# Patient Record
Sex: MALE | Race: WHITE | NOT HISPANIC OR LATINO | Employment: OTHER | URBAN - METROPOLITAN AREA
[De-identification: names, ages, dates, MRNs, and addresses within clinical notes are randomized per-mention and may not be internally consistent; named-entity substitution may affect disease eponyms.]

---

## 2017-01-11 ENCOUNTER — TELEPHONE (OUTPATIENT)
Dept: INTERNAL MEDICINE | Facility: MEDICAL CENTER | Age: 52
End: 2017-01-11

## 2017-01-11 NOTE — TELEPHONE ENCOUNTER
1. Caller Name: Humza                       Call Back Number: [phone]    2. Message: States pt has not been taking his pm meds x 2 months, has appt with you tomorrow, would like non-fasting labs drawn today to see current #'s.     3. Patient approves office to leave a detailed voicemail/MyChart message: yes

## 2017-01-12 ENCOUNTER — OFFICE VISIT (OUTPATIENT)
Dept: INTERNAL MEDICINE | Facility: MEDICAL CENTER | Age: 52
End: 2017-01-12
Payer: MEDICARE

## 2017-01-12 VITALS
HEIGHT: 65 IN | TEMPERATURE: 97.8 F | DIASTOLIC BLOOD PRESSURE: 108 MMHG | WEIGHT: 189 LBS | HEART RATE: 109 BPM | SYSTOLIC BLOOD PRESSURE: 144 MMHG | OXYGEN SATURATION: 94 % | BODY MASS INDEX: 31.49 KG/M2 | RESPIRATION RATE: 28 BRPM

## 2017-01-12 DIAGNOSIS — I25.119 CORONARY ARTERY DISEASE INVOLVING NATIVE CORONARY ARTERY OF NATIVE HEART WITH ANGINA PECTORIS (HCC): ICD-10-CM

## 2017-01-12 DIAGNOSIS — I25.5 CARDIOMYOPATHY, ISCHEMIC: ICD-10-CM

## 2017-01-12 DIAGNOSIS — K21.9 GASTROESOPHAGEAL REFLUX DISEASE, ESOPHAGITIS PRESENCE NOT SPECIFIED: ICD-10-CM

## 2017-01-12 DIAGNOSIS — E13.9 DIABETES MELLITUS DUE TO ABNORMAL INSULIN (HCC): ICD-10-CM

## 2017-01-12 DIAGNOSIS — I10 ESSENTIAL HYPERTENSION: ICD-10-CM

## 2017-01-12 DIAGNOSIS — E78.5 HYPERLIPIDEMIA, UNSPECIFIED HYPERLIPIDEMIA TYPE: ICD-10-CM

## 2017-01-12 DIAGNOSIS — E55.9 VITAMIN D DEFICIENCY: ICD-10-CM

## 2017-01-12 PROCEDURE — 99213 OFFICE O/P EST LOW 20 MIN: CPT | Mod: GE | Performed by: INTERNAL MEDICINE

## 2017-01-12 RX ORDER — LISINOPRIL 5 MG/1
5 TABLET ORAL DAILY
Qty: 30 TAB | Refills: 11 | Status: ON HOLD | OUTPATIENT
Start: 2017-01-12 | End: 2017-04-17

## 2017-01-12 RX ORDER — LORATADINE 10 MG/1
10 TABLET ORAL DAILY
COMMUNITY
End: 2017-01-12

## 2017-01-12 ASSESSMENT — PATIENT HEALTH QUESTIONNAIRE - PHQ9: CLINICAL INTERPRETATION OF PHQ2 SCORE: 0

## 2017-01-12 NOTE — PATIENT INSTRUCTIONS
- Current medication list:    1) Atorvastatin once daily  2) ASA 81 mg once daily  3) Metformin twice per day  4) Protonix once per day  5) Vit D once daily  6) Isosorbide mononitrate once daily  7) Lisinopril once daily

## 2017-01-12 NOTE — MR AVS SNAPSHOT
"        Ryan Trevizo   2017 2:30 PM   Office Visit   MRN: [id number]    Department:  [facility] - Internal Med   Dept Phone:  [phone]    Description:  Male : 1965   Provider:  Juancho Jenkins M.D.           Reason for Visit     Medication Management Pt has not been taking pm meds x 2 months-hoarding per caregiver       Allergies as of 2017     Allergen Noted Reactions    Ritalin [Methylphenidate] 2014   Unspecified    \"Hyper\"      You were diagnosed with     Coronary artery disease involving native coronary artery of native heart with angina pectoris (HCC)   [1891213]       Cardiomyopathy, ischemic   [063015]       Essential hypertension   [7978521]       Gastroesophageal reflux disease, esophagitis presence not specified   [3116745]       Hyperlipidemia, unspecified hyperlipidemia type   [5169201]       Vitamin D deficiency   [3605596]       Diabetes mellitus due to abnormal insulin ([facility])   [069087]         Vital Signs     Blood Pressure Pulse Temperature Respirations Height Weight    144/108 mmHg 109 36.6 °C (97.8 °F) 28 1.651 m (5' 5\") 85.73 kg (189 lb)    Body Mass Index Oxygen Saturation Smoking Status             31.45 kg/m2 94% Never Smoker          Basic Information     Date Of Birth Sex Race Ethnicity Preferred Language    1965 Male White Non- English      Your appointments     2017  4:00 PM   Established Patient with Juancho Jenkins M.D.   [facility] - Internal Medicine (--)    [address]  [address]  [facility] [address] [phone]           You will be receiving a confirmation call a few days before your appointment from our automated call confirmation system.            2017  2:30 PM   PREVIOUS PATIENT with Joseph León M.D.   [facility] (--)    [address] [place] [address] [address]  [facility] [address] [phone]              Problem List              ICD-10-CM Priority Class " Noted - Resolved    HTN (hypertension) I10 Low  7/15/2012 - Present    GERD (gastroesophageal reflux disease) K21.9 Low  7/15/2012 - Present    Hyperlipidemia E78.5 Low  7/16/2012 - Present    Ischemic Cardiomyopathy EF 45% I25.5   7/17/2012 - Present    Glaucoma (Chronic) H40.9 Low  Unknown - Present    Claudication (HCC) (Chronic) I73.9   Unknown - Present    CAD (coronary artery disease) I25.10 High  10/14/2013 - Present    Chest pain R07.9 High  12/18/2014 - Present    Hyperglycemia R73.9   4/8/2016 - Present      Health Maintenance        Date Due Completion Dates    COLONOSCOPY 5/26/2013 ---    IMM INFLUENZA (1) 9/1/2016 10/15/2015, 10/3/2015, 10/31/2014, 9/15/2012, 10/30/2011, 9/15/2011    IMM DTaP/Tdap/Td Vaccine (2 - Td) 6/15/2026 6/15/2016            Current Immunizations     Influenza TIV (IM) 10/31/2014, 9/15/2012  1:18 PM, 10/30/2011, 9/15/2011    Influenza Vaccine Quad Inj (Pf) 10/15/2015, 10/3/2015    Pneumococcal polysaccharide vaccine (PPSV-23) 7/30/2012 12:10 AM    Tdap Vaccine 6/15/2016      Below and/or attached are the medications your provider expects you to take. Review all of your home medications and newly ordered medications with your provider and/or pharmacist. Follow medication instructions as directed by your provider and/or pharmacist. Please keep your medication list with you and share with your provider. Update the information when medications are discontinued, doses are changed, or new medications (including over-the-counter products) are added; and carry medication information at all times in the event of emergency situations     Allergies:  RITALIN - Unspecified               Medications  Valid as of: January 12, 2017 -  4:17 PM    Generic Name Brand Name Tablet Size Instructions for use    Aspirin (Tablet Delayed Response) ECOTRIN 81 MG Take 1 Tab by mouth every day.        Atorvastatin Calcium (Tab) LIPITOR 40 MG Take 1 Tab by mouth every evening.        Cholecalciferol (Tab)  cholecalciferol 1000 UNIT Take 1 Tab by mouth every day.        Isosorbide Mononitrate (TABLET SR 24 HR) IMDUR 30 MG Take 1 Tab by mouth every day.        Lisinopril (Tab) PRINIVIL 5 MG Take 1 Tab by mouth every day.        MetFORMIN HCl (Tab) GLUCOPHAGE 500 MG Take 1 Tab by mouth 2 times a day, with meals.        Metoprolol Succinate (TABLET SR 24 HR) TOPROL XL 25 MG Take 1 Tab by mouth every day.        Pantoprazole Sodium (Tablet Delayed Response) PROTONIX 40 MG Take 1 Tab by mouth every day.        .                 Medicines prescribed today were sent to:     [facility], [place] - [address]    [address] [place] [address]    Phone: [phone] Fax: [phone]    Open 24 Hours?: No      Medication refill instructions:       If your prescription bottle indicates you have medication refills left, it is not necessary to call your provider’s office. Please contact your pharmacy and they will refill your medication.    If your prescription bottle indicates you do not have any refills left, you may request refills at any time through one of the following ways: The online [employer] system (except Urgent Care), by calling your provider’s office, or by asking your pharmacy to contact your provider’s office with a refill request. Medication refills are processed only during regular business hours and may not be available until the next business day. Your provider may request additional information or to have a follow-up visit with you prior to refilling your medication.   *Please Note: Medication refills are assigned a new Rx number when refilled electronically. Your pharmacy may indicate that no refills were authorized even though a new prescription for the same medication is available at the pharmacy. Please request the medicine by name with the pharmacy before contacting your provider for a refill.        Your To Do List     Future Labs/Procedures Complete By Expires    HEMOGLOBIN A1C  As  directed 1/12/2018    VITAMIN D,25 HYDROXY  As directed 1/12/2018      Referral     A referral request has been sent to our patient care coordination department. Please allow 3-5 business days for us to process this request and contact you either by phone or mail. If you do not hear from us by the 5th business day, please call us at [phone].        Instructions    - Current medication list:    1) Atorvastatin once daily  2) ASA 81 mg once daily  3) Metformin twice per day  4) Protonix once per day  5) Vit D once daily  6) Isosorbide mononitrate once daily  7) Lisinopril once daily          MyChart Status: Patient Declined

## 2017-01-12 NOTE — TELEPHONE ENCOUNTER
Spoke to Humza and relayed that you were still seeing pt's this afternoon, that we could discuss all of this at visit tomorrow and give lab order at that time.  Please reiterate to pt the importance of taking meds bid, they found out pt was hoarding them!

## 2017-01-13 NOTE — PROGRESS NOTES
Established Patient    Ryan presents today with the following:    CC: Medication reconciliation    HPI: 51 year old male with a PMH of STEMI in 2012, severe CAD, ischemic cardiomyopathy, HTN, hyperglycemia, GERD, HLD, anxiety and mild mental retardation presents to the clinic for a follow up. The patient was last seen in this office this past August as a new patient. At that time he was unsure which medications he was on and was overall a very poor historian. [facility] was contacted and a was receiving pantaprazole 40 mg daily, metformin 500 mg BID and fenofibrate 160 mg daily. Since that time the patient has visited the ED 3 times on 9/9 for HA and body aches, 12/11 for abd pain, 12/21 for cough. Basic labs were drawn on 12/11 and were significant for a glucose level of 272 and an ALT of 57. A Ct scan abdomen was also performed on 12/11 and showed changes in the distal esophagus likely related to inflammatory esophagitis and cholelithiasis. At todays visit the patient is accompanied by his caregiver named Humza who is aiding with history. Per caregiver, Mr. Trevizo has been non-compliant with medications and hoarding pills. He has been seen in the ED multiple times without any caregiver present and when medications from ED physicians are prescribed, he does not pick them up from the pharmacy. [facility] was again contacted to get an updated list of the patients medications and they are currently administering ASA, Vitamin D, metformin, protonix, atorvastatin, fenofibrate and metoprolol. Today, the patient feels fine and has no acute complaints.    Patient Active Problem List    Diagnosis Date Noted   • Chest pain 12/18/2014     Priority: High   • CAD (coronary artery disease) 10/14/2013     Priority: High   • Glaucoma      Priority: Low   • Hyperlipidemia 07/16/2012     Priority: Low   • HTN (hypertension) 07/15/2012     Priority: Low   • GERD (gastroesophageal reflux disease) 07/15/2012     Priority: Low  "  • Hyperglycemia 04/08/2016   • Claudication (HCC)    • Ischemic Cardiomyopathy EF 45% 07/17/2012       Current Outpatient Prescriptions   Medication Sig Dispense Refill   • aspirin EC (ECOTRIN) 81 MG Tablet Delayed Response Take 1 Tab by mouth every day. 30 Tab 11   • lisinopril (PRINIVIL) 5 MG Tab Take 1 Tab by mouth every day. 30 Tab 11   • vitamin D (CHOLECALCIFEROL) 1000 UNIT Tab Take 1 Tab by mouth every day. 30 Tab 11   • metformin (GLUCOPHAGE) 500 MG Tab Take 1 Tab by mouth 2 times a day, with meals. 60 Tab 11   • pantoprazole (PROTONIX) 40 MG Tablet Delayed Response Take 1 Tab by mouth every day. 30 Tab 11   • atorvastatin (LIPITOR) 40 MG Tab Take 1 Tab by mouth every evening. 30 Tab 11   • metoprolol SR (TOPROL XL) 25 MG TABLET SR 24 HR Take 1 Tab by mouth every day. 30 Tab 11   • isosorbide mononitrate SR (IMDUR) 30 MG TABLET SR 24 HR Take 1 Tab by mouth every day. 30 Tab 3     No current facility-administered medications for this visit.       ROS: As per HPI. Additional pertinent symptoms as noted below.    Constitutional: Denies fever, chills  Eyes: Denies vision loss  Reports blurry vision (recently got glasses)  ENT: Denies hearing loss, discharge, earache, odynophagia, dysphagia  Cardiovascular: Denies angina, palpitations, edema  Respiratory: Denies wheeze, sputum  GI: Denies nausea, vomiting, dysphagia, diarrhea, constipation, blood in stool, abdominal pain  : Denies frequency, hesitancy, urgency, dysuria, hematuria  Musculo-skeletal: Denies weakness, myalgia, stiffness  Skin: Denies rash, sores, jaundice  Neurological: Denies loss of sensation, numbness      /108 mmHg  Pulse 109  Temp(Src) 36.6 °C (97.8 °F)  Resp 28  Ht 1.651 m (5' 5\")  Wt 85.73 kg (189 lb)  BMI 31.45 kg/m2  SpO2 94%    Physical Exam   General:  Alert and oriented, No apparent distress. Disheveled male appears stated age, well nourished, appears anxious.  Eyes: Pupils equal and reactive. No scleral icterus. EOMI, " no injected conjunctiva.  Throat: Clear no erythema or exudates noted.  Neck: Supple. No lymphadenopathy noted. Thyroid not enlarged.  Lungs: Clear to auscultation and percussion bilaterally. No wheezing, crackles, rhales.  Cardiovascular: Regular rate and rhythm. No murmurs, rubs or gallops.  Abdomen:  Benign. No rebound or guarding noted. Non-distended, non-tender.  Extremities: No clubbing, cyanosis, edema.  Skin: Clear. No rash or suspicious skin lesions noted.      Assessment and Plan    1. Medication reconciliation              - Per [facility] pharmacy patient is being prescribed ASA, vitamin D, metformin, protonix, atorvastatin, fenofibrate and metoprolol.   - [facility] chart review with proper medications prescribed at todays visit.   - Continue vit D, metformin, protonix, atorvastatin and metoprolol   - Discontinue fenofibrate (ALT elevation, repeat CMP)   - Change ASA dose from 325mg to 81mg daily   - Added lisinopril and isosorbide mononitrate   - Caregiver states that they will be inspecting meds daily from now on                2. Coronary artery disease              - Continue metoprolol, ASA, atorvastatin   - Isosorbide mononitrate added at todays visit (previously prescribed to outside pharmacy, but patient does not  meds unless they are sent to [facility])   - Risk factor modification. Patient was counseled at length to diet + exercise              - Saw cardiologist, Dr. Calixto, on 9/29. No changes were made to medication regimen at that time.   - F/u with Dr. León     3. Ischemic Cardiomyopathy EF 45%   - Likely 2/2 to CAD              - Continue metoprolol, lisinopril, atorvastatin   - Monitor volume status    4. Hyperlipidemia              - Continue atorvastatin   - D/C fenofibrate   - Recheck lipid panel    5. Essential hypertension   - /108, pulse 109 at today's visit, but patient did not take BP medications this morning              - Continue metoprolol, added  lisinopril for renal protection and cardiomyopathy   - BP log given to patient. He has a BP cuff at home. Counseled to take BP log and bring it in with him at next visit   - Counseled regarding symptoms of hypotension     6. Gastroesophageal reflux disease              - Continue protonix daily for now   - GI referral    7. Diabetes mellitus              - HbA1c 7.7% on 5/20/2016   - Recheck HbA1c   - Continue Metformin. May need to increase dose or add second agent for better control of DM   - Check urine microalbumin    8. Vitamin D deficiency   - Continue Vitamin D replacement   - Check Vit D levels    9. Distal Esophagus changes on recent CT abd 12/11   - CT abd 12/11 shows changes in the distal esophagus likely related to inflammatory esophagitis   - Continue protonix   - Referral to GI for possible EGD    10. Health maintenance   - GI referral for colonoscopy    PLAN: Follow up in 5 weeks to review HbA1c, Vit D, lipid panel and monitor BP.          Signed by: Juancho Jenkins M.D.

## 2017-02-04 ENCOUNTER — HOSPITAL ENCOUNTER (EMERGENCY)
Facility: MEDICAL CENTER | Age: 52
End: 2017-02-04
Attending: EMERGENCY MEDICINE
Payer: MEDICARE

## 2017-02-04 VITALS
HEIGHT: 71 IN | RESPIRATION RATE: 18 BRPM | BODY MASS INDEX: 28 KG/M2 | WEIGHT: 200 LBS | HEART RATE: 94 BPM | DIASTOLIC BLOOD PRESSURE: 94 MMHG | SYSTOLIC BLOOD PRESSURE: 109 MMHG | OXYGEN SATURATION: 94 % | TEMPERATURE: 98.4 F

## 2017-02-04 DIAGNOSIS — M25.551 RIGHT HIP PAIN: ICD-10-CM

## 2017-02-04 PROCEDURE — 99283 EMERGENCY DEPT VISIT LOW MDM: CPT

## 2017-02-04 RX ORDER — TRAMADOL HYDROCHLORIDE 50 MG/1
50 TABLET ORAL EVERY 4 HOURS PRN
Qty: 16 TAB | Refills: 0 | Status: SHIPPED | OUTPATIENT
Start: 2017-02-04 | End: 2017-03-23

## 2017-02-04 ASSESSMENT — PAIN SCALES - GENERAL: PAINLEVEL_OUTOF10: 10

## 2017-02-04 NOTE — ED AVS SNAPSHOT
Neul Access Code: Activation code not generated  Current Neul Status: Patient Declined    Your email address is not on file at [facility].  Email Addresses are required for you to sign up for Neul, please contact [phone] to verify your personal information and to provide your email address prior to attempting to register for Neul.    Ryan Trevizo  [address]   [address]  [place] [address]    [employer]  A secure, online tool to manage your health information     [employer]’s Neul® is a secure, online tool that connects you to your personalized health information from the privacy of your home -- day or night - making it very easy for you to manage your healthcare. Once the activation process is completed, you can even access your medical information using the Neul anthony, which is available for free in the Apple Anthony store or Google Play store.     To learn more about Neul, visit [email or web address]    There are two levels of access available (as shown below):   My Chart Features  [facility] Primary Care Doctor [facility]  Specialists [facility]  Urgent  Care Non-[facility] Primary Care Doctor   Email your healthcare team securely and privately 24/7 X X X    Manage appointments: schedule your next appointment; view details of past/upcoming appointments X      Request prescription refills. X      View recent personal medical records, including lab and immunizations X X X X   View health record, including health history, allergies, medications X X X X   Read reports about your outpatient visits, procedures, consult and ER notes X X X X   See your discharge summary, which is a recap of your hospital and/or ER visit that includes your diagnosis, lab results, and care plan X X  X     How to register for [employer]:  Once your e-mail address has been verified, follow the following steps to sign up for [employer].     1. Go to  [email or web address]  2. Click on the Sign Up Now box, which takes you to the  New Member Sign Up page. You will need to provide the following information:  a. Enter your [employer] Access Code exactly as it appears at the top of this page. (You will not need to use this code after you’ve completed the sign-up process. If you do not sign up before the expiration date, you must request a new code.)   b. Enter your date of birth.   c. Enter your home email address.   d. Click Submit, and follow the next screen’s instructions.  3. Create a [employer] ID. This will be your [employer] login ID and cannot be changed, so think of one that is secure and easy to remember.  4. Create a [employer] password. You can change your password at any time.  5. Enter your Password Reset Question and Answer. This can be used at a later time if you forget your password.   6. Enter your e-mail address. This allows you to receive e-mail notifications when new information is available in [employer].  7. Click Sign Up. You can now view your health information.    For assistance activating your [employer] account, call [phone]

## 2017-02-04 NOTE — ED AVS SNAPSHOT
After Visit Summary                                                                                                                Ryan Trevizo   MRN: [id number]    Department:  [facility], Emergency Dept   Date of Visit:  2/4/2017            [facility], Emergency Dept    [address] [place] [id number]    Phone:  [phone]      You were seen by     Félix Soto M.D.      Your Diagnosis Was     Right hip pain     M25.551       Follow-up Information     1. Follow up with Juancho Jenkins M.D..    Specialty:  Internal Medicine    Why:  As needed    Contact information    [address] [place] [id number] [phone]        Medication Information     Review all of your home medications and newly ordered medications with your primary doctor and/or pharmacist as soon as possible. Follow medication instructions as directed by your doctor and/or pharmacist.     Please keep your complete medication list with you and share with your physician. Update the information when medications are discontinued, doses are changed, or new medications (including over-the-counter products) are added; and carry medication information at all times in the event of emergency situations.               Medication List      START taking these medications        Instructions    tramadol 50 MG Tabs   Commonly known as:  ULTRAM    Take 1 Tab by mouth every four hours as needed.   Dose:  50 mg         ASK your doctor about these medications        Instructions    aspirin EC 81 MG Tbec   Commonly known as:  ECOTRIN    Take 1 Tab by mouth every day.   Dose:  81 mg       atorvastatin 40 MG Tabs   Commonly known as:  LIPITOR    Take 1 Tab by mouth every evening.   Dose:  40 mg       isosorbide mononitrate SR 30 MG Tb24   Commonly known as:  IMDUR    Take 1 Tab by mouth every day.   Dose:  30 mg       lisinopril 5 MG Tabs   Commonly known as:  PRINIVIL    Take 1 Tab by mouth every day.   Dose:   5 mg       metformin 500 MG Tabs   Commonly known as:  GLUCOPHAGE    Take 1 Tab by mouth 2 times a day, with meals.   Dose:  500 mg       metoprolol SR 25 MG Tb24   Commonly known as:  TOPROL XL    Take 1 Tab by mouth every day.   Dose:  25 mg       pantoprazole 40 MG Tbec   Commonly known as:  PROTONIX    Take 1 Tab by mouth every day.   Dose:  40 mg       vitamin D 1000 UNIT Tabs   Commonly known as:  cholecalciferol    Take 1 Tab by mouth every day.   Dose:  1000 Units               Procedures and tests performed during your visit     CARDIAC MONITORING    O2 Protocol    SALINE LOCK        Discharge Instructions       Joint Pain  Joint pain can be caused by many things. The joint can be bruised, infected, weak from aging, or sore from exercise. The pain will probably go away if you follow your doctor's instructions for home care. If your joint pain continues, more tests may be needed to help find the cause of your condition.  HOME CARE  Watch your condition for any changes. Follow these instructions as told to lessen the pain that you are feeling:  · Take medicines only as told by your doctor.  · Rest the sore joint for as long as told by your doctor. If your doctor tells you to, raise (elevate) the painful joint above the level of your heart while you are sitting or lying down.  · Do not do things that cause pain or make the pain worse.  · If told, put ice on the painful area:  ¨ Put ice in a plastic bag.  ¨ Place a towel between your skin and the bag.  ¨ Leave the ice on for 20 minutes, 2-3 times per day.  · Wear an elastic bandage, splint, or sling as told by your doctor. Loosen the bandage or splint if your fingers or toes lose feeling (become numb) and tingle, or if they turn cold and blue.  · Begin exercising or stretching the joint as told by your doctor. Ask your doctor what types of exercise are safe for you.  · Keep all follow-up visits as told by your doctor. This is important.  GET HELP IF:  · Your  pain gets worse and medicine does not help it.  · Your joint pain does not get better in 3 days.  · You have more bruising or swelling.  · You have a fever.  · You lose 10 pounds (4.5 kg) or more without trying.  GET HELP RIGHT AWAY IF:  · You are not able to move the joint.  · Your fingers or toes become numb or they turn cold and blue.     This information is not intended to replace advice given to you by your health care provider. Make sure you discuss any questions you have with your health care provider.     Document Released: 12/06/2010 Document Revised: 01/08/2016 Document Reviewed: 09/29/2015  [employer] Interactive Patient Education ©2016 [employer]vier Inc.            Patient Information     Patient Information    Following emergency treatment: all patient requiring follow-up care must return either to a private physician or a clinic if your condition worsens before you are able to obtain further medical attention, please return to the emergency room.     Billing Information    At [facility], we work to make the billing process streamlined for our patients.  Our Representatives are here to answer any questions you may have regarding your hospital bill.  If you have insurance coverage and have supplied your insurance information to us, we will submit a claim to your insurer on your behalf.  Should you have any questions regarding your bill, we can be reached online or by phone as follows:  Online: You are able pay your bills online or live chat with our representatives about any billing questions you may have. We are here to help Monday - Friday from 8:00am to 7:30pm and 9:00am - 12:00pm on Saturdays.  Please visit [email or web address]  for more information.   Phone:  [phone] or [phone]    Please note that your emergency physician, surgeon, pathologist, radiologist, anesthesiologist, and other specialists are not employed by [facility] and will therefore bill separately  for their services.  Please contact them directly for any questions concerning their bills at the numbers below:     Emergency Physician Services:  [phone]  [facility]:  [phone]  [facility]:  [phone]  [facility]:  [phone]    1. Your final bill may vary from the amount quoted upon discharge if all procedures are not complete at that time, or if your doctor has additional procedures of which we are not aware. You will receive an additional bill if you return to the Emergency Department at [facility] for suture removal regardless of the facility of which the sutures were placed.     2. Please arrange for settlement of this account at the emergency registration.    3. All self-pay accounts are due in full at the time of treatment.  If you are unable to meet this obligation then payment is expected within 4-5 days.     4. If you have had radiology studies (CT, X-ray, Ultrasound, MRI), you have received a preliminary result during your emergency department visit. Please contact the radiology department [phone] to receive a copy of your final result. Please discuss the Final result with your primary physician or with the follow up physician provided.     Crisis Hotline:  [place] Crisis Hotline:  4-724-HLYXOCX or 1-491.520.2743  [place] Crisis Hotline:    1-809.244.7603 or 349-903-9714         ED Discharge Follow Up Questions    1. In order to provide you with very good care, we would like to follow up with a phone call in the next few days.  May we have your permission to contact you?     YES /  NO    2. What is the best phone number to call you? (       )_____-__________    3. What is the best time to call you?      Morning  /  Afternoon  /  Evening                   Patient Signature:  ____________________________________________________________    Date:  ____________________________________________________________      Your appointments        Feb 16, 2017  4:00 PM   Established Patient with Juancho Jenkins M.D.   [facility] - Internal Medicine (--)    [address] [place] [address]  [facility] [id number] [phone]           You will be receiving a confirmation call a few days before your appointment from our automated call confirmation system.            Apr 13, 2017  2:30 PM   PREVIOUS PATIENT with Joseph León M.D.   [facility] (--)    [address] [place] [address]  [facility] [id number] [phone]

## 2017-02-04 NOTE — ED AVS SNAPSHOT
2/4/2017          Ryan Trevizo  [address]    [address]  [place] [address]    Dear Ryan:    [facility] wants to ensure your discharge home is safe and you or your loved ones have had all your questions answered regarding your care after you leave the hospital.    You may receive a telephone call within two days of your discharge.  This call is to make certain you understand your discharge instructions as well as ensure we provided you with the best care possible during your stay with us.     The call will only last approximately 3-5 minutes and will be done by a nurse.    Once again, we want to ensure your discharge home is safe and that you have a clear understanding of any next steps in your care.  If you have any questions or concerns, please do not hesitate to contact us, we are here for you.  Thank you for choosing [facility] for your healthcare needs.    Sincerely,    Enrique Ibanez    [facility]

## 2017-02-05 NOTE — DISCHARGE INSTRUCTIONS
Joint Pain  Joint pain can be caused by many things. The joint can be bruised, infected, weak from aging, or sore from exercise. The pain will probably go away if you follow your doctor's instructions for home care. If your joint pain continues, more tests may be needed to help find the cause of your condition.  HOME CARE  Watch your condition for any changes. Follow these instructions as told to lessen the pain that you are feeling:  · Take medicines only as told by your doctor.  · Rest the sore joint for as long as told by your doctor. If your doctor tells you to, raise (elevate) the painful joint above the level of your heart while you are sitting or lying down.  · Do not do things that cause pain or make the pain worse.  · If told, put ice on the painful area:  ¨ Put ice in a plastic bag.  ¨ Place a towel between your skin and the bag.  ¨ Leave the ice on for 20 minutes, 2-3 times per day.  · Wear an elastic bandage, splint, or sling as told by your doctor. Loosen the bandage or splint if your fingers or toes lose feeling (become numb) and tingle, or if they turn cold and blue.  · Begin exercising or stretching the joint as told by your doctor. Ask your doctor what types of exercise are safe for you.  · Keep all follow-up visits as told by your doctor. This is important.  GET HELP IF:  · Your pain gets worse and medicine does not help it.  · Your joint pain does not get better in 3 days.  · You have more bruising or swelling.  · You have a fever.  · You lose 10 pounds (4.5 kg) or more without trying.  GET HELP RIGHT AWAY IF:  · You are not able to move the joint.  · Your fingers or toes become numb or they turn cold and blue.     This information is not intended to replace advice given to you by your health care provider. Make sure you discuss any questions you have with your health care provider.     Document Released: 12/06/2010 Document Revised: 01/08/2016 Document Reviewed: 09/29/2015  [employer]  Patient Education ©2016 Elsevier Inc.

## 2017-02-05 NOTE — ED PROVIDER NOTES
ED Provider Note    CHIEF COMPLAINT  Chief Complaint   Patient presents with   • Abdominal Pain     right lower       HPI  Ryan Trevizo is a 51 y.o. male who presents to the ED with R hip pain. No reported trauma, however, did lift a heavy box yesterday. No back pain. No leg weakness. No loss of urine or bowel control. Denies flank or abdominal pain and only pain located on lateral/anterior R hip. Able to ambulate w/o problem. No testicular pain. No hematuria or dysuria.     REVIEW OF SYSTEMS  See HPI for further details. All other systems are negative.     PAST MEDICAL HISTORY   has a past medical history of Arthritis; HTN (hypertension) (7/15/2012); GERD (gastroesophageal reflux disease) (7/15/2012); STEMI (ST elevation myocardial infarction) (CMS-HCC); Anxiety disorder; MI (myocardial infarction) (CMS-HCC); Hyperlipemia; Upper GI bleed (7/26/2012); Glaucoma; SOB (shortness of breath); Claudication (CMS-HCC); Dizziness; CAD (coronary artery disease) (10/14/2013); ACS (acute coronary syndrome) (CMS-HCC) (7/25/2012); Pericarditis secondary to acute myocardial infarction (CMS-HCC) (7/19/2012); Right knee pain; Blood glucose elevated; Mental retardation; Osteoarthritis; and Hypertriglyceridemia.    SOCIAL HISTORY  Social History     Social History Main Topics   • Smoking status: Never Smoker    • Smokeless tobacco: Never Used   • Alcohol Use: No   • Drug Use: No      Comment: cough syrup; vicodin; narcotics   • Sexual Activity: Not on file      Comment: Single, has no children, works as an .       SURGICAL HISTORY   has past surgical history that includes other orthopedic surgery (7- ); other (knee surgery); other cardiac surgery; other; gastroscopy-endo (7/25/2012); and gastroscopy-endo (7/26/2012).    CURRENT MEDICATIONS  Home Medications     **Home medications have not yet been reviewed for this encounter**          ALLERGIES  Allergies   Allergen Reactions   • Ritalin [Methylphenidate]  "Unspecified     \"Hyper\"       PHYSICAL EXAM  VITAL SIGNS: /99 mmHg  Pulse 98  Temp(Src) 36.9 °C (98.4 °F) (Temporal)  Resp 16  Ht 1.803 m (5' 11\")  Wt 90.719 kg (200 lb)  BMI 27.91 kg/m2  SpO2 94% @LARON[825411::@   Pulse ox interpretation: I interpret this pulse ox as normal.  Constitutional: Alert in no apparent distress. Stable gait.   HENT: No signs of trauma, Bilateral external ears normal, Nose normal.   Eyes: Pupils are equal and reactive, Conjunctiva normal, Non-icteric.   Neck: Normal range of motion, No tenderness, Supple, No stridor. No JVD.  Lymphatic: No lymphadenopathy noted.   Cardiovascular: Regular rate and rhythm, no murmurs.   Thorax & Lungs: Normal breath sounds, No respiratory distress, No wheezing, No chest tenderness.   Abdomen: Bowel sounds normal, Soft, No tenderness, No masses, No pulsatile masses. No peritoneal signs.  Skin: Warm, Dry, No erythema, No rash.   Back: No bony tenderness, No CVA tenderness.   Extremities: Intact distal pulses, No edema, No tenderness,   Musculoskeletal: Good range of motion in all major joints. Tenderness on palpation of R lateral/anterior hip. Range of motion w/o pain. No erythema. No significant edema or deformity. Normal distal pulses and sensation/strength in R leg.   Neurologic: Alert , Normal motor function, Normal sensory function, No focal deficits noted.   Psychiatric: Affect normal, Judgment normal, Mood normal.     COURSE & MEDICAL DECISION MAKING  Pertinent Labs & Imaging studies reviewed. (See chart for details)  Well appearing. VSS. No flank pain. No abdominal pain. Only pain is located R hip. Normal rom and stable gait. Low suspicion for acute fracture or septic joint. Given tenderness to palpation on hip - low suspicion for back etiology as source and no back pain present. Recommend pain control and close medicine f/u as needed. Ambulated out of ED w/o difficulty.   The patient will not drink alcohol nor drive with prescribed " medications. The patient will return for worsening symptoms and is stable at the time of discharge. The patient verbalizes understanding and will comply.    FINAL IMPRESSION  1. Right hip pain         Electronically signed by: Félix Soto, 2/4/2017 10:38 PM

## 2017-02-05 NOTE — ED NOTES
Pt dc home ambulatory verbalized understanding of dc instruction follow up and meds vitals stable no distress noted at dc

## 2017-02-16 ENCOUNTER — OFFICE VISIT (OUTPATIENT)
Dept: INTERNAL MEDICINE | Facility: MEDICAL CENTER | Age: 52
End: 2017-02-16
Payer: MEDICARE

## 2017-02-16 VITALS
RESPIRATION RATE: 24 BRPM | WEIGHT: 188.6 LBS | TEMPERATURE: 97.3 F | HEIGHT: 65 IN | HEART RATE: 94 BPM | SYSTOLIC BLOOD PRESSURE: 114 MMHG | DIASTOLIC BLOOD PRESSURE: 88 MMHG | OXYGEN SATURATION: 95 % | BODY MASS INDEX: 31.42 KG/M2

## 2017-02-16 DIAGNOSIS — M25.562 CHRONIC PAIN OF BOTH KNEES: ICD-10-CM

## 2017-02-16 DIAGNOSIS — K21.9 GASTROESOPHAGEAL REFLUX DISEASE, ESOPHAGITIS PRESENCE NOT SPECIFIED: ICD-10-CM

## 2017-02-16 DIAGNOSIS — G89.29 CHRONIC PAIN OF BOTH KNEES: ICD-10-CM

## 2017-02-16 DIAGNOSIS — R30.0 DYSURIA: ICD-10-CM

## 2017-02-16 DIAGNOSIS — M25.561 CHRONIC PAIN OF BOTH KNEES: ICD-10-CM

## 2017-02-16 DIAGNOSIS — E11.9 TYPE 2 DIABETES MELLITUS WITHOUT COMPLICATION, WITHOUT LONG-TERM CURRENT USE OF INSULIN (HCC): ICD-10-CM

## 2017-02-16 DIAGNOSIS — I25.119 CORONARY ARTERY DISEASE INVOLVING NATIVE CORONARY ARTERY OF NATIVE HEART WITH ANGINA PECTORIS (HCC): ICD-10-CM

## 2017-02-16 DIAGNOSIS — I10 ESSENTIAL HYPERTENSION: ICD-10-CM

## 2017-02-16 DIAGNOSIS — I25.5 CARDIOMYOPATHY, ISCHEMIC: ICD-10-CM

## 2017-02-16 DIAGNOSIS — E78.5 HYPERLIPIDEMIA, UNSPECIFIED HYPERLIPIDEMIA TYPE: ICD-10-CM

## 2017-02-16 LAB
APPEARANCE UR: CLEAR
BILIRUB UR STRIP-MCNC: ABNORMAL MG/DL
COLOR UR AUTO: ABNORMAL
GLUCOSE UR STRIP.AUTO-MCNC: 2000 MG/DL
KETONES UR STRIP.AUTO-MCNC: 5 MG/DL
LEUKOCYTE ESTERASE UR QL STRIP.AUTO: ABNORMAL
NITRITE UR QL STRIP.AUTO: ABNORMAL
PH UR STRIP.AUTO: 6 [PH] (ref 5–8)
PROT UR QL STRIP: 30 MG/DL
RBC UR QL AUTO: ABNORMAL
SP GR UR STRIP.AUTO: 1.01
UROBILINOGEN UR STRIP-MCNC: ABNORMAL MG/DL

## 2017-02-16 PROCEDURE — 81002 URINALYSIS NONAUTO W/O SCOPE: CPT | Performed by: INTERNAL MEDICINE

## 2017-02-16 PROCEDURE — G8598 ASA/ANTIPLAT THER USED: HCPCS | Mod: GC | Performed by: INTERNAL MEDICINE

## 2017-02-16 PROCEDURE — G8484 FLU IMMUNIZE NO ADMIN: HCPCS | Mod: GC | Performed by: INTERNAL MEDICINE

## 2017-02-16 PROCEDURE — 1036F TOBACCO NON-USER: CPT | Mod: GC | Performed by: INTERNAL MEDICINE

## 2017-02-16 PROCEDURE — 3045F PR MOST RECENT HEMOGLOBIN A1C LEVEL 7.0-9.0%: CPT | Mod: GC | Performed by: INTERNAL MEDICINE

## 2017-02-16 PROCEDURE — 3017F COLORECTAL CA SCREEN DOC REV: CPT | Mod: 8P,GC | Performed by: INTERNAL MEDICINE

## 2017-02-16 PROCEDURE — 99213 OFFICE O/P EST LOW 20 MIN: CPT | Mod: GE | Performed by: INTERNAL MEDICINE

## 2017-02-16 PROCEDURE — G8419 CALC BMI OUT NRM PARAM NOF/U: HCPCS | Mod: GC | Performed by: INTERNAL MEDICINE

## 2017-02-16 PROCEDURE — G8510 SCR DEP NEG, NO PLAN REQD: HCPCS | Mod: GC | Performed by: INTERNAL MEDICINE

## 2017-02-16 RX ORDER — IBUPROFEN 600 MG/1
600 TABLET ORAL EVERY 6 HOURS PRN
Qty: 30 TAB | Refills: 0 | Status: SHIPPED | OUTPATIENT
Start: 2017-02-16 | End: 2017-04-16

## 2017-02-16 ASSESSMENT — PATIENT HEALTH QUESTIONNAIRE - PHQ9: CLINICAL INTERPRETATION OF PHQ2 SCORE: 0

## 2017-02-16 NOTE — MR AVS SNAPSHOT
"        Ryan Trevizo   2017 4:00 PM   Office Visit   MRN: [id number]    Department:  [facility] Med - Internal Med   Dept Phone:  [phone]    Description:  Male : 1965   Provider:  Juancho Jenkins M.D.           Reason for Visit     Coronary Artery Disease           Allergies as of 2017     Allergen Noted Reactions    Ritalin [Methylphenidate] 2014   Unspecified    \"Hyper\"      You were diagnosed with     Chronic pain of both knees   [4870241]       Dysuria   [788.1.ICD-9-CM]         Vital Signs     Blood Pressure Pulse Temperature Respirations Height Weight    114/88 mmHg 94 36.3 °C (97.3 °F) 24 1.651 m (5' 5\") 85.548 kg (188 lb 9.6 oz)    Body Mass Index Oxygen Saturation Smoking Status             31.38 kg/m2 95% Never Smoker          Basic Information     Date Of Birth Sex Race Ethnicity Preferred Language    1965 Male White Non- English      Your appointments     Mar 23, 2017  2:00 PM   Established Patient with Juancho Jenkins M.D.   [facility] - Internal Medicine (--)    [address] [place] [address]  [address]  [facility] [address] [phone]           You will be receiving a confirmation call a few days before your appointment from our automated call confirmation system.            2017  2:30 PM   PREVIOUS PATIENT with Joseph León M.D.   [facility] (--)    [address] [place] [address]  [facility] [address] [phone]              Problem List              ICD-10-CM Priority Class Noted - Resolved    HTN (hypertension) I10 Low  7/15/2012 - Present    GERD (gastroesophageal reflux disease) K21.9 Low  7/15/2012 - Present    Hyperlipidemia E78.5 Low  2012 - Present    Ischemic Cardiomyopathy EF 45% I25.5   2012 - Present    Glaucoma (Chronic) H40.9 Low  Unknown - Present    Claudication (CMS-HCC) (Chronic) I73.9   Unknown - Present    CAD (coronary artery disease) I25.10 High  10/14/2013 - Present   " Chest pain R07.9 High  12/18/2014 - Present    Hyperglycemia R73.9   4/8/2016 - Present      Health Maintenance        Date Due Completion Dates    IMM INFLUENZA (1) 9/1/2016 10/15/2015, 10/3/2015, 10/31/2014, 9/15/2012, 10/30/2011, 9/15/2011    IMM DTaP/Tdap/Td Vaccine (2 - Td) 6/15/2026 6/15/2016    COLONOSCOPY 2/6/2027 2/6/2017 (N/S)    Override on 2/6/2017: (N/S) (PER [facility] NO COLONOSCOPY)            Current Immunizations     Influenza TIV (IM) 10/31/2014, 9/15/2012  1:18 PM, 10/30/2011, 9/15/2011    Influenza Vaccine Quad Inj (Pf) 10/15/2015, 10/3/2015    Pneumococcal polysaccharide vaccine (PPSV-23) 7/30/2012 12:10 AM    Tdap Vaccine 6/15/2016      Below and/or attached are the medications your provider expects you to take. Review all of your home medications and newly ordered medications with your provider and/or pharmacist. Follow medication instructions as directed by your provider and/or pharmacist. Please keep your medication list with you and share with your provider. Update the information when medications are discontinued, doses are changed, or new medications (including over-the-counter products) are added; and carry medication information at all times in the event of emergency situations     Allergies:  RITALIN - Unspecified               Medications  Valid as of: February 16, 2017 -  4:50 PM    Generic Name Brand Name Tablet Size Instructions for use    Aspirin (Tablet Delayed Response) ECOTRIN 81 MG Take 1 Tab by mouth every day.        Atorvastatin Calcium (Tab) LIPITOR 40 MG Take 1 Tab by mouth every evening.        Cholecalciferol (Tab) cholecalciferol 1000 UNIT Take 1 Tab by mouth every day.        Ibuprofen (Tab) MOTRIN 600 MG Take 1 Tab by mouth every 6 hours as needed. for knee pain        Isosorbide Mononitrate (TABLET SR 24 HR) IMDUR 30 MG Take 1 Tab by mouth every day.        Lisinopril (Tab) PRINIVIL 5 MG Take 1 Tab by mouth every day.        MetFORMIN HCl (Tab) GLUCOPHAGE 500 MG Take 1  Tab by mouth 2 times a day, with meals.        Metoprolol Succinate (TABLET SR 24 HR) TOPROL XL 25 MG Take 1 Tab by mouth every day.        Pantoprazole Sodium (Tablet Delayed Response) PROTONIX 40 MG Take 1 Tab by mouth every day.        TraMADol HCl (Tab) ULTRAM 50 MG Take 1 Tab by mouth every four hours as needed.        .                 Medicines prescribed today were sent to:     [facility] [place] [place] - [address] [facility]    [address] [place] [address]    Phone: [phone] Fax: [phone]    Open 24 Hours?: No      Medication refill instructions:       If your prescription bottle indicates you have medication refills left, it is not necessary to call your provider’s office. Please contact your pharmacy and they will refill your medication.    If your prescription bottle indicates you do not have any refills left, you may request refills at any time through one of the following ways: The online [employer] system (except Urgent Care), by calling your provider’s office, or by asking your pharmacy to contact your provider’s office with a refill request. Medication refills are processed only during regular business hours and may not be available until the next business day. Your provider may request additional information or to have a follow-up visit with you prior to refilling your medication.   *Please Note: Medication refills are assigned a new Rx number when refilled electronically. Your pharmacy may indicate that no refills were authorized even though a new prescription for the same medication is available at the pharmacy. Please request the medicine by name with the pharmacy before contacting your provider for a refill.        Instructions    - Urinalysis  - Call [phone]  - Get blood work  - Batteries for your blood pressure cuff. Take blood pressure once per day.          MyChart Status: Patient Declined

## 2017-02-17 NOTE — PATIENT INSTRUCTIONS
- Urinalysis  - Call [phone]  - Get blood work  - Batteries for your blood pressure cuff. Take blood pressure once per day.

## 2017-02-17 NOTE — PROGRESS NOTES
Established Patient    Ryan presents today with the following:    CC: 5 week follow up    HPI: Mr. Trevizo presents to the clinic today alone after taking the bus here (usually has a caregiver present) for a follow up. At last visit he was ordered a urine microalbumin, HbA1c, Vitamin D, lipid panel, given a GI referral for colonoscopy and follow up on his distal esophageal changes and was asked to bring in a BP log. Pt did not get labs, did not follow up with GI and did not log his BP because his cuff was out of batteries. He was seen in the ED since I last saw him on 2/4 with complaints of right hip pain after lifting heavy boxes during work. He was prescribed toradol and sent home. At today's visit his right hip is not painful, but he states that he has been urinating frequently with episodic dysuria and complains of continued chronic b/l knee pain. He states that he is compliant with all his medications. Patient was provided with the phone number for the GI referral and instructed to get his labs and get batteries for his BP cuff and take it once per day.    Patient Active Problem List    Diagnosis Date Noted   • Chest pain 12/18/2014     Priority: High   • CAD (coronary artery disease) 10/14/2013     Priority: High   • Glaucoma      Priority: Low   • Hyperlipidemia 07/16/2012     Priority: Low   • HTN (hypertension) 07/15/2012     Priority: Low   • GERD (gastroesophageal reflux disease) 07/15/2012     Priority: Low   • Type 2 diabetes mellitus without complication, without long-term current use of insulin ([facility]) 02/16/2017   • Claudication (CMS-[facility])    • Ischemic Cardiomyopathy EF 45% 07/17/2012       Current Outpatient Prescriptions   Medication Sig Dispense Refill   • ibuprofen (MOTRIN) 600 MG Tab Take 1 Tab by mouth every 6 hours as needed. for knee pain 30 Tab 0   • tramadol (ULTRAM) 50 MG Tab Take 1 Tab by mouth every four hours as needed. 16 Tab 0   • aspirin EC (ECOTRIN) 81 MG Tablet Delayed Response Take 1  "Tab by mouth every day. 30 Tab 11   • lisinopril (PRINIVIL) 5 MG Tab Take 1 Tab by mouth every day. 30 Tab 11   • metoprolol SR (TOPROL XL) 25 MG TABLET SR 24 HR Take 1 Tab by mouth every day. 30 Tab 11   • vitamin D (CHOLECALCIFEROL) 1000 UNIT Tab Take 1 Tab by mouth every day. 30 Tab 11   • metformin (GLUCOPHAGE) 500 MG Tab Take 1 Tab by mouth 2 times a day, with meals. 60 Tab 11   • pantoprazole (PROTONIX) 40 MG Tablet Delayed Response Take 1 Tab by mouth every day. 30 Tab 11   • isosorbide mononitrate SR (IMDUR) 30 MG TABLET SR 24 HR Take 1 Tab by mouth every day. 30 Tab 3   • atorvastatin (LIPITOR) 40 MG Tab Take 1 Tab by mouth every evening. 30 Tab 11     No current facility-administered medications for this visit.       ROS: As per HPI. Additional pertinent symptoms as noted below.      /88 mmHg  Pulse 94  Temp(Src) 36.3 °C (97.3 °F)  Resp 24  Ht 1.651 m (5' 5\")  Wt 85.548 kg (188 lb 9.6 oz)  BMI 31.38 kg/m2  SpO2 95%    Physical Exam   General:  Alert and oriented, No apparent distress. Disheveled male appears stated age, well nourished, appears anxious.  Eyes: Pupils equal and reactive. No scleral icterus. EOMI, no injected conjunctiva.  Throat: Clear no erythema or exudates noted.  Neck: Supple. No lymphadenopathy noted. Thyroid not enlarged.  Lungs: Clear to auscultation and percussion bilaterally. No wheezing, crackles, rhales.  Cardiovascular: Regular rate and rhythm. No murmurs, rubs or gallops.  Abdomen:  Benign. No rebound or guarding noted. Non-distended, non-tender.  Extremities: No clubbing, cyanosis, edema. Surgical scar on right knee, knees non-tender to touch, non-erythematous, no swelling present.  Skin: Clear. No rash or suspicious skin lesions noted.      Assessment and Plan    1. Chronic pain of both knees  - Left knee worse than right  - Xrays in 2009 and 2012 show mild arthritis  - Likely worsening arthritis  - Conservative care with ibuprofen PRN  - Consider adding a " brace    2. Dysuria  - UA showed >2000 glucose and >30 protein  - Likely caused by glycosuria  - Control DMII  - Already on lisinopril  - HbA1c pending    3. Essential hypertension  - /88 at today's visit  - No symptoms of hypotension  - Continue current regimen  - BP log      PLAN: Follow up in 5 weeks. Asked again to get his urine microalbumin, HbA1c, Vit D, lipid panel and monitor BP. Given number for GI referral and he said he would call.      Signed by: Juancho Jenkins M.D.

## 2017-03-21 LAB
CHOLEST SERPL-MCNC: 192 MG/DL (ref 100–199)
COMMENT 011824: ABNORMAL
HDLC SERPL-MCNC: 37 MG/DL
LDLC SERPL CALC-MCNC: 119 MG/DL (ref 0–99)
MICROALBUMIN UR-MCNC: 69.1 UG/ML
TRIGL SERPL-MCNC: 181 MG/DL (ref 0–149)
VLDLC SERPL CALC-MCNC: 36 MG/DL (ref 5–40)

## 2017-03-23 ENCOUNTER — OFFICE VISIT (OUTPATIENT)
Dept: INTERNAL MEDICINE | Facility: MEDICAL CENTER | Age: 52
End: 2017-03-23
Payer: MEDICARE

## 2017-03-23 VITALS
SYSTOLIC BLOOD PRESSURE: 118 MMHG | HEART RATE: 103 BPM | TEMPERATURE: 98.2 F | HEIGHT: 65 IN | WEIGHT: 186.8 LBS | BODY MASS INDEX: 31.12 KG/M2 | RESPIRATION RATE: 22 BRPM | OXYGEN SATURATION: 92 % | DIASTOLIC BLOOD PRESSURE: 86 MMHG

## 2017-03-23 DIAGNOSIS — E78.5 HYPERLIPIDEMIA, UNSPECIFIED HYPERLIPIDEMIA TYPE: ICD-10-CM

## 2017-03-23 DIAGNOSIS — E11.9 TYPE 2 DIABETES MELLITUS WITHOUT COMPLICATION, WITHOUT LONG-TERM CURRENT USE OF INSULIN (HCC): ICD-10-CM

## 2017-03-23 LAB
HBA1C MFR BLD: NORMAL % (ref ?–5.8)
INT CON NEG: NEGATIVE
INT CON POS: POSITIVE
REQUEST PROBLEM   100552: NORMAL

## 2017-03-23 PROCEDURE — G8484 FLU IMMUNIZE NO ADMIN: HCPCS | Mod: GC | Performed by: INTERNAL MEDICINE

## 2017-03-23 PROCEDURE — 99213 OFFICE O/P EST LOW 20 MIN: CPT | Mod: GE | Performed by: INTERNAL MEDICINE

## 2017-03-23 PROCEDURE — G8432 DEP SCR NOT DOC, RNG: HCPCS | Mod: GC | Performed by: INTERNAL MEDICINE

## 2017-03-23 PROCEDURE — G8598 ASA/ANTIPLAT THER USED: HCPCS | Mod: GC | Performed by: INTERNAL MEDICINE

## 2017-03-23 PROCEDURE — 1036F TOBACCO NON-USER: CPT | Mod: GC | Performed by: INTERNAL MEDICINE

## 2017-03-23 PROCEDURE — 3017F COLORECTAL CA SCREEN DOC REV: CPT | Mod: 8P,GC | Performed by: INTERNAL MEDICINE

## 2017-03-23 PROCEDURE — 3046F HEMOGLOBIN A1C LEVEL >9.0%: CPT | Mod: 8P,GC | Performed by: INTERNAL MEDICINE

## 2017-03-23 PROCEDURE — 83036 HEMOGLOBIN GLYCOSYLATED A1C: CPT | Mod: GC | Performed by: INTERNAL MEDICINE

## 2017-03-23 PROCEDURE — G8417 CALC BMI ABV UP PARAM F/U: HCPCS | Mod: GC | Performed by: INTERNAL MEDICINE

## 2017-03-23 RX ORDER — ATORVASTATIN CALCIUM 80 MG/1
80 TABLET, FILM COATED ORAL DAILY
Qty: 30 TAB | Refills: 11 | Status: SHIPPED | OUTPATIENT
Start: 2017-03-23 | End: 2017-03-23 | Stop reason: SDUPTHER

## 2017-03-23 RX ORDER — ATORVASTATIN CALCIUM 80 MG/1
80 TABLET, FILM COATED ORAL EVERY EVENING
Qty: 30 TAB | Refills: 11 | Status: SHIPPED | OUTPATIENT
Start: 2017-03-23 | End: 2017-06-18

## 2017-03-23 NOTE — PATIENT INSTRUCTIONS
- Increase metformin from 500mg twice daily to 1,000mg twice daily  - Increase atorvastatin from 40mg to 80mg and take at night    1800 Calorie Diet for Diabetes Meal Planning  The 1800 calorie diet is designed for eating up to 1800 calories each day. Following this diet and making healthy meal choices can help improve overall health. This diet controls blood sugar (glucose) levels and can also help lower blood pressure and cholesterol.  SERVING SIZES  Measuring foods and serving sizes helps to make sure you are getting the right amount of food. The list below tells how big or small some common serving sizes are:  · 1 oz.........4 stacked dice.   · 3 oz.........Deck of cards.   · 1 tsp........Tip of little finger.   · 1 tbs........Thumb.   · 2 tbs........Golf ball.   · ½ cup.......Half of a fist.   · 1 cup........A fist.   GUIDELINES FOR CHOOSING FOODS  The goal of this diet is to eat a variety of foods and limit calories to 1800 each day. This can be done by choosing foods that are low in calories and fat. The diet also suggests eating small amounts of food frequently. Doing this helps control your blood glucose levels so they do not get too high or too low. Each meal or snack may include a protein food source to help you feel more satisfied and to stabilize your blood glucose. Try to eat about the same amount of food around the same time each day. This includes weekend days, travel days, and days off work. Space your meals about 4 to 5 hours apart and add a snack between them if you wish.   For example, a daily food plan could include breakfast, a morning snack, lunch, dinner, and an evening snack. Healthy meals and snacks include whole grains, vegetables, fruits, lean meats, poultry, fish, and dairy products. As you plan your meals, select a variety of foods. Choose from the bread and starch, vegetable, fruit, dairy, and meat/protein groups. Examples of foods from each group and their suggested serving sizes are  listed below. Use measuring cups and spoons to become familiar with what a healthy portion looks like.  Bread and Starch  Each serving equals 15 grams of carbohydrates.  · 1 slice bread.   · ¼ bagel.   · ¾ cup cold cereal (unsweetened).   · ½ cup hot cereal or mashed potatoes.   · 1 small potato (size of a computer mouse).   ·  cup cooked pasta or rice.   · ½ English muffin.   · 1 cup broth-based soup.   · 3 cups of popcorn.   · 4 to 6 whole-wheat crackers.   · ½ cup cooked beans, peas, or corn.   Vegetable  Each serving equals 5 grams of carbohydrates.  · ½ cup cooked vegetables.   · 1 cup raw vegetables.   · ½ cup tomato or vegetable juice.   Fruit  Each serving equals 15 grams of carbohydrates.  · 1 small apple or orange.   · 1¼ cup watermelon or strawberries.   · ½ cup applesauce (no sugar added).   · 2 tbs raisins.   · ½ banana.   · ½ cup canned fruit, packed in water, its own juice, or sweetened with a sugar substitute.   · ½ cup unsweetened fruit juice.   Dairy  Each serving equals 12 to 15 grams of carbohydrates.  · 1 cup fat-free milk.   · 6 oz artificially sweetened yogurt or plain yogurt.   · 1 cup low-fat buttermilk.   · 1 cup soy milk.   · 1 cup almond milk.   Meat/Protein  · 1 large egg.   · 2 to 3 oz meat, poultry, or fish.   · ¼ cup low-fat cottage cheese.   · 1 tbs peanut butter.   · 1 oz low-fat cheese.   · ¼ cup tuna in water.   · ½ cup tofu.   Fat  · 1 tsp oil.   · 1 tsp trans-fat-free margarine.   · 1 tsp butter.   · 1 tsp mayonnaise.   · 2 tbs avocado.   · 1 tbs salad dressing.   · 1 tbs cream cheese.   · 2 tbs sour cream.   SAMPLE 1800 CALORIE DIET PLAN  Breakfast  · ¾ cup unsweetened cereal (1 carb serving).   · 1 cup fat-free milk (1 carb serving).   · 1 slice whole-wheat toast (1 carb serving).   · ½ small banana (1 carb serving).   · 1 scrambled egg.   · 1 tsp trans-fat-free margarine.   Lunch  · Tuna sandwich.   · 2 slices whole-wheat bread (2 carb servings).   · ½ cup canned tuna in  water, drained.   · 1 tbs reduced fat mayonnaise.   · 1 stalk celery, chopped.   · 2 slices tomato.   · 1 lettuce leaf.   · 1 cup carrot sticks.   · 24 to 30 seedless grapes (2 carb servings).   · 6 oz light yogurt (1 carb serving).   Afternoon Snack  · 3 stephanie cracker squares (1 carb serving).   · Fat-free milk, 1 cup (1 carb serving).   · 1 tbs peanut butter.   Dinner  · 3 oz salmon, broiled with 1 tsp oil.   · 1 cup mashed potatoes (2 carb servings) with 1 tsp trans-fat-free margarine.   · 1 cup fresh or frozen green beans.   · 1 cup steamed asparagus.   · 1 cup fat-free milk (1 carb serving).   Evening Snack  · 3 cups air-popped popcorn (1 carb serving).   · 2 tbs parmesan cheese sprinkled on top.   MEAL PLAN  Use this worksheet to help you make a daily meal plan based on the 1800 calorie diet suggestions. If you are using this plan to help you control your blood glucose, you may interchange carbohydrate-containing foods (dairy, starches, and fruits). Select a variety of fresh foods of varying colors and flavors. The total amount of carbohydrate in your meals or snacks is more important than making sure you include all of the food groups every time you eat. Choose from the following foods to build your day's meals:  · 8 Starches.   · 4 Vegetables.   · 3 Fruits.   · 2 Dairy.   · 6 to 7 oz Meat/Protein.   · Up to 4 Fats.   Your dietician can use this worksheet to help you decide how many servings and which types of foods are right for you.  BREAKFAST  Food Group and Servings / Food Choice  Starch ________________________________________________________  Dairy _________________________________________________________  Fruit _________________________________________________________  Meat/Protein __________________________________________________  Fat ___________________________________________________________  LUNCH  Food Group and Servings / Food Choice  Starch  ________________________________________________________  Meat/Protein __________________________________________________  Vegetable _____________________________________________________  Fruit _________________________________________________________  Dairy _________________________________________________________  Fat ___________________________________________________________  AFTERNOON SNACK  Food Group and Servings / Food Choice  Starch ________________________________________________________  Meat/Protein __________________________________________________  Fruit __________________________________________________________  Dairy _________________________________________________________  DINNER  Food Group and Servings / Food Choice  Starch _________________________________________________________  Meat/Protein ___________________________________________________  Dairy __________________________________________________________  Vegetable ______________________________________________________  Fruit ___________________________________________________________  Fat ____________________________________________________________  EVENING SNACK  Food Group and Servings / Food Choice  Fruit __________________________________________________________  Meat/Protein ___________________________________________________  Dairy __________________________________________________________  Starch _________________________________________________________  DAILY TOTALS  Starch ____________________________  Vegetable _________________________  Fruit _____________________________  Dairy _____________________________  Meat/Protein______________________  Fat _______________________________  Document Released: 07/10/2006 Document Revised: 03/11/2013 Document Reviewed: 11/02/2012  [employer]® Patient Information ©2013 [employer].

## 2017-03-24 LAB — 25(OH)D3+25(OH)D2 SERPL-MCNC: 28 NG/ML (ref 30–100)

## 2017-03-24 NOTE — PROGRESS NOTES
Established Patient    Ryan presents today with the following:    CC: 5 week follow up    HPI: Mr. Trevizo presents to the clinic today for follow up on recent labwork and for management of his chronic conditions. He is accompanied by his , Humza. Since being seen 1 month prior the patient has been doing well, overall and has not had any ER visits. Laboratory evaluation showed a HbA1c of 9.8%, T. chol 192, , HDL 37,  and urine microalbumin of 69.1. He has an appt. To be seen by a Dr. León (cardiologist) on 4/13 for management of his previous MI, severe CAD, ischemic cardiomyopathy, and HTN.    Patient Active Problem List    Diagnosis Date Noted   • Chest pain 12/18/2014     Priority: High   • CAD (coronary artery disease) 10/14/2013     Priority: High   • Glaucoma      Priority: Low   • Hyperlipidemia 07/16/2012     Priority: Low   • HTN (hypertension) 07/15/2012     Priority: Low   • GERD (gastroesophageal reflux disease) 07/15/2012     Priority: Low   • Type 2 diabetes mellitus without complication, without long-term current use of insulin ([facility]) 02/16/2017   • Claudication (CMS-[facility])    • Ischemic Cardiomyopathy EF 45% 07/17/2012       Current Outpatient Prescriptions   Medication Sig Dispense Refill   • metformin (GLUCOPHAGE) 1000 MG tablet Take 1 Tab by mouth 2 times a day, with meals. 60 Tab 11   • atorvastatin (LIPITOR) 80 MG tablet Take 1 Tab by mouth every evening. 30 Tab 11   • ibuprofen (MOTRIN) 600 MG Tab Take 1 Tab by mouth every 6 hours as needed. for knee pain 30 Tab 0   • aspirin EC (ECOTRIN) 81 MG Tablet Delayed Response Take 1 Tab by mouth every day. 30 Tab 11   • lisinopril (PRINIVIL) 5 MG Tab Take 1 Tab by mouth every day. 30 Tab 11   • metoprolol SR (TOPROL XL) 25 MG TABLET SR 24 HR Take 1 Tab by mouth every day. 30 Tab 11   • vitamin D (CHOLECALCIFEROL) 1000 UNIT Tab Take 1 Tab by mouth every day. 30 Tab 11   • pantoprazole (PROTONIX) 40 MG Tablet Delayed Response Take  "1 Tab by mouth every day. 30 Tab 11   • isosorbide mononitrate SR (IMDUR) 30 MG TABLET SR 24 HR Take 1 Tab by mouth every day. 30 Tab 3     No current facility-administered medications for this visit.       ROS: As per HPI. Additional pertinent symptoms as noted below.        /86 mmHg  Pulse 103  Temp(Src) 36.8 °C (98.2 °F)  Resp 22  Ht 1.651 m (5' 5\")  Wt 84.732 kg (186 lb 12.8 oz)  BMI 31.09 kg/m2  SpO2 92%        Assessment and Plan    1. Type 2 diabetes mellitus  - HbA1c 7.7% on 5/20/2016  - HbA1c 9.8% on 3/23/17 indicating worsening control  - Increase metformin from 500mg BID to 1000mg BID today  - Urine microalbumin 69.1. Already on lisinopril for renal protection.  - Long discussion with pt about diet and exercise with diabetic diet information printed for pt.  - Eye exam, microfilament at next visit    2. Hyperlipidemia  - Atorvastatin 40mg daily increased to 80mg at today's visit  - Lipid panel 3/20 showed T. Chol 192, , HDL 37 and   - ASCVD score 11.2% based on most recent lipid panel results. Increase lipid therapy today.    3. Coronary artery disease  - Continue metoprolol, ASA, atorvastatin, isosorbide mononitrate  - Will see Dr. León on 4/13  - Stable, no CP                4. Ischemic Cardiomyopathy EF 45%  - Likely 2/2 CAD, Hx MI  - Saw cardiologist, Dr. Calixto, on 9/29. No changes were made to medication regimen at that time.  - F/u with Dr. León on 4/13  - Continue metoprolol, lisinopril, atorvastatin  - Euvolemic with clear lungs at todays visit    5. Essential hypertension  - /76  - Continue metoprolol, lisinopril  - Stable    6. Gastroesophageal reflux disease  - Continue protonix daily for now  - GI referral    8. Vitamin D deficiency  - Continue Vitamin D replacement  - Vit D level pending    9. Distal Esophagus changes on recent CT abd 12/11  - CT abd 12/11 shows changes in the distal esophagus likely related to inflammatory esophagitis  - Continue " protonix  - Referral to GI for possible EGD    10. Health maintenance  - GI referral for colonoscopy pending    PLAN: Follow up in 3 months with repeat HbA1c, Vit D level pending, GI referral pending.    Signed by: Juancho Jenkins M.D.

## 2017-03-29 LAB
25(OH)D3+25(OH)D2 SERPL-MCNC: 29.9 NG/ML (ref 30–100)
HBA1C MFR BLD: 10.1 % (ref 4.8–5.6)

## 2017-04-13 ENCOUNTER — OFFICE VISIT (OUTPATIENT)
Dept: CARDIOLOGY | Facility: MEDICAL CENTER | Age: 52
End: 2017-04-13
Payer: MEDICARE

## 2017-04-13 VITALS
HEIGHT: 65 IN | OXYGEN SATURATION: 92 % | SYSTOLIC BLOOD PRESSURE: 110 MMHG | BODY MASS INDEX: 30.49 KG/M2 | HEART RATE: 100 BPM | WEIGHT: 183 LBS | DIASTOLIC BLOOD PRESSURE: 80 MMHG

## 2017-04-13 DIAGNOSIS — I10 ESSENTIAL HYPERTENSION: ICD-10-CM

## 2017-04-13 DIAGNOSIS — I25.5 CARDIOMYOPATHY, ISCHEMIC: Primary | ICD-10-CM

## 2017-04-13 DIAGNOSIS — E78.2 MIXED HYPERLIPIDEMIA: ICD-10-CM

## 2017-04-13 DIAGNOSIS — E78.5 DYSLIPIDEMIA: ICD-10-CM

## 2017-04-13 DIAGNOSIS — I25.10 CORONARY ARTERY DISEASE INVOLVING NATIVE CORONARY ARTERY OF NATIVE HEART WITHOUT ANGINA PECTORIS: ICD-10-CM

## 2017-04-13 PROCEDURE — G8432 DEP SCR NOT DOC, RNG: HCPCS | Performed by: INTERNAL MEDICINE

## 2017-04-13 PROCEDURE — 99214 OFFICE O/P EST MOD 30 MIN: CPT | Performed by: INTERNAL MEDICINE

## 2017-04-13 PROCEDURE — G8598 ASA/ANTIPLAT THER USED: HCPCS | Performed by: INTERNAL MEDICINE

## 2017-04-13 PROCEDURE — 3017F COLORECTAL CA SCREEN DOC REV: CPT | Mod: 8P | Performed by: INTERNAL MEDICINE

## 2017-04-13 PROCEDURE — 1036F TOBACCO NON-USER: CPT | Performed by: INTERNAL MEDICINE

## 2017-04-13 PROCEDURE — G8417 CALC BMI ABV UP PARAM F/U: HCPCS | Performed by: INTERNAL MEDICINE

## 2017-04-13 ASSESSMENT — ENCOUNTER SYMPTOMS: CARDIOVASCULAR NEGATIVE: 1

## 2017-04-13 NOTE — PROGRESS NOTES
Name:          Ryan Trevizo   YOB: 1965  Date:     4/13/2017      Juancho Jenkins M.D.  [address]  [place] [address]     Joseph León MD  [address], [address]  [place] [address]  Phone: [phone]  Back Line: [phone]  Fax: [phone]  E-mail: [email or web address]   Dear Dr. Jenkins,    We had the pleasure of seeing your patient, Ryan Trevizo, in Cardiology Clinic at [facility] today.    As you know, he is a 51-year-old man with a history of ischemic systolic congestive heart failure with an ejection fraction of 40% by left ventriculogram done on 4/9/16 though nuclear stress test at that time showed an EF of 57%. He has high-grade disease in his PDA/PL system,  of his proximal circumflex, high grade disease of a small diagonal, but no obstructive disease of his LAD.    Mr. Trevizo is overall doing fairly well today. He has no complaints of angina in conjunction with his ischemic cardiomyopathy and various areas of under revascularized myocardium. His blood pressure in our office is 110/80 mmHg with a heart rate of 100 bpm. His medical therapy includes Toprol 25 mg by mouth daily, lisinopril 5 mg by mouth daily, atorvastatin 80 mg by mouth daily at bedtime, and Imdur were 30 mg by mouth daily. I reviewed his previous testing showing fairly dense scar inferiorly and laterally reveals high-grade disease of his posterior descending coronary artery, and posterior lateral branches of the right coronary artery. 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 His LAD, however, is patent. His ejection fraction is variable between different modalities, and I will plan to repeat an echocardiogram at our next follow-up appointment to adjudicate that. His LDL last month was 119 mg/dL having previously been 48 mg/dL. He is again on 80 mg by mouth daily of atorvastatin, and I suspect that that LDL reflects nonadherence to that  medication. I did order a repeat lipid panel for 6 months time. Otherwise, I have not changed his cardiovascular regimen today.    We will have the patient follow-up in 6 months.    Thank you for the referral and please do not hesitate to contact me at any time. My contact information is listed above.    This note was dictated using Dragon speech recognition software.     A full note including my physical examination and a full list of rectified medications is available in our medical record, and can be faxed as well.    Joseph León MD  Cardiologist  [facility]

## 2017-04-13 NOTE — Clinical Note
Name:          Ryan Trevizo   YOB: 1965  Date:     4/13/2017      Juancho Jenkins M.D.  [address]  [place] [address]     Joseph León MD  [address]  [place] [address]  Phone: [phone]  Back Line: [phone]  Fax: [phone]  E-mail: [email or web address]   Dear Dr. Jenkins,    We had the pleasure of seeing your patient, Ryan Trevizo, in Cardiology Clinic at [facility] and [facility] today.    As you know, he is a 51-year-old man with a history of ischemic systolic congestive heart failure with an ejection fraction of 40% by left ventriculogram done on 4/9/16 though nuclear stress test at that time showed an EF of 57%. He has high-grade disease in his PDA/PL system,  of his proximal circumflex, high grade disease of a small diagonal, but no obstructive disease of his LAD.    Mr. Trevizo is overall doing fairly well today. He has no complaints of angina in conjunction with his ischemic cardiomyopathy and various areas of under revascularized myocardium. His blood pressure in our office is 110/80 mmHg with a heart rate of 100 bpm. His medical therapy includes Toprol 25 mg by mouth daily, lisinopril 5 mg by mouth daily, atorvastatin 80 mg by mouth daily at bedtime, and Imdur were 30 mg by mouth daily. I reviewed his previous testing showing fairly dense scar inferiorly and laterally reveals high-grade disease of his posterior descending coronary artery, and posterior lateral branches of the right coronary artery. 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 His LAD, however, is patent. His ejection fraction is variable between different modalities, and I will plan to repeat an echocardiogram at our next follow-up appointment to adjudicate that. His LDL last month was 119 mg/dL having previously been 48 mg/dL. He is again on 80 mg by mouth daily of atorvastatin, and I suspect that that LDL reflects nonadherence to that  medication. I did order a repeat lipid panel for 6 months time. Otherwise, I have not changed his cardiovascular regimen today.    We will have the patient follow-up in 6 months.    Thank you for the referral and please do not hesitate to contact me at any time. My contact information is listed above.    This note was dictated using Dragon speech recognition software.     A full note including my physical examination and a full list of rectified medications is available in our medical record, and can be faxed as well.    Joseph León MD  Cardiologist  [facility]

## 2017-04-13 NOTE — MR AVS SNAPSHOT
"        Ryan Trevizo   2017 2:30 PM   Office Visit   MRN: [id number]    Department:  [facility]   Dept Phone:  [phone]    Description:  Male : 1965   Provider:  Joseph León M.D.           Allergies as of 2017     Allergen Noted Reactions    Ritalin [Methylphenidate] 2014   Unspecified    \"Hyper\"      You were diagnosed with     Dyslipidemia   [358593]         Vital Signs     Blood Pressure Pulse Height Weight Body Mass Index Oxygen Saturation    110/80 mmHg 100 1.651 m (5' 5\") 83.008 kg (183 lb) 30.45 kg/m2 92%    Smoking Status                   Never Smoker            Basic Information     Date Of Birth Sex Race Ethnicity Preferred Language    1965 Male White Non- English      Your appointments     2017  2:30 PM   Established Patient with Juancho Jenkins M.D.   [facility] - Internal Medicine (--)    [address] [place] [address]  [address]  [facility] [address] [phone]           You will be receiving a confirmation call a few days before your appointment from our automated call confirmation system.            Oct 06, 2017  2:30 PM   FOLLOW UP with Joseph León M.D.   [facility] (--)    [address] [place] [address]  [facility] [address] [phone]              Problem List              ICD-10-CM Priority Class Noted - Resolved    HTN (hypertension) I10 Low  7/15/2012 - Present    GERD (gastroesophageal reflux disease) K21.9 Low  7/15/2012 - Present    Hyperlipidemia E78.5 Low  2012 - Present    Ischemic Cardiomyopathy EF 45% I25.5   2012 - Present    Glaucoma (Chronic) H40.9 Low  Unknown - Present    Claudication (CMS-HCC) (Chronic) I73.9   Unknown - Present    CAD (coronary artery disease) I25.10 High  10/14/2013 - Present    Chest pain R07.9 High  2014 - Present    Type 2 diabetes mellitus without complication, without long-term current use of insulin (HCC) E11.9   2017 - " Present      Health Maintenance        Date Due Completion Dates    IMM HEP B VACCINE (1 of 3 - Primary Series) 1965 ---    DIABETES MONOFILAMENT / LE EXAM 1965 ---    RETINAL SCREENING 5/26/1981 ---    A1C SCREENING 9/28/2017 3/28/2017, 3/23/2017, 5/20/2016, 4/8/2016, 7/25/2012, 7/14/2012    SERUM CREATININE 12/11/2017 12/11/2016, 5/20/2016, 5/15/2016, 5/5/2016, 4/11/2016, 4/10/2016, 4/9/2016, 4/8/2016, 4/8/2016, 4/5/2016, 4/4/2016, 10/8/2015, 7/15/2015, 12/19/2014, 12/18/2014, 6/13/2014, 6/12/2013, 1/24/2013, 9/15/2012, 9/14/2012, 7/30/2012, 7/30/2012, 7/29/2012, 7/28/2012, 7/27/2012, 7/25/2012, 7/20/2012, 7/19/2012, 7/17/2012, 7/17/2012, 7/16/2012, 7/15/2012, 7/14/2012, 8/1/2009, 7/12/2007, 4/21/2005    FASTING LIPID PROFILE 3/20/2018 3/20/2017, 12/19/2014, 6/13/2014, 1/24/2013, 7/15/2012, 8/1/2009    URINE ACR / MICROALBUMIN 3/20/2018 3/20/2017, 1/24/2013    IMM DTaP/Tdap/Td Vaccine (2 - Td) 6/15/2026 6/15/2016    COLONOSCOPY 2/6/2027 2/6/2017 (N/S)    Override on 2/6/2017: (N/S) (PER [facility] NO COLONOSCOPY)            Current Immunizations     Influenza TIV (IM) 10/31/2014, 9/15/2012  1:18 PM, 10/30/2011, 9/15/2011    Influenza Vaccine Quad Inj (Pf) 10/15/2015, 10/3/2015    Pneumococcal polysaccharide vaccine (PPSV-23) 7/30/2012 12:10 AM    Tdap Vaccine 6/15/2016      Below and/or attached are the medications your provider expects you to take. Review all of your home medications and newly ordered medications with your provider and/or pharmacist. Follow medication instructions as directed by your provider and/or pharmacist. Please keep your medication list with you and share with your provider. Update the information when medications are discontinued, doses are changed, or new medications (including over-the-counter products) are added; and carry medication information at all times in the event of emergency situations     Allergies:  RITALIN - Unspecified               Medications  Valid as of: April 13,  2017 -  3:07 PM    Generic Name Brand Name Tablet Size Instructions for use    Aspirin (Tablet Delayed Response) ECOTRIN 81 MG Take 1 Tab by mouth every day.        Atorvastatin Calcium (Tab) LIPITOR 80 MG Take 1 Tab by mouth every evening.        Cholecalciferol (Tab) cholecalciferol 1000 UNIT Take 1 Tab by mouth every day.        Ibuprofen (Tab) MOTRIN 600 MG Take 1 Tab by mouth every 6 hours as needed. for knee pain        Isosorbide Mononitrate (TABLET SR 24 HR) IMDUR 30 MG Take 1 Tab by mouth every day.        Lisinopril (Tab) PRINIVIL 5 MG Take 1 Tab by mouth every day.        MetFORMIN HCl (Tab) GLUCOPHAGE 1000 MG Take 1 Tab by mouth 2 times a day, with meals.        Metoprolol Succinate (TABLET SR 24 HR) TOPROL XL 25 MG Take 1 Tab by mouth every day.        Pantoprazole Sodium (Tablet Delayed Response) PROTONIX 40 MG Take 1 Tab by mouth every day.        .                 Medicines prescribed today were sent to:     [facility] [address] [place] [address] Lizzie Lewis. [address] [address]    Phone: [phone] Fax: [phone]    Open 24 Hours?: No      Medication refill instructions:       If your prescription bottle indicates you have medication refills left, it is not necessary to call your provider’s office. Please contact your pharmacy and they will refill your medication.    If your prescription bottle indicates you do not have any refills left, you may request refills at any time through one of the following ways: The online fitkit system (except Urgent Care), by calling your provider’s office, or by asking your pharmacy to contact your provider’s office with a refill request. Medication refills are processed only during regular business hours and may not be available until the next business day. Your provider may request additional information or to have a follow-up visit with you prior to refilling your medication.   *Please Note: Medication refills are assigned a  new Rx number when refilled electronically. Your pharmacy may indicate that no refills were authorized even though a new prescription for the same medication is available at the pharmacy. Please request the medicine by name with the pharmacy before contacting your provider for a refill.           MyChart Status: Patient Declined

## 2017-04-13 NOTE — PROGRESS NOTES
Subjective:   Ryan Trevizo is a 51-year-old man with a history of ischemic systolic congestive heart failure with an ejection fraction of 40% by left ventriculogram done on 4/9/16 though nuclear stress test at that time showed an EF of 57%. He has high-grade disease in his PDA/PL system,  of his proximal circumflex, high grade disease of a small diagonal, but no obstructive disease of his LAD.    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     He comes in with a caregiver.    In conjunction with his fairly high cholesterol level as below asked him if he was adherent to his statin therapy when he had that panel drawn. His caregiver felt that he was nonadherence to metformin at that time, but otherwise adherent to his medical regimen. The patient endorsed the same.     Past Medical History   Diagnosis Date   • Arthritis    • HTN (hypertension) 7/15/2012   • GERD (gastroesophageal reflux disease) 7/15/2012   • STEMI (ST elevation myocardial infarction) (CMS-HCC)    • Anxiety disorder    • MI (myocardial infarction) (CMS-HCC)    • Hyperlipemia    • Upper GI bleed 7/26/2012   • Glaucoma    • SOB (shortness of breath)    • Claudication (CMS-HCC)    • Dizziness    • CAD (coronary artery disease) 10/14/2013   • ACS (acute coronary syndrome) (CMS-[facility]) 7/25/2012   • Pericarditis secondary to acute myocardial infarction (CMS-HCC) 7/19/2012   • Right knee pain    • Blood glucose elevated    • Mental retardation    • Osteoarthritis    • Hypertriglyceridemia      Past Surgical History   Procedure Laterality Date   • Other orthopedic surgery  7-      R knee surgery   • Other  knee surgery   • Other cardiac surgery       stent placement   • Other     • Gastroscopy-endo  7/25/2012     Performed by JORDIN VERA at ENDOSCOPY [facility]   •  "Gastroscopy-endo  7/26/2012     Performed by JORDIN VERA at ENDOSCOPY [facility]S     History reviewed. No pertinent family history.  History   Smoking status   • Never Smoker    Smokeless tobacco   • Never Used     Allergies   Allergen Reactions   • Ritalin [Methylphenidate] Unspecified     \"Hyper\"     Outpatient Encounter Prescriptions as of 4/13/2017   Medication Sig Dispense Refill   • metformin (GLUCOPHAGE) 1000 MG tablet Take 1 Tab by mouth 2 times a day, with meals. 60 Tab 11   • atorvastatin (LIPITOR) 80 MG tablet Take 1 Tab by mouth every evening. 30 Tab 11   • aspirin EC (ECOTRIN) 81 MG Tablet Delayed Response Take 1 Tab by mouth every day. 30 Tab 11   • lisinopril (PRINIVIL) 5 MG Tab Take 1 Tab by mouth every day. 30 Tab 11   • metoprolol SR (TOPROL XL) 25 MG TABLET SR 24 HR Take 1 Tab by mouth every day. 30 Tab 11   • vitamin D (CHOLECALCIFEROL) 1000 UNIT Tab Take 1 Tab by mouth every day. 30 Tab 11   • pantoprazole (PROTONIX) 40 MG Tablet Delayed Response Take 1 Tab by mouth every day. 30 Tab 11   • isosorbide mononitrate SR (IMDUR) 30 MG TABLET SR 24 HR Take 1 Tab by mouth every day. 30 Tab 3   • ibuprofen (MOTRIN) 600 MG Tab Take 1 Tab by mouth every 6 hours as needed. for knee pain (Patient not taking: Reported on 4/13/2017) 30 Tab 0     No facility-administered encounter medications on file as of 4/13/2017.     Review of Systems   Cardiovascular: Negative.    All other systems reviewed and are negative.       Objective:   /80 mmHg  Pulse 100  Ht 1.651 m (5' 5\")  Wt 83.008 kg (183 lb)  BMI 30.45 kg/m2  SpO2 92%    Physical Exam   Constitutional: He is oriented to person, place, and time. He appears well-developed and well-nourished. No distress.   Pleasant, fairly soft spoken, mildly disheveled, middle-aged man accompanied by a caregiver in no distress   HENT:   Head: Normocephalic and atraumatic.   Eyes: Conjunctivae and EOM are normal. Pupils are equal, round, and reactive to " "light. No scleral icterus.   Neck: Neck supple. No JVD present. No tracheal deviation present.   Cardiovascular: Normal rate, regular rhythm, normal heart sounds and intact distal pulses.  Exam reveals no gallop and no friction rub.    No murmur heard.  Pulses:       Dorsalis pedis pulses are 2+ on the right side, and 2+ on the left side.   No carotid bruits   Pulmonary/Chest: Effort normal and breath sounds normal. No stridor. No respiratory distress. He has no wheezes. He has no rales.   Abdominal: Soft. Bowel sounds are normal. He exhibits no distension.   Musculoskeletal: He exhibits no edema.   Neurological: He is alert and oriented to person, place, and time.   Skin: Skin is warm and dry. No rash noted. He is not diaphoretic. No erythema. No pallor.   Psychiatric: He has a normal mood and affect. Judgment and thought content normal.   Vitals reviewed.    Cardiac catheterization, 4/9/2016:  \"IMPRESSION:  1.  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  2.  A 40% lesion in the very proximal right coronary artery.  3.  A 90% stenosis at the origin of one of the tandem posterior descending    artery vessels.  4.  An 80% lesion in the mid portion of the posterior left ventricular artery.  5.  Elongated area of ectasia of the proximal left anterior descending.  6.  A 90% stenosis involving the origin of the small caliber first diagonal    artery.  7.  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  8.  Diffuse nonobstructive plaquing in the mid and distal left anterior    descending.  9.  Complete occlusion of the proximal circumflex, which is known from    previous angiogram.  10.  Ventricular dysfunction with hypokinesis of the mid anterior wall and    distal inferior wall\"    Exercise stress echocardiogram, 10/7/2014:  \"CONCLUSIONS  Exercise capacity is average.  EKG response to exercise was indeterminate because of baseline EKG   abnormality  Dense inferior infarct with " "preserved overall function.  Estimated LVEF   50%\"    Myocardial perfusion imaging, 4/5/2016:  \" IMPRESSIONS   Fixed defect in the basal and mid myocardium.   inferoNormal left ventricular size, ejection fraction, and wall motion\"       12/19/2014 00:45 3/20/2017 11:14   Cholesterol,Tot 101 192   Triglycerides 123 181 (H)   HDL 28 (A) 37 (L)   LDL 48 119 (H)       Assessment:     1. Ischemic Cardiomyopathy EF 45%     2. Dyslipidemia  LIPID PANEL   3. Coronary artery disease involving native coronary artery of native heart without angina pectoris     4. Essential hypertension     5. Mixed hyperlipidemia         Medical Decision Making:  Today's Assessment / Status / Plan:     Medical Decision Making:    Mr. Trevizo is overall doing fairly well today. He has no complaints of angina in conjunction with his ischemic cardiomyopathy and various areas of under revascularized myocardium. His blood pressure in our office is 110/80 mmHg with a heart rate of 100 bpm. His medical therapy includes Toprol 25 mg by mouth daily, lisinopril 5 mg by mouth daily, atorvastatin 80 mg by mouth daily at bedtime, and Imdur were 30 mg by mouth daily. I reviewed his previous testing showing fairly dense scar inferiorly and laterally reveals high-grade disease of his posterior descending coronary artery, and posterior lateral branches of the right coronary artery. 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 His LAD, however, is patent. His ejection fraction is variable between different modalities, and I will plan to repeat an echocardiogram at our next follow-up appointment to adjudicate that. His LDL last month was 119 mg/dL having previously been 48 mg/dL. He is again on 80 mg by mouth daily of atorvastatin, and I suspect that that LDL reflects nonadherence to that medication. I did order a repeat lipid panel for 6 months time. Otherwise, I have not changed his cardiovascular regimen today.    Joseph " MD Mau  Cardiologist, [facility] [place]

## 2017-04-16 ENCOUNTER — HOSPITAL ENCOUNTER (OUTPATIENT)
Facility: MEDICAL CENTER | Age: 52
End: 2017-04-17
Attending: EMERGENCY MEDICINE | Admitting: HOSPITALIST
Payer: MEDICARE

## 2017-04-16 ENCOUNTER — APPOINTMENT (OUTPATIENT)
Dept: RADIOLOGY | Facility: MEDICAL CENTER | Age: 52
End: 2017-04-16
Attending: STUDENT IN AN ORGANIZED HEALTH CARE EDUCATION/TRAINING PROGRAM
Payer: MEDICARE

## 2017-04-16 ENCOUNTER — APPOINTMENT (OUTPATIENT)
Dept: RADIOLOGY | Facility: MEDICAL CENTER | Age: 52
End: 2017-04-16
Attending: EMERGENCY MEDICINE
Payer: MEDICARE

## 2017-04-16 ENCOUNTER — RESOLUTE PROFESSIONAL BILLING HOSPITAL PROF FEE (OUTPATIENT)
Dept: HOSPITALIST | Facility: MEDICAL CENTER | Age: 52
End: 2017-04-16
Payer: MEDICARE

## 2017-04-16 DIAGNOSIS — R07.9 CHEST PAIN, UNSPECIFIED TYPE: ICD-10-CM

## 2017-04-16 LAB
ALBUMIN SERPL BCP-MCNC: 4.4 G/DL (ref 3.2–4.9)
ALBUMIN/GLOB SERPL: 1.3 G/DL
ALP SERPL-CCNC: 71 U/L (ref 30–99)
ALT SERPL-CCNC: 62 U/L (ref 2–50)
AMPHET UR QL SCN: NEGATIVE
ANION GAP SERPL CALC-SCNC: 12 MMOL/L (ref 0–11.9)
APTT PPP: 23.6 SEC (ref 24.7–36)
AST SERPL-CCNC: 25 U/L (ref 12–45)
BARBITURATES UR QL SCN: NEGATIVE
BASOPHILS # BLD AUTO: 0.6 % (ref 0–1.8)
BASOPHILS # BLD: 0.05 K/UL (ref 0–0.12)
BENZODIAZ UR QL SCN: NEGATIVE
BILIRUB SERPL-MCNC: 0.7 MG/DL (ref 0.1–1.5)
BNP SERPL-MCNC: 21 PG/ML (ref 0–100)
BUN SERPL-MCNC: 15 MG/DL (ref 8–22)
BZE UR QL SCN: NEGATIVE
CALCIUM SERPL-MCNC: 9.9 MG/DL (ref 8.5–10.5)
CANNABINOIDS UR QL SCN: NEGATIVE
CHLORIDE SERPL-SCNC: 100 MMOL/L (ref 96–112)
CO2 SERPL-SCNC: 24 MMOL/L (ref 20–33)
CREAT SERPL-MCNC: 0.91 MG/DL (ref 0.5–1.4)
EOSINOPHIL # BLD AUTO: 0.12 K/UL (ref 0–0.51)
EOSINOPHIL NFR BLD: 1.4 % (ref 0–6.9)
ERYTHROCYTE [DISTWIDTH] IN BLOOD BY AUTOMATED COUNT: 39.7 FL (ref 35.9–50)
ERYTHROCYTE [SEDIMENTATION RATE] IN BLOOD BY WESTERGREN METHOD: 17 MM/HOUR (ref 0–20)
GFR SERPL CREATININE-BSD FRML MDRD: >60 ML/MIN/1.73 M 2
GLOBULIN SER CALC-MCNC: 3.3 G/DL (ref 1.9–3.5)
GLUCOSE BLD-MCNC: 264 MG/DL (ref 65–99)
GLUCOSE SERPL-MCNC: 194 MG/DL (ref 65–99)
HCT VFR BLD AUTO: 43.4 % (ref 42–52)
HGB BLD-MCNC: 15.1 G/DL (ref 14–18)
IMM GRANULOCYTES # BLD AUTO: 0.04 K/UL (ref 0–0.11)
IMM GRANULOCYTES NFR BLD AUTO: 0.5 % (ref 0–0.9)
INR PPP: 0.84 (ref 0.87–1.13)
LIPASE SERPL-CCNC: 62 U/L (ref 11–82)
LYMPHOCYTES # BLD AUTO: 1.91 K/UL (ref 1–4.8)
LYMPHOCYTES NFR BLD: 23.1 % (ref 22–41)
MCH RBC QN AUTO: 29.9 PG (ref 27–33)
MCHC RBC AUTO-ENTMCNC: 34.8 G/DL (ref 33.7–35.3)
MCV RBC AUTO: 85.9 FL (ref 81.4–97.8)
MDMA UR QL SCN: NEGATIVE
METHADONE UR QL SCN: NEGATIVE
MONOCYTES # BLD AUTO: 0.49 K/UL (ref 0–0.85)
MONOCYTES NFR BLD AUTO: 5.9 % (ref 0–13.4)
NEUTROPHILS # BLD AUTO: 5.67 K/UL (ref 1.82–7.42)
NEUTROPHILS NFR BLD: 68.5 % (ref 44–72)
NRBC # BLD AUTO: 0 K/UL
NRBC BLD AUTO-RTO: 0 /100 WBC
OPIATES UR QL SCN: POSITIVE
OXYCODONE UR QL SCN: NEGATIVE
PCP UR QL SCN: NEGATIVE
PLATELET # BLD AUTO: 432 K/UL (ref 164–446)
PMV BLD AUTO: 9.8 FL (ref 9–12.9)
POTASSIUM SERPL-SCNC: 4.3 MMOL/L (ref 3.6–5.5)
PROPOXYPH UR QL SCN: NEGATIVE
PROT SERPL-MCNC: 7.7 G/DL (ref 6–8.2)
PROTHROMBIN TIME: 11.8 SEC (ref 12–14.6)
RBC # BLD AUTO: 5.05 M/UL (ref 4.7–6.1)
SODIUM SERPL-SCNC: 136 MMOL/L (ref 135–145)
TROPONIN I SERPL-MCNC: <0.01 NG/ML (ref 0–0.04)
TROPONIN I SERPL-MCNC: <0.01 NG/ML (ref 0–0.04)
TSH SERPL DL<=0.005 MIU/L-ACNC: 9.54 UIU/ML (ref 0.3–3.7)
WBC # BLD AUTO: 8.3 K/UL (ref 4.8–10.8)

## 2017-04-16 PROCEDURE — 83880 ASSAY OF NATRIURETIC PEPTIDE: CPT

## 2017-04-16 PROCEDURE — 700102 HCHG RX REV CODE 250 W/ 637 OVERRIDE(OP): Performed by: EMERGENCY MEDICINE

## 2017-04-16 PROCEDURE — 83036 HEMOGLOBIN GLYCOSYLATED A1C: CPT

## 2017-04-16 PROCEDURE — 96375 TX/PRO/DX INJ NEW DRUG ADDON: CPT

## 2017-04-16 PROCEDURE — 85730 THROMBOPLASTIN TIME PARTIAL: CPT

## 2017-04-16 PROCEDURE — 74000 DX-ABDOMEN-1 VIEW: CPT

## 2017-04-16 PROCEDURE — 96374 THER/PROPH/DIAG INJ IV PUSH: CPT

## 2017-04-16 PROCEDURE — 99285 EMERGENCY DEPT VISIT HI MDM: CPT

## 2017-04-16 PROCEDURE — G0378 HOSPITAL OBSERVATION PER HR: HCPCS

## 2017-04-16 PROCEDURE — 700117 HCHG RX CONTRAST REV CODE 255: Performed by: INTERNAL MEDICINE

## 2017-04-16 PROCEDURE — 83690 ASSAY OF LIPASE: CPT

## 2017-04-16 PROCEDURE — 96361 HYDRATE IV INFUSION ADD-ON: CPT

## 2017-04-16 PROCEDURE — 304562 HCHG STAT O2 MASK/CANNULA

## 2017-04-16 PROCEDURE — A9270 NON-COVERED ITEM OR SERVICE: HCPCS | Performed by: STUDENT IN AN ORGANIZED HEALTH CARE EDUCATION/TRAINING PROGRAM

## 2017-04-16 PROCEDURE — 700111 HCHG RX REV CODE 636 W/ 250 OVERRIDE (IP): Performed by: HOSPITALIST

## 2017-04-16 PROCEDURE — 85652 RBC SED RATE AUTOMATED: CPT

## 2017-04-16 PROCEDURE — A9270 NON-COVERED ITEM OR SERVICE: HCPCS | Performed by: EMERGENCY MEDICINE

## 2017-04-16 PROCEDURE — 85610 PROTHROMBIN TIME: CPT

## 2017-04-16 PROCEDURE — 85025 COMPLETE CBC W/AUTO DIFF WBC: CPT

## 2017-04-16 PROCEDURE — A9270 NON-COVERED ITEM OR SERVICE: HCPCS | Performed by: HOSPITALIST

## 2017-04-16 PROCEDURE — 36415 COLL VENOUS BLD VENIPUNCTURE: CPT

## 2017-04-16 PROCEDURE — 700111 HCHG RX REV CODE 636 W/ 250 OVERRIDE (IP): Performed by: EMERGENCY MEDICINE

## 2017-04-16 PROCEDURE — 700102 HCHG RX REV CODE 250 W/ 637 OVERRIDE(OP): Performed by: HOSPITALIST

## 2017-04-16 PROCEDURE — 71275 CT ANGIOGRAPHY CHEST: CPT

## 2017-04-16 PROCEDURE — 96372 THER/PROPH/DIAG INJ SC/IM: CPT

## 2017-04-16 PROCEDURE — 93306 TTE W/DOPPLER COMPLETE: CPT

## 2017-04-16 PROCEDURE — 71010 DX-CHEST-LIMITED (1 VIEW): CPT

## 2017-04-16 PROCEDURE — 700105 HCHG RX REV CODE 258: Performed by: EMERGENCY MEDICINE

## 2017-04-16 PROCEDURE — 700102 HCHG RX REV CODE 250 W/ 637 OVERRIDE(OP): Performed by: STUDENT IN AN ORGANIZED HEALTH CARE EDUCATION/TRAINING PROGRAM

## 2017-04-16 PROCEDURE — 80307 DRUG TEST PRSMV CHEM ANLYZR: CPT

## 2017-04-16 PROCEDURE — 84443 ASSAY THYROID STIM HORMONE: CPT

## 2017-04-16 PROCEDURE — 93306 TTE W/DOPPLER COMPLETE: CPT | Mod: 26 | Performed by: INTERNAL MEDICINE

## 2017-04-16 PROCEDURE — 93005 ELECTROCARDIOGRAM TRACING: CPT

## 2017-04-16 PROCEDURE — 82962 GLUCOSE BLOOD TEST: CPT

## 2017-04-16 PROCEDURE — 80053 COMPREHEN METABOLIC PANEL: CPT

## 2017-04-16 PROCEDURE — 84484 ASSAY OF TROPONIN QUANT: CPT

## 2017-04-16 RX ORDER — OMEPRAZOLE 20 MG/1
20 CAPSULE, DELAYED RELEASE ORAL DAILY
Status: DISCONTINUED | OUTPATIENT
Start: 2017-04-17 | End: 2017-04-17

## 2017-04-16 RX ORDER — ISOSORBIDE MONONITRATE 30 MG/1
30 TABLET, EXTENDED RELEASE ORAL DAILY
Status: DISCONTINUED | OUTPATIENT
Start: 2017-04-17 | End: 2017-04-16

## 2017-04-16 RX ORDER — NITROGLYCERIN 0.4 MG/1
0.4 TABLET SUBLINGUAL
Status: DISCONTINUED | OUTPATIENT
Start: 2017-04-16 | End: 2017-04-17 | Stop reason: HOSPADM

## 2017-04-16 RX ORDER — MORPHINE SULFATE 4 MG/ML
4 INJECTION, SOLUTION INTRAMUSCULAR; INTRAVENOUS
Status: DISCONTINUED | OUTPATIENT
Start: 2017-04-16 | End: 2017-04-16

## 2017-04-16 RX ORDER — ACETAMINOPHEN 325 MG/1
650 TABLET ORAL EVERY 6 HOURS PRN
Status: DISCONTINUED | OUTPATIENT
Start: 2017-04-16 | End: 2017-04-17 | Stop reason: HOSPADM

## 2017-04-16 RX ORDER — METOPROLOL SUCCINATE 25 MG/1
25 TABLET, EXTENDED RELEASE ORAL DAILY
Status: DISCONTINUED | OUTPATIENT
Start: 2017-04-17 | End: 2017-04-17 | Stop reason: HOSPADM

## 2017-04-16 RX ORDER — ONDANSETRON 2 MG/ML
4 INJECTION INTRAMUSCULAR; INTRAVENOUS EVERY 4 HOURS PRN
Status: DISCONTINUED | OUTPATIENT
Start: 2017-04-16 | End: 2017-04-17 | Stop reason: HOSPADM

## 2017-04-16 RX ORDER — ATORVASTATIN CALCIUM 80 MG/1
80 TABLET, FILM COATED ORAL EVERY EVENING
Status: DISCONTINUED | OUTPATIENT
Start: 2017-04-16 | End: 2017-04-17 | Stop reason: HOSPADM

## 2017-04-16 RX ORDER — MORPHINE SULFATE 4 MG/ML
2 INJECTION, SOLUTION INTRAMUSCULAR; INTRAVENOUS
Status: DISCONTINUED | OUTPATIENT
Start: 2017-04-16 | End: 2017-04-16

## 2017-04-16 RX ORDER — ASPIRIN 325 MG
325 TABLET ORAL ONCE
Status: COMPLETED | OUTPATIENT
Start: 2017-04-16 | End: 2017-04-16

## 2017-04-16 RX ORDER — POLYETHYLENE GLYCOL 3350 17 G/17G
1 POWDER, FOR SOLUTION ORAL
Status: DISCONTINUED | OUTPATIENT
Start: 2017-04-16 | End: 2017-04-17 | Stop reason: HOSPADM

## 2017-04-16 RX ORDER — DEXTROSE MONOHYDRATE 25 G/50ML
25 INJECTION, SOLUTION INTRAVENOUS
Status: DISCONTINUED | OUTPATIENT
Start: 2017-04-16 | End: 2017-04-17 | Stop reason: HOSPADM

## 2017-04-16 RX ORDER — ISOSORBIDE MONONITRATE 30 MG/1
30 TABLET, EXTENDED RELEASE ORAL DAILY
Status: DISCONTINUED | OUTPATIENT
Start: 2017-04-16 | End: 2017-04-17 | Stop reason: HOSPADM

## 2017-04-16 RX ORDER — LISINOPRIL 10 MG/1
5 TABLET ORAL DAILY
Status: DISCONTINUED | OUTPATIENT
Start: 2017-04-17 | End: 2017-04-17 | Stop reason: HOSPADM

## 2017-04-16 RX ORDER — GUAIFENESIN/DEXTROMETHORPHAN 100-10MG/5
10 SYRUP ORAL EVERY 6 HOURS PRN
Status: DISCONTINUED | OUTPATIENT
Start: 2017-04-16 | End: 2017-04-16

## 2017-04-16 RX ORDER — BISACODYL 10 MG
10 SUPPOSITORY, RECTAL RECTAL
Status: DISCONTINUED | OUTPATIENT
Start: 2017-04-16 | End: 2017-04-17 | Stop reason: HOSPADM

## 2017-04-16 RX ORDER — AMOXICILLIN 250 MG
2 CAPSULE ORAL 2 TIMES DAILY
Status: DISCONTINUED | OUTPATIENT
Start: 2017-04-16 | End: 2017-04-17 | Stop reason: HOSPADM

## 2017-04-16 RX ORDER — MORPHINE SULFATE 4 MG/ML
2 INJECTION, SOLUTION INTRAMUSCULAR; INTRAVENOUS EVERY 4 HOURS PRN
Status: DISCONTINUED | OUTPATIENT
Start: 2017-04-16 | End: 2017-04-17 | Stop reason: HOSPADM

## 2017-04-16 RX ORDER — ONDANSETRON 2 MG/ML
4 INJECTION INTRAMUSCULAR; INTRAVENOUS ONCE
Status: COMPLETED | OUTPATIENT
Start: 2017-04-16 | End: 2017-04-16

## 2017-04-16 RX ORDER — SODIUM CHLORIDE 9 MG/ML
1000 INJECTION, SOLUTION INTRAVENOUS ONCE
Status: COMPLETED | OUTPATIENT
Start: 2017-04-16 | End: 2017-04-16

## 2017-04-16 RX ORDER — NITROGLYCERIN 80 MG/1
1 PATCH TRANSDERMAL DAILY
Status: DISCONTINUED | OUTPATIENT
Start: 2017-04-16 | End: 2017-04-16

## 2017-04-16 RX ADMIN — NITROGLYCERIN 0.4 MG: 0.4 TABLET SUBLINGUAL at 21:21

## 2017-04-16 RX ADMIN — LIDOCAINE HYDROCHLORIDE 30 ML: 20 SOLUTION OROPHARYNGEAL at 17:39

## 2017-04-16 RX ADMIN — ASPIRIN 325 MG: 325 TABLET, COATED ORAL at 17:39

## 2017-04-16 RX ADMIN — IOHEXOL 40 ML: 350 INJECTION, SOLUTION INTRAVENOUS at 20:36

## 2017-04-16 RX ADMIN — ONDANSETRON 4 MG: 2 INJECTION INTRAMUSCULAR; INTRAVENOUS at 17:39

## 2017-04-16 RX ADMIN — ENOXAPARIN SODIUM 40 MG: 100 INJECTION SUBCUTANEOUS at 20:07

## 2017-04-16 RX ADMIN — MORPHINE SULFATE 2 MG: 4 INJECTION INTRAVENOUS at 18:51

## 2017-04-16 RX ADMIN — HYDROMORPHONE HYDROCHLORIDE 1 MG: 1 INJECTION, SOLUTION INTRAMUSCULAR; INTRAVENOUS; SUBCUTANEOUS at 17:39

## 2017-04-16 RX ADMIN — INSULIN LISPRO 5 UNITS: 100 INJECTION, SOLUTION INTRAVENOUS; SUBCUTANEOUS at 20:10

## 2017-04-16 RX ADMIN — SODIUM CHLORIDE 1000 ML: 9 INJECTION, SOLUTION INTRAVENOUS at 17:39

## 2017-04-16 RX ADMIN — ATORVASTATIN CALCIUM 80 MG: 80 TABLET, FILM COATED ORAL at 20:07

## 2017-04-16 ASSESSMENT — COPD QUESTIONNAIRES
DO YOU EVER COUGH UP ANY MUCUS OR PHLEGM?: YES, EVERY DAY
COPD SCREENING SCORE: 4
HAVE YOU SMOKED AT LEAST 100 CIGARETTES IN YOUR ENTIRE LIFE: NO/DON'T KNOW
DURING THE PAST 4 WEEKS HOW MUCH DID YOU FEEL SHORT OF BREATH: SOME OF THE TIME

## 2017-04-16 ASSESSMENT — PAIN SCALES - GENERAL
PAINLEVEL_OUTOF10: 10

## 2017-04-16 ASSESSMENT — LIFESTYLE VARIABLES
EVER_SMOKED: NEVER
DO YOU DRINK ALCOHOL: NO

## 2017-04-16 NOTE — ED NOTES
Chief Complaint   Patient presents with   • Chest Pain     Pt Brought in by EMS from home. Pt complains of pain in his chest since 1300hrs today. Pt states that coughing makes it worse. Pt states that if he lays still the pain goes down.

## 2017-04-16 NOTE — ED PROVIDER NOTES
"ER Provider Note     Scribed for Tien Camargo M.D. by Bill Blake. 4/16/2017, 4:35 PM.    Primary Care Provider: Juancho Jenkins M.D.  Means of Arrival: Ambulance    History obtained from: Patient  History limited by: None     CHIEF COMPLAINT   Chief Complaint   Patient presents with   • Chest Pain     Pt Brought in by EMS from home. Pt complains of pain in his chest since 1300hrs today. Pt states that coughing makes it worse. Pt states that if he lays still the pain goes down.        HPI   Ryan Trevizo is a 51 y.o. brought in by ambulance to the emergency department for chest pain since 1:00 PM today. Patient reports his pain onset while he was at rest and is substernal and epigastric. He describes it as \"sharp\" and states it was not relieved by nitroglycerin or aspirin administered by EMS. Patient's pain is worsened with cough, and he states he has had a cough for a few days. His pain does not radiate anywhere. He denies shortness of breath, fever, chills, numbness, diaphoresis, or other symptoms. Patient reports a history of coronary artery disease and myocardial infarction.     Cardiac Risk Factors:  Age < 65  No Aspirin use within 7 days  +ve prior history of coronary artery disease  History of diabetes  No hyperlipidemia   History of hypertension  No obesity  No family history of coronary artery disease at a young age <54 yo  No tobacco use   No drugs (methamphetamine or cocaine)  No history of aortic aneurysm   No history of aortic dissection   No history of deep vein thrombosis or pulmonary embolism     REVIEW OF SYSTEMS     General: No fever or chills. No diaphoresis   Ear nose throat: No sore throat or trouble swallowing. No runny nose or congestion.  Pulmonary: Positive for cough. No shortness of breath.  Cardiovascular: Positive for chest pain.. See above at rest  GI: Positive for epigastric pain. No nausea or vomiting.  Dermatologic: No rashes. No abrasions.  Neurologic: No " weakness or numbness.  Psychiatric: No anxiety or stress.  All other systems reviewed and are negative. C.    PAST MEDICAL HISTORY  Past Medical History   Diagnosis Date   • Arthritis    • HTN (hypertension) 7/15/2012   • GERD (gastroesophageal reflux disease) 7/15/2012   • STEMI (ST elevation myocardial infarction) (CMS-HCC)    • Anxiety disorder    • MI (myocardial infarction) (CMS-HCC)    • Hyperlipemia    • Upper GI bleed 7/26/2012   • Glaucoma    • SOB (shortness of breath)    • Claudication (CMS-HCC)    • Dizziness    • CAD (coronary artery disease) 10/14/2013   • ACS (acute coronary syndrome) (CMS-HCC) 7/25/2012   • Pericarditis secondary to acute myocardial infarction (CMS-HCC) 7/19/2012   • Right knee pain    • Blood glucose elevated    • Mental retardation    • Osteoarthritis    • Hypertriglyceridemia        SURGICAL HISTORY  Past Surgical History   Procedure Laterality Date   • Other orthopedic surgery  7-      R knee surgery   • Other  knee surgery   • Other cardiac surgery       stent placement   • Other     • Gastroscopy-endo  7/25/2012     Performed by JORDIN VERA at ENDOSCOPY [facility]   • Gastroscopy-endo  7/26/2012     Performed by JORDIN VERA at ENDOSCOPY [facility]       SOCIAL HISTORY  Social History   Substance Use Topics   • Smoking status: Never Smoker    • Smokeless tobacco: Never Used   • Alcohol Use: No       CURRENT MEDICATIONS  Previous Medications    ASPIRIN EC (ECOTRIN) 81 MG TABLET DELAYED RESPONSE    Take 1 Tab by mouth every day.    ATORVASTATIN (LIPITOR) 80 MG TABLET    Take 1 Tab by mouth every evening.    IBUPROFEN (MOTRIN) 600 MG TAB    Take 1 Tab by mouth every 6 hours as needed. for knee pain    ISOSORBIDE MONONITRATE SR (IMDUR) 30 MG TABLET SR 24 HR    Take 1 Tab by mouth every day.    LISINOPRIL (PRINIVIL) 5 MG TAB    Take 1 Tab by mouth every day.    METFORMIN (GLUCOPHAGE) 1000 MG TABLET    Take 1 Tab by mouth 2 times a day, with meals.    METOPROLOL  "SR (TOPROL XL) 25 MG TABLET SR 24 HR    Take 1 Tab by mouth every day.    PANTOPRAZOLE (PROTONIX) 40 MG TABLET DELAYED RESPONSE    Take 1 Tab by mouth every day.    VITAMIN D (CHOLECALCIFEROL) 1000 UNIT TAB    Take 1 Tab by mouth every day.       ALLERGIES     Allergies   Allergen Reactions   • Ritalin [Methylphenidate] Unspecified     \"Hyper\"       PHYSICAL EXAM   Vital Signs: /94 mmHg  Pulse 119  Resp 19  Ht 1.803 m (5' 10.98\")  Wt 90.719 kg (200 lb)  BMI 27.91 kg/m2  SpO2 91%    Constitutional: Well developed, Well nourished, No acute distress, Non-toxic appearance.   Psychiatric: Calm. Not anxious.  HENT:  Oropharynx: no exudate no erythema  Eyes: PERRLA, EOMI, Conjunctiva normal, No discharge.   Musculoskeletal: Neck is soft and supple no meningismus  Lymphatic: No cervical lymphadenopathy noted.   Cardiovascular: Normal heart rate, Normal rhythm, No murmurs, Negative Homans, no pedal edema, good equal pedal pulses.  Pulmonary: Lungs are clear to auscultation bilaterally. No wheezes rales or rhonchi  Abdomen: Bowel sounds normal, soft nondistended and nontender. No rebound and no guarding .  Skin: Warm, Dry, No erythema, No rash.   Neurologic: Alert & oriented, moves all extremities normally. Good sensation to light touch on all extremities.    DIAGNOSTIC STUDIES/PROCEDURES  Labs:   Results for orders placed or performed during the hospital encounter of 04/16/17   Troponin   Result Value Ref Range    Troponin I <0.01 0.00 - 0.04 ng/mL   Btype Natriuretic Peptide   Result Value Ref Range    B Natriuretic Peptide 21 0 - 100 pg/mL   CBC with Differential   Result Value Ref Range    WBC 8.3 4.8 - 10.8 K/uL    RBC 5.05 4.70 - 6.10 M/uL    Hemoglobin 15.1 14.0 - 18.0 g/dL    Hematocrit 43.4 42.0 - 52.0 %    MCV 85.9 81.4 - 97.8 fL    MCH 29.9 27.0 - 33.0 pg    MCHC 34.8 33.7 - 35.3 g/dL    RDW 39.7 35.9 - 50.0 fL    Platelet Count 432 164 - 446 K/uL    MPV 9.8 9.0 - 12.9 fL    Neutrophils-Polys 68.50 " 44.00 - 72.00 %    Lymphocytes 23.10 22.00 - 41.00 %    Monocytes 5.90 0.00 - 13.40 %    Eosinophils 1.40 0.00 - 6.90 %    Basophils 0.60 0.00 - 1.80 %    Immature Granulocytes 0.50 0.00 - 0.90 %    Nucleated RBC 0.00 /100 WBC    Neutrophils (Absolute) 5.67 1.82 - 7.42 K/uL    Lymphs (Absolute) 1.91 1.00 - 4.80 K/uL    Monos (Absolute) 0.49 0.00 - 0.85 K/uL    Eos (Absolute) 0.12 0.00 - 0.51 K/uL    Baso (Absolute) 0.05 0.00 - 0.12 K/uL    Immature Granulocytes (abs) 0.04 0.00 - 0.11 K/uL    NRBC (Absolute) 0.00 K/uL   Complete Metabolic Panel (CMP)   Result Value Ref Range    Sodium 136 135 - 145 mmol/L    Potassium 4.3 3.6 - 5.5 mmol/L    Chloride 100 96 - 112 mmol/L    Co2 24 20 - 33 mmol/L    Anion Gap 12.0 (H) 0.0 - 11.9    Glucose 194 (H) 65 - 99 mg/dL    Bun 15 8 - 22 mg/dL    Creatinine 0.91 0.50 - 1.40 mg/dL    Calcium 9.9 8.5 - 10.5 mg/dL    AST(SGOT) 25 12 - 45 U/L    ALT(SGPT) 62 (H) 2 - 50 U/L    Alkaline Phosphatase 71 30 - 99 U/L    Total Bilirubin 0.7 0.1 - 1.5 mg/dL    Albumin 4.4 3.2 - 4.9 g/dL    Total Protein 7.7 6.0 - 8.2 g/dL    Globulin 3.3 1.9 - 3.5 g/dL    A-G Ratio 1.3 g/dL   Prothrombin Time   Result Value Ref Range    PT 11.8 (L) 12.0 - 14.6 sec    INR 0.84 (L) 0.87 - 1.13   APTT   Result Value Ref Range    APTT 23.6 (L) 24.7 - 36.0 sec   Lipase   Result Value Ref Range    Lipase 62 11 - 82 U/L   ESTIMATED GFR   Result Value Ref Range    GFR If African American >60 >60 mL/min/1.73 m 2    GFR If Non African American >60 >60 mL/min/1.73 m 2      All labs reviewed by me.    EKG Interpretation:  12- Lead EKG; interpreted by Dr. Tien Camargo.  Sinus tachycardia with a rate of 128 bpm.   Borderline right axis deviation  Normal intervals.   No ST elevation or depression    No widening of QRS complex   Good R wave progression   No diagnostic Q waves.   Axis deviation is new when compared to old EKG from 12/11/2016   Clinical Impression: No acute ischemic findings     Radiology:    DX-CHEST-LIMITED (1 VIEW)   Final Result         Bibasilar atelectasis.        The radiologist's interpretation of all radiological studies have been reviewed by me.    COURSE & MEDICAL DECISION MAKING   Nursing notes, VS, PMSFSHx reviewed in chart     Differential Diagnoses: (include but are not limited to) this patient unfortunately has a known history of coronary artery disease are reviewed the old chart. 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 Unfortunate, this patient had chest pain at rest and not relieved with nitroglycerin this may be GI related may not be cardiac with his history that is quite significant, do not feel comfortable sending him home he does have some axis deviation EKG in 2011. That's unusual however, the patient does not have the classical EKG findings for pulmonary embolus. This is slightly pleuritic.    4:35 PM - Patient was evaluated; DX-chest, eGFR, CBC, CMP, troponin, BNP, lipase, prothrombin time, APTT, EKG ordered. I explained to the patient I will order a full cardiac workup and treat his symptoms. We discussed he will be admitted to the hospital due to his history and current symptoms. The patient will be medicated with Dilaudid and Zofran for his symptoms.     5:03 PM - [facility] paged. Patient's troponin is negative. EKG does not show signs of ischemia he does have a history of coronary artery disease my recommendation at this point would be watch for serial troponins. At this point,. Patient also has tachycardia but I do a CT scan to rule out PE as the patient had pleuritic chest pain tachycardia and I cannot rule him out. By the PERC criteria and by the well's criteria the patient is moderate risk.    DISPOSITION:  Patient will be admitted to [facility] in guarded condition.     FINAL IMPRESSION   1. Chest pain, unspecified type         I, Bill Blake (Scribe), am scribing for, and in the presence of,  Tien Camargo M.D..    Electronically signed by: Bill Blake (Scribe), 4/16/2017    ITien M.D. personally performed the services described in this documentation, as scribed by Bill Blake in my presence, and it is both accurate and complete.    The note accurately reflects work and decisions made by me.  Tien Camargo  4/16/2017  5:31 PM

## 2017-04-16 NOTE — IP AVS SNAPSHOT
" Home Care Instructions                                                                                                                  Name:Ryan Trevizo  Medical Record Number:[id number]  CSN: [id number]    YOB: 1965   Age: 51 y.o.  Sex: male  HT:1.803 m (5' 10.98\") WT: 90.719 kg (200 lb)          Admit Date: 4/16/2017     Discharge Date:   Today's Date: 4/17/2017  Attending Doctor:  No att. providers found                  Allergies:  Ritalin            Discharge Instructions       Discharge Instructions    Discharged to home by car with self. Discharged via wheelchair, hospital escort: Yes.  Special equipment needed: Not Applicable    Be sure to schedule a follow-up appointment with your primary care doctor or any specialists as instructed.     Discharge Plan:   Diet Plan: Discussed  Activity Level: Discussed  Confirmed Follow up Appointment: Patient to Call and Schedule Appointment  Confirmed Symptoms Management: Discussed  Medication Reconciliation Updated: Yes  Influenza Vaccine Indication: Not indicated: Previously immunized this influenza season and > 8 years of age    I understand that a diet low in cholesterol, fat, and sodium is recommended for good health. Unless I have been given specific instructions below for another diet, I accept this instruction as my diet prescription.   Other diet: Heart healthy     Special Instructions: None    · Is patient discharged on Warfarin / Coumadin?   No     · Is patient Post Blood Transfusion?  No    Depression / Suicide Risk    As you are discharged from this [facility] facility, it is important to learn how to keep safe from harming yourself.    Recognize the warning signs:  · Abrupt changes in personality, positive or negative- including increase in energy   · Giving away possessions  · Change in eating patterns- significant weight changes-  positive or negative  · Change in sleeping patterns- unable to sleep or sleeping all the " time   · Unwillingness or inability to communicate  · Depression  · Unusual sadness, discouragement and loneliness  · Talk of wanting to die  · Neglect of personal appearance   · Rebelliousness- reckless behavior  · Withdrawal from people/activities they love  · Confusion- inability to concentrate     If you or a loved one observes any of these behaviors or has concerns about self-harm, here's what you can do:  · Talk about it- your feelings and reasons for harming yourself  · Remove any means that you might use to hurt yourself (examples: pills, rope, extension cords, firearm)  · Get professional help from the community (Mental Health, Substance Abuse, psychological counseling)  · Do not be alone:Call your Safe Contact- someone whom you trust who will be there for you.  · Call your local CRISIS HOTLINE 456-7091 or 517-464-6205  · Call your local Children's Mobile Crisis Response Team [place] (692) 496-6907 or www.[employer]  · Call the toll free National Suicide Prevention Hotlines   · National Suicide Prevention Lifeline 971-499-ETEH (3150)  · Dw[facility] Line Network 800-SUICIDE (815-6009)        Your appointments     Jun 01, 2017  2:30 PM   Established Patient with Juancho Jenkins M.D.   [facility] (--)    [address] [place] [address] [address]  [facility] [address] [phone]           You will be receiving a confirmation call a few days before your appointment from our automated call confirmation system.            Oct 06, 2017  2:30 PM   FOLLOW UP with Joseph León M.D.   [facility] (--)    [address] [place] [address] [address]  [facility] [address] [phone]                 Discharge Medication Instructions:    Below are the medications your physician expects you to take upon discharge:    Review all your home medications and newly ordered medications with your doctor and/or pharmacist. Follow medication instructions as  directed by your doctor and/or pharmacist.    Please keep your medication list with you and share with your physician.               Medication List      START taking these medications        Instructions    Morning Afternoon Evening Bedtime    azithromycin 250 MG Tabs   Commonly known as:  ZITHROMAX        500 mg X day 1 250 mg PO OD for the remaining 4 days                        fluconazole 200 MG Tabs   Commonly known as:  DIFLUCAN        Take 1 Tab by mouth every day.   Dose:  200 mg                        hyoscyamine-maalox plus-lidocaine viscous   Last time this was given:  30 mL on 4/16/2017  5:39 PM   Commonly known as:  GI COCKTAIL        Take 15 mL by mouth every four hours as needed (abdominal pain).   Dose:  15 mL                        losartan 25 MG Tabs   Commonly known as:  COZAAR        Take 1 Tab by mouth every day.   Dose:  25 mg                        metoprolol SR 25 MG Tb24   Last time this was given:  25 mg on 4/17/2017  9:22 AM   Commonly known as:  TOPROL XL        Take 2 Tabs by mouth every day.   Dose:  50 mg                        omeprazole 20 MG delayed-release capsule   Last time this was given:  20 mg on 4/17/2017  9:23 AM   Commonly known as:  PRILOSEC        Take 2 Caps by mouth 2 times a day.   Dose:  40 mg                          CONTINUE taking these medications        Instructions    Morning Afternoon Evening Bedtime    aspirin EC 81 MG Tbec   Last time this was given:  81 mg on 4/17/2017  9:21 AM   Commonly known as:  ECOTRIN        Take 1 Tab by mouth every day.   Dose:  81 mg                        atorvastatin 80 MG tablet   Last time this was given:  80 mg on 4/16/2017  8:07 PM   Commonly known as:  LIPITOR        Take 1 Tab by mouth every evening.   Dose:  80 mg                        isosorbide mononitrate SR 30 MG Tb24   Last time this was given:  30 mg on 4/17/2017  9:22 AM   Commonly known as:  IMDUR        Take 1 Tab by mouth every day.   Dose:  30 mg                         metformin 1000 MG tablet   Commonly known as:  GLUCOPHAGE        Take 1 Tab by mouth 2 times a day, with meals.   Dose:  1000 mg                        vitamin D 1000 UNIT Tabs   Last time this was given:  1,000 Units on 4/17/2017  9:21 AM   Commonly known as:  cholecalciferol        Take 1 Tab by mouth every day.   Dose:  1000 Units                          STOP taking these medications     lisinopril 5 MG Tabs   Commonly known as:  PRINIVIL               pantoprazole 40 MG Tbec   Commonly known as:  PROTONIX                    Where to Get Your Medications      Information about where to get these medications is not yet available     ! Ask your nurse or doctor about these medications    - azithromycin 250 MG Tabs  - fluconazole 200 MG Tabs  - hyoscyamine-maalox plus-lidocaine viscous  - losartan 25 MG Tabs  - metoprolol SR 25 MG Tb24  - omeprazole 20 MG delayed-release capsule            Orders for after discharge     REFERRAL TO CARDIOLOGY    Complete by:  As directed    Diffuse CAD       REFERRAL TO GASTROENTEROLOGY    Complete by:  As directed    Diffuse esophagitis             Instructions           Diet / Nutrition:    Follow any diet instructions given to you by your doctor or the dietician, including how much salt (sodium) you are allowed each day.    If you are overweight, talk to your doctor about a weight reduction plan.    Activity:    Remain physically active following your doctor's instructions about exercise and activity.    Rest often.     Any time you become even a little tired or short of breath, SIT DOWN and rest.    Worsening Symptoms:    Report any of the following signs and symptoms to the doctor's office immediately:    *Pain of jaw, arm, or neck  *Chest pain not relieved by medication                               *Dizziness or loss of consciousness  *Difficulty breathing even when at rest   *More tired than usual                                       *Bleeding drainage or swelling  of surgical site  *Swelling of feet, ankles, legs or stomach                 *Fever (>100ºF)  *Pink or blood tinged sputum  *Weight gain (3lbs/day or 5lbs /week)           *Shock from internal defibrillator (if applicable)  *Palpitations or irregular heartbeats                *Cool and/or numb extremities    Stroke Awareness    Common Risk Factors for Stroke include:    Age  Atrial Fibrillation  Carotid Artery Stenosis  Diabetes Mellitus  Excessive alcohol consumption  High blood pressure  Overweight   Physical inactivity  Smoking    Warning signs and symptoms of a stroke include:    *Sudden numbness or weakness of the face, arm or leg (especially on one side of the body).  *Sudden confusion, trouble speaking or understanding.  *Sudden trouble seeing in one or both eyes.  *Sudden trouble walking, dizziness, loss of balance or coordination.Sudden severe headache with no known cause.    It is very important to get treatment quickly when a stroke occurs. If you experience any of the above warning signs, call 911 immediately.                   Disclaimer         Quit Smoking / Tobacco Use:    I understand the use of any tobacco products increases my chance of suffering from future heart disease or stroke and could cause other illnesses which may shorten my life. Quitting the use of tobacco products is the single most important thing I can do to improve my health. For further information on smoking / tobacco cessation call a Toll Free Quit Line at 1-691.183.3248 (*[employer] [place]) or 1-598.668.3398 (American Lung Association) or you can access the web based program at www.lungusa.org.    [place] Tobacco Users Help Line:  (567) 591-2638       Toll Free: 1-565.437.6765  Quit Tobacco Program [facility] (452)862-9920    Crisis Hotline:    [place] Crisis Hotline:  2-526-UZPBUCD or 1-464.380.3726    [place] Crisis Hotline:    1-922.907.8609 or 243-561-1639    Discharge Survey:   Thank you for  choosing [facility]. We hope we did everything we could to make your hospital stay a pleasant one. You may be receiving a phone survey and we would appreciate your time and participation in answering the questions. Your input is very valuable to us in our efforts to improve our service to our patients and their families.        My signature on this form indicates that:    1. I have reviewed and understand the above information.  2. My questions regarding this information have been answered to my satisfaction.  3. I have formulated a plan with my discharge nurse to obtain my prescribed medications for home.                  Disclaimer         __________________________________                     __________       ________                       Patient Signature                                                 Date                    Time

## 2017-04-16 NOTE — ED NOTES
Pt resting comfortably in bed. Monitors in place VSS. No s/s of distress noted. Call bell within reach. Will continue to monitor.

## 2017-04-17 VITALS
BODY MASS INDEX: 28 KG/M2 | RESPIRATION RATE: 16 BRPM | HEIGHT: 71 IN | TEMPERATURE: 97.6 F | OXYGEN SATURATION: 95 % | WEIGHT: 200 LBS | DIASTOLIC BLOOD PRESSURE: 56 MMHG | SYSTOLIC BLOOD PRESSURE: 106 MMHG | HEART RATE: 73 BPM

## 2017-04-17 PROBLEM — R00.0 TACHYCARDIA: Status: ACTIVE | Noted: 2017-04-17

## 2017-04-17 LAB
ALBUMIN SERPL BCP-MCNC: 3.6 G/DL (ref 3.2–4.9)
ALBUMIN/GLOB SERPL: 1.4 G/DL
ALP SERPL-CCNC: 53 U/L (ref 30–99)
ALT SERPL-CCNC: 44 U/L (ref 2–50)
ANION GAP SERPL CALC-SCNC: 8 MMOL/L (ref 0–11.9)
AST SERPL-CCNC: 23 U/L (ref 12–45)
BASOPHILS # BLD AUTO: 0.3 % (ref 0–1.8)
BASOPHILS # BLD: 0.03 K/UL (ref 0–0.12)
BILIRUB SERPL-MCNC: 0.7 MG/DL (ref 0.1–1.5)
BUN SERPL-MCNC: 17 MG/DL (ref 8–22)
CALCIUM SERPL-MCNC: 8.2 MG/DL (ref 8.5–10.5)
CHLORIDE SERPL-SCNC: 107 MMOL/L (ref 96–112)
CHOLEST SERPL-MCNC: 148 MG/DL (ref 100–199)
CO2 SERPL-SCNC: 20 MMOL/L (ref 20–33)
CREAT SERPL-MCNC: 0.7 MG/DL (ref 0.5–1.4)
EKG IMPRESSION: NORMAL
EOSINOPHIL # BLD AUTO: 0.07 K/UL (ref 0–0.51)
EOSINOPHIL NFR BLD: 0.7 % (ref 0–6.9)
ERYTHROCYTE [DISTWIDTH] IN BLOOD BY AUTOMATED COUNT: 41.6 FL (ref 35.9–50)
EST. AVERAGE GLUCOSE BLD GHB EST-MCNC: 246 MG/DL
GFR SERPL CREATININE-BSD FRML MDRD: >60 ML/MIN/1.73 M 2
GLOBULIN SER CALC-MCNC: 2.6 G/DL (ref 1.9–3.5)
GLUCOSE BLD-MCNC: 185 MG/DL (ref 65–99)
GLUCOSE BLD-MCNC: 225 MG/DL (ref 65–99)
GLUCOSE SERPL-MCNC: 185 MG/DL (ref 65–99)
HBA1C MFR BLD: 10.2 % (ref 0–5.6)
HCT VFR BLD AUTO: 40.4 % (ref 42–52)
HDLC SERPL-MCNC: 28 MG/DL
HGB BLD-MCNC: 13.4 G/DL (ref 14–18)
IMM GRANULOCYTES # BLD AUTO: 0.06 K/UL (ref 0–0.11)
IMM GRANULOCYTES NFR BLD AUTO: 0.6 % (ref 0–0.9)
LDLC SERPL CALC-MCNC: 73 MG/DL
LV EJECT FRACT  99904: 45
LV EJECT FRACT MOD 2C 99903: 34.17
LV EJECT FRACT MOD 4C 99902: 50.92
LV EJECT FRACT MOD BP 99901: 42.33
LYMPHOCYTES # BLD AUTO: 1.59 K/UL (ref 1–4.8)
LYMPHOCYTES NFR BLD: 15.4 % (ref 22–41)
MCH RBC QN AUTO: 29.6 PG (ref 27–33)
MCHC RBC AUTO-ENTMCNC: 33.2 G/DL (ref 33.7–35.3)
MCV RBC AUTO: 89.4 FL (ref 81.4–97.8)
MONOCYTES # BLD AUTO: 0.6 K/UL (ref 0–0.85)
MONOCYTES NFR BLD AUTO: 5.8 % (ref 0–13.4)
NEUTROPHILS # BLD AUTO: 7.99 K/UL (ref 1.82–7.42)
NEUTROPHILS NFR BLD: 77.2 % (ref 44–72)
NRBC # BLD AUTO: 0 K/UL
NRBC BLD AUTO-RTO: 0 /100 WBC
PLATELET # BLD AUTO: 359 K/UL (ref 164–446)
PMV BLD AUTO: 9.6 FL (ref 9–12.9)
POTASSIUM SERPL-SCNC: 4 MMOL/L (ref 3.6–5.5)
PROT SERPL-MCNC: 6.2 G/DL (ref 6–8.2)
RBC # BLD AUTO: 4.52 M/UL (ref 4.7–6.1)
SODIUM SERPL-SCNC: 135 MMOL/L (ref 135–145)
TRIGL SERPL-MCNC: 235 MG/DL (ref 0–149)
TROPONIN I SERPL-MCNC: <0.01 NG/ML (ref 0–0.04)
WBC # BLD AUTO: 10.3 K/UL (ref 4.8–10.8)

## 2017-04-17 PROCEDURE — 93005 ELECTROCARDIOGRAM TRACING: CPT | Performed by: HOSPITALIST

## 2017-04-17 PROCEDURE — 99217 PR OBSERVATION CARE DISCHARGE: CPT | Mod: GC | Performed by: HOSPITALIST

## 2017-04-17 PROCEDURE — 96376 TX/PRO/DX INJ SAME DRUG ADON: CPT

## 2017-04-17 PROCEDURE — 700102 HCHG RX REV CODE 250 W/ 637 OVERRIDE(OP): Performed by: INTERNAL MEDICINE

## 2017-04-17 PROCEDURE — A9270 NON-COVERED ITEM OR SERVICE: HCPCS | Performed by: INTERNAL MEDICINE

## 2017-04-17 PROCEDURE — A9270 NON-COVERED ITEM OR SERVICE: HCPCS | Performed by: HOSPITALIST

## 2017-04-17 PROCEDURE — 80061 LIPID PANEL: CPT

## 2017-04-17 PROCEDURE — 36415 COLL VENOUS BLD VENIPUNCTURE: CPT

## 2017-04-17 PROCEDURE — G0378 HOSPITAL OBSERVATION PER HR: HCPCS

## 2017-04-17 PROCEDURE — A9270 NON-COVERED ITEM OR SERVICE: HCPCS | Performed by: STUDENT IN AN ORGANIZED HEALTH CARE EDUCATION/TRAINING PROGRAM

## 2017-04-17 PROCEDURE — 700102 HCHG RX REV CODE 250 W/ 637 OVERRIDE(OP): Performed by: HOSPITALIST

## 2017-04-17 PROCEDURE — 93010 ELECTROCARDIOGRAM REPORT: CPT | Performed by: INTERNAL MEDICINE

## 2017-04-17 PROCEDURE — 80053 COMPREHEN METABOLIC PANEL: CPT

## 2017-04-17 PROCEDURE — 84484 ASSAY OF TROPONIN QUANT: CPT

## 2017-04-17 PROCEDURE — 700111 HCHG RX REV CODE 636 W/ 250 OVERRIDE (IP): Performed by: HOSPITALIST

## 2017-04-17 PROCEDURE — 82962 GLUCOSE BLOOD TEST: CPT

## 2017-04-17 PROCEDURE — 85025 COMPLETE CBC W/AUTO DIFF WBC: CPT

## 2017-04-17 PROCEDURE — 96372 THER/PROPH/DIAG INJ SC/IM: CPT

## 2017-04-17 PROCEDURE — 700102 HCHG RX REV CODE 250 W/ 637 OVERRIDE(OP): Performed by: STUDENT IN AN ORGANIZED HEALTH CARE EDUCATION/TRAINING PROGRAM

## 2017-04-17 RX ORDER — FLUCONAZOLE 200 MG/1
200 TABLET ORAL DAILY
Qty: 10 TAB | Refills: 0 | Status: SHIPPED | OUTPATIENT
Start: 2017-04-17 | End: 2017-05-06

## 2017-04-17 RX ORDER — FLUCONAZOLE 200 MG/1
200 TABLET ORAL DAILY
Qty: 10 TAB | Refills: 0 | Status: SHIPPED | OUTPATIENT
Start: 2017-04-17 | End: 2017-04-17

## 2017-04-17 RX ORDER — LOSARTAN POTASSIUM 25 MG/1
25 TABLET ORAL DAILY
Qty: 30 TAB | Refills: 0 | Status: SHIPPED | OUTPATIENT
Start: 2017-04-17 | End: 2017-05-05 | Stop reason: SDUPTHER

## 2017-04-17 RX ORDER — AZITHROMYCIN 250 MG/1
TABLET, FILM COATED ORAL
Qty: 6 TAB | Refills: 0 | Status: SHIPPED | OUTPATIENT
Start: 2017-04-17 | End: 2017-05-06

## 2017-04-17 RX ORDER — LOSARTAN POTASSIUM 25 MG/1
25 TABLET ORAL DAILY
Qty: 30 TAB | Refills: 0 | Status: ON HOLD | OUTPATIENT
Start: 2017-04-17 | End: 2017-05-18 | Stop reason: CLARIF

## 2017-04-17 RX ORDER — OMEPRAZOLE 20 MG/1
20 CAPSULE, DELAYED RELEASE ORAL 2 TIMES DAILY
Status: DISCONTINUED | OUTPATIENT
Start: 2017-04-17 | End: 2017-04-17 | Stop reason: HOSPADM

## 2017-04-17 RX ORDER — AZITHROMYCIN 250 MG/1
TABLET, FILM COATED ORAL
Qty: 6 TAB | Refills: 0 | Status: SHIPPED | OUTPATIENT
Start: 2017-04-17 | End: 2017-04-17

## 2017-04-17 RX ORDER — METOPROLOL SUCCINATE 25 MG/1
50 TABLET, EXTENDED RELEASE ORAL DAILY
Qty: 30 TAB | Refills: 11 | Status: SHIPPED | OUTPATIENT
Start: 2017-04-17 | End: 2017-04-17

## 2017-04-17 RX ORDER — OMEPRAZOLE 20 MG/1
40 CAPSULE, DELAYED RELEASE ORAL 2 TIMES DAILY
Qty: 60 CAP | Refills: 1 | Status: SHIPPED | OUTPATIENT
Start: 2017-04-17 | End: 2017-04-17

## 2017-04-17 RX ORDER — METOPROLOL SUCCINATE 25 MG/1
50 TABLET, EXTENDED RELEASE ORAL DAILY
Qty: 30 TAB | Refills: 1 | Status: SHIPPED | OUTPATIENT
Start: 2017-04-17 | End: 2017-05-18

## 2017-04-17 RX ORDER — OMEPRAZOLE 20 MG/1
40 CAPSULE, DELAYED RELEASE ORAL 2 TIMES DAILY
Qty: 60 CAP | Refills: 1 | Status: SHIPPED | OUTPATIENT
Start: 2017-04-17 | End: 2017-05-18

## 2017-04-17 RX ADMIN — ISOSORBIDE MONONITRATE 30 MG: 30 TABLET ORAL at 09:22

## 2017-04-17 RX ADMIN — INSULIN LISPRO 3 UNITS: 100 INJECTION, SOLUTION INTRAVENOUS; SUBCUTANEOUS at 13:17

## 2017-04-17 RX ADMIN — LISINOPRIL 5 MG: 10 TABLET ORAL at 09:22

## 2017-04-17 RX ADMIN — ACETAMINOPHEN 650 MG: 325 TABLET, FILM COATED ORAL at 05:54

## 2017-04-17 RX ADMIN — ASPIRIN 81 MG: 81 TABLET ORAL at 09:21

## 2017-04-17 RX ADMIN — OMEPRAZOLE 20 MG: 20 CAPSULE, DELAYED RELEASE ORAL at 09:23

## 2017-04-17 RX ADMIN — METOPROLOL SUCCINATE 25 MG: 25 TABLET, EXTENDED RELEASE ORAL at 09:22

## 2017-04-17 RX ADMIN — MORPHINE SULFATE 2 MG: 4 INJECTION INTRAVENOUS at 09:28

## 2017-04-17 RX ADMIN — INSULIN LISPRO 2 UNITS: 100 INJECTION, SOLUTION INTRAVENOUS; SUBCUTANEOUS at 05:55

## 2017-04-17 RX ADMIN — MORPHINE SULFATE 2 MG: 4 INJECTION INTRAVENOUS at 03:55

## 2017-04-17 RX ADMIN — VITAMIN D, TAB 1000IU (100/BT) 1000 UNITS: 25 TAB at 09:21

## 2017-04-17 ASSESSMENT — PAIN SCALES - GENERAL
PAINLEVEL_OUTOF10: 10
PAINLEVEL_OUTOF10: 10

## 2017-04-17 NOTE — DISCHARGE SUMMARY
[facility] Internal Medicine Discharge Summary      Admit Date:  4/16/2017       Discharge Date:   4/17/2017    Service:   [facility] Internal Medicine White Team  Attending Physician(s):   Dr. Smith       Senior Resident(s):   Dr. Tuttle  Luis Felipe Resident(s):   Dr. Molina      Primary Diagnosis:   Atypical chest pain  Secondary Diagnoses:                Active Hospital Problems    Diagnosis   • Chest pain [R07.9]       Hospital Summary (Brief Narrative):          Patient was admitted to telemetry on observation status with serial troponins and EKGs. Studies were not suggestive of ischemic heart disease in this CAD patient (recent cardiac cath showing diffuse disease). Patient did have tachycardia and desated in the ED prompting CTPE, which was negative, however did reveal esophageal thickening suggestive of esophagitis as a cause of his complaints. He reported relief with GI cocktail during his short visit. His vitals remained stable other than the tachycardia noted on EKGs without any ischemic changes. His axis shifted (almost 90 degrees) when his rate decreased to normal upon repeat EKGs which is indicative of strain. It is unclear the etiology of the tachycardia but the patient remained stable throughout his visit and his rate was normal at the time of discharge. Recommend outpatient Holter monitoring for the same and outpatient cardiology consult placed. Outpatient GI consulted for possible upper EGD in the near future. Patient was started on oral Prilosec 40 mg BID with a 10 day course of fluconazole.     URTI  Patient was also complaining of cough for the past few days with clear sputum production. He works in the [employer] nearby and is exposed to significant secondhand smoke which may have exacerbated bronchitis. He was discharged on a 5 day course of Z-tucker and advised to follow up with his PCP 1-2 weeks after discharge to note resolution of symptoms.      Patient /Hospital Summary (Details -- Problem Oriented)  ":            #Atypical chest pain  -hx of  Ischemic cardiomyopathy , CAD, DM2, HTN   - central chest pain, started 4/16 at 1 pm, lasted few minutes while lying on couch and watching a movie, sharp in nature, no radiation, worse with coughing and moving around, associated with SOB, dry cough and palpitation.    -VS at ED: Vital Signs: /94 mmHg  Pulse 119  Resp 19  Ht 1.803 m (5' 10.98\")  SpO2 91% on 2 L NC  -CBC: wnl, CMP: wnl apart from sr glucose: 194  -EKG no acute ischemic changes , troponin wnl , BNP wnl    -CXR: Bibasilar atelectasis.    -ECHO in 7/2012: Left ventricular ejection fraction is 55% to 60%. Left Ventricle Normal left ventricular chamber size. Normal left ventricular systolic function.  Hypokinesis to akinesis of the inferior/posterior/lateral   wall.    -MPI in 4/2016: Fixed defect in the basal and mid myocardium. inferoNormal left ventricular size, ejection fraction, and wall motion  -Pt had cath in 4/2016:  40% lesion in the very proximal right coronary artery,  90% stenosis at the origin of one of the tandem posterior descending  artery vessels, 80% lesion in the mid portion of the posterior left ventricular artery. Elongated area of ectasia of the proximal left anterior descending.A 90% stenosis involving the origin of the small caliber first diagonal  artery.  - Lipid panel, TSH 9.5 Ft4 and Ft3 pending  - continue outpatient therapy      Hx of DM  - sr glucose: 194  -ISS, hypoglycemia protocol    - A1C  pending  -Accu check     Hx of HTN  -stable    -cont lisinopril  -ctm      Service: MEDICAL      Consultants:     None    Procedures:        None    Imaging/ Testing:      CXR  Heart size is within normal limits.  No focal infiltrates or consolidations are identified in the lungs.  No pleural fluid collections are identified.  No pneumothorax is appreciated. No rib fracture.  There is bibasilar atelectasis.    CTA Pulmonary arteries  1.  No CT evidence for pulmonary emboli.  2.  No " pneumonia or pneumothorax.  3.  Diffuse esophageal wall thickening, likely inflammatory.  4.  Fatty infiltration of liver.  5.  Stomach distended with food material.    TTE  CONCLUSIONS  Mildly reduced left ventricular systolic function.  Left ventricular ejection fraction is visually estimated to be 45%.  Inferolateral and inferior hypokinesis.  Grade I diastolic dysfunction.  Moderate asymmetric septal hypertrophy.  Mild aortic insufficiency.  Compared to the report of the study done on 07/25/2012 there has been   no significant change.        Discharge Medications:         Medication Reconciliation: Completed     Medication List      START taking these medications       Instructions    azithromycin 250 MG Tabs   Commonly known as:  ZITHROMAX    500 mg X day 1 250 mg PO OD for the remaining 4 days       fluconazole 200 MG Tabs   Commonly known as:  DIFLUCAN    Take 1 Tab by mouth every day.   Dose:  200 mg       hyoscyamine-maalox plus-lidocaine viscous   Last time this was given:  30 mL on 4/16/2017  5:39 PM   Commonly known as:  GI COCKTAIL    Take 15 mL by mouth every four hours as needed (abdominal pain).   Dose:  15 mL       losartan 25 MG Tabs   Commonly known as:  COZAAR    Take 1 Tab by mouth every day.   Dose:  25 mg       omeprazole 20 MG delayed-release capsule   Last time this was given:  20 mg on 4/17/2017  9:23 AM   Commonly known as:  PRILOSEC   Replaces:  pantoprazole 40 MG Tbec    Take 2 Caps by mouth 2 times a day.   Dose:  40 mg         CHANGE how you take these medications       Instructions    metoprolol SR 25 MG Tb24   What changed:  how much to take   Last time this was given:  25 mg on 4/17/2017  9:22 AM   Commonly known as:  TOPROL XL    Take 2 Tabs by mouth every day.   Dose:  50 mg         CONTINUE taking these medications       Instructions    aspirin EC 81 MG Tbec   Last time this was given:  81 mg on 4/17/2017  9:21 AM   Commonly known as:  ECOTRIN    Take 1 Tab by mouth every day.    Dose:  81 mg       atorvastatin 80 MG tablet   Last time this was given:  80 mg on 4/16/2017  8:07 PM   Commonly known as:  LIPITOR    Take 1 Tab by mouth every evening.   Dose:  80 mg       isosorbide mononitrate SR 30 MG Tb24   Last time this was given:  30 mg on 4/17/2017  9:22 AM   Commonly known as:  IMDUR    Take 1 Tab by mouth every day.   Dose:  30 mg       metformin 1000 MG tablet   Commonly known as:  GLUCOPHAGE    Take 1 Tab by mouth 2 times a day, with meals.   Dose:  1000 mg       vitamin D 1000 UNIT Tabs   Last time this was given:  1,000 Units on 4/17/2017  9:21 AM   Commonly known as:  cholecalciferol    Take 1 Tab by mouth every day.   Dose:  1000 Units         STOP taking these medications          lisinopril 5 MG Tabs   Commonly known as:  PRINIVIL       pantoprazole 40 MG Tbec   Commonly known as:  PROTONIX   Replaced by:  omeprazole 20 MG delayed-release capsule               Disposition:   Discharge to home    Diet:   Diabetic    Activity:   As tolerated    Instructions:      Please follow up with outpatient GI and Cardiology   Recommend outpatient Holter monitor 24 hours.   The patient was instructed to return to the ER in the event of worsening symptoms. I have counseled the patient on the importance of compliance and the patient has agreed to proceed with all medical recommendations and follow up plan indicated above.   The patient understands that all medications come with benefits and risks. Risks may include permanent injury or death and these risks can be minimized with close reassessment and monitoring.        Primary Care Provider:    Juancho Jenkins M.D.    Discharge summary faxed to primary care provider:  Completed  Copy of discharge summary given to the patient: Completed    Follow up appointment details :      N/A    Pending Studies:        Glyco-HB  Free T4  Free T3    Time spent on discharge day patient visit, preparing discharge paperwork and arranging for patient follow  up.    Summary of follow up issues:   Possible outpatient Holter monitoring for asymptomatic tachycardia with axis shift  Possible outpatient EGD    Discharge Time (Minutes) :    >45 mins      Condition on Discharge    ______________________________________________________________________    Interval history/exam for day of discharge:     Denies any chest pain overnight. No significant events overnight. Stable VSS. EKG and Trops negative. Patient ready for discharge and advised to follow up with outpatient PCP, cardiology and GI    Filed Vitals:    04/16/17 2354 04/17/17 0409 04/17/17 0745 04/17/17 1131   BP: 117/84 139/90 122/64 106/56   Pulse: 91 88 72 73   Temp: 36.4 °C (97.6 °F) 36.4 °C (97.5 °F) 35.8 °C (96.4 °F) 36.4 °C (97.6 °F)   Resp: 15 16 16 16   Height:       Weight:       SpO2: 96% 93% 98% 95%     Weight/BMI: Body mass index is 27.91 kg/(m^2).  Pulse Oximetry: 95 %, O2 (LPM): 2, O2 Delivery: Nasal Cannula    GEN: Well developed, AO x 3, in no apparent distress   HEENT: Atraumatic, normocephalic, oral mucosa is moist, no JVD  CVS: S1 S2 present, R/R/R, no M/R/G  RS: Air entry equal bilaterally. No W/R/R noted upon auscultation  ABD: Soft, nontender, BS present in all quadrants  EXT: No pedal edema noted  NEUR: Patient is able to move all of her extremities. CN 2-12 are grossly intact         Most Recent Labs:    Lab Results   Component Value Date/Time    WBC 10.3 04/17/2017 03:59 AM    RBC 4.52* 04/17/2017 03:59 AM    HEMOGLOBIN 13.4* 04/17/2017 03:59 AM    HEMATOCRIT 40.4* 04/17/2017 03:59 AM    MCV 89.4 04/17/2017 03:59 AM    MCH 29.6 04/17/2017 03:59 AM    MCHC 33.2* 04/17/2017 03:59 AM    MPV 9.6 04/17/2017 03:59 AM    NEUTROPHILS-POLYS 77.20* 04/17/2017 03:59 AM    LYMPHOCYTES 15.40* 04/17/2017 03:59 AM    MONOCYTES 5.80 04/17/2017 03:59 AM    EOSINOPHILS 0.70 04/17/2017 03:59 AM    BASOPHILS 0.30 04/17/2017 03:59 AM    ANISOCYTOSIS 1+ 07/26/2012 11:00 AM      Lab Results   Component Value Date/Time     SODIUM 135 04/17/2017 03:59 AM    POTASSIUM 4.0 04/17/2017 03:59 AM    CHLORIDE 107 04/17/2017 03:59 AM    CO2 20 04/17/2017 03:59 AM    GLUCOSE 185* 04/17/2017 03:59 AM    BUN 17 04/17/2017 03:59 AM    CREATININE 0.70 04/17/2017 03:59 AM    BUN-CREATININE RATIO 18 06/13/2014 08:40 AM      Lab Results   Component Value Date/Time    ALT(SGPT) 44 04/17/2017 03:59 AM    AST(SGOT) 23 04/17/2017 03:59 AM    ALKALINE PHOSPHATASE 53 04/17/2017 03:59 AM    TOTAL BILIRUBIN 0.7 04/17/2017 03:59 AM    DIRECT BILIRUBIN 0.3 09/14/2012 09:10 PM    LIPASE 62 04/16/2017 04:00 PM    ALBUMIN 3.6 04/17/2017 03:59 AM    GLOBULIN 2.6 04/17/2017 03:59 AM    INR 0.84* 04/16/2017 04:00 PM     Lab Results   Component Value Date/Time    PT 11.8* 04/16/2017 04:00 PM    INR 0.84* 04/16/2017 04:00 PM

## 2017-04-17 NOTE — PROGRESS NOTES
Pt up to bathroom steady gait, C/O upset stomach. Moderate belching occuring will continue to monitor.

## 2017-04-17 NOTE — PROGRESS NOTES
Morphine was given at 1851 from day nurse. Pt still has CP 10/10. Paged [facility] for updates and orders.   Order received.

## 2017-04-17 NOTE — PROGRESS NOTES
"Pt vomited dark brown color emesis with undigested food, 400 ml.   Asked pt what food pt had today. Pt doesn't recall what he had in the morning.   Asked pt when last BM was. Doesn't recall it either, but reported approximately 2-3 days ago.  Hyperactive BS x 4 present. Rounded abdomen noted. No abdominal pain noted.   Blood pressure 135/85, pulse 96, temperature 36.2 °C (97.2 °F), resp. rate 22, height 1.803 m (5' 10.98\"), weight 90.719 kg (200 lb), SpO2 98 %.    Paged [facility] and awaiting   "

## 2017-04-17 NOTE — PROGRESS NOTES
Assumed patient care. Report received from SARAHI Stephens.  Pt A&Ox4.  Respirations even, unlabored on 2L O2, diminished lung sounds to lower lobes noted.   Pt complains of 10/10 non-radiating sharp, constant mid chest pain, exacerbated with cough.  Medicated per MAR.  Monitors applied, sinsus rhythm noted.    Call light within reach.  Pt updated on POC, updated communication board.  Needs met, will continue to monitor.

## 2017-04-17 NOTE — PROGRESS NOTES
Diet order changed from NPO to cardiac diabetic.  Informed dietary and patient.   Provided patient with iced water per pt's request

## 2017-04-17 NOTE — PROGRESS NOTES
Pt arrived to unit via gurney at 1845. Pt oriented to room, unit, and plan of care. Tele-monitor placed. Pt c/o 10/10 jad CP. Medicated per MAR. No SOB, but dizziness w and w/o movement. VSS. All questions answered at this time. Call light within reach; fall precautions in place.

## 2017-04-17 NOTE — H&P
[facility] – [place], [place] Internal Medicine Admitting History and Physical    Name Ryan Trevizo     1965   Age/Sex 51 y.o. male   MRN [id number]   Code Status Full      After 5PM or if no immediate response to page, please call for cross-coverage  Attending/Team: Dr. Black/Julito Call [phone] to page   1st Call - Day Intern (R1):   Dr. Molina 2nd Call - Day Sr. Resident (R2/R3):   Dr. Tuttle       Chief Complaint:  Chest pain     HPI:  Mr. Trevizo is a 51 y.o. M with PMHx of CAD, HTN, DLD , DM2 presented to Ed c/o chest pain. Pt reported central chest pain, started  at 1 pm, lasted few minutes while lying on couch and watching a movie, sharp in nature, no radiation, worse with coughing and moving around, associated with SOB, dry cough and palpitation. Reported having chest pain 5 times/day. Reported SOB and chest pain worse on walking. Reported hx of chest pain for almost 1 yr.  Denies PND, orthopnea, fever, chills. Denies abdominal pain, Nausea, vomiting, diarrhea or constipation.   Denies any recent travel history or clot hx..Pt is following with cardiology out pt. Denies substance use or tobacco use.  Per nurse pt desat to 80% in ED . CTPE was ordered by ED physician , result pending     MPI in 2016: Fixed defect in the basal and mid myocardium. inferoNormal left ventricular size, ejection fraction, and wall motion  Pt had cath in 2016:  40% lesion in the very proximal right coronary artery,  90% stenosis at the origin of one of the tandem posterior descending  artery vessels, 80% lesion in the mid portion of the posterior left ventricular artery. Elongated area of ectasia of the proximal left anterior descending.A 90% stenosis involving the origin of the small caliber first diagonal  artery.    ECHO in 2012: Left ventricular ejection fraction is 55% to 60%. Left Ventricle Normal left ventricular chamber size. Normal left ventricular systolic function.  Hypokinesis to akinesis of the  inferior/posterior/lateral   wall.       ROS  General: No fever or chills. No diaphoresis    Ear nose throat: No sore throat or trouble swallowing. No runny nose or congestion.  Pulmonary: Positive for cough and shortness of breath.  Cardiovascular: Positive for chest pain.  GI:No nausea or vomiting. No abdominal pain   Dermatologic: No rashes. No abrasions.  Neurologic: No weakness or numbness.  Psychiatric: No anxiety or stress.        Past Medical History:   Past Medical History   Diagnosis Date   • Arthritis    • HTN (hypertension) 7/15/2012   • GERD (gastroesophageal reflux disease) 7/15/2012   • STEMI (ST elevation myocardial infarction) (CMS-HCC)    • Anxiety disorder    • MI (myocardial infarction) (CMS-HCC)    • Hyperlipemia    • Upper GI bleed 7/26/2012   • Glaucoma    • SOB (shortness of breath)    • Claudication (CMS-HCC)    • Dizziness    • CAD (coronary artery disease) 10/14/2013   • ACS (acute coronary syndrome) (CMS-HCC) 7/25/2012   • Pericarditis secondary to acute myocardial infarction (CMS-HCC) 7/19/2012   • Right knee pain    • Blood glucose elevated    • Mental retardation    • Osteoarthritis    • Hypertriglyceridemia        Past Surgical History:  Past Surgical History   Procedure Laterality Date   • Other orthopedic surgery  7-      R knee surgery   • Other  knee surgery   • Other cardiac surgery       stent placement   • Other     • Gastroscopy-endo  7/25/2012     Performed by JORDIN VERA at ENDOSCOPY [facility]   • Gastroscopy-endo  7/26/2012     Performed by JORDIN VERA at ENDOSCOPY [facility] ORS       Current Outpatient Medications:  Home Medications     Reviewed by Brian Balderas (Pharmacy Tech) on 04/16/17 at 1716  Med List Status: Complete    Medication Last Dose Status    aspirin EC (ECOTRIN) 81 MG Tablet Delayed Response 4/16/2017 Active    atorvastatin (LIPITOR) 80 MG tablet 4/15/2017 Active    isosorbide mononitrate SR (IMDUR) 30 MG TABLET SR 24 HR  "4/16/2017 Active    lisinopril (PRINIVIL) 5 MG Tab 4/16/2017 Active    metformin (GLUCOPHAGE) 1000 MG tablet 4/16/2017 Active    metoprolol SR (TOPROL XL) 25 MG TABLET SR 24 HR 4/16/2017 Active    pantoprazole (PROTONIX) 40 MG Tablet Delayed Response 4/16/2017 Active    vitamin D (CHOLECALCIFEROL) 1000 UNIT Tab 4/16/2017 Active                Medication Allergy/Sensitivities:  Allergies   Allergen Reactions   • Ritalin [Methylphenidate] Unspecified     \"Hyper\"         Family History:  No family history on file.    Social History:  Social History     Social History   • Marital Status: Single     Spouse Name: N/A   • Number of Children: N/A   • Years of Education: N/A     Occupational History   • Not on file.     Social History Main Topics   • Smoking status: Never Smoker    • Smokeless tobacco: Never Used   • Alcohol Use: No   • Drug Use: No      Comment: cough syrup; vicodin; narcotics   • Sexual Activity: Not on file      Comment: Single, has no children, works as an .     Other Topics Concern   • Not on file     Social History Narrative    ** Merged History Encounter **         ** Merged History Encounter **          PCP : Juancho Jenkins M.D.    Physical Exam     Filed Vitals:    04/16/17 1555 04/16/17 1800 04/16/17 1838 04/16/17 1957   BP: 118/94  130/95 122/80   Pulse: 119 106 91 78   Temp:   36.2 °C (97.1 °F) 36.6 °C (97.8 °F)   Resp: 19 18 20 18   Height:       Weight:       SpO2: 91% 96% 94% 95%     Body mass index is 27.91 kg/(m^2).  /80 mmHg  Pulse 78  Temp(Src) 36.6 °C (97.8 °F)  Resp 18  Ht 1.803 m (5' 10.98\")  Wt 90.719 kg (200 lb)  BMI 27.91 kg/m2  SpO2 95%  O2 therapy: Pulse Oximetry: 95 %, O2 (LPM): 2, O2 Delivery: Nasal Cannula    Physical Exam  Constitutional: Well developed, Well nourished, No acute distress, Non-toxic appearance.    Psychiatric: Calm. Not anxious.  HENT:  Oropharynx: no exudate no erythema  Eyes: PERRLA, EOMI, Conjunctiva normal, No discharge. "    Musculoskeletal: Neck is soft and supple no meningismus  Lymphatic: No cervical lymphadenopathy noted.    Cardiovascular: Tachycardia, Normal rhythm, No murmurs, Negative Homans, no pedal edema, good equal pedal pulses.  Pulmonary: Lungs are clear to auscultation bilaterally. No wheezes rales or rhonchi  Abdomen: Bowel sounds normal, soft nondistended and nontender. No rebound and no guarding .  Skin: Warm, Dry, No erythema, No rash.    Neurologic: Alert & oriented, moves all extremities normally. Good sensation to light touch on all extremities.        Data Review     Lab Data Review:  Recent Results (from the past 24 hour(s))   EKG (ER)    Collection Time: 17  3:41 PM   Result Value Ref Range    Report       [facility] Emergency Dept.    Test Date:  2017  Pt Name:    NICHELLE MARQUEZ                   Department: ER  MRN:        [id number]                      Room:        03  Gender:     M                            Technician: MACHELLE  :        1965                   Requested By:ER TRIAGE PROTOCOL  Order #:    [id number]                    Reading MD:    Measurements  Intervals                                Axis  Rate:       128                          P:          24  NH:         148                          QRS:        67  QRSD:       96                           T:          42  QT:         296  QTc:        432    Interpretive Statements  SINUS TACHYCARDIA  LATERAL INFARCT, AGE INDETERMINATE  BASELINE WANDER IN LEAD(S) V1,V2,V3,V4,V5,V6  Compared to ECG 2016 18:12:22  No significant changes     Troponin    Collection Time: 17  4:00 PM   Result Value Ref Range    Troponin I <0.01 0.00 - 0.04 ng/mL   Btype Natriuretic Peptide    Collection Time: 17  4:00 PM   Result Value Ref Range    B Natriuretic Peptide 21 0 - 100 pg/mL   CBC with Differential    Collection Time: 17  4:00 PM   Result Value Ref Range    WBC 8.3 4.8 - 10.8 K/uL    RBC 5.05 4.70 - 6.10  M/uL    Hemoglobin 15.1 14.0 - 18.0 g/dL    Hematocrit 43.4 42.0 - 52.0 %    MCV 85.9 81.4 - 97.8 fL    MCH 29.9 27.0 - 33.0 pg    MCHC 34.8 33.7 - 35.3 g/dL    RDW 39.7 35.9 - 50.0 fL    Platelet Count 432 164 - 446 K/uL    MPV 9.8 9.0 - 12.9 fL    Neutrophils-Polys 68.50 44.00 - 72.00 %    Lymphocytes 23.10 22.00 - 41.00 %    Monocytes 5.90 0.00 - 13.40 %    Eosinophils 1.40 0.00 - 6.90 %    Basophils 0.60 0.00 - 1.80 %    Immature Granulocytes 0.50 0.00 - 0.90 %    Nucleated RBC 0.00 /100 WBC    Neutrophils (Absolute) 5.67 1.82 - 7.42 K/uL    Lymphs (Absolute) 1.91 1.00 - 4.80 K/uL    Monos (Absolute) 0.49 0.00 - 0.85 K/uL    Eos (Absolute) 0.12 0.00 - 0.51 K/uL    Baso (Absolute) 0.05 0.00 - 0.12 K/uL    Immature Granulocytes (abs) 0.04 0.00 - 0.11 K/uL    NRBC (Absolute) 0.00 K/uL   Complete Metabolic Panel (CMP)    Collection Time: 04/16/17  4:00 PM   Result Value Ref Range    Sodium 136 135 - 145 mmol/L    Potassium 4.3 3.6 - 5.5 mmol/L    Chloride 100 96 - 112 mmol/L    Co2 24 20 - 33 mmol/L    Anion Gap 12.0 (H) 0.0 - 11.9    Glucose 194 (H) 65 - 99 mg/dL    Bun 15 8 - 22 mg/dL    Creatinine 0.91 0.50 - 1.40 mg/dL    Calcium 9.9 8.5 - 10.5 mg/dL    AST(SGOT) 25 12 - 45 U/L    ALT(SGPT) 62 (H) 2 - 50 U/L    Alkaline Phosphatase 71 30 - 99 U/L    Total Bilirubin 0.7 0.1 - 1.5 mg/dL    Albumin 4.4 3.2 - 4.9 g/dL    Total Protein 7.7 6.0 - 8.2 g/dL    Globulin 3.3 1.9 - 3.5 g/dL    A-G Ratio 1.3 g/dL   Prothrombin Time    Collection Time: 04/16/17  4:00 PM   Result Value Ref Range    PT 11.8 (L) 12.0 - 14.6 sec    INR 0.84 (L) 0.87 - 1.13   APTT    Collection Time: 04/16/17  4:00 PM   Result Value Ref Range    APTT 23.6 (L) 24.7 - 36.0 sec   Lipase    Collection Time: 04/16/17  4:00 PM   Result Value Ref Range    Lipase 62 11 - 82 U/L   ESTIMATED GFR    Collection Time: 04/16/17  4:00 PM   Result Value Ref Range    GFR If African American >60 >60 mL/min/1.73 m 2    GFR If Non  >60 >60  "mL/min/1.73 m 2   TSH (Thyroid Stimulating Hormone)    Collection Time: 04/16/17  4:00 PM   Result Value Ref Range    TSH 9.540 (H) 0.300 - 3.700 uIU/mL   URINE DRUG SCREEN    Collection Time: 04/16/17  7:05 PM   Result Value Ref Range    Amphetamines Urine Negative Negative    Barbiturates Negative Negative    Benzodiazepines Negative Negative    Cocaine Metabolite Negative Negative    Methadone Negative Negative    Ecstasy Negative Negative    Opiates Positive (A) Negative    Oxycodone Negative Negative    Phencyclidine -Pcp Negative Negative    Propoxyphene Negative Negative    Cannabinoid Metab Negative Negative   WESTERGREN SED RATE    Collection Time: 04/16/17  7:16 PM   Result Value Ref Range    Sed Rate Westergren 17 0 - 20 mm/hour   ACCU-CHEK GLUCOSE    Collection Time: 04/16/17  8:06 PM   Result Value Ref Range    Glucose - Accu-Ck 264 (H) 65 - 99 mg/dL       Imaging/Procedures Review:    DX-CHEST-LIMITED (1 VIEW)   Final Result         Bibasilar atelectasis.      CT-CTA CHEST PULMONARY ARTERY W/ RECONS    (Results Pending)   ECHOCARDIOGRAM COMP W/O CONT    (Results Pending)             Assessment/Plan     #Atypical chest pain/  Acute Hypoxia  -hx of  Ischemic cardiomyopathy , CAD, DM2, HTN   - central chest pain, started 4/16 at 1 pm, lasted few minutes while lying on couch and watching a movie, sharp in nature, no radiation, worse with coughing and moving around, associated with SOB, dry cough and palpitation.   -VS at ED: Vital Signs: /94 mmHg  Pulse 119  Resp 19  Ht 1.803 m (5' 10.98\")  SpO2 91% on 2 L NC  -CBC: wnl, CMP: wnl apart from sr glucose: 194  -EKG no acute ischemic changes , troponin wnl , BNP wnl    -Hypoxia  Etiology unclear , no sign and symptoms of pneumonia   -CXR: Bibasilar atelectasis.   -ECHO in 7/2012: Left ventricular ejection fraction is 55% to 60%. Left Ventricle Normal left ventricular chamber size. Normal left ventricular systolic function.  Hypokinesis to akinesis of " the inferior/posterior/lateral   wall.   -MPI in 4/2016: Fixed defect in the basal and mid myocardium. inferoNormal left ventricular size, ejection fraction, and wall motion  -Pt had cath in 4/2016:  40% lesion in the very proximal right coronary artery,  90% stenosis at the origin of one of the tandem posterior descending  artery vessels, 80% lesion in the mid portion of the posterior left ventricular artery. Elongated area of ectasia of the proximal left anterior descending.A 90% stenosis involving the origin of the small caliber first diagonal  artery.    Plan    -Ordered aspirin 325 mg in ED    -Trend troponin and EKG    -Aspirin , NTG prn , atorvastatin 80 mg . Cont home med BB, ACEs and isosorbide    -No indication of heparin drip for now   -F/U CTPE    -Will keep pt NPO at midnight  if pt have EKG changes or troponin bump up then will consider MPI study    -Utox    -GI cocktail  -Lipid panel,, TSH        Hx of DM  - sr glucose: 194  -ISS, hypoglycemia protocol    - A1C   -Accu check     Hx of HTN  -stable   -cont lisinopril  -ctm      Anticipated Hospital stay: Observation admit        Quality Measures  Core Measures  No FC  DVT ppx: lovenox

## 2017-04-17 NOTE — SENIOR ADMIT NOTE
"52 Y/O M with PMHx of CAD, HTN, DLD , DM came to the hospital due to recurrent chest pain x 3 year. He is having chest pain five time per day and each episode last for 10 minutes, substernal ,non radiating , ache in nature and aggravated with cough . Denies any exertional chest pain . Pt reports of chronic cough . Denies any recent travel history or clot hx..Pt is following with cardiology out pt. Denies substance use or tobacco use.    Per nurse pt desat to 80% in ED . CTPE was ordered by ED physician , result pending       MPI in 4/2016   Fixed defect in the basal and mid myocardium.   inferoNormal left ventricular size, ejection fraction, and wall motion      Pt had cath in 4/2016:  40% lesion in the very proximal right coronary artery,  90% stenosis at the origin of one of the tandem posterior descending  artery vessels, 80% lesion in the mid portion of the posterior left ventricular artery.Elongated area of ectasia of the proximal left anterior descending.A 90% stenosis involving the origin of the small caliber first diagonal  artery.    ECHO in 7/2012    Normal left ventricular systolic function.  Normal left ventricular wall thickness.  Left ventricular ejection fraction is 55% to 60%.  Structurally normal mitral valve.  Trace mitral regurgitation.  Mild aortic insufficiency.  Mild tricuspid regurgitation.           #################################################################  Physical Exam:  Vitals/ General Appearance:   Weight/BMI: Body mass index is 27.91 kg/(m^2).  /94 mmHg  Pulse 119  Resp 19  Ht 1.803 m (5' 10.98\")  Wt 90.719 kg (200 lb)  BMI 27.91 kg/m2  SpO2 91%  Filed Vitals:    04/16/17 1552 04/16/17 1555   BP:  118/94   Pulse:  119   Resp:  19   Height: 1.803 m (5' 10.98\")    Weight: 90.719 kg (200 lb)    SpO2:  91%     Oxygen Therapy:  Pulse Oximetry: 91 %, O2 (LPM): 3.5    Constitutional:    No acute distress, Non-toxic appearance.   HENMT:  Normocephalic, Atraumatic  Eyes:   " Conjunctiva normal, No discharge.  Neck:   Supple,   Cardiovascular:  Normal heart rate, Normal rhythm, No murmurs, No rubs, No gallops.   Extremitites with intact distal pulses, no cyanosis, clubbing or edema.  Lungs:  Respiratory effort is normal. Normal breath sounds, breath sounds clear to auscultation bilaterally,  no rales, no rhonchi, no wheezing.   Abdomen: Bowel sounds normal, Soft, No tenderness, No guarding, No rebound, No masses  Neurologic: Alert & oriented x 3      Lab Data Review:  Recent Results (from the past 24 hour(s))   Troponin    Collection Time: 04/16/17  4:00 PM   Result Value Ref Range    Troponin I <0.01 0.00 - 0.04 ng/mL   Btype Natriuretic Peptide    Collection Time: 04/16/17  4:00 PM   Result Value Ref Range    B Natriuretic Peptide 21 0 - 100 pg/mL   CBC with Differential    Collection Time: 04/16/17  4:00 PM   Result Value Ref Range    WBC 8.3 4.8 - 10.8 K/uL    RBC 5.05 4.70 - 6.10 M/uL    Hemoglobin 15.1 14.0 - 18.0 g/dL    Hematocrit 43.4 42.0 - 52.0 %    MCV 85.9 81.4 - 97.8 fL    MCH 29.9 27.0 - 33.0 pg    MCHC 34.8 33.7 - 35.3 g/dL    RDW 39.7 35.9 - 50.0 fL    Platelet Count 432 164 - 446 K/uL    MPV 9.8 9.0 - 12.9 fL    Neutrophils-Polys 68.50 44.00 - 72.00 %    Lymphocytes 23.10 22.00 - 41.00 %    Monocytes 5.90 0.00 - 13.40 %    Eosinophils 1.40 0.00 - 6.90 %    Basophils 0.60 0.00 - 1.80 %    Immature Granulocytes 0.50 0.00 - 0.90 %    Nucleated RBC 0.00 /100 WBC    Neutrophils (Absolute) 5.67 1.82 - 7.42 K/uL    Lymphs (Absolute) 1.91 1.00 - 4.80 K/uL    Monos (Absolute) 0.49 0.00 - 0.85 K/uL    Eos (Absolute) 0.12 0.00 - 0.51 K/uL    Baso (Absolute) 0.05 0.00 - 0.12 K/uL    Immature Granulocytes (abs) 0.04 0.00 - 0.11 K/uL    NRBC (Absolute) 0.00 K/uL   Complete Metabolic Panel (CMP)    Collection Time: 04/16/17  4:00 PM   Result Value Ref Range    Sodium 136 135 - 145 mmol/L    Potassium 4.3 3.6 - 5.5 mmol/L    Chloride 100 96 - 112 mmol/L    Co2 24 20 - 33 mmol/L     Anion Gap 12.0 (H) 0.0 - 11.9    Glucose 194 (H) 65 - 99 mg/dL    Bun 15 8 - 22 mg/dL    Creatinine 0.91 0.50 - 1.40 mg/dL    Calcium 9.9 8.5 - 10.5 mg/dL    AST(SGOT) 25 12 - 45 U/L    ALT(SGPT) 62 (H) 2 - 50 U/L    Alkaline Phosphatase 71 30 - 99 U/L    Total Bilirubin 0.7 0.1 - 1.5 mg/dL    Albumin 4.4 3.2 - 4.9 g/dL    Total Protein 7.7 6.0 - 8.2 g/dL    Globulin 3.3 1.9 - 3.5 g/dL    A-G Ratio 1.3 g/dL   Prothrombin Time    Collection Time: 04/16/17  4:00 PM   Result Value Ref Range    PT 11.8 (L) 12.0 - 14.6 sec    INR 0.84 (L) 0.87 - 1.13   APTT    Collection Time: 04/16/17  4:00 PM   Result Value Ref Range    APTT 23.6 (L) 24.7 - 36.0 sec   Lipase    Collection Time: 04/16/17  4:00 PM   Result Value Ref Range    Lipase 62 11 - 82 U/L   ESTIMATED GFR    Collection Time: 04/16/17  4:00 PM   Result Value Ref Range    GFR If African American >60 >60 mL/min/1.73 m 2    GFR If Non African American >60 >60 mL/min/1.73 m 2       Imaging/Procedures Review:    DX-CHEST-LIMITED (1 VIEW)   Final Result         Bibasilar atelectasis.      CT-CTA CHEST PULMONARY ARTERY W/ RECONS    (Results Pending)      MDM (Assessment and Plan):         1-Atypical chest pain : EKG no acute ischemic changes , troponin wnl   2-Hypoxia  Etiology unclear , no sign and symptoms of pneumonia . CTPE pending . CXR normal. BNP wnl   3-Hx of DM, Ischemic cardiomyopathy , DLD, CAD , HTN, GERD ,       Plan   -Ordered aspirin 325 mg in ED   -Trend troponin and EKG   -Aspirin , NTG prn , atorvastatin 80 mg . Cont home med BB, ACEs and isosorbide   -No indication of heparin drip   -F/U CTPE   -Will keep pt NPO at midnight  if pt have EKG changes or troponin bump up then will consider MPI study   -Utox   -ISS, hypoglycemia protocol   -GI cocktail  -Lipid panel, A1C , TSH    For detail H@P please see intern note

## 2017-04-17 NOTE — PROGRESS NOTES
Pt states personal belongings are in possession.  Pt escorted off unit by unit clerk without incident.

## 2017-04-17 NOTE — DISCHARGE PLANNING
Care Transition Team Assessment    Information Source  Orientation : Oriented x 4  Information Given By: Patient  Who is responsible for making decisions for patient? : Patient    Readmission Evaluation  Is this a readmission?: No    Elopement Risk  Legal Hold: No  Ambulatory or Self Mobile in Wheelchair: Yes  Disoriented: No  Psychiatric Symptoms: None  History of Wandering: No  Elopement this Admit: No  Vocalizing Wanting to Leave: No  Displays Behaviors, Body Language Wanting to Leave: No-Not at Risk for Elopement    Interdisciplinary Discharge Planning  Does Admitting Nurse Feel This Could be a Complex Discharge?: No  Primary Care Physician: Alice  Lives with - Patient's Self Care Capacity: Alone and Able to Care For Self  Support Systems: Friends / Neighbors  Housing / Facility: 1 Story Apartment / Condo  Do You Take your Prescribed Medications Regularly: Yes  Able to Return to Previous ADL's: Yes  Mobility Issues: No  Prior Services: None  Patient Expects to be Discharged to:: home  Assistance Needed: No  Durable Medical Equipment: Not Applicable    Discharge Preparedness  What is your plan after discharge?: Uncertain - pending medical team collaboration  Prior Functional Level: Ambulatory  Difficulity with ADLs: None  Difficulity with IADLs: None    Functional Assesment  Prior Functional Level: Ambulatory    Finances  Financial Barriers to Discharge: No  Prescription Coverage: Yes    Vision / Hearing Impairment  Vision Impairment : No  Hearing Impairment : No    Values / Beliefs / Concerns  Values / Beliefs Concerns : No    Advance Directive  Advance Directive?: None  Advance Directive offered?: AD Booklet refused    Domestic Abuse  Have you ever been the victim of abuse or violence?: No  Physical Abuse or Sexual Abuse: No  Verbal Abuse or Emotional Abuse: No    Psychological Assessment  History of Substance Abuse: None  History of Psychiatric Problems: No  Non-compliant with Treatment: No  Newly  Diagnosed Illness: No    Discharge Risks or Barriers  Discharge risks or barriers?: No  Patient risk factors: Lack of outside supports    Anticipated Discharge Information  Anticipated discharge disposition: Home  Discharge Address: face sheet  Discharge Contact Phone Number: bus

## 2017-04-17 NOTE — ED NOTES
"Med rec updated and complete  Allergies reviewed  Pt states \"No antibiotics in the last 30 days\".      "

## 2017-04-17 NOTE — DISCHARGE INSTRUCTIONS
Discharge Instructions    Discharged to home by car with self. Discharged via wheelchair, hospital escort: Yes.  Special equipment needed: Not Applicable    Be sure to schedule a follow-up appointment with your primary care doctor or any specialists as instructed.     Discharge Plan:   Diet Plan: Discussed  Activity Level: Discussed  Confirmed Follow up Appointment: Patient to Call and Schedule Appointment  Confirmed Symptoms Management: Discussed  Medication Reconciliation Updated: Yes  Influenza Vaccine Indication: Not indicated: Previously immunized this influenza season and > 8 years of age    I understand that a diet low in cholesterol, fat, and sodium is recommended for good health. Unless I have been given specific instructions below for another diet, I accept this instruction as my diet prescription.   Other diet: Heart healthy     Special Instructions: None    · Is patient discharged on Warfarin / Coumadin?   No     · Is patient Post Blood Transfusion?  No    Depression / Suicide Risk    As you are discharged from this [facility] facility, it is important to learn how to keep safe from harming yourself.    Recognize the warning signs:  · Abrupt changes in personality, positive or negative- including increase in energy   · Giving away possessions  · Change in eating patterns- significant weight changes-  positive or negative  · Change in sleeping patterns- unable to sleep or sleeping all the time   · Unwillingness or inability to communicate  · Depression  · Unusual sadness, discouragement and loneliness  · Talk of wanting to die  · Neglect of personal appearance   · Rebelliousness- reckless behavior  · Withdrawal from people/activities they love  · Confusion- inability to concentrate     If you or a loved one observes any of these behaviors or has concerns about self-harm, here's what you can do:  · Talk about it- your feelings and reasons for harming yourself  · Remove any means that you might use to  hurt yourself (examples: pills, rope, extension cords, firearm)  · Get professional help from the community (Mental Health, Substance Abuse, psychological counseling)  · Do not be alone:Call your Safe Contact- someone whom you trust who will be there for you.  · Call your local CRISIS HOTLINE 587-4303 or 451-112-3049  · Call your local Children's Mobile Crisis Response Team [place] (826) 137-2881 or www.[employer]  · Call the toll free National Suicide Prevention Hotlines   · National Suicide Prevention Lifeline 621-459-KTKF (1853)  · National Hope Line Network 800-SUICIDE (986-3908)

## 2017-04-17 NOTE — PROGRESS NOTES
Hot meal provided to patient.   IV dc'd.  Discharge instructions given to patient; patient verbalizes understanding, all questions answered.  Copy of DC summary provided, signed copy in chart.  6 prescriptions provided to patient, copies in chart.

## 2017-04-17 NOTE — H&P
[facility] Internal Medicine Admitting History and Physical    Name Ryan Trevizo     1965   Age/Sex 51 y.o. male   MRN [id number]   Code Status Full      After 5PM or if no immediate response to page, please call for cross-coverage  Attending/Team: Dr. Smith/Julito Call [phone] to page   1st Call - Day Intern (R1):   Dr. Szymanski  2nd Call - Day Sr. Resident (R2/R3):   Dr. Tuttle       Chief Complaint:  Chest pain     HPI:  Mr. Trevizo is a 51 y.o. M with PMHx of CAD, HTN, DLD , DM2 presented to Ed c/o chest pain. Pt reported central chest pain, started  at 1 pm, lasted few minutes while lying on couch and watching a movie, sharp in nature, no radiation, worse with coughing and moving around, associated with SOB, dry cough and palpitation. Reported having chest pain 5 times/day. Reported SOB and chest pain worse on walking. Reported hx of chest pain for almost 1 yr.  Denies PND, orthopnea, fever, chills. Denies abdominal pain, Nausea, vomiting, diarrhea or constipation.   Denies any recent travel history or clot hx..Pt is following with cardiology out pt. Denies substance use or tobacco use.  Per nurse pt desat to 80% in ED . CTPE was ordered by ED physician , result pending     MPI in 2016: Fixed defect in the basal and mid myocardium. inferoNormal left ventricular size, ejection fraction, and wall motion  Pt had cath in 2016:  40% lesion in the very proximal right coronary artery,  90% stenosis at the origin of one of the tandem posterior descending  artery vessels, 80% lesion in the mid portion of the posterior left ventricular artery. Elongated area of ectasia of the proximal left anterior descending.A 90% stenosis involving the origin of the small caliber first diagonal  artery.    ECHO in 2012: Left ventricular ejection fraction is 55% to 60%. Left Ventricle Normal left ventricular chamber size. Normal left ventricular systolic function.  Hypokinesis to akinesis of the  inferior/posterior/lateral   wall.       ROS  General: No fever or chills. No diaphoresis    Ear nose throat: No sore throat or trouble swallowing. No runny nose or congestion.  Pulmonary: Positive for cough and shortness of breath.  Cardiovascular: Positive for chest pain.  GI:No nausea or vomiting. No abdominal pain   Dermatologic: No rashes. No abrasions.  Neurologic: No weakness or numbness.  Psychiatric: No anxiety or stress.        Past Medical History:   Past Medical History   Diagnosis Date   • Arthritis    • HTN (hypertension) 7/15/2012   • GERD (gastroesophageal reflux disease) 7/15/2012   • STEMI (ST elevation myocardial infarction) (CMS-HCC)    • Anxiety disorder    • MI (myocardial infarction) (CMS-HCC)    • Hyperlipemia    • Upper GI bleed 7/26/2012   • Glaucoma    • SOB (shortness of breath)    • Claudication (CMS-HCC)    • Dizziness    • CAD (coronary artery disease) 10/14/2013   • ACS (acute coronary syndrome) (CMS-HCC) 7/25/2012   • Pericarditis secondary to acute myocardial infarction (CMS-HCC) 7/19/2012   • Right knee pain    • Blood glucose elevated    • Mental retardation    • Osteoarthritis    • Hypertriglyceridemia        Past Surgical History:  Past Surgical History   Procedure Laterality Date   • Other orthopedic surgery  7-      R knee surgery   • Other  knee surgery   • Other cardiac surgery       stent placement   • Other     • Gastroscopy-endo  7/25/2012     Performed by JORDIN VERA at ENDOSCOPY [facility]   • Gastroscopy-endo  7/26/2012     Performed by JORDIN VERA at ENDOSCOPY [facility] ORS       Current Outpatient Medications:  Home Medications     Reviewed by Brian Balderas (Pharmacy Tech) on 04/16/17 at 1716  Med List Status: Complete    Medication Last Dose Status    aspirin EC (ECOTRIN) 81 MG Tablet Delayed Response 4/16/2017 Active    atorvastatin (LIPITOR) 80 MG tablet 4/15/2017 Active    isosorbide mononitrate SR (IMDUR) 30 MG TABLET SR 24 HR  "4/16/2017 Active    lisinopril (PRINIVIL) 5 MG Tab 4/16/2017 Active    metformin (GLUCOPHAGE) 1000 MG tablet 4/16/2017 Active    metoprolol SR (TOPROL XL) 25 MG TABLET SR 24 HR 4/16/2017 Active    pantoprazole (PROTONIX) 40 MG Tablet Delayed Response 4/16/2017 Active    vitamin D (CHOLECALCIFEROL) 1000 UNIT Tab 4/16/2017 Active                Medication Allergy/Sensitivities:  Allergies   Allergen Reactions   • Ritalin [Methylphenidate] Unspecified     \"Hyper\"         Family History:  No family history on file.    Social History:  Social History     Social History   • Marital Status: Single     Spouse Name: N/A   • Number of Children: N/A   • Years of Education: N/A     Occupational History   • Not on file.     Social History Main Topics   • Smoking status: Never Smoker    • Smokeless tobacco: Never Used   • Alcohol Use: No   • Drug Use: No      Comment: cough syrup; vicodin; narcotics   • Sexual Activity: Not on file      Comment: Single, has no children, works as an .     Other Topics Concern   • Not on file     Social History Narrative    ** Merged History Encounter **         ** Merged History Encounter **          PCP : Juancho Jenkins M.D.    Physical Exam     Filed Vitals:    04/16/17 2354 04/17/17 0409 04/17/17 0745 04/17/17 1131   BP: 117/84 139/90 122/64 106/56   Pulse: 91 88 72 73   Temp: 36.4 °C (97.6 °F) 36.4 °C (97.5 °F) 35.8 °C (96.4 °F) 36.4 °C (97.6 °F)   Resp: 15 16 16 16   Height:       Weight:       SpO2: 96% 93% 98% 95%     Body mass index is 27.91 kg/(m^2).  /56 mmHg  Pulse 73  Temp(Src) 36.4 °C (97.6 °F)  Resp 16  Ht 1.803 m (5' 10.98\")  Wt 90.719 kg (200 lb)  BMI 27.91 kg/m2  SpO2 95%  O2 therapy: Pulse Oximetry: 95 %, O2 (LPM): 2, O2 Delivery: Nasal Cannula    Physical Exam  Constitutional: Well developed, Well nourished, No acute distress, Non-toxic appearance.    Psychiatric: Calm. Not anxious.  HENT:  Oropharynx: no exudate no erythema  Eyes: ELICEO DIMAS, " Conjunctiva normal, No discharge.    Musculoskeletal: Neck is soft and supple no meningismus  Lymphatic: No cervical lymphadenopathy noted.    Cardiovascular: Tachycardia, Normal rhythm, No murmurs, Negative Homans, no pedal edema, good equal pedal pulses.  Pulmonary: Lungs are clear to auscultation bilaterally. No wheezes rales or rhonchi  Abdomen: Bowel sounds normal, soft nondistended and nontender. No rebound and no guarding .  Skin: Warm, Dry, No erythema, No rash.    Neurologic: Alert & oriented, moves all extremities normally. Good sensation to light touch on all extremities.        Data Review     Lab Data Review:  Recent Results (from the past 24 hour(s))   EKG (ER)    Collection Time: 17  3:41 PM   Result Value Ref Range    Report       [facility] Emergency Dept.    Test Date:  2017  Pt Name:    NICHELLE MARQUEZ                   Department: ER  MRN:        [id number]                      Room:       [facility]  Gender:     M                            Technician: EDSR  :        1965                   Requested By:ER TRIAGE PROTOCOL  Order #:    [id number]                    Reading MD:    Measurements  Intervals                                Axis  Rate:       128                          P:          24  NH:         148                          QRS:        67  QRSD:       96                           T:          42  QT:         296  QTc:        432    Interpretive Statements  SINUS TACHYCARDIA  LATERAL INFARCT, AGE INDETERMINATE  BASELINE WANDER IN LEAD(S) V1,V2,V3,V4,V5,V6  Compared to ECG 2016 18:12:22  No significant changes     Troponin    Collection Time: 17  4:00 PM   Result Value Ref Range    Troponin I <0.01 0.00 - 0.04 ng/mL   Btype Natriuretic Peptide    Collection Time: 17  4:00 PM   Result Value Ref Range    B Natriuretic Peptide 21 0 - 100 pg/mL   CBC with Differential    Collection Time: 17  4:00 PM   Result Value Ref Range    WBC 8.3 4.8 -  10.8 K/uL    RBC 5.05 4.70 - 6.10 M/uL    Hemoglobin 15.1 14.0 - 18.0 g/dL    Hematocrit 43.4 42.0 - 52.0 %    MCV 85.9 81.4 - 97.8 fL    MCH 29.9 27.0 - 33.0 pg    MCHC 34.8 33.7 - 35.3 g/dL    RDW 39.7 35.9 - 50.0 fL    Platelet Count 432 164 - 446 K/uL    MPV 9.8 9.0 - 12.9 fL    Neutrophils-Polys 68.50 44.00 - 72.00 %    Lymphocytes 23.10 22.00 - 41.00 %    Monocytes 5.90 0.00 - 13.40 %    Eosinophils 1.40 0.00 - 6.90 %    Basophils 0.60 0.00 - 1.80 %    Immature Granulocytes 0.50 0.00 - 0.90 %    Nucleated RBC 0.00 /100 WBC    Neutrophils (Absolute) 5.67 1.82 - 7.42 K/uL    Lymphs (Absolute) 1.91 1.00 - 4.80 K/uL    Monos (Absolute) 0.49 0.00 - 0.85 K/uL    Eos (Absolute) 0.12 0.00 - 0.51 K/uL    Baso (Absolute) 0.05 0.00 - 0.12 K/uL    Immature Granulocytes (abs) 0.04 0.00 - 0.11 K/uL    NRBC (Absolute) 0.00 K/uL   Complete Metabolic Panel (CMP)    Collection Time: 04/16/17  4:00 PM   Result Value Ref Range    Sodium 136 135 - 145 mmol/L    Potassium 4.3 3.6 - 5.5 mmol/L    Chloride 100 96 - 112 mmol/L    Co2 24 20 - 33 mmol/L    Anion Gap 12.0 (H) 0.0 - 11.9    Glucose 194 (H) 65 - 99 mg/dL    Bun 15 8 - 22 mg/dL    Creatinine 0.91 0.50 - 1.40 mg/dL    Calcium 9.9 8.5 - 10.5 mg/dL    AST(SGOT) 25 12 - 45 U/L    ALT(SGPT) 62 (H) 2 - 50 U/L    Alkaline Phosphatase 71 30 - 99 U/L    Total Bilirubin 0.7 0.1 - 1.5 mg/dL    Albumin 4.4 3.2 - 4.9 g/dL    Total Protein 7.7 6.0 - 8.2 g/dL    Globulin 3.3 1.9 - 3.5 g/dL    A-G Ratio 1.3 g/dL   Prothrombin Time    Collection Time: 04/16/17  4:00 PM   Result Value Ref Range    PT 11.8 (L) 12.0 - 14.6 sec    INR 0.84 (L) 0.87 - 1.13   APTT    Collection Time: 04/16/17  4:00 PM   Result Value Ref Range    APTT 23.6 (L) 24.7 - 36.0 sec   Lipase    Collection Time: 04/16/17  4:00 PM   Result Value Ref Range    Lipase 62 11 - 82 U/L   ESTIMATED GFR    Collection Time: 04/16/17  4:00 PM   Result Value Ref Range    GFR If African American >60 >60 mL/min/1.73 m 2    GFR If Non  African American >60 >60 mL/min/1.73 m 2   Hemoglobin A1c    Collection Time: 04/16/17  4:00 PM   Result Value Ref Range    Glycohemoglobin 10.2 (H) 0.0 - 5.6 %    Est Avg Glucose 246 mg/dL   TSH (Thyroid Stimulating Hormone)    Collection Time: 04/16/17  4:00 PM   Result Value Ref Range    TSH 9.540 (H) 0.300 - 3.700 uIU/mL   URINE DRUG SCREEN    Collection Time: 04/16/17  7:05 PM   Result Value Ref Range    Amphetamines Urine Negative Negative    Barbiturates Negative Negative    Benzodiazepines Negative Negative    Cocaine Metabolite Negative Negative    Methadone Negative Negative    Ecstasy Negative Negative    Opiates Positive (A) Negative    Oxycodone Negative Negative    Phencyclidine -Pcp Negative Negative    Propoxyphene Negative Negative    Cannabinoid Metab Negative Negative   WESTERGREN SED RATE    Collection Time: 04/16/17  7:16 PM   Result Value Ref Range    Sed Rate Westergren 17 0 - 20 mm/hour   ACCU-CHEK GLUCOSE    Collection Time: 04/16/17  8:06 PM   Result Value Ref Range    Glucose - Accu-Ck 264 (H) 65 - 99 mg/dL   ECHOCARDIOGRAM COMP W/O CONT    Collection Time: 04/16/17  8:52 PM   Result Value Ref Range    Eject.Frac. MOD BP 42.33     Eject.Frac. MOD 4C 50.92     Eject.Frac. MOD 2C 34.17     Left Ventrical Ejection Fraction 45    TROPONIN    Collection Time: 04/16/17 10:30 PM   Result Value Ref Range    Troponin I <0.01 0.00 - 0.04 ng/mL   CBC with Differential    Collection Time: 04/17/17  3:59 AM   Result Value Ref Range    WBC 10.3 4.8 - 10.8 K/uL    RBC 4.52 (L) 4.70 - 6.10 M/uL    Hemoglobin 13.4 (L) 14.0 - 18.0 g/dL    Hematocrit 40.4 (L) 42.0 - 52.0 %    MCV 89.4 81.4 - 97.8 fL    MCH 29.6 27.0 - 33.0 pg    MCHC 33.2 (L) 33.7 - 35.3 g/dL    RDW 41.6 35.9 - 50.0 fL    Platelet Count 359 164 - 446 K/uL    MPV 9.6 9.0 - 12.9 fL    Neutrophils-Polys 77.20 (H) 44.00 - 72.00 %    Lymphocytes 15.40 (L) 22.00 - 41.00 %    Monocytes 5.80 0.00 - 13.40 %    Eosinophils 0.70 0.00 - 6.90 %     Basophils 0.30 0.00 - 1.80 %    Immature Granulocytes 0.60 0.00 - 0.90 %    Nucleated RBC 0.00 /100 WBC    Neutrophils (Absolute) 7.99 (H) 1.82 - 7.42 K/uL    Lymphs (Absolute) 1.59 1.00 - 4.80 K/uL    Monos (Absolute) 0.60 0.00 - 0.85 K/uL    Eos (Absolute) 0.07 0.00 - 0.51 K/uL    Baso (Absolute) 0.03 0.00 - 0.12 K/uL    Immature Granulocytes (abs) 0.06 0.00 - 0.11 K/uL    NRBC (Absolute) 0.00 K/uL   Comp Metabolic Panel (CMP)    Collection Time: 04/17/17  3:59 AM   Result Value Ref Range    Sodium 135 135 - 145 mmol/L    Potassium 4.0 3.6 - 5.5 mmol/L    Chloride 107 96 - 112 mmol/L    Co2 20 20 - 33 mmol/L    Anion Gap 8.0 0.0 - 11.9    Glucose 185 (H) 65 - 99 mg/dL    Bun 17 8 - 22 mg/dL    Creatinine 0.70 0.50 - 1.40 mg/dL    Calcium 8.2 (L) 8.5 - 10.5 mg/dL    AST(SGOT) 23 12 - 45 U/L    ALT(SGPT) 44 2 - 50 U/L    Alkaline Phosphatase 53 30 - 99 U/L    Total Bilirubin 0.7 0.1 - 1.5 mg/dL    Albumin 3.6 3.2 - 4.9 g/dL    Total Protein 6.2 6.0 - 8.2 g/dL    Globulin 2.6 1.9 - 3.5 g/dL    A-G Ratio 1.4 g/dL   Lipid Profile (Lipid Panel) Fasting    Collection Time: 04/17/17  3:59 AM   Result Value Ref Range    Cholesterol,Tot 148 100 - 199 mg/dL    Triglycerides 235 (H) 0 - 149 mg/dL    HDL 28 (A) >=40 mg/dL    LDL 73 <100 mg/dL   TROPONIN    Collection Time: 04/17/17  3:59 AM   Result Value Ref Range    Troponin I <0.01 0.00 - 0.04 ng/mL   ESTIMATED GFR    Collection Time: 04/17/17  3:59 AM   Result Value Ref Range    GFR If African American >60 >60 mL/min/1.73 m 2    GFR If Non African American >60 >60 mL/min/1.73 m 2   ACCU-CHEK GLUCOSE    Collection Time: 04/17/17  5:51 AM   Result Value Ref Range    Glucose - Accu-Ck 185 (H) 65 - 99 mg/dL   EKG    Collection Time: 04/17/17  7:03 AM   Result Value Ref Range    Report       [facility]    Test Date:  2017-04-17  Pt Name:    NICHELLE ALMA                   Department: CPU  MRN:        [id number]                      Room:       T208  Gender:     M                "             Technician: SARAHI  :        1965                   Requested By:TREVOR LONGORIA  Order #:    [id number]                    Reading MD: Coretta León MD    Measurements  Intervals                                Axis  Rate:       79                           P:          27  HI:         160                          QRS:        -19  QRSD:       96                           T:          37  QT:         360  QTc:        413    Interpretive Statements  SINUS RHYTHM  LEFT AXIS DEVIATION  CONSIDER INFERIOR INFARCT  Compared to ECG 2017 15:41:27  Sinus tachycardia no longer present    Electronically Signed On 2017 9:22:25 PDT by Coretta León MD         Imaging/Procedures Review:    ECHOCARDIOGRAM COMP W/O CONT   Final Result      [id number] VIEW   Final Result      1.  Mildly increased small bowel and colonic gas, without evidence for obstruction.   2.  Mildly increased colonic stool.   3.  Contrast present in the bladder from prior CT scan.      CT-CTA CHEST PULMONARY ARTERY W/ RECONS   Final Result      1.  No CT evidence for pulmonary emboli.   2.  No pneumonia or pneumothorax.   3.  Diffuse esophageal wall thickening, likely inflammatory.   4.  Fatty infiltration of liver.   5.  Stomach distended with food material.               DX-CHEST-LIMITED (1 VIEW)   Final Result         Bibasilar atelectasis.                Assessment/Plan     #Atypical chest pain/  Acute Hypoxia  -hx of  Ischemic cardiomyopathy , CAD, DM2, HTN   - central chest pain, started  at 1 pm, lasted few minutes while lying on couch and watching a movie, sharp in nature, no radiation, worse with coughing and moving around, associated with SOB, dry cough and palpitation.   -VS at ED: Vital Signs: /94 mmHg  Pulse 119  Resp 19  Ht 1.803 m (5' 10.98\")  SpO2 91% on 2 L NC  -CBC: wnl, CMP: wnl apart from sr glucose: 194  -EKG no acute ischemic changes , troponin wnl , BNP wnl    -Hypoxia  Etiology unclear , " no sign and symptoms of pneumonia   -CXR: Bibasilar atelectasis.   -ECHO in 7/2012: Left ventricular ejection fraction is 55% to 60%. Left Ventricle Normal left ventricular chamber size. Normal left ventricular systolic function.  Hypokinesis to akinesis of the inferior/posterior/lateral   wall.   -MPI in 4/2016: Fixed defect in the basal and mid myocardium. inferoNormal left ventricular size, ejection fraction, and wall motion  -Pt had cath in 4/2016:  40% lesion in the very proximal right coronary artery,  90% stenosis at the origin of one of the tandem posterior descending  artery vessels, 80% lesion in the mid portion of the posterior left ventricular artery. Elongated area of ectasia of the proximal left anterior descending.A 90% stenosis involving the origin of the small caliber first diagonal  artery.    Plan    -Ordered aspirin 325 mg in ED    -Trend troponin and EKG    -Aspirin , NTG prn , atorvastatin 80 mg . Cont home med BB, ACEs and isosorbide    -No indication of heparin drip for now   -F/U CTPE    -Will keep pt NPO at midnight  if pt have EKG changes or troponin bump up then will consider MPI study    -Utox    -GI cocktail  -Lipid panel,, TSH        Hx of DM  - sr glucose: 194  -ISS, hypoglycemia protocol    - A1C   -Accu check     Hx of HTN  -stable   -cont lisinopril  -ctm      Anticipated Hospital stay: Observation admit        Quality Measures  Core Measures  No FC  DVT ppx: lovenox

## 2017-04-18 ENCOUNTER — PATIENT OUTREACH (OUTPATIENT)
Dept: HEALTH INFORMATION MANAGEMENT | Facility: OTHER | Age: 52
End: 2017-04-18

## 2017-04-18 NOTE — PROGRESS NOTES
04/18/2017 1022 - Discharge Outreach attempt - phone listed is for brotherHumza - LM on brother's VMB  04/18/2017 1135 - Discharge Outreach - received callback from Humza who states he is not his brother, but is his . Phone number received. Patient called on cell and LM  04/19/2017 9606 - Discharge Outreach - LM

## 2017-05-05 PROCEDURE — 99284 EMERGENCY DEPT VISIT MOD MDM: CPT

## 2017-05-06 ENCOUNTER — HOSPITAL ENCOUNTER (EMERGENCY)
Facility: MEDICAL CENTER | Age: 52
End: 2017-05-06
Attending: EMERGENCY MEDICINE
Payer: MEDICARE

## 2017-05-06 VITALS
BODY MASS INDEX: 28.21 KG/M2 | TEMPERATURE: 96.6 F | HEART RATE: 76 BPM | RESPIRATION RATE: 18 BRPM | OXYGEN SATURATION: 93 % | WEIGHT: 201.5 LBS | HEIGHT: 71 IN | SYSTOLIC BLOOD PRESSURE: 138 MMHG | DIASTOLIC BLOOD PRESSURE: 100 MMHG

## 2017-05-06 DIAGNOSIS — R11.2 NAUSEA AND VOMITING, INTRACTABILITY OF VOMITING NOT SPECIFIED, UNSPECIFIED VOMITING TYPE: ICD-10-CM

## 2017-05-06 LAB
ALBUMIN SERPL BCP-MCNC: 4.4 G/DL (ref 3.2–4.9)
ALBUMIN/GLOB SERPL: 1.4 G/DL
ALP SERPL-CCNC: 59 U/L (ref 30–99)
ALT SERPL-CCNC: 72 U/L (ref 2–50)
ANION GAP SERPL CALC-SCNC: 6 MMOL/L (ref 0–11.9)
AST SERPL-CCNC: 29 U/L (ref 12–45)
BASOPHILS # BLD AUTO: 0.7 % (ref 0–1.8)
BASOPHILS # BLD: 0.04 K/UL (ref 0–0.12)
BILIRUB SERPL-MCNC: 0.6 MG/DL (ref 0.1–1.5)
BUN SERPL-MCNC: 18 MG/DL (ref 8–22)
CALCIUM SERPL-MCNC: 9.6 MG/DL (ref 8.5–10.5)
CHLORIDE SERPL-SCNC: 104 MMOL/L (ref 96–112)
CO2 SERPL-SCNC: 22 MMOL/L (ref 20–33)
CREAT SERPL-MCNC: 0.84 MG/DL (ref 0.5–1.4)
EOSINOPHIL # BLD AUTO: 0.12 K/UL (ref 0–0.51)
EOSINOPHIL NFR BLD: 2.1 % (ref 0–6.9)
ERYTHROCYTE [DISTWIDTH] IN BLOOD BY AUTOMATED COUNT: 41.5 FL (ref 35.9–50)
ETHANOL BLD-MCNC: 0 G/DL
GFR SERPL CREATININE-BSD FRML MDRD: >60 ML/MIN/1.73 M 2
GLOBULIN SER CALC-MCNC: 3.1 G/DL (ref 1.9–3.5)
GLUCOSE SERPL-MCNC: 244 MG/DL (ref 65–99)
HCT VFR BLD AUTO: 38.8 % (ref 42–52)
HGB BLD-MCNC: 13.2 G/DL (ref 14–18)
IMM GRANULOCYTES # BLD AUTO: 0.03 K/UL (ref 0–0.11)
IMM GRANULOCYTES NFR BLD AUTO: 0.5 % (ref 0–0.9)
LIPASE SERPL-CCNC: 45 U/L (ref 11–82)
LYMPHOCYTES # BLD AUTO: 1.16 K/UL (ref 1–4.8)
LYMPHOCYTES NFR BLD: 20.8 % (ref 22–41)
MCH RBC QN AUTO: 30.1 PG (ref 27–33)
MCHC RBC AUTO-ENTMCNC: 34 G/DL (ref 33.7–35.3)
MCV RBC AUTO: 88.4 FL (ref 81.4–97.8)
MONOCYTES # BLD AUTO: 0.39 K/UL (ref 0–0.85)
MONOCYTES NFR BLD AUTO: 7 % (ref 0–13.4)
NEUTROPHILS # BLD AUTO: 3.85 K/UL (ref 1.82–7.42)
NEUTROPHILS NFR BLD: 68.9 % (ref 44–72)
NRBC # BLD AUTO: 0 K/UL
NRBC BLD AUTO-RTO: 0 /100 WBC
PLATELET # BLD AUTO: 327 K/UL (ref 164–446)
PMV BLD AUTO: 9.6 FL (ref 9–12.9)
POTASSIUM SERPL-SCNC: 4.2 MMOL/L (ref 3.6–5.5)
PROT SERPL-MCNC: 7.5 G/DL (ref 6–8.2)
RBC # BLD AUTO: 4.39 M/UL (ref 4.7–6.1)
SODIUM SERPL-SCNC: 132 MMOL/L (ref 135–145)
WBC # BLD AUTO: 5.6 K/UL (ref 4.8–10.8)

## 2017-05-06 PROCEDURE — 83690 ASSAY OF LIPASE: CPT

## 2017-05-06 PROCEDURE — 700111 HCHG RX REV CODE 636 W/ 250 OVERRIDE (IP): Performed by: EMERGENCY MEDICINE

## 2017-05-06 PROCEDURE — 80053 COMPREHEN METABOLIC PANEL: CPT

## 2017-05-06 PROCEDURE — 96360 HYDRATION IV INFUSION INIT: CPT

## 2017-05-06 PROCEDURE — 80307 DRUG TEST PRSMV CHEM ANLYZR: CPT

## 2017-05-06 PROCEDURE — 96372 THER/PROPH/DIAG INJ SC/IM: CPT | Mod: XU

## 2017-05-06 PROCEDURE — 700105 HCHG RX REV CODE 258: Performed by: EMERGENCY MEDICINE

## 2017-05-06 PROCEDURE — 85025 COMPLETE CBC W/AUTO DIFF WBC: CPT

## 2017-05-06 RX ORDER — ONDANSETRON 4 MG/1
4 TABLET, ORALLY DISINTEGRATING ORAL EVERY 8 HOURS PRN
Qty: 10 TAB | Refills: 0 | Status: SHIPPED | OUTPATIENT
Start: 2017-05-06 | End: 2017-06-01

## 2017-05-06 RX ORDER — ONDANSETRON 4 MG/1
4 TABLET, ORALLY DISINTEGRATING ORAL ONCE
Status: COMPLETED | OUTPATIENT
Start: 2017-05-06 | End: 2017-05-06

## 2017-05-06 RX ORDER — SODIUM CHLORIDE 9 MG/ML
1000 INJECTION, SOLUTION INTRAVENOUS ONCE
Status: COMPLETED | OUTPATIENT
Start: 2017-05-06 | End: 2017-05-06

## 2017-05-06 RX ADMIN — ONDANSETRON 4 MG: 4 TABLET, ORALLY DISINTEGRATING ORAL at 04:03

## 2017-05-06 RX ADMIN — SODIUM CHLORIDE 1000 ML: 9 INJECTION, SOLUTION INTRAVENOUS at 06:32

## 2017-05-06 ASSESSMENT — ENCOUNTER SYMPTOMS
VOMITING: 1
ABDOMINAL PAIN: 1
NAUSEA: 1
DIARRHEA: 1
BACK PAIN: 0
SHORTNESS OF BREATH: 0
FEVER: 0
HEADACHES: 0

## 2017-05-06 ASSESSMENT — PAIN SCALES - GENERAL
PAINLEVEL_OUTOF10: 10
PAINLEVEL_OUTOF10: 10

## 2017-05-06 ASSESSMENT — LIFESTYLE VARIABLES: DO YOU DRINK ALCOHOL: NO

## 2017-05-06 NOTE — ED NOTES
Pt arrived to ED EMS and was placed in lobby.  Pt with c/o abdominal pain and vomiting that started yesterday afternoon.

## 2017-05-06 NOTE — DISCHARGE INSTRUCTIONS
Nausea and Vomiting  Nausea means you feel sick to your stomach. Throwing up (vomiting) is a reflex where stomach contents come out of your mouth.  HOME CARE   · Take medicine as told by your doctor.  · Do not force yourself to eat. However, you do need to drink fluids.  · If you feel like eating, eat a normal diet as told by your doctor.  ¨ Eat rice, wheat, potatoes, bread, lean meats, yogurt, fruits, and vegetables.  ¨ Avoid high-fat foods.  · Drink enough fluids to keep your pee (urine) clear or pale yellow.  · Ask your doctor how to replace body fluid losses (rehydrate). Signs of body fluid loss (dehydration) include:  ¨ Feeling very thirsty.  ¨ Dry lips and mouth.  ¨ Feeling dizzy.  ¨ Dark pee.  ¨ Peeing less than normal.  ¨ Feeling confused.  ¨ Fast breathing or heart rate.  GET HELP RIGHT AWAY IF:   · You have blood in your throw up.  · You have black or bloody poop (stool).  · You have a bad headache or stiff neck.  · You feel confused.  · You have bad belly (abdominal) pain.  · You have chest pain or trouble breathing.  · You do not pee at least once every 8 hours.  · You have cold, clammy skin.  · You keep throwing up after 24 to 48 hours.  · You have a fever.  MAKE SURE YOU:   · Understand these instructions.  · Will watch your condition.  · Will get help right away if you are not doing well or get worse.     This information is not intended to replace advice given to you by your health care provider. Make sure you discuss any questions you have with your health care provider.     Document Released: 06/05/2009 Document Revised: 03/11/2013 Document Reviewed: 05/18/2012  [email or web address] Interactive Patient Education ©2016 [email or web address]

## 2017-05-06 NOTE — ED AVS SNAPSHOT
Home Care Instructions                                                                                                                Ryan Trevizo   MRN: [id number]    Department:  [facility], Emergency Dept   Date of Visit:  5/5/2017            [facility], Emergency Dept    [address] [place] [address]    Phone:  [phone]      You were seen by     An Samnaiego D.O.      Your Diagnosis Was     Nausea and vomiting, intractability of vomiting not specified, unspecified vomiting type     R11.2       These are the medications you received during your hospitalization from 05/05/2017 2355 to 05/06/2017 0848     Date/Time Order Dose Route Action    05/06/2017 0403 ondansetron (ZOFRAN ODT) dispertab 4 mg 4 mg Oral Given    05/06/2017 0632 NS infusion 1,000 mL 1,000 mL Intravenous New Bag      Follow-up Information     1. Follow up with Juancho Jenkins M.D. In 2 days.    Specialty:  Internal Medicine    Contact information    [address]  [address] [address] [phone]          2. Follow up with [facility], Emergency Dept.    Specialty:  Emergency Medicine    Why:  If symptoms worsen    Contact information    [address] [place] [address] [phone]      Medication Information     Review all of your home medications and newly ordered medications with your primary doctor and/or pharmacist as soon as possible. Follow medication instructions as directed by your doctor and/or pharmacist.     Please keep your complete medication list with you and share with your physician. Update the information when medications are discontinued, doses are changed, or new medications (including over-the-counter products) are added; and carry medication information at all times in the event of emergency situations.               Medication List      START taking these medications        Instructions    Morning Afternoon Evening Bedtime    ondansetron 4 MG Tbdp   Last time this was given:  4 mg on 5/6/2017  4:03 AM   Commonly known as:  ZOFRAN ODT        Take 1 Tab by mouth every 8 hours as needed for Nausea/Vomiting.   Dose:  4 mg                          ASK your doctor about these medications        Instructions    Morning Afternoon Evening Bedtime    aspirin EC 81 MG Tbec   Commonly known as:  ECOTRIN        Take 1 Tab by mouth every day.   Dose:  81 mg                        atorvastatin 80 MG tablet   Commonly known as:  LIPITOR        Take 1 Tab by mouth every evening.   Dose:  80 mg                        isosorbide mononitrate SR 30 MG Tb24   Commonly known as:  IMDUR        Take 1 Tab by mouth every day.   Dose:  30 mg                        * losartan 25 MG Tabs   Commonly known as:  COZAAR        Take 1 Tab by mouth every day.   Dose:  25 mg                        * losartan 25 MG Tabs   Commonly known as:  COZAAR        TAKE 1 TABLET BY MOUTH ONCE DAILY.                        metformin 1000 MG tablet   Commonly known as:  GLUCOPHAGE        Take 1 Tab by mouth 2 times a day, with meals.   Dose:  1000 mg                        metoprolol SR 25 MG Tb24   Commonly known as:  TOPROL XL        Take 2 Tabs by mouth every day.   Dose:  50 mg                        omeprazole 20 MG delayed-release capsule   Commonly known as:  PRILOSEC        Take 2 Caps by mouth 2 times a day.   Dose:  40 mg                        vitamin D 1000 UNIT Tabs   Commonly known as:  cholecalciferol        Take 1 Tab by mouth every day.   Dose:  1000 Units                        * Notice:  This list has 2 medication(s) that are the same as other medications prescribed for you. Read the directions carefully, and ask your doctor or other care provider to review them with you.         Where to Get Your Medications      These medications were sent to [facility] - [address] [facility]  [address] [facility] [place] [address]     Phone:   [phone]    - ondansetron 4 MG Tbdp            Procedures and tests performed during your visit     CBC WITH DIFFERENTIAL    CMP    DIAGNOSTIC ALCOHOL    ESTIMATED GFR    LIPASE        Discharge Instructions       Nausea and Vomiting  Nausea means you feel sick to your stomach. Throwing up (vomiting) is a reflex where stomach contents come out of your mouth.  HOME CARE   · Take medicine as told by your doctor.  · Do not force yourself to eat. However, you do need to drink fluids.  · If you feel like eating, eat a normal diet as told by your doctor.  ¨ Eat rice, wheat, potatoes, bread, lean meats, yogurt, fruits, and vegetables.  ¨ Avoid high-fat foods.  · Drink enough fluids to keep your pee (urine) clear or pale yellow.  · Ask your doctor how to replace body fluid losses (rehydrate). Signs of body fluid loss (dehydration) include:  ¨ Feeling very thirsty.  ¨ Dry lips and mouth.  ¨ Feeling dizzy.  ¨ Dark pee.  ¨ Peeing less than normal.  ¨ Feeling confused.  ¨ Fast breathing or heart rate.  GET HELP RIGHT AWAY IF:   · You have blood in your throw up.  · You have black or bloody poop (stool).  · You have a bad headache or stiff neck.  · You feel confused.  · You have bad belly (abdominal) pain.  · You have chest pain or trouble breathing.  · You do not pee at least once every 8 hours.  · You have cold, clammy skin.  · You keep throwing up after 24 to 48 hours.  · You have a fever.  MAKE SURE YOU:   · Understand these instructions.  · Will watch your condition.  · Will get help right away if you are not doing well or get worse.     This information is not intended to replace advice given to you by your health care provider. Make sure you discuss any questions you have with your health care provider.     Document Released: 06/05/2009 Document Revised: 03/11/2013 Document Reviewed: 05/18/2012  [employer] Interactive Patient Education ©2016 [employer]            Patient Information     Patient  Information    Following emergency treatment: all patient requiring follow-up care must return either to a private physician or a clinic if your condition worsens before you are able to obtain further medical attention, please return to the emergency room.     Billing Information    At [facility], we work to make the billing process streamlined for our patients.  Our Representatives are here to answer any questions you may have regarding your hospital bill.  If you have insurance coverage and have supplied your insurance information to us, we will submit a claim to your insurer on your behalf.  Should you have any questions regarding your bill, we can be reached online or by phone as follows:  Online: You are able pay your bills online or live chat with our representatives about any billing questions you may have. We are here to help Monday - Friday from 8:00am to 7:30pm and 9:00am - 12:00pm on Saturdays.  Please visit [email or web address]  for more information.   Phone:  [phone] or [phone]    Please note that your emergency physician, surgeon, pathologist, radiologist, anesthesiologist, and other specialists are not employed by [facility] and will therefore bill separately for their services.  Please contact them directly for any questions concerning their bills at the numbers below:     Emergency Physician Services:  [phone]  [facility]:  [phone]  [facility]:  [phone]  [facility]:  [phone]    1. Your final bill may vary from the amount quoted upon discharge if all procedures are not complete at that time, or if your doctor has additional procedures of which we are not aware. You will receive an additional bill if you return to the Emergency Department at [facility] for suture removal regardless of the facility of which the sutures were placed.     2. Please arrange for settlement of this account  at the emergency registration.    3. All self-pay accounts are due in full at the time of treatment.  If you are unable to meet this obligation then payment is expected within 4-5 days.     4. If you have had radiology studies (CT, X-ray, Ultrasound, MRI), you have received a preliminary result during your emergency department visit. Please contact the radiology department [phone] to receive a copy of your final result. Please discuss the Final result with your primary physician or with the follow up physician provided.     Crisis Hotline:  [place] Crisis Hotline:  3-269-GQCYEQK or 1-108.156.4283  [place] Crisis Hotline:    1-345.256.5260 or 879-579-2089         ED Discharge Follow Up Questions    1. In order to provide you with very good care, we would like to follow up with a phone call in the next few days.  May we have your permission to contact you?     YES /  NO    2. What is the best phone number to call you? (       )_____-__________    3. What is the best time to call you?      Morning  /  Afternoon  /  Evening                   Patient Signature:  ____________________________________________________________    Date:  ____________________________________________________________      Your appointments     Jun 01, 2017  2:45 PM   Established Patient with Juancho Jenkins M.D.   [facility] (--)    [address] [place] [address]  [address]  [place] [address] [phone]           You will be receiving a confirmation call a few days before your appointment from our automated call confirmation system.            Oct 06, 2017  2:30 PM   FOLLOW UP with Joseph León M.D.   [facility] (--)    [address] [place] [address] [address]  [place] [address] [phone]

## 2017-05-06 NOTE — ED AVS SNAPSHOT
5/6/2017    Ryan Trevizo  [address]  [place] [address]    Dear Ryan:    [facility] wants to ensure your discharge home is safe and you or your loved ones have had all of your questions answered regarding your care after you leave the hospital.    Below is a list of resources and contact information should you have any questions regarding your hospital stay, follow-up instructions, or active medical symptoms.    Questions or Concerns Regarding… Contact   Medical Questions Related to Your Discharge  (7 days a week, 8am-5pm) Contact a Nurse Care Coordinator   [phone]   Medical Questions Not Related to Your Discharge  (24 hours a day / 7 days a week)  Contact the Nurse Health Line   [phone]    Medications or Discharge Instructions Refer to your discharge packet   or contact your [facility] Primary Care Provider   [phone]   Follow-up Appointment(s) Schedule your appointment via [employer]   or contact Scheduling [phone]   Billing Review your statement via [employer]  or contact Billing [phone]   Medical Records Review your records via [employer]   or contact Medical Records [phone]     You may receive a telephone call within two days of discharge. This call is to make certain you understand your discharge instructions and have the opportunity to have any questions answered. You can also easily access your medical information, test results and upcoming appointments via the [employer] free online health management tool. You can learn more and sign up at [employer]. For assistance setting up your [employer] account, please call [phone].    Once again, we want to ensure your discharge home is safe and that you have a clear understanding of any next steps in your care. If you have any questions or concerns, please do not hesitate to contact us, we are here for you. Thank you for choosing [facility] for your healthcare needs.    Sincerely,    Your [facility] Healthcare Team

## 2017-05-06 NOTE — ED NOTES
Pt IV removed and dressing placed pt verbalizes understanding of discharge instructions, patient ambulatory to discharge with steady gait NAD or deficits noted at time of discharge. Pt taking bus home

## 2017-05-06 NOTE — ED NOTES
Chief Complaint   Patient presents with   • Vomiting     ate spaghetti tonight and was laying on couch. pt stated he felt nauseated and vomited once. brought in by EMS

## 2017-05-06 NOTE — ED NOTES
Pt provided with warm blanket.  Pt resting quietly.  NAD noted.  Respirations even and unlabored.  VS stable.

## 2017-05-06 NOTE — ED PROVIDER NOTES
ED Provider Note    Scribed for An Samaniego D.O. by Karon Aparicio. 5/6/2017, 3:59 AM.    Primary care provider: Juancho Jenkins M.D.  Means of arrival: EMS  History obtained from: Patient  History limited by: None    CHIEF COMPLAINT  Chief Complaint   Patient presents with   • Vomiting     ate spaghetti tonight and was laying on couch. pt stated he felt nauseated and vomited once. brought in by EMS      HPI  Ryan Trevizo is a 51 y.o. male who presents to the Emergency Department with left upper quadrant abdominal pain, nausea and one episode of vomiting onset one half hour prior to arrival to the ED. He also has diarrhea but denies fever or back pain. The patient states he ate spaghetti last night, and he was the only one who ate it, before he went to bed. He was feeling all right but got sick when he went to sleep. He has had symptoms like this before and states this happens periodically, and he takes no medications for this. He denies a history of surgeries. He denie any alcohol, drug or tobacco use.    REVIEW OF SYSTEMS  Review of Systems   Constitutional: Negative for fever.   Respiratory: Negative for shortness of breath.    Cardiovascular: Negative for chest pain.   Gastrointestinal: Positive for nausea, vomiting, abdominal pain and diarrhea.   Musculoskeletal: Negative for back pain.   Neurological: Negative for headaches.   All other systems reviewed and are negative.      PAST MEDICAL HISTORY   has a past medical history of Arthritis; HTN (hypertension) (7/15/2012); GERD (gastroesophageal reflux disease) (7/15/2012); STEMI (ST elevation myocardial infarction) (CMS-HCC); Anxiety disorder; MI (myocardial infarction) (CMS-HCC); Hyperlipemia; Upper GI bleed (7/26/2012); Glaucoma; SOB (shortness of breath); Claudication (CMS-HCC); Dizziness; CAD (coronary artery disease) (10/14/2013); ACS (acute coronary syndrome) (CMS-HCC) (7/25/2012); Pericarditis secondary to acute myocardial infarction  "(CMS-HCC) (7/19/2012); Right knee pain; Blood glucose elevated; Mental retardation; Osteoarthritis; and Hypertriglyceridemia.    SURGICAL HISTORY   has past surgical history that includes other orthopedic surgery (7- ); other (knee surgery); other cardiac surgery; other; gastroscopy-endo (7/25/2012); and gastroscopy-endo (7/26/2012).    SOCIAL HISTORY  Social History   Substance Use Topics   • Smoking status: Never Smoker    • Smokeless tobacco: Never Used   • Alcohol Use: No      History   Drug Use No     Comment: cough syrup; vicodin; narcotics     FAMILY HISTORY  None noted    CURRENT MEDICATIONS  Home Medications     Reviewed by Linda Babin R.N. (Registered Nurse) on 05/06/17 at 0312  Med List Status: Partial    Medication Last Dose Status    aspirin EC (ECOTRIN) 81 MG Tablet Delayed Response 4/16/2017 Active    atorvastatin (LIPITOR) 80 MG tablet 4/15/2017 Active    isosorbide mononitrate SR (IMDUR) 30 MG TABLET SR 24 HR 4/16/2017 Active    losartan (COZAAR) 25 MG Tab  Active    losartan (COZAAR) 25 MG Tab  Active    metformin (GLUCOPHAGE) 1000 MG tablet 4/16/2017 Active    metoprolol SR (TOPROL XL) 25 MG TABLET SR 24 HR  Active    omeprazole (PRILOSEC) 20 MG delayed-release capsule  Active    vitamin D (CHOLECALCIFEROL) 1000 UNIT Tab 4/16/2017 Active              ALLERGIES  Allergies   Allergen Reactions   • Ritalin [Methylphenidate] Unspecified     \"Hyper\"     PHYSICAL EXAM  VITAL SIGNS: /100 mmHg  Pulse 80  Temp(Src) 36.9 °C (98.4 °F)  Resp 14  Ht 1.803 m (5' 11\")  Wt 91.4 kg (201 lb 8 oz)  BMI 28.12 kg/m2  SpO2 94%  Vitals reviewed.  Constitutional: Patient is oriented to person, place, and time. Appears well-developed and well-nourished. No distress.    Head: Normocephalic and atraumatic.   Ears: Normal external ears bilaterally.   Mouth/Throat: Oropharynx is clear and moist  Eyes: Conjunctivae are normal. Pupils are equal, round, and reactive to light.   Neck: Normal range of " motion. Neck supple.  Cardiovascular: Normal rate, regular rhythm and normal heart sounds.   Pulmonary/Chest: Effort normal and breath sounds normal. No respiratory distress, no wheezes, rhonchi, or rales. No chest wall tenderness.  Abdominal: Soft. Bowel sounds are normal. There is mild left upper quadrant pain but no rebound or guarding, or peritoneal signs. No CVA tenderness.  Musculoskeletal: No edema and no tenderness.   Neurological: No focal deficits.   Skin: Skin is warm and dry. No erythema. No pallor.   Psychiatric: Patient has a normal mood and affect.     LABS  Results for orders placed or performed during the hospital encounter of 05/06/17   CBC WITH DIFFERENTIAL   Result Value Ref Range    WBC 5.6 4.8 - 10.8 K/uL    RBC 4.39 (L) 4.70 - 6.10 M/uL    Hemoglobin 13.2 (L) 14.0 - 18.0 g/dL    Hematocrit 38.8 (L) 42.0 - 52.0 %    MCV 88.4 81.4 - 97.8 fL    MCH 30.1 27.0 - 33.0 pg    MCHC 34.0 33.7 - 35.3 g/dL    RDW 41.5 35.9 - 50.0 fL    Platelet Count 327 164 - 446 K/uL    MPV 9.6 9.0 - 12.9 fL    Neutrophils-Polys 68.90 44.00 - 72.00 %    Lymphocytes 20.80 (L) 22.00 - 41.00 %    Monocytes 7.00 0.00 - 13.40 %    Eosinophils 2.10 0.00 - 6.90 %    Basophils 0.70 0.00 - 1.80 %    Immature Granulocytes 0.50 0.00 - 0.90 %    Nucleated RBC 0.00 /100 WBC    Neutrophils (Absolute) 3.85 1.82 - 7.42 K/uL    Lymphs (Absolute) 1.16 1.00 - 4.80 K/uL    Monos (Absolute) 0.39 0.00 - 0.85 K/uL    Eos (Absolute) 0.12 0.00 - 0.51 K/uL    Baso (Absolute) 0.04 0.00 - 0.12 K/uL    Immature Granulocytes (abs) 0.03 0.00 - 0.11 K/uL    NRBC (Absolute) 0.00 K/uL   CMP   Result Value Ref Range    Sodium 132 (L) 135 - 145 mmol/L    Potassium 4.2 3.6 - 5.5 mmol/L    Chloride 104 96 - 112 mmol/L    Co2 22 20 - 33 mmol/L    Anion Gap 6.0 0.0 - 11.9    Glucose 244 (H) 65 - 99 mg/dL    Bun 18 8 - 22 mg/dL    Creatinine 0.84 0.50 - 1.40 mg/dL    Calcium 9.6 8.5 - 10.5 mg/dL    AST(SGOT) 29 12 - 45 U/L    ALT(SGPT) 72 (H) 2 - 50 U/L     Alkaline Phosphatase 59 30 - 99 U/L    Total Bilirubin 0.6 0.1 - 1.5 mg/dL    Albumin 4.4 3.2 - 4.9 g/dL    Total Protein 7.5 6.0 - 8.2 g/dL    Globulin 3.1 1.9 - 3.5 g/dL    A-G Ratio 1.4 g/dL   LIPASE   Result Value Ref Range    Lipase 45 11 - 82 U/L   DIAGNOSTIC ALCOHOL   Result Value Ref Range    Diagnostic Alcohol 0.00 0.00 g/dL   ESTIMATED GFR   Result Value Ref Range    GFR If African American >60 >60 mL/min/1.73 m 2    GFR If Non African American >60 >60 mL/min/1.73 m 2     All labs reviewed by me.    COURSE & MEDICAL DECISION MAKING  Pertinent Labs & Imaging studies reviewed. (See chart for details)    Obtained and reviewed past medical records which indicated that patient was seen here 4/16 and 2/4 for evaluation of abdominal pain.    3:59 AM - Patient seen and examined at bedside. This is an overall well-appearing patient. If mild upper abdominal pain. No rebound or guarding. Patient will be treated with Zofran PO 4 mg. Ordered CBC with differential, CMP, lipase, diagnostic alcohol and estimated GFR labs to evaluate his symptoms. The differential diagnoses include but are not limited to: pancreatitis, gastritis, food poisoning     5:54 AM - ED testing reveals that patient's white count is normal, his lipase is not elevated. His glucose is elevated at 244 and sodium is slightly low at 132. Patient will be treated with 1L of IV fluids.     8:19 AM - Patient was reevaluated at bedside, and he states he is feeling better. We will have patient undergo a PO challenge to ensure he does not vomit then plan for discharge home.     8:42 AM - Patient underwent PO challenge with no vomiting. He will be discharged home at this time with a prescription for Zofran. He is instructed to follow up with his PCP for a reevaluation or return to [facility] ED for worsening symptoms. Patient is agreeable to discharge plan with no further questions.     The patient will return for new or worsening symptoms and is stable at the time  of discharge.    The patient is referred to a primary physician for blood pressure management, diabetic screening, and for all other preventative health concerns.    DISPOSITION:  Patient will be discharged home in stable condition.    FOLLOW UP:  Juancho Jenkins M.D.  [address]  [address]  [place] [address]  [phone]    In 2 days      [facility], Emergency Dept  [address] [address]  [place] [id number] [phone]    If symptoms worsen    OUTPATIENT MEDICATIONS:  Discharge Medication List as of 5/6/2017  8:48 AM      START taking these medications    Details   ondansetron (ZOFRAN ODT) 4 MG TABLET DISPERSIBLE Take 1 Tab by mouth every 8 hours as needed for Nausea/Vomiting., Disp-10 Tab, R-0, Normal           FINAL IMPRESSION  1. Nausea and vomiting, intractability of vomiting not specified, unspecified vomiting type          I, Karon Aparicio (Scribe), am scribing for, and in the presence of, An Samaniego D.O..    Electronically signed by: Karon Aparicio (Scribe), 5/6/2017    IAn D.O. personally performed the services described in this documentation, as scribed by Karon Aparicio in my presence, and it is both accurate and complete.    The note accurately reflects work and decisions made by me.  An Samaniego  5/6/2017  11:21 AM

## 2017-05-06 NOTE — ED AVS SNAPSHOT
Losonoco Access Code: Activation code not generated  Current Losonoco Status: Patient Declined    Your email address is not on file at [employer].  Email Addresses are required for you to sign up for Losonoco, please contact [phone] to verify your personal information and to provide your email address prior to attempting to register for Losonoco.    Ryan Trevizo  [address]  [place] [address]    Content Fleett  A secure, online tool to manage your health information     [employer]’s Losonoco® is a secure, online tool that connects you to your personalized health information from the privacy of your home -- day or night - making it very easy for you to manage your healthcare. Once the activation process is completed, you can even access your medical information using the Losonoco anthony, which is available for free in the Apple Anthony store or Google Play store.     To learn more about Losonoco, visit [email or web address]    There are two levels of access available (as shown below):   My Chart Features  [facility] Primary Care Doctor [facility]  Specialists [facility]  Urgent  Care Non-[facility] Primary Care Doctor   Email your healthcare team securely and privately 24/7 X X X    Manage appointments: schedule your next appointment; view details of past/upcoming appointments X      Request prescription refills. X      View recent personal medical records, including lab and immunizations X X X X   View health record, including health history, allergies, medications X X X X   Read reports about your outpatient visits, procedures, consult and ER notes X X X X   See your discharge summary, which is a recap of your hospital and/or ER visit that includes your diagnosis, lab results, and care plan X X  X     How to register for Content Fleett:  Once your e-mail address has been verified, follow the following steps to sign up for Content Fleett.     1. Go to  [email or web address]  2. Click on the Sign Up Now box, which takes you to the  New Member Sign Up page. You will need to provide the following information:  a. Enter your CRITICAL TECHNOLOGIES Access Code exactly as it appears at the top of this page. (You will not need to use this code after you’ve completed the sign-up process. If you do not sign up before the expiration date, you must request a new code.)   b. Enter your date of birth.   c. Enter your home email address.   d. Click Submit, and follow the next screen’s instructions.  3. Create a CRITICAL TECHNOLOGIES ID. This will be your CRITICAL TECHNOLOGIES login ID and cannot be changed, so think of one that is secure and easy to remember.  4. Create a CRITICAL TECHNOLOGIES password. You can change your password at any time.  5. Enter your Password Reset Question and Answer. This can be used at a later time if you forget your password.   6. Enter your e-mail address. This allows you to receive e-mail notifications when new information is available in CRITICAL TECHNOLOGIES.  7. Click Sign Up. You can now view your health information.    For assistance activating your CRITICAL TECHNOLOGIES account, call [phone]

## 2017-05-06 NOTE — ED NOTES
Pt resting quietly in bed with eyes closed.  NAD noted.  Respirations even and unlabored.  VS stable.  No question or concerns to address at this time.

## 2017-05-17 ENCOUNTER — HOSPITAL ENCOUNTER (INPATIENT)
Facility: MEDICAL CENTER | Age: 52
LOS: 2 days | DRG: 309 | End: 2017-05-20
Attending: EMERGENCY MEDICINE | Admitting: HOSPITALIST
Payer: MEDICARE

## 2017-05-17 DIAGNOSIS — I48.91 ATRIAL FIBRILLATION, UNSPECIFIED TYPE (HCC): ICD-10-CM

## 2017-05-17 DIAGNOSIS — R10.13 EPIGASTRIC PAIN: ICD-10-CM

## 2017-05-17 DIAGNOSIS — K20.90 ESOPHAGITIS: ICD-10-CM

## 2017-05-17 DIAGNOSIS — R00.0 RAPID HEART RATE: ICD-10-CM

## 2017-05-17 PROCEDURE — 85025 COMPLETE CBC W/AUTO DIFF WBC: CPT

## 2017-05-17 PROCEDURE — 81002 URINALYSIS NONAUTO W/O SCOPE: CPT | Performed by: EMERGENCY MEDICINE

## 2017-05-17 PROCEDURE — 99285 EMERGENCY DEPT VISIT HI MDM: CPT

## 2017-05-17 PROCEDURE — 83690 ASSAY OF LIPASE: CPT

## 2017-05-17 PROCEDURE — 80053 COMPREHEN METABOLIC PANEL: CPT

## 2017-05-17 RX ORDER — ONDANSETRON 2 MG/ML
4 INJECTION INTRAMUSCULAR; INTRAVENOUS
Status: COMPLETED | OUTPATIENT
Start: 2017-05-17 | End: 2017-05-18

## 2017-05-17 NOTE — ED AVS SNAPSHOT
Home Care Instructions                                                                                                                Ryan Trevizo   MRN: [id number]    Department:  [facility], Emergency Dept   Date of Visit:  5/17/2017            [facility], Emergency Dept    [address] [address]    Phone:  [phone]      You were seen by     1. Abilio Sotomayor M.D.    2. Reinier Vogt M.D.      Your Diagnosis Was     Epigastric pain     R10.13       These are the medications you received during your hospitalization from 05/17/2017 2134 to 05/18/2017 0404     Date/Time Order Dose Route Action    05/18/2017 0005 HYDROmorphone (DILAUDID) injection 1 mg 1 mg Intravenous Given    05/18/2017 0005 ondansetron (ZOFRAN) syringe/vial injection 4 mg 4 mg Intravenous Given    05/18/2017 0248 HYDROmorphone (DILAUDID) injection 1 mg 1 mg Intravenous Given    05/18/2017 0248 omeprazole (PRILOSEC) capsule 40 mg 40 mg Oral Given    05/18/2017 0248 NS infusion 1,000 mL 1,000 mL Intravenous New Bag      Follow-up Information     1. Follow up with Juancho Jenkins M.D.. Schedule an appointment as soon as possible for a visit in 2 days.    Specialty:  Internal Medicine    Why:  As needed    Contact information    [address]  [place] [address] [place] [address] [phone]        Medication Information     Review all of your home medications and newly ordered medications with your primary doctor and/or pharmacist as soon as possible. Follow medication instructions as directed by your doctor and/or pharmacist.     Please keep your complete medication list with you and share with your physician. Update the information when medications are discontinued, doses are changed, or new medications (including over-the-counter products) are added; and carry medication information at all times in the event of emergency situations.               Medication List      START taking  these medications        Instructions    Morning Afternoon Evening Bedtime    hydrocodone-acetaminophen 5-325 MG Tabs per tablet   Commonly known as:  NORCO        Take 1-2 Tabs by mouth every four hours as needed (mild pain).   Dose:  1-2 Tab                        * sucralfate 1 GM/10ML Susp   Commonly known as:  CARAFATE        Take 10 mL by mouth 2 times a day.   Dose:  1 g                        * sucralfate 1 GM/10ML Susp   Commonly known as:  CARAFATE        Take 10 mL by mouth 2 times a day.   Dose:  1 g                        * Notice:  This list has 2 medication(s) that are the same as other medications prescribed for you. Read the directions carefully, and ask your doctor or other care provider to review them with you.      ASK your doctor about these medications        Instructions    Morning Afternoon Evening Bedtime    aspirin EC 81 MG Tbec   Commonly known as:  ECOTRIN        Take 1 Tab by mouth every day.   Dose:  81 mg                        atorvastatin 80 MG tablet   Commonly known as:  LIPITOR        Take 1 Tab by mouth every evening.   Dose:  80 mg                        isosorbide mononitrate SR 30 MG Tb24   Commonly known as:  IMDUR        Take 1 Tab by mouth every day.   Dose:  30 mg                        * losartan 25 MG Tabs   Commonly known as:  COZAAR        Take 1 Tab by mouth every day.   Dose:  25 mg                        * losartan 25 MG Tabs   Commonly known as:  COZAAR        TAKE 1 TABLET BY MOUTH ONCE DAILY.                        metformin 1000 MG tablet   Commonly known as:  GLUCOPHAGE        Take 1 Tab by mouth 2 times a day, with meals.   Dose:  1000 mg                        metoprolol SR 25 MG Tb24   Commonly known as:  TOPROL XL        Take 2 Tabs by mouth every day.   Dose:  50 mg                        * omeprazole 20 MG delayed-release capsule   What changed:  Another medication with the same name was added. Make sure you understand how and when to take each.   Last  time this was given:  40 mg on 5/18/2017  2:48 AM   Commonly known as:  PRILOSEC   Ask about: Which instructions should I use?        Take 2 Caps by mouth 2 times a day.   Dose:  40 mg                        * omeprazole 40 MG delayed-release capsule   What changed:  You were already taking a medication with the same name, and this prescription was added. Make sure you understand how and when to take each.   Last time this was given:  40 mg on 5/18/2017  2:48 AM   Commonly known as:  PRILOSEC   Ask about: Which instructions should I use?        Take 1 Cap by mouth every day.   Dose:  40 mg                        ondansetron 4 MG Tbdp   Commonly known as:  ZOFRAN ODT        Take 1 Tab by mouth every 8 hours as needed for Nausea/Vomiting.   Dose:  4 mg                        vitamin D 1000 UNIT Tabs   Commonly known as:  cholecalciferol        Take 1 Tab by mouth every day.   Dose:  1000 Units                        * Notice:  This list has 4 medication(s) that are the same as other medications prescribed for you. Read the directions carefully, and ask your doctor or other care provider to review them with you.         Where to Get Your Medications      These medications were sent to [facility] - [facility] - [address] [facility]  [address] [facility], [place] [address]     Phone:  [phone]    - omeprazole 40 MG delayed-release capsule  - sucralfate 1 GM/10ML Susp      You can get these medications from any pharmacy     Bring a paper prescription for each of these medications    - hydrocodone-acetaminophen 5-325 MG Tabs per tablet  - sucralfate 1 GM/10ML Susp            Procedures and tests performed during your visit     Procedure/Test Number of Times Performed    CBC WITH DIFFERENTIAL 1    COMP METABOLIC PANEL 1    ESTIMATED GFR 1    LIPASE 1    NURSING COMMUNICATION 3    POC UA 1    POC URINALYSIS 1        Discharge Instructions       Gastritis    Avoid all ibuprofen and naproxen, aspirin and  alcohol.  Take Prilosec 40 mg a day for 4 weeks.  Add sucralfate once a day to the Prilosec. Return for worsening pain, bleeding or pain and fever.  Followup with your doctor or GI if not better in 3-4 days.   Take maalox and hydrocodone if needed until acid blocker takes effect. Return for worsening pain, uncontrolled vomiting, bleeding, dizziness or ill appearance    You have gastritis. Gastritis is an irritation of the stomach. This is often caused by medications, but can be from anything that bothers the stomach.   Other stomach irritants are:  Ø Alcohol.  Ø Caffeine.  Ø Nicotine.  Ø Spicy or acid foods.     Ø Medications for pain and arthritis. Aspirin and other anti-inflammatory medicines such as ibuprofen (Advil), naproxen (Aleve), and ketoprofen (Orudis) can be highly irritating.  Ø Emotional distress.     Symptoms of gastritis may include:  Ø Abdominal pain. Ø Indigestion. Ø Nausea and or vomiting. Ø Bleeding.      Some patients with chronic gastritis and ulcers have been infected by a germ. They may need special testing. Medications which kill germs can be used to cure this condition.    Treatment includes avoiding the substances mentioned above that are known to cause stomach trouble.    Medications used to treat gastritis can include:  Ø Antacids.  Ø Medicines to control vomiting.   Ø Acid blocking medicines (Axid, Pepcid, Prevacid, Prilosec, Tagamet, Zantac).     Symptoms of gastritis usually improve within 2-3 days of starting treatment. Call your caregiver if you are not better in a few days.    Seek medical care if you:    Ø Have increased stomach pain or chest pain.  Ø Vomit blood.  Ø Faint or feel light headed. Ø Can not keep fluids down.  Ø Pass bloody or black stools.  Ø Develop severe back pain.     Document Released: 12/18/2006  Document Re-Released: 06/30/2008  PutPlace® Patient Information ©2008 Incoming Media.              Patient Information     Patient Information    Following emergency  treatment: all patient requiring follow-up care must return either to a private physician or a clinic if your condition worsens before you are able to obtain further medical attention, please return to the emergency room.     Billing Information    At [facility], we work to make the billing process streamlined for our patients.  Our Representatives are here to answer any questions you may have regarding your hospital bill.  If you have insurance coverage and have supplied your insurance information to us, we will submit a claim to your insurer on your behalf.  Should you have any questions regarding your bill, we can be reached online or by phone as follows:  Online: You are able pay your bills online or live chat with our representatives about any billing questions you may have. We are here to help Monday - Friday from 8:00am to 7:30pm and 9:00am - 12:00pm on Saturdays.  Please visit [email or web address]  for more information.   Phone:  [phone] or [phone]    Please note that your emergency physician, surgeon, pathologist, radiologist, anesthesiologist, and other specialists are not employed by [facility] and will therefore bill separately for their services.  Please contact them directly for any questions concerning their bills at the numbers below:     Emergency Physician Services:  [phone]  [facility]:  [phone]  [facility]:  [phone]  [facility]:  [phone]    1. Your final bill may vary from the amount quoted upon discharge if all procedures are not complete at that time, or if your doctor has additional procedures of which we are not aware. You will receive an additional bill if you return to the Emergency Department at [facility] for suture removal regardless of the facility of which the sutures were placed.     2. Please arrange for settlement of this account at the emergency  registration.    3. All self-pay accounts are due in full at the time of treatment.  If you are unable to meet this obligation then payment is expected within 4-5 days.     4. If you have had radiology studies (CT, X-ray, Ultrasound, MRI), you have received a preliminary result during your emergency department visit. Please contact the radiology department [phone] to receive a copy of your final result. Please discuss the Final result with your primary physician or with the follow up physician provided.     Crisis Hotline:  [place] Crisis Hotline:  3-113-RVDVDYX or 1-342.234.6279  [place] Crisis Hotline:    1-808.714.3299 or 038-467-7948         ED Discharge Follow Up Questions    1. In order to provide you with very good care, we would like to follow up with a phone call in the next few days.  May we have your permission to contact you?     YES /  NO    2. What is the best phone number to call you? (       )_____-__________    3. What is the best time to call you?      Morning  /  Afternoon  /  Evening                   Patient Signature:  ____________________________________________________________    Date:  ____________________________________________________________      Your appointments     Jun 01, 2017  2:45 PM   Established Patient with Juancho Jenkins M.D.   [facility] Internal [facility] (--)    [address] [place] [address]  [address]  [facility] [address] [phone]           You will be receiving a confirmation call a few days before your appointment from our automated call confirmation system.            Oct 06, 2017  2:30 PM   FOLLOW UP with Joseph León M.D.   [facility] (--)    [address] [place] [address] [address]  [place] [address] [phone]

## 2017-05-17 NOTE — IP AVS SNAPSHOT
SÃ‚Â² Development Access Code: Activation code not generated  Current SÃ‚Â² Development Status: Patient Declined    Your email address is not on file at [employer].  Email Addresses are required for you to sign up for SÃ‚Â² Development, please contact [phone] to verify your personal information and to provide your email address prior to attempting to register for SÃ‚Â² Development.    Ryan Trevizo  [address]  [place] [address]    Watch Over Met  A secure, online tool to manage your health information     [employer]’s SÃ‚Â² Development® is a secure, online tool that connects you to your personalized health information from the privacy of your home -- day or night - making it very easy for you to manage your healthcare. Once the activation process is completed, you can even access your medical information using the SÃ‚Â² Development anthony, which is available for free in the Apple Anthony store or Google Play store.     To learn more about SÃ‚Â² Development, visit [email or web address]    There are two levels of access available (as shown below):   My Chart Features  [facility] Primary Care Doctor [facility]  Specialists [facility]  Urgent  Care Non-[facility] Primary Care Doctor   Email your healthcare team securely and privately 24/7 X X X    Manage appointments: schedule your next appointment; view details of past/upcoming appointments X      Request prescription refills. X      View recent personal medical records, including lab and immunizations X X X X   View health record, including health history, allergies, medications X X X X   Read reports about your outpatient visits, procedures, consult and ER notes X X X X   See your discharge summary, which is a recap of your hospital and/or ER visit that includes your diagnosis, lab results, and care plan X X  X     How to register for Watch Over Met:  Once your e-mail address has been verified, follow the following steps to sign up for Watch Over Met.     1. Go to  [email or web address]  2. Click on the Sign Up Now box, which takes you to the  New Member Sign Up page. You will need to provide the following information:  a. Enter your Weblicon Technologies Access Code exactly as it appears at the top of this page. (You will not need to use this code after you’ve completed the sign-up process. If you do not sign up before the expiration date, you must request a new code.)   b. Enter your date of birth.   c. Enter your home email address.   d. Click Submit, and follow the next screen’s instructions.  3. Create a Weblicon Technologies ID. This will be your Weblicon Technologies login ID and cannot be changed, so think of one that is secure and easy to remember.  4. Create a Weblicon Technologies password. You can change your password at any time.  5. Enter your Password Reset Question and Answer. This can be used at a later time if you forget your password.   6. Enter your e-mail address. This allows you to receive e-mail notifications when new information is available in Weblicon Technologies.  7. Click Sign Up. You can now view your health information.    For assistance activating your Weblicon Technologies account, call [phone]

## 2017-05-17 NOTE — ED AVS SNAPSHOT
Visualant Access Code: Activation code not generated  Current Visualant Status: Patient Declined    Your email address is not on file at [employer].  Email Addresses are required for you to sign up for Visualant, please contact [phone] to verify your personal information and to provide your email address prior to attempting to register for Visualant.    Ryan Trevizo  [address]  [place] [address]    Cloud Dynamicst  A secure, online tool to manage your health information     [employer]’s Visualant® is a secure, online tool that connects you to your personalized health information from the privacy of your home -- day or night - making it very easy for you to manage your healthcare. Once the activation process is completed, you can even access your medical information using the Visualant anthony, which is available for free in the Apple Anthony store or Google Play store.     To learn more about Visualant, visit [email or web address]    There are two levels of access available (as shown below):   My Chart Features  [facility] Primary Care Doctor [facility]  Specialists [facility]  Urgent  Care Non-[facility] Primary Care Doctor   Email your healthcare team securely and privately 24/7 X X X    Manage appointments: schedule your next appointment; view details of past/upcoming appointments X      Request prescription refills. X      View recent personal medical records, including lab and immunizations X X X X   View health record, including health history, allergies, medications X X X X   Read reports about your outpatient visits, procedures, consult and ER notes X X X X   See your discharge summary, which is a recap of your hospital and/or ER visit that includes your diagnosis, lab results, and care plan X X  X     How to register for Cloud Dynamicst:  Once your e-mail address has been verified, follow the following steps to sign up for Cloud Dynamicst.     1. Go to  [email or web address]  2. Click on the Sign Up Now box, which takes you to the  New Member Sign Up page. You will need to provide the following information:  a. Enter your Telesocial Access Code exactly as it appears at the top of this page. (You will not need to use this code after you’ve completed the sign-up process. If you do not sign up before the expiration date, you must request a new code.)   b. Enter your date of birth.   c. Enter your home email address.   d. Click Submit, and follow the next screen’s instructions.  3. Create a Telesocial ID. This will be your Telesocial login ID and cannot be changed, so think of one that is secure and easy to remember.  4. Create a Telesocial password. You can change your password at any time.  5. Enter your Password Reset Question and Answer. This can be used at a later time if you forget your password.   6. Enter your e-mail address. This allows you to receive e-mail notifications when new information is available in Telesocial.  7. Click Sign Up. You can now view your health information.    For assistance activating your Telesocial account, call [phone]

## 2017-05-17 NOTE — IP AVS SNAPSHOT
5/20/2017    Ryan Trevizo  [address]  [place] [address]    Dear Ryan:    [facility] wants to ensure your discharge home is safe and you or your loved ones have had all of your questions answered regarding your care after you leave the hospital.    Below is a list of resources and contact information should you have any questions regarding your hospital stay, follow-up instructions, or active medical symptoms.    Questions or Concerns Regarding… Contact   Medical Questions Related to Your Discharge  (7 days a week, 8am-5pm) Contact a Nurse Care Coordinator   [phone]   Medical Questions Not Related to Your Discharge  (24 hours a day / 7 days a week)  Contact the Nurse Health Line   [phone]    Medications or Discharge Instructions Refer to your discharge packet   or contact your [facility] Primary Care Provider   [phone]   Follow-up Appointment(s) Schedule your appointment via [employer]Ex Communications   or contact Scheduling [phone]   Billing Review your statement via [employer]Ex Communications  or contact Billing [phone]   Medical Records Review your records via [employer]Ex Communications   or contact Medical Records [phone]     You may receive a telephone call within two days of discharge. This call is to make certain you understand your discharge instructions and have the opportunity to have any questions answered. You can also easily access your medical information, test results and upcoming appointments via the WebEx Communications free online health management tool. You can learn more and sign up at Ember/WebEx Communications. For assistance setting up your WebEx Communications account, please call [phone].    Once again, we want to ensure your discharge home is safe and that you have a clear understanding of any next steps in your care. If you have any questions or concerns, please do not hesitate to contact us, we are here for you. Thank you for choosing [facility] for your healthcare needs.    Sincerely,    Your [facility] Healthcare Team

## 2017-05-17 NOTE — ED AVS SNAPSHOT
5/18/2017    Ryan Trevizo  [address]  [place] [address]    Dear Ryan:    [facility] wants to ensure your discharge home is safe and you or your loved ones have had all of your questions answered regarding your care after you leave the hospital.    Below is a list of resources and contact information should you have any questions regarding your hospital stay, follow-up instructions, or active medical symptoms.    Questions or Concerns Regarding… Contact   Medical Questions Related to Your Discharge  (7 days a week, 8am-5pm) Contact a Nurse Care Coordinator   [phone]   Medical Questions Not Related to Your Discharge  (24 hours a day / 7 days a week)  Contact the Nurse Health Line   [phone]    Medications or Discharge Instructions Refer to your discharge packet   or contact your [facility] Primary Care Provider   [phone]   Follow-up Appointment(s) Schedule your appointment via [employer]   or contact Scheduling [phone]   Billing Review your statement via [employer]  or contact Billing [phone]   Medical Records Review your records via [employer]   or contact Medical Records [phone]     You may receive a telephone call within two days of discharge. This call is to make certain you understand your discharge instructions and have the opportunity to have any questions answered. You can also easily access your medical information, test results and upcoming appointments via the [employer] free online health management tool. You can learn more and sign up at [employer]. For assistance setting up your [employer] account, please call [phone].    Once again, we want to ensure your discharge home is safe and that you have a clear understanding of any next steps in your care. If you have any questions or concerns, please do not hesitate to contact us, we are here for you. Thank you for choosing [facility] for your healthcare needs.    Sincerely,    Your [facility] Healthcare Team

## 2017-05-17 NOTE — IP AVS SNAPSHOT
" Home Care Instructions                                                                                                                  Name:Ryan Trevizo  Medical Record Number:[id number]  CSN: [id number]    YOB: 1965   Age: 51 y.o.  Sex: male  HT:1.753 m (5' 9\") WT: 84.5 kg (186 lb 4.6 oz)          Admit Date: 5/17/2017     Discharge Date:   Today's Date: 5/20/2017  Attending Doctor:  Doug West M.D.                  Allergies:  Ritalin            Discharge Instructions       Discharge Instructions    Discharged to home by car with relative. Discharged via wheelchair, hospital escort: Yes.  Special equipment needed: Not Applicable    Be sure to schedule a follow-up appointment with your primary care doctor or any specialists as instructed.     Discharge Plan:   Influenza Vaccine Indication: Not indicated: Previously immunized this influenza season and > 8 years of age    I understand that a diet low in cholesterol, fat, and sodium is recommended for good health. Unless I have been given specific instructions below for another diet, I accept this instruction as my diet prescription.   Other diet: Diabetic (Low Concentrated Sugar)    Special Instructions: None    · Is patient discharged on Warfarin / Coumadin?   No     · Is patient Post Blood Transfusion?  No    Depression / Suicide Risk    As you are discharged from this [facility] facility, it is important to learn how to keep safe from harming yourself.    Recognize the warning signs:  · Abrupt changes in personality, positive or negative- including increase in energy   · Giving away possessions  · Change in eating patterns- significant weight changes-  positive or negative  · Change in sleeping patterns- unable to sleep or sleeping all the time   · Unwillingness or inability to communicate  · Depression  · Unusual sadness, discouragement and loneliness  · Talk of wanting to die  · Neglect of personal appearance   · Rebelliousness- " reckless behavior  · Withdrawal from people/activities they love  · Confusion- inability to concentrate     If you or a loved one observes any of these behaviors or has concerns about self-harm, here's what you can do:  · Talk about it- your feelings and reasons for harming yourself  · Remove any means that you might use to hurt yourself (examples: pills, rope, extension cords, firearm)  · Get professional help from the community (Mental Health, Substance Abuse, psychological counseling)  · Do not be alone:Call your Safe Contact- someone whom you trust who will be there for you.  · Call your local CRISIS HOTLINE 827-0745 or 514-181-8384  · Call your local Children's Mobile Crisis Response Team [place] (835) 366-9274 or www.[employer]  · Call the toll free National Suicide Prevention Hotlines   · National Suicide Prevention Lifeline 285-836-HOWG (0243)  · [employer] Line Network 800-SUICIDE (879-5528)        Rivaroxaban oral tablets  What is this medicine?  RIVAROXABAN (ri va LEONIDES a ban) is an anticoagulant (blood thinner). It is used to treat blood clots in the lungs or in the veins. It is also used after knee or hip surgeries to prevent blood clots. It is also used to lower the chance of stroke in people with a medical condition called atrial fibrillation.  This medicine may be used for other purposes; ask your health care provider or pharmacist if you have questions.  COMMON BRAND NAME(S): Xarelto  What should I tell my health care provider before I take this medicine?  They need to know if you have any of these conditions:  -bleeding disorders  -bleeding in the brain  -blood in your stools (black or tarry stools) or if you have blood in your vomit  -history of stomach bleeding  -kidney disease  -liver disease  -low blood counts, like low white cell, platelet, or red cell counts  -recent or planned spinal or epidural procedure  -take medicines that treat or prevent blood clots  -an unusual or  allergic reaction to rivaroxaban, other medicines, foods, dyes, or preservatives  -pregnant or trying to get pregnant  -breast-feeding  How should I use this medicine?  Take this medicine by mouth with a glass of water. Follow the directions on the prescription label. Take your medicine at regular intervals. Do not take it more often than directed. Do not stop taking except on your doctor's advice.  If you are taking this medicine after hip or knee replacement surgery, take it with or without food.  If you are taking this medicine for atrial fibrillation, take it with your evening meal. If you are taking this medicine to treat blood clots, take it with food at the same time each day. If you are unable to swallow your tablet, you may crush the tablet and mix it in applesauce. Then, immediately eat the applesauce. You should eat more food right after you eat the applesauce containing the crushed tablet.  Talk to your pediatrician regarding the use of this medicine in children. Special care may be needed.  Overdosage: If you think you've taken too much of this medicine contact a poison control center or emergency room at once.  Overdosage: If you think you have taken too much of this medicine contact a poison control center or emergency room at once.  NOTE: This medicine is only for you. Do not share this medicine with others.  What if I miss a dose?  If you take your medicine once a day and miss a dose, take the missed dose as soon as you remember. If you take your medicine twice a day and miss a dose, take the missed dose immediately. In this instance, 2 tablets may be taken at the same time. The next day you should take 1 tablet twice a day as directed.  What may interact with this medicine?  -aspirin and aspirin-like medicines  -certain antibiotics like erythromycin, azithromycin, and clarithromycin  -certain medicines for fungal infections like ketoconazole and itraconazole  -certain medicines for irregular heart  beat like amiodarone, quinidine, dronedarone  -certain medicines for seizures like carbamazepine, phenytoin  -certain medicines that treat or prevent blood clots like warfarin, enoxaparin, and dalteparin   -conivaptan  -diltiazem  -felodipine  -indinavir  -lopinavir; ritonavir  -NSAIDS, medicines for pain and inflammation, like ibuprofen or naproxen  -ranolazine  -rifampin  -ritonavir  -Inga's wort  -verapamil  This list may not describe all possible interactions. Give your health care provider a list of all the medicines, herbs, non-prescription drugs, or dietary supplements you use. Also tell them if you smoke, drink alcohol, or use illegal drugs. Some items may interact with your medicine.  What should I watch for while using this medicine?  Do not stop taking this medicine without first talking to your doctor. Stopping this medicine may increase your risk of having a stroke. Be sure to refill your prescription before you run out of medicine.  This medicine may increase your risk to bruise or bleed. Call your doctor or health care professional if you notice any unusual bleeding.  Be careful brushing and flossing your teeth or using a toothpick because you may bleed more easily. If you have any dental work done, tell your dentist you are receiving this medicine.  What side effects may I notice from receiving this medicine?  Side effects that you should report to your doctor or health care professional as soon as possible:  -allergic reactions like skin rash, itching or hives, swelling of the face, lips, or tongue  -back pain  -bloody or black, tarry stools  -changes in vision  -confusion, trouble speaking or understanding  -red or dark-brown urine  -redness, blistering, peeling or loosening of the skin, including inside the mouth  -severe headaches  -spitting up blood or brown material that looks like coffee grounds  -sudden numbness or weakness of the face, arm or leg  -trouble walking, dizziness, loss of  balance or coordination  -unusual bruising or bleeding from the eye, gums, or nose   Side effects that usually do not require medical attention (Report these to your doctor or health care professional if they continue or are bothersome.):  -dizziness  -muscle pain  This list may not describe all possible side effects. Call your doctor for medical advice about side effects. You may report side effects to FDA at 2-693-QWX-6534.  Where should I keep my medicine?  Keep out of the reach of children.  Store at room temperature between 15 and 30 degrees C (59 and 86 degrees F). Throw away any unused medicine after the expiration date.  NOTE: This sheet is a summary. It may not cover all possible information. If you have questions about this medicine, talk to your doctor, pharmacist, or health care provider.  © 2014, Elsevier/Gold Standard. (3/17/2014 3:32:09 PM)      Atrial Fibrillation  Atrial fibrillation is a type of irregular heart rhythm (arrhythmia). During atrial fibrillation, the upper chambers of the heart (atria) quiver continuously in a chaotic pattern. This causes an irregular and often rapid heart rate.   Atrial fibrillation is the result of the heart becoming overloaded with disorganized signals that tell it to beat. These signals are normally released one at a time by a part of the right atrium called the sinoatrial node. They then travel from the atria to the lower chambers of the heart (ventricles), causing the atria and ventricles to contract and pump blood as they pass. In atrial fibrillation, parts of the atria outside of the sinoatrial node also release these signals. This results in two problems. First, the atria receive so many signals that they do not have time to fully contract. Second, the ventricles, which can only receive one signal at a time, beat irregularly and out of rhythm with the atria.   There are three types of atrial fibrillation:   · Paroxysmal. Paroxysmal atrial fibrillation starts  suddenly and stops on its own within a week.  · Persistent. Persistent atrial fibrillation lasts for more than a week. It may stop on its own or with treatment.  · Permanent. Permanent atrial fibrillation does not go away. Episodes of atrial fibrillation may lead to permanent atrial fibrillation.  Atrial fibrillation can prevent your heart from pumping blood normally. It increases your risk of stroke and can lead to heart failure.   CAUSES   2. Heart conditions, including a heart attack, heart failure, coronary artery disease, and heart valve conditions.    3. Inflammation of the sac that surrounds the heart (pericarditis).  4. Blockage of an artery in the lungs (pulmonary embolism).  5. Pneumonia or other infections.  6. Chronic lung disease.  7. Thyroid problems, especially if the thyroid is overactive (hyperthyroidism).  8. Caffeine, excessive alcohol use, and use of some illegal drugs.    9. Use of some medicines, including certain decongestants and diet pills.  10. Heart surgery.    11. Birth defects.    Sometimes, no cause can be found. When this happens, the atrial fibrillation is called lone atrial fibrillation. The risk of complications from atrial fibrillation increases if you have lone atrial fibrillation and you are age 60 years or older.  RISK FACTORS  2. Heart failure.  3. Coronary artery disease.  4. Diabetes mellitus.    5. High blood pressure (hypertension).    6. Obesity.    7. Other arrhythmias.    8. Increased age.  SIGNS AND SYMPTOMS   2. A feeling that your heart is beating rapidly or irregularly.    3. A feeling of discomfort or pain in your chest.    4. Shortness of breath.    5. Sudden light-headedness or weakness.    6. Getting tired easily when exercising.    7. Urinating more often than normal (mainly when atrial fibrillation first begins).    In paroxysmal atrial fibrillation, symptoms may start and suddenly stop.  DIAGNOSIS   Your health care provider may be able to detect atrial  fibrillation when taking your pulse. Your health care provider may have you take a test called an ambulatory electrocardiogram (ECG). An ECG records your heartbeat patterns over a 24-hour period. You may also have other tests, such as:  2. Transthoracic echocardiogram (TTE). During echocardiography, sound waves are used to evaluate how blood flows through your heart.  3. Transesophageal echocardiogram (DELMY).  4. Stress test. There is more than one type of stress test. If a stress test is needed, ask your health care provider about which type is best for you.  5. Chest X-ray exam.  6. Blood tests.  7. Computed tomography (CT).  TREATMENT   Treatment may include:  · Treating any underlying conditions. For example, if you have an overactive thyroid, treating the condition may correct atrial fibrillation.  · Taking medicine. Medicines may be given to control a rapid heart rate or to prevent blood clots, heart failure, or a stroke.  · Having a procedure to correct the rhythm of the heart:  ¨ Electrical cardioversion. During electrical cardioversion, a controlled, low-energy shock is delivered to the heart through your skin. If you have chest pain, very low blood pressure, or sudden heart failure, this procedure may need to be done as an emergency.  ¨ Catheter ablation. During this procedure, heart tissues that send the signals that cause atrial fibrillation are destroyed.  ¨ Surgical ablation. During this surgery, thin lines of heart tissue that carry the abnormal signals are destroyed. This procedure can either be an open-heart surgery or a minimally invasive surgery. With the minimally invasive surgery, small cuts are made to access the heart instead of a large opening.  ¨ Pulmonary venous isolation. During this surgery, tissue around the veins that carry blood from the lungs (pulmonary veins) is destroyed. This tissue is thought to carry the abnormal signals.  HOME CARE INSTRUCTIONS   · Take medicines only as directed  by your health care provider. Some medicines can make atrial fibrillation worse or recur.  · If blood thinners were prescribed by your health care provider, take them exactly as directed. Too much blood-thinning medicine can cause bleeding. If you take too little, you will not have the needed protection against stroke and other problems.  · Perform blood tests at home if directed by your health care provider. Perform blood tests exactly as directed.  · Quit smoking if you smoke.  · Do not drink alcohol.  · Do not drink caffeinated beverages such as coffee, soda, and some teas. You may drink decaffeinated coffee, soda, or tea.    · Maintain a healthy weight. Do not use diet pills unless your health care provider approves. They may make heart problems worse.    · Follow diet instructions as directed by your health care provider.  · Exercise regularly as directed by your health care provider.  · Keep all follow-up visits as directed by your health care provider. This is important.  PREVENTION   The following substances can cause atrial fibrillation to recur:   · Caffeinated beverages.  · Alcohol.  · Certain medicines, especially those used for breathing problems.  · Certain herbs and herbal medicines, such as those containing ephedra or ginseng.  · Illegal drugs, such as cocaine and amphetamines.  Sometimes medicines are given to prevent atrial fibrillation from recurring. Proper treatment of any underlying condition is also important in helping prevent recurrence.   SEEK MEDICAL CARE IF:  · You notice a change in the rate, rhythm, or strength of your heartbeat.  · You suddenly begin urinating more frequently.  · You tire more easily when exerting yourself or exercising.  SEEK IMMEDIATE MEDICAL CARE IF:   · You have chest pain, abdominal pain, sweating, or weakness.  · You feel nauseous.  · You have shortness of breath.  · You suddenly have swollen feet and ankles.  · You feel dizzy.  · Your face or limbs feel numb or  weak.  · You have a change in your vision or speech.  MAKE SURE YOU:   · Understand these instructions.  · Will watch your condition.  · Will get help right away if you are not doing well or get worse.     This information is not intended to replace advice given to you by your health care provider. Make sure you discuss any questions you have with your health care provider.     Document Released: 12/18/2006 Document Revised: 01/08/2016 Document Reviewed: 04/13/2016  [employer]K Interactive Patient Education ©2016 Elsevier Inc.      Gastritis    Avoid all ibuprofen and naproxen, aspirin and alcohol.  Take Prilosec 40 mg a day for 4 weeks.  Add sucralfate once a day to the Prilosec. Return for worsening pain, bleeding or pain and fever.  Followup with your doctor or GI if not better in 3-4 days.   Take maalox and hydrocodone if needed until acid blocker takes effect. Return for worsening pain, uncontrolled vomiting, bleeding, dizziness or ill appearance    You have gastritis. Gastritis is an irritation of the stomach. This is often caused by medications, but can be from anything that bothers the stomach.   Other stomach irritants are:  9. Alcohol.  10. Caffeine.  11. Nicotine.  12. Spicy or acid foods.     13. Medications for pain and arthritis. Aspirin and other anti-inflammatory medicines such as ibuprofen (Advil), naproxen (Aleve), and ketoprofen (Orudis) can be highly irritating.  14. Emotional distress.     Symptoms of gastritis may include:  15. Abdominal pain. 16. Indigestion. 17. Nausea and or vomiting. 18. Bleeding.      Some patients with chronic gastritis and ulcers have been infected by a germ. They may need special testing. Medications which kill germs can be used to cure this condition.    Treatment includes avoiding the substances mentioned above that are known to cause stomach trouble.    Medications used to treat gastritis can include:  19. Antacids.  20. Medicines to control vomiting.   21. Acid  blocking medicines (Axid, Pepcid, Prevacid, Prilosec, Tagamet, Zantac).     Symptoms of gastritis usually improve within 2-3 days of starting treatment. Call your caregiver if you are not better in a few days.    Seek medical care if you:    22. Have increased stomach pain or chest pain.  23. Vomit blood.  24. Faint or feel light headed. 25. Can not keep fluids down.  26. Pass bloody or black stools.  27. Develop severe back pain.     Document Released: 12/18/2006  Document Re-Released: 06/30/2008  ExitCare® Patient Information ©2008 IQMS.        Your appointments     Jun 01, 2017  2:00 PM   HOSPITAL FOLLOW UP with YONAS Anglin   [facility] (--)    [address] [facility] [address]  [place] [address] [phone]            Jun 01, 2017  2:45 PM   Established Patient with Juancho Jenkins M.D.   [facility] (--)    [address] [place] [address] [address]  [place] [address] [phone]           You will be receiving a confirmation call a few days before your appointment from our automated call confirmation system.            Oct 06, 2017  2:30 PM   FOLLOW UP with Joseph León M.D.   [facility] (--)    [address] [facility] [address]  [place] [address] [phone]              Follow-up Information     1. Follow up with Juancho Jenkins M.D.. Schedule an appointment as soon as possible for a visit in 2 days.    Specialty:  Internal Medicine    Why:  As needed    Contact information    [address]  [facility] [address]  [place] [address] [phone]          2. Follow up with Reinaldo Berry M.D..    Specialty:  Gastroenterology    Contact information    [id number]   [place] [address] [phone]          3. Follow up with Juancho Jenkins M.D..    Specialty:  Internal Medicine    Contact information    [address] [facility] [address]  [place] [address] [phone]          4. Follow up  with Juancho Jenkins M.D.. Schedule an appointment as soon as possible for a visit in 2 weeks.    Specialty:  Internal Medicine    Why:  For hospital follow up    Contact information    [address]  [address]  [place] [address] [phone]           Discharge Medication Instructions:    Below are the medications your physician expects you to take upon discharge:    Review all your home medications and newly ordered medications with your doctor and/or pharmacist. Follow medication instructions as directed by your doctor and/or pharmacist.    Please keep your medication list with you and share with your physician.               Medication List      START taking these medications        Instructions    Morning Afternoon Evening Bedtime    hydrocodone-acetaminophen 5-325 MG Tabs per tablet   Commonly known as:  NORCO   Next Dose Due:  Take as needed for Pain every 4 hours   Notes to Patient:  Pain Pill          Take 1-2 Tabs by mouth every four hours as needed (mild pain).   Dose:  1-2 Tab                        metoprolol SR 25 MG Tb24   Commonly known as:  TOPROL XL   Next Dose Due:  Take tomorrow afternoon   Notes to Patient:  Heart and Blood Pressure        Take 1 Tab by mouth every day.   Dose:  25 mg                        omeprazole 20 MG delayed-release capsule   Last time this was given:  20 mg on 5/20/2017  8:12 AM   Commonly known as:  PRILOSEC   Next Dose Due:  Take tonight at bedtime   Notes to Patient:  For Acid Reflux (Heart Burn)        Take 1 Cap by mouth 2 times a day.   Dose:  20 mg                        rivaroxaban 20 MG Tabs tablet   Commonly known as:  XARELTO   Next Dose Due:  Take tonight at dinner time   Notes to Patient:  Blood Thinner        Take 1 Tab by mouth with dinner.   Dose:  20 mg                          CHANGE how you take these medications        Instructions    Morning Afternoon Evening Bedtime    atorvastatin 80 MG tablet   What changed:  how much to take   Last time this  was given:  80 mg on 5/19/2017  9:22 PM   Commonly known as:  LIPITOR   Next Dose Due:  Take at Bedtime   Notes to Patient:  Help Lower Cholesterol        Take 1 Tab by mouth every evening.   Dose:  80 mg                          CONTINUE taking these medications        Instructions    Morning Afternoon Evening Bedtime    fenofibrate 160 MG tablet   Commonly known as:  TRIGLIDE   Next Dose Due:  Take tomorrow morning   Notes to Patient:  For triglycerides        Take 160 mg by mouth every day.   Dose:  160 mg                        lisinopril 5 MG Tabs   Commonly known as:  PRINIVIL   Next Dose Due:  Take tomorrow morning   Notes to Patient:  For blood pressure        Take 5 mg by mouth every day.   Dose:  5 mg                        loratadine 10 MG Tabs   Commonly known as:  CLARITIN   Next Dose Due:  Take tomorrow morning   Notes to Patient:  For allergies        Take 10 mg by mouth every day.   Dose:  10 mg                        metformin 1000 MG tablet   Commonly known as:  GLUCOPHAGE   Next Dose Due:  Take with dinner tonight and with breakfast tomorrow.   Notes to Patient:  For blood sugar        Take 1 Tab by mouth 2 times a day, with meals.   Dose:  1000 mg                        ondansetron 4 MG Tbdp   Commonly known as:  ZOFRAN ODT        Take 1 Tab by mouth every 8 hours as needed for Nausea/Vomiting.   Dose:  4 mg                        vitamin D 1000 UNIT Tabs   Last time this was given:  1,000 Units on 5/20/2017  8:12 AM   Commonly known as:  cholecalciferol   Next Dose Due:  Take tomorrow morning.   Notes to Patient:  For Vitamin D        Take 1 Tab by mouth every day.   Dose:  1000 Units                          STOP taking these medications     aspirin EC 81 MG Tbec   Commonly known as:  ECOTRIN   Notes to Patient:  STOP TAKING               pantoprazole 40 MG Tbec   Commonly known as:  PROTONIX   Notes to Patient:  STOP TAKING                      Where to Get Your Medications      These  medications were sent to [facility] - [place], [place] - [address]  [address], [place] [address]     Phone:  [phone]    - metoprolol SR 25 MG Tb24  - omeprazole 20 MG delayed-release capsule      You can get these medications from any pharmacy     Bring a paper prescription for each of these medications    - hydrocodone-acetaminophen 5-325 MG Tabs per tablet      Information about where to get these medications is not yet available     ! Ask your nurse or doctor about these medications    - rivaroxaban 20 MG Tabs tablet            Instructions           Diet / Nutrition:    Follow any diet instructions given to you by your doctor or the dietician, including how much salt (sodium) you are allowed each day.    If you are overweight, talk to your doctor about a weight reduction plan.    Activity:    Remain physically active following your doctor's instructions about exercise and activity.    Rest often.     Any time you become even a little tired or short of breath, SIT DOWN and rest.    Worsening Symptoms:    Report any of the following signs and symptoms to the doctor's office immediately:    *Pain of jaw, arm, or neck  *Chest pain not relieved by medication                               *Dizziness or loss of consciousness  *Difficulty breathing even when at rest   *More tired than usual                                       *Bleeding drainage or swelling of surgical site  *Swelling of feet, ankles, legs or stomach                 *Fever (>100ºF)  *Pink or blood tinged sputum  *Weight gain (3lbs/day or 5lbs /week)           *Shock from internal defibrillator (if applicable)  *Palpitations or irregular heartbeats                *Cool and/or numb extremities    Stroke Awareness    Common Risk Factors for Stroke include:    Age  Atrial Fibrillation  Carotid Artery Stenosis  Diabetes Mellitus  Excessive alcohol consumption  High blood pressure  Overweight   Physical  inactivity  Smoking    Warning signs and symptoms of a stroke include:    *Sudden numbness or weakness of the face, arm or leg (especially on one side of the body).  *Sudden confusion, trouble speaking or understanding.  *Sudden trouble seeing in one or both eyes.  *Sudden trouble walking, dizziness, loss of balance or coordination.Sudden severe headache with no known cause.    It is very important to get treatment quickly when a stroke occurs. If you experience any of the above warning signs, call 911 immediately.                   Disclaimer         Quit Smoking / Tobacco Use:    I understand the use of any tobacco products increases my chance of suffering from future heart disease or stroke and could cause other illnesses which may shorten my life. Quitting the use of tobacco products is the single most important thing I can do to improve my health. For further information on smoking / tobacco cessation call a Toll Free Quit Line at 1-472.720.3413 (*[employer] [place]) or 1-548.325.9988 (American [employer] Association) or you can access the web based program at www.[employer].org.    [place] Tobacco Users Help Line:  (678) 924-6747       Toll Free: 1-700.157.7081  Quit Tobacco Program [facility] (938)546-3591    Crisis Hotline:    [place] Crisis Hotline:  8-711-CKNSNEU or 1-662.955.6691    [place] Crisis Hotline:    1-238.878.8771 or 362-235-5277    Discharge Survey:   Thank you for choosing [facility]. We hope we did everything we could to make your hospital stay a pleasant one. You may be receiving a phone survey and we would appreciate your time and participation in answering the questions. Your input is very valuable to us in our efforts to improve our service to our patients and their families.        My signature on this form indicates that:    1. I have reviewed and understand the above information.  2. My questions regarding this information have been answered to my  satisfaction.  3. I have formulated a plan with my discharge nurse to obtain my prescribed medications for home.                  Disclaimer         __________________________________                     __________       ________                       Patient Signature                                                 Date                    Time

## 2017-05-17 NOTE — IP AVS SNAPSHOT
" <p align=\"LEFT\"><IMG SRC=\"//[email or web address]$/[email or web address]\" alt=\"Image\" WIDTH=\"50%\" HEIGHT=\"200\" BORDER=\"\"></p>                   Name:Ryan Trevizo  Medical Record Number:[id number]  CSN: [id number]    YOB: 1965   Age: 51 y.o.  Sex: male  HT:1.753 m (5' 9\") WT: 84.5 kg (186 lb 4.6 oz)          Admit Date: 5/17/2017     Discharge Date:   Today's Date: 5/20/2017  Attending Doctor:  Doug West M.D.                  Allergies:  Ritalin          Your appointments     Jun 01, 2017  2:00 PM   HOSPITAL FOLLOW UP with YONAS Anglin   [facility] (--)    [address] [place] [address] [address]  [place] [address] [phone]            Jun 01, 2017  2:45 PM   Established Patient with Juancho Jenkins M.D.   [facility] - Internal Medicine (--)    [address] [place] [address] [phone]           You will be receiving a confirmation call a few days before your appointment from our automated call confirmation system.            Oct 06, 2017  2:30 PM   FOLLOW UP with Joseph León M.D.   [facility] (--)    [address] [place] [address] [address]  [place] [address] [phone]              Follow-up Information     1. Follow up with Juancho Jenkins M.D.. Schedule an appointment as soon as possible for a visit in 2 days.    Specialty:  Internal Medicine    Why:  As needed    Contact information    [address] [place] [address] [address]  [facility] [address] [phone]          2. Follow up with Reinaldo Berry M.D..    Specialty:  Gastroenterology    Contact information    [address] [facility]  [place] [address] [phone]          3. Follow up with Juancho Jenkins M.D..    Specialty:  Internal Medicine    Contact information    [address] [id number] [phone]          4. Follow up with Juancho Jenkins M.D.. Schedule an appointment as soon as possible for a visit in 2 " weeks.    Specialty:  Internal Medicine    Why:  For hospital follow up    Contact information    [address]  [address]  [place] [address]  [phone]           Medication List      Take these Medications        Instructions    atorvastatin 80 MG tablet   What changed:  how much to take   Commonly known as:  LIPITOR   Notes to Patient:  Help Lower Cholesterol    Take 1 Tab by mouth every evening.   Dose:  80 mg       fenofibrate 160 MG tablet   Commonly known as:  TRIGLIDE   Notes to Patient:  For triglycerides    Take 160 mg by mouth every day.   Dose:  160 mg       hydrocodone-acetaminophen 5-325 MG Tabs per tablet   Commonly known as:  NORCO   Notes to Patient:  Pain Pill      Take 1-2 Tabs by mouth every four hours as needed (mild pain).   Dose:  1-2 Tab       lisinopril 5 MG Tabs   Commonly known as:  PRINIVIL   Notes to Patient:  For blood pressure    Take 5 mg by mouth every day.   Dose:  5 mg       loratadine 10 MG Tabs   Commonly known as:  CLARITIN   Notes to Patient:  For allergies    Take 10 mg by mouth every day.   Dose:  10 mg       metformin 1000 MG tablet   Commonly known as:  GLUCOPHAGE   Notes to Patient:  For blood sugar    Take 1 Tab by mouth 2 times a day, with meals.   Dose:  1000 mg       metoprolol SR 25 MG Tb24   Commonly known as:  TOPROL XL   Notes to Patient:  Heart and Blood Pressure    Take 1 Tab by mouth every day.   Dose:  25 mg       omeprazole 20 MG delayed-release capsule   Commonly known as:  PRILOSEC   Notes to Patient:  For Acid Reflux (Heart Burn)    Take 1 Cap by mouth 2 times a day.   Dose:  20 mg       ondansetron 4 MG Tbdp   Commonly known as:  ZOFRAN ODT    Take 1 Tab by mouth every 8 hours as needed for Nausea/Vomiting.   Dose:  4 mg       rivaroxaban 20 MG Tabs tablet   Commonly known as:  XARELTO   Notes to Patient:  Blood Thinner    Take 1 Tab by mouth with dinner.   Dose:  20 mg       vitamin D 1000 UNIT Tabs   Commonly known as:  cholecalciferol   Notes to  Patient:  For Vitamin D    Take 1 Tab by mouth every day.   Dose:  1000 Units

## 2017-05-18 ENCOUNTER — RESOLUTE PROFESSIONAL BILLING HOSPITAL PROF FEE (OUTPATIENT)
Dept: HOSPITALIST | Facility: MEDICAL CENTER | Age: 52
End: 2017-05-18
Payer: MEDICARE

## 2017-05-18 PROBLEM — I48.91 ATRIAL FIBRILLATION WITH RAPID VENTRICULAR RESPONSE (HCC): Status: ACTIVE | Noted: 2017-05-18

## 2017-05-18 LAB
ALBUMIN SERPL BCP-MCNC: 4.8 G/DL (ref 3.2–4.9)
ALBUMIN/GLOB SERPL: 1.5 G/DL
ALP SERPL-CCNC: 61 U/L (ref 30–99)
ALT SERPL-CCNC: 56 U/L (ref 2–50)
ANION GAP SERPL CALC-SCNC: 10 MMOL/L (ref 0–11.9)
APPEARANCE UR: CLEAR
APPEARANCE UR: NORMAL
AST SERPL-CCNC: 29 U/L (ref 12–45)
BASOPHILS # BLD AUTO: 0.4 % (ref 0–1.8)
BASOPHILS # BLD: 0.04 K/UL (ref 0–0.12)
BILIRUB SERPL-MCNC: 0.6 MG/DL (ref 0.1–1.5)
BUN SERPL-MCNC: 18 MG/DL (ref 8–22)
CALCIUM SERPL-MCNC: 9.7 MG/DL (ref 8.5–10.5)
CHLORIDE SERPL-SCNC: 102 MMOL/L (ref 96–112)
CO2 SERPL-SCNC: 21 MMOL/L (ref 20–33)
COLOR UR AUTO: NORMAL
COLOR UR AUTO: YELLOW
CREAT SERPL-MCNC: 0.96 MG/DL (ref 0.5–1.4)
EOSINOPHIL # BLD AUTO: 0.1 K/UL (ref 0–0.51)
EOSINOPHIL NFR BLD: 1 % (ref 0–6.9)
ERYTHROCYTE [DISTWIDTH] IN BLOOD BY AUTOMATED COUNT: 41.7 FL (ref 35.9–50)
GFR SERPL CREATININE-BSD FRML MDRD: >60 ML/MIN/1.73 M 2
GLOBULIN SER CALC-MCNC: 3.2 G/DL (ref 1.9–3.5)
GLUCOSE BLD-MCNC: 169 MG/DL (ref 65–99)
GLUCOSE BLD-MCNC: 203 MG/DL (ref 65–99)
GLUCOSE BLD-MCNC: 271 MG/DL (ref 65–99)
GLUCOSE SERPL-MCNC: 295 MG/DL (ref 65–99)
GLUCOSE UR QL STRIP.AUTO: 500 MG/DL
GLUCOSE UR STRIP.AUTO-MCNC: NORMAL MG/DL
HCT VFR BLD AUTO: 43.2 % (ref 42–52)
HGB BLD-MCNC: 14.4 G/DL (ref 14–18)
IMM GRANULOCYTES # BLD AUTO: 0.05 K/UL (ref 0–0.11)
IMM GRANULOCYTES NFR BLD AUTO: 0.5 % (ref 0–0.9)
KETONES UR QL STRIP.AUTO: NEGATIVE MG/DL
KETONES UR STRIP.AUTO-MCNC: NORMAL MG/DL
LEUKOCYTE ESTERASE UR QL STRIP.AUTO: NEGATIVE
LEUKOCYTE ESTERASE UR QL STRIP.AUTO: NORMAL
LIPASE SERPL-CCNC: 52 U/L (ref 11–82)
LYMPHOCYTES # BLD AUTO: 1.12 K/UL (ref 1–4.8)
LYMPHOCYTES NFR BLD: 11.5 % (ref 22–41)
MCH RBC QN AUTO: 29.8 PG (ref 27–33)
MCHC RBC AUTO-ENTMCNC: 33.3 G/DL (ref 33.7–35.3)
MCV RBC AUTO: 89.3 FL (ref 81.4–97.8)
MONOCYTES # BLD AUTO: 0.46 K/UL (ref 0–0.85)
MONOCYTES NFR BLD AUTO: 4.7 % (ref 0–13.4)
NEUTROPHILS # BLD AUTO: 7.97 K/UL (ref 1.82–7.42)
NEUTROPHILS NFR BLD: 81.9 % (ref 44–72)
NITRITE UR QL STRIP.AUTO: NEGATIVE
NITRITE UR QL STRIP.AUTO: NORMAL
NRBC # BLD AUTO: 0 K/UL
NRBC BLD AUTO-RTO: 0 /100 WBC
PH UR STRIP.AUTO: 5.5 [PH]
PH UR STRIP.AUTO: NORMAL [PH] (ref 5–8)
PLATELET # BLD AUTO: 350 K/UL (ref 164–446)
PMV BLD AUTO: 9.6 FL (ref 9–12.9)
POTASSIUM SERPL-SCNC: 4.6 MMOL/L (ref 3.6–5.5)
PROT SERPL-MCNC: 8 G/DL (ref 6–8.2)
PROT UR QL STRIP: NEGATIVE MG/DL
PROT UR QL STRIP: NORMAL MG/DL
RBC # BLD AUTO: 4.84 M/UL (ref 4.7–6.1)
RBC UR QL AUTO: NEGATIVE
RBC UR QL AUTO: NORMAL
SODIUM SERPL-SCNC: 133 MMOL/L (ref 135–145)
SP GR UR STRIP.AUTO: NORMAL
SP GR UR: 1.02
TROPONIN I SERPL-MCNC: <0.01 NG/ML (ref 0–0.04)
TSH SERPL DL<=0.005 MIU/L-ACNC: 5.22 UIU/ML (ref 0.3–3.7)
WBC # BLD AUTO: 9.7 K/UL (ref 4.8–10.8)

## 2017-05-18 PROCEDURE — 700111 HCHG RX REV CODE 636 W/ 250 OVERRIDE (IP)

## 2017-05-18 PROCEDURE — 99223 1ST HOSP IP/OBS HIGH 75: CPT | Mod: AI | Performed by: HOSPITALIST

## 2017-05-18 PROCEDURE — 700111 HCHG RX REV CODE 636 W/ 250 OVERRIDE (IP): Performed by: EMERGENCY MEDICINE

## 2017-05-18 PROCEDURE — A9270 NON-COVERED ITEM OR SERVICE: HCPCS | Performed by: HOSPITALIST

## 2017-05-18 PROCEDURE — 96366 THER/PROPH/DIAG IV INF ADDON: CPT

## 2017-05-18 PROCEDURE — 700112 HCHG RX REV CODE 229: Performed by: HOSPITALIST

## 2017-05-18 PROCEDURE — 84443 ASSAY THYROID STIM HORMONE: CPT

## 2017-05-18 PROCEDURE — 84484 ASSAY OF TROPONIN QUANT: CPT

## 2017-05-18 PROCEDURE — 700105 HCHG RX REV CODE 258: Performed by: EMERGENCY MEDICINE

## 2017-05-18 PROCEDURE — 700111 HCHG RX REV CODE 636 W/ 250 OVERRIDE (IP): Performed by: HOSPITALIST

## 2017-05-18 PROCEDURE — 770020 HCHG ROOM/CARE - TELE (206)

## 2017-05-18 PROCEDURE — 96361 HYDRATE IV INFUSION ADD-ON: CPT

## 2017-05-18 PROCEDURE — 96375 TX/PRO/DX INJ NEW DRUG ADDON: CPT

## 2017-05-18 PROCEDURE — A9270 NON-COVERED ITEM OR SERVICE: HCPCS | Performed by: EMERGENCY MEDICINE

## 2017-05-18 PROCEDURE — 81002 URINALYSIS NONAUTO W/O SCOPE: CPT

## 2017-05-18 PROCEDURE — 700102 HCHG RX REV CODE 250 W/ 637 OVERRIDE(OP): Performed by: EMERGENCY MEDICINE

## 2017-05-18 PROCEDURE — 82962 GLUCOSE BLOOD TEST: CPT | Mod: 91

## 2017-05-18 PROCEDURE — 304562 HCHG STAT O2 MASK/CANNULA

## 2017-05-18 PROCEDURE — 700102 HCHG RX REV CODE 250 W/ 637 OVERRIDE(OP): Performed by: HOSPITALIST

## 2017-05-18 PROCEDURE — 96365 THER/PROPH/DIAG IV INF INIT: CPT

## 2017-05-18 PROCEDURE — 93005 ELECTROCARDIOGRAM TRACING: CPT | Performed by: EMERGENCY MEDICINE

## 2017-05-18 PROCEDURE — 96376 TX/PRO/DX INJ SAME DRUG ADON: CPT

## 2017-05-18 RX ORDER — ATORVASTATIN CALCIUM 80 MG/1
80 TABLET, FILM COATED ORAL EVERY EVENING
Status: DISCONTINUED | OUTPATIENT
Start: 2017-05-18 | End: 2017-05-20 | Stop reason: HOSPADM

## 2017-05-18 RX ORDER — OMEPRAZOLE 20 MG/1
40 CAPSULE, DELAYED RELEASE ORAL 2 TIMES DAILY
Status: DISCONTINUED | OUTPATIENT
Start: 2017-05-18 | End: 2017-05-18

## 2017-05-18 RX ORDER — PANTOPRAZOLE SODIUM 40 MG/1
40 TABLET, DELAYED RELEASE ORAL DAILY
Status: ON HOLD | COMMUNITY
End: 2017-05-20

## 2017-05-18 RX ORDER — PROMETHAZINE HYDROCHLORIDE 25 MG/1
12.5-25 SUPPOSITORY RECTAL EVERY 4 HOURS PRN
Status: DISCONTINUED | OUTPATIENT
Start: 2017-05-18 | End: 2017-05-20 | Stop reason: HOSPADM

## 2017-05-18 RX ORDER — AMOXICILLIN 250 MG
2 CAPSULE ORAL 2 TIMES DAILY
Status: DISCONTINUED | OUTPATIENT
Start: 2017-05-18 | End: 2017-05-20 | Stop reason: HOSPADM

## 2017-05-18 RX ORDER — BISACODYL 10 MG
10 SUPPOSITORY, RECTAL RECTAL
Status: DISCONTINUED | OUTPATIENT
Start: 2017-05-18 | End: 2017-05-20 | Stop reason: HOSPADM

## 2017-05-18 RX ORDER — LISINOPRIL 5 MG/1
5 TABLET ORAL DAILY
COMMUNITY
End: 2017-06-01 | Stop reason: SDUPTHER

## 2017-05-18 RX ORDER — SODIUM CHLORIDE 9 MG/ML
1000 INJECTION, SOLUTION INTRAVENOUS ONCE
Status: COMPLETED | OUTPATIENT
Start: 2017-05-18 | End: 2017-05-18

## 2017-05-18 RX ORDER — OMEPRAZOLE 40 MG/1
40 CAPSULE, DELAYED RELEASE ORAL DAILY
Qty: 30 CAP | Refills: 0 | Status: SHIPPED | OUTPATIENT
Start: 2017-05-18 | End: 2017-05-20

## 2017-05-18 RX ORDER — OMEPRAZOLE 20 MG/1
20 CAPSULE, DELAYED RELEASE ORAL 2 TIMES DAILY
Status: DISCONTINUED | OUTPATIENT
Start: 2017-05-18 | End: 2017-05-20 | Stop reason: HOSPADM

## 2017-05-18 RX ORDER — DILTIAZEM HYDROCHLORIDE 5 MG/ML
0.25 INJECTION INTRAVENOUS ONCE
Status: COMPLETED | OUTPATIENT
Start: 2017-05-18 | End: 2017-05-18

## 2017-05-18 RX ORDER — SUCRALFATE ORAL 1 G/10ML
1 SUSPENSION ORAL 2 TIMES DAILY
Qty: 240 ML | Refills: 1 | Status: SHIPPED | OUTPATIENT
Start: 2017-05-18 | End: 2017-05-20

## 2017-05-18 RX ORDER — SUCRALFATE ORAL 1 G/10ML
1 SUSPENSION ORAL EVERY 6 HOURS
Status: DISCONTINUED | OUTPATIENT
Start: 2017-05-18 | End: 2017-05-19

## 2017-05-18 RX ORDER — POLYETHYLENE GLYCOL 3350 17 G/17G
1 POWDER, FOR SOLUTION ORAL
Status: DISCONTINUED | OUTPATIENT
Start: 2017-05-18 | End: 2017-05-20 | Stop reason: HOSPADM

## 2017-05-18 RX ORDER — LORATADINE 10 MG/1
10 TABLET ORAL DAILY
COMMUNITY
End: 2017-06-01 | Stop reason: SDUPTHER

## 2017-05-18 RX ORDER — HYDROCODONE BITARTRATE AND ACETAMINOPHEN 5; 325 MG/1; MG/1
1-2 TABLET ORAL EVERY 4 HOURS PRN
Qty: 10 TAB | Refills: 0 | Status: SHIPPED | OUTPATIENT
Start: 2017-05-18 | End: 2017-06-01

## 2017-05-18 RX ORDER — DILTIAZEM HYDROCHLORIDE 5 MG/ML
10 INJECTION INTRAVENOUS
Status: DISCONTINUED | OUTPATIENT
Start: 2017-05-18 | End: 2017-05-20 | Stop reason: HOSPADM

## 2017-05-18 RX ORDER — LOSARTAN POTASSIUM 25 MG/1
25 TABLET ORAL DAILY
Status: DISCONTINUED | OUTPATIENT
Start: 2017-05-19 | End: 2017-05-20 | Stop reason: HOSPADM

## 2017-05-18 RX ORDER — ONDANSETRON 2 MG/ML
4 INJECTION INTRAMUSCULAR; INTRAVENOUS EVERY 4 HOURS PRN
Status: DISCONTINUED | OUTPATIENT
Start: 2017-05-18 | End: 2017-05-20 | Stop reason: HOSPADM

## 2017-05-18 RX ORDER — ONDANSETRON 2 MG/ML
4 INJECTION INTRAMUSCULAR; INTRAVENOUS ONCE
Status: COMPLETED | OUTPATIENT
Start: 2017-05-18 | End: 2017-05-18

## 2017-05-18 RX ORDER — ACETAMINOPHEN 325 MG/1
650 TABLET ORAL EVERY 6 HOURS PRN
Status: DISCONTINUED | OUTPATIENT
Start: 2017-05-18 | End: 2017-05-20 | Stop reason: HOSPADM

## 2017-05-18 RX ORDER — DOCUSATE SODIUM 100 MG/1
100 CAPSULE, LIQUID FILLED ORAL 2 TIMES DAILY
Status: DISCONTINUED | OUTPATIENT
Start: 2017-05-18 | End: 2017-05-20 | Stop reason: HOSPADM

## 2017-05-18 RX ORDER — DEXTROSE MONOHYDRATE 25 G/50ML
25 INJECTION, SOLUTION INTRAVENOUS
Status: DISCONTINUED | OUTPATIENT
Start: 2017-05-18 | End: 2017-05-20 | Stop reason: HOSPADM

## 2017-05-18 RX ORDER — OMEPRAZOLE 20 MG/1
40 CAPSULE, DELAYED RELEASE ORAL ONCE
Status: COMPLETED | OUTPATIENT
Start: 2017-05-18 | End: 2017-05-18

## 2017-05-18 RX ORDER — ONDANSETRON 2 MG/ML
INJECTION INTRAMUSCULAR; INTRAVENOUS
Status: COMPLETED
Start: 2017-05-18 | End: 2017-05-18

## 2017-05-18 RX ORDER — FENOFIBRATE 160 MG/1
160 TABLET ORAL DAILY
COMMUNITY
End: 2017-06-01 | Stop reason: SDUPTHER

## 2017-05-18 RX ORDER — ONDANSETRON 4 MG/1
4 TABLET, ORALLY DISINTEGRATING ORAL EVERY 8 HOURS PRN
Qty: 10 TAB | Refills: 0 | Status: SHIPPED | OUTPATIENT
Start: 2017-05-18 | End: 2017-05-20

## 2017-05-18 RX ORDER — ONDANSETRON 4 MG/1
4 TABLET, ORALLY DISINTEGRATING ORAL EVERY 4 HOURS PRN
Status: DISCONTINUED | OUTPATIENT
Start: 2017-05-18 | End: 2017-05-20 | Stop reason: HOSPADM

## 2017-05-18 RX ORDER — PROMETHAZINE HYDROCHLORIDE 25 MG/1
12.5-25 TABLET ORAL EVERY 4 HOURS PRN
Status: DISCONTINUED | OUTPATIENT
Start: 2017-05-18 | End: 2017-05-20 | Stop reason: HOSPADM

## 2017-05-18 RX ADMIN — INSULIN LISPRO 2 UNITS: 100 INJECTION, SOLUTION INTRAVENOUS; SUBCUTANEOUS at 17:26

## 2017-05-18 RX ADMIN — DOCUSATE SODIUM 100 MG: 100 CAPSULE ORAL at 20:30

## 2017-05-18 RX ADMIN — ONDANSETRON 4 MG: 2 INJECTION, SOLUTION INTRAMUSCULAR; INTRAVENOUS at 05:15

## 2017-05-18 RX ADMIN — ONDANSETRON 4 MG: 2 INJECTION INTRAMUSCULAR; INTRAVENOUS at 05:15

## 2017-05-18 RX ADMIN — SUCRALFATE 1 G: 1 SUSPENSION ORAL at 13:40

## 2017-05-18 RX ADMIN — SODIUM CHLORIDE 1000 ML: 9 INJECTION, SOLUTION INTRAVENOUS at 05:53

## 2017-05-18 RX ADMIN — INSULIN LISPRO 5 UNITS: 100 INJECTION, SOLUTION INTRAVENOUS; SUBCUTANEOUS at 14:27

## 2017-05-18 RX ADMIN — OMEPRAZOLE 40 MG: 20 CAPSULE, DELAYED RELEASE ORAL at 02:48

## 2017-05-18 RX ADMIN — OMEPRAZOLE 20 MG: 20 CAPSULE, DELAYED RELEASE ORAL at 13:41

## 2017-05-18 RX ADMIN — INSULIN LISPRO 3 UNITS: 100 INJECTION, SOLUTION INTRAVENOUS; SUBCUTANEOUS at 20:34

## 2017-05-18 RX ADMIN — OMEPRAZOLE 20 MG: 20 CAPSULE, DELAYED RELEASE ORAL at 20:28

## 2017-05-18 RX ADMIN — SUCRALFATE 1 G: 1 SUSPENSION ORAL at 20:29

## 2017-05-18 RX ADMIN — SODIUM CHLORIDE 1000 ML: 9 INJECTION, SOLUTION INTRAVENOUS at 02:48

## 2017-05-18 RX ADMIN — HYDROMORPHONE HYDROCHLORIDE 1 MG: 1 INJECTION, SOLUTION INTRAMUSCULAR; INTRAVENOUS; SUBCUTANEOUS at 02:48

## 2017-05-18 RX ADMIN — METOPROLOL TARTRATE 25 MG: 25 TABLET, FILM COATED ORAL at 08:30

## 2017-05-18 RX ADMIN — SUCRALFATE 1 G: 1 SUSPENSION ORAL at 23:59

## 2017-05-18 RX ADMIN — ATORVASTATIN CALCIUM 80 MG: 80 TABLET, FILM COATED ORAL at 20:29

## 2017-05-18 RX ADMIN — STANDARDIZED SENNA CONCENTRATE AND DOCUSATE SODIUM 2 TABLET: 8.6; 5 TABLET, FILM COATED ORAL at 20:28

## 2017-05-18 RX ADMIN — ENOXAPARIN SODIUM 100 MG: 100 INJECTION SUBCUTANEOUS at 14:29

## 2017-05-18 RX ADMIN — ONDANSETRON 4 MG: 2 INJECTION INTRAMUSCULAR; INTRAVENOUS at 00:05

## 2017-05-18 RX ADMIN — DILTIAZEM HYDROCHLORIDE 5 MG/HR: 5 INJECTION INTRAVENOUS at 06:13

## 2017-05-18 RX ADMIN — HYDROMORPHONE HYDROCHLORIDE 1 MG: 1 INJECTION, SOLUTION INTRAMUSCULAR; INTRAVENOUS; SUBCUTANEOUS at 00:05

## 2017-05-18 RX ADMIN — METOPROLOL TARTRATE 25 MG: 25 TABLET, FILM COATED ORAL at 13:40

## 2017-05-18 RX ADMIN — DILTIAZEM HYDROCHLORIDE 23 MG: 5 INJECTION INTRAVENOUS at 05:53

## 2017-05-18 RX ADMIN — ENOXAPARIN SODIUM 100 MG: 100 INJECTION SUBCUTANEOUS at 21:12

## 2017-05-18 RX ADMIN — VITAMIN D, TAB 1000IU (100/BT) 1000 UNITS: 25 TAB at 13:40

## 2017-05-18 RX ADMIN — ONDANSETRON 4 MG: 2 INJECTION INTRAMUSCULAR; INTRAVENOUS at 04:42

## 2017-05-18 RX ADMIN — METOPROLOL TARTRATE 25 MG: 25 TABLET, FILM COATED ORAL at 20:29

## 2017-05-18 ASSESSMENT — PATIENT HEALTH QUESTIONNAIRE - PHQ9
2. FEELING DOWN, DEPRESSED, IRRITABLE, OR HOPELESS: NOT AT ALL
SUM OF ALL RESPONSES TO PHQ QUESTIONS 1-9: 0
SUM OF ALL RESPONSES TO PHQ9 QUESTIONS 1 AND 2: 0
1. LITTLE INTEREST OR PLEASURE IN DOING THINGS: NOT AT ALL

## 2017-05-18 ASSESSMENT — PAIN SCALES - GENERAL
PAINLEVEL_OUTOF10: 0
PAINLEVEL_OUTOF10: 0
PAINLEVEL_OUTOF10: 9
PAINLEVEL_OUTOF10: 0
PAINLEVEL_OUTOF10: 10

## 2017-05-18 ASSESSMENT — LIFESTYLE VARIABLES
ALCOHOL_USE: NO
EVER_SMOKED: NEVER

## 2017-05-18 NOTE — ED PROVIDER NOTES
ED Provider Note    Patient was turned over to me from Dr. Blue pending IV fluids. Please see his note for additional background information. Just prior to patient being discharged patient started vomiting got very diaphoretic and started having a rapid heart rate. EKG revealed the patient was in atrial fibrillation with rapid ventricular rate up to the 140s which cause him to feel slightly lightheaded.    Constitutional: Well developed, Well nourished, mild distress.   HENT: Normocephalic, Atraumatic,   Eyes: Conjunctiva normal, No discharge.   Cardiovascular: Tachycardic with a regular rhythm  No murmurs, equal pulses.   Pulmonary: Normal breath sounds, No respiratory distress, No wheezing, No rales, No rhonchi.  Chest: No chest wall tenderness or deformity.   Abdomen:Soft, No tenderness, .   Skin: Warm, diaphoretic No erythema, No rash.   Neurologic: Alert & oriented x 3, Normal motor function,  No focal deficits noted.   Psychiatric: Affect normal, Judgment normal, Mood normal.       Because the patient has a rapid ventricular rate he'll be started on diltiazem push followed by drip. He is given 8 mg of Zofran. An additional liter of normal saline. Patient was given diltiazem with that his heart rate is down to 100.    EKG shows atrial fibrillation, normal axis, rate 127, no ST elevation, Q waves in 2 and aVF. Compared to previous EKG dated 4/16/2017 patient now having atrial fibrillation. Interpretation atrial fibrillation with rapid ventricular rate          DISPOSITION:  Patient will be admitted to Dr. Barnes in guarded condition.        Impression  1. Epigastric pain    2. Esophagitis    3. Atrial fibrillation, unspecified type (CMS-HCC)    4. Rapid heart rate      This record was made with a voice recognition software. The software is not perfect. I have tried to correct any grammar, spelling or context errors to the best of my ability, but errors may still remain. Interpretation of this chart should be  taken in this context.

## 2017-05-18 NOTE — ED NOTES
Med rec complete per pt home pharmacy  Left voicemail for caregiver to verify medications   Allergies reviewed  No oral ABX within last 30 days

## 2017-05-18 NOTE — ED PROVIDER NOTES
ED Provider Note    Scribed for Abilio Sotomayor M.D. by Laurence Woodruff. 5/17/2017  11:22 PM    Primary care provider: Juancho Jenkins M.D.  Means of arrival: [facility]   History obtained from: Patient   History limited by: None     CHIEF COMPLAINT  Chief Complaint   Patient presents with   • Abdominal Pain     abd pain since this afternoon, with n/v denies diarrhea, dizziness    • N/V   • Dizziness       HPI  Ryan Trevizo is a 51 y.o. male who presents with worsening abdominal pain onset 5.5 hours prior to exam. Patient reports associated nausea and multiple episodes of vomiting. He also reports dizziness since onset of his pain. Patient has a history of diabetes mellitus and hypertension. He denies fever, chills, or dysuria. Patient is not a smoker. Denies pancreatitis diverticulitis appendicitis and surgeries.    REVIEW OF SYSTEMS  Pertinent positives include: abdominal pain, nausea, vomiting, dizziness.  Pertinent negatives include: fever, chills, dysuria. Chest pain, cough, headache  10+ systems reviewed and negative.  C     PAST MEDICAL HISTORY  Past Medical History   Diagnosis Date   • Arthritis    • HTN (hypertension) 7/15/2012   • GERD (gastroesophageal reflux disease) 7/15/2012   • STEMI (ST elevation myocardial infarction) (CMS-HCC)    • Anxiety disorder    • MI (myocardial infarction) (CMS-HCC)    • Hyperlipemia    • Upper GI bleed 7/26/2012   • Glaucoma    • SOB (shortness of breath)    • Claudication (CMS-HCC)    • Dizziness    • CAD (coronary artery disease) 10/14/2013   • ACS (acute coronary syndrome) (CMS-HCC) 7/25/2012   • Pericarditis secondary to acute myocardial infarction (CMS-HCC) 7/19/2012   • Right knee pain    • Blood glucose elevated    • Mental retardation    • Osteoarthritis    • Hypertriglyceridemia        FAMILY HISTORY  None noted.     SOCIAL HISTORY  Social History   Substance Use Topics   • Smoking status: Never Smoker    • Smokeless tobacco: Never Used   • Alcohol Use: No  "    History   Drug Use No     Comment: cough syrup; vicodin; narcotics       SURGICAL HISTORY  Past Surgical History   Procedure Laterality Date   • Other orthopedic surgery  7-      R knee surgery   • Other  knee surgery   • Other cardiac surgery       stent placement   • Other     • Gastroscopy-endo  7/25/2012     Performed by JORDIN VERA at ENDOSCOPY [facility]   • Gastroscopy-endo  7/26/2012     Performed by JORDIN VERA at ENDOSCOPY [facility] ORS       CURRENT MEDICATIONS  No current facility-administered medications on file prior to encounter.     Current Outpatient Prescriptions on File Prior to Encounter   Medication Sig Dispense Refill   • ondansetron (ZOFRAN ODT) 4 MG TABLET DISPERSIBLE Take 1 Tab by mouth every 8 hours as needed for Nausea/Vomiting. 10 Tab 0   • losartan (COZAAR) 25 MG Tab TAKE 1 TABLET BY MOUTH ONCE DAILY. 90 Tab 3   • losartan (COZAAR) 25 MG Tab Take 1 Tab by mouth every day. 30 Tab 0   • metoprolol SR (TOPROL XL) 25 MG TABLET SR 24 HR Take 2 Tabs by mouth every day. 30 Tab 1   • omeprazole (PRILOSEC) 20 MG delayed-release capsule Take 2 Caps by mouth 2 times a day. 60 Cap 1   • metformin (GLUCOPHAGE) 1000 MG tablet Take 1 Tab by mouth 2 times a day, with meals. 60 Tab 11   • atorvastatin (LIPITOR) 80 MG tablet Take 1 Tab by mouth every evening. 30 Tab 11   • aspirin EC (ECOTRIN) 81 MG Tablet Delayed Response Take 1 Tab by mouth every day. 30 Tab 11   • vitamin D (CHOLECALCIFEROL) 1000 UNIT Tab Take 1 Tab by mouth every day. 30 Tab 11   • isosorbide mononitrate SR (IMDUR) 30 MG TABLET SR 24 HR Take 1 Tab by mouth every day. 30 Tab 3       ALLERGIES  Allergies   Allergen Reactions   • Ritalin [Methylphenidate] Unspecified     \"Hyper\"       PHYSICAL EXAM  VITAL SIGNS: /92 mmHg  Pulse 119  Temp(Src) 36.4 °C (97.5 °F)  Resp 18  Ht 1.803 m (5' 11\")  Wt 90.719 kg (200 lb)  BMI 27.91 kg/m2  SpO2 95%  Reviewed and borderline elevated blood pressure, " tachycardic  Constitutional: Well developed, Well nourished.  HENT: Normocephalic, atraumatic, bilateral external ears normal, oropharynx moist, No exudates or erythema.   Eyes: PERRLA, conjunctiva pink, no scleral icterus. Pupils 2 mm.   Cardiovascular: Regular rate and rhythm. No murmurs, rubs or gallops.   Respiratory: Lungs clear to auscultation bilaterally. No wheezes, rales, or rhonchi.   Gastrointestinal: Abdomen soft, non-tender. Slightly distended. Very active bowel sounds. No rebound or guarding. No masses. No tympany.   Skin: No erythema, no rash.   Genitourinary: No costovertebral angle tenderness.   Neurologic: Alert & oriented x 3, cranial nerves 2-12 intact by passive exam.  No focal deficit noted.  Psychiatric: Affect normal, Judgment normal, Mood normal.     DIFFERENTIAL DIAGNOSIS:  Gastritis, ulcer, pancreatitis, urinary tract infection, ventral hernia.    LABORATORY:  Results for orders placed or performed during the hospital encounter of 05/17/17   CBC WITH DIFFERENTIAL   Result Value Ref Range    WBC 9.7 4.8 - 10.8 K/uL    RBC 4.84 4.70 - 6.10 M/uL    Hemoglobin 14.4 14.0 - 18.0 g/dL    Hematocrit 43.2 42.0 - 52.0 %    MCV 89.3 81.4 - 97.8 fL    MCH 29.8 27.0 - 33.0 pg    MCHC 33.3 (L) 33.7 - 35.3 g/dL    RDW 41.7 35.9 - 50.0 fL    Platelet Count 350 164 - 446 K/uL    MPV 9.6 9.0 - 12.9 fL    Neutrophils-Polys 81.90 (H) 44.00 - 72.00 %    Lymphocytes 11.50 (L) 22.00 - 41.00 %    Monocytes 4.70 0.00 - 13.40 %    Eosinophils 1.00 0.00 - 6.90 %    Basophils 0.40 0.00 - 1.80 %    Immature Granulocytes 0.50 0.00 - 0.90 %    Nucleated RBC 0.00 /100 WBC    Neutrophils (Absolute) 7.97 (H) 1.82 - 7.42 K/uL    Lymphs (Absolute) 1.12 1.00 - 4.80 K/uL    Monos (Absolute) 0.46 0.00 - 0.85 K/uL    Eos (Absolute) 0.10 0.00 - 0.51 K/uL    Baso (Absolute) 0.04 0.00 - 0.12 K/uL    Immature Granulocytes (abs) 0.05 0.00 - 0.11 K/uL    NRBC (Absolute) 0.00 K/uL   COMP METABOLIC PANEL   Result Value Ref Range     Sodium 133 (L) 135 - 145 mmol/L    Potassium 4.6 3.6 - 5.5 mmol/L    Chloride 102 96 - 112 mmol/L    Co2 21 20 - 33 mmol/L    Anion Gap 10.0 0.0 - 11.9    Glucose 295 (H) 65 - 99 mg/dL    Bun 18 8 - 22 mg/dL    Creatinine 0.96 0.50 - 1.40 mg/dL    Calcium 9.7 8.5 - 10.5 mg/dL    AST(SGOT) 29 12 - 45 U/L    ALT(SGPT) 56 (H) 2 - 50 U/L    Alkaline Phosphatase 61 30 - 99 U/L    Total Bilirubin 0.6 0.1 - 1.5 mg/dL    Albumin 4.8 3.2 - 4.9 g/dL    Total Protein 8.0 6.0 - 8.2 g/dL    Globulin 3.2 1.9 - 3.5 g/dL    A-G Ratio 1.5 g/dL   LIPASE   Result Value Ref Range    Lipase 52 11 - 82 U/L   POC URINALYSIS   Result Value Ref Range    POC Appearance  Negative    POC Glucose  Negative mg/dL    POC Ketones  Negative mg/dL    POC Specific Gravity  <1.005 - >1.030    POC Blood  Negative    POC Urine PH  5.0 - 8.0    POC Protein  Negative mg/dL    POC Nitrites  Negative    POC Leukocyte Esterase  Negative    POC Color  Negative   POC UA   Result Value Ref Range    POC Color Yellow     POC Appearance Clear     POC Glucose 500 (A) Negative mg/dL    POC Ketones Negative Negative mg/dL    POC Specific Gravity 1.020 1.005-1.030    POC Blood Negative Negative    POC Urine PH 5.5 5.0-8.0    POC Protein Negative Negative mg/dL    POC Nitrites Negative Negative    POC Leukocyte Esterase Negative Negative         INTERVENTIONS:  Medications   ondansetron (ZOFRAN) syringe/vial injection 4 mg (4 mg Intravenous Given 5/18/17 0005)   HYDROmorphone (DILAUDID) injection 1 mg (not administered)   omeprazole (PRILOSEC) capsule 40 mg (not administered)   NS infusion 1,000 mL (not administered)   HYDROmorphone (DILAUDID) injection 1 mg (1 mg Intravenous Given 5/18/17 0005)     Response: A shoulder roll reported mild improvement in pain after 1st dose of hydromorphone but on repeat exam he still had no objective tenderness in the supraumbilical region where he felt his pain. Patient given IV fluids for dehydration given the tachycardia and  concentrated urine.    COURSE & MEDICAL DECISION MAKING    11:22 PM - Patient seen and examined at bedside. Patient will be treated with Dilaudid 1 mg IV and Zofran 4 mg IV for his symptoms. Ordered CBC with differential, CMP, lipase, and POC urinalysis to evaluate.     All chart reviewed and patient had CT pulmonary angiogram last month and a CT abdomen and pelvis in December. Both of these demonstrated esophagitis. Patient denies taking a proton pump inhibitor but based on his medication list he is supposed to be on Prilosec.    This patient presents with abdominal pain with a benign exam and unremarkable labs. There is no evidence of perforation, pancreatitis or cholecystitis. Based on prior CT he does have cholelithiasis. There is no evidence of bowel ischemia oriented to iliac disease. There is no evidence of UTI..    PLAN:  New Prescriptions    HYDROCODONE-ACETAMINOPHEN (NORCO) 5-325 MG TAB PER TABLET    Take 1-2 Tabs by mouth every four hours as needed (mild pain).    OMEPRAZOLE (PRILOSEC) 40 MG DELAYED-RELEASE CAPSULE    Take 1 Cap by mouth every day.    sucralfate  Prescription monitoring accessed  Avoid nonsteroidal anti-inflammatories and alcohol  Gastritis handout given  Return to ER for worsening pain, pain and fever, uncontrolled vomiting, ill appearance    Juancho Jenkins M.D.  [address] [address]  [phone]    Schedule an appointment as soon as possible for a visit in 2 days  As needed      CONDITION: Improved, stable.    FINAL IMPRESSION  1. Epigastric pain    2. Esophagitis        Electronically signed by: Laurence Woodruff, 5/17/2017 11:22 PM    The note accurately reflects work and decisions made by me.  Abilio Sotomayor  5/18/2017  1:33 AM    Electronically signed by: Abilio Sotomayor, 5/18/2017 1:33 AM

## 2017-05-18 NOTE — ED NOTES
Pt denies any needs at this time. Continues on monitoring equipment. Awaiting room assignment for admission. Will continue to monitor.

## 2017-05-18 NOTE — H&P
CARDIOLOGIST:  Joseph León MD.    PRIMARY CARE PHYSICIAN:  Juancho Jenkins MD    CHIEF COMPLAINT:  Epigastric pain, shortness of breath with nausea and   vomiting over past day.    HISTORY OF PRESENT ILLNESS:  The patient is a 51-year-old male with history of   hypertension, diabetes, coronary artery disease, GERD, evaluated here at   [facility] with complaints of epigastric pain, shortness of breath, nausea and   vomiting.  The patient reports onset of symptoms over the past day.  He   reports no fevers or chills, no cough or shortness of breath.  No chest pain   or leg swelling.  Reports burning sensation in the epigastric region after   eating with at times cough.  Reports no painful swallowing or food getting   stuck.  He reports no bloody or black stools.  Weight stable.  No orthopnea   symptoms.  No palpitations.  No numbness or localized weakness.    REVIEW OF SYSTEMS:  As above, otherwise negative according to AMA and CMS   criteria.    PAST MEDICAL HISTORY:  Includes,  1.  Diabetes mellitus type 2.  2.  Coronary artery disease followed by Dr. Joseph León, cardiology.  3.  GERD.  4.  Hypertension.  5.  ST elevation MI per records.  6.  Glaucoma.  7.  History of upper gastrointestinal bleed.  8.  Arthritis.    PAST SURGICAL HISTORY:  Includes EGD with findings of Dieulafoy stomach and   Nieves's esophagitis 2012, right lower extremity DVT with history of IVC   filter placement.    HOME MEDICATIONS:  Currently on enteric coated aspirin 81 daily,   Lipitor 80 daily, Triglide 160 mg daily, lisinopril 5 mg daily, Cozaar 25 mg   daily, metformin 1000 b.i.d., Zofran 4 q. 8 hours p.r.n., Protonix 40 mg   daily, vitamin D 1000 units daily.    ALLERGIES:  Ritalin.    SOCIAL HISTORY:  Lives by himself.  No tobacco, alcohol or illicit drugs.    Works with a [employer].    FAMILY HISTORY:  None reported.    PHYSICAL EXAMINATION:  VITAL SIGNS:  Temperature is 36.4, pulse 80s-130s, respiratory rate 17, blood    pressure 109/84, 91% on 3 liters.  GENERAL:  The patient was alert, appropriate, oriented.  No apparent distress.  HEENT:  Anicteric.  Extraocular movements are intact.  Mucous membranes were   moist.  NECK:  No cervical or supraclavicular adenopathy.  Trachea midline.  No JVD.  CARDIOVASCULAR:  Irregularly irregular, tachycardic.  No murmurs.  LUNGS:  Slightly diminished at bases.  No rales or wheeze.  ABDOMEN:  Bowel sounds present, soft, nontender, nondistended, no   hepatosplenomegaly.  Mildly obese.  BACK:  No CVA or paraspinal tenderness.  EXTREMITIES:  He had no lower extremity edema, negative Homans sign,   symmetric, generalized 5/5 strength.  NEUROLOGIC:  Cranial nerves grossly intact.  No focal extremity weakness.  SKIN:  Warm and dry, without pallor.  PSYCHIATRIC:  Calm, cooperative without depressed affect.    LABORATORY DATA:  White count 9, hemoglobin 14, platelet count 358,000.  BUN   and creatinine 18 and 0.9.  Blood sugar 295.  Sodium 133, ALT of 56, total   bili of 0.6, troponin less than 0.01.    DIAGNOSTIC DATA:  CT scan of the chest 4/16/2017 revealed no PE, diffuse   esophageal wall thickening, fat-infiltrated liver, stomach distended with   fluid material.    Patient's EKG, my interpretation revealed atrial fibrillation, rate   approximately 127, nonspecific ST-T findings in inferior leads and flattening   in lateral leads.    IMPRESSION AND PLAN:  1.  Atrial fibrillation with rapid ventricular response, newly diagnosed, of   unknown duration.  Patient will be admitted acute to ICU.  He will be started   on IV Cardizem drip with oral Lopressor scheduled.  Titrate to heart rate less   than 105.  Patient does have elevated CHADS-VASc 2 score with diabetes,   hypertension, heart failure.  He will be started on Lovenox anticoagulation in   event he needs intervention by GI.  We will need further discussion regarding   oral anticoagulation.  Patient previously had followed up with Dr. Ruiz    Mau, it is reasonable to have the consult.  2.  Esophagitis with gastroesophageal reflux disease, epigastric pain, nausea   and vomiting.  He will be placed on oral Prilosec b.i.d., Carafate, discuss   with GI for consultation, p.r.n. IV antiemetics.  3.  Diabetes mellitus, currently uncontrolled.  We will hold metformin with   recent contrast study.  Monitor serial Accu-Cheks, provide insulin sliding   scale coverage.  5.  History of coronary artery disease.  Resume statin.  6.  History of ischemic cardiomyopathy without decompensation.  Resume   outpatient medications to include Cozaar, metoprolol.  7.  History of hypertension, currently controlled, to be continued on   outpatient medications.  8.  Mild hyponatremia.  We will monitor.    Acute admit.  Greater than 2 midnights anticipated.        ____________________________________     MD FATUMA SANCHEZBV / NTS    DD:  05/18/2017 08:40:12  DT:  05/18/2017 10:04:02    D#:  [id number]  Job#:  [id number]

## 2017-05-18 NOTE — ED NOTES
Pt given socks per request.  Pt resting in bed in NAD.  Pt denies further needs at this time.  Bed in lowest position, brakes of bed on, call bell within reach.

## 2017-05-18 NOTE — ED NOTES
.  Chief Complaint   Patient presents with   • Abdominal Pain     abd pain since this afternoon, with n/v denies diarrhea, dizziness    • N/V   • Dizziness     No acute distress noted, resp are even and unlabored, .Pt Informed regarding triage process and verbalized understanding to inform triage tech or RN for any changes in condition.  Placed in lobby.

## 2017-05-19 ENCOUNTER — APPOINTMENT (OUTPATIENT)
Dept: RADIOLOGY | Facility: MEDICAL CENTER | Age: 52
DRG: 309 | End: 2017-05-19
Attending: INTERNAL MEDICINE
Payer: MEDICARE

## 2017-05-19 PROBLEM — K22.70 BARRETT ESOPHAGUS: Status: ACTIVE | Noted: 2017-05-19

## 2017-05-19 LAB
ANION GAP SERPL CALC-SCNC: 6 MMOL/L (ref 0–11.9)
BUN SERPL-MCNC: 14 MG/DL (ref 8–22)
CALCIUM SERPL-MCNC: 8.9 MG/DL (ref 8.5–10.5)
CHLORIDE SERPL-SCNC: 103 MMOL/L (ref 96–112)
CO2 SERPL-SCNC: 25 MMOL/L (ref 20–33)
CREAT SERPL-MCNC: 0.87 MG/DL (ref 0.5–1.4)
GFR SERPL CREATININE-BSD FRML MDRD: >60 ML/MIN/1.73 M 2
GLUCOSE BLD-MCNC: 139 MG/DL (ref 65–99)
GLUCOSE BLD-MCNC: 154 MG/DL (ref 65–99)
GLUCOSE BLD-MCNC: 158 MG/DL (ref 65–99)
GLUCOSE BLD-MCNC: 213 MG/DL (ref 65–99)
GLUCOSE SERPL-MCNC: 201 MG/DL (ref 65–99)
POTASSIUM SERPL-SCNC: 4.2 MMOL/L (ref 3.6–5.5)
SODIUM SERPL-SCNC: 134 MMOL/L (ref 135–145)

## 2017-05-19 PROCEDURE — 700112 HCHG RX REV CODE 229: Performed by: HOSPITALIST

## 2017-05-19 PROCEDURE — 74174 CTA ABD&PLVS W/CONTRAST: CPT

## 2017-05-19 PROCEDURE — 160002 HCHG RECOVERY MINUTES (STAT): Performed by: INTERNAL MEDICINE

## 2017-05-19 PROCEDURE — A9270 NON-COVERED ITEM OR SERVICE: HCPCS | Performed by: HOSPITALIST

## 2017-05-19 PROCEDURE — 80048 BASIC METABOLIC PNL TOTAL CA: CPT

## 2017-05-19 PROCEDURE — 160203 HCHG ENDO MINUTES - 1ST 30 MINS LEVEL 4: Performed by: INTERNAL MEDICINE

## 2017-05-19 PROCEDURE — 0DB48ZX EXCISION OF ESOPHAGOGASTRIC JUNCTION, VIA NATURAL OR ARTIFICIAL OPENING ENDOSCOPIC, DIAGNOSTIC: ICD-10-PCS | Performed by: INTERNAL MEDICINE

## 2017-05-19 PROCEDURE — 160035 HCHG PACU - 1ST 60 MINS PHASE I: Performed by: INTERNAL MEDICINE

## 2017-05-19 PROCEDURE — 500066 HCHG BITE BLOCK, ECT: Performed by: INTERNAL MEDICINE

## 2017-05-19 PROCEDURE — 0DB38ZX EXCISION OF LOWER ESOPHAGUS, VIA NATURAL OR ARTIFICIAL OPENING ENDOSCOPIC, DIAGNOSTIC: ICD-10-PCS | Performed by: INTERNAL MEDICINE

## 2017-05-19 PROCEDURE — 99153 MOD SED SAME PHYS/QHP EA: CPT | Performed by: INTERNAL MEDICINE

## 2017-05-19 PROCEDURE — 770020 HCHG ROOM/CARE - TELE (206)

## 2017-05-19 PROCEDURE — 36415 COLL VENOUS BLD VENIPUNCTURE: CPT

## 2017-05-19 PROCEDURE — 82962 GLUCOSE BLOOD TEST: CPT

## 2017-05-19 PROCEDURE — 0DB68ZX EXCISION OF STOMACH, VIA NATURAL OR ARTIFICIAL OPENING ENDOSCOPIC, DIAGNOSTIC: ICD-10-PCS | Performed by: INTERNAL MEDICINE

## 2017-05-19 PROCEDURE — 700111 HCHG RX REV CODE 636 W/ 250 OVERRIDE (IP)

## 2017-05-19 PROCEDURE — 99233 SBSQ HOSP IP/OBS HIGH 50: CPT | Performed by: HOSPITALIST

## 2017-05-19 PROCEDURE — 700111 HCHG RX REV CODE 636 W/ 250 OVERRIDE (IP): Performed by: HOSPITALIST

## 2017-05-19 PROCEDURE — 502240 HCHG MISC OR SUPPLY RC 0272: Performed by: INTERNAL MEDICINE

## 2017-05-19 PROCEDURE — 700117 HCHG RX CONTRAST REV CODE 255: Performed by: HOSPITALIST

## 2017-05-19 PROCEDURE — 88312 SPECIAL STAINS GROUP 1: CPT | Mod: 59

## 2017-05-19 PROCEDURE — 700102 HCHG RX REV CODE 250 W/ 637 OVERRIDE(OP): Performed by: HOSPITALIST

## 2017-05-19 PROCEDURE — 88305 TISSUE EXAM BY PATHOLOGIST: CPT | Mod: 59

## 2017-05-19 PROCEDURE — 99152 MOD SED SAME PHYS/QHP 5/>YRS: CPT | Performed by: INTERNAL MEDICINE

## 2017-05-19 PROCEDURE — 160048 HCHG OR STATISTICAL LEVEL 1-5: Performed by: INTERNAL MEDICINE

## 2017-05-19 RX ORDER — MIDAZOLAM HYDROCHLORIDE 1 MG/ML
INJECTION INTRAMUSCULAR; INTRAVENOUS
Status: DISCONTINUED | OUTPATIENT
Start: 2017-05-19 | End: 2017-05-19 | Stop reason: HOSPADM

## 2017-05-19 RX ORDER — MIDAZOLAM HYDROCHLORIDE 1 MG/ML
.5-2 INJECTION INTRAMUSCULAR; INTRAVENOUS PRN
Status: ACTIVE | OUTPATIENT
Start: 2017-05-19 | End: 2017-05-19

## 2017-05-19 RX ORDER — SODIUM CHLORIDE 9 MG/ML
500 INJECTION, SOLUTION INTRAVENOUS
Status: ACTIVE | OUTPATIENT
Start: 2017-05-19 | End: 2017-05-19

## 2017-05-19 RX ADMIN — ATORVASTATIN CALCIUM 80 MG: 80 TABLET, FILM COATED ORAL at 21:22

## 2017-05-19 RX ADMIN — METOPROLOL TARTRATE 25 MG: 25 TABLET, FILM COATED ORAL at 15:39

## 2017-05-19 RX ADMIN — METOPROLOL TARTRATE 25 MG: 25 TABLET, FILM COATED ORAL at 21:22

## 2017-05-19 RX ADMIN — OMEPRAZOLE 20 MG: 20 CAPSULE, DELAYED RELEASE ORAL at 21:22

## 2017-05-19 RX ADMIN — ENOXAPARIN SODIUM 80 MG: 100 INJECTION SUBCUTANEOUS at 11:13

## 2017-05-19 RX ADMIN — LOSARTAN POTASSIUM 25 MG: 25 TABLET, FILM COATED ORAL at 08:35

## 2017-05-19 RX ADMIN — ENOXAPARIN SODIUM 80 MG: 100 INJECTION SUBCUTANEOUS at 21:22

## 2017-05-19 RX ADMIN — VITAMIN D, TAB 1000IU (100/BT) 1000 UNITS: 25 TAB at 08:36

## 2017-05-19 RX ADMIN — STANDARDIZED SENNA CONCENTRATE AND DOCUSATE SODIUM 2 TABLET: 8.6; 5 TABLET, FILM COATED ORAL at 21:22

## 2017-05-19 RX ADMIN — OMEPRAZOLE 20 MG: 20 CAPSULE, DELAYED RELEASE ORAL at 08:36

## 2017-05-19 RX ADMIN — SUCRALFATE 1 G: 1 SUSPENSION ORAL at 05:17

## 2017-05-19 RX ADMIN — INSULIN LISPRO 2 UNITS: 100 INJECTION, SOLUTION INTRAVENOUS; SUBCUTANEOUS at 17:39

## 2017-05-19 RX ADMIN — STANDARDIZED SENNA CONCENTRATE AND DOCUSATE SODIUM 2 TABLET: 8.6; 5 TABLET, FILM COATED ORAL at 08:36

## 2017-05-19 RX ADMIN — DOCUSATE SODIUM 100 MG: 100 CAPSULE ORAL at 21:22

## 2017-05-19 RX ADMIN — INSULIN LISPRO 2 UNITS: 100 INJECTION, SOLUTION INTRAVENOUS; SUBCUTANEOUS at 21:23

## 2017-05-19 RX ADMIN — DOCUSATE SODIUM 100 MG: 100 CAPSULE ORAL at 08:35

## 2017-05-19 RX ADMIN — METOPROLOL TARTRATE 25 MG: 25 TABLET, FILM COATED ORAL at 05:18

## 2017-05-19 RX ADMIN — IOHEXOL 100 ML: 350 INJECTION, SOLUTION INTRAVENOUS at 17:06

## 2017-05-19 ASSESSMENT — ENCOUNTER SYMPTOMS
DIARRHEA: 0
MEMORY LOSS: 0
VOMITING: 0
NERVOUS/ANXIOUS: 0
DIZZINESS: 0
FOCAL WEAKNESS: 0
SPUTUM PRODUCTION: 0
FALLS: 1
BLURRED VISION: 0
HEMOPTYSIS: 0
COUGH: 0
BACK PAIN: 0
NAUSEA: 0
LOSS OF CONSCIOUSNESS: 0
SHORTNESS OF BREATH: 0
HEADACHES: 0
CHILLS: 0
HALLUCINATIONS: 0
FEVER: 0
EYE PAIN: 0
SPEECH CHANGE: 0
WEAKNESS: 0
BLOOD IN STOOL: 0
NAUSEA: 1
PALPITATIONS: 0
EYE REDNESS: 0
EYE DISCHARGE: 0
ORTHOPNEA: 0
ABDOMINAL PAIN: 1
NECK PAIN: 0
FLANK PAIN: 0
STRIDOR: 0
SEIZURES: 0
HEARTBURN: 0

## 2017-05-19 ASSESSMENT — PAIN SCALES - GENERAL
PAINLEVEL_OUTOF10: 0
PAINLEVEL_OUTOF10: 0
PAINLEVEL_OUTOF10: 2
PAINLEVEL_OUTOF10: 0
PAINLEVEL_OUTOF10: 0
PAINLEVEL_OUTOF10: 2
PAINLEVEL_OUTOF10: 0

## 2017-05-19 ASSESSMENT — LIFESTYLE VARIABLES: SUBSTANCE_ABUSE: 0

## 2017-05-19 NOTE — PROGRESS NOTES
Gastroenterology Progress Note     Author: Reinaldo Berry   Date & Time Created: 2017 10:09 AM    CC:  Epigastric pain, nausea and vomiting    Interval History:  : No further nausea or vomiting.  Mild epigastric discomfort.  Denies other issues.    Review of Systems:  Review of Systems   Constitutional: Negative for fever and chills.   Respiratory: Negative for shortness of breath.    Cardiovascular: Negative for chest pain.   Gastrointestinal: Positive for abdominal pain. Negative for heartburn, nausea, vomiting, blood in stool and melena.       Physical Exam:  Physical Exam   Constitutional: He is oriented to person, place, and time. He appears well-developed and well-nourished.   Cardiovascular: Normal rate and regular rhythm.    Pulmonary/Chest: Effort normal and breath sounds normal.   Abdominal: Soft. Bowel sounds are normal. He exhibits no distension. There is tenderness in the epigastric area.   Musculoskeletal: He exhibits no edema.   Neurological: He is alert and oriented to person, place, and time.   Nursing note and vitals reviewed.      Labs:        Invalid input(s): [id number]  Recent Labs      17   0607   TROPONINI  <0.01     Recent Labs      17   0304   SODIUM  133*  134*   POTASSIUM  4.6  4.2   CHLORIDE  102  103   CO2  21  25   BUN  18  14   CREATININE  0.96  0.87   CALCIUM  9.7  8.9     Recent Labs      17   0304   ALTSGPT  56*   --    ASTSGOT  29   --    ALKPHOSPHAT  61   --    TBILIRUBIN  0.6   --    LIPASE  52   --    GLUCOSE  295*  201*     Recent Labs      17   RBC  4.84   HEMOGLOBIN  14.4   HEMATOCRIT  43.2   PLATELETCT  350     Recent Labs      17   WBC  9.7   NEUTSPOLYS  81.90*   LYMPHOCYTES  11.50*   MONOCYTES  4.70   EOSINOPHILS  1.00   BASOPHILS  0.40   ASTSGOT  29   ALTSGPT  56*   ALKPHOSPHAT  61   TBILIRUBIN  0.6     Hemodynamics:  Temp (24hrs), Av.4 °C (97.6 °F), Min:36 °C (96.8 °F),  Max:36.9 °C (98.5 °F)  Temperature: 36.9 °C (98.5 °F)  Pulse  Av.5  Min: 63  Max: 132Heart Rate (Monitored): 90  Blood Pressure: 109/83 mmHg, NIBP: 110/76 mmHg     Respiratory:    Respiration: 19, Pulse Oximetry: 96 %           Fluids:    Intake/Output Summary (Last 24 hours) at 17 1009  Last data filed at 17 0901   Gross per 24 hour   Intake      0 ml   Output    900 ml   Net   -900 ml     Weight: 84.5 kg (186 lb 4.6 oz)  GI/Nutrition:  Orders Placed This Encounter   Procedures   • DIET NPO     Standing Status: Standing      Number of Occurrences: 8      Standing Expiration Date:      Order Specific Question:  Type:     Answer:  At Midnight [5]     Order Specific Question:  Restrict to:     Answer:  Sips with Medications [3]     Medical Decision Making, by Problem:  Active Hospital Problems    Diagnosis   • Atrial fibrillation with rapid ventricular response (CMS-HCC) [I48.91]     EGD Findings ():  1. Focal area of possible mild ischemia in gastric fundus?  Area biopsied for confirmation and to rule out neoplasia  2. Long segment suspected Barretts esophagus, biopsied.  No nodules or masses or significant inflammation.  3. Small hiatal hernia.    PROBLEMS:  1.  Nausea and vomiting.  Suspect symptoms are reflux related.  2.  Epigastric pain.  Question related to possible gastric ischemia versus reflux versus HP gastritis  3.  Chronic heartburn.  4.  Abnormal CT scan with thickening of his esophagus.  5. Barretts esophagus  6.  Atrial fibrillation with rapid ventricular response.  7.  Coronary artery disease.  8.  Hypertension.  9.  Diabetes.  10.  Fatty liver.  11.  Ischemic cardiomyopathy.    Plan:  1. Await biopsy results.  2. Continue PPI twice daily  3. Stop sucralfate  4. Obtain CTA ab/pelvis to further assess for any anatomic abnormalities or vascular issues that could lead to gastric ischemia as no obvious large hiatal hernia to explain based on endoscopy.  Question related to mild  hypotension during recent rapid A fib episode?  5. OK to restart Lovenox  6. Clear liquid diet    Medications reviewed and Labs reviewed

## 2017-05-19 NOTE — PROGRESS NOTES
Monitor Summary:  A fib  101-107, up to 153 nonsustaining; converted to SR 68-80 at 0113  5 bt V tach   /.10/

## 2017-05-19 NOTE — PROCEDURES
DATE OF SERVICE:  05/19/2017    DATE OF PROCEDURE:  05/19/2017.    PROCEDURE:  Upper endoscopy with biopsies.    :  Reinaldo Berry MD    INDICATION:  The patient is a 51-year-old gentleman, who has had issues with   recurrent chronic heartburn, nausea and vomiting, epigastric pain and had a CT   scan showing thickening of his esophagus.  He does have a history of   suspected Nieves's esophagus.    INFORMED CONSENT:  Written informed consent was obtained from the patient   after thorough explanation of indications, benefits and risks of the   procedure, which included but was not limited to bleeding, infection,   perforation, adverse reaction to medications and missed lesions.    MEDICATIONS GIVEN:  Versed 3 mg, fentanyl 75 mcg was administered IV in   titrated doses throughout the procedure.  The time of sedation was from   09:50-10:04 a.m.    Continuous telemetry, BP, pulse oximetry, and capnography were performed   throughout the procedure.    A midsize upper endoscope was used for the procedure.    FINDINGS:  Patient was placed in the left lateral decubitus position.  After   sedation was achieved, the endoscope was passed into the posterior pharynx   under direct visualization and advanced to the second portion of the duodenum   without difficulty.  The patient tolerated procedure well with following   findings:  1.  Esophagus:  The patient's esophagus did have a long segment of suspected   Nieves's mucosa with salmon-colored mucosa extending from 29 cm to the GE   junction at 37 cm.  Norton C8M9 classification.  No visible nodules or masses   or inflammation within the segment of Nieves's.  Random biopsies of the   salmon-colored mucosa were obtained for diagnostic purposes.  2.  Stomach:  He did have a very small 1 cm hiatal hernia.  Then, in the   fundus, he had a focal 10 cm area of abnormal mucosa, which was erythematous,   vascular appearing with some scant ulceration that appeared to be  possibly   consistent with mild superficial gastric ischemia.  The area was not firm.  It   did not appear clearly malignant, although this could be another possibility.    Several biopsies were obtained and placed in bottle B.  The remainder of the   stomach mucosa appeared normal.  Retroflexed view was obtained and again   showed only a very small hernia, but no other clear anatomic reason for   possible gastric ischemia.  Random gastric biopsies were obtained for H.   pylori.  2.  Duodenum:  Normal.    IMPRESSION:  1.  Focal area of abnormal appearing mucosa in the fundus, concerning for   possible mild superficial gastric ischemia of unclear etiology.  Biopsies were   obtained to confirm diagnosis and rule out gastrointestinal neoplasia.  No   obvious endoscopic reason such as large hernia that would predispose him to   volvulus or other issue seen on endoscopy.  2.  Long segment Nieves's esophagus, suspected.  Biopsied for confirmation.  3.  Small hiatal hernia.    PLAN:  1.  Continue PPI twice daily.  2.  Clear liquid diet.  3.  Await biopsies.  4.  We will proceed with CTA of the abdomen and pelvis to further assess for   any anatomic or vascular causes of possible mild superficial gastric ischemia.    Another possibility is mild hypotension that he suffered during his recent   atrial fibrillation with rapid ventricular response episode.       ____________________________________     MD RILEY GARCIA / MEÑO    DD:  05/19/2017 10:27:23  DT:  05/19/2017 12:31:00    D#:  [id number]  Job#:  [id number]

## 2017-05-19 NOTE — PROGRESS NOTES
Assumed care of patient. Bedside report received from SARAHI Nix. Updated on POC, call light within reach and fall precautions in place. Bed locked and in lowest position. Patient instructed to call for assistance before getting out of bed. All questions answered, no other needs at this time. MAR, labs, orders reviewed.

## 2017-05-19 NOTE — DISCHARGE PLANNING
Care Transition Team Assessment    Completed screening and pt stated he has a  through [employer]ability [employer] names [employer] [phone]. Communicated to  at [facility].     Information Source  Orientation : Oriented x 4  Information Given By: Patient  Informant's Name: Ryan  Who is responsible for making decisions for patient? : Patient    Elopement Risk  Legal Hold: No  Ambulatory or Self Mobile in Wheelchair: Yes  Disoriented: No  Psychiatric Symptoms: None  History of Wandering: No  Elopement this Admit: No  Vocalizing Wanting to Leave: No  Displays Behaviors, Body Language Wanting to Leave: No-Not at Risk for Elopement  Elopement Risk: Not at Risk for Elopement    Interdisciplinary Discharge Planning  Does Admitting Nurse Feel This Could be a Complex Discharge?: No  Primary Care Physician: Pranay  Lives with - Patient's Self Care Capacity: Alone and Able to Care For Self  Patient or legal guardian wants to designate a caregiver (see row info): No  Support Systems: Parent, Spouse / Significant Other  Housing / Facility: 1 Story Apartment / Condo  Do You Take your Prescribed Medications Regularly: Yes  Able to Return to Previous ADL's: Yes  Mobility Issues: No  Prior Services:  (Case Management)  Patient Expects to be Discharged to:: Home  Assistance Needed: No  Durable Medical Equipment: Not Applicable    Discharge Preparedness  What is your plan after discharge?: Home with help  What are your discharge supports?: Partner ( [employer] at [employer])  Prior Functional Level: Ambulatory, Independent with Activities of Daily Living, Independent with Medication Management  Difficulity with ADLs: None  Difficulity with IADLs: Driving  Difficulity with IADL Comments:  [employer] drives    Functional Assesment  Prior Functional Level: Ambulatory, Independent with Activities of Daily Living, Independent with Medication Management    Finances  Financial Barriers to Discharge:  No  Prescription Coverage: Yes ([employer])    Vision / Hearing Impairment  Vision Impairment : Yes  Right Eye Vision: Wears Glasses  Left Eye Vision: Wears Glasses  Hearing Impairment : No    Values / Beliefs / Concerns  Values / Beliefs Concerns : No    Domestic Abuse  Have you ever been the victim of abuse or violence?: No  Physical Abuse or Sexual Abuse: No  Verbal Abuse or Emotional Abuse: No  Possible Abuse Reported to:: Not Applicable    Psychological Assessment  History of Substance Abuse: None    Discharge Risks or Barriers  Discharge risks or barriers?: No    Anticipated Discharge Information  Anticipated discharge disposition: Home  Discharge Address: [address] [place] [id number]  Discharge Contact Phone Number: [phone]

## 2017-05-19 NOTE — CONSULTS
DATE OF SERVICE:  05/18/2017    REFERRING PHYSICIAN:  Dr. Jeffrey.    CHIEF COMPLAINT:  We were asked to evaluate the patient by Dr. Jeffrey for   further evaluation of epigastric pain, heartburn, abnormal CT scan.    HISTORY OF PRESENT ILLNESS:  The patient is a 51-year-old gentleman with a   history of prior suspected Nieves esophagus, GERD, coronary artery disease   that presented to the hospital for further evaluation of nausea, vomiting,   mild dyspnea, and epigastric pain.    He is somewhat of a poor historian.    He did have a prior GI bleed in 2012.  At that time, he had an upper endoscopy   by Dr. Funes.  This did show 3-4 cm segment of suspected Nieves's esophagus   which was not biopsied.  There was also noted to be a Dieulafoy lesion and a   small hiatal hernia.  He has not followed up with GI since that time.    He notes that he has had chronic issues with heartburn, although tells me that   he does not take any medications for heartburn despite his medication list   showing a PPI.  He notes that over the past several months, he has been having   issues with recurrent nausea and vomiting, although the vomiting sounds more   like possible regurgitation.  He notes that at night when lying flat, he   awakens with fluid in his mouth, which he spits out as well as has episodes of   coughing at night.  Denies obvious postprandial emesis.  He does note some   chronic midepigastric discomfort.  He denies any dysphagia.  He denies any   recent weight loss.  Denies melena, hematochezia, or change in bowel habits.  Then, last night, he had further episode of nausea and vomiting as well as   some lightheadedness and mild dyspnea.  Given this, he presented to the   emergency room for evaluation.  In the emergency department, he was found to   be in atrial fibrillation with rapid ventricular rate, which was treated with   Cardizem.    He has undergone prior imaging with a CTA of his chest in April 2017.  This   showed  diffuse esophageal wall thickening without obvious adenopathy.  He did   have a mildly distended stomach at that time as well as fatty liver.  He also   had a CT scan in December 2016 which showed thickening of his distal thoracic   esophagus.  Prior ultrasound from April 2016 without gallstones.    PAST MEDICAL HISTORY:  1.  GERD.  2.  Nieves's esophagus, suspected.  3.  Hypertension.  4.  Diabetes.  5.  Coronary artery disease with prior STEMI, status post stent in 2012.  6.  Mild ischemic cardiomyopathy.  7.  Glaucoma.  8.  Arthritis.    PAST SURGICAL HISTORY:  IVC filter placement.    CURRENT MEDICATIONS:  Based on the list as he is unaware of what medications   he takes shows an aspirin, but he denies this, Lipitor, Triglide, lisinopril,   Cozaar, metformin, Zofran, Protonix, also denies this, vitamin D.    ALLERGIES:  TO RITALIN.    FAMILY HISTORY:  Denies any GI malignancies.    SOCIAL HISTORY:  Denies tobacco, alcohol or other drugs.    REVIEW OF SYSTEMS:  Complete 14-point review of systems was obtained and found   to be significant for nausea, vomiting, abdominal pain, frequent heartburn,   recent dyspnea, which has resolved, fatigue, but was otherwise unremarkable.    PHYSICAL EXAMINATION:  VITAL SIGNS:  Afebrile, heart rate 70s, blood pressure 127/82, sating 97% on   room air.  GENERAL:  He is a well appearing obese gentleman in no acute distress.  HEENT:  Eyes show anicteric sclerae.  Mouth shows normal oropharynx.  NECK:  Shows no lymphadenopathy.  Thyroid shows no thyromegaly.  LUNGS:  Clear to auscultation bilaterally.  CARDIOVASCULAR:  Revealed irregularly irregular rhythm without obvious murmur.  ABDOMEN:  Soft, nondistended with good bowel sounds.  He was mildly tender to   palpation in the epigastric region without rebound or guarding.  No   appreciable hepatosplenomegaly or other masses.  EXTREMITIES:  Revealed no edema.  NEUROLOGIC:  Nonfocal.  SKIN:  Revealed no rashes.    LABORATORY DATA:   Showed a CBC with a white blood cell count of 9.7,   hemoglobin 14.4, platelet count 350.  Chemistry shows sodium 133, potassium   4.6, BUN 18, creatinine 1.0.  Glucose of 295.  LFTs show AST 29, ALT 6.    Lipase was normal.  Troponin was negative.    IMAGING:  As per the history of present illness.    IMPRESSION AND PLAN:  The patient is a 51-year-old gentleman with a history of   coronary artery disease, GERD, suspected Nieves esophagus that presented for   evaluation of nausea, vomiting, frequent heartburn, epigastric pain, as well   as some dyspnea and was found to be in atrial fibrillation with RVR, for which   he was admitted to the hospital.  Given his GI complaints as well as prior CT   imaging showing thickening of his esophagus, we have been consulted.  Based   on his history, I suspect the majority of his symptoms are reflux related, and   he may very well have some erosive esophagitis secondary to his reflux.    However, given the abnormal CT scan and history of Nieves's, would also be   concerned about the possibility of esophageal neoplasia.  He does also have   some epigastric pain and could have possible H. pylori, peptic ulcer disease   as well.  At this time, we would recommend proceeding with upper endoscopy to   further evaluate upper GI mucosa and rule out any underlying neoplasia as well   as other worrisome lesions prior to considering a long-term anticoagulation   therapy for his atrial fibrillation.  I did discuss the importance of   compliance with medications including his PPI as well as lifestyle   modifications for GERD.    PROBLEMS:  1.  Nausea and vomiting.  2.  Epigastric pain.  3.  Chronic heartburn.  4.  Abnormal CT scan with thickening of his esophagus.  5.  Atrial fibrillation with rapid ventricular response.  6.  Coronary artery disease.  7.  Hypertension.  8.  Diabetes.  9.  Fatty liver.  10.  Ischemic cardiomyopathy.    PLAN:  1.  Recommend upper endoscopy for further  evaluation of his upper GI tract   mucosa to rule out any underlying neoplasia.  2.  I agree with current PPI p.o. twice daily and sucralfate.  3.  We would hold a.m. Lovenox dosing for upper endoscopy.    Thank you for allowing me to take part in the care of your patient.  Please   feel free to call with any questions.       ____________________________________     MD RILEY GARCIA / MEÑO    DD:  05/18/2017 15:04:32  DT:  05/18/2017 18:57:53    D#:  [id number]  Job#:  [id number]

## 2017-05-19 NOTE — PROGRESS NOTES
Received report from night SARAHI Brito. Resumed care. Patient is A+Ox4. Bed alarm is in use. Patient is educated on the use of the call light and calls appropritaly. Bed is in the lowest position. Tele monitor is on the patient and heart rhythm  was confirmed by assessment of monitor reading. Patient is resting comfortably in bed and has no complaint of pain.

## 2017-05-19 NOTE — OR SURGEON
Immediate Post-Operative Note      PreOp Diagnosis: Nausea and vomiting, epigastric pain    PostOp Diagnosis: ?Focal area in fundus concerning for ischemia, Barretts esophagus, small hiatal hernia    Procedure(s):  GASTROSCOPY-ENDO - Wound Class: Clean Contaminated    Surgeon(s):  Reinaldo Berry M.D.    Anesthesiologist/Type of Anesthesia:  No anesthesia staff entered./Sedation    Surgical Staff:  Endoscopy Technician: Terrell Lanza  Sedation/Monitoring Nurse: Lakesha Lowe R.N.    Specimen: A-random gastric, B-gastric fundus, C-esophageal    Estimated Blood Loss: None    Findings:   1. 10 cm focal area in gastric fundus with erythema, edema, friability and slight ulceration concerning for possible localized gastric ischemia.  Area biopsied.  2. Suspected long segment Barretts esophagus with salmon colored mucosa extending from 29 cm to 37 cm without visible nodules.  Biopsied.  3. Small hiatal hernia     Complications: None        5/19/2017 10:05 AM Reinaldo Berry

## 2017-05-19 NOTE — PROGRESS NOTES
Hospital Medicine Progress Note, Adult, Complex               Author: Doug West Date & Time created: 5/19/2017  3:20 PM     Interval History:  Pt admitted with afib with rvr     Review of Systems:  Review of Systems   Constitutional: Positive for malaise/fatigue. Negative for fever.   HENT: Negative for congestion, ear discharge and nosebleeds.    Eyes: Negative for blurred vision, pain, discharge and redness.   Respiratory: Negative for cough, hemoptysis, sputum production, shortness of breath and stridor.    Cardiovascular: Negative for chest pain, palpitations, orthopnea and leg swelling.   Gastrointestinal: Positive for nausea and abdominal pain (mild). Negative for vomiting, diarrhea, blood in stool and melena.   Genitourinary: Negative for dysuria, hematuria and flank pain.   Musculoskeletal: Positive for falls. Negative for back pain, joint pain and neck pain.   Skin: Negative.    Neurological: Negative for dizziness, speech change, focal weakness, seizures, loss of consciousness, weakness and headaches.   Psychiatric/Behavioral: Negative for hallucinations, memory loss and substance abuse. The patient is not nervous/anxious.    All other systems reviewed and are negative.      Physical Exam:  Physical Exam   Constitutional: He is oriented to person, place, and time. He appears well-developed.   HENT:   Head: Normocephalic and atraumatic.   Mouth/Throat: Oropharynx is clear and moist. No oropharyngeal exudate.   Eyes: Conjunctivae are normal. Pupils are equal, round, and reactive to light. Right eye exhibits no discharge. Left eye exhibits no discharge. No scleral icterus.   Neck: Neck supple. No JVD present. No tracheal deviation present.   Cardiovascular: Normal rate and regular rhythm.  Exam reveals no gallop and no friction rub.    Murmur heard.  Pulmonary/Chest: Effort normal and breath sounds normal. No stridor. No respiratory distress. He has no wheezes. He exhibits no tenderness.    Abdominal: Soft. Bowel sounds are normal. He exhibits no distension. There is no tenderness. There is no rebound.   Musculoskeletal: He exhibits no edema or tenderness.   Neurological: He is alert and oriented to person, place, and time. No cranial nerve deficit. He exhibits normal muscle tone.   Skin: Skin is warm and dry. He is not diaphoretic. No cyanosis. Nails show no clubbing.   Psychiatric: He has a normal mood and affect. His behavior is normal.   Nursing note and vitals reviewed.      Labs:        Invalid input(s): [id number]  Recent Labs      17   0607   TROPONINI  <0.01     Recent Labs      17   0304   SODIUM  133*  134*   POTASSIUM  4.6  4.2   CHLORIDE  102  103   CO2  21  25   BUN  18  14   CREATININE  0.96  0.87   CALCIUM  9.7  8.9     Recent Labs      17   0304   ALTSGPT  56*   --    ASTSGOT  29   --    ALKPHOSPHAT  61   --    TBILIRUBIN  0.6   --    LIPASE  52   --    GLUCOSE  295*  201*     Recent Labs      17   RBC  4.84   HEMOGLOBIN  14.4   HEMATOCRIT  43.2   PLATELETCT  350     Recent Labs      17   WBC  9.7   NEUTSPOLYS  81.90*   LYMPHOCYTES  11.50*   MONOCYTES  4.70   EOSINOPHILS  1.00   BASOPHILS  0.40   ASTSGOT  29   ALTSGPT  56*   ALKPHOSPHAT  61   TBILIRUBIN  0.6           Hemodynamics:  Temp (24hrs), Av.6 °C (97.8 °F), Min:36 °C (96.8 °F), Max:37 °C (98.6 °F)  Temperature: 37 °C (98.6 °F)  Pulse  Av.7  Min: 63  Max: 132Heart Rate (Monitored): 82  Blood Pressure: 101/68 mmHg, NIBP: 104/82 mmHg     Respiratory:    Respiration: 20, Pulse Oximetry: 96 %        RUL Breath Sounds: Clear, RML Breath Sounds: Clear, RLL Breath Sounds: Diminished, JAISON Breath Sounds: Clear, LLL Breath Sounds: Diminished  Fluids:    Intake/Output Summary (Last 24 hours) at 17 1520  Last data filed at 17 0901   Gross per 24 hour   Intake      0 ml   Output    900 ml   Net   -900 ml     Weight: 84.5 kg (186 lb 4.6  oz)  GI/Nutrition:  Orders Placed This Encounter   Procedures   • DIET ORDER     Standing Status: Standing      Number of Occurrences: 1      Standing Expiration Date:      Order Specific Question:  Diet:     Answer:  Clear Liquids - No Red Foods [12]     Medical Decision Making, by Problem:  Active Hospital Problems    Diagnosis   • *Atrial fibrillation with rapid ventricular response (CMS-HCC) [I48.91]  Paroxysmal afib  -converted to NSR  -continue metoprolol, resume lovenox consider xarelto on discharge   • GERD (gastroesophageal reflux disease) [K21.9]  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

## 2017-05-19 NOTE — CARE PLAN
Problem: Knowledge Deficit  Goal: Knowledge of disease process/condition, treatment plan, diagnostic tests, and medications will improve  Outcome: PROGRESSING AS EXPECTED  POC discussed, all questions answered, no other concerns at this time.    Problem: Pain Management  Goal: Pain level will decrease to patient’s comfort goal  Outcome: PROGRESSING AS EXPECTED  Pt denies pain at this time.

## 2017-05-20 ENCOUNTER — PATIENT OUTREACH (OUTPATIENT)
Dept: HEALTH INFORMATION MANAGEMENT | Facility: OTHER | Age: 52
End: 2017-05-20

## 2017-05-20 VITALS
TEMPERATURE: 96.9 F | OXYGEN SATURATION: 97 % | DIASTOLIC BLOOD PRESSURE: 76 MMHG | SYSTOLIC BLOOD PRESSURE: 115 MMHG | WEIGHT: 186.29 LBS | HEART RATE: 65 BPM | RESPIRATION RATE: 16 BRPM | BODY MASS INDEX: 27.59 KG/M2 | HEIGHT: 69 IN

## 2017-05-20 LAB
ANION GAP SERPL CALC-SCNC: 8 MMOL/L (ref 0–11.9)
BASOPHILS # BLD AUTO: 0.6 % (ref 0–1.8)
BASOPHILS # BLD: 0.04 K/UL (ref 0–0.12)
BUN SERPL-MCNC: 10 MG/DL (ref 8–22)
CALCIUM SERPL-MCNC: 8.8 MG/DL (ref 8.5–10.5)
CHLORIDE SERPL-SCNC: 102 MMOL/L (ref 96–112)
CO2 SERPL-SCNC: 24 MMOL/L (ref 20–33)
CREAT SERPL-MCNC: 0.82 MG/DL (ref 0.5–1.4)
EOSINOPHIL # BLD AUTO: 0.22 K/UL (ref 0–0.51)
EOSINOPHIL NFR BLD: 3.2 % (ref 0–6.9)
ERYTHROCYTE [DISTWIDTH] IN BLOOD BY AUTOMATED COUNT: 42 FL (ref 35.9–50)
GFR SERPL CREATININE-BSD FRML MDRD: >60 ML/MIN/1.73 M 2
GLUCOSE BLD-MCNC: 138 MG/DL (ref 65–99)
GLUCOSE BLD-MCNC: 222 MG/DL (ref 65–99)
GLUCOSE SERPL-MCNC: 156 MG/DL (ref 65–99)
HCT VFR BLD AUTO: 40.1 % (ref 42–52)
HGB BLD-MCNC: 13.3 G/DL (ref 14–18)
IMM GRANULOCYTES # BLD AUTO: 0.03 K/UL (ref 0–0.11)
IMM GRANULOCYTES NFR BLD AUTO: 0.4 % (ref 0–0.9)
LYMPHOCYTES # BLD AUTO: 1.34 K/UL (ref 1–4.8)
LYMPHOCYTES NFR BLD: 19.7 % (ref 22–41)
MCH RBC QN AUTO: 29.8 PG (ref 27–33)
MCHC RBC AUTO-ENTMCNC: 33.2 G/DL (ref 33.7–35.3)
MCV RBC AUTO: 89.9 FL (ref 81.4–97.8)
MONOCYTES # BLD AUTO: 0.43 K/UL (ref 0–0.85)
MONOCYTES NFR BLD AUTO: 6.3 % (ref 0–13.4)
NEUTROPHILS # BLD AUTO: 4.73 K/UL (ref 1.82–7.42)
NEUTROPHILS NFR BLD: 69.8 % (ref 44–72)
NRBC # BLD AUTO: 0 K/UL
NRBC BLD AUTO-RTO: 0 /100 WBC
PLATELET # BLD AUTO: 291 K/UL (ref 164–446)
PMV BLD AUTO: 9.5 FL (ref 9–12.9)
POTASSIUM SERPL-SCNC: 3.9 MMOL/L (ref 3.6–5.5)
RBC # BLD AUTO: 4.46 M/UL (ref 4.7–6.1)
SODIUM SERPL-SCNC: 134 MMOL/L (ref 135–145)
WBC # BLD AUTO: 6.8 K/UL (ref 4.8–10.8)

## 2017-05-20 PROCEDURE — 82962 GLUCOSE BLOOD TEST: CPT | Mod: 91

## 2017-05-20 PROCEDURE — 80048 BASIC METABOLIC PNL TOTAL CA: CPT

## 2017-05-20 PROCEDURE — A9270 NON-COVERED ITEM OR SERVICE: HCPCS | Performed by: HOSPITALIST

## 2017-05-20 PROCEDURE — 700112 HCHG RX REV CODE 229: Performed by: HOSPITALIST

## 2017-05-20 PROCEDURE — 85025 COMPLETE CBC W/AUTO DIFF WBC: CPT

## 2017-05-20 PROCEDURE — 700102 HCHG RX REV CODE 250 W/ 637 OVERRIDE(OP): Performed by: HOSPITALIST

## 2017-05-20 PROCEDURE — 99239 HOSP IP/OBS DSCHRG MGMT >30: CPT | Performed by: HOSPITALIST

## 2017-05-20 PROCEDURE — 700111 HCHG RX REV CODE 636 W/ 250 OVERRIDE (IP): Performed by: HOSPITALIST

## 2017-05-20 PROCEDURE — 36415 COLL VENOUS BLD VENIPUNCTURE: CPT

## 2017-05-20 RX ORDER — METOPROLOL SUCCINATE 25 MG/1
25 TABLET, EXTENDED RELEASE ORAL DAILY
Qty: 30 TAB | Refills: 0 | Status: SHIPPED | OUTPATIENT
Start: 2017-05-20 | End: 2017-06-01 | Stop reason: SDUPTHER

## 2017-05-20 RX ORDER — OMEPRAZOLE 20 MG/1
20 CAPSULE, DELAYED RELEASE ORAL 2 TIMES DAILY
Qty: 60 CAP | Refills: 0 | Status: SHIPPED | OUTPATIENT
Start: 2017-05-20 | End: 2017-06-01 | Stop reason: SDUPTHER

## 2017-05-20 RX ADMIN — DOCUSATE SODIUM 100 MG: 100 CAPSULE ORAL at 08:12

## 2017-05-20 RX ADMIN — INSULIN LISPRO 3 UNITS: 100 INJECTION, SOLUTION INTRAVENOUS; SUBCUTANEOUS at 11:41

## 2017-05-20 RX ADMIN — METOPROLOL TARTRATE 25 MG: 25 TABLET, FILM COATED ORAL at 05:58

## 2017-05-20 RX ADMIN — LOSARTAN POTASSIUM 25 MG: 25 TABLET, FILM COATED ORAL at 08:12

## 2017-05-20 RX ADMIN — ENOXAPARIN SODIUM 80 MG: 100 INJECTION SUBCUTANEOUS at 08:12

## 2017-05-20 RX ADMIN — STANDARDIZED SENNA CONCENTRATE AND DOCUSATE SODIUM 2 TABLET: 8.6; 5 TABLET, FILM COATED ORAL at 08:12

## 2017-05-20 RX ADMIN — OMEPRAZOLE 20 MG: 20 CAPSULE, DELAYED RELEASE ORAL at 08:12

## 2017-05-20 RX ADMIN — METOPROLOL TARTRATE 25 MG: 25 TABLET, FILM COATED ORAL at 15:06

## 2017-05-20 RX ADMIN — VITAMIN D, TAB 1000IU (100/BT) 1000 UNITS: 25 TAB at 08:12

## 2017-05-20 ASSESSMENT — ENCOUNTER SYMPTOMS
DIARRHEA: 0
NAUSEA: 0
ABDOMINAL PAIN: 0
SHORTNESS OF BREATH: 0
FEVER: 0
BLOOD IN STOOL: 0
VOMITING: 0
CHILLS: 0
HEARTBURN: 0

## 2017-05-20 ASSESSMENT — PAIN SCALES - GENERAL: PAINLEVEL_OUTOF10: 0

## 2017-05-20 NOTE — CARE PLAN
Problem: Safety  Goal: Will remain free from injury  Outcome: PROGRESSING AS EXPECTED  Safety measures and precautions adequate. No injuries.

## 2017-05-20 NOTE — CARE PLAN
Problem: Infection  Goal: Will remain free from infection  Outcome: PROGRESSING AS EXPECTED  Pt has no new or worsening signs of infection.  MEWs are negative.

## 2017-05-20 NOTE — PROGRESS NOTES
Received pt from day RN. Pt resting in bed. No complaints of pain. Discussed POC with patient and pain management. Vitals are stable; will continue to monitor.

## 2017-05-20 NOTE — PROGRESS NOTES
Gastroenterology Progress Note     Author: Reinaldo Berry   Date & Time Created: 5/20/2017 10:45 AM    CC:  Epigastric pain, nausea and vomiting    Interval History:  5/19: No further nausea or vomiting.  Mild epigastric discomfort.  Denies other issues.  5/20: No nausea, vomiting, abdominal pain.  Gastric biopsies suggestive of ischemia.  CTA shows no vascular or anatomic problems, ?colitis.    Review of Systems:  Review of Systems   Constitutional: Negative for fever and chills.   Respiratory: Negative for shortness of breath.    Cardiovascular: Negative for chest pain.   Gastrointestinal: Negative for heartburn, nausea, vomiting, abdominal pain, diarrhea, blood in stool and melena.       Physical Exam:  Physical Exam   Constitutional: He is oriented to person, place, and time. He appears well-developed and well-nourished.   Cardiovascular: Normal rate and regular rhythm.    Pulmonary/Chest: Effort normal and breath sounds normal.   Abdominal: Soft. Bowel sounds are normal. He exhibits no distension. There is no tenderness.   Musculoskeletal: He exhibits no edema.   Neurological: He is alert and oriented to person, place, and time.   Nursing note and vitals reviewed.      Labs:        Invalid input(s): [id number]  Recent Labs      05/18/17   0607   TROPONINI  <0.01     Recent Labs      05/17/17 2349 05/19/17   0304  05/20/17   0312   SODIUM  133*  134*  134*   POTASSIUM  4.6  4.2  3.9   CHLORIDE  102  103  102   CO2  21  25  24   BUN  18  14  10   CREATININE  0.96  0.87  0.82   CALCIUM  9.7  8.9  8.8     Recent Labs      05/17/17 2349 05/19/17   0304  05/20/17   0312   ALTSGPT  56*   --    --    ASTSGOT  29   --    --    ALKPHOSPHAT  61   --    --    TBILIRUBIN  0.6   --    --    LIPASE  52   --    --    GLUCOSE  295*  201*  156*     Recent Labs      05/17/17 2349 05/20/17   0312   RBC  4.84  4.46*   HEMOGLOBIN  14.4  13.3*   HEMATOCRIT  43.2  40.1*   PLATELETCT  350  291     Recent Labs       17   2349  17   0312   WBC  9.7  6.8   NEUTSPOLYS  81.90*  69.80   LYMPHOCYTES  11.50*  19.70*   MONOCYTES  4.70  6.30   EOSINOPHILS  1.00  3.20   BASOPHILS  0.40  0.60   ASTSGOT  29   --    ALTSGPT  56*   --    ALKPHOSPHAT  61   --    TBILIRUBIN  0.6   --      Hemodynamics:  Temp (24hrs), Av.6 °C (97.8 °F), Min:35.9 °C (96.7 °F), Max:37 °C (98.6 °F)  Temperature: 35.9 °C (96.7 °F)  Pulse  Av.3  Min: 58  Max: 132   Blood Pressure: 106/75 mmHg, NIBP: 109/73 mmHg     Respiratory:    Respiration: 16, Pulse Oximetry: 97 %        RLL Breath Sounds: Diminished, LLL Breath Sounds: Diminished  Fluids:    Intake/Output Summary (Last 24 hours) at 17 1009  Last data filed at 17 0901   Gross per 24 hour   Intake      0 ml   Output    900 ml   Net   -900 ml     Weight: 84.5 kg (186 lb 4.6 oz)  GI/Nutrition:  Orders Placed This Encounter   Procedures   • DIET ORDER     Standing Status: Standing      Number of Occurrences: 1      Standing Expiration Date:      Order Specific Question:  Diet:     Answer:  Clear Liquids - No Red Foods [12]     Medical Decision Making, by Problem:  Active Hospital Problems    Diagnosis   • Atrial fibrillation with rapid ventricular response (CMS-HCC) [I48.91]     EGD Findings ():  1. Focal area of possible mild ischemia in gastric fundus?  Area biopsied for confirmation and to rule out neoplasia  2. Long segment suspected Barretts esophagus, biopsied.  No nodules or masses or significant inflammation.  3. Small hiatal hernia.    PROBLEMS:  1.  Nausea and vomiting.  Suspect symptoms are reflux related.  2.  Epigastric pain.  Suspect due to gastric ischemia and possible colonic ischemia given CTA with thickening of colon.  No obvious anatomic or vascular problems.  Suspect due to hypotension during A fib with RVR episode.  Currently asymptomatic.  4.  Abnormal CT scan with thickening of his esophagus.  5. Barretts esophagus  6.  Atrial fibrillation with rapid  ventricular response.  7.  Coronary artery disease.  8.  Hypertension.  9.  Diabetes.  10.  Fatty liver.  11.  Ischemic cardiomyopathy.    Plan:  1. Continue PPI BID  2. Advance diet  3. Will arrange for outpatient follow-up as will need surveillance EGDs given Barretts esophagus and possible colonoscopy to rule out colonic lesions.  Will sign off.    Medications reviewed, Labs reviewed and Radiology images reviewed

## 2017-05-20 NOTE — PROGRESS NOTES
Pt is pleasant, AO x 4, and has no c/o pain. Currently resting in bed but ambulated with no needed assistance.  Weaned to RA and sating in the upper 90's with no c/o SOB.  Vitals are stable.  Does continue to have some stomach discomfort but is tolerating clear liquid diet well.  Call light in reach.  No other problems or concerns.

## 2017-05-20 NOTE — DISCHARGE INSTRUCTIONS
Discharge Instructions    Discharged to home by car with relative. Discharged via wheelchair, hospital escort: Yes.  Special equipment needed: Not Applicable    Be sure to schedule a follow-up appointment with your primary care doctor or any specialists as instructed.     Discharge Plan:   Influenza Vaccine Indication: Not indicated: Previously immunized this influenza season and > 8 years of age    I understand that a diet low in cholesterol, fat, and sodium is recommended for good health. Unless I have been given specific instructions below for another diet, I accept this instruction as my diet prescription.   Other diet: Diabetic (Low Concentrated Sugar)    Special Instructions: None    · Is patient discharged on Warfarin / Coumadin?   No     · Is patient Post Blood Transfusion?  No    Depression / Suicide Risk    As you are discharged from this [facility] facility, it is important to learn how to keep safe from harming yourself.    Recognize the warning signs:  · Abrupt changes in personality, positive or negative- including increase in energy   · Giving away possessions  · Change in eating patterns- significant weight changes-  positive or negative  · Change in sleeping patterns- unable to sleep or sleeping all the time   · Unwillingness or inability to communicate  · Depression  · Unusual sadness, discouragement and loneliness  · Talk of wanting to die  · Neglect of personal appearance   · Rebelliousness- reckless behavior  · Withdrawal from people/activities they love  · Confusion- inability to concentrate     If you or a loved one observes any of these behaviors or has concerns about self-harm, here's what you can do:  · Talk about it- your feelings and reasons for harming yourself  · Remove any means that you might use to hurt yourself (examples: pills, rope, extension cords, firearm)  · Get professional help from the community (Mental Health, Substance Abuse, psychological counseling)  · Do not be  alone:Call your Safe Contact- someone whom you trust who will be there for you.  · Call your local CRISIS HOTLINE 899-6086 or 837-346-2633  · Call your local Children's Mobile Crisis Response Team [place] (052) 184-3094 or www.CREOpoint  · Call the toll free National Suicide Prevention Hotlines   · National Suicide Prevention Lifeline 707-317-IIOX (7756)  · [employer] Hope Line Network 800-SUICIDE (645-3319)        Rivaroxaban oral tablets  What is this medicine?  RIVAROXABAN (ri va LEONIDES a ban) is an anticoagulant (blood thinner). It is used to treat blood clots in the lungs or in the veins. It is also used after knee or hip surgeries to prevent blood clots. It is also used to lower the chance of stroke in people with a medical condition called atrial fibrillation.  This medicine may be used for other purposes; ask your health care provider or pharmacist if you have questions.  COMMON BRAND NAME(S): Xarelto  What should I tell my health care provider before I take this medicine?  They need to know if you have any of these conditions:  -bleeding disorders  -bleeding in the brain  -blood in your stools (black or tarry stools) or if you have blood in your vomit  -history of stomach bleeding  -kidney disease  -liver disease  -low blood counts, like low white cell, platelet, or red cell counts  -recent or planned spinal or epidural procedure  -take medicines that treat or prevent blood clots  -an unusual or allergic reaction to rivaroxaban, other medicines, foods, dyes, or preservatives  -pregnant or trying to get pregnant  -breast-feeding  How should I use this medicine?  Take this medicine by mouth with a glass of water. Follow the directions on the prescription label. Take your medicine at regular intervals. Do not take it more often than directed. Do not stop taking except on your doctor's advice.  If you are taking this medicine after hip or knee replacement surgery, take it with or without food.  If you  are taking this medicine for atrial fibrillation, take it with your evening meal. If you are taking this medicine to treat blood clots, take it with food at the same time each day. If you are unable to swallow your tablet, you may crush the tablet and mix it in applesauce. Then, immediately eat the applesauce. You should eat more food right after you eat the applesauce containing the crushed tablet.  Talk to your pediatrician regarding the use of this medicine in children. Special care may be needed.  Overdosage: If you think you've taken too much of this medicine contact a poison control center or emergency room at once.  Overdosage: If you think you have taken too much of this medicine contact a poison control center or emergency room at once.  NOTE: This medicine is only for you. Do not share this medicine with others.  What if I miss a dose?  If you take your medicine once a day and miss a dose, take the missed dose as soon as you remember. If you take your medicine twice a day and miss a dose, take the missed dose immediately. In this instance, 2 tablets may be taken at the same time. The next day you should take 1 tablet twice a day as directed.  What may interact with this medicine?  -aspirin and aspirin-like medicines  -certain antibiotics like erythromycin, azithromycin, and clarithromycin  -certain medicines for fungal infections like ketoconazole and itraconazole  -certain medicines for irregular heart beat like amiodarone, quinidine, dronedarone  -certain medicines for seizures like carbamazepine, phenytoin  -certain medicines that treat or prevent blood clots like warfarin, enoxaparin, and dalteparin   -conivaptan  -diltiazem  -felodipine  -indinavir  -lopinavir; ritonavir  -NSAIDS, medicines for pain and inflammation, like ibuprofen or naproxen  -ranolazine  -rifampin  -ritonavir  -Inga's wort  -verapamil  This list may not describe all possible interactions. Give your health care provider a list  of all the medicines, herbs, non-prescription drugs, or dietary supplements you use. Also tell them if you smoke, drink alcohol, or use illegal drugs. Some items may interact with your medicine.  What should I watch for while using this medicine?  Do not stop taking this medicine without first talking to your doctor. Stopping this medicine may increase your risk of having a stroke. Be sure to refill your prescription before you run out of medicine.  This medicine may increase your risk to bruise or bleed. Call your doctor or health care professional if you notice any unusual bleeding.  Be careful brushing and flossing your teeth or using a toothpick because you may bleed more easily. If you have any dental work done, tell your dentist you are receiving this medicine.  What side effects may I notice from receiving this medicine?  Side effects that you should report to your doctor or health care professional as soon as possible:  -allergic reactions like skin rash, itching or hives, swelling of the face, lips, or tongue  -back pain  -bloody or black, tarry stools  -changes in vision  -confusion, trouble speaking or understanding  -red or dark-brown urine  -redness, blistering, peeling or loosening of the skin, including inside the mouth  -severe headaches  -spitting up blood or brown material that looks like coffee grounds  -sudden numbness or weakness of the face, arm or leg  -trouble walking, dizziness, loss of balance or coordination  -unusual bruising or bleeding from the eye, gums, or nose   Side effects that usually do not require medical attention (Report these to your doctor or health care professional if they continue or are bothersome.):  -dizziness  -muscle pain  This list may not describe all possible side effects. Call your doctor for medical advice about side effects. You may report side effects to FDA at 9-626-FDA-3962.  Where should I keep my medicine?  Keep out of the reach of children.  Store at room  temperature between 15 and 30 degrees C (59 and 86 degrees F). Throw away any unused medicine after the expiration date.  NOTE: This sheet is a summary. It may not cover all possible information. If you have questions about this medicine, talk to your doctor, pharmacist, or health care provider.  © 2014, Elsevier/Gold Standard. (3/17/2014 3:32:09 PM)      Atrial Fibrillation  Atrial fibrillation is a type of irregular heart rhythm (arrhythmia). During atrial fibrillation, the upper chambers of the heart (atria) quiver continuously in a chaotic pattern. This causes an irregular and often rapid heart rate.   Atrial fibrillation is the result of the heart becoming overloaded with disorganized signals that tell it to beat. These signals are normally released one at a time by a part of the right atrium called the sinoatrial node. They then travel from the atria to the lower chambers of the heart (ventricles), causing the atria and ventricles to contract and pump blood as they pass. In atrial fibrillation, parts of the atria outside of the sinoatrial node also release these signals. This results in two problems. First, the atria receive so many signals that they do not have time to fully contract. Second, the ventricles, which can only receive one signal at a time, beat irregularly and out of rhythm with the atria.   There are three types of atrial fibrillation:   · Paroxysmal. Paroxysmal atrial fibrillation starts suddenly and stops on its own within a week.  · Persistent. Persistent atrial fibrillation lasts for more than a week. It may stop on its own or with treatment.  · Permanent. Permanent atrial fibrillation does not go away. Episodes of atrial fibrillation may lead to permanent atrial fibrillation.  Atrial fibrillation can prevent your heart from pumping blood normally. It increases your risk of stroke and can lead to heart failure.   CAUSES   2. Heart conditions, including a heart attack, heart failure, coronary  artery disease, and heart valve conditions.    3. Inflammation of the sac that surrounds the heart (pericarditis).  4. Blockage of an artery in the lungs (pulmonary embolism).  5. Pneumonia or other infections.  6. Chronic lung disease.  7. Thyroid problems, especially if the thyroid is overactive (hyperthyroidism).  8. Caffeine, excessive alcohol use, and use of some illegal drugs.    9. Use of some medicines, including certain decongestants and diet pills.  10. Heart surgery.    11. Birth defects.    Sometimes, no cause can be found. When this happens, the atrial fibrillation is called lone atrial fibrillation. The risk of complications from atrial fibrillation increases if you have lone atrial fibrillation and you are age 60 years or older.  RISK FACTORS  2. Heart failure.  3. Coronary artery disease.  4. Diabetes mellitus.    5. High blood pressure (hypertension).    6. Obesity.    7. Other arrhythmias.    8. Increased age.  SIGNS AND SYMPTOMS   2. A feeling that your heart is beating rapidly or irregularly.    3. A feeling of discomfort or pain in your chest.    4. Shortness of breath.    5. Sudden light-headedness or weakness.    6. Getting tired easily when exercising.    7. Urinating more often than normal (mainly when atrial fibrillation first begins).    In paroxysmal atrial fibrillation, symptoms may start and suddenly stop.  DIAGNOSIS   Your health care provider may be able to detect atrial fibrillation when taking your pulse. Your health care provider may have you take a test called an ambulatory electrocardiogram (ECG). An ECG records your heartbeat patterns over a 24-hour period. You may also have other tests, such as:  2. Transthoracic echocardiogram (TTE). During echocardiography, sound waves are used to evaluate how blood flows through your heart.  3. Transesophageal echocardiogram (DELMY).  4. Stress test. There is more than one type of stress test. If a stress test is needed, ask your health care  provider about which type is best for you.  5. Chest X-ray exam.  6. Blood tests.  7. Computed tomography (CT).  TREATMENT   Treatment may include:  · Treating any underlying conditions. For example, if you have an overactive thyroid, treating the condition may correct atrial fibrillation.  · Taking medicine. Medicines may be given to control a rapid heart rate or to prevent blood clots, heart failure, or a stroke.  · Having a procedure to correct the rhythm of the heart:  ¨ Electrical cardioversion. During electrical cardioversion, a controlled, low-energy shock is delivered to the heart through your skin. If you have chest pain, very low blood pressure, or sudden heart failure, this procedure may need to be done as an emergency.  ¨ Catheter ablation. During this procedure, heart tissues that send the signals that cause atrial fibrillation are destroyed.  ¨ Surgical ablation. During this surgery, thin lines of heart tissue that carry the abnormal signals are destroyed. This procedure can either be an open-heart surgery or a minimally invasive surgery. With the minimally invasive surgery, small cuts are made to access the heart instead of a large opening.  ¨ Pulmonary venous isolation. During this surgery, tissue around the veins that carry blood from the lungs (pulmonary veins) is destroyed. This tissue is thought to carry the abnormal signals.  HOME CARE INSTRUCTIONS   · Take medicines only as directed by your health care provider. Some medicines can make atrial fibrillation worse or recur.  · If blood thinners were prescribed by your health care provider, take them exactly as directed. Too much blood-thinning medicine can cause bleeding. If you take too little, you will not have the needed protection against stroke and other problems.  · Perform blood tests at home if directed by your health care provider. Perform blood tests exactly as directed.  · Quit smoking if you smoke.  · Do not drink alcohol.  · Do not  drink caffeinated beverages such as coffee, soda, and some teas. You may drink decaffeinated coffee, soda, or tea.    · Maintain a healthy weight. Do not use diet pills unless your health care provider approves. They may make heart problems worse.    · Follow diet instructions as directed by your health care provider.  · Exercise regularly as directed by your health care provider.  · Keep all follow-up visits as directed by your health care provider. This is important.  PREVENTION   The following substances can cause atrial fibrillation to recur:   · Caffeinated beverages.  · Alcohol.  · Certain medicines, especially those used for breathing problems.  · Certain herbs and herbal medicines, such as those containing ephedra or ginseng.  · Illegal drugs, such as cocaine and amphetamines.  Sometimes medicines are given to prevent atrial fibrillation from recurring. Proper treatment of any underlying condition is also important in helping prevent recurrence.   SEEK MEDICAL CARE IF:  · You notice a change in the rate, rhythm, or strength of your heartbeat.  · You suddenly begin urinating more frequently.  · You tire more easily when exerting yourself or exercising.  SEEK IMMEDIATE MEDICAL CARE IF:   · You have chest pain, abdominal pain, sweating, or weakness.  · You feel nauseous.  · You have shortness of breath.  · You suddenly have swollen feet and ankles.  · You feel dizzy.  · Your face or limbs feel numb or weak.  · You have a change in your vision or speech.  MAKE SURE YOU:   · Understand these instructions.  · Will watch your condition.  · Will get help right away if you are not doing well or get worse.     This information is not intended to replace advice given to you by your health care provider. Make sure you discuss any questions you have with your health care provider.     Document Released: 12/18/2006 Document Revised: 01/08/2016 Document Reviewed: 04/13/2016  Elsevier Interactive Patient Education ©2016  [employer]      Gastritis    Avoid all ibuprofen and naproxen, aspirin and alcohol.  Take Prilosec 40 mg a day for 4 weeks.  Add sucralfate once a day to the Prilosec. Return for worsening pain, bleeding or pain and fever.  Followup with your doctor or GI if not better in 3-4 days.   Take maalox and hydrocodone if needed until acid blocker takes effect. Return for worsening pain, uncontrolled vomiting, bleeding, dizziness or ill appearance    You have gastritis. Gastritis is an irritation of the stomach. This is often caused by medications, but can be from anything that bothers the stomach.   Other stomach irritants are:  9. Alcohol.  10. Caffeine.  11. Nicotine.  12. Spicy or acid foods.     13. Medications for pain and arthritis. Aspirin and other anti-inflammatory medicines such as ibuprofen (Advil), naproxen (Aleve), and ketoprofen (Orudis) can be highly irritating.  14. Emotional distress.     Symptoms of gastritis may include:  15. Abdominal pain. 16. Indigestion. 17. Nausea and or vomiting. 18. Bleeding.      Some patients with chronic gastritis and ulcers have been infected by a germ. They may need special testing. Medications which kill germs can be used to cure this condition.    Treatment includes avoiding the substances mentioned above that are known to cause stomach trouble.    Medications used to treat gastritis can include:  19. Antacids.  20. Medicines to control vomiting.   21. Acid blocking medicines (Axid, Pepcid, Prevacid, Prilosec, Tagamet, Zantac).     Symptoms of gastritis usually improve within 2-3 days of starting treatment. Call your caregiver if you are not better in a few days.    Seek medical care if you:    22. Have increased stomach pain or chest pain.  23. Vomit blood.  24. Faint or feel light headed. 25. Can not keep fluids down.  26. Pass bloody or black stools.  27. Develop severe back pain.     Document Released: 12/18/2006  Document Re-Released: 06/30/2008  ExitCare® Patient  Information ©2008 [facility].

## 2017-05-20 NOTE — CARE PLAN
Problem: Communication  Goal: The ability to communicate needs accurately and effectively will improve  Discussed with patient POC, white board updated; pt verbalized understanding

## 2017-05-20 NOTE — DISCHARGE PLANNING
[facility] with Pat at [facility] who will assured this SW she will fill and delivery pts Xarelto tonight to pts home.  SW contacted pts emergency contact who advised they will pick pt up from the hospital at 1600 today.

## 2017-05-21 NOTE — DISCHARGE SUMMARY
DATE OF ADMISSION:  05/17/2017    DATE OF DISCHARGE:  05/20/2017    PRINCIPAL DIAGNOSES:  1.  Paroxysmal atrial fibrillation with rapid ventricular response.  2.  Coronary artery disease with ischemic cardiomyopathy.  3.  Gastroesophageal reflux disease with Nieves's esophagus.  4.  Mild ischemic gastritis.  5.  Type 2 diabetes.  6.  Ischemic cardiomyopathy.    PRESENTATION:  Please refer to the dictated H and P by Dr. Jeffrey.    CONSULTANTS:  Dr. Berry from GI.    PROCEDURES DONE:  EGD revealing 10 cm focal area and gastric fundus with   erythema, edema, friability and slight ulceration concerning for possible   localized gastric ischemia.  Suspected long segment Nieves's esophagus with   salmon-colored mucosa extending from 29-37 cm without visible nodules, small   hiatal hernia.    PERTINENT TEST RESULTS ON THIS HOSPITALIZATION:  White blood cell count 6.8,   hemoglobin 13.3, hematocrit 40.1, platelet count 291.  Sodium 134, potassium   3.9, chloride 102, bicarbonate 24, glucose 156, BUN 10, creatinine 0.82.    Troponin I was less than 0.01.  Urinalysis was negative except for glucose.    TSH was 5.22.  Pathology revealed gastric biopsy fragments of benign gastric   mucosa without ___ abnormality.  No evidence of active chronic gastritis   identified.  ___ stained with appropriate control was negative for H. pylori.    Gastric fundus biopsy revealed fragments of benign gastric mucosa with marked   congestions and areas of ulceration suggestive of ischemic gastritis.  Only   minimal chronic inflammation is present, no evidence of malignancy or                 seen with appropriate control was negative for H. pylori.    Esophageal biopsy reveals 3 fragments of benign glandular mucosa, 2 of which   demonstrate intestinal metaplasia, no dysplasia is identified.  CT angiogram   of the abdomen and pelvis revealed no vascular abnormalities to account for   gastric ischemia, mild apparent wall thickening of the  gastric fundus, likely   due to nondistended state, small hiatal hernia, fatty infiltration of the   liver, minimal pneumobilia of uncertain etiology.  Minimal peritoneal fluid in   the pelvis, proximal sigmoid colonic wall thickening suggesting colitis.    HOSPITAL COURSE:  The patient is a 51-year-old male who presented with   abdominal pain.  He was noted to be in atrial fibrillation with rapid   ventricular response.  He was admitted and started on Lovenox and on Cardizem   drip.  He converted back to normal sinus rhythm.  He was noted to have   esophagitis on recent CT angiogram of the chest and Dr. Berry from GI was   consulted and evaluated the patient who underwent an upper endoscopy with the   above noted findings.  His EGD was concerning for a small area of ischemic   gastritis.  He had a CT angiogram with no clear etiology for his ischemic   gastritis.  The patient was transiently hypotensive with his AFib and he could   have possibly had some ischemia just related to his hypertension.  The   patient was restarted on anticoagulation after he was cleared by GI to resume   anticoagulation.  He remains in sinus rhythm.  His diet was advanced and he is   tolerating it well with no abdominal pain.  His abdominal exam is benign.  He   is otherwise clinically stable for discharge with the following instructions.    DIET:  Diabetic cardiac diet.    ACTIVITY:  As tolerated.    DISCHARGE MEDICATIONS:  Norco 1-2 tablets every 4 hours as needed, Toprol-XL   25 mg daily, omeprazole 20 mg 2 times a day, Xarelto 20 mg daily, atorvastatin   80 mg daily, Fenofibrate 160 mg daily, lisinopril 5 mg daily, loratadine 10   mg daily, metformin 1000 mg b.i.d., Zofran 4 mg every 8 hours as needed,   vitamin D 1000 units daily.    The patient will follow up with Dr. Berry from GI.  He will follow up with   his primary care provider as needed for recheck.  The patient will also   follow up with cardiology.  He is already  established with Dr. Mg.    Total time spent preparing for this discharge was 35 minutes.       ____________________________________     MD MAE TAVERAS / MEÑO    DD:  05/20/2017 13:39:02  DT:  05/20/2017 19:13:16    D#:  [id number]  Job#:  [id number]    cc: GALINDO MG MD, CRICKET MCARTHUR MD, Juancho Jenkins MD

## 2017-05-23 LAB — EKG IMPRESSION: NORMAL

## 2017-05-25 ENCOUNTER — PATIENT OUTREACH (OUTPATIENT)
Dept: HEALTH INFORMATION MANAGEMENT | Facility: OTHER | Age: 52
End: 2017-05-25

## 2017-06-01 ENCOUNTER — OFFICE VISIT (OUTPATIENT)
Dept: INTERNAL MEDICINE | Facility: MEDICAL CENTER | Age: 52
End: 2017-06-01
Payer: MEDICARE

## 2017-06-01 VITALS
WEIGHT: 184.2 LBS | SYSTOLIC BLOOD PRESSURE: 118 MMHG | OXYGEN SATURATION: 93 % | DIASTOLIC BLOOD PRESSURE: 84 MMHG | HEART RATE: 100 BPM | HEIGHT: 65 IN | BODY MASS INDEX: 30.69 KG/M2 | TEMPERATURE: 97.5 F

## 2017-06-01 DIAGNOSIS — E11.9 TYPE 2 DIABETES MELLITUS WITHOUT COMPLICATION, WITHOUT LONG-TERM CURRENT USE OF INSULIN (HCC): ICD-10-CM

## 2017-06-01 DIAGNOSIS — I10 ESSENTIAL HYPERTENSION: ICD-10-CM

## 2017-06-01 DIAGNOSIS — K21.9 GASTROESOPHAGEAL REFLUX DISEASE, ESOPHAGITIS PRESENCE NOT SPECIFIED: ICD-10-CM

## 2017-06-01 DIAGNOSIS — K22.719 BARRETT'S ESOPHAGUS WITH DYSPLASIA: ICD-10-CM

## 2017-06-01 DIAGNOSIS — E78.2 MIXED HYPERLIPIDEMIA: ICD-10-CM

## 2017-06-01 DIAGNOSIS — I48.91 ATRIAL FIBRILLATION WITH RAPID VENTRICULAR RESPONSE (HCC): ICD-10-CM

## 2017-06-01 DIAGNOSIS — I48.0 PAROXYSMAL ATRIAL FIBRILLATION (HCC): ICD-10-CM

## 2017-06-01 DIAGNOSIS — I25.5 CARDIOMYOPATHY, ISCHEMIC: ICD-10-CM

## 2017-06-01 PROCEDURE — 99213 OFFICE O/P EST LOW 20 MIN: CPT | Mod: GE | Performed by: INTERNAL MEDICINE

## 2017-06-01 PROCEDURE — G8417 CALC BMI ABV UP PARAM F/U: HCPCS | Mod: GC | Performed by: INTERNAL MEDICINE

## 2017-06-01 PROCEDURE — 3046F HEMOGLOBIN A1C LEVEL >9.0%: CPT | Mod: GC | Performed by: INTERNAL MEDICINE

## 2017-06-01 PROCEDURE — 3017F COLORECTAL CA SCREEN DOC REV: CPT | Mod: 8P,GC | Performed by: INTERNAL MEDICINE

## 2017-06-01 PROCEDURE — 1111F DSCHRG MED/CURRENT MED MERGE: CPT | Mod: GC | Performed by: INTERNAL MEDICINE

## 2017-06-01 PROCEDURE — G8432 DEP SCR NOT DOC, RNG: HCPCS | Mod: GC | Performed by: INTERNAL MEDICINE

## 2017-06-01 PROCEDURE — G8598 ASA/ANTIPLAT THER USED: HCPCS | Mod: GC | Performed by: INTERNAL MEDICINE

## 2017-06-01 PROCEDURE — 1036F TOBACCO NON-USER: CPT | Mod: GC | Performed by: INTERNAL MEDICINE

## 2017-06-01 RX ORDER — FENOFIBRATE 160 MG/1
160 TABLET ORAL DAILY
Qty: 30 TAB | Refills: 11 | Status: ON HOLD | OUTPATIENT
Start: 2017-06-01 | End: 2017-07-21

## 2017-06-01 RX ORDER — METOPROLOL SUCCINATE 25 MG/1
25 TABLET, EXTENDED RELEASE ORAL DAILY
Qty: 30 TAB | Refills: 5 | Status: SHIPPED | OUTPATIENT
Start: 2017-06-01 | End: 2017-11-01 | Stop reason: SDUPTHER

## 2017-06-01 RX ORDER — LISINOPRIL 5 MG/1
5 TABLET ORAL DAILY
Qty: 30 TAB | Refills: 11 | Status: SHIPPED | OUTPATIENT
Start: 2017-06-01 | End: 2018-04-30 | Stop reason: SDUPTHER

## 2017-06-01 RX ORDER — OMEPRAZOLE 20 MG/1
20 CAPSULE, DELAYED RELEASE ORAL 2 TIMES DAILY
Qty: 60 CAP | Refills: 11 | Status: SHIPPED | OUTPATIENT
Start: 2017-06-01 | End: 2017-09-11

## 2017-06-01 RX ORDER — LORATADINE 10 MG/1
10 TABLET ORAL DAILY
Qty: 30 TAB | Refills: 5 | Status: ON HOLD | OUTPATIENT
Start: 2017-06-01 | End: 2017-07-25

## 2017-06-01 NOTE — MR AVS SNAPSHOT
"        Ryan Trevizo   2017 2:45 PM   Office Visit   MRN: [id number]    Department:  [facility] Med - Internal Med   Dept Phone:  [phone]    Description:  Male : 1965   Provider:  Juancho Jenkins M.D.           Reason for Visit     Diabetes F/u       Allergies as of 2017     Allergen Noted Reactions    Ritalin [Methylphenidate] 2014   Unspecified    \"Hyper\"      You were diagnosed with     Paroxysmal atrial fibrillation (CMS-HCC)   [994763]         Vital Signs     Blood Pressure Pulse Temperature Height Weight Body Mass Index    118/84 mmHg 100 36.4 °C (97.5 °F) 1.651 m (5' 5\") 83.553 kg (184 lb 3.2 oz) 30.65 kg/m2    Oxygen Saturation Smoking Status                93% Never Smoker           Basic Information     Date Of Birth Sex Race Ethnicity Preferred Language    1965 Male White Non- English      Your appointments     Sep 11, 2017  1:15 PM   Established Patient with Juancho Jenkins M.D.   [facility] - Internal Medicine (--)    [address] [place] [address]  [facility] [address] [phone]           You will be receiving a confirmation call a few days before your appointment from our automated call confirmation system.            Oct 06, 2017  2:30 PM   FOLLOW UP with Joseph León M.D.   [facility] (--)    [address] [place] [address]  [facility] [address] [phone]              Problem List              ICD-10-CM Priority Class Noted - Resolved    HTN (hypertension) I10 Low  7/15/2012 - Present    GERD (gastroesophageal reflux disease) K21.9 Medium  7/15/2012 - Present    Hyperlipidemia E78.5 Low  2012 - Present    Ischemic Cardiomyopathy EF 45% I25.5   2012 - Present    Glaucoma (Chronic) H40.9 Low  Unknown - Present    Claudication (CMS-HCC) (Chronic) I73.9   Unknown - Present    CAD (coronary artery disease) I25.10 Low  10/14/2013 - Present    Type 2 diabetes mellitus without complication, " without long-term current use of insulin (HCC) E11.9 Low  2/16/2017 - Present    Chest pain R07.9 High  4/16/2017 - Present    Tachycardia R00.0   4/17/2017 - Present    Atrial fibrillation with rapid ventricular response (CMS-HCC) I48.91   5/18/2017 - Present    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   5/19/2017 - Present      Health Maintenance        Date Due Completion Dates    IMM HEP B VACCINE (1 of 3 - Primary Series) 1965 ---    DIABETES MONOFILAMENT / LE EXAM 1965 ---    RETINAL SCREENING 5/26/1981 ---    A1C SCREENING 10/16/2017 4/16/2017, 3/28/2017, 3/23/2017, 5/20/2016, 4/8/2016, 7/25/2012, 7/14/2012    URINE ACR / MICROALBUMIN 3/20/2018 3/20/2017, 1/24/2013    FASTING LIPID PROFILE 4/17/2018 4/17/2017, 3/20/2017, 12/19/2014, 6/13/2014, 1/24/2013, 7/15/2012, 8/1/2009    SERUM CREATININE 5/20/2018 5/20/2017, 5/19/2017, 5/17/2017, 5/6/2017, 4/17/2017, 4/16/2017, 12/11/2016, 5/20/2016, 5/15/2016, 5/5/2016, 4/11/2016, 4/10/2016, 4/9/2016, 4/8/2016, 4/8/2016, 4/5/2016, 4/4/2016, 10/8/2015, 7/15/2015, 12/19/2014, 12/18/2014, 6/13/2014, 6/12/2013, 1/24/2013, 9/15/2012, 9/14/2012, 7/30/2012, 7/30/2012, 7/29/2012, 7/28/2012, 7/27/2012, 7/25/2012, 7/20/2012, 7/19/2012, 7/17/2012, 7/17/2012, 7/16/2012, 7/15/2012, 7/14/2012, 8/1/2009, 7/12/2007, 4/21/2005    IMM DTaP/Tdap/Td Vaccine (2 - Td) 6/15/2026 6/15/2016    COLONOSCOPY 2/6/2027 2/6/2017 (N/S)    Override on 2/6/2017: (N/S) (PER [facility] NO COLONOSCOPY)            Current Immunizations     Influenza TIV (IM) 10/17/2016, 10/31/2014, 9/15/2012  1:18 PM, 10/30/2011, 9/15/2011    Influenza Vaccine Quad Inj (Pf) 10/15/2015, 10/3/2015    Pneumococcal polysaccharide vaccine (PPSV-23) 7/30/2012 12:10 AM    Tdap Vaccine 6/15/2016      Below and/or attached are the medications your provider expects you to take. Review all of your home medications and newly ordered medications with your provider and/or pharmacist. Follow medication instructions as directed by your provider  and/or pharmacist. Please keep your medication list with you and share with your provider. Update the information when medications are discontinued, doses are changed, or new medications (including over-the-counter products) are added; and carry medication information at all times in the event of emergency situations     Allergies:  RITALIN - Unspecified               Medications  Valid as of: June 01, 2017 -  3:27 PM    Generic Name Brand Name Tablet Size Instructions for use    Atorvastatin Calcium (Tab) LIPITOR 80 MG Take 1 Tab by mouth every evening.        Cholecalciferol (Tab) cholecalciferol 1000 UNIT Take 1 Tab by mouth every day.        Fenofibrate (Tab) TRIGLIDE 160 MG Take 1 Tab by mouth every day.        Lisinopril (Tab) PRINIVIL 5 MG Take 1 Tab by mouth every day.        Loratadine (Tab) CLARITIN 10 MG Take 1 Tab by mouth every day.        MetFORMIN HCl (Tab) GLUCOPHAGE 1000 MG Take 1 Tab by mouth 2 times a day, with meals.        Metoprolol Succinate (TABLET SR 24 HR) TOPROL XL 25 MG Take 1 Tab by mouth every day.        Omeprazole (CAPSULE DELAYED RELEASE) PRILOSEC 20 MG Take 1 Cap by mouth 2 times a day.        Rivaroxaban (Tab) XARELTO 20 MG Take 1 Tab by mouth with dinner.        .                 Medicines prescribed today were sent to:     [facility] [address] [facility]    [address] [address]    Phone: [phone] Fax: [phone]    Open 24 Hours?: No      Medication refill instructions:       If your prescription bottle indicates you have medication refills left, it is not necessary to call your provider’s office. Please contact your pharmacy and they will refill your medication.    If your prescription bottle indicates you do not have any refills left, you may request refills at any time through one of the following ways: The online [employer] system (except Urgent Care), by calling your provider’s office, or by asking your pharmacy to contact  your provider’s office with a refill request. Medication refills are processed only during regular business hours and may not be available until the next business day. Your provider may request additional information or to have a follow-up visit with you prior to refilling your medication.   *Please Note: Medication refills are assigned a new Rx number when refilled electronically. Your pharmacy may indicate that no refills were authorized even though a new prescription for the same medication is available at the pharmacy. Please request the medicine by name with the pharmacy before contacting your provider for a refill.        Referral     A referral request has been sent to our patient care coordination department. Please allow 3-5 business days for us to process this request and contact you either by phone or mail. If you do not hear from us by the 5th business day, please call us at [phone].           MyChart Status: Patient Declined

## 2017-06-01 NOTE — PROGRESS NOTES
Established Patient    Ryan presents today with the following:    CC: 3 month follow up, hospital follow up    HPI: Mr. Trevizo is a 52 year old male with a PMH of ischemic cardiomyopathy EF 45%, CAD, DMII, HTN, HLD, and GERD that presents for a 3 months follow up.    Since last being seen in this office on 3/23/2017 the patient was seen by his cardiologist, Dr. León, on 4/13/17 who recommended a repeat echocardiogram and lipid panel in 6 months with no change to his cardiac regimen at that time. He was hospitalized twice since last being seen once from 4/16-4/17 for chest pain that was likely 2/2 GERD. A CT-PE was ordered during that hospitalization which came back negative for acute PE. He was prescribed prilosec 40mg BID and a 10 day course of fluconazaole (He usually never picks up these prescribed medications and only will take it if it is prescribed to [facility] and delivered to him) on discharge at that time. He was also hospitalized from 5/18-5/20 with epigastric pain, SOB and N/V. He was found to be in A. Fib with RVR and admitted to the ICU on an IV cardizem drip and converted back into NSR. A CHADS vasc was calculated to be 2 and he was initiated on rivaroxaban during that hospitalization. He was also seen by gastroenterology who performed an EGD due to his recent CT findings of distal esophageal thickening and biopsies were taken of patient's distal esophagus and stomach which indicated barretts esophagus. A 10cm focal area of gastric fundus with erythema, edema, friability and slight ulceration was seen and the patient was diagnosed with ischemic gastritis which may have been secondary to hypotension.    At today's visit the patient feels well with no complaints of CP, SOB, epigastric pain, or N/V. He is accompanied by his caregiver Humza.    Patient Active Problem List    Diagnosis Date Noted   • Chest pain 04/16/2017     Priority: High   • GERD (gastroesophageal reflux disease) 07/15/2012      "Priority: Medium   • Type 2 diabetes mellitus without complication, without long-term current use of insulin ([facility]) 02/16/2017     Priority: Low   • CAD (coronary artery disease) 10/14/2013     Priority: Low   • Glaucoma      Priority: Low   • Hyperlipidemia 07/16/2012     Priority: Low   • HTN (hypertension) 07/15/2012     Priority: Low   • Nieves esophagus 05/19/2017   • Atrial fibrillation with rapid ventricular response (CMS-HCC) 05/18/2017   • Tachycardia 04/17/2017   • Claudication (CMS-HCC)    • Ischemic Cardiomyopathy EF 45% 07/17/2012       Current Outpatient Prescriptions   Medication Sig Dispense Refill   • rivaroxaban (XARELTO) 20 MG Tab tablet Take 1 Tab by mouth with dinner. 30 Tab 11   • omeprazole (PRILOSEC) 20 MG delayed-release capsule Take 1 Cap by mouth 2 times a day. 60 Cap 11   • metoprolol SR (TOPROL XL) 25 MG TABLET SR 24 HR Take 1 Tab by mouth every day. 30 Tab 5   • loratadine (CLARITIN) 10 MG Tab Take 1 Tab by mouth every day. 30 Tab 5   • fenofibrate (TRIGLIDE) 160 MG tablet Take 1 Tab by mouth every day. 30 Tab 11   • lisinopril (PRINIVIL) 5 MG Tab Take 1 Tab by mouth every day. 30 Tab 11   • metformin (GLUCOPHAGE) 1000 MG tablet Take 1 Tab by mouth 2 times a day, with meals. 60 Tab 11   • atorvastatin (LIPITOR) 80 MG tablet Take 1 Tab by mouth every evening. (Patient taking differently: Take 40 mg by mouth every evening.) 30 Tab 11   • vitamin D (CHOLECALCIFEROL) 1000 UNIT Tab Take 1 Tab by mouth every day. 30 Tab 11     No current facility-administered medications for this visit.       ROS: As per HPI. Additional pertinent symptoms as noted below.        /84 mmHg  Pulse 100  Temp(Src) 36.4 °C (97.5 °F)  Ht 1.651 m (5' 5\")  Wt 83.553 kg (184 lb 3.2 oz)  BMI 30.65 kg/m2  SpO2 93%    Physical Exam   General:  Alert and oriented, No apparent distress. Disheveled male appears stated age, well nourished, appears anxious.  Eyes: Pupils equal and reactive. No scleral icterus. EOMI, " no injected conjunctiva.  Throat: Clear no erythema or exudates noted.  Neck: Supple. No lymphadenopathy noted. Thyroid not enlarged.  Lungs: Clear to auscultation and percussion bilaterally. No wheezing, crackles, rhales.  Cardiovascular: Regular rate and rhythm. No murmurs, rubs or gallops.  Abdomen:  Benign. No rebound or guarding noted. Non-distended, non-tender.  Extremities: No clubbing, cyanosis, edema. Surgical scar on right knee, knees non-tender to touch, non-erythematous, no swelling present.  Skin: Clear. No rash or suspicious skin lesions noted.      Assessment and Plan    1. Paroxysmal atrial fibrillation with RVR  - Admitted from 5/18-5/20 with atrial fibrillation  - Converted back into NSR on IV cardizem drip  - Currently in NSR  - Denies CP, SOB, weakness, lightheadedness at today's visit  - Continue metoprolol ER 25mg daily  - CHADS vasc 2. Rivaroxaban prescribed at today's visit with enrollment in the anticoagulation clinic.  - Appt with Dr. León on 10/6/2017.    2. Type 2 diabetes mellitus  - HbA1c 7.7% on 5/20/16  - HbA1c 9.8% on 3/23/17 indicating worsening control  - Increase metformin from 500mg BID to 1000mg BID on 3/23/17  - Urine microalbumin 69.1. Already on lisinopril for renal protection.  - Continue to emphasize the importance of diet and weight loss  - Eye exam for diabetic, microfilament at next visit    3. Hyperlipidemia  - Atorvastatin 40mg daily increased to 80mg on 3/23/17  - Lipid panel 3/20 showed T. Chol 192, , HDL 37 and   - ASCVD score 11.2% based on most recent lipid panel results    4. Coronary artery disease  - Continue metoprolol, rivaroxaban, atorvastatin  - Will see Dr. León on 10/6/17 for repeat echo and lipid panel  - Stable, no CP                5. Ischemic Cardiomyopathy EF 45%  - Likely 2/2 CAD, Hx MI  - Saw cardiologist, Dr. Calixto, on 9/29. No changes were made to medication regimen at that time.  - Saw Dr. León on 4/13. No changes were  made to medication regimen, but will see on 10/6/17 for repeat echo and lipid panel.  - Continue metoprolol, lisinopril, atorvastatin  - Euvolemic with clear lungs at todays visit    6. Essential hypertension  - /84 at today's visit  - Continue metoprolol, lisinopril  - Stable    7. Gastroesophageal reflux disease  - Continue protonix daily  - EGD performed on 5/19/17 with salmon colored distal esophagus mucosa indicative of duarte's and a 10cm focal area of gastric fundus with erythema, edema, friability and slight ulceration was seen. May be indicative of ischemic gastritis associated with hypotension when he was in atrial fibrillation.  - CTA abdomen 5/19 did not indicate any vascular abnormality that would cause ischemic gastritis  - GI referral    8. Duarte's esophagus  - EGD performed on 5/19/17 with salmon colored distal esophagus mucosa indicative of duarte's and a 10cm focal area of gastric fundus with erythema, edema, friability and slight ulceration was seen.  - Pathology of esophageal biopsies 5/19/17 with intestinal metaplasia  - CT abd 12/11/16 shows changes in the distal esophagus likely related to inflammatory esophagitis  - Continue protonix  - Referral to GI for management of duarte's esophagus and screening colonoscopy    9. Vitamin D deficiency  - Continue Vitamin D replacement  - Vit D 3/28/17 was 29.9    10. Health maintenance  - GI referral for colonoscopy pending    PLAN: Follow up in 3 months with repeat POC HbA1c, GI referral pending. Consider monofilament, eye exam at next visit.              Signed by: Juancho Jenkins M.D.

## 2017-06-07 ENCOUNTER — TELEPHONE (OUTPATIENT)
Dept: VASCULAR LAB | Facility: MEDICAL CENTER | Age: 52
End: 2017-06-07

## 2017-06-07 NOTE — TELEPHONE ENCOUNTER
[facility]     for pt to call clinic and establish care. (contacted disability resource)    Félix Preston, TOYAD

## 2017-06-18 ENCOUNTER — HOSPITAL ENCOUNTER (EMERGENCY)
Facility: MEDICAL CENTER | Age: 52
End: 2017-06-18
Attending: EMERGENCY MEDICINE
Payer: MEDICARE

## 2017-06-18 VITALS
BODY MASS INDEX: 30.67 KG/M2 | DIASTOLIC BLOOD PRESSURE: 88 MMHG | HEART RATE: 67 BPM | HEIGHT: 65 IN | OXYGEN SATURATION: 92 % | RESPIRATION RATE: 15 BRPM | SYSTOLIC BLOOD PRESSURE: 150 MMHG | WEIGHT: 184.08 LBS | TEMPERATURE: 97.9 F

## 2017-06-18 DIAGNOSIS — R10.12 LEFT UPPER QUADRANT PAIN: ICD-10-CM

## 2017-06-18 LAB
ALBUMIN SERPL BCP-MCNC: 4 G/DL (ref 3.2–4.9)
ALBUMIN/GLOB SERPL: 1.7 G/DL
ALP SERPL-CCNC: 59 U/L (ref 30–99)
ALT SERPL-CCNC: 67 U/L (ref 2–50)
ANION GAP SERPL CALC-SCNC: 8 MMOL/L (ref 0–11.9)
AST SERPL-CCNC: 30 U/L (ref 12–45)
BASOPHILS # BLD AUTO: 0.6 % (ref 0–1.8)
BASOPHILS # BLD: 0.03 K/UL (ref 0–0.12)
BILIRUB SERPL-MCNC: 0.8 MG/DL (ref 0.1–1.5)
BUN SERPL-MCNC: 12 MG/DL (ref 8–22)
CALCIUM SERPL-MCNC: 8.7 MG/DL (ref 8.4–10.2)
CHLORIDE SERPL-SCNC: 108 MMOL/L (ref 96–112)
CO2 SERPL-SCNC: 21 MMOL/L (ref 20–33)
CREAT SERPL-MCNC: 0.8 MG/DL (ref 0.5–1.4)
EOSINOPHIL # BLD AUTO: 0.13 K/UL (ref 0–0.51)
EOSINOPHIL NFR BLD: 2.5 % (ref 0–6.9)
ERYTHROCYTE [DISTWIDTH] IN BLOOD BY AUTOMATED COUNT: 40.1 FL (ref 35.9–50)
GFR SERPL CREATININE-BSD FRML MDRD: >60 ML/MIN/1.73 M 2
GLOBULIN SER CALC-MCNC: 2.4 G/DL (ref 1.9–3.5)
GLUCOSE SERPL-MCNC: 171 MG/DL (ref 65–99)
HCT VFR BLD AUTO: 37.6 % (ref 42–52)
HGB BLD-MCNC: 13 G/DL (ref 14–18)
IMM GRANULOCYTES # BLD AUTO: 0.03 K/UL (ref 0–0.11)
IMM GRANULOCYTES NFR BLD AUTO: 0.6 % (ref 0–0.9)
LACTATE BLD-SCNC: 1.29 MMOL/L (ref 0.5–2)
LIPASE SERPL-CCNC: 29 U/L (ref 7–58)
LYMPHOCYTES # BLD AUTO: 1.08 K/UL (ref 1–4.8)
LYMPHOCYTES NFR BLD: 20.4 % (ref 22–41)
MCH RBC QN AUTO: 30.3 PG (ref 27–33)
MCHC RBC AUTO-ENTMCNC: 34.6 G/DL (ref 33.7–35.3)
MCV RBC AUTO: 87.6 FL (ref 81.4–97.8)
MONOCYTES # BLD AUTO: 0.4 K/UL (ref 0–0.85)
MONOCYTES NFR BLD AUTO: 7.5 % (ref 0–13.4)
NEUTROPHILS # BLD AUTO: 3.63 K/UL (ref 1.82–7.42)
NEUTROPHILS NFR BLD: 68.4 % (ref 44–72)
NRBC # BLD AUTO: 0 K/UL
NRBC BLD AUTO-RTO: 0 /100 WBC
PLATELET # BLD AUTO: 265 K/UL (ref 164–446)
PMV BLD AUTO: 9.4 FL (ref 9–12.9)
POTASSIUM SERPL-SCNC: 4 MMOL/L (ref 3.6–5.5)
PROT SERPL-MCNC: 6.4 G/DL (ref 6–8.2)
RBC # BLD AUTO: 4.29 M/UL (ref 4.7–6.1)
SODIUM SERPL-SCNC: 137 MMOL/L (ref 135–145)
SPECIMEN DRAWN FROM PATIENT: NORMAL
WBC # BLD AUTO: 5.3 K/UL (ref 4.8–10.8)

## 2017-06-18 PROCEDURE — 700102 HCHG RX REV CODE 250 W/ 637 OVERRIDE(OP): Performed by: EMERGENCY MEDICINE

## 2017-06-18 PROCEDURE — 96375 TX/PRO/DX INJ NEW DRUG ADDON: CPT

## 2017-06-18 PROCEDURE — 36415 COLL VENOUS BLD VENIPUNCTURE: CPT

## 2017-06-18 PROCEDURE — 80053 COMPREHEN METABOLIC PANEL: CPT

## 2017-06-18 PROCEDURE — 96374 THER/PROPH/DIAG INJ IV PUSH: CPT

## 2017-06-18 PROCEDURE — 85025 COMPLETE CBC W/AUTO DIFF WBC: CPT

## 2017-06-18 PROCEDURE — 96376 TX/PRO/DX INJ SAME DRUG ADON: CPT

## 2017-06-18 PROCEDURE — 99285 EMERGENCY DEPT VISIT HI MDM: CPT

## 2017-06-18 PROCEDURE — A9270 NON-COVERED ITEM OR SERVICE: HCPCS | Performed by: EMERGENCY MEDICINE

## 2017-06-18 PROCEDURE — 700111 HCHG RX REV CODE 636 W/ 250 OVERRIDE (IP): Performed by: EMERGENCY MEDICINE

## 2017-06-18 PROCEDURE — 83605 ASSAY OF LACTIC ACID: CPT

## 2017-06-18 PROCEDURE — 83690 ASSAY OF LIPASE: CPT

## 2017-06-18 RX ORDER — MORPHINE SULFATE 4 MG/ML
4 INJECTION, SOLUTION INTRAMUSCULAR; INTRAVENOUS ONCE
Status: COMPLETED | OUTPATIENT
Start: 2017-06-18 | End: 2017-06-18

## 2017-06-18 RX ORDER — ATORVASTATIN CALCIUM 80 MG/1
40 TABLET, FILM COATED ORAL NIGHTLY
Status: ON HOLD | COMMUNITY
End: 2017-07-25

## 2017-06-18 RX ORDER — ONDANSETRON 2 MG/ML
4 INJECTION INTRAMUSCULAR; INTRAVENOUS ONCE
Status: COMPLETED | OUTPATIENT
Start: 2017-06-18 | End: 2017-06-18

## 2017-06-18 RX ADMIN — ONDANSETRON 4 MG: 2 INJECTION INTRAMUSCULAR; INTRAVENOUS at 10:02

## 2017-06-18 RX ADMIN — MORPHINE SULFATE 4 MG: 4 INJECTION INTRAVENOUS at 10:06

## 2017-06-18 RX ADMIN — FAMOTIDINE 20 MG: 10 INJECTION, SOLUTION INTRAVENOUS at 10:10

## 2017-06-18 RX ADMIN — LIDOCAINE HYDROCHLORIDE 30 ML: 20 SOLUTION OROPHARYNGEAL at 10:01

## 2017-06-18 RX ADMIN — MORPHINE SULFATE 4 MG: 4 INJECTION INTRAVENOUS at 11:13

## 2017-06-18 ASSESSMENT — PAIN SCALES - GENERAL: PAINLEVEL_OUTOF10: 10

## 2017-06-18 NOTE — ED AVS SNAPSHOT
Tracour Access Code: Activation code not generated  Current Tracour Status: Patient Declined    Your email address is not on file at [facility].  Email Addresses are required for you to sign up for Tracour, please contact [phone] to verify your personal information and to provide your email address prior to attempting to register for Tracour.    Ryan Trevioz  [address]  [place] [address]    [employer]  A secure, online tool to manage your health information     [facility]’s Tracour® is a secure, online tool that connects you to your personalized health information from the privacy of your home -- day or night - making it very easy for you to manage your healthcare. Once the activation process is completed, you can even access your medical information using the Tracour anthony, which is available for free in the Apple Anthony store or Google Play store.     To learn more about Tracour, visit [email or web address]    There are two levels of access available (as shown below):   My Chart Features  [facility] Primary Care Doctor [facility]  Specialists [facility]  Urgent  Care Non-[facility] Primary Care Doctor   Email your healthcare team securely and privately 24/7 X X X    Manage appointments: schedule your next appointment; view details of past/upcoming appointments X      Request prescription refills. X      View recent personal medical records, including lab and immunizations X X X X   View health record, including health history, allergies, medications X X X X   Read reports about your outpatient visits, procedures, consult and ER notes X X X X   See your discharge summary, which is a recap of your hospital and/or ER visit that includes your diagnosis, lab results, and care plan X X  X     How to register for [employer]:  Once your e-mail address has been verified, follow the following steps to sign up for [employer].     1. Go to  [email or web address]  2. Click on the Sign Up Now box, which takes you to the  New Member Sign Up page. You will need to provide the following information:  a. Enter your [employer] Access Code exactly as it appears at the top of this page. (You will not need to use this code after you’ve completed the sign-up process. If you do not sign up before the expiration date, you must request a new code.)   b. Enter your date of birth.   c. Enter your home email address.   d. Click Submit, and follow the next screen’s instructions.  3. Create a [employer] ID. This will be your [employer] login ID and cannot be changed, so think of one that is secure and easy to remember.  4. Create a [employer] password. You can change your password at any time.  5. Enter your Password Reset Question and Answer. This can be used at a later time if you forget your password.   6. Enter your e-mail address. This allows you to receive e-mail notifications when new information is available in [employer].  7. Click Sign Up. You can now view your health information.    For assistance activating your [employer] account, call [phone]

## 2017-06-18 NOTE — ED PROVIDER NOTES
ED Provider Note    CHIEF COMPLAINT  Chief Complaint   Patient presents with   • LLQ Pain       HPI  Ryan Trevizo is a 52 y.o. male who presents to the emergency department with complaint of abdominal pain. He states he has left upper quadrant abdominal pain. Pain is sharp in nature and radiates to his back. He does have a history in the past of ulceration and possible ischemic gastric gastritis secondary to atrial fibrillation RVR. He states that he is taking his medications as prescribed. The patient denies any chest pain, has had nausea but no vomiting, no black stools, hematemesis, hematuria, melena. He states he woke up this morning and is exacerbated compared to his previous bouts.    REVIEW OF SYSTEMS  Positives as above. Pertinent negatives include fever, hematochezia, melena, hematemesis  All other review of systems are negative    PAST MEDICAL HISTORY  Past Medical History   Diagnosis Date   • Arthritis    • HTN (hypertension) 7/15/2012   • GERD (gastroesophageal reflux disease) 7/15/2012   • STEMI (ST elevation myocardial infarction) (CMS-HCC)    • Anxiety disorder    • MI (myocardial infarction) (CMS-HCC)    • Hyperlipemia    • Upper GI bleed 7/26/2012   • Glaucoma    • SOB (shortness of breath)    • Claudication (CMS-HCC)    • Dizziness    • CAD (coronary artery disease) 10/14/2013   • ACS (acute coronary syndrome) (CMS-HCC) 7/25/2012   • Pericarditis secondary to acute myocardial infarction (CMS-HCC) 7/19/2012   • Right knee pain    • Blood glucose elevated    • Mental retardation    • Osteoarthritis    • Hypertriglyceridemia    • A-fib (CMS-HCC)    • Ischemic cardiomyopathy    • Diabetes ([facility])        FAMILY HISTORY  Noncontributory    SOCIAL HISTORY  Social History     Social History   • Marital Status: Single     Spouse Name: N/A   • Number of Children: N/A   • Years of Education: N/A     Social History Main Topics   • Smoking status: Never Smoker    • Smokeless tobacco: Never Used   •  "Alcohol Use: No   • Drug Use: No   • Sexual Activity: Not Asked      Comment: Single, has no children, works as an .     Other Topics Concern   • None     Social History Narrative    ** Merged History Encounter **         ** Merged History Encounter **            SURGICAL HISTORY  Past Surgical History   Procedure Laterality Date   • Other orthopedic surgery  7-      R knee surgery   • Other  knee surgery   • Other cardiac surgery       stent placement   • Other     • Gastroscopy-endo  7/25/2012     Performed by JORDIN VERA at ENDOSCOPY [facility]   • Gastroscopy-endo  7/26/2012     Performed by JORDIN VERA at ENDOSCOPY [facility]   • Gastroscopy-endo  5/19/2017     Procedure: GASTROSCOPY-ENDO;  Surgeon: Reinaldo Berry M.D.;  Location: [facility];  Service:        CURRENT MEDICATIONS  Home Medications     **Home medications have not yet been reviewed for this encounter**          ALLERGIES  Allergies   Allergen Reactions   • Ritalin [Methylphenidate] Unspecified     \"Hyper\"       PHYSICAL EXAM  VITAL SIGNS: /88 mmHg  Pulse 76  Temp(Src) 36.6 °C (97.9 °F)  Resp 18  Ht 1.651 m (5' 5\")  Wt 83.5 kg (184 lb 1.4 oz)  BMI 30.63 kg/m2   Constitutional: Well developed, Well nourished, No acute distress, Non-toxic appearance.   Eyes: PERRLA, EOMI, Conjunctiva normal, No discharge.   Cardiovascular: Normal heart rate, Normal rhythm, No murmurs, No rubs, No gallops, and intact distal pulses.   Thorax & Lungs:  No respiratory distress, no rales, no rhonchi, No wheezing, No chest wall tenderness.   Abdomen: Protuberant, left upper quadrant tenderness, no guarding, no rebound, normal bowel sounds   Skin: Warm, Dry, No erythema, No rash.   Extremities: Full range of motion, no deformity, no edema.  Neurologic: Alert & oriented x 3, No focal deficits noted, acting appropriately on exam.  Psychiatric: Affect normal for clinical presentation.    Reviewed patient's " previous discharge summary and GI consultation by Dr. Berry    COURSE & MEDICAL DECISION MAKING  Pertinent Labs & Imaging studies reviewed. (See chart for details)  This is a 52-year-old male with chronic abdominal pain. Here in the emergency department, he is afebrile, is a vague presentation of pain. The patient is a negative lipase and not previous expressing pancreatitis, does not have right upper quadrant tenderness in his biliary enzymes are negative and I do not suspect cholecystitis, choledocholithiasis. He has no right lower quadrant tenderness and do not suspect appendicitis. The patient received a GI cocktail, morphine 4 mg IV, Zofran 4 mg IV and significant reduction in pain. Follow this, the patient requested 1 more dose of morphine therefore received 4 mg IV. On reevaluation, he has a nonsurgical abdomen, his no fever, is positive by mouth states pain is completely resolved. I do believe the patient has probable gastritis and receive Pepcid here in the emergency room as well.    Although at this point I do not believe the patient has an acute intra-abdominal process that requires emergent surgical intervention there is a possibility that the patient is experiencing an early infection that is in evolution that may require further evaluation and possible surgical consultation. Therefore, strict return precautions have been given to return to the emergency department within 24 hours if the patient has any remaining abdominal pain and to return sooner for increasing pain, uncontrolled nausea or vomiting, fevers or change in symptoms. The patient will followup with their primary care physician within 3 days for re-evaluation.      New Prescriptions    No medications on file       FINAL IMPRESSION   No diagnosis found.    The patient will return for new or worsening symptoms and is stable at the time of discharge.    The patient is referred to a primary physician for blood pressure management, diabetic  screening, and for all other preventative health concerns.    DISPOSITION:  Patient will be discharged home in stable condition.    FOLLOW UP:  [facility], Emergency Dept  [address]  [place] [id number] [phone]    If symptoms worsen    Reinaldo Berry M.D.  [address]  [place] [address]  [phone]    Schedule an appointment as soon as possible for a visit      Juancho Jenkins M.D.  [address]  [facility] [address]  [place] [id number] [phone]    Schedule an appointment as soon as possible for a visit  As needed      OUTPATIENT MEDICATIONS:  Discharge Medication List as of 6/18/2017 11:55 AM              Electronically signed by: Victor Manuel Truong, 6/18/2017 9:44 AM

## 2017-06-18 NOTE — ED NOTES
Pt able to tolerate water without difficulty, given discharge instructions, verbalized understanding to information provided including follow up care and return precautions.  Pt ambulating without difficulty, stated will take the bus home.

## 2017-06-18 NOTE — ED NOTES
0958:  PIV started by SARAHI Talley.  Blood drawn and sent to lab  1010:  Medicated as ordered.  Repositioned for comfort

## 2017-06-18 NOTE — ED AVS SNAPSHOT
6/18/2017    Ryan Trevizo  [address]  [facility] [address]    Dear Ryan:    [facility] wants to ensure your discharge home is safe and you or your loved ones have had all of your questions answered regarding your care after you leave the hospital.    Below is a list of resources and contact information should you have any questions regarding your hospital stay, follow-up instructions, or active medical symptoms.    Questions or Concerns Regarding… Contact   Medical Questions Related to Your Discharge  (7 days a week, 8am-5pm) Contact a Nurse Care Coordinator   [phone]   Medical Questions Not Related to Your Discharge  (24 hours a day / 7 days a week)  Contact the Nurse Health Line   [phone]    Medications or Discharge Instructions Refer to your discharge packet   or contact your [facility] Primary Care Provider   [phone]   Follow-up Appointment(s) Schedule your appointment via [employer]   or contact Scheduling [phone]   Billing Review your statement via [employer]  or contact Billing [phone]   Medical Records Review your records via [employer]   or contact Medical Records [phone]     You may receive a telephone call within two days of discharge. This call is to make certain you understand your discharge instructions and have the opportunity to have any questions answered. You can also easily access your medical information, test results and upcoming appointments via the [employer] free online health management tool. You can learn more and sign up at [email or web address]. For assistance setting up your [employer] account, please call [phone].    Once again, we want to ensure your discharge home is safe and that you have a clear understanding of any next steps in your care. If you have any questions or concerns, please do not hesitate to contact us, we are here for you. Thank you for choosing [facility] for your healthcare needs.    Sincerely,    Your [facility] Healthcare Team

## 2017-06-18 NOTE — ED AVS SNAPSHOT
Home Care Instructions                                                                                                                Ryan Trevizo   MRN: [id number]    Department:  [facility], Emergency Dept   Date of Visit:  6/18/2017            [facility], Emergency Dept    [address]    [place] [address]    Phone:  [phone]      You were seen by     Victor Manuel Truong D.O.      Your Diagnosis Was     Left upper quadrant pain     R10.12       These are the medications you received during your hospitalization from 06/18/2017 0920 to 06/18/2017 1155     Date/Time Order Dose Route Action    06/18/2017 1006 morphine (pf) 4 mg/ml injection 4 mg 4 mg Intravenous Given    06/18/2017 1001 hyoscyamine-maalox plus-lidocaine viscous (GI COCKTAIL) oral susp 30 mL 30 mL Oral Given    06/18/2017 1002 ondansetron (ZOFRAN) syringe/vial injection 4 mg 4 mg Intravenous Given    06/18/2017 1010 famotidine (PEPCID) injection 20 mg 20 mg Intravenous Given    06/18/2017 1113 morphine (pf) 4 mg/ml injection 4 mg 4 mg Intravenous Given      Follow-up Information     1. Follow up with [facility], Emergency Dept.    Specialty:  Emergency Medicine    Why:  If symptoms worsen    Contact information    [address] [address] [phone]        2. Schedule an appointment as soon as possible for a visit with Reinaldo Berry M.D..    Specialty:  Gastroenterology    Contact information    [address]  [place] [address] [phone]          3. Schedule an appointment as soon as possible for a visit with Juancho Jenkins M.D..    Specialty:  Internal Medicine    Why:  As needed    Contact information    [address]  [address]  [place] [address] [phone]        Medication Information     Review all of your home medications and newly ordered medications with your primary doctor and/or pharmacist as soon as  possible. Follow medication instructions as directed by your doctor and/or pharmacist.     Please keep your complete medication list with you and share with your physician. Update the information when medications are discontinued, doses are changed, or new medications (including over-the-counter products) are added; and carry medication information at all times in the event of emergency situations.               Medication List      ASK your doctor about these medications        Instructions    Morning Afternoon Evening Bedtime    atorvastatin 80 MG tablet   Commonly known as:  LIPITOR        Take 40 mg by mouth every evening.   Dose:  40 mg                        fenofibrate 160 MG tablet   Commonly known as:  TRIGLIDE        Take 1 Tab by mouth every day.   Dose:  160 mg                        lisinopril 5 MG Tabs   Commonly known as:  PRINIVIL        Take 1 Tab by mouth every day.   Dose:  5 mg                        loratadine 10 MG Tabs   Commonly known as:  CLARITIN        Take 1 Tab by mouth every day.   Dose:  10 mg                        metformin 1000 MG tablet   Commonly known as:  GLUCOPHAGE        Take 1 Tab by mouth 2 times a day, with meals.   Dose:  1000 mg                        metoprolol SR 25 MG Tb24   Commonly known as:  TOPROL XL        Take 1 Tab by mouth every day.   Dose:  25 mg                        omeprazole 20 MG delayed-release capsule   Commonly known as:  PRILOSEC        Take 1 Cap by mouth 2 times a day.   Dose:  20 mg                        rivaroxaban 20 MG Tabs tablet   Commonly known as:  XARELTO        Take 1 Tab by mouth with dinner.   Dose:  20 mg                        vitamin D 1000 UNIT Tabs   Commonly known as:  cholecalciferol        Take 1 Tab by mouth every day.   Dose:  1000 Units                                Procedures and tests performed during your visit     CBC WITH DIFFERENTIAL    COMP METABOLIC PANEL    Cardiac Monitoring    ESTIMATED GFR    IV Saline Lock     LACTIC ACID    LIPASE            Patient Information     Patient Information    Following emergency treatment: all patient requiring follow-up care must return either to a private physician or a clinic if your condition worsens before you are able to obtain further medical attention, please return to the emergency room.     Billing Information    At [facility], we work to make the billing process streamlined for our patients.  Our Representatives are here to answer any questions you may have regarding your hospital bill.  If you have insurance coverage and have supplied your insurance information to us, we will submit a claim to your insurer on your behalf.  Should you have any questions regarding your bill, we can be reached online or by phone as follows:  Online: You are able pay your bills online or live chat with our representatives about any billing questions you may have. We are here to help Monday - Friday from 8:00am to 7:30pm and 9:00am - 12:00pm on Saturdays.  Please visit [email or web address]  for more information.   Phone:  [phone] or [phone]    Please note that your emergency physician, surgeon, pathologist, radiologist, anesthesiologist, and other specialists are not employed by [facility] and will therefore bill separately for their services.  Please contact them directly for any questions concerning their bills at the numbers below:     Emergency Physician Services:  [phone]  [facility]:  [phone]  [facility]:  [phone]  [facility]:  [phone]    1. Your final bill may vary from the amount quoted upon discharge if all procedures are not complete at that time, or if your doctor has additional procedures of which we are not aware. You will receive an additional bill if you return to the Emergency Department at [facility] for suture removal regardless of the facility of which the sutures  were placed.     2. Please arrange for settlement of this account at the emergency registration.    3. All self-pay accounts are due in full at the time of treatment.  If you are unable to meet this obligation then payment is expected within 4-5 days.     4. If you have had radiology studies (CT, X-ray, Ultrasound, MRI), you have received a preliminary result during your emergency department visit. Please contact the radiology department [phone] to receive a copy of your final result. Please discuss the Final result with your primary physician or with the follow up physician provided.     Crisis Hotline:  [place] Crisis Hotline:  6-887-HSXPTRP or 1-971.106.4585  [place] Crisis Hotline:    1-704.683.4911 or 528-957-5420         ED Discharge Follow Up Questions    1. In order to provide you with very good care, we would like to follow up with a phone call in the next few days.  May we have your permission to contact you?     YES /  NO    2. What is the best phone number to call you? (       )_____-__________    3. What is the best time to call you?      Morning  /  Afternoon  /  Evening                   Patient Signature:  ____________________________________________________________    Date:  ____________________________________________________________      Your appointments     Jun 20, 2017 11:30 AM   Anti-Coag New Patient with VISTA PHARMACIST   [facility] [place] [facility]    [address]  [place] [id number] [phone]            Sep 11, 2017  1:15 PM   Established Patient with Juancho Jenkins M.D.   [facility] (--)    [address]  [address]  [place] [address]   [phone]           You will be receiving a confirmation call a few days before your appointment from our automated call confirmation system.            Oct 06, 2017  2:30 PM   FOLLOW UP with Joseph León M.D.   [facility] (--)    [address]  [place] [address]   [phone]

## 2017-06-18 NOTE — ED NOTES
Patient woke up with significant pain. States he is taking the medications as directed. Has a follow up with Dr. León. He has a  to help him with his appointment.

## 2017-06-20 ENCOUNTER — ANTICOAGULATION VISIT (OUTPATIENT)
Dept: MEDICAL GROUP | Facility: PHYSICIAN GROUP | Age: 52
End: 2017-06-20
Payer: MEDICARE

## 2017-06-20 ENCOUNTER — TELEPHONE (OUTPATIENT)
Dept: VASCULAR LAB | Facility: MEDICAL CENTER | Age: 52
End: 2017-06-20

## 2017-06-20 VITALS — DIASTOLIC BLOOD PRESSURE: 106 MMHG | HEART RATE: 74 BPM | SYSTOLIC BLOOD PRESSURE: 146 MMHG

## 2017-06-20 DIAGNOSIS — I48.91 ATRIAL FIBRILLATION WITH RAPID VENTRICULAR RESPONSE (HCC): ICD-10-CM

## 2017-06-20 LAB — INR PPP: 1 (ref 2–3.5)

## 2017-06-20 PROCEDURE — 99999 PR NO CHARGE: CPT | Performed by: INTERNAL MEDICINE

## 2017-06-20 PROCEDURE — 85610 PROTHROMBIN TIME: CPT | Performed by: INTERNAL MEDICINE

## 2017-06-20 NOTE — TELEPHONE ENCOUNTER
Initial anticoag note and most recent pcp note reviewed.  Pending further recommendations we will continue indefinite anticoag with xarelto as directed by pcp for afib with chads-vasc at least 3.      Will defer management of rhythm, rate, and all other CV issues to cards and PCP.    Michael Bloch, MD  Anticoagulation Clinic    Cc:  LUIS MIGUEL Jenkins

## 2017-06-20 NOTE — MR AVS SNAPSHOT
"Ryan Trevizo   2017 11:30 AM   Anticoagulation Visit   MRN: [id number]    Department:  [facility]   Dept Phone:  [phone]    Description:  Male : 1965   Provider:  Reinier Bah, PHARMD           Allergies as of 2017     Allergen Noted Reactions    Ritalin [Methylphenidate] 2014   Unspecified    \"Hyper\"      You were diagnosed with     Atrial fibrillation with rapid ventricular response (CMS-HCC)   [319564]         Vital Signs     Smoking Status                   Never Smoker            Basic Information     Date Of Birth Sex Race Ethnicity Preferred Language    1965 Male White Non- English      Your appointments     Sep 11, 2017  1:15 PM   Established Patient with Juancho Jenkins M.D.   [facility] - Internal Medicine (--)    [address] [place] [address]  [facility] [address] [phone]           You will be receiving a confirmation call a few days before your appointment from our automated call confirmation system.            Sep 12, 2017 11:30 AM   [facility]   [facility]    [address] [facility] [id number] [phone]            Oct 06, 2017  2:30 PM   FOLLOW UP with Joseph León M.D.   [facility] (--)    [address] [place] [address]  [facility] [id number] [phone]              Problem List              ICD-10-CM Priority Class Noted - Resolved    HTN (hypertension) I10 Low  7/15/2012 - Present    GERD (gastroesophageal reflux disease) K21.9 Medium  7/15/2012 - Present    Hyperlipidemia E78.5 Low  2012 - Present    Ischemic Cardiomyopathy EF 45% I25.5   2012 - Present    Glaucoma (Chronic) H40.9 Low  Unknown - Present    Claudication (CMS-HCC) (Chronic) I73.9   Unknown - Present    CAD (coronary artery disease) I25.10 Low  10/14/2013 - Present    Type 2 diabetes mellitus without complication, without long-term current " use of insulin (HCC) E11.9 Low  2/16/2017 - Present    Chest pain R07.9 High  4/16/2017 - Present    Tachycardia R00.0   4/17/2017 - Present    Atrial fibrillation with rapid ventricular response (CMS-HCC) I48.91   5/18/2017 - Present    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   5/19/2017 - Present      Health Maintenance        Date Due Completion Dates    IMM HEP B VACCINE (1 of 3 - Primary Series) 1965 ---    DIABETES MONOFILAMENT / LE EXAM 1965 ---    RETINAL SCREENING 5/26/1981 ---    A1C SCREENING 10/16/2017 4/16/2017, 3/28/2017, 3/23/2017, 5/20/2016, 4/8/2016, 7/25/2012, 7/14/2012    URINE ACR / MICROALBUMIN 3/20/2018 3/20/2017, 1/24/2013    FASTING LIPID PROFILE 4/17/2018 4/17/2017, 3/20/2017, 12/19/2014, 6/13/2014, 1/24/2013, 7/15/2012, 8/1/2009    SERUM CREATININE 6/18/2018 6/18/2017, 5/20/2017, 5/19/2017, 5/17/2017, 5/6/2017, 4/17/2017, 4/16/2017, 12/11/2016, 5/20/2016, 5/15/2016, 5/5/2016, 4/11/2016, 4/10/2016, 4/9/2016, 4/8/2016, 4/8/2016, 4/5/2016, 4/4/2016, 10/8/2015, 7/15/2015, 12/19/2014, 12/18/2014, 6/13/2014, 6/12/2013, 1/24/2013, 9/15/2012, 9/14/2012, 7/30/2012, 7/30/2012, 7/29/2012, 7/28/2012, 7/27/2012, 7/25/2012, 7/20/2012, 7/19/2012, 7/17/2012, 7/17/2012, 7/16/2012, 7/15/2012, 7/14/2012, 8/1/2009, 7/12/2007, 4/21/2005    IMM DTaP/Tdap/Td Vaccine (2 - Td) 6/15/2026 6/15/2016    COLONOSCOPY 2/6/2027 2/6/2017 (N/S)    Override on 2/6/2017: (N/S) (PER [facility] NO COLONOSCOPY)            Results     POCT Protime      Component    INR    1.0    Comment:     [id number] 3/2018 ic valid                        Current Immunizations     Influenza TIV (IM) 10/17/2016, 10/31/2014, 9/15/2012  1:18 PM, 10/30/2011, 9/15/2011    Influenza Vaccine Quad Inj (Pf) 10/15/2015, 10/3/2015    Pneumococcal polysaccharide vaccine (PPSV-23) 7/30/2012 12:10 AM    Tdap Vaccine 6/15/2016      Below and/or attached are the medications your provider expects you to take. Review all of your home medications and newly ordered medications  with your provider and/or pharmacist. Follow medication instructions as directed by your provider and/or pharmacist. Please keep your medication list with you and share with your provider. Update the information when medications are discontinued, doses are changed, or new medications (including over-the-counter products) are added; and carry medication information at all times in the event of emergency situations     Allergies:  RITALIN - Unspecified               Medications  Valid as of: June 20, 2017 - 11:47 AM    Generic Name Brand Name Tablet Size Instructions for use    Atorvastatin Calcium (Tab) LIPITOR 80 MG Take 40 mg by mouth every evening.        Cholecalciferol (Tab) cholecalciferol 1000 UNIT Take 1 Tab by mouth every day.        Fenofibrate (Tab) TRIGLIDE 160 MG Take 1 Tab by mouth every day.        Lisinopril (Tab) PRINIVIL 5 MG Take 1 Tab by mouth every day.        Loratadine (Tab) CLARITIN 10 MG Take 1 Tab by mouth every day.        MetFORMIN HCl (Tab) GLUCOPHAGE 1000 MG Take 1 Tab by mouth 2 times a day, with meals.        Metoprolol Succinate (TABLET SR 24 HR) TOPROL XL 25 MG Take 1 Tab by mouth every day.        Omeprazole (CAPSULE DELAYED RELEASE) PRILOSEC 20 MG Take 1 Cap by mouth 2 times a day.        Rivaroxaban (Tab) XARELTO 20 MG Take 1 Tab by mouth with dinner.        .                 Medicines prescribed today were sent to:     [place], [place] - [address] [facility]    [address] [address]    Phone: [phone] Fax: [phone]    Open 24 Hours?: No      Medication refill instructions:       If your prescription bottle indicates you have medication refills left, it is not necessary to call your provider’s office. Please contact your pharmacy and they will refill your medication.    If your prescription bottle indicates you do not have any refills left, you may request refills at any time through one of the following ways: The online [employer]  system (except Urgent Care), by calling your provider’s office, or by asking your pharmacy to contact your provider’s office with a refill request. Medication refills are processed only during regular business hours and may not be available until the next business day. Your provider may request additional information or to have a follow-up visit with you prior to refilling your medication.   *Please Note: Medication refills are assigned a new Rx number when refilled electronically. Your pharmacy may indicate that no refills were authorized even though a new prescription for the same medication is available at the pharmacy. Please request the medicine by name with the pharmacy before contacting your provider for a refill.        Warfarin Dosing Calendar   June 2017 Details    Sun Mon Tue Wed Thu Fri Sat         1               2               3                 4               5               6               7               8               9               10                 11               12               13               14               15               16               17                 18               19               20   1.0      See details      21               22               23               24                 25               26               27               28               29               30                 Date Details   06/20 This INR check   INR: 1.0   [id number] 3/2018 ic valid                 Warfarin Dosing Calendar   July 2017 Details    Sun Mon Tue Wed Thu Fri Sat           1                 2               3               4               5               6               7               8                 9               10               11               12               13               14               15                 16               17               18               19               20               21               22                 23               24               25               26                 27               28               29                 30               31                     Date Details   No additional details              Warfarin Dosing Calendar   August 2017 Details    Sun Mon Tue Wed Thu Fri Sat       1               2               3               4               5                 6               7               8               9               10               11               12                 13               14               15               16               17               18               19                 20               21               22               23               24               25               26                 27               28               29               30               31                  Date Details   No additional details              Warfarin Dosing Calendar   September 2017 Details    Sun Mon Tue Wed Thu Fri Sat          1               2                 3               4               5               6               7               8               9                 10               11               12               13               14               15               16                 17               18               19               20               21               22               23                 24               25               26               27               28               29               30                Date Details   No additional details    Date of next INR:  9/12/2017              MyChart Status: Patient Declined

## 2017-06-20 NOTE — PROGRESS NOTES
Anticoagulation Summary as of 6/20/2017     INR goal    Selected INR 1.0 (6/20/2017)   Maintenance plan No maintenance plan   Plan last modified Reinier Bah, PHARMD (6/20/2017)   Next INR check 9/12/2017   Target end date Indefinite    Indications   Atrial fibrillation with rapid ventricular response (CMS-[facility]) [I48.91]         Anticoagulation Episode Summary     INR check location     Preferred lab     Send INR reminders to     Comments       Anticoagulation Care Providers     Provider Role Specialty Phone number    Juancho Jenkins M.D. Referring Internal Medicine [phone]    [facility] Responsible  [phone]        Current Outpatient Prescriptions on File Prior to Visit   Medication Sig Dispense Refill   • atorvastatin (LIPITOR) 80 MG tablet Take 40 mg by mouth every evening.     • rivaroxaban (XARELTO) 20 MG Tab tablet Take 1 Tab by mouth with dinner. 30 Tab 11   • omeprazole (PRILOSEC) 20 MG delayed-release capsule Take 1 Cap by mouth 2 times a day. 60 Cap 11   • metoprolol SR (TOPROL XL) 25 MG TABLET SR 24 HR Take 1 Tab by mouth every day. 30 Tab 5   • loratadine (CLARITIN) 10 MG Tab Take 1 Tab by mouth every day. 30 Tab 5   • fenofibrate (TRIGLIDE) 160 MG tablet Take 1 Tab by mouth every day. 30 Tab 11   • lisinopril (PRINIVIL) 5 MG Tab Take 1 Tab by mouth every day. 30 Tab 11   • metformin (GLUCOPHAGE) 1000 MG tablet Take 1 Tab by mouth 2 times a day, with meals. 60 Tab 11   • vitamin D (CHOLECALCIFEROL) 1000 UNIT Tab Take 1 Tab by mouth every day. 30 Tab 11     No current facility-administered medications on file prior to visit.     Past Medical History   Diagnosis Date   • Arthritis    • HTN (hypertension) 7/15/2012   • GERD (gastroesophageal reflux disease) 7/15/2012   • STEMI (ST elevation myocardial infarction) (CMS-HCC)    • Anxiety disorder    • MI (myocardial infarction) (CMS-HCC)    • Hyperlipemia    • Upper GI bleed 7/26/2012   • Glaucoma    • SOB (shortness of  breath)    • Claudication (CMS-HCC)    • Dizziness    • CAD (coronary artery disease) 10/14/2013   • ACS (acute coronary syndrome) (CMS-HCC) 7/25/2012   • Pericarditis secondary to acute myocardial infarction (CMS-HCC) 7/19/2012   • Right knee pain    • Blood glucose elevated    • Mental retardation    • Osteoarthritis    • Hypertriglyceridemia    • A-fib (CMS-HCC)    • Ischemic cardiomyopathy    • Diabetes (CMS-HCC)      Lab Results   Component Value Date/Time    SODIUM 137 06/18/2017 09:58 AM    POTASSIUM 4.0 06/18/2017 09:58 AM    CHLORIDE 108 06/18/2017 09:58 AM    CO2 21 06/18/2017 09:58 AM    GLUCOSE 171* 06/18/2017 09:58 AM    BUN 12 06/18/2017 09:58 AM    CREATININE 0.80 06/18/2017 09:58 AM    BUN-CREATININE RATIO 18 06/13/2014 08:40 AM      Lab Results   Component Value Date/Time    WBC 5.3 06/18/2017 09:58 AM    RBC 4.29* 06/18/2017 09:58 AM    HEMOGLOBIN 13.0* 06/18/2017 09:58 AM    HEMATOCRIT 37.6* 06/18/2017 09:58 AM    MCV 87.6 06/18/2017 09:58 AM    MCH 30.3 06/18/2017 09:58 AM    MCHC 34.6 06/18/2017 09:58 AM    MPV 9.4 06/18/2017 09:58 AM    NEUTROPHILS-POLYS 68.40 06/18/2017 09:58 AM    LYMPHOCYTES 20.40* 06/18/2017 09:58 AM    MONOCYTES 7.50 06/18/2017 09:58 AM    EOSINOPHILS 2.50 06/18/2017 09:58 AM    BASOPHILS 0.60 06/18/2017 09:58 AM    ANISOCYTOSIS 1+ 07/26/2012 11:00 AM     Anticoagulation Patient Findings     Patient seen in clinic for initial visit following recent diagnosis of atrial fibrillation with RVR.  Patient is mentally handicapped and most information acquired through representative from [employer], Humza Zelaya.  He explains that patient is managed by PCP Dr. Jenkins, but also visits ER frequently for various ailment as he lives on his own.  Full med rec completed and reviewed with Humza to ensure no new medications initiated at last ER visit.  All medications packaged for patient via [employer], compliance monitored by [employer].  Upon review of  patient history, he did have acute DVT in hospital setting shortly after MI in 7/2012.  He had GI bleed at the time and was only placed on daily ASA as treatment going forward.  No evidence of valvular disease or stroke/TIA.  Atrial fibrillation newly diagnosed at recent ER visit, but has extensive cardiovascular history.  Followed by Dr. León in cardiology.  [id number] = at least 3 (HTN, DM, CAD).    Explained our services, hours of operation, anticoagulation therapy, potential SE, potential DI. Discussed monitoring parameters, such as blood in urine, blood in stool, discussed what to do if a dose is missed, or suspected as missed.  Emphasized importance of compliance.  Discussed lifestyle choices of ETOH & smoking and its impact on therapy.  Added [facility] to care team.       Target end date:Indefinite     Indication: Atrial Fibrillation     Drug: Xarelto 20mg     CHADsVASC = at least 3    Health Status Since Last Assessment   Patient denies any new relevant medical problems, ED visits or hospitalizations   Patient denies any embolic events (stroke/tia/systemic embolism)    Adherence with DOAC Therapy   Pt has NO missed any doses in the average week    Bleeding Risk Assessment     Negative Epistaxis   Pt denies any excessive or unusual bleeding/hematomas.  Pt denies any GI bleeds or hematemesis.  Pt denies any concerning daily headache or sub dural hematoma symptoms.     Pt denies any hematuria or abnormal vaginal bleeding.   Latest Hemoglobin 13.0   ETOH overuse Negative     Creatinine Clearance/Renal Function     Latest ClCr 124.7 mL/min (ABW)     Drug Interactions   ASA/other antiplatelets Negative   NSAID Negative   Other drug interactions Negative    Examination   Blood Pressure 146/106   Symptomatic hypotension Negative   Significant gait impairment/imbalance/high risk for falls? Negative    Final Assessment and Recommendations:   Patient appears stable from the anticoagulation  staindpoint.     Benefits of continued DOAC therapy outweigh risks for this patient   Recommend pt continue with current DOAC therapy Xarelto 20mg one time daily (with dose adjustment)     Other Actions:    Follow up:   In clinic in three months as requested by [employer] (as to coincide with next PCP visit),  Tuesday September 12, 2017 @ 11:30 am     Reinier Bah, PHARMD    CC Dr Michael Bloch

## 2017-07-03 RX ORDER — PANTOPRAZOLE SODIUM 40 MG/1
TABLET, DELAYED RELEASE ORAL
Qty: 30 TAB | Refills: 0 | OUTPATIENT
Start: 2017-07-03

## 2017-07-03 RX ORDER — MELATONIN
Qty: 30 TAB | Refills: 11 | Status: ON HOLD | OUTPATIENT
Start: 2017-07-03 | End: 2018-01-06

## 2017-07-03 NOTE — TELEPHONE ENCOUNTER
Was the patient seen in the last year in this department? Yes Last seen 6/1/17 Dr Jenkins next appt 9/11/17 Dr Jenkins.    Does patient have an active prescription for medications requested? No     Received Request Via: Pharmacy

## 2017-07-04 ENCOUNTER — APPOINTMENT (OUTPATIENT)
Dept: RADIOLOGY | Facility: MEDICAL CENTER | Age: 52
End: 2017-07-04
Attending: EMERGENCY MEDICINE
Payer: MEDICARE

## 2017-07-04 ENCOUNTER — HOSPITAL ENCOUNTER (EMERGENCY)
Facility: MEDICAL CENTER | Age: 52
End: 2017-07-04
Attending: EMERGENCY MEDICINE
Payer: MEDICARE

## 2017-07-04 VITALS
SYSTOLIC BLOOD PRESSURE: 152 MMHG | DIASTOLIC BLOOD PRESSURE: 89 MMHG | HEART RATE: 95 BPM | OXYGEN SATURATION: 95 % | RESPIRATION RATE: 18 BRPM | TEMPERATURE: 98.2 F

## 2017-07-04 DIAGNOSIS — M25.512 ACUTE PAIN OF LEFT SHOULDER: ICD-10-CM

## 2017-07-04 PROCEDURE — 99283 EMERGENCY DEPT VISIT LOW MDM: CPT

## 2017-07-04 PROCEDURE — 73030 X-RAY EXAM OF SHOULDER: CPT | Mod: LT

## 2017-07-04 NOTE — ED AVS SNAPSHOT
[employer] Access Code: [email or web address]  Expires: 8/3/2017  9:51 PM    Your email address is not on file at [facility].  Email Addresses are required for you to sign up for [employer], please contact [phone] to verify your personal information and to provide your email address prior to attempting to register for [employer].    Ryan Trevizo  [address]  [address]  [place] [address]    [employer]  A secure, online tool to manage your health information     [facility]’s [employer]® is a secure, online tool that connects you to your personalized health information from the privacy of your home -- day or night - making it very easy for you to manage your healthcare. Once the activation process is completed, you can even access your medical information using the [employer] anthony, which is available for free in the Apple Anthony store or Google Play store.     To learn more about [employer], visit [email or web address]    There are two levels of access available (as shown below):   My Chart Features  [facility] Primary Care Doctor [facility]  Specialists [facility]  Urgent  Care Non-[facility] Primary Care Doctor   Email your healthcare team securely and privately 24/7 X X X    Manage appointments: schedule your next appointment; view details of past/upcoming appointments X      Request prescription refills. X      View recent personal medical records, including lab and immunizations X X X X   View health record, including health history, allergies, medications X X X X   Read reports about your outpatient visits, procedures, consult and ER notes X X X X   See your discharge summary, which is a recap of your hospital and/or ER visit that includes your diagnosis, lab results, and care plan X X  X     How to register for [employer]:  Once your e-mail address has been verified, follow the following steps to sign up for [employer].     1. Go to  [email or web address]  2. Click on the Sign Up Now box, which takes you to the New Member Sign Up page. You will  need to provide the following information:  a. Enter your [employer] Access Code exactly as it appears at the top of this page. (You will not need to use this code after you’ve completed the sign-up process. If you do not sign up before the expiration date, you must request a new code.)   b. Enter your date of birth.   c. Enter your home email address.   d. Click Submit, and follow the next screen’s instructions.  3. Create a Bloomzt ID. This will be your [employer] login ID and cannot be changed, so think of one that is secure and easy to remember.  4. Create a Work[employer] password. You can change your password at any time.  5. Enter your Password Reset Question and Answer. This can be used at a later time if you forget your password.   6. Enter your e-mail address. This allows you to receive e-mail notifications when new information is available in [employer].  7. Click Sign Up. You can now view your health information.    For assistance activating your [employer] account, call [phone]

## 2017-07-04 NOTE — ED AVS SNAPSHOT
Home Care Instructions                                                                                                                Ryan Trevizo   MRN: [id number]    Department:  [facility], Emergency Dept   Date of Visit:  7/4/2017            [facility], Emergency Dept    [address] [place] [address]    Phone:  [phone]      You were seen by     Candelario German M.D.      Your Diagnosis Was     Acute pain of left shoulder     M25.512       Follow-up Information     1. Follow up with Candelario Richardson M.D..    Specialty:  Orthopaedics    Contact information    [address]  [address]  [place] [address] [phone]        Medication Information     Review all of your home medications and newly ordered medications with your primary doctor and/or pharmacist as soon as possible. Follow medication instructions as directed by your doctor and/or pharmacist.     Please keep your complete medication list with you and share with your physician. Update the information when medications are discontinued, doses are changed, or new medications (including over-the-counter products) are added; and carry medication information at all times in the event of emergency situations.               Medication List      ASK your doctor about these medications        Instructions    Morning Afternoon Evening Bedtime    atorvastatin 80 MG tablet   Commonly known as:  LIPITOR        Take 40 mg by mouth every evening.   Dose:  40 mg                        fenofibrate 160 MG tablet   Commonly known as:  TRIGLIDE        Take 1 Tab by mouth every day.   Dose:  160 mg                        lisinopril 5 MG Tabs   Commonly known as:  PRINIVIL        Take 1 Tab by mouth every day.   Dose:  5 mg                        loratadine 10 MG Tabs   Commonly known as:  CLARITIN        Take 1 Tab by mouth every day.   Dose:  10 mg                        metformin 1000 MG tablet      Commonly known as:  GLUCOPHAGE        Take 1 Tab by mouth 2 times a day, with meals.   Dose:  1000 mg                        metoprolol SR 25 MG Tb24   Commonly known as:  TOPROL XL        Take 1 Tab by mouth every day.   Dose:  25 mg                        omeprazole 20 MG delayed-release capsule   Commonly known as:  PRILOSEC        Take 1 Cap by mouth 2 times a day.   Dose:  20 mg                        rivaroxaban 20 MG Tabs tablet   Commonly known as:  XARELTO        Take 1 Tab by mouth with dinner.   Dose:  20 mg                        vitamin D3 (cholecalciferol) 1000 UNIT Tabs        TAKE 1 TABLET BY MOUTH ONCE DAILY.                                Procedures and tests performed during your visit     DX-SHOULDER 2+ LEFT        Discharge Instructions       Joint Pain  Joint pain can be caused by many things. The joint can be bruised, infected, weak from aging, or sore from exercise. The pain will probably go away if you follow your doctor's instructions for home care. If your joint pain continues, more tests may be needed to help find the cause of your condition.  HOME CARE  Watch your condition for any changes. Follow these instructions as told to lessen the pain that you are feeling:  · Take medicines only as told by your doctor.  · Rest the sore joint for as long as told by your doctor. If your doctor tells you to, raise (elevate) the painful joint above the level of your heart while you are sitting or lying down.  · Do not do things that cause pain or make the pain worse.  · If told, put ice on the painful area:  ¨ Put ice in a plastic bag.  ¨ Place a towel between your skin and the bag.  ¨ Leave the ice on for 20 minutes, 2-3 times per day.  · Wear an elastic bandage, splint, or sling as told by your doctor. Loosen the bandage or splint if your fingers or toes lose feeling (become numb) and tingle, or if they turn cold and blue.  · Begin exercising or stretching the joint as told by your doctor. Ask  your doctor what types of exercise are safe for you.  · Keep all follow-up visits as told by your doctor. This is important.  GET HELP IF:  · Your pain gets worse and medicine does not help it.  · Your joint pain does not get better in 3 days.  · You have more bruising or swelling.  · You have a fever.  · You lose 10 pounds (4.5 kg) or more without trying.  GET HELP RIGHT AWAY IF:  · You are not able to move the joint.  · Your fingers or toes become numb or they turn cold and blue.     This information is not intended to replace advice given to you by your health care provider. Make sure you discuss any questions you have with your health care provider.     Document Released: 12/06/2010 Document Revised: 01/08/2016 Document Reviewed: 09/29/2015  [employer] Interactive Patient Education ©2016 [employer]            Patient Information     Patient Information    Following emergency treatment: all patient requiring follow-up care must return either to a private physician or a clinic if your condition worsens before you are able to obtain further medical attention, please return to the emergency room.     Billing Information    At [facility], we work to make the billing process streamlined for our patients.  Our Representatives are here to answer any questions you may have regarding your hospital bill.  If you have insurance coverage and have supplied your insurance information to us, we will submit a claim to your insurer on your behalf.  Should you have any questions regarding your bill, we can be reached online or by phone as follows:  Online: You are able pay your bills online or live chat with our representatives about any billing questions you may have. We are here to help Monday - Friday from 8:00am to 7:30pm and 9:00am - 12:00pm on Saturdays.  Please visit [email or web address]  for more information.   Phone:  [phone] or [phone]    Please note that your emergency  physician, surgeon, pathologist, radiologist, anesthesiologist, and other specialists are not employed by [facility] and will therefore bill separately for their services.  Please contact them directly for any questions concerning their bills at the numbers below:     Emergency Physician Services:  [phone]  [facility]:  [phone]  [facility]:  [phone]  [facility]:  [phone]    1. Your final bill may vary from the amount quoted upon discharge if all procedures are not complete at that time, or if your doctor has additional procedures of which we are not aware. You will receive an additional bill if you return to the Emergency Department at [facility] for suture removal regardless of the facility of which the sutures were placed.     2. Please arrange for settlement of this account at the emergency registration.    3. All self-pay accounts are due in full at the time of treatment.  If you are unable to meet this obligation then payment is expected within 4-5 days.     4. If you have had radiology studies (CT, X-ray, Ultrasound, MRI), you have received a preliminary result during your emergency department visit. Please contact the radiology department [phone] to receive a copy of your final result. Please discuss the Final result with your primary physician or with the follow up physician provided.     Crisis Hotline:  [place] Crisis Hotline:  2-785-RUWLXPG or 1-128.614.6424  [place] Crisis Hotline:    1-381.392.2772 or 442-410-7276         ED Discharge Follow Up Questions    1. In order to provide you with very good care, we would like to follow up with a phone call in the next few days.  May we have your permission to contact you?     YES /  NO    2. What is the best phone number to call you? (       )_____-__________    3. What is the best time to call you?      Morning  /  Afternoon  /  Evening                   Patient Signature:   ____________________________________________________________    Date:  ____________________________________________________________      Your appointments     Sep 11, 2017  1:15 PM   Established Patient with Juancho Jenkins M.D.   [facility] - Internal Medicine (--)    [address] [place] [address] [address]  [place] [address]   [phone]           You will be receiving a confirmation call a few days before your appointment from our automated call confirmation system.            Sep 12, 2017 11:30 AM   Anti-Coag Routine with VISTA PHARMACIST   [facility]    [address] [place] [address]   [phone]            Oct 06, 2017  2:30 PM   FOLLOW UP with Joseph León M.D.   [facility] (--)    [address] [place] [address] [address]  [place] [address]   [phone]

## 2017-07-04 NOTE — ED AVS SNAPSHOT
7/4/2017    Ryan Trevizo  [address]  [facility] [address]    Dear Ryan:    [facility] wants to ensure your discharge home is safe and you or your loved ones have had all of your questions answered regarding your care after you leave the hospital.    Below is a list of resources and contact information should you have any questions regarding your hospital stay, follow-up instructions, or active medical symptoms.    Questions or Concerns Regarding… Contact   Medical Questions Related to Your Discharge  (7 days a week, 8am-5pm) Contact a Nurse Care Coordinator   [phone]   Medical Questions Not Related to Your Discharge  (24 hours a day / 7 days a week)  Contact the Nurse Health Line   [phone]    Medications or Discharge Instructions Refer to your discharge packet   or contact your [facility] Primary Care Provider   [phone]   Follow-up Appointment(s) Schedule your appointment via ZipList   or contact Scheduling [phone]   Billing Review your statement via ZipList  or contact Billing [phone]   Medical Records Review your records via ZipList   or contact Medical Records [phone]     You may receive a telephone call within two days of discharge. This call is to make certain you understand your discharge instructions and have the opportunity to have any questions answered. You can also easily access your medical information, test results and upcoming appointments via the ZipList free online health management tool. You can learn more and sign up at [employer]/ZipList. For assistance setting up your ZipList account, please call [phone].    Once again, we want to ensure your discharge home is safe and that you have a clear understanding of any next steps in your care. If you have any questions or concerns, please do not hesitate to contact us, we are here for you. Thank you for choosing [facility] for your healthcare needs.    Sincerely,    Your [facility] Healthcare Team

## 2017-07-05 NOTE — ED PROVIDER NOTES
ED Provider Note    CHIEF COMPLAINT  Chief Complaint   Patient presents with   • Shoulder Pain       HPI  Ryan Trevizo is a 52 y.o. male who presents complaining of left shoulder pain. At one point he told me it started Friday and another time yesterday. He is somewhat of a vague historian he doesn't think he has had a recent injury he does report a remote injury to the shoulder he says he doesn't see a doctor about the shoulder pain but it has occurred before. He is left-hand dominant he says he lifts heavy boxes occasionally at work he does not describe a definite injury however    REVIEW OF SYSTEMS  See HPI for further details. Musculoskeletal is not complaining of other joint pain  All other systems are negative.    PAST MEDICAL HISTORY  Past Medical History   Diagnosis Date   • Arthritis    • HTN (hypertension) 7/15/2012   • GERD (gastroesophageal reflux disease) 7/15/2012   • STEMI (ST elevation myocardial infarction) (CMS-HCC)    • Anxiety disorder    • MI (myocardial infarction) (CMS-HCC)    • Hyperlipemia    • Upper GI bleed 7/26/2012   • Glaucoma    • SOB (shortness of breath)    • Claudication (CMS-HCC)    • Dizziness    • CAD (coronary artery disease) 10/14/2013   • ACS (acute coronary syndrome) (CMS-HCC) 7/25/2012   • Pericarditis secondary to acute myocardial infarction (CMS-HCC) 7/19/2012   • Right knee pain    • Blood glucose elevated    • Mental retardation    • Osteoarthritis    • Hypertriglyceridemia    • A-fib (CMS-HCC)    • Ischemic cardiomyopathy    • Diabetes ([facility])        FAMILY HISTORY  No family history on file.    SOCIAL HISTORY  Social History     Social History   • Marital Status: Single     Spouse Name: N/A   • Number of Children: N/A   • Years of Education: N/A     Social History Main Topics   • Smoking status: Never Smoker    • Smokeless tobacco: Never Used   • Alcohol Use: No   • Drug Use: No   • Sexual Activity: Not on file      Comment: Single, has no children, works as an  ".     Other Topics Concern   • Not on file     Social History Narrative    ** Merged History Encounter **         ** Merged History Encounter **            SURGICAL HISTORY  Past Surgical History   Procedure Laterality Date   • Other orthopedic surgery  7-      R knee surgery   • Other  knee surgery   • Other cardiac surgery       stent placement   • Other     • Gastroscopy-endo  7/25/2012     Performed by JORDIN VERA at ENDOSCOPY [facility]   • Gastroscopy-endo  7/26/2012     Performed by JORDIN VERA at ENDOSCOPY [facility]   • Gastroscopy-endo  5/19/2017     Procedure: GASTROSCOPY-ENDO;  Surgeon: Reinaldo Berry M.D.;  Location: ENDOSCOPY [facility];  Service:        CURRENT MEDICATIONS  Home Medications     **Home medications have not yet been reviewed for this encounter**          ALLERGIES  Allergies   Allergen Reactions   • Ritalin [Methylphenidate] Unspecified     \"Hyper\"     EKG Interpretation:  Interpreted by me    Rhythm:  Normal sinus rhythm   Rate: 97  Axis: normal  Ectopy: none  Conduction: normal  ST Segments: no acute change  T Waves: no acute change  Q Waves: none  Clinical Impression: Normal EKG without acute changes   PHYSICAL EXAM  VITAL SIGNS: /115 mmHg  Pulse 100  Temp(Src) 36.8 °C (98.2 °F)  Resp 18  SpO2 97%     Constitutional: Well developed, Well nourished, No acute distress, Non-toxic appearance.    .   Skin: Warm, Dry, No erythema, No rash.   Extremities: Intact distal pulses, No edema, No tenderness, No cyanosis, No clubbing. Left shoulder does not reveal any swelling or deformity abnormal motion he does have pain with abduction above 90°  Musculoskeletal: Good range of motion in all major joints except there is pain with movement of the left shoulder. No tenderness to palpation or major deformities, or injuries noted.   Neurologic: Alert & oriented x 3, Normal motor function, Normal sensory function, No focal deficits noted.     "         COURSE & MEDICAL DECISION MAKING  Pertinent Labs & Imaging studies reviewed. (See chart for details)  Patient has some ongoing left shoulder pain which is not significantly physician for get an x-ray although I don't anticipate that it likely will be helpful since it is more of a long-term shoulder problem that will need orthopedic follow-up unless my partner to just make a disposition following x-ray. This clearly seems to be musculoskeletal and is increased with movement there is not a component of chest pain shortness of breath or other discomfort that would suggest acute coronary syndrome. I think this is a 7 over use injury will improve with time    FINAL IMPRESSION  1. Shoulder pain      Electronically signed by: Candelario German, 7/4/2017 8:57 PM

## 2017-07-05 NOTE — DISCHARGE INSTRUCTIONS
Joint Pain  Joint pain can be caused by many things. The joint can be bruised, infected, weak from aging, or sore from exercise. The pain will probably go away if you follow your doctor's instructions for home care. If your joint pain continues, more tests may be needed to help find the cause of your condition.  HOME CARE  Watch your condition for any changes. Follow these instructions as told to lessen the pain that you are feeling:  · Take medicines only as told by your doctor.  · Rest the sore joint for as long as told by your doctor. If your doctor tells you to, raise (elevate) the painful joint above the level of your heart while you are sitting or lying down.  · Do not do things that cause pain or make the pain worse.  · If told, put ice on the painful area:  ¨ Put ice in a plastic bag.  ¨ Place a towel between your skin and the bag.  ¨ Leave the ice on for 20 minutes, 2-3 times per day.  · Wear an elastic bandage, splint, or sling as told by your doctor. Loosen the bandage or splint if your fingers or toes lose feeling (become numb) and tingle, or if they turn cold and blue.  · Begin exercising or stretching the joint as told by your doctor. Ask your doctor what types of exercise are safe for you.  · Keep all follow-up visits as told by your doctor. This is important.  GET HELP IF:  · Your pain gets worse and medicine does not help it.  · Your joint pain does not get better in 3 days.  · You have more bruising or swelling.  · You have a fever.  · You lose 10 pounds (4.5 kg) or more without trying.  GET HELP RIGHT AWAY IF:  · You are not able to move the joint.  · Your fingers or toes become numb or they turn cold and blue.     This information is not intended to replace advice given to you by your health care provider. Make sure you discuss any questions you have with your health care provider.     Document Released: 12/06/2010 Document Revised: 01/08/2016 Document Reviewed: 09/29/2015  [employer]  Patient Education ©2016 [employer]

## 2017-07-14 ENCOUNTER — HOSPITAL ENCOUNTER (EMERGENCY)
Facility: MEDICAL CENTER | Age: 52
End: 2017-07-15
Attending: EMERGENCY MEDICINE
Payer: MEDICARE

## 2017-07-14 DIAGNOSIS — R19.7 DIARRHEA, UNSPECIFIED TYPE: Primary | ICD-10-CM

## 2017-07-14 DIAGNOSIS — R10.12 LEFT UPPER QUADRANT PAIN: ICD-10-CM

## 2017-07-14 PROCEDURE — 99284 EMERGENCY DEPT VISIT MOD MDM: CPT

## 2017-07-14 NOTE — ED AVS SNAPSHOT
[email or web address] Access Code: [email or web address]  Expires: 8/3/2017  9:51 PM    Your email address is not on file at [facility].  Email Addresses are required for you to sign up for [email or web address], please contact [phone] to verify your personal information and to provide your email address prior to attempting to register for [email or web address].    Ryan Palomino Joseline  [address]  [place] [address]    Mplife.com  A secure, online tool to manage your health information     [facility]’s Mplife.com® is a secure, online tool that connects you to your personalized health information from the privacy of your home -- day or night - making it very easy for you to manage your healthcare. Once the activation process is completed, you can even access your medical information using the Mplife.com yoli, which is available for free in the Apple Yoli store or Google Play store.     To learn more about [email or web address], visit [email or web address]    There are two levels of access available (as shown below):   My Chart Features  [facility] Primary Care Doctor [facility]  Specialists [facility]  Urgent  Care Non-[facility] Primary Care Doctor   Email your healthcare team securely and privately 24/7 X X X    Manage appointments: schedule your next appointment; view details of past/upcoming appointments X      Request prescription refills. X      View recent personal medical records, including lab and immunizations X X X X   View health record, including health history, allergies, medications X X X X   Read reports about your outpatient visits, procedures, consult and ER notes X X X X   See your discharge summary, which is a recap of your hospital and/or ER visit that includes your diagnosis, lab results, and care plan X X  X     How to register for Mplife.com:  Once your e-mail address has been verified, follow the following steps to sign up for [email or web address].com.     1. Go to  [email or web address]  2. Click on the Sign Up Now box, which takes you to the New Member Sign Up page.  You will need to provide the following information:  a. Enter your Qoture Access Code exactly as it appears at the top of this page. (You will not need to use this code after you’ve completed the sign-up process. If you do not sign up before the expiration date, you must request a new code.)   b. Enter your date of birth.   c. Enter your home email address.   d. Click Submit, and follow the next screen’s instructions.  3. Create a [employer] ID. This will be your Qoture login ID and cannot be changed, so think of one that is secure and easy to remember.  4. Create a Qoture password. You can change your password at any time.  5. Enter your Password Reset Question and Answer. This can be used at a later time if you forget your password.   6. Enter your e-mail address. This allows you to receive e-mail notifications when new information is available in Qoture.  7. Click Sign Up. You can now view your health information.    For assistance activating your Qoture account, call [phone]

## 2017-07-14 NOTE — ED AVS SNAPSHOT
7/15/2017    Ryan Trevizo  [address]  [facility] [address]    Dear Ryan:    [facility] wants to ensure your discharge home is safe and you or your loved ones have had all of your questions answered regarding your care after you leave the hospital.    Below is a list of resources and contact information should you have any questions regarding your hospital stay, follow-up instructions, or active medical symptoms.    Questions or Concerns Regarding… Contact   Medical Questions Related to Your Discharge  (7 days a week, 8am-5pm) Contact a Nurse Care Coordinator   [phone]   Medical Questions Not Related to Your Discharge  (24 hours a day / 7 days a week)  Contact the Nurse Health Line   [phone]    Medications or Discharge Instructions Refer to your discharge packet   or contact your [facility] Primary Care Provider   [phone]   Follow-up Appointment(s) Schedule your appointment via Roomish   or contact Scheduling [phone]   Billing Review your statement via Roomish  or contact Billing [phone]   Medical Records Review your records via Roomish   or contact Medical Records [phone]     You may receive a telephone call within two days of discharge. This call is to make certain you understand your discharge instructions and have the opportunity to have any questions answered. You can also easily access your medical information, test results and upcoming appointments via the Roomish free online health management tool. You can learn more and sign up at [employer]/Roomish. For assistance setting up your Roomish account, please call [phone].    Once again, we want to ensure your discharge home is safe and that you have a clear understanding of any next steps in your care. If you have any questions or concerns, please do not hesitate to contact us, we are here for you. Thank you for choosing [facility] for your healthcare needs.    Sincerely,    Your [facility] Healthcare Team

## 2017-07-15 VITALS
OXYGEN SATURATION: 95 % | WEIGHT: 185.19 LBS | HEART RATE: 59 BPM | DIASTOLIC BLOOD PRESSURE: 89 MMHG | TEMPERATURE: 97.9 F | SYSTOLIC BLOOD PRESSURE: 136 MMHG | HEIGHT: 71 IN | BODY MASS INDEX: 25.93 KG/M2 | RESPIRATION RATE: 18 BRPM

## 2017-07-15 LAB
ALBUMIN SERPL BCP-MCNC: 3.9 G/DL (ref 3.2–4.9)
ALBUMIN/GLOB SERPL: 1.6 G/DL
ALP SERPL-CCNC: 71 U/L (ref 30–99)
ALT SERPL-CCNC: 51 U/L (ref 2–50)
ANION GAP SERPL CALC-SCNC: 8 MMOL/L (ref 0–11.9)
AST SERPL-CCNC: 22 U/L (ref 12–45)
BASOPHILS # BLD AUTO: 0.6 % (ref 0–1.8)
BASOPHILS # BLD: 0.03 K/UL (ref 0–0.12)
BILIRUB SERPL-MCNC: 1.2 MG/DL (ref 0.1–1.5)
BUN SERPL-MCNC: 8 MG/DL (ref 8–22)
CALCIUM SERPL-MCNC: 8.9 MG/DL (ref 8.5–10.5)
CHLORIDE SERPL-SCNC: 107 MMOL/L (ref 96–112)
CO2 SERPL-SCNC: 22 MMOL/L (ref 20–33)
CREAT SERPL-MCNC: 0.79 MG/DL (ref 0.5–1.4)
EOSINOPHIL # BLD AUTO: 0.13 K/UL (ref 0–0.51)
EOSINOPHIL NFR BLD: 2.5 % (ref 0–6.9)
ERYTHROCYTE [DISTWIDTH] IN BLOOD BY AUTOMATED COUNT: 41.1 FL (ref 35.9–50)
GFR SERPL CREATININE-BSD FRML MDRD: >60 ML/MIN/1.73 M 2
GLOBULIN SER CALC-MCNC: 2.5 G/DL (ref 1.9–3.5)
GLUCOSE SERPL-MCNC: 188 MG/DL (ref 65–99)
HCT VFR BLD AUTO: 36.2 % (ref 42–52)
HGB BLD-MCNC: 12.1 G/DL (ref 14–18)
IMM GRANULOCYTES # BLD AUTO: 0.03 K/UL (ref 0–0.11)
IMM GRANULOCYTES NFR BLD AUTO: 0.6 % (ref 0–0.9)
LIPASE SERPL-CCNC: 25 U/L (ref 11–82)
LYMPHOCYTES # BLD AUTO: 1.54 K/UL (ref 1–4.8)
LYMPHOCYTES NFR BLD: 29.3 % (ref 22–41)
MCH RBC QN AUTO: 29.5 PG (ref 27–33)
MCHC RBC AUTO-ENTMCNC: 33.4 G/DL (ref 33.7–35.3)
MCV RBC AUTO: 88.3 FL (ref 81.4–97.8)
MONOCYTES # BLD AUTO: 0.45 K/UL (ref 0–0.85)
MONOCYTES NFR BLD AUTO: 8.6 % (ref 0–13.4)
NEUTROPHILS # BLD AUTO: 3.07 K/UL (ref 1.82–7.42)
NEUTROPHILS NFR BLD: 58.4 % (ref 44–72)
NRBC # BLD AUTO: 0 K/UL
NRBC BLD AUTO-RTO: 0 /100 WBC
PLATELET # BLD AUTO: 251 K/UL (ref 164–446)
PMV BLD AUTO: 9.7 FL (ref 9–12.9)
POTASSIUM SERPL-SCNC: 3.3 MMOL/L (ref 3.6–5.5)
PROT SERPL-MCNC: 6.4 G/DL (ref 6–8.2)
RBC # BLD AUTO: 4.1 M/UL (ref 4.7–6.1)
SODIUM SERPL-SCNC: 137 MMOL/L (ref 135–145)
WBC # BLD AUTO: 5.3 K/UL (ref 4.8–10.8)

## 2017-07-15 PROCEDURE — 80053 COMPREHEN METABOLIC PANEL: CPT

## 2017-07-15 PROCEDURE — 700102 HCHG RX REV CODE 250 W/ 637 OVERRIDE(OP): Performed by: EMERGENCY MEDICINE

## 2017-07-15 PROCEDURE — 85025 COMPLETE CBC W/AUTO DIFF WBC: CPT

## 2017-07-15 PROCEDURE — 83690 ASSAY OF LIPASE: CPT

## 2017-07-15 PROCEDURE — A9270 NON-COVERED ITEM OR SERVICE: HCPCS | Performed by: EMERGENCY MEDICINE

## 2017-07-15 RX ORDER — DICYCLOMINE HCL 20 MG
20 TABLET ORAL EVERY 6 HOURS
Qty: 20 TAB | Refills: 0 | Status: SHIPPED | OUTPATIENT
Start: 2017-07-15 | End: 2017-09-11

## 2017-07-15 RX ORDER — DICYCLOMINE HCL 20 MG
20 TABLET ORAL EVERY 6 HOURS
Qty: 20 TAB | Refills: 0 | Status: SHIPPED | OUTPATIENT
Start: 2017-07-15 | End: 2017-07-20

## 2017-07-15 RX ORDER — DICYCLOMINE HCL 20 MG
20 TABLET ORAL ONCE
Status: COMPLETED | OUTPATIENT
Start: 2017-07-15 | End: 2017-07-15

## 2017-07-15 RX ADMIN — DICYCLOMINE HYDROCHLORIDE 20 MG: 20 TABLET ORAL at 00:34

## 2017-07-15 ASSESSMENT — PAIN SCALES - GENERAL: PAINLEVEL_OUTOF10: 10

## 2017-07-15 ASSESSMENT — LIFESTYLE VARIABLES: DO YOU DRINK ALCOHOL: NO

## 2017-07-15 NOTE — ED NOTES
Discharge instructions given to patient; patient verbalized understanding. Patient advised on where to  prescription at; patient verbalized understanding. Patient's VS WNL upon discharge. Patient ambulatory w/ steady gait upon leaving ED.

## 2017-07-15 NOTE — ED NOTES
Stand by assist x1, patient up to restroom and back to room. He has a steady gate at this time  He was unable to provide a urine sample at this time.   Vital signs rechecked

## 2017-07-15 NOTE — ED NOTES
.  Chief Complaint   Patient presents with   • Diarrhea     diarrhea since this afternoon, pt with co abd pain to top of abd generalized in area, bib remsa ambulatory to triage, denies any blood in stool, denies any pain with urination     Pt with diarrhea stool x2 this evening fsbs 204 by sariah.  Pt Informed regarding triage process and verbalized understanding to inform triage tech or RN for any changes in condition.  Placed in lobby.

## 2017-07-15 NOTE — DISCHARGE INSTRUCTIONS
Return to the emergency department in 24 hours for any persistent abdominal pain or diarrhea.  Return to the emergency department immediately for worsening abdominal pain, vomiting, bloody stools, fever or other new concerns.  Otherwise, follow-up with primary care next week for reevaluation, medication management) pressure monitoring.    Bentyl every 6 hours as needed for abdominal cramping.  Clear liquid diet for 12-24 hours, then advance to bland as tolerated.    Abdominal Pain (Nonspecific)  Your exam might not show the exact reason you have abdominal pain. Since there are many different causes of abdominal pain, another checkup and more tests may be needed. It is very important to follow up for lasting (persistent) or worsening symptoms. A possible cause of abdominal pain in any person who still has his or her appendix is acute appendicitis. Appendicitis is often hard to diagnose. Normal blood tests, urine tests, ultrasound, and CT scans do not completely rule out early appendicitis or other causes of abdominal pain. Sometimes, only the changes that happen over time will allow appendicitis and other causes of abdominal pain to be determined. Other potential problems that may require surgery may also take time to become more apparent. Because of this, it is important that you follow all of the instructions below.  HOME CARE INSTRUCTIONS   · Rest as much as possible.   · Do not eat solid food until your pain is gone.   · While adults or children have pain: A diet of water, weak decaffeinated tea, broth or bouillon, gelatin, oral rehydration solutions (ORS), frozen ice pops, or ice chips may be helpful.   · When pain is gone in adults or children: Start a light diet (dry toast, crackers, applesauce, or white rice). Increase the diet slowly as long as it does not bother you. Eat no dairy products (including cheese and eggs) and no spicy, fatty, fried, or high-fiber foods.   · Use no alcohol, caffeine, or  cigarettes.   · Take your regular medicines unless your caregiver told you not to.   · Take any prescribed medicine as directed.   · Only take over-the-counter or prescription medicines for pain, discomfort, or fever as directed by your caregiver. Do not give aspirin to children.   If your caregiver has given you a follow-up appointment, it is very important to keep that appointment. Not keeping the appointment could result in a permanent injury and/or lasting (chronic) pain and/or disability. If there is any problem keeping the appointment, you must call to reschedule.   SEEK IMMEDIATE MEDICAL CARE IF:   · Your pain is not gone in 24 hours.   · Your pain becomes worse, changes location, or feels different.   · You or your child has an oral temperature above 102° F (38.9° C), not controlled by medicine.   · Your baby is older than 3 months with a rectal temperature of 102° F (38.9° C) or higher.   · Your baby is 3 months old or younger with a rectal temperature of 100.4° F (38° C) or higher.   · You have shaking chills.   · You keep throwing up (vomiting) or cannot drink liquids.   · There is blood in your vomit or you see blood in your bowel movements.   · Your bowel movements become dark or black.   · You have frequent bowel movements.   · Your bowel movements stop (become blocked) or you cannot pass gas.   · You have bloody, frequent, or painful urination.   · You have yellow discoloration in the skin or whites of the eyes.   · Your stomach becomes bloated or bigger.   · You have dizziness or fainting.   · You have chest or back pain.   MAKE SURE YOU:   · Understand these instructions.   · Will watch your condition.   · Will get help right away if you are not doing well or get worse.   Document Released: 12/18/2006 Document Revised: 03/11/2013 Document Reviewed: 11/15/2010  [employer]® Patient Information ©2013 [employer].  Diarrhea  Diarrhea is watery poop (stool). It can make you feel weak, tired, thirsty, or  give you a dry mouth (signs of dehydration). Watery poop is a sign of another problem, most often an infection. It often lasts 2-3 days. It can last longer if it is a sign of something serious. Take care of yourself as told by your doctor.  HOME CARE   · Drink 1 cup (8 ounces) of fluid each time you have watery poop.  · Do not drink the following fluids:  ¨ Those that contain simple sugars (fructose, glucose, galactose, lactose, sucrose, maltose).  ¨ Sports drinks.  ¨ Fruit juices.  ¨ Whole milk products.  ¨ Sodas.  ¨ Drinks with caffeine (coffee, tea, soda) or alcohol.  · Oral rehydration solution may be used if the doctor says it is okay. You may make your own solution. Follow this recipe:  ¨  - teaspoon table salt.  ¨ ¾ teaspoon baking soda.  ¨  teaspoon salt substitute containing potassium chloride.  ¨ 1 tablespoons sugar.  ¨ 1 liter (34 ounces) of water.  · Avoid the following foods:  ¨ High fiber foods, such as raw fruits and vegetables.  ¨ Nuts, seeds, and whole grain breads and cereals.  ¨  Those that are sweetened with sugar alcohols (xylitol, sorbitol, mannitol).  · Try eating the following foods:  ¨ Starchy foods, such as rice, toast, pasta, low-sugar cereal, oatmeal, baked potatoes, crackers, and bagels.  ¨ Bananas.  ¨ Applesauce.  · Eat probiotic-rich foods, such as yogurt and milk products that are fermented.  · Wash your hands well after each time you have watery poop.  · Only take medicine as told by your doctor.  · Take a warm bath to help lessen burning or pain from having watery poop.  GET HELP RIGHT AWAY IF:   · You cannot drink fluids without throwing up (vomiting).  · You keep throwing up.  · You have blood in your poop, or your poop looks black and tarry.  · You do not pee (urinate) in 6-8 hours, or there is only a small amount of very dark pee.  · You have belly (abdominal) pain that gets worse or stays in the same spot (localizes).  · You are weak, dizzy, confused, or light-headed.  · You  have a very bad headache.  · Your watery poop gets worse or does not get better.  · You have a fever or lasting symptoms for more than 2-3 days.  · You have a fever and your symptoms suddenly get worse.  MAKE SURE YOU:   · Understand these instructions.  · Will watch your condition.  · Will get help right away if you are not doing well or get worse.     This information is not intended to replace advice given to you by your health care provider. Make sure you discuss any questions you have with your health care provider.     Document Released: 06/05/2009 Document Revised: 01/08/2016 Document Reviewed: 08/25/2013  [email or web address] Interactive Patient Education ©2016 [email or web address]

## 2017-07-15 NOTE — ED PROVIDER NOTES
ED Provider Note    CHIEF COMPLAINT  Chief Complaint   Patient presents with   • Diarrhea     diarrhea since this afternoon, pt with co abd pain to top of abd generalized in area, bib remsa ambulatory to triage, denies any blood in stool, denies any pain with urination       HPI  Ryan Trevizo is a 52 y.o. male who presents to the emergency department by ambulance to triage for abdominal pain and diarrhea this afternoon. Patient points to the epigastric and left upper quadrant for intermittent cramping abdominal pain. Associated with 2 episodes of nonbloody mucoid diarrhea prior to arrival. Nausea without vomiting. No fever or chills. Denies sick contacts, recent travel, bad food or recent antibiotics.    REVIEW OF SYSTEMS  See HPI for further details. All other systems are negative.     PAST MEDICAL HISTORY   has a past medical history of Arthritis; HTN (hypertension) (7/15/2012); GERD (gastroesophageal reflux disease) (7/15/2012); STEMI (ST elevation myocardial infarction) (CMS-HCC); Anxiety disorder; MI (myocardial infarction) (CMS-HCC); Hyperlipemia; Upper GI bleed (7/26/2012); Glaucoma; SOB (shortness of breath); Claudication (CMS-HCC); Dizziness; CAD (coronary artery disease) (10/14/2013); ACS (acute coronary syndrome) (CMS-HCC) (7/25/2012); Pericarditis secondary to acute myocardial infarction (CMS-HCC) (7/19/2012); Right knee pain; Blood glucose elevated; Mental retardation; Osteoarthritis; Hypertriglyceridemia; A-fib (CMS-HCC); Ischemic cardiomyopathy; and Diabetes (CMS-HCC).    SOCIAL HISTORY  Social History     Social History Main Topics   • Smoking status: Never Smoker    • Smokeless tobacco: Never Used   • Alcohol Use: No   • Drug Use: No   • Sexual Activity: Not on file      Comment: Single, has no children, works as an .       SURGICAL HISTORY   has past surgical history that includes other orthopedic surgery (7- ); other (knee surgery); other cardiac surgery; other;  "gastroscopy-endo (7/25/2012); gastroscopy-endo (7/26/2012); and gastroscopy-endo (5/19/2017).    CURRENT MEDICATIONS  Home Medications     **Home medications have not yet been reviewed for this encounter**          ALLERGIES  Allergies   Allergen Reactions   • Ritalin [Methylphenidate] Unspecified     \"Hyper\"       PHYSICAL EXAM  VITAL SIGNS: /89 mmHg  Pulse 63  Temp(Src) 36.6 °C (97.9 °F)  Resp 18  Ht 1.803 m (5' 11\")  Wt 84 kg (185 lb 3 oz)  BMI 25.84 kg/m2  SpO2 96%  Pulse ox interpretation: I interpret this pulse ox as normal.  Constitutional: Alert in no apparent distress. Disheveled.  HENT: Normocephalic, atraumatic. Bilateral external ears normal, Nose normal. Moist mucous membranes.    Eyes: Pupils are equal and reactive, Conjunctiva normal.   Neck: Normal range of motion, Supple.   Lymphatic: No lymphadenopathy noted.   Cardiovascular: Regular rate and rhythm, no murmurs. Distal pulses intact.    Thorax & Lungs: Normal breath sounds.  No wheezing/rales/ronchi. No increased work of breathing.   Abdomen: Obese, soft, nondistended. Mild tenderness to palpation epigastric and left upper quadrant without rebound, guarding or peritonitis.  Skin: Warm, Dry, No erythema, No rash.   Musculoskeletal: Good range of motion in all major joints. No major deformities noted.   Neurologic: Alert , no gross focal deficit noted.  Psychiatric: Affect normal, Judgment normal, Mood normal.       DIAGNOSTIC STUDIES / PROCEDURES    LABS  Results for orders placed or performed during the hospital encounter of 07/14/17   CBC WITH DIFFERENTIAL   Result Value Ref Range    WBC 5.3 4.8 - 10.8 K/uL    RBC 4.10 (L) 4.70 - 6.10 M/uL    Hemoglobin 12.1 (L) 14.0 - 18.0 g/dL    Hematocrit 36.2 (L) 42.0 - 52.0 %    MCV 88.3 81.4 - 97.8 fL    MCH 29.5 27.0 - 33.0 pg    MCHC 33.4 (L) 33.7 - 35.3 g/dL    RDW 41.1 35.9 - 50.0 fL    Platelet Count 251 164 - 446 K/uL    MPV 9.7 9.0 - 12.9 fL    Neutrophils-Polys 58.40 44.00 - 72.00 %    " Lymphocytes 29.30 22.00 - 41.00 %    Monocytes 8.60 0.00 - 13.40 %    Eosinophils 2.50 0.00 - 6.90 %    Basophils 0.60 0.00 - 1.80 %    Immature Granulocytes 0.60 0.00 - 0.90 %    Nucleated RBC 0.00 /100 WBC    Neutrophils (Absolute) 3.07 1.82 - 7.42 K/uL    Lymphs (Absolute) 1.54 1.00 - 4.80 K/uL    Monos (Absolute) 0.45 0.00 - 0.85 K/uL    Eos (Absolute) 0.13 0.00 - 0.51 K/uL    Baso (Absolute) 0.03 0.00 - 0.12 K/uL    Immature Granulocytes (abs) 0.03 0.00 - 0.11 K/uL    NRBC (Absolute) 0.00 K/uL   COMP METABOLIC PANEL   Result Value Ref Range    Sodium 137 135 - 145 mmol/L    Potassium 3.3 (L) 3.6 - 5.5 mmol/L    Chloride 107 96 - 112 mmol/L    Co2 22 20 - 33 mmol/L    Anion Gap 8.0 0.0 - 11.9    Glucose 188 (H) 65 - 99 mg/dL    Bun 8 8 - 22 mg/dL    Creatinine 0.79 0.50 - 1.40 mg/dL    Calcium 8.9 8.5 - 10.5 mg/dL    AST(SGOT) 22 12 - 45 U/L    ALT(SGPT) 51 (H) 2 - 50 U/L    Alkaline Phosphatase 71 30 - 99 U/L    Total Bilirubin 1.2 0.1 - 1.5 mg/dL    Albumin 3.9 3.2 - 4.9 g/dL    Total Protein 6.4 6.0 - 8.2 g/dL    Globulin 2.5 1.9 - 3.5 g/dL    A-G Ratio 1.6 g/dL   LIPASE   Result Value Ref Range    Lipase 25 11 - 82 U/L   ESTIMATED GFR   Result Value Ref Range    GFR If African American >60 >60 mL/min/1.73 m 2    GFR If Non African American >60 >60 mL/min/1.73 m 2       COURSE & MEDICAL DECISION MAKING  Nursing notes and vital signs were reviewed. (See chart for details)  The patients  records were reviewed, history was obtained from the patient;     ED evaluation for abdominal pain and diarrhea is unrevealing, suspect acute gastrointestinal viral illness versus food reaction. Patient is quite asymptomatic in the emergency department, mild abdominal discomfort without reproducible pain on palpation. No clinical evidence for cholecystitis, appendicitis, pyelonephritis or other peritonitis or acute abdomen. Vital signs are stable without fever or tachycardia. Labs are unremarkable, no leukocytosis, electrolyte  derangement, renal or liver dysfunction. Tolerating oral fluids without nausea or vomiting. No further diarrhea in the emergency department. No recent travel, antibiotic use nor bloody or mucoid stools to suggest infectious etiology.    Patient is stable for discharge at this time, anticipatory guidance provided, Bentyl for abdominal cramping, close follow-up is encouraged, and strict ED return instructions have been detailed and include 24-hour return for persistent symptoms, an immediate return for worsening symptoms, which include pain, fever, vomiting or bloody stools at which time further evaluation may be indicated.. Patient is agreeable to the disposition and plan.    Patient's blood pressure was elevated in the emergency department, and has been referred to primary care for close monitoring.    FINAL IMPRESSION  (R19.7) Diarrhea, unspecified type  (primary encounter diagnosis)  (R10.12) Left upper quadrant pain      Electronically signed by: Talita Hickey, 7/15/2017 12:09 AM      This dictation was created using voice recognition software. The accuracy of the dictation is limited to the abilities of the software. I expect there may be some errors of grammar and possibly content. The nursing notes were reviewed and certain aspects of this information were incorporated into this note.

## 2017-07-16 ENCOUNTER — PATIENT OUTREACH (OUTPATIENT)
Dept: HEALTH INFORMATION MANAGEMENT | Facility: OTHER | Age: 52
End: 2017-07-16

## 2017-07-16 NOTE — PROGRESS NOTES
· Placed discharge outreach phone call to patient s/p ER discharge 7/15/17.  Left voicemail providing my contact information and instructions to call with any questions or concerns.

## 2017-07-20 ENCOUNTER — APPOINTMENT (OUTPATIENT)
Dept: RADIOLOGY | Facility: MEDICAL CENTER | Age: 52
End: 2017-07-20
Attending: EMERGENCY MEDICINE
Payer: MEDICARE

## 2017-07-20 ENCOUNTER — HOSPITAL ENCOUNTER (OUTPATIENT)
Facility: MEDICAL CENTER | Age: 52
End: 2017-07-21
Attending: EMERGENCY MEDICINE | Admitting: INTERNAL MEDICINE
Payer: MEDICARE

## 2017-07-20 ENCOUNTER — RESOLUTE PROFESSIONAL BILLING HOSPITAL PROF FEE (OUTPATIENT)
Dept: MEDSURG UNIT | Facility: MEDICAL CENTER | Age: 52
End: 2017-07-20
Payer: MEDICARE

## 2017-07-20 LAB
ALBUMIN SERPL BCP-MCNC: 4.3 G/DL (ref 3.2–4.9)
ALBUMIN/GLOB SERPL: 1.5 G/DL
ALP SERPL-CCNC: 72 U/L (ref 30–99)
ALT SERPL-CCNC: 45 U/L (ref 2–50)
ANION GAP SERPL CALC-SCNC: 10 MMOL/L (ref 0–11.9)
APTT PPP: 28.7 SEC (ref 24.7–36)
AST SERPL-CCNC: 23 U/L (ref 12–45)
BASOPHILS # BLD AUTO: 0.7 % (ref 0–1.8)
BASOPHILS # BLD: 0.04 K/UL (ref 0–0.12)
BILIRUB SERPL-MCNC: 1.2 MG/DL (ref 0.1–1.5)
BNP SERPL-MCNC: 6 PG/ML (ref 0–100)
BUN SERPL-MCNC: 14 MG/DL (ref 8–22)
CALCIUM SERPL-MCNC: 9.4 MG/DL (ref 8.5–10.5)
CHLORIDE SERPL-SCNC: 107 MMOL/L (ref 96–112)
CO2 SERPL-SCNC: 20 MMOL/L (ref 20–33)
CREAT SERPL-MCNC: 0.82 MG/DL (ref 0.5–1.4)
DEPRECATED D DIMER PPP IA-ACNC: <200 NG/ML(D-DU)
EKG IMPRESSION: NORMAL
EKG IMPRESSION: NORMAL
EOSINOPHIL # BLD AUTO: 0.12 K/UL (ref 0–0.51)
EOSINOPHIL NFR BLD: 2.1 % (ref 0–6.9)
ERYTHROCYTE [DISTWIDTH] IN BLOOD BY AUTOMATED COUNT: 41.8 FL (ref 35.9–50)
EST. AVERAGE GLUCOSE BLD GHB EST-MCNC: 171 MG/DL
GFR SERPL CREATININE-BSD FRML MDRD: >60 ML/MIN/1.73 M 2
GLOBULIN SER CALC-MCNC: 2.9 G/DL (ref 1.9–3.5)
GLUCOSE BLD-MCNC: 116 MG/DL (ref 65–99)
GLUCOSE SERPL-MCNC: 178 MG/DL (ref 65–99)
HBA1C MFR BLD: 7.6 % (ref 0–5.6)
HCT VFR BLD AUTO: 39.6 % (ref 42–52)
HGB BLD-MCNC: 13.3 G/DL (ref 14–18)
IMM GRANULOCYTES # BLD AUTO: 0.03 K/UL (ref 0–0.11)
IMM GRANULOCYTES NFR BLD AUTO: 0.5 % (ref 0–0.9)
INR PPP: 1.06 (ref 0.87–1.13)
LIPASE SERPL-CCNC: 38 U/L (ref 11–82)
LYMPHOCYTES # BLD AUTO: 1.38 K/UL (ref 1–4.8)
LYMPHOCYTES NFR BLD: 24.5 % (ref 22–41)
MCH RBC QN AUTO: 29.6 PG (ref 27–33)
MCHC RBC AUTO-ENTMCNC: 33.6 G/DL (ref 33.7–35.3)
MCV RBC AUTO: 88.2 FL (ref 81.4–97.8)
MONOCYTES # BLD AUTO: 0.48 K/UL (ref 0–0.85)
MONOCYTES NFR BLD AUTO: 8.5 % (ref 0–13.4)
NEUTROPHILS # BLD AUTO: 3.59 K/UL (ref 1.82–7.42)
NEUTROPHILS NFR BLD: 63.7 % (ref 44–72)
NRBC # BLD AUTO: 0 K/UL
NRBC BLD AUTO-RTO: 0 /100 WBC
PLATELET # BLD AUTO: 269 K/UL (ref 164–446)
PMV BLD AUTO: 9.9 FL (ref 9–12.9)
POTASSIUM SERPL-SCNC: 4 MMOL/L (ref 3.6–5.5)
PROT SERPL-MCNC: 7.2 G/DL (ref 6–8.2)
PROTHROMBIN TIME: 14.1 SEC (ref 12–14.6)
RBC # BLD AUTO: 4.49 M/UL (ref 4.7–6.1)
SODIUM SERPL-SCNC: 137 MMOL/L (ref 135–145)
T4 FREE SERPL-MCNC: 0.67 NG/DL (ref 0.53–1.43)
TROPONIN I SERPL-MCNC: <0.01 NG/ML (ref 0–0.04)
TROPONIN I SERPL-MCNC: <0.01 NG/ML (ref 0–0.04)
TSH SERPL DL<=0.005 MIU/L-ACNC: 3.89 UIU/ML (ref 0.3–3.7)
WBC # BLD AUTO: 5.6 K/UL (ref 4.8–10.8)

## 2017-07-20 PROCEDURE — 93010 ELECTROCARDIOGRAM REPORT: CPT | Performed by: INTERNAL MEDICINE

## 2017-07-20 PROCEDURE — 84439 ASSAY OF FREE THYROXINE: CPT

## 2017-07-20 PROCEDURE — G0378 HOSPITAL OBSERVATION PER HR: HCPCS

## 2017-07-20 PROCEDURE — 83036 HEMOGLOBIN GLYCOSYLATED A1C: CPT

## 2017-07-20 PROCEDURE — 36415 COLL VENOUS BLD VENIPUNCTURE: CPT

## 2017-07-20 PROCEDURE — 71010 DX-CHEST-LIMITED (1 VIEW): CPT

## 2017-07-20 PROCEDURE — A9270 NON-COVERED ITEM OR SERVICE: HCPCS | Performed by: STUDENT IN AN ORGANIZED HEALTH CARE EDUCATION/TRAINING PROGRAM

## 2017-07-20 PROCEDURE — 85379 FIBRIN DEGRADATION QUANT: CPT

## 2017-07-20 PROCEDURE — 80053 COMPREHEN METABOLIC PANEL: CPT

## 2017-07-20 PROCEDURE — 85730 THROMBOPLASTIN TIME PARTIAL: CPT

## 2017-07-20 PROCEDURE — 85025 COMPLETE CBC W/AUTO DIFF WBC: CPT

## 2017-07-20 PROCEDURE — 82962 GLUCOSE BLOOD TEST: CPT

## 2017-07-20 PROCEDURE — 84484 ASSAY OF TROPONIN QUANT: CPT

## 2017-07-20 PROCEDURE — 93005 ELECTROCARDIOGRAM TRACING: CPT | Performed by: STUDENT IN AN ORGANIZED HEALTH CARE EDUCATION/TRAINING PROGRAM

## 2017-07-20 PROCEDURE — 83880 ASSAY OF NATRIURETIC PEPTIDE: CPT

## 2017-07-20 PROCEDURE — 83690 ASSAY OF LIPASE: CPT

## 2017-07-20 PROCEDURE — 93005 ELECTROCARDIOGRAM TRACING: CPT

## 2017-07-20 PROCEDURE — 84443 ASSAY THYROID STIM HORMONE: CPT

## 2017-07-20 PROCEDURE — 700102 HCHG RX REV CODE 250 W/ 637 OVERRIDE(OP): Performed by: STUDENT IN AN ORGANIZED HEALTH CARE EDUCATION/TRAINING PROGRAM

## 2017-07-20 PROCEDURE — 99285 EMERGENCY DEPT VISIT HI MDM: CPT

## 2017-07-20 PROCEDURE — 85610 PROTHROMBIN TIME: CPT

## 2017-07-20 RX ORDER — FENOFIBRATE 134 MG/1
134 CAPSULE ORAL DAILY
Status: DISCONTINUED | OUTPATIENT
Start: 2017-07-20 | End: 2017-07-21 | Stop reason: HOSPADM

## 2017-07-20 RX ORDER — AMOXICILLIN 250 MG
2 CAPSULE ORAL 2 TIMES DAILY
Status: DISCONTINUED | OUTPATIENT
Start: 2017-07-20 | End: 2017-07-21 | Stop reason: HOSPADM

## 2017-07-20 RX ORDER — ATORVASTATIN CALCIUM 40 MG/1
40 TABLET, FILM COATED ORAL NIGHTLY
Status: DISCONTINUED | OUTPATIENT
Start: 2017-07-21 | End: 2017-07-21 | Stop reason: HOSPADM

## 2017-07-20 RX ORDER — LABETALOL HYDROCHLORIDE 5 MG/ML
10 INJECTION, SOLUTION INTRAVENOUS EVERY 4 HOURS PRN
Status: DISCONTINUED | OUTPATIENT
Start: 2017-07-20 | End: 2017-07-21 | Stop reason: HOSPADM

## 2017-07-20 RX ORDER — BISACODYL 10 MG
10 SUPPOSITORY, RECTAL RECTAL
Status: DISCONTINUED | OUTPATIENT
Start: 2017-07-20 | End: 2017-07-21 | Stop reason: HOSPADM

## 2017-07-20 RX ORDER — MORPHINE SULFATE 4 MG/ML
4 INJECTION, SOLUTION INTRAMUSCULAR; INTRAVENOUS ONCE
Status: DISCONTINUED | OUTPATIENT
Start: 2017-07-20 | End: 2017-07-20

## 2017-07-20 RX ORDER — DEXTROSE MONOHYDRATE 25 G/50ML
25 INJECTION, SOLUTION INTRAVENOUS
Status: DISCONTINUED | OUTPATIENT
Start: 2017-07-20 | End: 2017-07-21 | Stop reason: HOSPADM

## 2017-07-20 RX ORDER — SODIUM CHLORIDE 9 MG/ML
INJECTION, SOLUTION INTRAVENOUS CONTINUOUS
Status: DISCONTINUED | OUTPATIENT
Start: 2017-07-20 | End: 2017-07-20

## 2017-07-20 RX ORDER — MELATONIN
1000 DAILY
Status: DISCONTINUED | OUTPATIENT
Start: 2017-07-20 | End: 2017-07-20

## 2017-07-20 RX ORDER — LISINOPRIL 5 MG/1
5 TABLET ORAL DAILY
Status: DISCONTINUED | OUTPATIENT
Start: 2017-07-21 | End: 2017-07-21 | Stop reason: HOSPADM

## 2017-07-20 RX ORDER — METOPROLOL SUCCINATE 25 MG/1
25 TABLET, EXTENDED RELEASE ORAL DAILY
Status: DISCONTINUED | OUTPATIENT
Start: 2017-07-21 | End: 2017-07-21 | Stop reason: HOSPADM

## 2017-07-20 RX ORDER — OMEPRAZOLE 20 MG/1
20 CAPSULE, DELAYED RELEASE ORAL 2 TIMES DAILY
Status: DISCONTINUED | OUTPATIENT
Start: 2017-07-21 | End: 2017-07-21 | Stop reason: HOSPADM

## 2017-07-20 RX ORDER — POLYETHYLENE GLYCOL 3350 17 G/17G
1 POWDER, FOR SOLUTION ORAL
Status: DISCONTINUED | OUTPATIENT
Start: 2017-07-20 | End: 2017-07-21 | Stop reason: HOSPADM

## 2017-07-20 RX ORDER — ACETAMINOPHEN 325 MG/1
650 TABLET ORAL EVERY 6 HOURS PRN
Status: DISCONTINUED | OUTPATIENT
Start: 2017-07-20 | End: 2017-07-21 | Stop reason: HOSPADM

## 2017-07-20 RX ORDER — ATORVASTATIN CALCIUM 80 MG/1
80 TABLET, FILM COATED ORAL ONCE
Status: COMPLETED | OUTPATIENT
Start: 2017-07-20 | End: 2017-07-20

## 2017-07-20 RX ADMIN — ATORVASTATIN CALCIUM 80 MG: 80 TABLET, FILM COATED ORAL at 21:57

## 2017-07-20 RX ADMIN — ACETAMINOPHEN 650 MG: 325 TABLET, FILM COATED ORAL at 21:57

## 2017-07-20 RX ADMIN — FENOFIBRATE 134 MG: 134 CAPSULE ORAL at 23:41

## 2017-07-20 ASSESSMENT — CHA2DS2 SCORE
CHA2DS2 VASC SCORE: 4
CHF OR LEFT VENTRICULAR DYSFUNCTION: YES
HYPERTENSION: YES
DIABETES: YES
AGE 75 OR GREATER: NO
VASCULAR DISEASE: YES
AGE 65 TO 74: NO
PRIOR STROKE OR TIA OR THROMBOEMBOLISM: NO
SEX: MALE

## 2017-07-20 ASSESSMENT — ENCOUNTER SYMPTOMS
SHORTNESS OF BREATH: 1
MYALGIAS: 0
VOMITING: 0
HEARTBURN: 1
DIZZINESS: 1
CHILLS: 0
BACK PAIN: 0
SEIZURES: 0
NAUSEA: 0
LOSS OF CONSCIOUSNESS: 0
FEVER: 0
BLURRED VISION: 0
TINGLING: 0
PALPITATIONS: 0
DOUBLE VISION: 0
HEADACHES: 0
COUGH: 0
CONSTIPATION: 0

## 2017-07-20 ASSESSMENT — LIFESTYLE VARIABLES
EVER_SMOKED: NEVER
SUBSTANCE_ABUSE: 0
ALCOHOL_USE: NO
EVER_SMOKED: NEVER

## 2017-07-20 ASSESSMENT — COPD QUESTIONNAIRES
DO YOU EVER COUGH UP ANY MUCUS OR PHLEGM?: YES, A FEW DAYS A WEEK OR MONTH
DURING THE PAST 4 WEEKS HOW MUCH DID YOU FEEL SHORT OF BREATH: SOME OF THE TIME
HAVE YOU SMOKED AT LEAST 100 CIGARETTES IN YOUR ENTIRE LIFE: NO/DON'T KNOW
COPD SCREENING SCORE: 4

## 2017-07-20 ASSESSMENT — PAIN SCALES - GENERAL
PAINLEVEL_OUTOF10: 10
PAINLEVEL_OUTOF10: 10

## 2017-07-20 ASSESSMENT — PATIENT HEALTH QUESTIONNAIRE - PHQ9
2. FEELING DOWN, DEPRESSED, IRRITABLE, OR HOPELESS: NOT AT ALL
SUM OF ALL RESPONSES TO PHQ9 QUESTIONS 1 AND 2: 0
SUM OF ALL RESPONSES TO PHQ QUESTIONS 1-9: 0
1. LITTLE INTEREST OR PLEASURE IN DOING THINGS: NOT AT ALL

## 2017-07-20 NOTE — IP AVS SNAPSHOT
" <p align=\"LEFT\"><IMG SRC=\"//[email or web address]\" alt=\"Image\" WIDTH=\"50%\" HEIGHT=\"200\" BORDER=\"\"></p>                   Name:Ryan Trevizo  Medical Record Number:[id number]  CSN: [id number]    YOB: 1965   Age: 52 y.o.  Sex: male  HT:1.803 m (5' 11\") WT: 81.8 kg (180 lb 5.4 oz)          Admit Date: 7/20/2017     Discharge Date:   Today's Date: 7/21/2017  Attending Doctor:  Nicolas Velasquez M.D.                  Allergies:  Ritalin          Your appointments     Sep 11, 2017  1:15 PM   Established Patient with Juancho Jenkins M.D.   [facility] - Internal Medicine (--)    [address] [place] [address] [address]  [facility] [id number] [phone]           You will be receiving a confirmation call a few days before your appointment from our automated call confirmation system.            Sep 12, 2017 11:30 AM   Anti-Coag Routine with VISTA PHARMACIST   [facility] [place]    [address] [facility] [id number] [phone]            Oct 06, 2017  2:30 PM   FOLLOW UP with Joseph León M.D.   [facility] (--)    [address] [place] [address] [address]  [facility] [id number] [phone]                 Medication List      Take these Medications        Instructions    atorvastatin 80 MG tablet   Commonly known as:  LIPITOR    Take 40 mg by mouth every evening.   Dose:  40 mg       dicyclomine 20 MG Tabs   Commonly known as:  BENTYL    Take 1 Tab by mouth every 6 hours.   Dose:  20 mg       lidocaine 5 % Ptch   Commonly known as:  LIDODERM    Apply 1 Patch to skin as directed every 24 hours.   Dose:  1 Patch       lisinopril 5 MG Tabs   Commonly known as:  PRINIVIL    Take 1 Tab by mouth every day.   Dose:  5 mg       loratadine 10 MG Tabs   Commonly known as:  CLARITIN    Take 1 Tab by mouth every day.   Dose:  10 mg       metformin 1000 MG tablet   Commonly known as:  GLUCOPHAGE    Take 1 Tab by mouth 2 times a day, with meals.   Dose:  " 1000 mg       metoprolol SR 25 MG Tb24   Commonly known as:  TOPROL XL    Take 1 Tab by mouth every day.   Dose:  25 mg       omeprazole 20 MG delayed-release capsule   Commonly known as:  PRILOSEC    Take 1 Cap by mouth 2 times a day.   Dose:  20 mg       rivaroxaban 20 MG Tabs tablet   Commonly known as:  XARELTO    Take 1 Tab by mouth with dinner.   Dose:  20 mg       vitamin D3 (cholecalciferol) 1000 UNIT Tabs    TAKE 1 TABLET BY MOUTH ONCE DAILY.

## 2017-07-20 NOTE — ED NOTES
"Chief Complaint   Patient presents with   • Chest Pain     10/10 right sided chest pain radiating to left side, lifts heavy boxes at work, denies N/V   ../90 mmHg  Pulse 78  Temp(Src) 36.8 °C (98.3 °F)  Resp 16  Ht 1.651 m (5' 5\")  Wt 83.915 kg (185 lb)  BMI 30.79 kg/m2  SpO2 94%    BIB EMS with above complaints, 325 ASA and 1 Nitro spray given PTA with no relief   "

## 2017-07-20 NOTE — ED PROVIDER NOTES
ED Provider Note    Scribed for Victor Manuel Truong D.O. by Félix Arzate. 7/20/2017  4:31 PM    Primary care provider: Juancho Jenkins M.D.  Means of arrival: EMS  History obtained from: Patient  History limited by: None    CHIEF COMPLAINT  Chief Complaint   Patient presents with   • Chest Pain     10/10 right sided chest pain radiating to left side, lifts heavy boxes at work, denies N/V       HPI  Ryan Trevizo is a 52 y.o. male who presents to the Emergency Department complaining of intermittent sharp right-sided chest pain with last episode earlier today.  Patient mentions having an episode of chest pain last night which was resolved upon waking up this morning. However, he was lifting boxes at work today when he started experiencing associated dizziness and sensation of syncope. He then experienced right sided chest pain which radiates to his left chest. Patient states he called the ambulance at 3 PM today for this episode. Episodes of chest pain last for approximately 30 minutes at a time. He was given nitro in the ambulance. He notes having associated cough which exacerbates the chest pain. Patient does not report any recent fevers. He denies taking aspirin regularly. Patient notes having a heart attack in 2012. He denies having any stents placed. Patient is not a smoker.    Cardiac Risk Factors:  No Age > 65  No Aspirin use within 7 days  History of coronary artery disease  Diabetes  Hyperlipidemia   Hypertension  No obesity  No family history of coronary artery disease at a young age <54 yo  No tobacco use   No drugs (methamphetamine or cocaine)  No history of aortic aneurysm   No history of aortic dissection   DVT left lower extremity  No hormone replacement  No oral birth control     REVIEW OF SYSTEMS  Pertinent positives include chest pain, dizziness, sensation of syncope, cough. Pertinent negatives include no recent fevers.  All other systems reviewed and negative.  C    PAST MEDICAL  HISTORY  Past Medical History   Diagnosis Date   • Arthritis    • HTN (hypertension) 7/15/2012   • GERD (gastroesophageal reflux disease) 7/15/2012   • STEMI (ST elevation myocardial infarction) (CMS-HCC)    • Anxiety disorder    • MI (myocardial infarction) (CMS-HCC)    • Hyperlipemia    • Upper GI bleed 7/26/2012   • Glaucoma    • SOB (shortness of breath)    • Claudication (CMS-HCC)    • Dizziness    • CAD (coronary artery disease) 10/14/2013   • ACS (acute coronary syndrome) (CMS-HCC) 7/25/2012   • Pericarditis secondary to acute myocardial infarction (CMS-HCC) 7/19/2012   • Right knee pain    • Blood glucose elevated    • Mental retardation    • Osteoarthritis    • Hypertriglyceridemia    • A-fib (CMS-HCC)    • Ischemic cardiomyopathy    • Diabetes (CMS-HCC)        SURGICAL HISTORY  Past Surgical History   Procedure Laterality Date   • Other orthopedic surgery  7-      R knee surgery   • Other  knee surgery   • Other cardiac surgery       stent placement   • Other     • Gastroscopy-endo  7/25/2012     Performed by JORDIN VERA at [facility]   • Gastroscopy-endo  7/26/2012     Performed by JORDIN VERA at ENDOSCOPY [facility]   • Gastroscopy-endo  5/19/2017     Procedure: GASTROSCOPY-ENDO;  Surgeon: Reinaldo Berry M.D.;  Location: [facility];  Service:         SOCIAL HISTORY  Social History   Substance Use Topics   • Smoking status: Never Smoker    • Smokeless tobacco: Never Used   • Alcohol Use: No      History   Drug Use No       FAMILY HISTORY  History reviewed. No pertinent family history.    CURRENT MEDICATIONS  No current facility-administered medications on file prior to encounter.     Current Outpatient Prescriptions on File Prior to Encounter   Medication Sig Dispense Refill   • dicyclomine (BENTYL) 20 MG Tab Take 1 Tab by mouth every 6 hours. 20 Tab 0   • vitamin D3, cholecalciferol, 1000 UNIT Tab TAKE 1 TABLET BY MOUTH ONCE DAILY. 30 Tab 11   •  "atorvastatin (LIPITOR) 80 MG tablet Take 40 mg by mouth every evening.     • rivaroxaban (XARELTO) 20 MG Tab tablet Take 1 Tab by mouth with dinner. 30 Tab 11   • omeprazole (PRILOSEC) 20 MG delayed-release capsule Take 1 Cap by mouth 2 times a day. 60 Cap 11   • metoprolol SR (TOPROL XL) 25 MG TABLET SR 24 HR Take 1 Tab by mouth every day. 30 Tab 5   • loratadine (CLARITIN) 10 MG Tab Take 1 Tab by mouth every day. 30 Tab 5   • fenofibrate (TRIGLIDE) 160 MG tablet Take 1 Tab by mouth every day. 30 Tab 11   • lisinopril (PRINIVIL) 5 MG Tab Take 1 Tab by mouth every day. 30 Tab 11   • metformin (GLUCOPHAGE) 1000 MG tablet Take 1 Tab by mouth 2 times a day, with meals. 60 Tab 11      ALLERGIES  Allergies   Allergen Reactions   • Ritalin [Methylphenidate] Unspecified     \"Hyper\"       PHYSICAL EXAM  VITAL SIGNS: /90 mmHg  Pulse 83  Temp(Src) 36.8 °C (98.3 °F)  Resp 19  Ht 1.651 m (5' 5\")  Wt 83.915 kg (185 lb)  BMI 30.79 kg/m2  SpO2 92%  Nursing notes and vitals reviewed.  Constitutional: Well developed, Well nourished, No acute distress, Non-toxic appearance.   Eyes: PERRLA, EOMI, Conjunctiva normal, No discharge.   Cardiovascular: Normal heart rate, Normal rhythm, No murmurs, No rubs, No gallops.   Thorax & Lungs: No respiratory distress, No rales, No rhonchi, No wheezing, No chest tenderness.   Abdomen: Bowel sounds normal, Soft, No tenderness, No guarding, No rebound, No masses, No pulsatile masses.   Skin: Warm, Dry, No erythema, No rash.   Musculoskeletal: Intact distal pulses, No edema, No cyanosis, No clubbing. Good range of motion in all major joints. No tenderness to palpation or major deformities noted, no CVA tenderness, no midline back tenderness.   Neurologic: Alert & oriented x 3, Normal motor function, Normal sensory function, No focal deficits noted.  Psychiatric: Affect normal for clinical presentation.    DIAGNOSTIC STUDIES/PROCEDURES    LABS  Results for orders placed or performed during " the hospital encounter of 07/20/17   Troponin   Result Value Ref Range    Troponin I <0.01 0.00 - 0.04 ng/mL   Btype Natriuretic Peptide   Result Value Ref Range    B Natriuretic Peptide 6 0 - 100 pg/mL   CBC with Differential   Result Value Ref Range    WBC 5.6 4.8 - 10.8 K/uL    RBC 4.49 (L) 4.70 - 6.10 M/uL    Hemoglobin 13.3 (L) 14.0 - 18.0 g/dL    Hematocrit 39.6 (L) 42.0 - 52.0 %    MCV 88.2 81.4 - 97.8 fL    MCH 29.6 27.0 - 33.0 pg    MCHC 33.6 (L) 33.7 - 35.3 g/dL    RDW 41.8 35.9 - 50.0 fL    Platelet Count 269 164 - 446 K/uL    MPV 9.9 9.0 - 12.9 fL    Neutrophils-Polys 63.70 44.00 - 72.00 %    Lymphocytes 24.50 22.00 - 41.00 %    Monocytes 8.50 0.00 - 13.40 %    Eosinophils 2.10 0.00 - 6.90 %    Basophils 0.70 0.00 - 1.80 %    Immature Granulocytes 0.50 0.00 - 0.90 %    Nucleated RBC 0.00 /100 WBC    Neutrophils (Absolute) 3.59 1.82 - 7.42 K/uL    Lymphs (Absolute) 1.38 1.00 - 4.80 K/uL    Monos (Absolute) 0.48 0.00 - 0.85 K/uL    Eos (Absolute) 0.12 0.00 - 0.51 K/uL    Baso (Absolute) 0.04 0.00 - 0.12 K/uL    Immature Granulocytes (abs) 0.03 0.00 - 0.11 K/uL    NRBC (Absolute) 0.00 K/uL   Complete Metabolic Panel (CMP)   Result Value Ref Range    Sodium 137 135 - 145 mmol/L    Potassium 4.0 3.6 - 5.5 mmol/L    Chloride 107 96 - 112 mmol/L    Co2 20 20 - 33 mmol/L    Anion Gap 10.0 0.0 - 11.9    Glucose 178 (H) 65 - 99 mg/dL    Bun 14 8 - 22 mg/dL    Creatinine 0.82 0.50 - 1.40 mg/dL    Calcium 9.4 8.5 - 10.5 mg/dL    AST(SGOT) 23 12 - 45 U/L    ALT(SGPT) 45 2 - 50 U/L    Alkaline Phosphatase 72 30 - 99 U/L    Total Bilirubin 1.2 0.1 - 1.5 mg/dL    Albumin 4.3 3.2 - 4.9 g/dL    Total Protein 7.2 6.0 - 8.2 g/dL    Globulin 2.9 1.9 - 3.5 g/dL    A-G Ratio 1.5 g/dL   Prothrombin Time   Result Value Ref Range    PT 14.1 12.0 - 14.6 sec    INR 1.06 0.87 - 1.13   APTT   Result Value Ref Range    APTT 28.7 24.7 - 36.0 sec   Lipase   Result Value Ref Range    Lipase 38 11 - 82 U/L   ESTIMATED GFR   Result Value  Ref Range    GFR If African American >60 >60 mL/min/1.73 m 2    GFR If Non African American >60 >60 mL/min/1.73 m 2   D-DIMER   Result Value Ref Range    D-Dimer Screen <200 <250 ng/mL(D-DU)   TSH WITH REFLEX TO FT4   Result Value Ref Range    TSH 3.890 (H) 0.300 - 3.700 uIU/mL   FREE THYROXINE   Result Value Ref Range    Free T-4 0.67 0.53 - 1.43 ng/dL   EKG (ER)   Result Value Ref Range    Report       [facility] Emergency Dept.    Test Date:  2017  Pt Name:    NICHELLE MARQUEZ                   Department: ER  MRN:        [id number]                      Room:       RD 03  Gender:     M                            Technician: [id number]  :        1965                   Requested By:ER TRIAGE PROTOCOL  Order #:    [id number]                    Reading MD: AMINATA GUTIERREZ DO    Measurements  Intervals                                Axis  Rate:       83                           P:          21  NC:         160                          QRS:        -16  QRSD:       100                          T:          37  QT:         368  QTc:        433    Interpretive Statements  SINUS RHYTHM  BORDERLINE LEFT AXIS DEVIATION  Compared to ECG 2017 20:44:39  Myocardial infarct finding no longer present    Electronically Signed On 2017 16:26:51 PDT by AMINATA GUTIERREZ DO        All labs reviewed by me.    RADIOLOGY  DX-CHEST-LIMITED (1 VIEW)   Final Result      1.  No acute cardiac or pulmonary abnormalities are identified.        The radiologist's interpretation of all radiological studies have been reviewed by me.    COURSE & MEDICAL DECISION MAKING  Pertinent Labs & Imaging studies reviewed. (See chart for details)    4:31 PM - Patient seen and examined at bedside. Ordered DX chest, estimated GFR, troponin, BNP, CBC, CMP, PTT, APTT, lipase, EKG to evaluate his symptoms.     4:36 PM Review of past medical records shows the patient had a stress test in 2016, had a non reversible defect  inferior lateral myocardium.    6:25 PM Paged [facility].    6:35 PM - I discussed the patient's case and the above findings with [facility] who will admit the patient.     This is a charming 52 y.o. male that presents with chest pain. Fortunately patient has a negative EKG and negative troponin. He is not hypoxic, tachypneic or tachycardic and do not expect him to have pulmonary embolism, aortic dissection or aortic aneurysm. The patient received pain medication for morphine and will be admitted here internal medicine for further evaluation of his chest pain.  I do not believe the patient is experiencing ST or non-ST elevation myocardial infarction, acute coronary syndrome.  DISPOSITION:  Patient will be admitted to [facility] in stable condition.     FINAL IMPRESSION  Chest pain   Félix MORRISSEY (Scribe), am scribing for, and in the presence of, Victor Manuel Truong D.O    Electronically signed by: Félix Arzate (Scribe), 7/20/2017    IVictor Manuel D.O. personally performed the services described in this documentation, as scribed by Félix Arzate in my presence, and it is both accurate and complete.    The note accurately reflects work and decisions made by me.  Victor Manuel Truong  7/20/2017  11:08 PM

## 2017-07-20 NOTE — IP AVS SNAPSHOT
7/21/2017    Ryan Trevizo  [address]  [place] [facility] [address]    Dear Ryan:    [facility] wants to ensure your discharge home is safe and you or your loved ones have had all of your questions answered regarding your care after you leave the hospital.    Below is a list of resources and contact information should you have any questions regarding your hospital stay, follow-up instructions, or active medical symptoms.    Questions or Concerns Regarding… Contact   Medical Questions Related to Your Discharge  (7 days a week, 8am-5pm) Contact a Nurse Care Coordinator   [phone]   Medical Questions Not Related to Your Discharge  (24 hours a day / 7 days a week)  Contact the Nurse Health Line   [phone]    Medications or Discharge Instructions Refer to your discharge packet   or contact your [facility] Primary Care Provider   [phone]   Follow-up Appointment(s) Schedule your appointment via [employer]   or contact Scheduling [phone]   Billing Review your statement via [employer]  or contact Billing [phone]   Medical Records Review your records via [employer]   or contact Medical Records [phone]     You may receive a telephone call within two days of discharge. This call is to make certain you understand your discharge instructions and have the opportunity to have any questions answered. You can also easily access your medical information, test results and upcoming appointments via the [employer] free online health management tool. You can learn more and sign up at [email or web address]. For assistance setting up your [employer] account, please call [phone].    Once again, we want to ensure your discharge home is safe and that you have a clear understanding of any next steps in your care. If you have any questions or concerns, please do not hesitate to contact us, we are here for you. Thank you for choosing [facility] for your healthcare needs.    Sincerely,    Your [facility] Healthcare Team

## 2017-07-20 NOTE — IP AVS SNAPSHOT
" Home Care Instructions                                                                                                                  Name:Ryan Trevizo  Medical Record Number:[id number]  CSN: [id number]    YOB: 1965   Age: 52 y.o.  Sex: male  HT:1.803 m (5' 11\") WT: 81.8 kg (180 lb 5.4 oz)          Admit Date: 7/20/2017     Discharge Date:   Today's Date: 7/21/2017  Attending Doctor:  Nicolas Velasquez M.D.                  Allergies:  Ritalin            Discharge Instructions       Discharge Instructions    Discharged to home by car with escort. Discharged via wheelchair, hospital escort: Yes.  Special equipment needed: Not Applicable    Be sure to schedule a follow-up appointment with your primary care doctor or any specialists as instructed.     Discharge Plan:   Diet Plan: Discussed  Activity Level: Discussed  Confirmed Follow up Appointment: Patient to Call and Schedule Appointment  Confirmed Symptoms Management: Discussed  Medication Reconciliation Updated: Yes  Influenza Vaccine Indication: Patient Refuses    I understand that a diet low in cholesterol, fat, and sodium is recommended for good health. Unless I have been given specific instructions below for another diet, I accept this instruction as my diet prescription.   Other diet: heart healthy, diabetic  Heart-Healthy Eating Plan  Heart-healthy meal planning includes:  · Limiting unhealthy fats.  · Increasing healthy fats.  · Making other small dietary changes.  You may need to talk with your doctor or a diet specialist (dietitian) to create an eating plan that is right for you.  WHAT TYPES OF FAT SHOULD I CHOOSE?  · Choose healthy fats. These include olive oil and canola oil, flaxseeds, walnuts, almonds, and seeds.  · Eat more omega-3 fats. These include salmon, mackerel, sardines, tuna, flaxseed oil, and ground flaxseeds. Try to eat fish at least twice each week.  · Limit saturated fats.  · Saturated fats are often found in animal " "products, such as meats, butter, and cream.  · Plant sources of saturated fats include palm oil, palm kernel oil, and coconut oil.  · Avoid foods with partially hydrogenated oils in them. These include stick margarine, some tub margarines, cookies, crackers, and other baked goods. These contain trans fats.  WHAT GENERAL GUIDELINES DO I NEED TO FOLLOW?  · Check food labels carefully. Identify foods with trans fats or high amounts of saturated fat.  · Fill one half of your plate with vegetables and green salads. Eat 4-5 servings of vegetables per day. A serving of vegetables is:  · 1 cup of raw leafy vegetables.  · ½ cup of raw or cooked cut-up vegetables.  · ½ cup of vegetable juice.  · Fill one fourth of your plate with whole grains. Look for the word \"whole\" as the first word in the ingredient list.  · Fill one fourth of your plate with lean protein foods.  · Eat 4-5 servings of fruit per day. A serving of fruit is:  · One medium whole fruit.  · ¼ cup of dried fruit.  · ½ cup of fresh, frozen, or canned fruit.  · ½ cup of 100% fruit juice.  · Eat more foods that contain soluble fiber. These include apples, broccoli, carrots, beans, peas, and barley. Try to get 20-30 g of fiber per day.  · Eat more home-cooked food. Eat less restaurant, buffet, and fast food.  · Limit or avoid alcohol.  · Limit foods high in starch and sugar.  · Avoid fried foods.  · Avoid frying your food. Try baking, boiling, grilling, or broiling it instead. You can also reduce fat by:  · Removing the skin from poultry.  · Removing all visible fats from meats.  · Skimming the fat off of stews, soups, and gravies before serving them.  · Steaming vegetables in water or broth.  · Lose weight if you are overweight.  · Eat 4-5 servings of nuts, legumes, and seeds per week:  · One serving of dried beans or legumes equals ½ cup after being cooked.  · One serving of nuts equals 1½ ounces.  · One serving of seeds equals ½ ounce or one tablespoon.  · You " may need to keep track of how much salt or sodium you eat. This is especially true if you have high blood pressure. Talk with your doctor or dietitian to get more information.  WHAT FOODS CAN I EAT?  Grains  Breads, including Congolese, white, diana, wheat, raisin, rye, oatmeal, and Italian. Tortillas that are neither fried nor made with lard or trans fat. Low-fat rolls, including hotdog and hamburger buns and English muffins. Biscuits. Muffins. Waffles. Pancakes. Light popcorn. Whole-grain cereals. Flatbread. Jennifer toast. Pretzels. Breadsticks. Rusks. Low-fat snacks. Low-fat crackers, including oyster, saltine, matzo, stephanie, animal, and rye. Rice and pasta, including brown rice and pastas that are made with whole wheat.   Vegetables  All vegetables.   Fruits  All fruits, but limit coconut.  Meats and Other Protein Sources  Lean, well-trimmed beef, veal, pork, and lamb. Chicken and turkey without skin. All fish and shellfish. Wild duck, rabbit, pheasant, and venison. Egg whites or low-cholesterol egg substitutes. Dried beans, peas, lentils, and tofu. Seeds and most nuts.  Dairy  Low-fat or nonfat cheeses, including ricotta, string, and mozzarella. Skim or 1% milk that is liquid, powdered, or evaporated. Buttermilk that is made with low-fat milk. Nonfat or low-fat yogurt.  Beverages  Mineral water. Diet carbonated beverages.  Sweets and Desserts  Sherbets and fruit ices. Honey, jam, marmalade, jelly, and syrups. Meringues and gelatins. Pure sugar candy, such as hard candy, jelly beans, gumdrops, mints, marshmallows, and small amounts of dark chocolate. Lake food cake.  Eat all sweets and desserts in moderation.  Fats and Oils  Nonhydrogenated (trans-free) margarines. Vegetable oils, including soybean, sesame, sunflower, olive, peanut, safflower, corn, canola, and cottonseed. Salad dressings or mayonnaise made with a vegetable oil. Limit added fats and oils that you use for cooking, baking, salads, and as  spreads.  Other  Cocoa powder. Coffee and tea. All seasonings and condiments.  The items listed above may not be a complete list of recommended foods or beverages. Contact your dietitian for more options.  WHAT FOODS ARE NOT RECOMMENDED?  Grains  Breads that are made with saturated or trans fats, oils, or whole milk. Croissants. Butter rolls. Cheese breads. Sweet rolls. Donuts. Buttered popcorn. Chow mein noodles. High-fat crackers, such as cheese or butter crackers.  Meats and Other Protein Sources  Fatty meats, such as hotdogs, short ribs, sausage, spareribs, meza, rib eye roast or steak, and mutton. High-fat deli meats, such as salami and bologna. Caviar. Domestic duck and goose. Organ meats, such as kidney, liver, sweetbreads, and heart.  Dairy  Cream, sour cream, cream cheese, and creamed cottage cheese. Whole-milk cheeses, including blue (tomi), [place], Brie, David, American, Havarti, Swiss, cheddar, Camembert, and [place]. Whole or 2% milk that is liquid, evaporated, or condensed. Whole buttermilk. Cream sauce or high-fat cheese sauce. Yogurt that is made from whole milk.  Beverages  Regular sodas and juice drinks with added sugar.  Sweets and Desserts  Frosting. Pudding. Cookies. Cakes other than augustine food cake. Candy that has milk chocolate or white chocolate, hydrogenated fat, butter, coconut, or unknown ingredients. Buttered syrups. Full-fat ice cream or ice cream drinks.  Fats and Oils  Gravy that has suet, meat fat, or shortening. Cocoa butter, hydrogenated oils, palm oil, coconut oil, palm kernel oil. These can often be found in baked products, candy, fried foods, nondairy creamers, and whipped toppings. Solid fats and shortenings, including meza fat, salt pork, lard, and butter. Nondairy cream substitutes, such as coffee creamers and sour cream substitutes. Salad dressings that are made of unknown oils, cheese, or sour cream.  The items listed above may not be a complete list of foods and  "beverages to avoid. Contact your dietitian for more information.     This information is not intended to replace advice given to you by your health care provider. Make sure you discuss any questions you have with your health care provider.     Document Released: 06/18/2013 Document Revised: 01/08/2016 Document Reviewed: 06/11/2015  [employer] Patient Education ©2016 [employer]      Diets for Diabetes, Food Labeling  Look at food labels to help you decide how much of a product you can eat. You will want to check the amount of total carbohydrate in a serving to see how the food fits into your meal plan. In the list of ingredients, the ingredient present in the largest amount by weight must be listed first, followed by the other ingredients in descending order.  STANDARD OF IDENTITY  Most products have a list of ingredients. However, foods that the Food and Drug Administration (FDA) has given a standard of identity do not need a list of ingredients. A standard of identity means that a food must contain certain ingredients if it is called a particular name. Examples are mayonnaise, peanut butter, ketchup, jelly, and cheese.  LABELING TERMS  There are many terms found on food labels. Some of these terms have specific definitions. Some terms are regulated by the FDA, and the FDA has clearly specified how they can be used. Others are not regulated or well-defined and can be misleading and confusing.  SPECIFICALLY DEFINED TERMS  Nutritive Sweetener.  · A sweetener that contains calories,such as table sugar or honey.  Nonnutritive Sweetener.  · A sweetener with few or no calories,such as saccharin, aspartame, sucralose, and cyclamate.  LABELING TERMS REGULATED BY THE FDA  Free.  · The product contains only a tiny or small amount of fat, cholesterol, sodium, sugar, or calories. For example, a \"fat-free\" product will contain less than 0.5 g of fat per serving.  Low.  · A food described as \"low\" in fat, saturated " "fat, cholesterol, sodium, or calories could be eaten fairly often without exceeding dietary guidelines. For example, \"low in fat\" means no more than 3 g of fat per serving.  Lean.  · \"Lean\" and \"extra lean\" are U.S. Department of Agriculture (USDA) terms for use on meat and poultry products. \"Lean\" means the product contains less than 10 g of fat, 4 g of saturated fat, and 95 mg of cholesterol per serving. \"Lean\" is not as low in fat as a product labeled \"low.\"  Extra Lean.  · \"Extra lean\" means the product contains less than 5 g of fat, 2 g of saturated fat, and 95 mg of cholesterol per serving. While \"extra lean\" has less fat than \"lean,\" it is still higher in fat than a product labeled \"low.\"  Reduced, Less, Fewer.  · A diet product that contains 25% less of a nutrient or calories than the regular version. For example, hot dogs might be labeled \"25% less fat than our regular hot dogs.\"  Light/Lite.  · A diet product that contains  fewer calories or ½ the fat of the original. For example, \"light in sodium\" means a product with ½ the usual sodium.  More.  · One serving contains at least 10% more of the daily value of a vitamin, mineral, or fiber than usual.  Good Source Of.  · One serving contains 10% to 19% of the daily value for a particular vitamin, mineral, or fiber.  Excellent Source Of.  · One serving contains 20% or more of the daily value for a particular nutrient. Other terms used might be \"high in\" or \"rich in.\"  Enriched or Fortified.  · The product contains added vitamins, minerals, or protein. Nutrition labeling must be used on enriched or fortified foods.  Imitation.  · The product has been altered so that it is lower in protein, vitamins, or minerals than the usual food,such as imitation peanut butter.  Total Fat.  · The number listed is the total of all fat found in a serving of the product. Under total fat, food labels must list saturated fat and trans fat, which are associated with raising bad " "cholesterol and an increased risk of heart blood vessel disease.  Saturated Fat.  · Mainly fats from animal-based sources. Some examples are red meat, cheese, cream, whole milk, and coconut oil.  Trans Fat.  · Found in some fried snack foods, packaged foods, and fried restaurant foods. It is recommended you eat as close to 0 g of trans fat as possible, since it raises bad cholesterol and lowers good cholesterol.  Polyunsaturated and Monounsaturated Fats.  · More healthful fats. These fats are from plant sources.  Total Carbohydrate.  · The number of carbohydrate grams in a serving of the product. Under total carbohydrate are listed the other carbohydrate sources, such as dietary fiber and sugars.  Dietary Fiber.  · A carbohydrate from plant sources.  Sugars.  · Sugars listed on the label contain all naturally occurring sugars as well as added sugars.  LABELING TERMS NOT REGULATED BY THE FDA  Sugarless.  · Table sugar (sucrose) has not been added. However, the  may use another form of sugar in place of sucrose to nafisa the product. For example, sugar alcohols are used to nafisa foods. Sugar alcohols are a form of sugar but are not table sugar. If a product contains sugar alcohols in place of sucrose, it can still be labeled \"sugarless.\"  Low Salt, Salt-Free, Unsalted, No Salt, No Salt Added, Without Added Salt.  · Food that is usually processed with salt has been made without salt. However, the food may contain sodium-containing additives, such as preservatives, leavening agents, or flavorings.  Natural.  · This term has no legal meaning.  Organic.  · Foods that are certified as organic have been inspected and approved by the USDA to ensure they are produced without pesticides, fertilizers containing synthetic ingredients, bioengineering, or ionizing radiation.  Document Released: 12/20/2004 Document Revised: 03/11/2013 Document Reviewed: 07/08/2010  ExitCare® Patient Information ©2014 [employer], " "LLC.      Tips for Eating Away From Home If You Have Diabetes  Controlling your level of blood glucose, also known as blood sugar, can be challenging. It can be even more difficult when you do not prepare your own meals. The following tips can help you manage your diabetes when you eat away from home.  PLANNING AHEAD  Plan ahead if you know you will be eating away from home:  · Ask your health care provider how to time meals and medicine if you are taking insulin.  · Make a list of restaurants near you that offer healthy choices. If they have a carry-out menu, take it home and plan what you will order ahead of time.  · Look up the restaurant you want to eat at online. Many chain and fast-food restaurants list nutritional information online. Use this information to choose the healthiest options and to calculate how many carbohydrates will be in your meal.  · Use a carbohydrate-counting book or mobile yoli to look up the carbohydrate content and serving size of the foods you want to eat.  · Become familiar with serving sizes and learn to recognize how many servings are in a portion. This will allow you to estimate how many carbohydrates you can eat.  FREE FOODS  A \"free food\" is any food or drink that has less than 5 g of carbohydrates per serving. Free foods include:  · Many vegetables.  · Hard boiled eggs.  · Nuts or seeds.  · Olives.  · Cheeses.  · Meats.  These types of foods make good appetizer choices and are often available at salad bars. Lemon juice, vinegar, or a low-calorie salad dressing of fewer than 20 calories per serving can be used as a \"free\" salad dressing.   CHOICES TO REDUCE CARBOHYDRATES  · Substitute nonfat sweetened yogurt with a sugar-free yogurt. Yogurt made from soy milk may also be used, but you will still want a sugar-free or plain option to choose a lower carbohydrate amount.  · Ask your  to take away the bread basket or chips from your table.  · Order fresh fruit. A salad bar often " "offers fresh fruit choices. Avoid canned fruit because it is usually packed in sugar or syrup.  · Order a salad, and eat it without dressing. Or, create a \"free\" salad dressing.  · Ask for substitutions. For example, instead of French fries, request an order of a vegetable such as salad, green beans, or broccoli.  OTHER TIPS   · If you take insulin, take the insulin once your food arrives to your table. This will ensure your insulin and food are timed correctly.  · Ask your  about the portion size before your order, and ask for a take-out box if the portion has more servings than you should have. When your food comes, leave the amount you should have on the plate, and put the rest in the take-out box.  · Consider splitting an entrée with someone and ordering a side salad.     This information is not intended to replace advice given to you by your health care provider. Make sure you discuss any questions you have with your health care provider.     Document Released: 12/18/2006 Document Revised: 01/08/2016 Document Reviewed: 03/17/2015  [employer] Interactive Patient Education ©2016 [employer]    Special Instructions: None    · Is patient discharged on Warfarin / Coumadin?   No     · Is patient Post Blood Transfusion?  No    Depression / Suicide Risk    As you are discharged from this [facility] facility, it is important to learn how to keep safe from harming yourself.    Recognize the warning signs:  · Abrupt changes in personality, positive or negative- including increase in energy   · Giving away possessions  · Change in eating patterns- significant weight changes-  positive or negative  · Change in sleeping patterns- unable to sleep or sleeping all the time   · Unwillingness or inability to communicate  · Depression  · Unusual sadness, discouragement and loneliness  · Talk of wanting to die  · Neglect of personal appearance   · Rebelliousness- reckless behavior  · Withdrawal from people/activities they " love  · Confusion- inability to concentrate     If you or a loved one observes any of these behaviors or has concerns about self-harm, here's what you can do:  · Talk about it- your feelings and reasons for harming yourself  · Remove any means that you might use to hurt yourself (examples: pills, rope, extension cords, firearm)  · Get professional help from the community (Mental Health, Substance Abuse, psychological counseling)  · Do not be alone:Call your Safe Contact- someone whom you trust who will be there for you.  · Call your local CRISIS HOTLINE 846-6287 or 844-122-4770  · Call your local Children's Mobile Crisis Response Team [place] (912) 237-3351 or www.GroupTie  · Call the toll free National Suicide Prevention Hotlines   · National Suicide Prevention Lifeline 415-075-RVNG (9150)  · [employer] Discovery Line Network 800-SUICIDE (187-9217)        Your appointments     Sep 11, 2017  1:15 PM   Established Patient with Juancho Jenkins M.D.   [facility] - Internal Medicine (--)    [address]  [address]  [facility] [address] [phone]           You will be receiving a confirmation call a few days before your appointment from our automated call confirmation system.            Sep 12, 2017 11:30 AM   Anti-Coag Routine with VISTA PHARMACIST   [facility]    [address] [id number] [phone]            Oct 06, 2017  2:30 PM   FOLLOW UP with Joseph León M.D.   [facility] (--)    [address] [place] [address]  [facility] [id number] [phone]                 Discharge Medication Instructions:    Below are the medications your physician expects you to take upon discharge:    Review all your home medications and newly ordered medications with your doctor and/or pharmacist. Follow medication instructions as directed by your doctor and/or pharmacist.    Please keep your medication list with you and  share with your physician.               Medication List      START taking these medications        Instructions    Morning Afternoon Evening Bedtime    lidocaine 5 % Ptch   Last time this was given:  1 Patch on 7/21/2017  1:06 PM   Commonly known as:  LIDODERM        Apply 1 Patch to skin as directed every 24 hours.   Dose:  1 Patch                          CONTINUE taking these medications        Instructions    Morning Afternoon Evening Bedtime    atorvastatin 80 MG tablet   Last time this was given:  80 mg on 7/20/2017  9:57 PM   Commonly known as:  LIPITOR        Take 40 mg by mouth every evening.   Dose:  40 mg                        dicyclomine 20 MG Tabs   Commonly known as:  BENTYL        Take 1 Tab by mouth every 6 hours.   Dose:  20 mg                        lisinopril 5 MG Tabs   Last time this was given:  5 mg on 7/21/2017  9:54 AM   Commonly known as:  PRINIVIL        Take 1 Tab by mouth every day.   Dose:  5 mg                        loratadine 10 MG Tabs   Commonly known as:  CLARITIN        Take 1 Tab by mouth every day.   Dose:  10 mg                        metformin 1000 MG tablet   Commonly known as:  GLUCOPHAGE        Take 1 Tab by mouth 2 times a day, with meals.   Dose:  1000 mg                        metoprolol SR 25 MG Tb24   Last time this was given:  25 mg on 7/21/2017  9:54 AM   Commonly known as:  TOPROL XL        Take 1 Tab by mouth every day.   Dose:  25 mg                        omeprazole 20 MG delayed-release capsule   Last time this was given:  20 mg on 7/21/2017  9:54 AM   Commonly known as:  PRILOSEC        Take 1 Cap by mouth 2 times a day.   Dose:  20 mg                        rivaroxaban 20 MG Tabs tablet   Commonly known as:  XARELTO        Take 1 Tab by mouth with dinner.   Dose:  20 mg                        vitamin D3 (cholecalciferol) 1000 UNIT Tabs        TAKE 1 TABLET BY MOUTH ONCE DAILY.                          STOP taking these medications     fenofibrate 160 MG  tablet   Commonly known as:  TRIGLIDE                    Where to Get Your Medications      Information about where to get these medications is not yet available     ! Ask your nurse or doctor about these medications    - lidocaine 5 % Ptch            Instructions           Diet / Nutrition:    Follow any diet instructions given to you by your doctor or the dietician, including how much salt (sodium) you are allowed each day.    If you are overweight, talk to your doctor about a weight reduction plan.    Activity:    Remain physically active following your doctor's instructions about exercise and activity.    Rest often.     Any time you become even a little tired or short of breath, SIT DOWN and rest.    Worsening Symptoms:    Report any of the following signs and symptoms to the doctor's office immediately:    *Pain of jaw, arm, or neck  *Chest pain not relieved by medication                               *Dizziness or loss of consciousness  *Difficulty breathing even when at rest   *More tired than usual                                       *Bleeding drainage or swelling of surgical site  *Swelling of feet, ankles, legs or stomach                 *Fever (>100ºF)  *Pink or blood tinged sputum  *Weight gain (3lbs/day or 5lbs /week)           *Shock from internal defibrillator (if applicable)  *Palpitations or irregular heartbeats                *Cool and/or numb extremities    Stroke Awareness    Common Risk Factors for Stroke include:    Age  Atrial Fibrillation  Carotid Artery Stenosis  Diabetes Mellitus  Excessive alcohol consumption  High blood pressure  Overweight   Physical inactivity  Smoking    Warning signs and symptoms of a stroke include:    *Sudden numbness or weakness of the face, arm or leg (especially on one side of the body).  *Sudden confusion, trouble speaking or understanding.  *Sudden trouble seeing in one or both eyes.  *Sudden trouble walking, dizziness, loss of balance or coordination.Sudden  severe headache with no known cause.    It is very important to get treatment quickly when a stroke occurs. If you experience any of the above warning signs, call 911 immediately.                   Disclaimer         Quit Smoking / Tobacco Use:    I understand the use of any tobacco products increases my chance of suffering from future heart disease or stroke and could cause other illnesses which may shorten my life. Quitting the use of tobacco products is the single most important thing I can do to improve my health. For further information on smoking / tobacco cessation call a Toll Free Quit Line at 1-903.198.6199 (*[employer]) or 1-994.194.6549 (American [employer] Association) or you can access the web based program at www.lungusa.org.    [place] Tobacco Users Help Line:  (158) 785-6870       Toll Free: 1-517.584.1455  Quit Tobacco Program [facility] (303)316-7291    Crisis Hotline:    [place] Crisis Hotline:  0-657-IXEMJND or 1-264.561.6077    [place] Crisis Hotline:    1-931.953.6311 or 511-716-8810    Discharge Survey:   Thank you for choosing [facility]. We hope we did everything we could to make your hospital stay a pleasant one. You may be receiving a phone survey and we would appreciate your time and participation in answering the questions. Your input is very valuable to us in our efforts to improve our service to our patients and their families.        My signature on this form indicates that:    1. I have reviewed and understand the above information.  2. My questions regarding this information have been answered to my satisfaction.  3. I have formulated a plan with my discharge nurse to obtain my prescribed medications for home.                  Disclaimer         __________________________________                     __________       ________                       Patient Signature                                                 Date                    Time

## 2017-07-20 NOTE — IP AVS SNAPSHOT
DragonRAD Access Code: [email or web address]  Expires: 8/3/2017  9:51 PM    Your email address is not on file at [facility].  Email Addresses are required for you to sign up for DragonRAD, please contact [phone] to verify your personal information and to provide your email address prior to attempting to register for DragonRAD.    Ryan Palomino Joseline  [address]  [place] [address]    DragonRAD  A secure, online tool to manage your health information     [facility]’s DragonRAD® is a secure, online tool that connects you to your personalized health information from the privacy of your home -- day or night - making it very easy for you to manage your healthcare. Once the activation process is completed, you can even access your medical information using the DragonRAD yoli, which is available for free in the Apple Yoli store or Google Play store.     To learn more about DragonRAD, visit [email or web address]    There are two levels of access available (as shown below):   My Chart Features  [facility] Primary Care Doctor [facility]  Specialists [facility]  Urgent  Care Non-[facility] Primary Care Doctor   Email your healthcare team securely and privately 24/7 X X X    Manage appointments: schedule your next appointment; view details of past/upcoming appointments X      Request prescription refills. X      View recent personal medical records, including lab and immunizations X X X X   View health record, including health history, allergies, medications X X X X   Read reports about your outpatient visits, procedures, consult and ER notes X X X X   See your discharge summary, which is a recap of your hospital and/or ER visit that includes your diagnosis, lab results, and care plan X X  X     How to register for DragonRAD:  Once your e-mail address has been verified, follow the following steps to sign up for DragonRAD.     1. Go to  [email or web address]  2. Click on the Sign Up Now box, which takes you to the New Member Sign Up page.  You will need to provide the following information:  a. Enter your [employer] Access Code exactly as it appears at the top of this page. (You will not need to use this code after you’ve completed the sign-up process. If you do not sign up before the expiration date, you must request a new code.)   b. Enter your date of birth.   c. Enter your home email address.   d. Click Submit, and follow the next screen’s instructions.  3. Create a D and K interprisest ID. This will be your [employer] login ID and cannot be changed, so think of one that is secure and easy to remember.  4. Create a [employer] password. You can change your password at any time.  5. Enter your Password Reset Question and Answer. This can be used at a later time if you forget your password.   6. Enter your e-mail address. This allows you to receive e-mail notifications when new information is available in [employer].  7. Click Sign Up. You can now view your health information.    For assistance activating your [employer] account, call [phone]

## 2017-07-21 ENCOUNTER — APPOINTMENT (OUTPATIENT)
Dept: RADIOLOGY | Facility: MEDICAL CENTER | Age: 52
End: 2017-07-21
Attending: INTERNAL MEDICINE
Payer: MEDICARE

## 2017-07-21 VITALS
DIASTOLIC BLOOD PRESSURE: 88 MMHG | TEMPERATURE: 97.1 F | OXYGEN SATURATION: 96 % | BODY MASS INDEX: 25.25 KG/M2 | SYSTOLIC BLOOD PRESSURE: 132 MMHG | RESPIRATION RATE: 18 BRPM | HEIGHT: 71 IN | WEIGHT: 180.34 LBS | HEART RATE: 70 BPM

## 2017-07-21 LAB
ALBUMIN SERPL BCP-MCNC: 3.9 G/DL (ref 3.2–4.9)
ALBUMIN/GLOB SERPL: 1.5 G/DL
ALP SERPL-CCNC: 57 U/L (ref 30–99)
ALT SERPL-CCNC: 42 U/L (ref 2–50)
ANION GAP SERPL CALC-SCNC: 5 MMOL/L (ref 0–11.9)
AST SERPL-CCNC: 21 U/L (ref 12–45)
BASOPHILS # BLD AUTO: 0.7 % (ref 0–1.8)
BASOPHILS # BLD: 0.04 K/UL (ref 0–0.12)
BILIRUB SERPL-MCNC: 1.2 MG/DL (ref 0.1–1.5)
BUN SERPL-MCNC: 12 MG/DL (ref 8–22)
CALCIUM SERPL-MCNC: 9.2 MG/DL (ref 8.5–10.5)
CHLORIDE SERPL-SCNC: 106 MMOL/L (ref 96–112)
CHOLEST SERPL-MCNC: 140 MG/DL (ref 100–199)
CO2 SERPL-SCNC: 23 MMOL/L (ref 20–33)
CREAT SERPL-MCNC: 0.83 MG/DL (ref 0.5–1.4)
EKG IMPRESSION: NORMAL
EOSINOPHIL # BLD AUTO: 0.18 K/UL (ref 0–0.51)
EOSINOPHIL NFR BLD: 3.3 % (ref 0–6.9)
ERYTHROCYTE [DISTWIDTH] IN BLOOD BY AUTOMATED COUNT: 42.5 FL (ref 35.9–50)
GFR SERPL CREATININE-BSD FRML MDRD: >60 ML/MIN/1.73 M 2
GLOBULIN SER CALC-MCNC: 2.6 G/DL (ref 1.9–3.5)
GLUCOSE BLD-MCNC: 170 MG/DL (ref 65–99)
GLUCOSE BLD-MCNC: 202 MG/DL (ref 65–99)
GLUCOSE SERPL-MCNC: 224 MG/DL (ref 65–99)
HCT VFR BLD AUTO: 37.9 % (ref 42–52)
HDLC SERPL-MCNC: 31 MG/DL
HGB BLD-MCNC: 12.8 G/DL (ref 14–18)
IMM GRANULOCYTES # BLD AUTO: 0.02 K/UL (ref 0–0.11)
IMM GRANULOCYTES NFR BLD AUTO: 0.4 % (ref 0–0.9)
LDLC SERPL CALC-MCNC: 82 MG/DL
LYMPHOCYTES # BLD AUTO: 1.49 K/UL (ref 1–4.8)
LYMPHOCYTES NFR BLD: 27.7 % (ref 22–41)
MCH RBC QN AUTO: 30 PG (ref 27–33)
MCHC RBC AUTO-ENTMCNC: 33.8 G/DL (ref 33.7–35.3)
MCV RBC AUTO: 89 FL (ref 81.4–97.8)
MONOCYTES # BLD AUTO: 0.48 K/UL (ref 0–0.85)
MONOCYTES NFR BLD AUTO: 8.9 % (ref 0–13.4)
NEUTROPHILS # BLD AUTO: 3.17 K/UL (ref 1.82–7.42)
NEUTROPHILS NFR BLD: 59 % (ref 44–72)
NRBC # BLD AUTO: 0 K/UL
NRBC BLD AUTO-RTO: 0 /100 WBC
PLATELET # BLD AUTO: 250 K/UL (ref 164–446)
PMV BLD AUTO: 10 FL (ref 9–12.9)
POTASSIUM SERPL-SCNC: 3.8 MMOL/L (ref 3.6–5.5)
PROT SERPL-MCNC: 6.5 G/DL (ref 6–8.2)
RBC # BLD AUTO: 4.26 M/UL (ref 4.7–6.1)
SODIUM SERPL-SCNC: 134 MMOL/L (ref 135–145)
TRIGL SERPL-MCNC: 134 MG/DL (ref 0–149)
TROPONIN I SERPL-MCNC: <0.01 NG/ML (ref 0–0.04)
WBC # BLD AUTO: 5.4 K/UL (ref 4.8–10.8)

## 2017-07-21 PROCEDURE — 93005 ELECTROCARDIOGRAM TRACING: CPT | Performed by: STUDENT IN AN ORGANIZED HEALTH CARE EDUCATION/TRAINING PROGRAM

## 2017-07-21 PROCEDURE — A9270 NON-COVERED ITEM OR SERVICE: HCPCS | Performed by: STUDENT IN AN ORGANIZED HEALTH CARE EDUCATION/TRAINING PROGRAM

## 2017-07-21 PROCEDURE — 84484 ASSAY OF TROPONIN QUANT: CPT

## 2017-07-21 PROCEDURE — 93010 ELECTROCARDIOGRAM REPORT: CPT | Mod: 76 | Performed by: INTERNAL MEDICINE

## 2017-07-21 PROCEDURE — 99217 PR OBSERVATION CARE DISCHARGE: CPT | Mod: GC | Performed by: INTERNAL MEDICINE

## 2017-07-21 PROCEDURE — 700102 HCHG RX REV CODE 250 W/ 637 OVERRIDE(OP): Performed by: STUDENT IN AN ORGANIZED HEALTH CARE EDUCATION/TRAINING PROGRAM

## 2017-07-21 PROCEDURE — 82962 GLUCOSE BLOOD TEST: CPT

## 2017-07-21 PROCEDURE — 700101 HCHG RX REV CODE 250: Performed by: STUDENT IN AN ORGANIZED HEALTH CARE EDUCATION/TRAINING PROGRAM

## 2017-07-21 PROCEDURE — 85025 COMPLETE CBC W/AUTO DIFF WBC: CPT

## 2017-07-21 PROCEDURE — 80061 LIPID PANEL: CPT

## 2017-07-21 PROCEDURE — G0378 HOSPITAL OBSERVATION PER HR: HCPCS

## 2017-07-21 PROCEDURE — 80053 COMPREHEN METABOLIC PANEL: CPT

## 2017-07-21 PROCEDURE — 36415 COLL VENOUS BLD VENIPUNCTURE: CPT

## 2017-07-21 RX ORDER — LIDOCAINE 50 MG/G
1 PATCH TOPICAL EVERY 24 HOURS
Status: DISCONTINUED | OUTPATIENT
Start: 2017-07-21 | End: 2017-07-21 | Stop reason: HOSPADM

## 2017-07-21 RX ORDER — LIDOCAINE 50 MG/G
1 PATCH TOPICAL EVERY 24 HOURS
Qty: 10 PATCH | Refills: 0 | Status: ON HOLD | OUTPATIENT
Start: 2017-07-21 | End: 2017-07-25

## 2017-07-21 RX ADMIN — OMEPRAZOLE 20 MG: 20 CAPSULE, DELAYED RELEASE ORAL at 09:54

## 2017-07-21 RX ADMIN — METOPROLOL SUCCINATE 25 MG: 25 TABLET, EXTENDED RELEASE ORAL at 09:54

## 2017-07-21 RX ADMIN — INSULIN LISPRO 3 UNITS: 100 INJECTION, SOLUTION INTRAVENOUS; SUBCUTANEOUS at 06:26

## 2017-07-21 RX ADMIN — STANDARDIZED SENNA CONCENTRATE AND DOCUSATE SODIUM 2 TABLET: 8.6; 5 TABLET, FILM COATED ORAL at 09:53

## 2017-07-21 RX ADMIN — FENOFIBRATE 134 MG: 134 CAPSULE ORAL at 09:00

## 2017-07-21 RX ADMIN — INSULIN LISPRO 4 UNITS: 100 INJECTION, SOLUTION INTRAVENOUS; SUBCUTANEOUS at 13:06

## 2017-07-21 RX ADMIN — ACETAMINOPHEN 650 MG: 325 TABLET, FILM COATED ORAL at 03:57

## 2017-07-21 RX ADMIN — LIDOCAINE 1 PATCH: 50 PATCH TOPICAL at 13:06

## 2017-07-21 RX ADMIN — LISINOPRIL 5 MG: 5 TABLET ORAL at 09:54

## 2017-07-21 ASSESSMENT — LIFESTYLE VARIABLES: EVER_SMOKED: NEVER

## 2017-07-21 ASSESSMENT — PAIN SCALES - GENERAL
PAINLEVEL_OUTOF10: 7
PAINLEVEL_OUTOF10: 0
PAINLEVEL_OUTOF10: 0
PAINLEVEL_OUTOF10: 3
PAINLEVEL_OUTOF10: 0
PAINLEVEL_OUTOF10: 3
PAINLEVEL_OUTOF10: 7

## 2017-07-21 NOTE — H&P
Internal Medicine Admitting History and Physical    Name Ryan Trevizo     1965   Age/Sex 52 y.o. male   MRN [id number]   Code Status FULL.     After 5PM or if no immediate response to page, please call for cross-coverage  Attending/Team: Dr. Velasquez/Ruddy. See Patient List for primary contact information  Call [phone] to page    1st Call - Day Intern (R1):   Dr. Bailey 2nd Call - Day Sr. Resident (R2/R3):   Dr. Knight       Chief Complaint: chest pain    HPI:  Mr. Trevizo is a 52 year old gentleman with PMHx significant for MI (s/p failed recannulization, ), paroxysmal atrial fibrillation with RVR (on rivaroxaban), CAD with ischemic cardiomyopathy, DM2, and GERD with Nieves's esophagus who presents with right chest pain (below nipple), which radiates across his chest to his left nipple.  States the pain began around 2:30PM.  At the time the pain began, he was lifting 40+ lb. Boxes.  Describes the pain as sharp and worsened by deep breath.  Lasted 30 min.  He again felt the pain a few hours later, also while lifting heavy boxes.  States today is the first time he has felt pain like this.  Endorses associated dizziness.      Patient also has a 10+ year history of chest pain when running more than 50 yards.  This resolves on rest.      Review of Systems   Constitutional: Negative for fever and chills.   HENT: Negative for hearing loss.    Eyes: Negative for blurred vision and double vision.   Respiratory: Positive for shortness of breath. Negative for cough.    Cardiovascular: Positive for chest pain. Negative for palpitations.   Gastrointestinal: Positive for heartburn. Negative for nausea, vomiting and constipation.   Genitourinary: Negative for dysuria and hematuria.   Musculoskeletal: Negative for myalgias and back pain.   Skin: Negative for rash.   Neurological: Positive for dizziness. Negative for tingling, seizures, loss of consciousness and headaches.   Psychiatric/Behavioral:  Negative for substance abuse.           Past Medical History:   Past Medical History   Diagnosis Date   • Arthritis    • HTN (hypertension) 7/15/2012   • GERD (gastroesophageal reflux disease) 7/15/2012   • STEMI (ST elevation myocardial infarction) (CMS-HCC)    • Anxiety disorder    • MI (myocardial infarction) (CMS-HCC)    • Hyperlipemia    • Upper GI bleed 7/26/2012   • Glaucoma    • SOB (shortness of breath)    • Claudication (CMS-HCC)    • Dizziness    • CAD (coronary artery disease) 10/14/2013   • ACS (acute coronary syndrome) (CMS-HCC) 7/25/2012   • Pericarditis secondary to acute myocardial infarction (CMS-HCC) 7/19/2012   • Right knee pain    • Blood glucose elevated    • Mental retardation    • Osteoarthritis    • Hypertriglyceridemia    • A-fib (CMS-HCC)    • Ischemic cardiomyopathy    • Diabetes (CMS-HCC)        Past Surgical History:  Past Surgical History   Procedure Laterality Date   • Other orthopedic surgery  7-      R knee surgery   • Other  knee surgery   • Other cardiac surgery       stent placement   • Other     • Gastroscopy-endo  7/25/2012     Performed by JORDIN VERA at ENDOSCOPY [facility]   • Gastroscopy-endo  7/26/2012     Performed by JORDIN VERA at ENDOSCOPY [facility]   • Gastroscopy-endo  5/19/2017     Procedure: GASTROSCOPY-ENDO;  Surgeon: Reinaldo Berry M.D.;  Location: ENDOSCOPY [facility];  Service:        Current Outpatient Medications:  Home Medications     Reviewed by Brooklynn Donato, Pharmacy Int (Pharmacy Intern) on 07/20/17 at 1721  Med List Status: Unable to Obtain    Medication Last Dose Status    atorvastatin (LIPITOR) 80 MG tablet 6/17/2017 Active    dicyclomine (BENTYL) 20 MG Tab  Active    fenofibrate (TRIGLIDE) 160 MG tablet 6/18/2017 Active    lisinopril (PRINIVIL) 5 MG Tab 6/18/2017 Active    loratadine (CLARITIN) 10 MG Tab 6/18/2017 Active    metformin (GLUCOPHAGE) 1000 MG tablet 6/18/2017 Active    metoprolol SR (TOPROL XL) 25 MG  "TABLET SR 24 HR 6/18/2017 Active    omeprazole (PRILOSEC) 20 MG delayed-release capsule 6/18/2017 Active    rivaroxaban (XARELTO) 20 MG Tab tablet 6/18/2017 Active    vitamin D3, cholecalciferol, 1000 UNIT Tab  Active                Medication Allergy/Sensitivities:  Allergies   Allergen Reactions   • Ritalin [Methylphenidate] Unspecified     \"Hyper\"         Family History:  History reviewed. No pertinent family history.    Social History:  Social History     Social History   • Marital Status: Single     Spouse Name: N/A   • Number of Children: N/A   • Years of Education: N/A     Occupational History   • Not on file.     Social History Main Topics   • Smoking status: Never Smoker    • Smokeless tobacco: Never Used   • Alcohol Use: No   • Drug Use: No   • Sexual Activity: Not on file      Comment: Single, has no children, works as an .     Other Topics Concern   • Not on file     Social History Narrative    ** Merged History Encounter **         ** Merged History Encounter **          PCP : Dr. Jenkins, [facility].       Physical Exam     Filed Vitals:    07/20/17 1605 07/20/17 1614 07/20/17 1743 07/20/17 1800   BP: 119/90      Pulse: 78 83 78 65   Temp: 36.8 °C (98.3 °F)      Resp: 16 19 16 21   Height:       Weight:       SpO2: 94% 92% 94% 94%     Body mass index is 30.79 kg/(m^2).  /90 mmHg  Pulse 65  Temp(Src) 36.8 °C (98.3 °F)  Resp 21  Ht 1.651 m (5' 5\")  Wt 83.915 kg (185 lb)  BMI 30.79 kg/m2  SpO2 94%  O2 therapy: Pulse Oximetry: 94 %, O2 Delivery: None (Room Air)    Physical Exam   Constitutional: He is oriented to person, place, and time and well-developed, well-nourished, and in no distress. No distress.   Eyes: EOM are normal. Pupils are equal, round, and reactive to light.   Neck: No JVD present.   Cardiovascular: Normal rate, regular rhythm, normal heart sounds and intact distal pulses.  Exam reveals no gallop and no friction rub.    No murmur heard.  Pulmonary/Chest: Effort " normal. No respiratory distress. He has no wheezes. He has no rales. He exhibits tenderness.   Reproducible sharp chest pain starting on right side 2 inches under the right nipple and radiating to left side.    Abdominal: Soft. Bowel sounds are normal. He exhibits no distension. There is no tenderness. There is no rebound.   Neurological: He is alert and oriented to person, place, and time.   Skin: He is not diaphoretic.   Psychiatric: Affect normal.         Data Review       Old Records Request:   Completed  Current Records review and summary: Completed    Lab Data Review:  Recent Results (from the past 24 hour(s))   EKG (ER)    Collection Time: 17  4:00 PM   Result Value Ref Range    Report       [facility]    Test Date:  2017  Pt Name:    NICHELLE MARQUEZ                   Department: ER  MRN:        [id number]                      Room:       [facility]  Gender:     M                            Technician: [id number]  :        1965                   Requested By:ER TRIAGE PROTOCOL  Order #:    [id number]                    Reading MD: AMINATA GUTIERREZ DO    Measurements  Intervals                                Axis  Rate:       83                           P:          21  MN:         160                          QRS:        -16  QRSD:       100                          T:          37  QT:         368  QTc:        433    Interpretive Statements  SINUS RHYTHM  BORDERLINE LEFT AXIS DEVIATION  Compared to ECG 2017 20:44:39  Myocardial infarct finding no longer present    Electronically Signed On 2017 16:26:51 PDT by AMINATA GUTIERREZ DO     Troponin    Collection Time: 17  4:30 PM   Result Value Ref Range    Troponin I <0.01 0.00 - 0.04 ng/mL   Btype Natriuretic Peptide    Collection Time: 17  4:30 PM   Result Value Ref Range    B Natriuretic Peptide 6 0 - 100 pg/mL   CBC with Differential    Collection Time: 17  4:30 PM   Result Value Ref  Range    WBC 5.6 4.8 - 10.8 K/uL    RBC 4.49 (L) 4.70 - 6.10 M/uL    Hemoglobin 13.3 (L) 14.0 - 18.0 g/dL    Hematocrit 39.6 (L) 42.0 - 52.0 %    MCV 88.2 81.4 - 97.8 fL    MCH 29.6 27.0 - 33.0 pg    MCHC 33.6 (L) 33.7 - 35.3 g/dL    RDW 41.8 35.9 - 50.0 fL    Platelet Count 269 164 - 446 K/uL    MPV 9.9 9.0 - 12.9 fL    Neutrophils-Polys 63.70 44.00 - 72.00 %    Lymphocytes 24.50 22.00 - 41.00 %    Monocytes 8.50 0.00 - 13.40 %    Eosinophils 2.10 0.00 - 6.90 %    Basophils 0.70 0.00 - 1.80 %    Immature Granulocytes 0.50 0.00 - 0.90 %    Nucleated RBC 0.00 /100 WBC    Neutrophils (Absolute) 3.59 1.82 - 7.42 K/uL    Lymphs (Absolute) 1.38 1.00 - 4.80 K/uL    Monos (Absolute) 0.48 0.00 - 0.85 K/uL    Eos (Absolute) 0.12 0.00 - 0.51 K/uL    Baso (Absolute) 0.04 0.00 - 0.12 K/uL    Immature Granulocytes (abs) 0.03 0.00 - 0.11 K/uL    NRBC (Absolute) 0.00 K/uL   Complete Metabolic Panel (CMP)    Collection Time: 07/20/17  4:30 PM   Result Value Ref Range    Sodium 137 135 - 145 mmol/L    Potassium 4.0 3.6 - 5.5 mmol/L    Chloride 107 96 - 112 mmol/L    Co2 20 20 - 33 mmol/L    Anion Gap 10.0 0.0 - 11.9    Glucose 178 (H) 65 - 99 mg/dL    Bun 14 8 - 22 mg/dL    Creatinine 0.82 0.50 - 1.40 mg/dL    Calcium 9.4 8.5 - 10.5 mg/dL    AST(SGOT) 23 12 - 45 U/L    ALT(SGPT) 45 2 - 50 U/L    Alkaline Phosphatase 72 30 - 99 U/L    Total Bilirubin 1.2 0.1 - 1.5 mg/dL    Albumin 4.3 3.2 - 4.9 g/dL    Total Protein 7.2 6.0 - 8.2 g/dL    Globulin 2.9 1.9 - 3.5 g/dL    A-G Ratio 1.5 g/dL   Prothrombin Time    Collection Time: 07/20/17  4:30 PM   Result Value Ref Range    PT 14.1 12.0 - 14.6 sec    INR 1.06 0.87 - 1.13   APTT    Collection Time: 07/20/17  4:30 PM   Result Value Ref Range    APTT 28.7 24.7 - 36.0 sec   Lipase    Collection Time: 07/20/17  4:30 PM   Result Value Ref Range    Lipase 38 11 - 82 U/L   ESTIMATED GFR    Collection Time: 07/20/17  4:30 PM   Result Value Ref Range    GFR If African American >60 >60 mL/min/1.73  m 2    GFR If Non African American >60 >60 mL/min/1.73 m 2       Imaging/Procedures Review:    ndependant Imaging Review: Completed  DX-CHEST-LIMITED (1 VIEW)   Final Result      1.  No acute cardiac or pulmonary abnormalities are identified.           EKG:   EKG Independant Review: Completed  QTc: 433, HR: 83, Normal Sinus Rhythm, no ST/T changes.            Assessment/Plan     Chest pain (present on admission)  Assessment & Plan  - Reproducible sharp chest pain starting on right side 2 inches under the right nipple and radiating to left side.  Occurred when patient was lifting 40+ lb. boxes.   - Likely musculoskeletal pain given history and physical exam.    - Rule out ACS: initial EKG negative.  Initial troponin < 0.01.  Trending troponin x3 total with EKG.    - PE unlikely.  Wells score 0.  PERC 1.  D-dimer negative.    - Aortic dissection unlikely.  CXR showed no acute cardiopulmonary processes/no mediastinal widening.  D-dimer negative.  No discrepancy between right and left radial pulses.    - Admit to Telemetry for observation.   - Continuing home heart medications.     Atrial fibrillation with rapid ventricular response (CMS-HCC) (present on admission)  Assessment & Plan  - Continuing home medication, rivaroxaban.      Type 2 diabetes mellitus without complication, without long-term current use of insulin (HCC) (present on admission)  Assessment & Plan  - Last HgbA1c: 10.2% (04/17).    - Rechecking HgbA1c.   - Patient on correctional insulin.  - Ordered diet and nutritional consult.     GERD with Nieves's esophagus (present on admission)  Assessment & Plan  - On home medication, omeprazole.    CAD with ischemic cardiomyopathy (present on admission)  Assessment & Plan  - HEART score 3.  Day team can consider stress test.       Anticipated Hospital stay: Observation admit        Quality Measures  Labs reviewed, EKG reviewed, Medications reviewed and Radiology images reviewed

## 2017-07-21 NOTE — PROGRESS NOTES
Assumed care of patient upon arrival to floor from ER, vital signs stable afebrile, telemetry sinus rhythm, medicated with prn tylenol for pain, patient currently sleeping, cont to monitor.

## 2017-07-21 NOTE — DISCHARGE SUMMARY
[facility] Internal Medicine Discharge Summary      Admit Date:  7/20/2017       Discharge Date:   7/21/2017    Service:   [facility] Internal Medicine Green Team  Attending Physician(s):   Dr. Velasquez        Senior Resident(s):   Dr. Knight  Luis Felipe Resident(s):   Dr. Ko       Primary Diagnosis:   Atypical chest pain due musculoskeletal etiology       Secondary Diagnoses:                Active Problems:    Chest pain POA: Yes    Atrial fibrillation with rapid ventricular response (CMS-HCC) POA: Yes    Type 2 diabetes mellitus without complication, without long-term current use of insulin (HCC) POA: Yes    GERD with Nieves's esophagus POA: Yes    CAD with ischemic cardiomyopathy POA: Yes  Resolved Problems:    * No resolved hospital problems. *      Hospital Summary (Brief Narrative):         Patient is a 52 year old male with past medical history of coronary artery disease, myocardia infarction in 2012, congestive systolic heart failure with EF of 45%, diabetes mellitus II, hypertension, gastroesophageal reflux disease, Nieves's esophagus, atrial fibrillation on Rivaroxaban, presented with atypical chest pain of acute onset after he tried to lift a heavy box that lasted for several hours and aggravated by cough. Work up revealed negative serial troponin measurements with no acute ST changes on EKG. Low suspicion for pulmonary embolism as patient had low D.dimer level with Wells score, chest pain was though to be due musculoskeletal etiology as it was reproducible on physical exam. Patient had stress test last year that showed irreversible changes and no further work up pursed at current admission. Patient medical therapy was optimized and he was discharged in stable condition on lidocaine patch.      Patient /Hospital Summary (Details -- Problem Oriented) :          Atrial fibrillation with rapid ventricular response (CMS-HCC)  No acute issues, managed with metoprolol and rivaroxaban.      Type 2 diabetes  mellitus without complication, without long-term current use of insulin (formerly [facility])  HBA1c of 7.6 on current admission, managed with metformin therapy, patient was educated on non-pharmacological measures for treatment     GERD with Nieves's esophagus  No acute issues, managed with omeprazole.      Consultants:     None    Procedures:        None    Imaging/ Testing:      DX-CHEST-LIMITED (1 VIEW)   Final Result      1.  No acute cardiac or pulmonary abnormalities are identified.          Discharge Medications:           Medication List      START taking these medications       Instructions    lidocaine 5 % Ptch   Last time this was given:  1 Patch on 7/21/2017  1:06 PM   Commonly known as:  LIDODERM    Apply 1 Patch to skin as directed every 24 hours.   Dose:  1 Patch         CONTINUE taking these medications       Instructions    atorvastatin 80 MG tablet   Last time this was given:  80 mg on 7/20/2017  9:57 PM   Commonly known as:  LIPITOR    Take 40 mg by mouth every evening.   Dose:  40 mg       dicyclomine 20 MG Tabs   Commonly known as:  BENTYL    Take 1 Tab by mouth every 6 hours.   Dose:  20 mg       lisinopril 5 MG Tabs   Last time this was given:  5 mg on 7/21/2017  9:54 AM   Commonly known as:  PRINIVIL    Take 1 Tab by mouth every day.   Dose:  5 mg       loratadine 10 MG Tabs   Commonly known as:  CLARITIN    Take 1 Tab by mouth every day.   Dose:  10 mg       metformin 1000 MG tablet   Commonly known as:  GLUCOPHAGE    Take 1 Tab by mouth 2 times a day, with meals.   Dose:  1000 mg       metoprolol SR 25 MG Tb24   Last time this was given:  25 mg on 7/21/2017  9:54 AM   Commonly known as:  TOPROL XL    Take 1 Tab by mouth every day.   Dose:  25 mg       omeprazole 20 MG delayed-release capsule   Last time this was given:  20 mg on 7/21/2017  9:54 AM   Commonly known as:  PRILOSEC    Take 1 Cap by mouth 2 times a day.   Dose:  20 mg       rivaroxaban 20 MG Tabs tablet   Commonly known as:  XARELTO    Take 1  Tab by mouth with dinner.   Dose:  20 mg       vitamin D3 (cholecalciferol) 1000 UNIT Tabs    TAKE 1 TABLET BY MOUTH ONCE DAILY.         STOP taking these medications          fenofibrate 160 MG tablet   Commonly known as:  TRIGLIDE             Disposition:   Home    Diet:   Cardiac     Activity:   As tolerated by patient     Instructions:      Continue to take outpatient medications as instructed   Follow up with primary care physician in 2-4 weeks      The patient was instructed to return to the ER in the event of worsening symptoms. I have counseled the patient on the importance of compliance and the patient has agreed to proceed with all medical recommendations and follow up plan indicated above.   The patient understands that all medications come with benefits and risks. Risks may include permanent injury or death and these risks can be minimized with close reassessment and monitoring.        Primary Care Provider:    Juancho Jenkins M.D.    Discharge summary routed to primary care provider    Follow up appointment details :      Primary care visit in 2-4 weeks     Pending Studies:        None     Time spent on discharge day patient visit, preparing discharge paperwork and arranging for patient follow up.    Summary of follow up issues:   Adjust outpatient medications as needed     Discharge Time (Minutes) :    60 minutes       Condition on Discharge    ______________________________________________________________________    Interval history/exam for day of discharge:    Patient is doing well this morning, denies new complaints, reports doing better, ready to be discharged home     Filed Vitals:    07/21/17 0004 07/21/17 0400 07/21/17 0845 07/21/17 1320   BP: 114/86 116/67 126/91 132/88   Pulse:  66 59 70   Temp:  36.5 °C (97.7 °F) 36.1 °C (96.9 °F) 36.2 °C (97.1 °F)   Resp:  16 18 18   Height:       Weight:       SpO2:  95% 94% 96%     Weight/BMI: Body mass index is 162.27 kg/(m^2).  Pulse Oximetry: 96 %,  O2 (LPM): 0, O2 Delivery: None (Room Air)    General: alert and oriented, calm, cooperative, in NAD  CVS: regular rhythm, normal S1,S2, no murmurs  PULM: clear to auscultation on both sides, chest tenderness   ABD: soft, no tenderness of distension, appreciated BS    Most Recent Labs:    Lab Results   Component Value Date/Time    WBC 5.4 07/21/2017 04:32 AM    RBC 4.26* 07/21/2017 04:32 AM    HEMOGLOBIN 12.8* 07/21/2017 04:32 AM    HEMATOCRIT 37.9* 07/21/2017 04:32 AM    MCV 89.0 07/21/2017 04:32 AM    MCH 30.0 07/21/2017 04:32 AM    MCHC 33.8 07/21/2017 04:32 AM    MPV 10.0 07/21/2017 04:32 AM    NEUTROPHILS-POLYS 59.00 07/21/2017 04:32 AM    LYMPHOCYTES 27.70 07/21/2017 04:32 AM    MONOCYTES 8.90 07/21/2017 04:32 AM    EOSINOPHILS 3.30 07/21/2017 04:32 AM    BASOPHILS 0.70 07/21/2017 04:32 AM    ANISOCYTOSIS 1+ 07/26/2012 11:00 AM      Lab Results   Component Value Date/Time    SODIUM 134* 07/21/2017 04:32 AM    POTASSIUM 3.8 07/21/2017 04:32 AM    CHLORIDE 106 07/21/2017 04:32 AM    CO2 23 07/21/2017 04:32 AM    GLUCOSE 224* 07/21/2017 04:32 AM    BUN 12 07/21/2017 04:32 AM    CREATININE 0.83 07/21/2017 04:32 AM    BUN-CREATININE RATIO 18 06/13/2014 08:40 AM      Lab Results   Component Value Date/Time    ALT(SGPT) 42 07/21/2017 04:32 AM    AST(SGOT) 21 07/21/2017 04:32 AM    ALKALINE PHOSPHATASE 57 07/21/2017 04:32 AM    TOTAL BILIRUBIN 1.2 07/21/2017 04:32 AM    DIRECT BILIRUBIN 0.3 09/14/2012 09:10 PM    LIPASE 38 07/20/2017 04:30 PM    ALBUMIN 3.9 07/21/2017 04:32 AM    GLOBULIN 2.6 07/21/2017 04:32 AM    INR 1.06 07/20/2017 04:30 PM     Lab Results   Component Value Date/Time    PT 14.1 07/20/2017 04:30 PM    INR 1.06 07/20/2017 04:30 PM

## 2017-07-21 NOTE — ED NOTES
Pt resting in gurney watching television. NAD noted. Respirations even, equal and unlabored.    VSS. Pt requesting food. Explained pt needs dysphagia screening prior to eating.

## 2017-07-21 NOTE — CARE PLAN
Problem: Safety  Goal: Will remain free from falls  Outcome: PROGRESSING AS EXPECTED  Patient remains free from falls/injuries at this time, tread socks on, cont to monitor and assist as needed.    Problem: Pain Management  Goal: Pain level will decrease to patient’s comfort goal  Outcome: PROGRESSING SLOWER THAN EXPECTED  Patient continues to complain of chest muscle pain and is receiving prn tylenol as needed per dr order, cont to monitor and medicate as needed.

## 2017-07-21 NOTE — CARE PLAN
Problem: Nutritional:  Goal: Patient to verbalize or demonstrate understanding of diet  Outcome: NOT MET  Pt not in room at this time, Diabetes diet education handouts left at bedside. RD will follow up with patient and provide diet education.

## 2017-07-21 NOTE — ED NOTES
Unable to complete med rec at this time   LM to caregiver to verify medications   Medications are the same per last visit at on the 06/18/17  Allergies reviewed

## 2017-07-21 NOTE — ED NOTES
Assumed care of pt, report received from Ijeoma MCCLAIN. Pt quietly resting on gurney. NAD noted. Sinus rhythm noted on monitor, all VSS. D-dimer collected and sent to lab. Pt aware of POC. Denies at this time.

## 2017-07-21 NOTE — SENIOR ADMIT NOTE
Patient is a 51 y/o M with PMHX of CAD, MI in 2012, unsuccessful opening of Non dominant circumflex artery, mild LAD and RCA disease, Echo with EF of 45%, DMII, HTN, GERD, Nieves's esophagus, Afib on Rivaroxaban was brought in because of acute onset chest pain that started soon after patient lifted a heavy box ~40lbs. Patient describes the pain as starting 1-2 inches below the right nipple and radiates in straight line all the way to the left side upto the mid clavicular line, exacerbated by deep breath, and reproducible on palpation. Also reported hx of lightheadedness.  Pain lasted close to 30 minutes.   Patient is fairly active, no hx of immobility.     Assessment:   Patient with hx of significant CAD presented with chest pain, which appears to be musculoskeletal in natures (after lifting heavy weight). Low suspicion of Cardiac/PE/Aortic dissection at this time.   Well score: 0  Heart Score: 3, low risk  D.dimer: <200    Plan:  Given significant cardiac hx, will observe overnight  Resume all home cardiac meds  Given , atorvastatin 80 once.   Monitor on tele.   Trend trop and EKG; initial trop and EKG not indicative of ACS.   Check A1c and lipid panel in AM  Anticipate Discharge in AM if remains stable.

## 2017-07-21 NOTE — ED NOTES
Report called to Tere MCCLAIN. Transport arrived for pt. Pt being transported to floor via gurney on monitor and RA in stable condition.

## 2017-07-21 NOTE — DISCHARGE INSTRUCTIONS
Discharge Instructions    Discharged to home by car with escort. Discharged via wheelchair, hospital escort: Yes.  Special equipment needed: Not Applicable    Be sure to schedule a follow-up appointment with your primary care doctor or any specialists as instructed.     Discharge Plan:   Diet Plan: Discussed  Activity Level: Discussed  Confirmed Follow up Appointment: Patient to Call and Schedule Appointment  Confirmed Symptoms Management: Discussed  Medication Reconciliation Updated: Yes  Influenza Vaccine Indication: Patient Refuses    I understand that a diet low in cholesterol, fat, and sodium is recommended for good health. Unless I have been given specific instructions below for another diet, I accept this instruction as my diet prescription.   Other diet: heart healthy, diabetic  Heart-Healthy Eating Plan  Heart-healthy meal planning includes:  · Limiting unhealthy fats.  · Increasing healthy fats.  · Making other small dietary changes.  You may need to talk with your doctor or a diet specialist (dietitian) to create an eating plan that is right for you.  WHAT TYPES OF FAT SHOULD I CHOOSE?  · Choose healthy fats. These include olive oil and canola oil, flaxseeds, walnuts, almonds, and seeds.  · Eat more omega-3 fats. These include salmon, mackerel, sardines, tuna, flaxseed oil, and ground flaxseeds. Try to eat fish at least twice each week.  · Limit saturated fats.  · Saturated fats are often found in animal products, such as meats, butter, and cream.  · Plant sources of saturated fats include palm oil, palm kernel oil, and coconut oil.  · Avoid foods with partially hydrogenated oils in them. These include stick margarine, some tub margarines, cookies, crackers, and other baked goods. These contain trans fats.  WHAT GENERAL GUIDELINES DO I NEED TO FOLLOW?  · Check food labels carefully. Identify foods with trans fats or high amounts of saturated fat.  · Fill one half of your plate with vegetables and green  "salads. Eat 4-5 servings of vegetables per day. A serving of vegetables is:  · 1 cup of raw leafy vegetables.  · ½ cup of raw or cooked cut-up vegetables.  · ½ cup of vegetable juice.  · Fill one fourth of your plate with whole grains. Look for the word \"whole\" as the first word in the ingredient list.  · Fill one fourth of your plate with lean protein foods.  · Eat 4-5 servings of fruit per day. A serving of fruit is:  · One medium whole fruit.  · ¼ cup of dried fruit.  · ½ cup of fresh, frozen, or canned fruit.  · ½ cup of 100% fruit juice.  · Eat more foods that contain soluble fiber. These include apples, broccoli, carrots, beans, peas, and barley. Try to get 20-30 g of fiber per day.  · Eat more home-cooked food. Eat less restaurant, buffet, and fast food.  · Limit or avoid alcohol.  · Limit foods high in starch and sugar.  · Avoid fried foods.  · Avoid frying your food. Try baking, boiling, grilling, or broiling it instead. You can also reduce fat by:  · Removing the skin from poultry.  · Removing all visible fats from meats.  · Skimming the fat off of stews, soups, and gravies before serving them.  · Steaming vegetables in water or broth.  · Lose weight if you are overweight.  · Eat 4-5 servings of nuts, legumes, and seeds per week:  · One serving of dried beans or legumes equals ½ cup after being cooked.  · One serving of nuts equals 1½ ounces.  · One serving of seeds equals ½ ounce or one tablespoon.  · You may need to keep track of how much salt or sodium you eat. This is especially true if you have high blood pressure. Talk with your doctor or dietitian to get more information.  WHAT FOODS CAN I EAT?  Grains  Breads, including Estonian, white, diana, wheat, raisin, rye, oatmeal, and Italian. Tortillas that are neither fried nor made with lard or trans fat. Low-fat rolls, including hotdog and hamburger buns and English muffins. Biscuits. Muffins. Waffles. Pancakes. Light popcorn. Whole-grain cereals. " Flatbread. [place] toast. Pretzels. Breadsticks. Rusks. Low-fat snacks. Low-fat crackers, including oyster, saltine, matzo, stephanie, animal, and rye. Rice and pasta, including brown rice and pastas that are made with whole wheat.   Vegetables  All vegetables.   Fruits  All fruits, but limit coconut.  Meats and Other Protein Sources  Lean, well-trimmed beef, veal, pork, and lamb. Chicken and turkey without skin. All fish and shellfish. Wild duck, rabbit, pheasant, and venison. Egg whites or low-cholesterol egg substitutes. Dried beans, peas, lentils, and tofu. Seeds and most nuts.  Dairy  Low-fat or nonfat cheeses, including ricotta, string, and mozzarella. Skim or 1% milk that is liquid, powdered, or evaporated. Buttermilk that is made with low-fat milk. Nonfat or low-fat yogurt.  Beverages  Mineral water. Diet carbonated beverages.  Sweets and Desserts  Sherbets and fruit ices. Honey, jam, marmalade, jelly, and syrups. Meringues and gelatins. Pure sugar candy, such as hard candy, jelly beans, gumdrops, mints, marshmallows, and small amounts of dark chocolate. [place] food cake.  Eat all sweets and desserts in moderation.  Fats and Oils  Nonhydrogenated (trans-free) margarines. Vegetable oils, including soybean, sesame, sunflower, olive, peanut, safflower, corn, canola, and cottonseed. Salad dressings or mayonnaise made with a vegetable oil. Limit added fats and oils that you use for cooking, baking, salads, and as spreads.  Other  Cocoa powder. Coffee and tea. All seasonings and condiments.  The items listed above may not be a complete list of recommended foods or beverages. Contact your dietitian for more options.  WHAT FOODS ARE NOT RECOMMENDED?  Grains  Breads that are made with saturated or trans fats, oils, or whole milk. Croissants. Butter rolls. Cheese breads. Sweet rolls. Donuts. Buttered popcorn. Chow mein noodles. High-fat crackers, such as cheese or butter crackers.  Meats and Other Protein Sources  Fatty  meats, such as hotdogs, short ribs, sausage, spareribs, meza, rib eye roast or steak, and mutton. High-fat deli meats, such as salami and bologna. Caviar. Domestic duck and goose. Organ meats, such as kidney, liver, sweetbreads, and heart.  Dairy  Cream, sour cream, cream cheese, and creamed cottage cheese. Whole-milk cheeses, including blue (tomi), [place], Brie, David, American, Havarti, Swiss, cheddar, Camembert, and [place]. Whole or 2% milk that is liquid, evaporated, or condensed. Whole buttermilk. Cream sauce or high-fat cheese sauce. Yogurt that is made from whole milk.  Beverages  Regular sodas and juice drinks with added sugar.  Sweets and Desserts  Frosting. Pudding. Cookies. Cakes other than augustine food cake. Candy that has milk chocolate or white chocolate, hydrogenated fat, butter, coconut, or unknown ingredients. Buttered syrups. Full-fat ice cream or ice cream drinks.  Fats and Oils  Gravy that has suet, meat fat, or shortening. Cocoa butter, hydrogenated oils, palm oil, coconut oil, palm kernel oil. These can often be found in baked products, candy, fried foods, nondairy creamers, and whipped toppings. Solid fats and shortenings, including meza fat, salt pork, lard, and butter. Nondairy cream substitutes, such as coffee creamers and sour cream substitutes. Salad dressings that are made of unknown oils, cheese, or sour cream.  The items listed above may not be a complete list of foods and beverages to avoid. Contact your dietitian for more information.     This information is not intended to replace advice given to you by your health care provider. Make sure you discuss any questions you have with your health care provider.     Document Released: 06/18/2013 Document Revised: 01/08/2016 Document Reviewed: 06/11/2015  [employer] Interactive Patient Education ©2016 [employer]      Diets for Diabetes, Food Labeling  Look at food labels to help you decide how much of a product you can eat. You  "will want to check the amount of total carbohydrate in a serving to see how the food fits into your meal plan. In the list of ingredients, the ingredient present in the largest amount by weight must be listed first, followed by the other ingredients in descending order.  STANDARD OF IDENTITY  Most products have a list of ingredients. However, foods that the Food and Drug Administration (FDA) has given a standard of identity do not need a list of ingredients. A standard of identity means that a food must contain certain ingredients if it is called a particular name. Examples are mayonnaise, peanut butter, ketchup, jelly, and cheese.  LABELING TERMS  There are many terms found on food labels. Some of these terms have specific definitions. Some terms are regulated by the FDA, and the FDA has clearly specified how they can be used. Others are not regulated or well-defined and can be misleading and confusing.  SPECIFICALLY DEFINED TERMS  Nutritive Sweetener.  · A sweetener that contains calories,such as table sugar or honey.  Nonnutritive Sweetener.  · A sweetener with few or no calories,such as saccharin, aspartame, sucralose, and cyclamate.  LABELING TERMS REGULATED BY THE FDA  Free.  · The product contains only a tiny or small amount of fat, cholesterol, sodium, sugar, or calories. For example, a \"fat-free\" product will contain less than 0.5 g of fat per serving.  Low.  · A food described as \"low\" in fat, saturated fat, cholesterol, sodium, or calories could be eaten fairly often without exceeding dietary guidelines. For example, \"low in fat\" means no more than 3 g of fat per serving.  Lean.  · \"Lean\" and \"extra lean\" are U.S. Department of Agriculture (USDA) terms for use on meat and poultry products. \"Lean\" means the product contains less than 10 g of fat, 4 g of saturated fat, and 95 mg of cholesterol per serving. \"Lean\" is not as low in fat as a product labeled \"low.\"  Extra Lean.  · \"Extra lean\" means the product " "contains less than 5 g of fat, 2 g of saturated fat, and 95 mg of cholesterol per serving. While \"extra lean\" has less fat than \"lean,\" it is still higher in fat than a product labeled \"low.\"  Reduced, Less, Fewer.  · A diet product that contains 25% less of a nutrient or calories than the regular version. For example, hot dogs might be labeled \"25% less fat than our regular hot dogs.\"  Light/Lite.  · A diet product that contains  fewer calories or ½ the fat of the original. For example, \"light in sodium\" means a product with ½ the usual sodium.  More.  · One serving contains at least 10% more of the daily value of a vitamin, mineral, or fiber than usual.  Good Source Of.  · One serving contains 10% to 19% of the daily value for a particular vitamin, mineral, or fiber.  Excellent Source Of.  · One serving contains 20% or more of the daily value for a particular nutrient. Other terms used might be \"high in\" or \"rich in.\"  Enriched or Fortified.  · The product contains added vitamins, minerals, or protein. Nutrition labeling must be used on enriched or fortified foods.  Imitation.  · The product has been altered so that it is lower in protein, vitamins, or minerals than the usual food,such as imitation peanut butter.  Total Fat.  · The number listed is the total of all fat found in a serving of the product. Under total fat, food labels must list saturated fat and trans fat, which are associated with raising bad cholesterol and an increased risk of heart blood vessel disease.  Saturated Fat.  · Mainly fats from animal-based sources. Some examples are red meat, cheese, cream, whole milk, and coconut oil.  Trans Fat.  · Found in some fried snack foods, packaged foods, and fried restaurant foods. It is recommended you eat as close to 0 g of trans fat as possible, since it raises bad cholesterol and lowers good cholesterol.  Polyunsaturated and Monounsaturated Fats.  · More healthful fats. These fats are from plant " "sources.  Total Carbohydrate.  · The number of carbohydrate grams in a serving of the product. Under total carbohydrate are listed the other carbohydrate sources, such as dietary fiber and sugars.  Dietary Fiber.  · A carbohydrate from plant sources.  Sugars.  · Sugars listed on the label contain all naturally occurring sugars as well as added sugars.  LABELING TERMS NOT REGULATED BY THE FDA  Sugarless.  · Table sugar (sucrose) has not been added. However, the  may use another form of sugar in place of sucrose to nafisa the product. For example, sugar alcohols are used to nafisa foods. Sugar alcohols are a form of sugar but are not table sugar. If a product contains sugar alcohols in place of sucrose, it can still be labeled \"sugarless.\"  Low Salt, Salt-Free, Unsalted, No Salt, No Salt Added, Without Added Salt.  · Food that is usually processed with salt has been made without salt. However, the food may contain sodium-containing additives, such as preservatives, leavening agents, or flavorings.  Natural.  · This term has no legal meaning.  Organic.  · Foods that are certified as organic have been inspected and approved by the USDA to ensure they are produced without pesticides, fertilizers containing synthetic ingredients, bioengineering, or ionizing radiation.  Document Released: 12/20/2004 Document Revised: 03/11/2013 Document Reviewed: 07/08/2010  [employer]Care® Patient Information ©2014 [employer].      Tips for Eating Away From Home If You Have Diabetes  Controlling your level of blood glucose, also known as blood sugar, can be challenging. It can be even more difficult when you do not prepare your own meals. The following tips can help you manage your diabetes when you eat away from home.  PLANNING AHEAD  Plan ahead if you know you will be eating away from home:  · Ask your health care provider how to time meals and medicine if you are taking insulin.  · Make a list of restaurants near you that " "offer healthy choices. If they have a carry-out menu, take it home and plan what you will order ahead of time.  · Look up the restaurant you want to eat at online. Many chain and fast-food restaurants list nutritional information online. Use this information to choose the healthiest options and to calculate how many carbohydrates will be in your meal.  · Use a carbohydrate-counting book or mobile yoli to look up the carbohydrate content and serving size of the foods you want to eat.  · Become familiar with serving sizes and learn to recognize how many servings are in a portion. This will allow you to estimate how many carbohydrates you can eat.  FREE FOODS  A \"free food\" is any food or drink that has less than 5 g of carbohydrates per serving. Free foods include:  · Many vegetables.  · Hard boiled eggs.  · Nuts or seeds.  · Olives.  · Cheeses.  · Meats.  These types of foods make good appetizer choices and are often available at salad bars. Lemon juice, vinegar, or a low-calorie salad dressing of fewer than 20 calories per serving can be used as a \"free\" salad dressing.   CHOICES TO REDUCE CARBOHYDRATES  · Substitute nonfat sweetened yogurt with a sugar-free yogurt. Yogurt made from soy milk may also be used, but you will still want a sugar-free or plain option to choose a lower carbohydrate amount.  · Ask your  to take away the bread basket or chips from your table.  · Order fresh fruit. A salad bar often offers fresh fruit choices. Avoid canned fruit because it is usually packed in sugar or syrup.  · Order a salad, and eat it without dressing. Or, create a \"free\" salad dressing.  · Ask for substitutions. For example, instead of French fries, request an order of a vegetable such as salad, green beans, or broccoli.  OTHER TIPS   · If you take insulin, take the insulin once your food arrives to your table. This will ensure your insulin and food are timed correctly.  · Ask your  about the portion size " before your order, and ask for a take-out box if the portion has more servings than you should have. When your food comes, leave the amount you should have on the plate, and put the rest in the take-out box.  · Consider splitting an entrée with someone and ordering a side salad.     This information is not intended to replace advice given to you by your health care provider. Make sure you discuss any questions you have with your health care provider.     Document Released: 12/18/2006 Document Revised: 01/08/2016 Document Reviewed: 03/17/2015  [email or web address] Interactive Patient Education ©2016 [email or web address]    Special Instructions: None    · Is patient discharged on Warfarin / Coumadin?   No     · Is patient Post Blood Transfusion?  No    Depression / Suicide Risk    As you are discharged from this [facility] facility, it is important to learn how to keep safe from harming yourself.    Recognize the warning signs:  · Abrupt changes in personality, positive or negative- including increase in energy   · Giving away possessions  · Change in eating patterns- significant weight changes-  positive or negative  · Change in sleeping patterns- unable to sleep or sleeping all the time   · Unwillingness or inability to communicate  · Depression  · Unusual sadness, discouragement and loneliness  · Talk of wanting to die  · Neglect of personal appearance   · Rebelliousness- reckless behavior  · Withdrawal from people/activities they love  · Confusion- inability to concentrate     If you or a loved one observes any of these behaviors or has concerns about self-harm, here's what you can do:  · Talk about it- your feelings and reasons for harming yourself  · Remove any means that you might use to hurt yourself (examples: pills, rope, extension cords, firearm)  · Get professional help from the community (Mental Health, Substance Abuse, psychological counseling)  · Do not be alone:Call your Safe Contact- someone whom you trust who will  be there for you.  · Call your local CRISIS HOTLINE 652-3011 or 011-704-4517  · Call your local Children's Mobile Crisis Response Team [place] (850) 698-0332 or www.[employer]  · Call the toll free National Suicide Prevention Hotlines   · National Suicide Prevention Lifeline 162-858-IFZK (3192)  · National ezzai - how to [place] Line Network 800-SUICIDE (299-5975)

## 2017-07-21 NOTE — CARE PLAN
Problem: Safety  Goal: Will remain free from injury  Outcome: PROGRESSING AS EXPECTED  Bed alarm on, bed locked in low position, treaded socks on, call light and items within reach. Hourly rounding in place, near nurses station. Assessed mobility and communicated with CNA and patient regarding risk of fall.         Problem: Infection  Goal: Will remain free from infection  Outcome: PROGRESSING AS EXPECTED  Assessing closely for s/s of worsening infection. Vitals q4, and additionally as needed. Encouraged pt on using alcohol hand wipes before eating meals, and after using urinal.   Used scrupulous hand hygiene, and sterile technique when administering IV medications.   Educated patient on s/s of infection, ways to prevent with vaccines, hygiene, hand washing.

## 2017-07-22 ENCOUNTER — PATIENT OUTREACH (OUTPATIENT)
Dept: HEALTH INFORMATION MANAGEMENT | Facility: OTHER | Age: 52
End: 2017-07-22

## 2017-07-22 NOTE — CONSULTS
Diabetes education: Met with patient before discharge. Pt has hx of diabetes on Metformin. Hg A1c was 7.6%. Pt wanted a meter to test blood sugars. Pt given a Freestyle lite meter,with 10 strips, and instructed on use. Pt to test once a day rotating ac and hs. Pt did not want to do a return demonstration.  Pt will need a prescription for strips and lancets for the Freestyle lite meter with dx code, directions , quantity and refills so medicare can cover. Pt given handouts including note with this information for his PCP .

## 2017-07-22 NOTE — PROGRESS NOTES
Pt discharged, all PIVs removed, vitals stable, denies pain.  Went over discharge instructions and paperwork signed. All questions answered. Follow up appointments set and pt understands. All belongings with patient. Caregiver at bedside, pt walked out with caregiver, copy of discharge instructions given to patient and extra to caregiver, went over meds and appointments with both.

## 2017-07-25 ENCOUNTER — APPOINTMENT (OUTPATIENT)
Dept: RADIOLOGY | Facility: MEDICAL CENTER | Age: 52
End: 2017-07-25
Attending: INTERNAL MEDICINE
Payer: MEDICARE

## 2017-07-25 ENCOUNTER — RESOLUTE PROFESSIONAL BILLING HOSPITAL PROF FEE (OUTPATIENT)
Dept: HOSPITALIST | Facility: MEDICAL CENTER | Age: 52
End: 2017-07-25
Payer: MEDICARE

## 2017-07-25 ENCOUNTER — HOSPITAL ENCOUNTER (OUTPATIENT)
Facility: MEDICAL CENTER | Age: 52
End: 2017-07-26
Attending: EMERGENCY MEDICINE | Admitting: HOSPITALIST
Payer: MEDICARE

## 2017-07-25 ENCOUNTER — APPOINTMENT (OUTPATIENT)
Dept: RADIOLOGY | Facility: MEDICAL CENTER | Age: 52
End: 2017-07-25
Attending: EMERGENCY MEDICINE
Payer: MEDICARE

## 2017-07-25 DIAGNOSIS — R07.9 CHEST PAIN, UNSPECIFIED TYPE: ICD-10-CM

## 2017-07-25 PROBLEM — I48.91 AF (ATRIAL FIBRILLATION) (HCC): Status: ACTIVE | Noted: 2017-07-25

## 2017-07-25 LAB
ALBUMIN SERPL BCP-MCNC: 4.5 G/DL (ref 3.2–4.9)
ALBUMIN/GLOB SERPL: 1.7 G/DL
ALP SERPL-CCNC: 71 U/L (ref 30–99)
ALT SERPL-CCNC: 43 U/L (ref 2–50)
ANION GAP SERPL CALC-SCNC: 10 MMOL/L (ref 0–11.9)
APTT PPP: 37.5 SEC (ref 24.7–36)
AST SERPL-CCNC: 18 U/L (ref 12–45)
BASOPHILS # BLD AUTO: 0.7 % (ref 0–1.8)
BASOPHILS # BLD: 0.06 K/UL (ref 0–0.12)
BILIRUB SERPL-MCNC: 0.8 MG/DL (ref 0.1–1.5)
BNP SERPL-MCNC: 4 PG/ML (ref 0–100)
BUN SERPL-MCNC: 16 MG/DL (ref 8–22)
CALCIUM SERPL-MCNC: 9.9 MG/DL (ref 8.5–10.5)
CHLORIDE SERPL-SCNC: 98 MMOL/L (ref 96–112)
CK MB SERPL-MCNC: 1.2 NG/ML (ref 0–5)
CO2 SERPL-SCNC: 24 MMOL/L (ref 20–33)
CREAT SERPL-MCNC: 1 MG/DL (ref 0.5–1.4)
DEPRECATED D DIMER PPP IA-ACNC: <200 NG/ML(D-DU)
EKG IMPRESSION: NORMAL
EOSINOPHIL # BLD AUTO: 0.13 K/UL (ref 0–0.51)
EOSINOPHIL NFR BLD: 1.4 % (ref 0–6.9)
ERYTHROCYTE [DISTWIDTH] IN BLOOD BY AUTOMATED COUNT: 40.1 FL (ref 35.9–50)
GFR SERPL CREATININE-BSD FRML MDRD: >60 ML/MIN/1.73 M 2
GLOBULIN SER CALC-MCNC: 2.7 G/DL (ref 1.9–3.5)
GLUCOSE BLD-MCNC: 193 MG/DL (ref 65–99)
GLUCOSE BLD-MCNC: 207 MG/DL (ref 65–99)
GLUCOSE BLD-MCNC: 214 MG/DL (ref 65–99)
GLUCOSE BLD-MCNC: 222 MG/DL (ref 65–99)
GLUCOSE BLD-MCNC: 223 MG/DL (ref 65–99)
GLUCOSE SERPL-MCNC: 279 MG/DL (ref 65–99)
HCT VFR BLD AUTO: 40.4 % (ref 42–52)
HGB BLD-MCNC: 14 G/DL (ref 14–18)
IMM GRANULOCYTES # BLD AUTO: 0.07 K/UL (ref 0–0.11)
IMM GRANULOCYTES NFR BLD AUTO: 0.8 % (ref 0–0.9)
INR PPP: 1.16 (ref 0.87–1.13)
INR PPP: 1.66 (ref 0.87–1.13)
LIPASE SERPL-CCNC: 50 U/L (ref 11–82)
LYMPHOCYTES # BLD AUTO: 1.76 K/UL (ref 1–4.8)
LYMPHOCYTES NFR BLD: 19.5 % (ref 22–41)
MCH RBC QN AUTO: 30.4 PG (ref 27–33)
MCHC RBC AUTO-ENTMCNC: 34.7 G/DL (ref 33.7–35.3)
MCV RBC AUTO: 87.8 FL (ref 81.4–97.8)
MONOCYTES # BLD AUTO: 0.71 K/UL (ref 0–0.85)
MONOCYTES NFR BLD AUTO: 7.9 % (ref 0–13.4)
NEUTROPHILS # BLD AUTO: 6.31 K/UL (ref 1.82–7.42)
NEUTROPHILS NFR BLD: 69.7 % (ref 44–72)
NRBC # BLD AUTO: 0 K/UL
NRBC BLD AUTO-RTO: 0 /100 WBC
PLATELET # BLD AUTO: 337 K/UL (ref 164–446)
PMV BLD AUTO: 10 FL (ref 9–12.9)
POTASSIUM SERPL-SCNC: 4.4 MMOL/L (ref 3.6–5.5)
PROT SERPL-MCNC: 7.2 G/DL (ref 6–8.2)
PROTHROMBIN TIME: 15.2 SEC (ref 12–14.6)
PROTHROMBIN TIME: 20.1 SEC (ref 12–14.6)
RBC # BLD AUTO: 4.6 M/UL (ref 4.7–6.1)
SODIUM SERPL-SCNC: 132 MMOL/L (ref 135–145)
TROPONIN I SERPL-MCNC: <0.01 NG/ML (ref 0–0.04)
WBC # BLD AUTO: 9 K/UL (ref 4.8–10.8)

## 2017-07-25 PROCEDURE — G0378 HOSPITAL OBSERVATION PER HR: HCPCS

## 2017-07-25 PROCEDURE — 71020 DX-CHEST-2 VIEWS: CPT

## 2017-07-25 PROCEDURE — 83690 ASSAY OF LIPASE: CPT

## 2017-07-25 PROCEDURE — 700105 HCHG RX REV CODE 258: Performed by: HOSPITALIST

## 2017-07-25 PROCEDURE — 71010 DX-CHEST-PORTABLE (1 VIEW): CPT

## 2017-07-25 PROCEDURE — 96374 THER/PROPH/DIAG INJ IV PUSH: CPT

## 2017-07-25 PROCEDURE — 700102 HCHG RX REV CODE 250 W/ 637 OVERRIDE(OP): Performed by: HOSPITALIST

## 2017-07-25 PROCEDURE — 304561 HCHG STAT O2

## 2017-07-25 PROCEDURE — 85730 THROMBOPLASTIN TIME PARTIAL: CPT

## 2017-07-25 PROCEDURE — 82553 CREATINE MB FRACTION: CPT

## 2017-07-25 PROCEDURE — 96361 HYDRATE IV INFUSION ADD-ON: CPT

## 2017-07-25 PROCEDURE — 700111 HCHG RX REV CODE 636 W/ 250 OVERRIDE (IP): Performed by: EMERGENCY MEDICINE

## 2017-07-25 PROCEDURE — 700102 HCHG RX REV CODE 250 W/ 637 OVERRIDE(OP): Performed by: NURSE PRACTITIONER

## 2017-07-25 PROCEDURE — A9270 NON-COVERED ITEM OR SERVICE: HCPCS | Performed by: INTERNAL MEDICINE

## 2017-07-25 PROCEDURE — 304562 HCHG STAT O2 MASK/CANNULA

## 2017-07-25 PROCEDURE — 82962 GLUCOSE BLOOD TEST: CPT

## 2017-07-25 PROCEDURE — 85025 COMPLETE CBC W/AUTO DIFF WBC: CPT

## 2017-07-25 PROCEDURE — 99220 PR INITIAL OBSERVATION CARE,LEVL III: CPT | Performed by: HOSPITALIST

## 2017-07-25 PROCEDURE — A9270 NON-COVERED ITEM OR SERVICE: HCPCS | Performed by: EMERGENCY MEDICINE

## 2017-07-25 PROCEDURE — 700102 HCHG RX REV CODE 250 W/ 637 OVERRIDE(OP): Performed by: EMERGENCY MEDICINE

## 2017-07-25 PROCEDURE — 36415 COLL VENOUS BLD VENIPUNCTURE: CPT

## 2017-07-25 PROCEDURE — 700111 HCHG RX REV CODE 636 W/ 250 OVERRIDE (IP): Performed by: HOSPITALIST

## 2017-07-25 PROCEDURE — A9270 NON-COVERED ITEM OR SERVICE: HCPCS | Performed by: HOSPITALIST

## 2017-07-25 PROCEDURE — 700102 HCHG RX REV CODE 250 W/ 637 OVERRIDE(OP): Performed by: INTERNAL MEDICINE

## 2017-07-25 PROCEDURE — 84484 ASSAY OF TROPONIN QUANT: CPT

## 2017-07-25 PROCEDURE — 99285 EMERGENCY DEPT VISIT HI MDM: CPT

## 2017-07-25 PROCEDURE — 93005 ELECTROCARDIOGRAM TRACING: CPT | Performed by: INTERNAL MEDICINE

## 2017-07-25 PROCEDURE — 80053 COMPREHEN METABOLIC PANEL: CPT

## 2017-07-25 PROCEDURE — A9270 NON-COVERED ITEM OR SERVICE: HCPCS | Performed by: NURSE PRACTITIONER

## 2017-07-25 PROCEDURE — 93010 ELECTROCARDIOGRAM REPORT: CPT | Performed by: INTERNAL MEDICINE

## 2017-07-25 PROCEDURE — 83880 ASSAY OF NATRIURETIC PEPTIDE: CPT

## 2017-07-25 PROCEDURE — 85379 FIBRIN DEGRADATION QUANT: CPT

## 2017-07-25 PROCEDURE — 85610 PROTHROMBIN TIME: CPT

## 2017-07-25 PROCEDURE — 93005 ELECTROCARDIOGRAM TRACING: CPT | Performed by: HOSPITALIST

## 2017-07-25 PROCEDURE — 96372 THER/PROPH/DIAG INJ SC/IM: CPT

## 2017-07-25 PROCEDURE — 96375 TX/PRO/DX INJ NEW DRUG ADDON: CPT

## 2017-07-25 PROCEDURE — 93005 ELECTROCARDIOGRAM TRACING: CPT | Performed by: EMERGENCY MEDICINE

## 2017-07-25 PROCEDURE — 94760 N-INVAS EAR/PLS OXIMETRY 1: CPT

## 2017-07-25 RX ORDER — MORPHINE SULFATE 4 MG/ML
4 INJECTION, SOLUTION INTRAMUSCULAR; INTRAVENOUS ONCE
Status: COMPLETED | OUTPATIENT
Start: 2017-07-25 | End: 2017-07-25

## 2017-07-25 RX ORDER — LORATADINE 10 MG/1
10 TABLET ORAL DAILY
COMMUNITY
End: 2017-09-11 | Stop reason: SDUPTHER

## 2017-07-25 RX ORDER — LISINOPRIL 10 MG/1
5 TABLET ORAL DAILY
Status: DISCONTINUED | OUTPATIENT
Start: 2017-07-25 | End: 2017-07-26 | Stop reason: HOSPADM

## 2017-07-25 RX ORDER — OXYCODONE HYDROCHLORIDE 5 MG/1
5 TABLET ORAL
Status: DISCONTINUED | OUTPATIENT
Start: 2017-07-25 | End: 2017-07-26 | Stop reason: HOSPADM

## 2017-07-25 RX ORDER — OXYCODONE HYDROCHLORIDE 5 MG/1
2.5 TABLET ORAL
Status: DISCONTINUED | OUTPATIENT
Start: 2017-07-25 | End: 2017-07-26 | Stop reason: HOSPADM

## 2017-07-25 RX ORDER — ATORVASTATIN CALCIUM 40 MG/1
40 TABLET, FILM COATED ORAL NIGHTLY
COMMUNITY
End: 2017-09-11 | Stop reason: SDUPTHER

## 2017-07-25 RX ORDER — METOPROLOL SUCCINATE 25 MG/1
25 TABLET, EXTENDED RELEASE ORAL DAILY
Status: DISCONTINUED | OUTPATIENT
Start: 2017-07-25 | End: 2017-07-26 | Stop reason: HOSPADM

## 2017-07-25 RX ORDER — BISACODYL 10 MG
10 SUPPOSITORY, RECTAL RECTAL
Status: DISCONTINUED | OUTPATIENT
Start: 2017-07-25 | End: 2017-07-26 | Stop reason: HOSPADM

## 2017-07-25 RX ORDER — DEXTROSE MONOHYDRATE 25 G/50ML
25 INJECTION, SOLUTION INTRAVENOUS
Status: DISCONTINUED | OUTPATIENT
Start: 2017-07-25 | End: 2017-07-26 | Stop reason: HOSPADM

## 2017-07-25 RX ORDER — NITROGLYCERIN 0.4 MG/1
0.4 TABLET SUBLINGUAL
Status: DISCONTINUED | OUTPATIENT
Start: 2017-07-25 | End: 2017-07-25

## 2017-07-25 RX ORDER — SODIUM CHLORIDE 9 MG/ML
500 INJECTION, SOLUTION INTRAVENOUS ONCE
Status: COMPLETED | OUTPATIENT
Start: 2017-07-25 | End: 2017-07-25

## 2017-07-25 RX ORDER — ASPIRIN 81 MG/1
324 TABLET, CHEWABLE ORAL ONCE
Status: COMPLETED | OUTPATIENT
Start: 2017-07-25 | End: 2017-07-25

## 2017-07-25 RX ORDER — NITROGLYCERIN 0.4 MG/1
TABLET SUBLINGUAL
Status: DISPENSED
Start: 2017-07-25 | End: 2017-07-25

## 2017-07-25 RX ORDER — AMOXICILLIN 250 MG
2 CAPSULE ORAL 2 TIMES DAILY
Status: DISCONTINUED | OUTPATIENT
Start: 2017-07-25 | End: 2017-07-26 | Stop reason: HOSPADM

## 2017-07-25 RX ORDER — ATORVASTATIN CALCIUM 40 MG/1
40 TABLET, FILM COATED ORAL NIGHTLY
Status: DISCONTINUED | OUTPATIENT
Start: 2017-07-25 | End: 2017-07-26 | Stop reason: HOSPADM

## 2017-07-25 RX ORDER — ASPIRIN 300 MG/1
300 SUPPOSITORY RECTAL ONCE
Status: COMPLETED | OUTPATIENT
Start: 2017-07-25 | End: 2017-07-25

## 2017-07-25 RX ORDER — POLYETHYLENE GLYCOL 3350 17 G/17G
1 POWDER, FOR SOLUTION ORAL
Status: DISCONTINUED | OUTPATIENT
Start: 2017-07-25 | End: 2017-07-26 | Stop reason: HOSPADM

## 2017-07-25 RX ORDER — RANOLAZINE 500 MG/1
500 TABLET, EXTENDED RELEASE ORAL 2 TIMES DAILY
Status: DISCONTINUED | OUTPATIENT
Start: 2017-07-25 | End: 2017-07-26

## 2017-07-25 RX ORDER — MORPHINE SULFATE 4 MG/ML
2 INJECTION, SOLUTION INTRAMUSCULAR; INTRAVENOUS
Status: DISCONTINUED | OUTPATIENT
Start: 2017-07-25 | End: 2017-07-26 | Stop reason: HOSPADM

## 2017-07-25 RX ADMIN — MORPHINE SULFATE 4 MG: 4 INJECTION INTRAVENOUS at 01:48

## 2017-07-25 RX ADMIN — MORPHINE SULFATE 2 MG: 4 INJECTION INTRAVENOUS at 14:41

## 2017-07-25 RX ADMIN — INSULIN LISPRO 3 UNITS: 100 INJECTION, SOLUTION INTRAVENOUS; SUBCUTANEOUS at 21:00

## 2017-07-25 RX ADMIN — NITROGLYCERIN 1 INCH: 20 OINTMENT TOPICAL at 19:32

## 2017-07-25 RX ADMIN — SODIUM CHLORIDE 500 ML: 9 INJECTION, SOLUTION INTRAVENOUS at 05:08

## 2017-07-25 RX ADMIN — RANOLAZINE 500 MG: 500 TABLET, FILM COATED, EXTENDED RELEASE ORAL at 20:58

## 2017-07-25 RX ADMIN — LISINOPRIL 5 MG: 10 TABLET ORAL at 09:19

## 2017-07-25 RX ADMIN — OXYCODONE HYDROCHLORIDE 5 MG: 5 TABLET ORAL at 05:32

## 2017-07-25 RX ADMIN — STANDARDIZED SENNA CONCENTRATE AND DOCUSATE SODIUM 2 TABLET: 8.6; 5 TABLET, FILM COATED ORAL at 09:19

## 2017-07-25 RX ADMIN — NITROGLYCERIN 0.5 INCH: 20 OINTMENT TOPICAL at 11:49

## 2017-07-25 RX ADMIN — ASPIRIN 324 MG: 81 TABLET, CHEWABLE ORAL at 01:47

## 2017-07-25 RX ADMIN — RANOLAZINE 500 MG: 500 TABLET, FILM COATED, EXTENDED RELEASE ORAL at 09:20

## 2017-07-25 RX ADMIN — ATORVASTATIN CALCIUM 40 MG: 40 TABLET, FILM COATED ORAL at 20:58

## 2017-07-25 RX ADMIN — OXYCODONE HYDROCHLORIDE 5 MG: 5 TABLET ORAL at 11:10

## 2017-07-25 RX ADMIN — INSULIN LISPRO 2 UNITS: 100 INJECTION, SOLUTION INTRAVENOUS; SUBCUTANEOUS at 09:46

## 2017-07-25 RX ADMIN — METOPROLOL SUCCINATE 25 MG: 25 TABLET, EXTENDED RELEASE ORAL at 09:20

## 2017-07-25 RX ADMIN — NITROGLYCERIN 0.4 MG: 0.4 TABLET SUBLINGUAL at 02:44

## 2017-07-25 RX ADMIN — INSULIN LISPRO 3 UNITS: 100 INJECTION, SOLUTION INTRAVENOUS; SUBCUTANEOUS at 13:12

## 2017-07-25 RX ADMIN — INSULIN LISPRO 3 UNITS: 100 INJECTION, SOLUTION INTRAVENOUS; SUBCUTANEOUS at 18:13

## 2017-07-25 RX ADMIN — STANDARDIZED SENNA CONCENTRATE AND DOCUSATE SODIUM 2 TABLET: 8.6; 5 TABLET, FILM COATED ORAL at 20:58

## 2017-07-25 ASSESSMENT — ENCOUNTER SYMPTOMS
FOCAL WEAKNESS: 0
CONSTITUTIONAL NEGATIVE: 1
ABDOMINAL PAIN: 0
DIARRHEA: 0
DIAPHORESIS: 0
DIZZINESS: 0
NAUSEA: 0
TINGLING: 0
SEIZURES: 0
LOSS OF CONSCIOUSNESS: 0
DIAPHORESIS: 1
HEADACHES: 0
WEAKNESS: 0
BRUISES/BLEEDS EASILY: 0
PHOTOPHOBIA: 0
CHILLS: 0
SPUTUM PRODUCTION: 0
COUGH: 0
FEVER: 0
FALLS: 0
WHEEZING: 0
SHORTNESS OF BREATH: 0
CONSTIPATION: 0
DEPRESSION: 0
PALPITATIONS: 0
STRIDOR: 0
MYALGIAS: 0
SORE THROAT: 0
NAUSEA: 1
SHORTNESS OF BREATH: 1
VOMITING: 0
BACK PAIN: 0
NECK PAIN: 0

## 2017-07-25 ASSESSMENT — COPD QUESTIONNAIRES
HAVE YOU SMOKED AT LEAST 100 CIGARETTES IN YOUR ENTIRE LIFE: NO/DON'T KNOW
COPD SCREENING SCORE: 0
DURING THE PAST 4 WEEKS HOW MUCH DID YOU FEEL SHORT OF BREATH: NONE/LITTLE OF THE TIME
DURING THE PAST 4 WEEKS HOW MUCH DID YOU FEEL SHORT OF BREATH: NONE/LITTLE OF THE TIME
COPD SCREENING SCORE: 1
DO YOU EVER COUGH UP ANY MUCUS OR PHLEGM?: NO/ONLY WITH OCCASIONAL COLDS OR INFECTIONS
DO YOU EVER COUGH UP ANY MUCUS OR PHLEGM?: NO/ONLY WITH OCCASIONAL COLDS OR INFECTIONS
HAVE YOU SMOKED AT LEAST 100 CIGARETTES IN YOUR ENTIRE LIFE: NO/DON'T KNOW

## 2017-07-25 ASSESSMENT — PAIN SCALES - GENERAL
PAINLEVEL_OUTOF10: 9
PAINLEVEL_OUTOF10: 6
PAINLEVEL_OUTOF10: 4
PAINLEVEL_OUTOF10: 6
PAINLEVEL_OUTOF10: 10
PAINLEVEL_OUTOF10: 9
PAINLEVEL_OUTOF10: 9
PAINLEVEL_OUTOF10: 10
PAINLEVEL_OUTOF10: 6

## 2017-07-25 ASSESSMENT — PATIENT HEALTH QUESTIONNAIRE - PHQ9
SUM OF ALL RESPONSES TO PHQ9 QUESTIONS 1 AND 2: 0
SUM OF ALL RESPONSES TO PHQ QUESTIONS 1-9: 0
SUM OF ALL RESPONSES TO PHQ9 QUESTIONS 1 AND 2: 0
1. LITTLE INTEREST OR PLEASURE IN DOING THINGS: NOT AT ALL
SUM OF ALL RESPONSES TO PHQ QUESTIONS 1-9: 0
1. LITTLE INTEREST OR PLEASURE IN DOING THINGS: NOT AT ALL

## 2017-07-25 ASSESSMENT — LIFESTYLE VARIABLES
EVER_SMOKED: NEVER
DO YOU DRINK ALCOHOL: NO

## 2017-07-25 NOTE — CONSULTS
Cardiology Consult Note:    Emilio Calixto  Date & Time note created:    7/25/2017   2:07 AM     Referring MD:  Dr. Bragg    Patient ID:   Name:             Ryan Trevizo   YOB: 1965  Age:                 52 y.o.  male   MRN:               [id number]                                                             Reason for Consult:      STEMI    History of Present Illness:    53 y/o M with chest pain today, described as sharp, 7/10 without radiation.  He called EMS and on arrival apparently was on the corner waiting to be picked up.  According to EMS he was waiting with his bags packed and he frequently call EMS.  His ECG showed inferior Q waves which started to show dynamic changes.  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  His remaining arteries had severe ectasia.  His ECG became dynamic and started showing ST elevation in the inferior leads which resolved.  His first troponin was negative.        Review of Systems:      Constitutional: Denies fevers, Denies weight changes  Eyes: Denies changes in vision, no eye pain  Ears/Nose/Throat/Mouth: Denies nasal congestion or sore throat   Cardiovascular: no chest pain, no palpitations   Respiratory: no shortness of breath , Denies cough  Gastrointestinal/Hepatic: Denies abdominal pain, nausea, vomiting, diarrhea, constipation or GI bleeding   Genitourinary: Denies dysuria or frequency  Musculoskeletal/Rheum: Denies  joint pain and swelling, no edema  Skin: Denies rash  Neurological: Denies headache, confusion, memory loss or focal weakness/parasthesias  Psychiatric: denies mood disorder   Endocrine: Maribel thyroid problems  Heme/Oncology/Lymph Nodes: Denies enlarged lymph nodes, denies brusing or known bleeding disorder  All other systems were reviewed and are negative (AMA/CMS criteria)                Past Medical History:   Past Medical History   Diagnosis  Date   • Arthritis    • HTN (hypertension) 7/15/2012   • GERD (gastroesophageal reflux disease) 7/15/2012   • STEMI (ST elevation myocardial infarction) (CMS-HCC)    • Anxiety disorder    • MI (myocardial infarction) (CMS-HCC)    • Hyperlipemia    • Upper GI bleed 7/26/2012   • Glaucoma    • SOB (shortness of breath)    • Claudication (CMS-HCC)    • Dizziness    • CAD (coronary artery disease) 10/14/2013   • ACS (acute coronary syndrome) (CMS-HCC) 7/25/2012   • Pericarditis secondary to acute myocardial infarction (CMS-HCC) 7/19/2012   • Right knee pain    • Blood glucose elevated    • Mental retardation    • Osteoarthritis    • Hypertriglyceridemia    • A-fib (CMS-HCC)    • Ischemic cardiomyopathy    • Diabetes (CMS-HCC)      There are no hospital problems to display for this patient.      Past Surgical History:  Past Surgical History   Procedure Laterality Date   • Other orthopedic surgery  7-      R knee surgery   • Other  knee surgery   • Other cardiac surgery       stent placement   • Other     • Gastroscopy-endo  7/25/2012     Performed by JORDIN VERA at ENDOSCOPY [facility]   • Gastroscopy-endo  7/26/2012     Performed by JORDIN VERA at ENDOSCOPY [facility]   • Gastroscopy-endo  5/19/2017     Procedure: GASTROSCOPY-ENDO;  Surgeon: Reinaldo Berry M.D.;  Location: ENDOSCOPY [facility]  Service:        Hospital Medications:  Current Facility-Administered Medications   Medication Dose   • nitroglycerin (NITROSTAT) tablet 0.4 mg  0.4 mg     Current Outpatient Prescriptions   Medication   • lidocaine (LIDODERM) 5 % Patch   • dicyclomine (BENTYL) 20 MG Tab   • vitamin D3, cholecalciferol, 1000 UNIT Tab   • atorvastatin (LIPITOR) 80 MG tablet   • rivaroxaban (XARELTO) 20 MG Tab tablet   • omeprazole (PRILOSEC) 20 MG delayed-release capsule   • metoprolol SR (TOPROL XL) 25 MG TABLET SR 24 HR   • loratadine (CLARITIN) 10 MG Tab   • lisinopril (PRINIVIL) 5 MG Tab   • metformin  "(GLUCOPHAGE) 1000 MG tablet         Current Outpatient Medications:    (Not in a hospital admission)    Medication Allergy:  Allergies   Allergen Reactions   • Ritalin [Methylphenidate] Unspecified     \"Hyper\"       Family History:  History reviewed. No pertinent family history.    Social History:  Social History     Social History   • Marital Status: Single     Spouse Name: N/A   • Number of Children: N/A   • Years of Education: N/A     Occupational History   • Not on file.     Social History Main Topics   • Smoking status: Never Smoker    • Smokeless tobacco: Never Used   • Alcohol Use: No   • Drug Use: No   • Sexual Activity: Not on file      Comment: Single, has no children, works as an .     Other Topics Concern   • Not on file     Social History Narrative    ** Merged History Encounter **         ** Merged History Encounter **              Physical Exam:  Vitals/ General Appearance:   Weight/BMI: Body mass index is 25.12 kg/(m^2).  Blood pressure 142/87, pulse 108, temperature 37.3 °C (99.1 °F), resp. rate 17, height 1.803 m (5' 11\"), weight 81.647 kg (180 lb), SpO2 92 %.  Filed Vitals:    07/25/17 0130 07/25/17 0131 07/25/17 0132   BP:  142/87    Pulse:  108    Temp:  37.3 °C (99.1 °F)    Resp:  18 17   Height: 1.803 m (5' 11\")     Weight: 81.647 kg (180 lb)     SpO2:  92% 92%     Oxygen Therapy:  Pulse Oximetry: 92 %, O2 (LPM): 2, O2 Delivery: Nasal Cannula    Constitutional:   Well developed, Well nourished, No acute distress  HENMT:  Normocephalic, Atraumatic, Oropharynx moist mucous membranes, No oral exudates, Nose normal.  No thyromegaly.  Eyes:  EOMI, Conjunctiva normal, No discharge.  Neck:  Normal range of motion, No cervical tenderness,  no JVD.  Cardiovascular:  Normal heart rate, Normal rhythm, No murmurs, No rubs, No gallops.   Extremitites with intact distal pulses, no cyanosis, or edema.  Lungs:  Normal breath sounds, breath sounds clear to auscultation bilaterally,  no crackles, no " wheezing.   Abdomen: Bowel sounds normal, Soft, No tenderness, No guarding, No rebound, No masses, No hepatosplenomegaly.  Skin: Warm, Dry, No erythema, No rash, no induration.  Neurologic: Alert & oriented x 3, No focal deficits noted, cranial nerves II through X are grossly intact.  Psychiatric: Affect normal, Judgment normal, Mood normal.      MDM (Data Review):     Records reviewed and summarized in current documentation    Lab Data Review:  Recent Results (from the past 24 hour(s))   EKG (ER)    Collection Time: 17  1:07 AM   Result Value Ref Range    Report       [facility] Emergency Dept.    Test Date:  2017  Pt Name:    NICHELLE MARQUEZ                   Department: ER  MRN:        [id number]                      Room:       RD 03  Gender:     M                            Technician: [id number]  :        1965                   Requested By:CAROLYN CORONADO  Order #:    [id number]                    Reading MD:    Measurements  Intervals                                Axis  Rate:       117                          P:          39  NM:         156                          QRS:        -31  QRSD:       94                           T:          22  QT:         316  QTc:        441    Interpretive Statements  SINUS TACHYCARDIA  PROBABLE INFERIOR INFARCT, AGE INDETERMINATE  BASELINE WANDER IN LEAD(S) V3  Compared to ECG 2017 11:20:30  Sinus rhythm no longer present  Myocardial infarct finding still present     Troponin    Collection Time: 17  1:17 AM   Result Value Ref Range    Troponin I <0.01 0.00 - 0.04 ng/mL   CBC with Differential    Collection Time: 17  1:17 AM   Result Value Ref Range    WBC 9.0 4.8 - 10.8 K/uL    RBC 4.60 (L) 4.70 - 6.10 M/uL    Hemoglobin 14.0 14.0 - 18.0 g/dL    Hematocrit 40.4 (L) 42.0 - 52.0 %    MCV 87.8 81.4 - 97.8 fL    MCH 30.4 27.0 - 33.0 pg    MCHC 34.7 33.7 - 35.3 g/dL    RDW 40.1 35.9 - 50.0 fL    Platelet Count 337 164 - 446 K/uL     MPV 10.0 9.0 - 12.9 fL    Neutrophils-Polys 69.70 44.00 - 72.00 %    Lymphocytes 19.50 (L) 22.00 - 41.00 %    Monocytes 7.90 0.00 - 13.40 %    Eosinophils 1.40 0.00 - 6.90 %    Basophils 0.70 0.00 - 1.80 %    Immature Granulocytes 0.80 0.00 - 0.90 %    Nucleated RBC 0.00 /100 WBC    Neutrophils (Absolute) 6.31 1.82 - 7.42 K/uL    Lymphs (Absolute) 1.76 1.00 - 4.80 K/uL    Monos (Absolute) 0.71 0.00 - 0.85 K/uL    Eos (Absolute) 0.13 0.00 - 0.51 K/uL    Baso (Absolute) 0.06 0.00 - 0.12 K/uL    Immature Granulocytes (abs) 0.07 0.00 - 0.11 K/uL    NRBC (Absolute) 0.00 K/uL   Complete Metabolic Panel (CMP)    Collection Time: 07/25/17  1:17 AM   Result Value Ref Range    Sodium 132 (L) 135 - 145 mmol/L    Potassium 4.4 3.6 - 5.5 mmol/L    Chloride 98 96 - 112 mmol/L    Co2 24 20 - 33 mmol/L    Anion Gap 10.0 0.0 - 11.9    Glucose 279 (H) 65 - 99 mg/dL    Bun 16 8 - 22 mg/dL    Creatinine 1.00 0.50 - 1.40 mg/dL    Calcium 9.9 8.5 - 10.5 mg/dL    AST(SGOT) 18 12 - 45 U/L    ALT(SGPT) 43 2 - 50 U/L    Alkaline Phosphatase 71 30 - 99 U/L    Total Bilirubin 0.8 0.1 - 1.5 mg/dL    Albumin 4.5 3.2 - 4.9 g/dL    Total Protein 7.2 6.0 - 8.2 g/dL    Globulin 2.7 1.9 - 3.5 g/dL    A-G Ratio 1.7 g/dL   Prothrombin Time    Collection Time: 07/25/17  1:17 AM   Result Value Ref Range    PT 20.1 (H) 12.0 - 14.6 sec    INR 1.66 (H) 0.87 - 1.13   APTT    Collection Time: 07/25/17  1:17 AM   Result Value Ref Range    APTT 37.5 (H) 24.7 - 36.0 sec   Lipase    Collection Time: 07/25/17  1:17 AM   Result Value Ref Range    Lipase 50 11 - 82 U/L   CKMB    Collection Time: 07/25/17  1:17 AM   Result Value Ref Range    CK-Mb 1.2 0.0 - 5.0 ng/mL   D-DIMER    Collection Time: 07/25/17  1:17 AM   Result Value Ref Range    D-Dimer Screen <200 <250 ng/mL(D-DU)   ESTIMATED GFR    Collection Time: 07/25/17  1:17 AM   Result Value Ref Range    GFR If African American >60 >60 mL/min/1.73 m 2    GFR If Non African American >60 >60 mL/min/1.73 m 2        Imaging/Procedures Review:    Chest Xray:  Reviewed    EKG:   As in HPI.     ECHO:  Mildly reduced left ventricular systolic function.  Left ventricular ejection fraction is visually estimated to be 45%.  Inferolateral and inferior hypokinesis.  Grade I diastolic dysfunction.  Moderate asymmetric septal hypertrophy.  Mild aortic insufficiency.  Compared to the report of the study done on 07/25/2012 there has been   no significant change.      Cath  Findings: 100% mid Circ - chronic. Severe ectasia of L main, Prox LAD, Circ.  Large aneurysm of Prox RCA.  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    53 y/o M with cardiac history doing and EKG changes in the PDA distribution.  His ECG did not meet criteria for STEMI and therefore this was called off.  We will follow his serial troponins and but likely will not plan for an intervention.      Thank for you allowing me to take part in your patient's care, please call should you have any questions or would like to discuss this patient.

## 2017-07-25 NOTE — IP AVS SNAPSHOT
" Home Care Instructions                                                                                                                  Name:Ryan Trevizo  Medical Record Number:[id number]  CSN: [id number]    YOB: 1965   Age: 52 y.o.  Sex: male  HT:1.803 m (5' 11\") WT: 82.5 kg (181 lb 14.1 oz)          Admit Date: 7/25/2017     Discharge Date:   Today's Date: 7/26/2017  Attending Doctor:  Ibrahima Sprague M.D.                  Allergies:  Ritalin            Discharge Instructions       Nonspecific Chest Pain   Chest pain can be caused by many different conditions. There is always a chance that your pain could be related to something serious, such as a heart attack or a blood clot in your lungs. Chest pain can also be caused by conditions that are not life-threatening. If you have chest pain, it is very important to follow up with your health care provider.  CAUSES   Chest pain can be caused by:  · Heartburn.  · Pneumonia or bronchitis.  · Anxiety or stress.  · Inflammation around your heart (pericarditis) or lung (pleuritis or pleurisy).  · A blood clot in your lung.  · A collapsed lung (pneumothorax). It can develop suddenly on its own (spontaneous pneumothorax) or from trauma to the chest.  · Shingles infection (varicella-zoster virus).  · Heart attack.  · Damage to the bones, muscles, and cartilage that make up your chest wall. This can include:  ¨ Bruised bones due to injury.  ¨ Strained muscles or cartilage due to frequent or repeated coughing or overwork.  ¨ Fracture to one or more ribs.  ¨ Sore cartilage due to inflammation (costochondritis).  RISK FACTORS   Risk factors for chest pain may include:  1. Activities that increase your risk for trauma or injury to your chest.  2. Respiratory infections or conditions that cause frequent coughing.  3. Medical conditions or overeating that can cause heartburn.  4. Heart disease or family history of heart disease.  5. Conditions or health behaviors that " increase your risk of developing a blood clot.  6. Having had chicken pox (varicella zoster).  SIGNS AND SYMPTOMS  Chest pain can feel like:  1. Burning or tingling on the surface of your chest or deep in your chest.  2. Crushing, pressure, aching, or squeezing pain.  3. Dull or sharp pain that is worse when you move, cough, or take a deep breath.  4. Pain that is also felt in your back, neck, shoulder, or arm, or pain that spreads to any of these areas.  Your chest pain may come and go, or it may stay constant.  DIAGNOSIS  Lab tests or other studies may be needed to find the cause of your pain. Your health care provider may have you take a test called an ambulatory ECG (electrocardiogram). An ECG records your heartbeat patterns at the time the test is performed. You may also have other tests, such as:  1. Transthoracic echocardiogram (TTE). During echocardiography, sound waves are used to create a picture of all of the heart structures and to look at how blood flows through your heart.  2. Transesophageal echocardiogram (DELMY). This is a more advanced imaging test that obtains images from inside your body. It allows your health care provider to see your heart in finer detail.  3. Cardiac monitoring. This allows your health care provider to monitor your heart rate and rhythm in real time.  4. Holter monitor. This is a portable device that records your heartbeat and can help to diagnose abnormal heartbeats. It allows your health care provider to track your heart activity for several days, if needed.  5. Stress tests. These can be done through exercise or by taking medicine that makes your heart beat more quickly.  6. Blood tests.  7. Imaging tests.  TREATMENT   Your treatment depends on what is causing your chest pain. Treatment may include:  1. Medicines. These may include:  1. Acid blockers for heartburn.  2. Anti-inflammatory medicine.  3. Pain medicine for inflammatory conditions.  4. Antibiotic medicine, if an  infection is present.  5. Medicines to dissolve blood clots.  6. Medicines to treat coronary artery disease.  2. Supportive care for conditions that do not require medicines. This may include:  1. Resting.  2. Applying heat or cold packs to injured areas.  3. Limiting activities until pain decreases.  HOME CARE INSTRUCTIONS  · If you were prescribed an antibiotic medicine, finish it all even if you start to feel better.  · Avoid any activities that bring on chest pain.  · Do not use any tobacco products, including cigarettes, chewing tobacco, or electronic cigarettes. If you need help quitting, ask your health care provider.  · Do not drink alcohol.  · Take medicines only as directed by your health care provider.  · Keep all follow-up visits as directed by your health care provider. This is important. This includes any further testing if your chest pain does not go away.  · If heartburn is the cause for your chest pain, you may be told to keep your head raised (elevated) while sleeping. This reduces the chance that acid will go from your stomach into your esophagus.  · Make lifestyle changes as directed by your health care provider. These may include:  ¨ Getting regular exercise. Ask your health care provider to suggest some activities that are safe for you.  ¨ Eating a heart-healthy diet. A registered dietitian can help you to learn healthy eating options.  ¨ Maintaining a healthy weight.  ¨ Managing diabetes, if necessary.  ¨ Reducing stress.  SEEK MEDICAL CARE IF:  · Your chest pain does not go away after treatment.  · You have a rash with blisters on your chest.  · You have a fever.  SEEK IMMEDIATE MEDICAL CARE IF:   · Your chest pain is worse.  · You have an increasing cough, or you cough up blood.  · You have severe abdominal pain.  · You have severe weakness.  · You faint.  · You have chills.  · You have sudden, unexplained chest discomfort.  · You have sudden, unexplained discomfort in your arms, back, neck,  or jaw.  · You have shortness of breath at any time.  · You suddenly start to sweat, or your skin gets clammy.  · You feel nauseous or you vomit.  · You suddenly feel light-headed or dizzy.  · Your heart begins to beat quickly, or it feels like it is skipping beats.  These symptoms may represent a serious problem that is an emergency. Do not wait to see if the symptoms will go away. Get medical help right away. Call your local emergency services (911 in the [place] Do not drive yourself to the hospital.     This information is not intended to replace advice given to you by your health care provider. Make sure you discuss any questions you have with your health care provider.     Document Released: 09/27/2006 Document Revised: 01/08/2016 Document Reviewed: 07/24/2015  [employer] Interactive Patient Education ©2016 [employer]  Discharge Instructions    Discharged to home by taxi with self. Discharged via walking, hospital escort: Yes.  Special equipment needed: Not Applicable    Be sure to schedule a follow-up appointment with your primary care doctor or any specialists as instructed.     Discharge Plan:   Diet Plan: Discussed  Activity Level: Discussed  Confirmed Follow up Appointment: Patient to Call and Schedule Appointment  Confirmed Symptoms Management: Discussed  Medication Reconciliation Updated: Yes  Influenza Vaccine Indication: Indicated: Not available from distributor/    I understand that a diet low in cholesterol, fat, and sodium is recommended for good health. Unless I have been given specific instructions below for another diet, I accept this instruction as my diet prescription.   Other diet: Regular    Special Instructions: None    · Is patient discharged on Warfarin / Coumadin?   No     · Is patient Post Blood Transfusion?  No    Depression / Suicide Risk    As you are discharged from this [facility] facility, it is important to learn how to keep safe from harming yourself.    Recognize  the warning signs:  · Abrupt changes in personality, positive or negative- including increase in energy   · Giving away possessions  · Change in eating patterns- significant weight changes-  positive or negative  · Change in sleeping patterns- unable to sleep or sleeping all the time   · Unwillingness or inability to communicate  · Depression  · Unusual sadness, discouragement and loneliness  · Talk of wanting to die  · Neglect of personal appearance   · Rebelliousness- reckless behavior  · Withdrawal from people/activities they love  · Confusion- inability to concentrate     If you or a loved one observes any of these behaviors or has concerns about self-harm, here's what you can do:  · Talk about it- your feelings and reasons for harming yourself  · Remove any means that you might use to hurt yourself (examples: pills, rope, extension cords, firearm)  · Get professional help from the community (Mental Health, Substance Abuse, psychological counseling)  · Do not be alone:Call your Safe Contact- someone whom you trust who will be there for you.  · Call your local CRISIS HOTLINE 014-7748 or 648-169-0245  · Call your local Children's Mobile Crisis Response Team [place] (121) 172-1718 or www.[employer]  · Call the toll free National Suicide Prevention Hotlines   · National Suicide Prevention Lifeline 705-232-KPLT (0451)  · National Hope Line Network 800-SUICIDE (888-6543)    F/u with Cardiology within a week   F/u with PCP within a week   Return to ED if emergency       Your appointments     Jul 28, 2017  1:00 PM   CARE MANAGER TELEPHONE VISIT with CARE MANAGER   [address] [address] [phone]           ***IMPORTANT**** This is a phone visit only. Do not come into the office. The Care Manager will call you at the scheduled time for Care Manager Telephone Visit.            Jul 31, 2017  1:00 PM   Established Patient with Magy Fong,  MAEVE   [facility] ([facility]    [address] [facility] [address]  [place] [address] [phone]           You will be receiving a confirmation call a few days before your appointment from our automated call confirmation system.            Sep 11, 2017  1:15 PM   Established Patient with Juancho Jenkins M.D.   [facility] Internal Medicine (--)    [address] [place] [address]  [address]  [facility] [id number] [phone]           You will be receiving a confirmation call a few days before your appointment from our automated call confirmation system.            Sep 12, 2017 11:30 AM   Anti-Coag Routine with VISTA PHARMACIST   [facility] [place]    [address] [facility] [id number] [phone]            Oct 06, 2017  2:30 PM   FOLLOW UP with Joseph León M.D.   [facility] (--)    [address]  [facility] [id number] [phone]              Follow-up Information     1. Follow up with Juancho Jenkins M.D.. Schedule an appointment as soon as possible for a visit in 1 week.    Specialty:  Internal Medicine    Why:  For a follow up appointment. Thank you    Contact information    [address]  [address]  [facility] [id number] [phone]           Discharge Medication Instructions:    Below are the medications your physician expects you to take upon discharge:    Review all your home medications and newly ordered medications with your doctor and/or pharmacist. Follow medication instructions as directed by your doctor and/or pharmacist.    Please keep your medication list with you and share with your physician.               Medication List      START taking these medications        Instructions    Morning Afternoon Evening Bedtime    nitroGLYCERIN 0.3 MG SL tablet   Last time this was given:  0.4 mg on 7/25/2017  2:44 AM   Commonly known as:  NITROSTAT        Place 1 Tab under tongue 1 time daily as needed for Chest  Pain.   Dose:  0.3 mg                        ranolazine 500 MG Tb12   Last time this was given:  500 mg on 7/26/2017  1:35 PM   Commonly known as:  RANEXA        Take 1 Tab by mouth 2 Times a Day.   Dose:  500 mg                          ASK your doctor about these medications        Instructions    Morning Afternoon Evening Bedtime    atorvastatin 40 MG Tabs   Last time this was given:  40 mg on 7/25/2017  8:58 PM   Commonly known as:  LIPITOR        Take 40 mg by mouth every evening.   Dose:  40 mg                        dicyclomine 20 MG Tabs   Commonly known as:  BENTYL        Take 1 Tab by mouth every 6 hours.   Dose:  20 mg                        lisinopril 5 MG Tabs   Last time this was given:  5 mg on 7/26/2017  8:38 AM   Commonly known as:  PRINIVIL        Take 1 Tab by mouth every day.   Dose:  5 mg                        loratadine 10 MG Tabs   Commonly known as:  CLARITIN        Take 10 mg by mouth every day.   Dose:  10 mg                        metformin 1000 MG tablet   Commonly known as:  GLUCOPHAGE        Take 1 Tab by mouth 2 times a day, with meals.   Dose:  1000 mg                        metoprolol SR 25 MG Tb24   Last time this was given:  25 mg on 7/26/2017  8:38 AM   Commonly known as:  TOPROL XL        Take 1 Tab by mouth every day.   Dose:  25 mg                        omeprazole 20 MG delayed-release capsule   Commonly known as:  PRILOSEC        Take 1 Cap by mouth 2 times a day.   Dose:  20 mg                        rivaroxaban 20 MG Tabs tablet   Commonly known as:  XARELTO        Take 1 Tab by mouth with dinner.   Dose:  20 mg                        vitamin D3 (cholecalciferol) 1000 UNIT Tabs        TAKE 1 TABLET BY MOUTH ONCE DAILY.                             Where to Get Your Medications      These medications were sent to [facility] [place] [place] - [address]  [address] [facility] [address]     Phone:  [phone]    - nitroGLYCERIN 0.3 MG SL  tablet  - ranolazine 500 MG Tb12            Instructions           Diet / Nutrition:    Follow any diet instructions given to you by your doctor or the dietician, including how much salt (sodium) you are allowed each day.    If you are overweight, talk to your doctor about a weight reduction plan.    Activity:    Remain physically active following your doctor's instructions about exercise and activity.    Rest often.     Any time you become even a little tired or short of breath, SIT DOWN and rest.    Worsening Symptoms:    Report any of the following signs and symptoms to the doctor's office immediately:    *Pain of jaw, arm, or neck  *Chest pain not relieved by medication                               *Dizziness or loss of consciousness  *Difficulty breathing even when at rest   *More tired than usual                                       *Bleeding drainage or swelling of surgical site  *Swelling of feet, ankles, legs or stomach                 *Fever (>100ºF)  *Pink or blood tinged sputum  *Weight gain (3lbs/day or 5lbs /week)           *Shock from internal defibrillator (if applicable)  *Palpitations or irregular heartbeats                *Cool and/or numb extremities    Stroke Awareness    Common Risk Factors for Stroke include:    Age  Atrial Fibrillation  Carotid Artery Stenosis  Diabetes Mellitus  Excessive alcohol consumption  High blood pressure  Overweight   Physical inactivity  Smoking    Warning signs and symptoms of a stroke include:    *Sudden numbness or weakness of the face, arm or leg (especially on one side of the body).  *Sudden confusion, trouble speaking or understanding.  *Sudden trouble seeing in one or both eyes.  *Sudden trouble walking, dizziness, loss of balance or coordination.Sudden severe headache with no known cause.    It is very important to get treatment quickly when a stroke occurs. If you experience any of the above warning signs, call 911 immediately.                      Disclaimer         Quit Smoking / Tobacco Use:    I understand the use of any tobacco products increases my chance of suffering from future heart disease or stroke and could cause other illnesses which may shorten my life. Quitting the use of tobacco products is the single most important thing I can do to improve my health. For further information on smoking / tobacco cessation call a Toll Free Quit Line at 1-713.315.1094 (*National Cancer [place]) or 1-913.565.6705 (American Lung Association) or you can access the web based program at www.lungusa.org.    [place] Tobacco Users Help Line:  (995) 804-9691       Toll Free: 1-238.575.6230  Quit Tobacco Program [facility] (247)889-5965    Crisis Hotline:    [place] Crisis Hotline:  6-376-VBBVIMN or 1-140.833.9202    [place] Crisis Hotline:    1-352.382.1114 or 248-623-4251    Discharge Survey:   Thank you for choosing [facility]. We hope we did everything we could to make your hospital stay a pleasant one. You may be receiving a phone survey and we would appreciate your time and participation in answering the questions. Your input is very valuable to us in our efforts to improve our service to our patients and their families.        My signature on this form indicates that:    1. I have reviewed and understand the above information.  2. My questions regarding this information have been answered to my satisfaction.  3. I have formulated a plan with my discharge nurse to obtain my prescribed medications for home.                  Disclaimer         __________________________________                     __________       ________                       Patient Signature                                                 Date                    Time

## 2017-07-25 NOTE — DISCHARGE PLANNING
Medical Social Work    Referral: STEMI    Intervention: MSW responded to trauma bay.  Pt is a 52 year [facility].  Pt is [place] (: 1965).  Per cardiology pt is not going to the cath lab at this time.  Per [facility] pt's emergency contact is his significant other, Cristal Hendrickson ([phone]).  Pt is alert and oriented and able to contact family as needed.    Plan: SW will follow as needed.

## 2017-07-25 NOTE — PROGRESS NOTES
Spoke to Patient he had his xarelto yesterday, Per Dr. Bragg will hold dose for tonight and the patient will have cardiac cath in AM.  Pt aware of plan of care.

## 2017-07-25 NOTE — ED NOTES
Pt bib EMS.   Chief Complaint   Patient presents with   • Chest Pain     Per EMS, pt was experiencing chest pain throughout the evening. Pt states the pain continued to wake him up throughout the night. Pt describes pain as sharp that radiates from right chest to left chest.      Pt arrived to ED and went straight to trauma bay. Cardiologist ruled out STEMI, pt brought to [facility]. Pt report received from Cara MCCLAIN.

## 2017-07-25 NOTE — H&P
Hospital Medicine History and Physical    Date of Service  7/25/2017    Chief Complaint  Chief Complaint   Patient presents with   • Chest Pain     Per EMS, pt was experiencing chest pain throughout the evening. Pt states the pain continued to wake him up throughout the night. Pt describes pain as sharp that radiates from right chest to left chest.        History of Presenting Illness  52 y.o. male who presented on 7/25/2017 with chest pain. The patient has a known history of ischemic cardiomyopathy with coronary artery disease. This past night, the patient had sharp, 7 of 10 radiating to the right chest pain which woke him up and prevented him from sleeping. He denies any alleviating or aggravating factors. When EMS arrived, the patient had been packed and was waiting for that. Initial assessment indicated that EKG showed ST elevations. Therefore he came in as a code STEMI. However, the patient has been seen by cardiology and this has been ruled out. At the time of my assessment at bedside, the patient was asymptomatic. Prior to this, he reports that he is in his usual state of health with no fevers, chills, nausea, vomiting, no headaches, insurance of breath, abdominal pain, diarrhea or dysuria.    Primary Care Physician  Juancho Jenkins M.D.    Consultants  Dr. Calixto of cardiology    Code Status  Full    Review of Systems  Review of Systems   Constitutional: Negative for fever and chills.   HENT: Negative for congestion and sore throat.    Eyes: Negative for photophobia.   Respiratory: Negative for cough, shortness of breath and wheezing.    Cardiovascular: Negative for chest pain and palpitations.   Gastrointestinal: Negative for nausea, vomiting, abdominal pain and diarrhea.   Genitourinary: Negative for dysuria.   Musculoskeletal: Negative for myalgias.   Skin: Negative.    Neurological: Negative for dizziness, tingling, focal weakness and headaches.   Psychiatric/Behavioral: Negative for depression and  suicidal ideas.        Past Medical History  Past Medical History   Diagnosis Date   • Arthritis    • HTN (hypertension) 7/15/2012   • GERD (gastroesophageal reflux disease) 7/15/2012   • STEMI (ST elevation myocardial infarction) (CMS-HCC)    • Anxiety disorder    • MI (myocardial infarction) (CMS-HCC)    • Hyperlipemia    • Upper GI bleed 7/26/2012   • Glaucoma    • SOB (shortness of breath)    • Claudication (CMS-HCC)    • Dizziness    • CAD (coronary artery disease) 10/14/2013   • ACS (acute coronary syndrome) (CMS-HCC) 7/25/2012   • Pericarditis secondary to acute myocardial infarction (CMS-HCC) 7/19/2012   • Right knee pain    • Blood glucose elevated    • Mental retardation    • Osteoarthritis    • Hypertriglyceridemia    • A-fib (CMS-HCC)    • Ischemic cardiomyopathy    • Diabetes (CMS-HCC)        Surgical History  Past Surgical History   Procedure Laterality Date   • Other orthopedic surgery  7-      R knee surgery   • Other  knee surgery   • Other cardiac surgery       stent placement   • Other     • Gastroscopy-endo  7/25/2012     Performed by JORDIN VERA at ENDOSCOPY [facility]   • Gastroscopy-endo  7/26/2012     Performed by JORDIN VERA at ENDOSCOPY [facility]   • Gastroscopy-endo  5/19/2017     Procedure: GASTROSCOPY-ENDO;  Surgeon: Reinaldo Berry M.D.;  Location: [facility];  Service:        Medications  No current facility-administered medications on file prior to encounter.     Current Outpatient Prescriptions on File Prior to Encounter   Medication Sig Dispense Refill   • lidocaine (LIDODERM) 5 % Patch Apply 1 Patch to skin as directed every 24 hours. 10 Patch 0   • dicyclomine (BENTYL) 20 MG Tab Take 1 Tab by mouth every 6 hours. 20 Tab 0   • vitamin D3, cholecalciferol, 1000 UNIT Tab TAKE 1 TABLET BY MOUTH ONCE DAILY. 30 Tab 11   • atorvastatin (LIPITOR) 80 MG tablet Take 40 mg by mouth every evening.     • rivaroxaban (XARELTO) 20 MG Tab tablet Take 1  "Tab by mouth with dinner. 30 Tab 11   • omeprazole (PRILOSEC) 20 MG delayed-release capsule Take 1 Cap by mouth 2 times a day. 60 Cap 11   • metoprolol SR (TOPROL XL) 25 MG TABLET SR 24 HR Take 1 Tab by mouth every day. 30 Tab 5   • loratadine (CLARITIN) 10 MG Tab Take 1 Tab by mouth every day. 30 Tab 5   • lisinopril (PRINIVIL) 5 MG Tab Take 1 Tab by mouth every day. 30 Tab 11   • metformin (GLUCOPHAGE) 1000 MG tablet Take 1 Tab by mouth 2 times a day, with meals. 60 Tab 11       Family History  History reviewed. No pertinent family history.    Social History  Social History   Substance Use Topics   • Smoking status: Never Smoker    • Smokeless tobacco: Never Used   • Alcohol Use: No       Allergies  Allergies   Allergen Reactions   • Ritalin [Methylphenidate] Unspecified     \"Hyper\"        Physical Exam  Laboratory   Hemodynamics  Temp (24hrs), Av.3 °C (99.1 °F), Min:37.3 °C (99.1 °F), Max:37.3 °C (99.1 °F)   Temperature: 37.3 °C (99.1 °F)  Pulse  Av  Min: 108  Max: 108 Heart Rate (Monitored): (!) 103  Blood Pressure: 142/87 mmHg, NIBP: 132/87 mmHg      Respiratory      Respiration: 12, Pulse Oximetry: 96 %             Physical Exam   Constitutional: He is oriented to person, place, and time. No distress.   HENT:   Head: Normocephalic and atraumatic.   Right Ear: External ear normal.   Left Ear: External ear normal.   Eyes: EOM are normal. Right eye exhibits no discharge. Left eye exhibits no discharge.   Neck: Neck supple. No JVD present.   Cardiovascular: Normal rate, regular rhythm and normal heart sounds.    Pulmonary/Chest: Effort normal and breath sounds normal. No respiratory distress. He exhibits no tenderness.   Abdominal: Soft. Bowel sounds are normal. He exhibits no distension. There is no tenderness.   Musculoskeletal: He exhibits no edema.   Neurological: He is alert and oriented to person, place, and time. No cranial nerve deficit.   Skin: Skin is dry. He is not diaphoretic. No erythema. "   Psychiatric: He has a normal mood and affect. His behavior is normal.   Nursing note and vitals reviewed.      Recent Labs      07/25/17 0117   WBC  9.0   RBC  4.60*   HEMOGLOBIN  14.0   HEMATOCRIT  40.4*   MCV  87.8   MCH  30.4   MCHC  34.7   RDW  40.1   PLATELETCT  337   MPV  10.0     Recent Labs      07/25/17 0117   SODIUM  132*   POTASSIUM  4.4   CHLORIDE  98   CO2  24   GLUCOSE  279*   BUN  16   CREATININE  1.00   CALCIUM  9.9     Recent Labs      07/25/17 0117   ALTSGPT  43   ASTSGOT  18   ALKPHOSPHAT  71   TBILIRUBIN  0.8   LIPASE  50   GLUCOSE  279*     Recent Labs      07/25/17 0117   APTT  37.5*   INR  1.66*     Recent Labs      07/25/17 0117   BNPBTYPENAT  4         Lab Results   Component Value Date    TROPONINI <0.01 07/25/2017       Imaging  Dx-chest-limited (1 View)    7/20/2017 7/20/2017 5:32 PM HISTORY/REASON FOR EXAM:  Chest Pain TECHNIQUE/EXAM DESCRIPTION AND NUMBER OF VIEWS: Single portable view of the chest. COMPARISON: 4/16/2017 FINDINGS: The mediastinal and cardiac silhouette is unremarkable. The pulmonary vascularity is within normal limits. The lung parenchyma is clear. There is no significant pleural effusion. There is no visible pneumothorax. There are no acute bony abnormalities.     7/20/2017  1.  No acute cardiac or pulmonary abnormalities are identified.    Dx-chest-portable (1 View)    7/25/2017 7/25/2017 1:33 AM HISTORY/REASON FOR EXAM:  Chest Pain. TECHNIQUE/EXAM DESCRIPTION AND NUMBER OF VIEWS: Single portable view of the chest. COMPARISON: 7/20/2017 FINDINGS: Cardiac mediastinal contour is unchanged. Lungs show hypoinflation. Minimal linear opacity at both lung bases. No pleural fluid collection or pneumothorax. No major bony abnormality is seen.     7/25/2017  Hypoinflation with minimal bibasilar atelectasis.    Dx-shoulder 2+ Left    7/4/2017 7/4/2017 9:20 PM HISTORY/REASON FOR EXAM:  Injury to left shoulder with left lateral shoulder pain TECHNIQUE/EXAM  DESCRIPTION AND NUMBER OF VIEWS:  3 views of the LEFT shoulder. COMPARISON: 02/21/2015 FINDINGS: Bone mineralization is normal. There is no evidence of acute fracture. There is no evidence of dislocation. There is no evidence of arthropathy. No abnormalities are identified in the soft tissues.     7/4/2017  There is no evidence of acute fracture.     Assessment/Plan     Anticipate that patient will need less than 2 midnights for management of the discussed medical issues.    This dictation was created using voice recognition software. The accuracy of the dictation is limited to the abilities of the software. I expect there may be some errors of grammar and possibly content.    Chest pain  Assessment & Plan  The patient has been seen by cardiology, STEMI has been ruled out. He will be monitored closely on telemetry and I will trend EKGs as as well as obtain serial troponin levels. He will be on as needed morphine and oxygen and nitroglycerin. I will continue his home Lipitor, ACE inhibitor, beta blocker, and several toe. His last echo was obtained in April of this year, I'm holding off on this unless cardiology requests the study. Dr. Robin of cardiology is following and indicates that there will be no likely intervention at this time.    CAD with ischemic cardiomyopathy (present on admission)  Assessment & Plan  Treatment as noted above    Type 2 diabetes mellitus without complication, without long-term current use of insulin ([facility]) (present on admission)  Assessment & Plan  I'm holding the home metformin for now, monitor with Accu-Cheks and cover with insulin sliding scale    AF (atrial fibrillation) (CMS-HCC)  Assessment & Plan  Continue home metoprolol and xarelto    HTN (hypertension) (present on admission)  Assessment & Plan  Continue home lisinopril and metoprolol      Prophylaxis: Patient is a relative, no additional need for DVT prophylaxis, no PPI indicated, stool softeners ordered

## 2017-07-25 NOTE — PROGRESS NOTES
Pt arrived to unit  via gurney.  VS stable.  Assessment complete. Plan of care discussed and understood.  No signs of distress noted at this time.  Pt reports 10/10 chest pain. Blood for trop collected sent to lab.VS stable SR on heart monitor.  Pt educated regarding fall precaution, encouraged use of call light for assistance.  Pt denies any additional needs at this time.  Call light within reach. Will continue to monitor.

## 2017-07-25 NOTE — PROGRESS NOTES
Pt returned to floor.  Pt given IV morphine for pain.  VSS.  Will continue to closely monitor. Call light within reach.

## 2017-07-25 NOTE — PROGRESS NOTES
Pt states morphine helped bring his chest pain from a 10 to a 6.  Received orders for a troponin and new orders for the nitro paste.  Blood drawn and sent to lab.  Will continue to closely monitor. Call light within reach.

## 2017-07-25 NOTE — ED PROVIDER NOTES
ED Provider Note    Scribed for Reinier Vogt M.D. by Cinthya Chandler. 7/25/2017, 1:15 AM.    Primary care provider: Juancho Jenkins M.D.  Means of arrival: Ambulance  History obtained from: Patient and EMS  History limited by: None    CHIEF COMPLAINT  Chief Complaint   Patient presents with   • Chest Pain     Per EMS, pt was experiencing chest pain throughout the evening. Pt states the pain continued to wake him up throughout the night. Pt describes pain as sharp that radiates from right chest to left chest.      HPI  Ryan Trevizo is a 52 y.o. male with a history of MI and stent placement who presents to the Emergency Department for persistent, sharp chest pain that woke him up at 0000. EMS was called and picked him up at 0034 when they administered a 324mg Aspirin. The patient describes the pain as sharp, radiating across the front of his chest. This pain is exacerbated by deep inspiration and comes in waves.  He endorses diaphoresis and nausea at this time as well but denies vomiting. The patient endorses a history of DVT in his leg and is currently taking Xarelto.     REVIEW OF SYSTEMS  Review of Systems   Constitutional: Positive for diaphoresis.        +Xarelto   Respiratory: Positive for shortness of breath.    Cardiovascular: Positive for chest pain.   Gastrointestinal: Positive for nausea.   All other systems reviewed and are negative.  C  PAST MEDICAL HISTORY   has a past medical history of Arthritis; HTN (hypertension) (7/15/2012); GERD (gastroesophageal reflux disease) (7/15/2012); STEMI (ST elevation myocardial infarction) (CMS-HCC); Anxiety disorder; MI (myocardial infarction) (CMS-HCC); Hyperlipemia; Upper GI bleed (7/26/2012); Glaucoma; SOB (shortness of breath); Claudication (CMS-HCC); Dizziness; CAD (coronary artery disease) (10/14/2013); ACS (acute coronary syndrome) (CMS-HCC) (7/25/2012); Pericarditis secondary to acute myocardial infarction (CMS-HCC) (7/19/2012); Right knee pain;  "Blood glucose elevated; Mental retardation; Osteoarthritis; Hypertriglyceridemia; A-fib (CMS-HCC); Ischemic cardiomyopathy; and Diabetes (CMS-[facility]).    SURGICAL HISTORY   has past surgical history that includes other orthopedic surgery (7- ); other (knee surgery); other cardiac surgery; other; gastroscopy-endo (7/25/2012); gastroscopy-endo (7/26/2012); and gastroscopy-endo (5/19/2017).    SOCIAL HISTORY  Social History   Substance Use Topics   • Smoking status: Never Smoker    • Smokeless tobacco: Never Used   • Alcohol Use: No      History   Drug Use No       FAMILY HISTORY  No pertinent family history     CURRENT MEDICATIONS    No current facility-administered medications on file prior to encounter.     Current Outpatient Prescriptions on File Prior to Encounter   Medication Sig Dispense Refill   • lidocaine (LIDODERM) 5 % Patch Apply 1 Patch to skin as directed every 24 hours. 10 Patch 0   • dicyclomine (BENTYL) 20 MG Tab Take 1 Tab by mouth every 6 hours. 20 Tab 0   • vitamin D3, cholecalciferol, 1000 UNIT Tab TAKE 1 TABLET BY MOUTH ONCE DAILY. 30 Tab 11   • atorvastatin (LIPITOR) 80 MG tablet Take 40 mg by mouth every evening.     • rivaroxaban (XARELTO) 20 MG Tab tablet Take 1 Tab by mouth with dinner. 30 Tab 11   • omeprazole (PRILOSEC) 20 MG delayed-release capsule Take 1 Cap by mouth 2 times a day. 60 Cap 11   • metoprolol SR (TOPROL XL) 25 MG TABLET SR 24 HR Take 1 Tab by mouth every day. 30 Tab 5   • loratadine (CLARITIN) 10 MG Tab Take 1 Tab by mouth every day. 30 Tab 5   • lisinopril (PRINIVIL) 5 MG Tab Take 1 Tab by mouth every day. 30 Tab 11   • metformin (GLUCOPHAGE) 1000 MG tablet Take 1 Tab by mouth 2 times a day, with meals. 60 Tab 11     ALLERGIES  Allergies   Allergen Reactions   • Ritalin [Methylphenidate] Unspecified     \"Hyper\"       PHYSICAL EXAM  VITAL SIGNS: /86 mmHg  Pulse 90  Temp(Src) 35.8 °C (96.5 °F)  Resp 22  Ht 1.803 m (5' 11\")  Wt 82.5 kg (181 lb 14.1 oz)  BMI " 25.38 kg/m2  SpO2 98%    Constitutional: Well developed, Well nourished, Mild distress.   HENT: Normocephalic, Atraumatic  Eyes: Conjunctiva normal, No discharge.   Cardiovascular: Normal heart rate, Normal rhythm, No murmurs, equal pulses.   Pulmonary: Normal breath sounds, No respiratory distress, No wheezing, No rales, No rhonchi.  Chest: No chest wall tenderness or deformity.   Abdomen:Soft, No masses, no rebound, no guarding. Slight tenderness to epigastric  Musculoskeletal: No major deformities noted, No tenderness. No calf tenderness, no edema, no cords  Skin: Warm, Dry, No erythema, No rash.   Neurologic: Alert & oriented x 3, Normal motor function,  No focal deficits noted.   Psychiatric: Affect normal, Judgment normal, Mood normal.     LABS  Results for orders placed or performed during the hospital encounter of 07/25/17   Troponin   Result Value Ref Range    Troponin I <0.01 0.00 - 0.04 ng/mL   Btype Natriuretic Peptide   Result Value Ref Range    B Natriuretic Peptide 4 0 - 100 pg/mL   CBC with Differential   Result Value Ref Range    WBC 9.0 4.8 - 10.8 K/uL    RBC 4.60 (L) 4.70 - 6.10 M/uL    Hemoglobin 14.0 14.0 - 18.0 g/dL    Hematocrit 40.4 (L) 42.0 - 52.0 %    MCV 87.8 81.4 - 97.8 fL    MCH 30.4 27.0 - 33.0 pg    MCHC 34.7 33.7 - 35.3 g/dL    RDW 40.1 35.9 - 50.0 fL    Platelet Count 337 164 - 446 K/uL    MPV 10.0 9.0 - 12.9 fL    Neutrophils-Polys 69.70 44.00 - 72.00 %    Lymphocytes 19.50 (L) 22.00 - 41.00 %    Monocytes 7.90 0.00 - 13.40 %    Eosinophils 1.40 0.00 - 6.90 %    Basophils 0.70 0.00 - 1.80 %    Immature Granulocytes 0.80 0.00 - 0.90 %    Nucleated RBC 0.00 /100 WBC    Neutrophils (Absolute) 6.31 1.82 - 7.42 K/uL    Lymphs (Absolute) 1.76 1.00 - 4.80 K/uL    Monos (Absolute) 0.71 0.00 - 0.85 K/uL    Eos (Absolute) 0.13 0.00 - 0.51 K/uL    Baso (Absolute) 0.06 0.00 - 0.12 K/uL    Immature Granulocytes (abs) 0.07 0.00 - 0.11 K/uL    NRBC (Absolute) 0.00 K/uL   Complete Metabolic Panel  (CMP)   Result Value Ref Range    Sodium 132 (L) 135 - 145 mmol/L    Potassium 4.4 3.6 - 5.5 mmol/L    Chloride 98 96 - 112 mmol/L    Co2 24 20 - 33 mmol/L    Anion Gap 10.0 0.0 - 11.9    Glucose 279 (H) 65 - 99 mg/dL    Bun 16 8 - 22 mg/dL    Creatinine 1.00 0.50 - 1.40 mg/dL    Calcium 9.9 8.5 - 10.5 mg/dL    AST(SGOT) 18 12 - 45 U/L    ALT(SGPT) 43 2 - 50 U/L    Alkaline Phosphatase 71 30 - 99 U/L    Total Bilirubin 0.8 0.1 - 1.5 mg/dL    Albumin 4.5 3.2 - 4.9 g/dL    Total Protein 7.2 6.0 - 8.2 g/dL    Globulin 2.7 1.9 - 3.5 g/dL    A-G Ratio 1.7 g/dL   Prothrombin Time   Result Value Ref Range    PT 20.1 (H) 12.0 - 14.6 sec    INR 1.66 (H) 0.87 - 1.13   APTT   Result Value Ref Range    APTT 37.5 (H) 24.7 - 36.0 sec   Lipase   Result Value Ref Range    Lipase 50 11 - 82 U/L   CKMB   Result Value Ref Range    CK-Mb 1.2 0.0 - 5.0 ng/mL   D-DIMER   Result Value Ref Range    D-Dimer Screen <200 <250 ng/mL(D-DU)   ESTIMATED GFR   Result Value Ref Range    GFR If African American >60 >60 mL/min/1.73 m 2    GFR If Non African American >60 >60 mL/min/1.73 m 2   EKG (ER)   Result Value Ref Range    Report       [facility] Emergency Dept.    Test Date:  2017  Pt Name:    NICHELLE MARQUEZ                   Department: ER  MRN:        [id number]                      Room:       RD 03  Gender:     M                            Technician: [id number]  :        1965                   Requested By:CAROLYN CORONADO  Order #:    [id number]                    Reading MD:    Measurements  Intervals                                Axis  Rate:       117                          P:          39  KY:         156                          QRS:        -31  QRSD:       94                           T:          22  QT:         316  QTc:        441    Interpretive Statements  SINUS TACHYCARDIA  PROBABLE INFERIOR INFARCT, AGE INDETERMINATE  BASELINE WANDER IN LEAD(S) V3  Compared to ECG 2017 11:20:30  Sinus rhythm no  longer present  Myocardial infarct finding still present       All labs reviewed by me.    EKG  12 Lead EKG interpreted by me shows a normal sinus tachycardia at a rate of 117. Axis leftward. Q waves in 2, 3, avf. No ST elevations. No T wave inversions.  Old EKG from 7/21/17 shows no significant changes. Final impression: Sinus tachycardia with Q waves in 2, 3, and avf.    RADIOLOGY  DX-CHEST-PORTABLE (1 VIEW)   Final Result      Hypoinflation with minimal bibasilar atelectasis.        The radiologist's interpretation of all radiological studies have been reviewed by me.    COURSE & MEDICAL DECISION MAKING  Pertinent Labs & Imaging studies reviewed. (See chart for details)    1:15 AM - Patient seen and examined at bedside. Patient will be treated with Morphine 4mg IV, Aspirin 324mg PO, Nitro 0.4mg PO. Ordered Chest X-ray, CKMB, D-dimer, Troponin, BNP, CBC, CMP, APTT, PT, Lipase, EKG to evaluate his symptoms. The differential diagnoses include but are not limited to: MI, bradycardia    1:21 AM  states that the patient does not need acute treatment at this time. He will be given Aspirin and monitored.     2:45 AM Paged Hospitalist.     2:50 AM - I discussed the patient's case and the above findings with Dr. Craig (hospitalist) who will see and admit the patient. Care is transferred at this time.    Medical Decision Making: Patient presents initially as possible STEMI from EMS call. At this point in time, his EKG here does not meet ST elevation criteria. Patient will be admitted for further risk stratification. He is persistently tachycardic therefore d-dimer was done which was negative. At this point time patient will be admitted for further risk stratification with repeat enzymes. DISPOSITION:  Patient will be admitted to  in guarded condition.    FINAL IMPRESSION  1. Chest pain, unspecified type          I, Cinthya Chandler (Scribe), otilio scribing for, and in the presence of, Reinier Vogt  M.D.    Electronically signed by: Cinthya Chandler (Scribe), 7/25/2017    Reinier MORRISSEY M.D. personally performed the services described in this documentation, as scribed by Cinthya Chandler in my presence, and it is both accurate and complete.    The note accurately reflects work and decisions made by me.  Reinier Vogt  7/25/2017  5:00 AM

## 2017-07-25 NOTE — PROGRESS NOTES
Cardiology Progress Note               Author: Silvino Bragg Date & Time created: 2017  8:17 AM     Interval History:  Admitted last night with recurrent anginal pain that awoke him from sleep.  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  Patient on rivaroxaban for persistent atrial fib.    Review of Systems   Constitutional: Negative.    Cardiovascular: Positive for chest pain.   Endo/Heme/Allergies: Does not bruise/bleed easily.       Physical Exam   Constitutional: He is oriented to person, place, and time. No distress.   HENT:   Head: Atraumatic.   Eyes: No scleral icterus.   Cardiovascular: Normal rate and regular rhythm.    No murmur heard.  Pulmonary/Chest: Breath sounds normal. He has no wheezes.   Musculoskeletal: He exhibits no edema.   Neurological: He is alert and oriented to person, place, and time.   Skin: Skin is warm and dry.       Hemodynamics:  Temp (24hrs), Av.6 °C (97.8 °F), Min:35.8 °C (96.5 °F), Max:37.3 °C (99.1 °F)  Temperature: 35.8 °C (96.5 °F)  Pulse  Av  Min: 90  Max: 108Heart Rate (Monitored): (!) 115  Blood Pressure: 138/86 mmHg, NIBP: 124/90 mmHg     Respiratory:    Respiration: (!) 22, Pulse Oximetry: 98 %, O2 Daily Delivery Respiratory : Silicone Nasal Cannula     Work Of Breathing / Effort: Mild  RUL Breath Sounds: Clear, RML Breath Sounds: Clear, RLL Breath Sounds: Clear, JAISON Breath Sounds: Clear  Fluids:     Weight: 82.5 kg (181 lb 14.1 oz)  GI/Nutrition:  Orders Placed This Encounter   Procedures   • Diet NPO     Standing Status: Standing      Number of Occurrences: 1      Standing Expiration Date:      Order Specific Question:  Restrict to:     Answer:  Sips with Medications [3]     Lab Results:  Recent Labs      17   0117   WBC  9.0   RBC  4.60*   HEMOGLOBIN  14.0   HEMATOCRIT  40.4*   MCV  87.8   MCH  30.4   MCHC  34.7   RDW  40.1   PLATELETCT  337   MPV   10.0     Recent Labs      07/25/17   0117   SODIUM  132*   POTASSIUM  4.4   CHLORIDE  98   CO2  24   GLUCOSE  279*   BUN  16   CREATININE  1.00   CALCIUM  9.9     Recent Labs      07/25/17   0117   APTT  37.5*   INR  1.66*     Recent Labs      07/25/17   0117   BNPBTYPENAT  4     Recent Labs      07/25/17   0117  07/25/17   0513   CKMB  1.2   --    TROPONINI  <0.01  <0.01   BNPBTYPENAT  4   --              Medical Decision Making, by Problem:  Active Hospital Problems    Diagnosis   • Chest pain [R07.9]   • Type 2 diabetes mellitus without complication, without long-term current use of insulin ([facility]) [E11.9]   • CAD with ischemic cardiomyopathy [I25.10]   • HTN (hypertension) [I10]   • AF (atrial fibrillation) (CMS-HCC) [I48.91]       Plan:  Angiograms from last year personally reviewed today.  He has complex CAD with some poor targets.Discussed possibility of CABG with patient with goal of symptom relief, not improved longevity.  Hold anticoagulant and schedule cath for Weds.  Add Ranexa for pain control. Parenthetically, he doesn't have home NTG    Core Measures

## 2017-07-25 NOTE — PROGRESS NOTES
"Hospital Medicine Interval Note  Date of Service:  7/25/2017    Chief Complaint  52 y.o.-year-old male admitted 7/25/2017 with chest pain. The patient has a known history of ischemic cardiomyopathy with coronary artery disease.     Interval Problem Update  Continues to have dull chest pain - nitro prn    Cardiac cath tomorrow    Hold Xarelto    Consultants/Specialty  Cardiology - Marily    Disposition  For cath tomorrow. Results pending     Review of Systems   Constitutional: Negative for fever, chills, malaise/fatigue and diaphoresis.   HENT: Negative for congestion.    Respiratory: Negative for cough, sputum production, shortness of breath and stridor.    Cardiovascular: Positive for chest pain. Negative for palpitations and leg swelling.   Gastrointestinal: Negative for nausea, vomiting, abdominal pain, diarrhea and constipation.   Genitourinary: Negative for dysuria and urgency.   Musculoskeletal: Negative for myalgias, back pain, falls and neck pain.   Neurological: Negative for dizziness, tingling, seizures, loss of consciousness, weakness and headaches.   Psychiatric/Behavioral: Negative for depression and suicidal ideas.      Physical Exam Laboratory/Imaging   Filed Vitals:    07/25/17 0430 07/25/17 0448 07/25/17 0450 07/25/17 0826   BP:  138/86  133/94   Pulse:  90  89   Temp:  35.8 °C (96.5 °F)  36.2 °C (97.2 °F)   Resp: 20 18 22 18   Height:  1.803 m (5' 11\")     Weight:  82.5 kg (181 lb 14.1 oz)     SpO2: 98%  98% 96%     Physical Exam   Constitutional: He is oriented to person, place, and time. He appears well-developed and well-nourished. No distress.   HENT:   Head: Normocephalic.   Eyes: Pupils are equal, round, and reactive to light. Right eye exhibits no discharge. Left eye exhibits no discharge.   Neck: Normal range of motion. Neck supple. No JVD present. No tracheal deviation present.   Cardiovascular: Normal rate, regular rhythm and normal heart sounds.    No murmur heard.  Pulmonary/Chest: " Effort normal and breath sounds normal. No stridor. No respiratory distress.   Abdominal: Soft. Bowel sounds are normal. He exhibits no distension.   Musculoskeletal: Normal range of motion. He exhibits no edema.   Neurological: He is alert and oriented to person, place, and time.   Skin: Skin is warm and dry. He is not diaphoretic.   Psychiatric: He has a normal mood and affect.    Lab Results   Component Value Date/Time    WBC 9.0 07/25/2017 01:17 AM    HEMOGLOBIN 14.0 07/25/2017 01:17 AM    HEMATOCRIT 40.4* 07/25/2017 01:17 AM    PLATELET COUNT 337 07/25/2017 01:17 AM     Lab Results   Component Value Date/Time    SODIUM 132* 07/25/2017 01:17 AM    POTASSIUM 4.4 07/25/2017 01:17 AM    CHLORIDE 98 07/25/2017 01:17 AM    CO2 24 07/25/2017 01:17 AM    GLUCOSE 279* 07/25/2017 01:17 AM    BUN 16 07/25/2017 01:17 AM    CREATININE 1.00 07/25/2017 01:17 AM      Assessment/Plan    Chest pain  Assessment & Plan  For cardiac cath tomorrow  Cont to hold Xarelto for cath  NPO at [place]    CAD with ischemic cardiomyopathy  Assessment & Plan  Treatment as noted above    Type 2 diabetes mellitus without complication, without long-term current use of insulin ([facility]) (present on admission)  Assessment & Plan  hold metformin for now  monitor with Accu-Cheks and cover with insulin sliding scale if needed    AF (atrial fibrillation) (CMS-HCC)  Assessment & Plan  Stable   Continue home metoprolol  Hold Xarelto    HTN (hypertension) (present on admission)  Assessment & Plan  BP stable 133/94  Continue home lisinopril and metoprolol       EKG reviewed, Labs reviewed and Medications reviewed  Luque catheter: No Luque      DVT: zarelto.    Ulcer prophylaxis: Not indicated

## 2017-07-25 NOTE — DISCHARGE PLANNING
Pt utilizes the bus for transport to work and appts.     Care Transition Team Assessment    Information Source  Orientation : Oriented x 4  Information Given By: Patient  Who is responsible for making decisions for patient? : Patient         Elopement Risk  Legal Hold: No  Ambulatory or Self Mobile in Wheelchair: No-Not an Elopement Risk    Interdisciplinary Discharge Planning  Does Admitting Nurse Feel This Could be a Complex Discharge?: No  Primary Care Physician: Dr Roberts  Lives with - Patient's Self Care Capacity: Alone and Able to Care For Self  Patient or legal guardian wants to designate a caregiver (see row info): No  Support Systems: Spouse / Significant Other  Housing / Facility: 1 Story Apartment / Condo  Do You Take your Prescribed Medications Regularly: Yes  Able to Return to Previous ADL's: Yes  Mobility Issues: No  Prior Services: None  Patient Expects to be Discharged to:: home  Assistance Needed: No  Durable Medical Equipment: Not Applicable    Discharge Preparedness  What is your plan after discharge?: Home with help  What are your discharge supports?: Other (comment) (significant other)  Prior Functional Level: Ambulatory, Independent with Activities of Daily Living    Functional Assesment  Prior Functional Level: Ambulatory, Independent with Activities of Daily Living    Finances  Financial Barriers to Discharge: No  Prescription Coverage: Yes    Vision / Hearing Impairment  Vision Impairment : No  Hearing Impairment : No              Domestic Abuse  Have you ever been the victim of abuse or violence?: No  Physical Abuse or Sexual Abuse: No  Verbal Abuse or Emotional Abuse: No    Psychological Assessment  History of Substance Abuse: None    Discharge Risks or Barriers  Discharge risks or barriers?: No    Anticipated Discharge Information  Anticipated discharge disposition: Home  Discharge Address: [address] [place] [address]  Discharge Contact Phone Number: [phone]

## 2017-07-25 NOTE — PROGRESS NOTES
Pt states continued dull chest pain.  Pt has nitro paste applied.  Pt doesn't appear to be in any apparent distress. VSS.  Will continue to closely monitor. Call light within reach.

## 2017-07-26 VITALS
HEIGHT: 71 IN | RESPIRATION RATE: 18 BRPM | HEART RATE: 87 BPM | SYSTOLIC BLOOD PRESSURE: 106 MMHG | TEMPERATURE: 97 F | OXYGEN SATURATION: 93 % | BODY MASS INDEX: 25.46 KG/M2 | DIASTOLIC BLOOD PRESSURE: 68 MMHG | WEIGHT: 181.88 LBS

## 2017-07-26 PROBLEM — R07.9 CHEST PAIN: Status: RESOLVED | Noted: 2017-04-16 | Resolved: 2017-07-26

## 2017-07-26 LAB
ACT BLD: 197 SEC (ref 74–137)
ANION GAP SERPL CALC-SCNC: 5 MMOL/L (ref 0–11.9)
BUN SERPL-MCNC: 18 MG/DL (ref 8–22)
CALCIUM SERPL-MCNC: 8.8 MG/DL (ref 8.5–10.5)
CHLORIDE SERPL-SCNC: 102 MMOL/L (ref 96–112)
CO2 SERPL-SCNC: 18 MMOL/L (ref 20–33)
CREAT SERPL-MCNC: 0.71 MG/DL (ref 0.5–1.4)
ERYTHROCYTE [DISTWIDTH] IN BLOOD BY AUTOMATED COUNT: 41.3 FL (ref 35.9–50)
GFR SERPL CREATININE-BSD FRML MDRD: >60 ML/MIN/1.73 M 2
GLUCOSE BLD-MCNC: 222 MG/DL (ref 65–99)
GLUCOSE BLD-MCNC: 275 MG/DL (ref 65–99)
GLUCOSE SERPL-MCNC: 210 MG/DL (ref 65–99)
HCT VFR BLD AUTO: 38.5 % (ref 42–52)
HGB BLD-MCNC: 13.3 G/DL (ref 14–18)
MCH RBC QN AUTO: 30.2 PG (ref 27–33)
MCHC RBC AUTO-ENTMCNC: 34.5 G/DL (ref 33.7–35.3)
MCV RBC AUTO: 87.5 FL (ref 81.4–97.8)
PLATELET # BLD AUTO: 274 K/UL (ref 164–446)
PMV BLD AUTO: 10.1 FL (ref 9–12.9)
POTASSIUM SERPL-SCNC: 5.2 MMOL/L (ref 3.6–5.5)
RBC # BLD AUTO: 4.4 M/UL (ref 4.7–6.1)
SODIUM SERPL-SCNC: 125 MMOL/L (ref 135–145)
WBC # BLD AUTO: 7.3 K/UL (ref 4.8–10.8)

## 2017-07-26 PROCEDURE — 99153 MOD SED SAME PHYS/QHP EA: CPT

## 2017-07-26 PROCEDURE — 99152 MOD SED SAME PHYS/QHP 5/>YRS: CPT

## 2017-07-26 PROCEDURE — 93572 IV DOP VEL&/PRESS C FLO EA: CPT

## 2017-07-26 PROCEDURE — 700102 HCHG RX REV CODE 250 W/ 637 OVERRIDE(OP): Performed by: HOSPITALIST

## 2017-07-26 PROCEDURE — 307093 HCHG TR BAND RADIAL

## 2017-07-26 PROCEDURE — C1769 GUIDE WIRE: HCPCS

## 2017-07-26 PROCEDURE — 700111 HCHG RX REV CODE 636 W/ 250 OVERRIDE (IP)

## 2017-07-26 PROCEDURE — 80048 BASIC METABOLIC PNL TOTAL CA: CPT

## 2017-07-26 PROCEDURE — 85027 COMPLETE CBC AUTOMATED: CPT

## 2017-07-26 PROCEDURE — 700117 HCHG RX CONTRAST REV CODE 255: Performed by: INTERNAL MEDICINE

## 2017-07-26 PROCEDURE — C1894 INTRO/SHEATH, NON-LASER: HCPCS

## 2017-07-26 PROCEDURE — 82962 GLUCOSE BLOOD TEST: CPT

## 2017-07-26 PROCEDURE — G0378 HOSPITAL OBSERVATION PER HR: HCPCS

## 2017-07-26 PROCEDURE — C1887 CATHETER, GUIDING: HCPCS

## 2017-07-26 PROCEDURE — A9270 NON-COVERED ITEM OR SERVICE: HCPCS | Performed by: INTERNAL MEDICINE

## 2017-07-26 PROCEDURE — 93458 L HRT ARTERY/VENTRICLE ANGIO: CPT

## 2017-07-26 PROCEDURE — 96361 HYDRATE IV INFUSION ADD-ON: CPT

## 2017-07-26 PROCEDURE — 700105 HCHG RX REV CODE 258: Performed by: INTERNAL MEDICINE

## 2017-07-26 PROCEDURE — A9270 NON-COVERED ITEM OR SERVICE: HCPCS | Performed by: HOSPITALIST

## 2017-07-26 PROCEDURE — 99217 PR OBSERVATION CARE DISCHARGE: CPT | Performed by: HOSPITALIST

## 2017-07-26 PROCEDURE — A9270 NON-COVERED ITEM OR SERVICE: HCPCS | Performed by: NURSE PRACTITIONER

## 2017-07-26 PROCEDURE — 96372 THER/PROPH/DIAG INJ SC/IM: CPT

## 2017-07-26 PROCEDURE — 700102 HCHG RX REV CODE 250 W/ 637 OVERRIDE(OP): Performed by: INTERNAL MEDICINE

## 2017-07-26 PROCEDURE — 360979 HCHG DIAGNOSTIC CATH

## 2017-07-26 PROCEDURE — 93571 IV DOP VEL&/PRESS C FLO 1ST: CPT

## 2017-07-26 PROCEDURE — 700101 HCHG RX REV CODE 250

## 2017-07-26 PROCEDURE — 700102 HCHG RX REV CODE 250 W/ 637 OVERRIDE(OP): Performed by: NURSE PRACTITIONER

## 2017-07-26 PROCEDURE — 36415 COLL VENOUS BLD VENIPUNCTURE: CPT

## 2017-07-26 PROCEDURE — 85347 COAGULATION TIME ACTIVATED: CPT

## 2017-07-26 PROCEDURE — 304952 HCHG R 2 PADS

## 2017-07-26 RX ORDER — RANOLAZINE 500 MG/1
500 TABLET, EXTENDED RELEASE ORAL 2 TIMES DAILY
Qty: 60 TAB | Refills: 3 | Status: SHIPPED | OUTPATIENT
Start: 2017-07-26 | End: 2017-10-18

## 2017-07-26 RX ORDER — HEPARIN SODIUM,PORCINE 1000/ML
VIAL (ML) INJECTION
Status: COMPLETED
Start: 2017-07-26 | End: 2017-07-26

## 2017-07-26 RX ORDER — LIDOCAINE HYDROCHLORIDE 20 MG/ML
INJECTION, SOLUTION INFILTRATION; PERINEURAL
Status: COMPLETED
Start: 2017-07-26 | End: 2017-07-26

## 2017-07-26 RX ORDER — ADENOSINE 3 MG/ML
INJECTION, SOLUTION INTRAVENOUS
Status: COMPLETED
Start: 2017-07-26 | End: 2017-07-26

## 2017-07-26 RX ORDER — ACETAMINOPHEN 325 MG/1
325 TABLET ORAL EVERY 4 HOURS PRN
Status: DISCONTINUED | OUTPATIENT
Start: 2017-07-26 | End: 2017-07-26 | Stop reason: HOSPADM

## 2017-07-26 RX ORDER — DEXAMETHASONE SODIUM PHOSPHATE 4 MG/ML
4 INJECTION, SOLUTION INTRA-ARTICULAR; INTRALESIONAL; INTRAMUSCULAR; INTRAVENOUS; SOFT TISSUE
Status: DISCONTINUED | OUTPATIENT
Start: 2017-07-26 | End: 2017-07-26 | Stop reason: HOSPADM

## 2017-07-26 RX ORDER — ONDANSETRON 2 MG/ML
4 INJECTION INTRAMUSCULAR; INTRAVENOUS EVERY 4 HOURS PRN
Status: DISCONTINUED | OUTPATIENT
Start: 2017-07-26 | End: 2017-07-26 | Stop reason: HOSPADM

## 2017-07-26 RX ORDER — SODIUM CHLORIDE 9 MG/ML
INJECTION, SOLUTION INTRAVENOUS CONTINUOUS
Status: ACTIVE | OUTPATIENT
Start: 2017-07-26 | End: 2017-07-26

## 2017-07-26 RX ORDER — ASPIRIN 325 MG
325 TABLET ORAL DAILY
Status: DISCONTINUED | OUTPATIENT
Start: 2017-07-26 | End: 2017-07-26

## 2017-07-26 RX ORDER — SCOLOPAMINE TRANSDERMAL SYSTEM 1 MG/1
1 PATCH, EXTENDED RELEASE TRANSDERMAL
Status: DISCONTINUED | OUTPATIENT
Start: 2017-07-26 | End: 2017-07-26 | Stop reason: HOSPADM

## 2017-07-26 RX ORDER — DIPHENHYDRAMINE HYDROCHLORIDE 50 MG/ML
25 INJECTION INTRAMUSCULAR; INTRAVENOUS EVERY 6 HOURS PRN
Status: DISCONTINUED | OUTPATIENT
Start: 2017-07-26 | End: 2017-07-26 | Stop reason: HOSPADM

## 2017-07-26 RX ORDER — VERAPAMIL HYDROCHLORIDE 2.5 MG/ML
INJECTION, SOLUTION INTRAVENOUS
Status: COMPLETED
Start: 2017-07-26 | End: 2017-07-26

## 2017-07-26 RX ORDER — NITROGLYCERIN 0.3 MG/1
0.3 TABLET SUBLINGUAL
Qty: 100 TAB | Refills: 0 | Status: SHIPPED | OUTPATIENT
Start: 2017-07-26 | End: 2018-01-04

## 2017-07-26 RX ORDER — HALOPERIDOL 5 MG/ML
1 INJECTION INTRAMUSCULAR EVERY 6 HOURS PRN
Status: DISCONTINUED | OUTPATIENT
Start: 2017-07-26 | End: 2017-07-26 | Stop reason: HOSPADM

## 2017-07-26 RX ORDER — RANOLAZINE 500 MG/1
500 TABLET, EXTENDED RELEASE ORAL 2 TIMES DAILY
Status: DISCONTINUED | OUTPATIENT
Start: 2017-07-26 | End: 2017-07-26 | Stop reason: HOSPADM

## 2017-07-26 RX ORDER — MIDAZOLAM HYDROCHLORIDE 1 MG/ML
INJECTION INTRAMUSCULAR; INTRAVENOUS
Status: COMPLETED
Start: 2017-07-26 | End: 2017-07-26

## 2017-07-26 RX ADMIN — LISINOPRIL 5 MG: 10 TABLET ORAL at 08:38

## 2017-07-26 RX ADMIN — SODIUM CHLORIDE: 9 INJECTION, SOLUTION INTRAVENOUS at 11:32

## 2017-07-26 RX ADMIN — OXYCODONE HYDROCHLORIDE 5 MG: 5 TABLET ORAL at 07:33

## 2017-07-26 RX ADMIN — FENTANYL CITRATE 75 MCG: 50 INJECTION, SOLUTION INTRAMUSCULAR; INTRAVENOUS at 10:53

## 2017-07-26 RX ADMIN — INSULIN LISPRO 3 UNITS: 100 INJECTION, SOLUTION INTRAVENOUS; SUBCUTANEOUS at 06:23

## 2017-07-26 RX ADMIN — ASPIRIN 81 MG: 81 TABLET ORAL at 11:32

## 2017-07-26 RX ADMIN — INSULIN LISPRO 5 UNITS: 100 INJECTION, SOLUTION INTRAVENOUS; SUBCUTANEOUS at 12:58

## 2017-07-26 RX ADMIN — NITROGLYCERIN 1 INCH: 20 OINTMENT TOPICAL at 06:26

## 2017-07-26 RX ADMIN — IOHEXOL 111 ML: 350 INJECTION, SOLUTION INTRAVENOUS at 10:54

## 2017-07-26 RX ADMIN — RANOLAZINE 500 MG: 500 TABLET, FILM COATED, EXTENDED RELEASE ORAL at 13:35

## 2017-07-26 RX ADMIN — HEPARIN SODIUM 2000 UNITS: 200 INJECTION, SOLUTION INTRAVENOUS at 10:52

## 2017-07-26 RX ADMIN — LIDOCAINE HYDROCHLORIDE: 20 INJECTION, SOLUTION INFILTRATION; PERINEURAL at 10:52

## 2017-07-26 RX ADMIN — NITROGLYCERIN 10 ML: 20 INJECTION INTRAVENOUS at 10:52

## 2017-07-26 RX ADMIN — VERAPAMIL HYDROCHLORIDE 5 MG: 2.5 INJECTION, SOLUTION INTRAVENOUS at 10:52

## 2017-07-26 RX ADMIN — MIDAZOLAM 2 MG: 1 INJECTION INTRAMUSCULAR; INTRAVENOUS at 10:53

## 2017-07-26 RX ADMIN — METOPROLOL SUCCINATE 25 MG: 25 TABLET, EXTENDED RELEASE ORAL at 08:38

## 2017-07-26 RX ADMIN — HEPARIN SODIUM: 1000 INJECTION, SOLUTION INTRAVENOUS; SUBCUTANEOUS at 10:52

## 2017-07-26 RX ADMIN — ADENOSINE 90 MG: 3 SOLUTION INTRAVENOUS at 10:52

## 2017-07-26 RX ADMIN — RANOLAZINE 500 MG: 500 TABLET, FILM COATED, EXTENDED RELEASE ORAL at 08:39

## 2017-07-26 ASSESSMENT — ENCOUNTER SYMPTOMS
BRUISES/BLEEDS EASILY: 0
CONSTITUTIONAL NEGATIVE: 1

## 2017-07-26 ASSESSMENT — PAIN SCALES - GENERAL: PAINLEVEL_OUTOF10: 10

## 2017-07-26 NOTE — PROGRESS NOTES
Assumed care at 1900.  Report received from Sue MCCLAIN. Assessment complete, plan of care discussed and understood.  Pt reports chest pain 4/10. FSBS 223. SR on heart monitor.  Pt educated on call light pt verbalizes understanding.  Pt denies any additional needs at this time.  Call light and phone within reach. Will continue to monitor.

## 2017-07-26 NOTE — PROGRESS NOTES
Report from Cath Lab SARAHI Harmon @ 1429.  Pt tolerated procedure well with no interventions required.  Pt back to room, A & O, VSS; Hemoband dry and intact.  Medicated and IVF running per MAR, meal tray provided.  Pt denies any other needs at this time - will continue to monitor.

## 2017-07-26 NOTE — RESPIRATORY CARE
COPD EDUCATION by COPD CLINICAL EDUCATOR  7/26/2017 at 6:40 AM by Anuja Wilkins     Patient reviewed by COPD education team. Patient does not qualify for COPD program.

## 2017-07-26 NOTE — PROGRESS NOTES
1150  Removed 3 mLs air from hemoband - no bleeding noted  1220  Removed 3 mLs air from hemoband - no bleeding noted  1239  Removed 3 mLs air from hemoband - no bleeding noted  1300  Hemoband removed - site clean and dry.

## 2017-07-26 NOTE — DISCHARGE SUMMARY
CHIEF COMPLAINT ON ADMISSION  Chief Complaint   Patient presents with   • Chest Pain     Per EMS, pt was experiencing chest pain throughout the evening. Pt states the pain continued to wake him up throughout the night. Pt describes pain as sharp that radiates from right chest to left chest.        CODE STATUS  Full Code    HPI & HOSPITAL COURSE  This is a 52 y.o. male admitted 7/25/2017 with chest pain. The patient has a known history of ischemic cardiomyopathy with coronary artery disease. 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 Patient on rivaroxaban and Xarelto for persistent atrial fib.    Cardiac cath 7/26: no intervention. See full report by Dr. Rutledge     Will send home on current medications and add Ranexa and NTG SL per Cardiology.    Therefore, he is discharged in good and stable condition with close outpatient follow-up.    SPECIFIC OUTPATIENT FOLLOW-UP  Cardiology within a week  PCP within a week    DISCHARGE PROBLEM LIST  Active Problems:    Type 2 diabetes mellitus without complication, without long-term current use of insulin ([facility]) POA: Yes    HTN (hypertension) POA: Yes    AF (atrial fibrillation) (CMS-HCC) POA: Unknown  Resolved Problems:    Chest pain POA: Unknown      FOLLOW UP  Future Appointments  Date Time Provider Department Center   9/11/2017 1:15 PM Juancho Jenkins M.D. [facility] None   9/12/2017 11:30 AM [facility] PHARMAC[facility]   10/6/2017 2:30 PM Joseph León M.D. [facility] None     Juancho Jenkins M.D.  [address] [address]  [phone]    Schedule an appointment as soon as possible for a visit in 1 week  For a follow up appointment. Thank you         Medication List      START taking these medications       Instructions    nitroGLYCERIN 0.3 MG SL tablet   Last time this was given:  0.4 mg on 7/25/2017  2:44 AM   Commonly known as:  NITROSTAT    Place 1  Tab under tongue 1 time daily as needed for Chest Pain.   Dose:  0.3 mg       ranolazine 500 MG Tb12   Last time this was given:  500 mg on 7/26/2017  8:39 AM   Commonly known as:  RANEXA    Take 1 Tab by mouth 2 Times a Day.   Dose:  500 mg         ASK your doctor about these medications       Instructions    atorvastatin 40 MG Tabs   Last time this was given:  40 mg on 7/25/2017  8:58 PM   Commonly known as:  LIPITOR    Take 40 mg by mouth every evening.   Dose:  40 mg       dicyclomine 20 MG Tabs   Commonly known as:  BENTYL    Take 1 Tab by mouth every 6 hours.   Dose:  20 mg       lisinopril 5 MG Tabs   Last time this was given:  5 mg on 7/26/2017  8:38 AM   Commonly known as:  PRINIVIL    Take 1 Tab by mouth every day.   Dose:  5 mg       loratadine 10 MG Tabs   Commonly known as:  CLARITIN    Take 10 mg by mouth every day.   Dose:  10 mg       metformin 1000 MG tablet   Commonly known as:  GLUCOPHAGE    Take 1 Tab by mouth 2 times a day, with meals.   Dose:  1000 mg       metoprolol SR 25 MG Tb24   Last time this was given:  25 mg on 7/26/2017  8:38 AM   Commonly known as:  TOPROL XL    Take 1 Tab by mouth every day.   Dose:  25 mg       omeprazole 20 MG delayed-release capsule   Commonly known as:  PRILOSEC    Take 1 Cap by mouth 2 times a day.   Dose:  20 mg       rivaroxaban 20 MG Tabs tablet   Commonly known as:  XARELTO    Take 1 Tab by mouth with dinner.   Dose:  20 mg       vitamin D3 (cholecalciferol) 1000 UNIT Tabs    TAKE 1 TABLET BY MOUTH ONCE DAILY.                 DIET  Orders Placed This Encounter   Procedures   • DIET ORDER     Standing Status: Standing      Number of Occurrences: 1      Standing Expiration Date:      Order Specific Question:  Diet:     Answer:  Cardiac [6]     Order Specific Question:  Diet:     Answer:  Diabetic [3]       ACTIVITY  As tolerated.    CONSULTATIONS  Cardiology - [facility]  Interventional Cardiology    PROCEDURES  Cardiac cath    LABORATORY  Lab Results   Component  Value Date/Time    SODIUM 125* 07/26/2017 06:10 AM    POTASSIUM 5.2 07/26/2017 06:10 AM    CHLORIDE 102 07/26/2017 06:10 AM    CO2 18* 07/26/2017 06:10 AM    GLUCOSE 210* 07/26/2017 06:10 AM    BUN 18 07/26/2017 06:10 AM    CREATININE 0.71 07/26/2017 06:10 AM        Lab Results   Component Value Date/Time    WBC 7.3 07/26/2017 06:10 AM    HEMOGLOBIN 13.3* 07/26/2017 06:10 AM    HEMATOCRIT 38.5* 07/26/2017 06:10 AM    PLATELET COUNT 274 07/26/2017 06:10 AM        Total time of the discharge process exceeds 32 minutes

## 2017-07-26 NOTE — PROGRESS NOTES
Cardiology Progress Note               Author: Silvino Bragg Date & Time created: 2017  12:47 PM     Interval History:  Patient underwent angiography this morning. No significant interval change in his coronary anatomy. Fractional flow reserve of the LAD and right coronary artery revealed adequate flow. He has an occluded OM 2 and a high-grade lesion at the origin of one limb of a dual PDA system.  No hematoma at arterial puncture site.    Review of Systems   Constitutional: Negative.    Cardiovascular: Negative for chest pain.   Endo/Heme/Allergies: Does not bruise/bleed easily.       Physical Exam   Constitutional: He is oriented to person, place, and time. No distress.   HENT:   Head: Atraumatic.   Eyes: No scleral icterus.   Cardiovascular: Normal rate and regular rhythm.    No murmur heard.  Pulmonary/Chest: Breath sounds normal. He has no wheezes.   Musculoskeletal: He exhibits no edema.   Neurological: He is alert and oriented to person, place, and time.   Skin: Skin is warm and dry.       Hemodynamics:  Temp (24hrs), Av.1 °C (96.9 °F), Min:35.9 °C (96.7 °F), Max:36.1 °C (97 °F)  Temperature: 36.1 °C (97 °F)  Pulse  Av.6  Min: 67  Max: 108  Blood Pressure: 106/68 mmHg     Respiratory:    Respiration: 18, Pulse Oximetry: 93 %     Work Of Breathing / Effort: Mild  RUL Breath Sounds: Clear, RML Breath Sounds: Clear, RLL Breath Sounds: Clear, JAISON Breath Sounds: Clear  Fluids:        GI/Nutrition:  Orders Placed This Encounter   Procedures   • Diet Order     Standing Status: Standing      Number of Occurrences: 1      Standing Expiration Date:      Order Specific Question:  Diet:     Answer:  Cardiac [6]     Lab Results:  Recent Labs      17   0117  17   0610   WBC  9.0  7.3   RBC  4.60*  4.40*   HEMOGLOBIN  14.0  13.3*   HEMATOCRIT  40.4*  38.5*   MCV  87.8  87.5   MCH  30.4  30.2   MCHC  34.7  34.5   RDW  40.1  41.3   PLATELETCT  337  274   MPV  10.0  10.1     Recent Labs       07/25/17   0117  07/26/17   0610   SODIUM  132*  125*   POTASSIUM  4.4  5.2   CHLORIDE  98  102   CO2  24  18*   GLUCOSE  279*  210*   BUN  16  18   CREATININE  1.00  0.71   CALCIUM  9.9  8.8     Recent Labs      07/25/17   0117  07/25/17   1520   APTT  37.5*   --    INR  1.66*  1.16*     Recent Labs      07/25/17   0117   BNPBTYPENAT  4     Recent Labs      07/25/17   0117 07/25/17   0513  07/25/17   1520   CKMB  1.2   --    --    TROPONINI  <0.01  <0.01  <0.01   BNPBTYPENAT  4   --    --              Medical Decision Making, by Problem:  Active Hospital Problems    Diagnosis   • Type 2 diabetes mellitus without complication, without long-term current use of insulin ([facility]) [E11.9]   • HTN (hypertension) [I10]   • AF (atrial fibrillation) (CMS-HCC) [I48.91]       Plan:  Patient will be discharged on medical therapy. He does not have nitroglycerin at home. Proper instructions on the use of nitro were discussed. Also would add Ranexa to his medical regimen. He will be followed in the office. If he fails medical therapy then consideration will be be given to cardiac surgery consultation. A CABG would be for palliative pain relief rather than for longevity as the LAD does not have any flow-limiting lesions.    Core Measures

## 2017-07-26 NOTE — PROGRESS NOTES
Pt resting with eyes closed rise and fall of chest observed. No signs of pain or distress noted. SR on heart monitor.

## 2017-07-26 NOTE — DISCHARGE INSTRUCTIONS
Nonspecific Chest Pain   Chest pain can be caused by many different conditions. There is always a chance that your pain could be related to something serious, such as a heart attack or a blood clot in your lungs. Chest pain can also be caused by conditions that are not life-threatening. If you have chest pain, it is very important to follow up with your health care provider.  CAUSES   Chest pain can be caused by:  · Heartburn.  · Pneumonia or bronchitis.  · Anxiety or stress.  · Inflammation around your heart (pericarditis) or lung (pleuritis or pleurisy).  · A blood clot in your lung.  · A collapsed lung (pneumothorax). It can develop suddenly on its own (spontaneous pneumothorax) or from trauma to the chest.  · Shingles infection (varicella-zoster virus).  · Heart attack.  · Damage to the bones, muscles, and cartilage that make up your chest wall. This can include:  ¨ Bruised bones due to injury.  ¨ Strained muscles or cartilage due to frequent or repeated coughing or overwork.  ¨ Fracture to one or more ribs.  ¨ Sore cartilage due to inflammation (costochondritis).  RISK FACTORS   Risk factors for chest pain may include:  1. Activities that increase your risk for trauma or injury to your chest.  2. Respiratory infections or conditions that cause frequent coughing.  3. Medical conditions or overeating that can cause heartburn.  4. Heart disease or family history of heart disease.  5. Conditions or health behaviors that increase your risk of developing a blood clot.  6. Having had chicken pox (varicella zoster).  SIGNS AND SYMPTOMS  Chest pain can feel like:  1. Burning or tingling on the surface of your chest or deep in your chest.  2. Crushing, pressure, aching, or squeezing pain.  3. Dull or sharp pain that is worse when you move, cough, or take a deep breath.  4. Pain that is also felt in your back, neck, shoulder, or arm, or pain that spreads to any of these areas.  Your chest pain may come and go, or it may  stay constant.  DIAGNOSIS  Lab tests or other studies may be needed to find the cause of your pain. Your health care provider may have you take a test called an ambulatory ECG (electrocardiogram). An ECG records your heartbeat patterns at the time the test is performed. You may also have other tests, such as:  1. Transthoracic echocardiogram (TTE). During echocardiography, sound waves are used to create a picture of all of the heart structures and to look at how blood flows through your heart.  2. Transesophageal echocardiogram (DELMY). This is a more advanced imaging test that obtains images from inside your body. It allows your health care provider to see your heart in finer detail.  3. Cardiac monitoring. This allows your health care provider to monitor your heart rate and rhythm in real time.  4. Holter monitor. This is a portable device that records your heartbeat and can help to diagnose abnormal heartbeats. It allows your health care provider to track your heart activity for several days, if needed.  5. Stress tests. These can be done through exercise or by taking medicine that makes your heart beat more quickly.  6. Blood tests.  7. Imaging tests.  TREATMENT   Your treatment depends on what is causing your chest pain. Treatment may include:  1. Medicines. These may include:  1. Acid blockers for heartburn.  2. Anti-inflammatory medicine.  3. Pain medicine for inflammatory conditions.  4. Antibiotic medicine, if an infection is present.  5. Medicines to dissolve blood clots.  6. Medicines to treat coronary artery disease.  2. Supportive care for conditions that do not require medicines. This may include:  1. Resting.  2. Applying heat or cold packs to injured areas.  3. Limiting activities until pain decreases.  HOME CARE INSTRUCTIONS  · If you were prescribed an antibiotic medicine, finish it all even if you start to feel better.  · Avoid any activities that bring on chest pain.  · Do not use any tobacco  products, including cigarettes, chewing tobacco, or electronic cigarettes. If you need help quitting, ask your health care provider.  · Do not drink alcohol.  · Take medicines only as directed by your health care provider.  · Keep all follow-up visits as directed by your health care provider. This is important. This includes any further testing if your chest pain does not go away.  · If heartburn is the cause for your chest pain, you may be told to keep your head raised (elevated) while sleeping. This reduces the chance that acid will go from your stomach into your esophagus.  · Make lifestyle changes as directed by your health care provider. These may include:  ¨ Getting regular exercise. Ask your health care provider to suggest some activities that are safe for you.  ¨ Eating a heart-healthy diet. A registered dietitian can help you to learn healthy eating options.  ¨ Maintaining a healthy weight.  ¨ Managing diabetes, if necessary.  ¨ Reducing stress.  SEEK MEDICAL CARE IF:  · Your chest pain does not go away after treatment.  · You have a rash with blisters on your chest.  · You have a fever.  SEEK IMMEDIATE MEDICAL CARE IF:   · Your chest pain is worse.  · You have an increasing cough, or you cough up blood.  · You have severe abdominal pain.  · You have severe weakness.  · You faint.  · You have chills.  · You have sudden, unexplained chest discomfort.  · You have sudden, unexplained discomfort in your arms, back, neck, or jaw.  · You have shortness of breath at any time.  · You suddenly start to sweat, or your skin gets clammy.  · You feel nauseous or you vomit.  · You suddenly feel light-headed or dizzy.  · Your heart begins to beat quickly, or it feels like it is skipping beats.  These symptoms may represent a serious problem that is an emergency. Do not wait to see if the symptoms will go away. Get medical help right away. Call your local emergency services (911 in the [place] Do not drive yourself to the  [facility].     This information is not intended to replace advice given to you by your health care provider. Make sure you discuss any questions you have with your health care provider.     Document Released: 09/27/2006 Document Revised: 01/08/2016 Document Reviewed: 07/24/2015  [employer] Interactive Patient Education ©2016 [employer]  Discharge Instructions    Discharged to home by taxi with self. Discharged via walking, hospital escort: Yes.  Special equipment needed: Not Applicable    Be sure to schedule a follow-up appointment with your primary care doctor or any specialists as instructed.     Discharge Plan:   Diet Plan: Discussed  Activity Level: Discussed  Confirmed Follow up Appointment: Patient to Call and Schedule Appointment  Confirmed Symptoms Management: Discussed  Medication Reconciliation Updated: Yes  Influenza Vaccine Indication: Indicated: Not available from distributor/    I understand that a diet low in cholesterol, fat, and sodium is recommended for good health. Unless I have been given specific instructions below for another diet, I accept this instruction as my diet prescription.   Other diet: Regular    Special Instructions: None    · Is patient discharged on Warfarin / Coumadin?   No     · Is patient Post Blood Transfusion?  No    Depression / Suicide Risk    As you are discharged from this [facility], it is important to learn how to keep safe from harming yourself.    Recognize the warning signs:  · Abrupt changes in personality, positive or negative- including increase in energy   · Giving away possessions  · Change in eating patterns- significant weight changes-  positive or negative  · Change in sleeping patterns- unable to sleep or sleeping all the time   · Unwillingness or inability to communicate  · Depression  · Unusual sadness, discouragement and loneliness  · Talk of wanting to die  · Neglect of personal appearance   · Rebelliousness- reckless  behavior  · Withdrawal from people/activities they love  · Confusion- inability to concentrate     If you or a loved one observes any of these behaviors or has concerns about self-harm, here's what you can do:  · Talk about it- your feelings and reasons for harming yourself  · Remove any means that you might use to hurt yourself (examples: pills, rope, extension cords, firearm)  · Get professional help from the community (Mental Health, Substance Abuse, psychological counseling)  · Do not be alone:Call your Safe Contact- someone whom you trust who will be there for you.  · Call your local CRISIS HOTLINE 743-8634 or 738-210-3652  · Call your local Children's Mobile Crisis Response Team [place] (908) 879-2425 or www.[employer]  · Call the toll free National Suicide Prevention Hotlines   · National Suicide Prevention Lifeline 512-139-CPDO (1292)  · National Hope Line Network 800-SUICIDE (574-1311)    F/u with Cardiology within a week   F/u with PCP within a week   Return to ED if emergency

## 2017-07-26 NOTE — CATH LAB
Cardiac catheterization report      Procedure date July 26, 2017    PreOp Diagnosis: Known CAD and coronary aneurysm with CCS III-IV angina    PostOp Diagnosis:   1. Inferior wall hypokinesis with moderately reduced left ventricular systolic function. Ejection fraction 35%  2. Coronary artery disease;          - Aneurysmal change proximal left anterior descending artery (LAD) with diffuse moderate disease up to     70% distal LAD. 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 Selective coronary angiography  3. Fractional flow reserve measurement of the left anterior descending artery  4. Fractional flow reserve measurement of the right coronary artery    Surgeon(s):  Cheryl Rutledge M.D.    Complications: none    Estimated Blood Loss: 10 cc's    Description of the procedure;    After inform consent was obtained, the patient was brought to cardiac catheterization laboratory in fasting state. Butch test was carried out on the right hand and was found to be negative.  Right wrist and right groin were then prepped and drapped in sterile fashion.  Versed and fentanyl were used for conscious sedation.  Lidocaine 2% was used to anesthetize the area.  A 6 Kazakh Terumo sheath was then placed in the right radial artery using Seldinger technique.  A 2.5 mg of verapamil, 100 microgram of nitroglycerine and 4000 units of heparin were administered into the radial sheath.    Selective angiography of the right and left coronary artery were then performed in multiple views using 5 Kazakh TIG catheter. The catheter was then exchanged to a pigtail  catheter.  Left ventriculography was performed using 30 cc of contrast injected over 3 seconds using pigtail catheter.  Left heart pullback was then performed.    We then next proceeded with FFR of the LAD.  A 6 Urdu EBU 3.5 guide was used for the LAD. FFR of the distal LAD was around 0.78 but the FFR for the mid and proximal lesions were not hemodynamically significant.  The catheter was exchanged for AR1 guide. FFR of RCA was then performed.  The FFR wire was then removed.  The catheter was subsequently removed.  Radial sheath was then removed. Hemostasis was obtained using TR wrist band.  The patient tolerated procedure well and left cardiac catheterization laboratory in stable condition.    Findings:     There is no gradient across aortic valve.  Left ventricular end-diastolic pressure was normal.    Left ventriculography showed inferior wall hypokinesis.  Overall LV systolic function is moderately reduced.  Ejection fraction is calculated to be 35 %.      Coronary artery disease and coronary aeruysm    This is right dominant system.    Left main is large and without flow limiting disease. It has mild aneurysmal change.  It bifurcated into left anterior descending and left circumflex artery.    Left anterior descending artery (LAD) is large caliber vessel and extends to the apex.  It gives rises to several small to medium sized diagonal branches.  Ectatic/aneruysmal change was noted in the proximal LAD.  Moderate diffuse disease was seen in the mid to distal LAD up to 70% stenosis in the distal portion.  The antegrade flow is normal.    FFR of the distal LAD was 0.78 but above 0.8 for the proximal and mid lesions.    Left circumflex artery is large caliber. It gives rise to large first and medium sized second obtuse marginal (OM) branches and small AV groove branch.   There was chronic total occlusion at the origin of the second OM with bridging collateral and LISA II flow.  There is no clear visible  stump.    Right coronary is large caliber. It gives rise to several medium sized acute marginal branch, dual posterior descending artery and posterolateral branch.  There was 95% stenosis at the ostium of one of the PDA.  Aneurysmal change was also seen in the proximal and mid RCA.  The antegrade flow is normal.    FFR of the main RCA was 0.86    Recommendations; Maximize medical therapy and risk factor modification.      Cheryl Rutledge M.D.  7/26/2017 10:52 AM

## 2017-07-26 NOTE — PROGRESS NOTES
Assumed care of Pt at 0700 after report from SARAHI Tamayo, assessment complete.  Pt A & O X 4, watching TV; VSS, nsr; Pt c/o 10/10 intermittent sharp chest pain - administered 5 mg oxycodone PO per MAR.  Pt updated on and understands POC - to remain NPO for cath lab.  Pt denies other needs at this time; bed in low position, call light within reach - will continue to monitor.

## 2017-07-27 ENCOUNTER — PATIENT OUTREACH (OUTPATIENT)
Dept: HEALTH INFORMATION MANAGEMENT | Facility: OTHER | Age: 52
End: 2017-07-27

## 2017-07-27 LAB — EKG IMPRESSION: NORMAL

## 2017-07-28 ENCOUNTER — APPOINTMENT (OUTPATIENT)
Dept: RADIOLOGY | Facility: MEDICAL CENTER | Age: 52
End: 2017-07-28
Attending: EMERGENCY MEDICINE
Payer: MEDICARE

## 2017-07-28 ENCOUNTER — HOSPITAL ENCOUNTER (EMERGENCY)
Facility: MEDICAL CENTER | Age: 52
End: 2017-07-28
Attending: EMERGENCY MEDICINE
Payer: MEDICARE

## 2017-07-28 ENCOUNTER — PATIENT OUTREACH (OUTPATIENT)
Dept: HEALTH INFORMATION MANAGEMENT | Facility: OTHER | Age: 52
End: 2017-07-28

## 2017-07-28 VITALS
WEIGHT: 181 LBS | BODY MASS INDEX: 25.34 KG/M2 | RESPIRATION RATE: 16 BRPM | OXYGEN SATURATION: 95 % | SYSTOLIC BLOOD PRESSURE: 119 MMHG | HEART RATE: 113 BPM | HEIGHT: 71 IN | TEMPERATURE: 98.6 F | DIASTOLIC BLOOD PRESSURE: 77 MMHG

## 2017-07-28 DIAGNOSIS — R73.9 HYPERGLYCEMIA: ICD-10-CM

## 2017-07-28 DIAGNOSIS — R07.89 CHEST WALL PAIN: ICD-10-CM

## 2017-07-28 LAB
ALBUMIN SERPL BCP-MCNC: 4.2 G/DL (ref 3.2–4.9)
ALBUMIN/GLOB SERPL: 1.5 G/DL
ALP SERPL-CCNC: 67 U/L (ref 30–99)
ALT SERPL-CCNC: 35 U/L (ref 2–50)
AMPHET UR QL SCN: NEGATIVE
ANION GAP SERPL CALC-SCNC: 13 MMOL/L (ref 0–11.9)
APTT PPP: 34.1 SEC (ref 24.7–36)
AST SERPL-CCNC: 20 U/L (ref 12–45)
BARBITURATES UR QL SCN: NEGATIVE
BASOPHILS # BLD AUTO: 0.6 % (ref 0–1.8)
BASOPHILS # BLD: 0.05 K/UL (ref 0–0.12)
BENZODIAZ UR QL SCN: NEGATIVE
BILIRUB SERPL-MCNC: 0.6 MG/DL (ref 0.1–1.5)
BNP SERPL-MCNC: 4 PG/ML (ref 0–100)
BUN SERPL-MCNC: 20 MG/DL (ref 8–22)
BZE UR QL SCN: NEGATIVE
CALCIUM SERPL-MCNC: 9.8 MG/DL (ref 8.5–10.5)
CANNABINOIDS UR QL SCN: NEGATIVE
CHLORIDE SERPL-SCNC: 100 MMOL/L (ref 96–112)
CO2 SERPL-SCNC: 19 MMOL/L (ref 20–33)
CREAT SERPL-MCNC: 1.35 MG/DL (ref 0.5–1.4)
EKG IMPRESSION: NORMAL
EOSINOPHIL # BLD AUTO: 0.14 K/UL (ref 0–0.51)
EOSINOPHIL NFR BLD: 1.7 % (ref 0–6.9)
ERYTHROCYTE [DISTWIDTH] IN BLOOD BY AUTOMATED COUNT: 41 FL (ref 35.9–50)
GFR SERPL CREATININE-BSD FRML MDRD: 55 ML/MIN/1.73 M 2
GLOBULIN SER CALC-MCNC: 2.8 G/DL (ref 1.9–3.5)
GLUCOSE SERPL-MCNC: 384 MG/DL (ref 65–99)
HCT VFR BLD AUTO: 36.2 % (ref 42–52)
HGB BLD-MCNC: 12.5 G/DL (ref 14–18)
IMM GRANULOCYTES # BLD AUTO: 0.06 K/UL (ref 0–0.11)
IMM GRANULOCYTES NFR BLD AUTO: 0.7 % (ref 0–0.9)
INR PPP: 1.69 (ref 0.87–1.13)
LIPASE SERPL-CCNC: 42 U/L (ref 11–82)
LYMPHOCYTES # BLD AUTO: 1.48 K/UL (ref 1–4.8)
LYMPHOCYTES NFR BLD: 17.5 % (ref 22–41)
MCH RBC QN AUTO: 30 PG (ref 27–33)
MCHC RBC AUTO-ENTMCNC: 34.5 G/DL (ref 33.7–35.3)
MCV RBC AUTO: 86.8 FL (ref 81.4–97.8)
METHADONE UR QL SCN: NEGATIVE
MONOCYTES # BLD AUTO: 0.62 K/UL (ref 0–0.85)
MONOCYTES NFR BLD AUTO: 7.3 % (ref 0–13.4)
NEUTROPHILS # BLD AUTO: 6.11 K/UL (ref 1.82–7.42)
NEUTROPHILS NFR BLD: 72.2 % (ref 44–72)
NRBC # BLD AUTO: 0 K/UL
NRBC BLD AUTO-RTO: 0 /100 WBC
OPIATES UR QL SCN: NEGATIVE
OXYCODONE UR QL SCN: NEGATIVE
PCP UR QL SCN: NEGATIVE
PLATELET # BLD AUTO: 370 K/UL (ref 164–446)
PMV BLD AUTO: 10 FL (ref 9–12.9)
POTASSIUM SERPL-SCNC: 4.1 MMOL/L (ref 3.6–5.5)
PROPOXYPH UR QL SCN: NEGATIVE
PROT SERPL-MCNC: 7 G/DL (ref 6–8.2)
PROTHROMBIN TIME: 20.4 SEC (ref 12–14.6)
RBC # BLD AUTO: 4.17 M/UL (ref 4.7–6.1)
SODIUM SERPL-SCNC: 132 MMOL/L (ref 135–145)
TROPONIN I SERPL-MCNC: 0.01 NG/ML (ref 0–0.04)
WBC # BLD AUTO: 8.5 K/UL (ref 4.8–10.8)

## 2017-07-28 PROCEDURE — 80307 DRUG TEST PRSMV CHEM ANLYZR: CPT

## 2017-07-28 PROCEDURE — 80053 COMPREHEN METABOLIC PANEL: CPT

## 2017-07-28 PROCEDURE — 700102 HCHG RX REV CODE 250 W/ 637 OVERRIDE(OP): Performed by: EMERGENCY MEDICINE

## 2017-07-28 PROCEDURE — 84484 ASSAY OF TROPONIN QUANT: CPT

## 2017-07-28 PROCEDURE — 71010 DX-CHEST-LIMITED (1 VIEW): CPT

## 2017-07-28 PROCEDURE — 85610 PROTHROMBIN TIME: CPT

## 2017-07-28 PROCEDURE — 83880 ASSAY OF NATRIURETIC PEPTIDE: CPT

## 2017-07-28 PROCEDURE — 85025 COMPLETE CBC W/AUTO DIFF WBC: CPT

## 2017-07-28 PROCEDURE — 96374 THER/PROPH/DIAG INJ IV PUSH: CPT | Mod: XU

## 2017-07-28 PROCEDURE — 83690 ASSAY OF LIPASE: CPT

## 2017-07-28 PROCEDURE — 85730 THROMBOPLASTIN TIME PARTIAL: CPT

## 2017-07-28 PROCEDURE — 71275 CT ANGIOGRAPHY CHEST: CPT

## 2017-07-28 PROCEDURE — 700117 HCHG RX CONTRAST REV CODE 255: Performed by: EMERGENCY MEDICINE

## 2017-07-28 PROCEDURE — 99285 EMERGENCY DEPT VISIT HI MDM: CPT

## 2017-07-28 PROCEDURE — 93005 ELECTROCARDIOGRAM TRACING: CPT

## 2017-07-28 PROCEDURE — 36415 COLL VENOUS BLD VENIPUNCTURE: CPT

## 2017-07-28 RX ADMIN — INSULIN HUMAN 5 UNITS: 100 INJECTION, SOLUTION PARENTERAL at 22:16

## 2017-07-28 RX ADMIN — IOHEXOL 100 ML: 350 INJECTION, SOLUTION INTRAVENOUS at 21:30

## 2017-07-28 ASSESSMENT — PAIN SCALES - GENERAL: PAINLEVEL_OUTOF10: 10

## 2017-07-28 NOTE — PROGRESS NOTES
07/28/2017 1258 - Discharge Outreach attempt - Not accepting calls on cell. LM on Home number.  07/28/2017 1518 - Discharge Outreach attempt - Not accepting calls.

## 2017-07-28 NOTE — ED AVS SNAPSHOT
Home Care Instructions                                                                                                                Ryan Trevizo   MRN: [id number]    Department:  [facility], Emergency Dept   Date of Visit:  7/28/2017            [facility], Emergency Dept    [address]    [place] [address]    Phone:  [phone]      You were seen by     Vikas Johns M.D.      Your Diagnosis Was     Chest wall pain     R07.89       These are the medications you received during your hospitalization from 07/28/2017 1918 to 07/28/2017 2225     Date/Time Order Dose Route Action    07/28/2017 2130 iohexol (OMNIPAQUE) 350 mg/mL 100 mL Intravenous Given    07/28/2017 2216 insulin regular (HUMULIN R) injection 5 Units 5 Units Intravenous Given      Medication Information     Review all of your home medications and newly ordered medications with your primary doctor and/or pharmacist as soon as possible. Follow medication instructions as directed by your doctor and/or pharmacist.     Please keep your complete medication list with you and share with your physician. Update the information when medications are discontinued, doses are changed, or new medications (including over-the-counter products) are added; and carry medication information at all times in the event of emergency situations.               Medication List      ASK your doctor about these medications        Instructions    Morning Afternoon Evening Bedtime    atorvastatin 40 MG Tabs   Commonly known as:  LIPITOR        Take 40 mg by mouth every evening.   Dose:  40 mg                        dicyclomine 20 MG Tabs   Commonly known as:  BENTYL        Take 1 Tab by mouth every 6 hours.   Dose:  20 mg                        lisinopril 5 MG Tabs   Commonly known as:  PRINIVIL        Take 1 Tab by mouth every day.   Dose:  5 mg                        loratadine 10 MG Tabs   Commonly known as:  CLARITIN        Take  10 mg by mouth every day.   Dose:  10 mg                        metformin 1000 MG tablet   Commonly known as:  GLUCOPHAGE        Take 1 Tab by mouth 2 times a day, with meals.   Dose:  1000 mg                        metoprolol SR 25 MG Tb24   Commonly known as:  TOPROL XL        Take 1 Tab by mouth every day.   Dose:  25 mg                        nitroGLYCERIN 0.3 MG SL tablet   Commonly known as:  NITROSTAT        Place 1 Tab under tongue 1 time daily as needed for Chest Pain.   Dose:  0.3 mg                        omeprazole 20 MG delayed-release capsule   Commonly known as:  PRILOSEC        Take 1 Cap by mouth 2 times a day.   Dose:  20 mg                        ranolazine 500 MG Tb12   Commonly known as:  RANEXA        Take 1 Tab by mouth 2 Times a Day.   Dose:  500 mg                        rivaroxaban 20 MG Tabs tablet   Commonly known as:  XARELTO        Take 1 Tab by mouth with dinner.   Dose:  20 mg                        vitamin D3 (cholecalciferol) 1000 UNIT Tabs        TAKE 1 TABLET BY MOUTH ONCE DAILY.                                Procedures and tests performed during your visit     APTT    Btype Natriuretic Peptide    CBC with Differential    CONSENT FOR CONTRAST INJECTION    CT-CTA CHEST PULMONARY ARTERY W/ RECONS    Cardiac Monitoring    Complete Metabolic Panel (CMP)    DX-CHEST-LIMITED (1 VIEW)    EKG (ER)    ESTIMATED GFR    IV Saline Lock    Lipase    Maintain O2 sats greater than 94%    Oxygen Therapy per Protocol    Prothrombin Time    Saline Lock    Troponin    URINE DRUG SCREEN        Discharge Instructions       Chest Wall Pain  Chest wall pain is pain in or around the bones and muscles of your chest. It may take up to 6 weeks to get better. It may take longer if you must stay physically active in your work and activities.   CAUSES   Chest wall pain may happen on its own. However, it may be caused by:  · A viral illness like the  flu.  · Injury.  · Coughing.  · Exercise.  · Arthritis.  · Fibromyalgia.  · Shingles.  HOME CARE INSTRUCTIONS   · Avoid overtiring physical activity. Try not to strain or perform activities that cause pain. This includes any activities using your chest or your abdominal and side muscles, especially if heavy weights are used.  · Put ice on the sore area.  · Put ice in a plastic bag.  · Place a towel between your skin and the bag.  · Leave the ice on for 15-20 minutes per hour while awake for the first 2 days.  · Only take over-the-counter or prescription medicines for pain, discomfort, or fever as directed by your caregiver.  SEEK IMMEDIATE MEDICAL CARE IF:   · Your pain increases, or you are very uncomfortable.  · You have a fever.  · Your chest pain becomes worse.  · You have new, unexplained symptoms.  · You have nausea or vomiting.  · You feel sweaty or lightheaded.  · You have a cough with phlegm (sputum), or you cough up blood.  MAKE SURE YOU:   · Understand these instructions.  · Will watch your condition.  · Will get help right away if you are not doing well or get worse.     This information is not intended to replace advice given to you by your health care provider. Make sure you discuss any questions you have with your health care provider.     Document Released: 12/18/2006 Document Revised: 03/11/2013 Document Reviewed: 03/14/2016  [employer] Interactive Patient Education ©2016 [employer].    Chest Pain, Nonspecific  It is often hard to give a specific diagnosis for the cause of chest pain. There is always a chance that your pain could be related to something serious, like a heart attack or a blood clot in the lungs. You need to follow up with your caregiver for further evaluation. More lab tests or other studies such as X-rays, electrocardiography, stress testing, or cardiac imaging may be needed to find the cause of your pain.  Most of the time, nonspecific chest pain improves within 2 to 3 days with  rest and mild pain medicine. For the next few days, avoid physical exertion or activities that bring on pain. Do not smoke. Avoid drinking alcohol. Call your caregiver for routine follow-up as advised.   SEEK IMMEDIATE MEDICAL CARE IF:  · You develop increased chest pain or pain that radiates to the arm, neck, jaw, back, or abdomen.   · You develop shortness of breath, increased coughing, or you start coughing up blood.   · You have severe back or abdominal pain, nausea, or vomiting.   · You develop severe weakness, fainting, fever, or chills.   Document Released: 12/18/2006 Document Revised: 03/11/2013 Document Reviewed: 06/06/2008  Lumavita® Patient Information ©2013 [employer].  Hyperglycemia  High blood sugar (hyperglycemia) means that the level of sugar in your blood is higher than it should be. Signs of high blood sugar include:  · Feeling thirsty.  · Frequent peeing (urinating).  · Feeling tired or sleepy.  · Dry mouth.  · Vision changes.  · Feeling weak.  · Feeling hungry but losing weight.  · Numbness and tingling in your hands or feet.  · Headache.  When you ignore these signs, your blood sugar may keep going up. These problems may get worse, and other problems may begin.  HOME CARE  · Check your blood sugars as told by your doctor. Write down the numbers with the date and time.  · Take the right amount of insulin or diabetes pills at the right time. Write down the dose with date and time.  · Refill your insulin or diabetes pills before running out.  · Watch what you eat. Follow your meal plan.  · Drink liquids without sugar, such as water. Check with your doctor if you have kidney or heart disease.  · Follow your doctor's orders for exercise. Exercise at the same time of day.  · Keep your doctor's appointments.  GET HELP RIGHT AWAY IF:   · You have trouble thinking or are confused.  · You have fast breathing with fruity smelling breath.  · You pass out (faint).  · You have 2 to 3 days of high blood  sugars and you do not know why.  · You have chest pain.  · You are feeling sick to your stomach (nauseous) or throwing up (vomiting).  · You have sudden vision changes.  MAKE SURE YOU:   · Understand these instructions.  · Will watch your condition.  · Will get help right away if you are not doing well or get worse.     This information is not intended to replace advice given to you by your health care provider. Make sure you discuss any questions you have with your health care provider.     Document Released: 10/15/2010 Document Revised: 01/08/2016 Document Reviewed: 10/15/2010  [employer] Interactive Patient Education ©2016 [employer]            Patient Information     Patient Information    Following emergency treatment: all patient requiring follow-up care must return either to a private physician or a clinic if your condition worsens before you are able to obtain further medical attention, please return to the emergency room.     Billing Information    At [facility], we work to make the billing process streamlined for our patients.  Our Representatives are here to answer any questions you may have regarding your hospital bill.  If you have insurance coverage and have supplied your insurance information to us, we will submit a claim to your insurer on your behalf.  Should you have any questions regarding your bill, we can be reached online or by phone as follows:  Online: You are able pay your bills online or live chat with our representatives about any billing questions you may have. We are here to help Monday - Friday from 8:00am to 7:30pm and 9:00am - 12:00pm on Saturdays.  Please visit [email or web address]  for more information.   Phone:  [phone] or [phone]    Please note that your emergency physician, surgeon, pathologist, radiologist, anesthesiologist, and other specialists are not employed by [facility] and will therefore bill separately for their services.   Please contact them directly for any questions concerning their bills at the numbers below:     Emergency Physician Services:  [phone]  [facility]:  [phone]  [facility]:  [phone]  [facility]:  [phone]    1. Your final bill may vary from the amount quoted upon discharge if all procedures are not complete at that time, or if your doctor has additional procedures of which we are not aware. You will receive an additional bill if you return to the Emergency Department at [facility] for suture removal regardless of the facility of which the sutures were placed.     2. Please arrange for settlement of this account at the emergency registration.    3. All self-pay accounts are due in full at the time of treatment.  If you are unable to meet this obligation then payment is expected within 4-5 days.     4. If you have had radiology studies (CT, X-ray, Ultrasound, MRI), you have received a preliminary result during your emergency department visit. Please contact the radiology department [phone] to receive a copy of your final result. Please discuss the Final result with your primary physician or with the follow up physician provided.     Crisis Hotline:  [place] Crisis Hotline:  7-225-RSDPSLZ or 1-973.922.1908  [place] Crisis Hotline:    1-938.531.5892 or 140-776-1397         ED Discharge Follow Up Questions    1. In order to provide you with very good care, we would like to follow up with a phone call in the next few days.  May we have your permission to contact you?     YES /  NO    2. What is the best phone number to call you? (       )_____-__________    3. What is the best time to call you?      Morning  /  Afternoon  /  Evening                   Patient Signature:  ____________________________________________________________    Date:  ____________________________________________________________      Your appointments     Jul 31, 2017  1:00  PM   Established Patient with MAEVE Quarles   [facility] ([facility])    [address]  [place] [address] [phone]           You will be receiving a confirmation call a few days before your appointment from our automated call confirmation system.            Sep 11, 2017  1:15 PM   Established Patient with Juancho Jenkins M.D.   [facility] (--)    [address] [place] [address]  [address]  [facility] [id number] [phone]           You will be receiving a confirmation call a few days before your appointment from our automated call confirmation system.            Sep 12, 2017 11:30 AM   Anti-Coag Routine with VISTA PHARMACIST   [facility]    [address] [address] [phone]            Oct 06, 2017  2:30 PM   FOLLOW UP with Joseph León M.D.   [facility] (--)    [address] [place] [address]  [place] [id number] [phone]

## 2017-07-28 NOTE — ED AVS SNAPSHOT
UpSpring Access Code: [email or web address]  Expires: 8/3/2017  9:51 PM    Your email address is not on file at [facility].  Email Addresses are required for you to sign up for UpSpring, please contact [phone] to verify your personal information and to provide your email address prior to attempting to register for UpSpring.    Ryan Palomino [place]  [address]  [place] [address]    UpSpring  A secure, online tool to manage your health information     [facility]’s UpSpring® is a secure, online tool that connects you to your personalized health information from the privacy of your home -- day or night - making it very easy for you to manage your healthcare. Once the activation process is completed, you can even access your medical information using the UpSpring yoli, which is available for free in the Apple Yoli store or Google Play store.     To learn more about UpSpring, visit [email or web address]    There are two levels of access available (as shown below):   My Chart Features  [facility] Primary Care Doctor [facility]  Specialists [facility]  Urgent  Care Non-[facility] Primary Care Doctor   Email your healthcare team securely and privately 24/7 X X X    Manage appointments: schedule your next appointment; view details of past/upcoming appointments X      Request prescription refills. X      View recent personal medical records, including lab and immunizations X X X X   View health record, including health history, allergies, medications X X X X   Read reports about your outpatient visits, procedures, consult and ER notes X X X X   See your discharge summary, which is a recap of your hospital and/or ER visit that includes your diagnosis, lab results, and care plan X X  X     How to register for UpSpring:  Once your e-mail address has been verified, follow the following steps to sign up for UpSpring.     1. Go to  [email or web address]  2. Click on the Sign Up Now box, which takes you to the New Member Sign Up page.  You will need to provide the following information:  a. Enter your GuestShots Access Code exactly as it appears at the top of this page. (You will not need to use this code after you’ve completed the sign-up process. If you do not sign up before the expiration date, you must request a new code.)   b. Enter your date of birth.   c. Enter your home email address.   d. Click Submit, and follow the next screen’s instructions.  3. Create a FDTEKt ID. This will be your GuestShots login ID and cannot be changed, so think of one that is secure and easy to remember.  4. Create a GuestShots password. You can change your password at any time.  5. Enter your Password Reset Question and Answer. This can be used at a later time if you forget your password.   6. Enter your e-mail address. This allows you to receive e-mail notifications when new information is available in GuestShots.  7. Click Sign Up. You can now view your health information.    For assistance activating your GuestShots account, call [phone]

## 2017-07-28 NOTE — ED AVS SNAPSHOT
7/28/2017    Ryan Trevizo  [address]  [facility] [address]    Dear Ryan:    [facility] wants to ensure your discharge home is safe and you or your loved ones have had all of your questions answered regarding your care after you leave the hospital.    Below is a list of resources and contact information should you have any questions regarding your hospital stay, follow-up instructions, or active medical symptoms.    Questions or Concerns Regarding… Contact   Medical Questions Related to Your Discharge  (7 days a week, 8am-5pm) Contact a Nurse Care Coordinator   [phone]   Medical Questions Not Related to Your Discharge  (24 hours a day / 7 days a week)  Contact the Nurse Health Line   [phone]    Medications or Discharge Instructions Refer to your discharge packet   or contact your [facility] Primary Care Provider   [phone]   Follow-up Appointment(s) Schedule your appointment via [employer]   or contact Scheduling [phone]   Billing Review your statement via [employer]  or contact Billing [phone]   Medical Records Review your records via [employer]   or contact Medical Records [phone]     You may receive a telephone call within two days of discharge. This call is to make certain you understand your discharge instructions and have the opportunity to have any questions answered. You can also easily access your medical information, test results and upcoming appointments via the [employer] free online health management tool. You can learn more and sign up at Readyforce/Objective Logistics. For assistance setting up your [employer] account, please call [phone].    Once again, we want to ensure your discharge home is safe and that you have a clear understanding of any next steps in your care. If you have any questions or concerns, please do not hesitate to contact us, we are here for you. Thank you for choosing [facility] for your healthcare needs.    Sincerely,    Your [facility] Healthcare Team

## 2017-07-29 NOTE — ED NOTES
Pt bib ambulance c/o of 10/10 midline chest pain that started approx. 45 minutes ago with SOB. EMS administered X2 sprays of Nitro and X4 ASA, pt states he felt a little bit of relief with the Nitro.

## 2017-07-29 NOTE — DISCHARGE INSTRUCTIONS
Chest Wall Pain  Chest wall pain is pain in or around the bones and muscles of your chest. It may take up to 6 weeks to get better. It may take longer if you must stay physically active in your work and activities.   CAUSES   Chest wall pain may happen on its own. However, it may be caused by:  · A viral illness like the flu.  · Injury.  · Coughing.  · Exercise.  · Arthritis.  · Fibromyalgia.  · Shingles.  HOME CARE INSTRUCTIONS   · Avoid overtiring physical activity. Try not to strain or perform activities that cause pain. This includes any activities using your chest or your abdominal and side muscles, especially if heavy weights are used.  · Put ice on the sore area.  · Put ice in a plastic bag.  · Place a towel between your skin and the bag.  · Leave the ice on for 15-20 minutes per hour while awake for the first 2 days.  · Only take over-the-counter or prescription medicines for pain, discomfort, or fever as directed by your caregiver.  SEEK IMMEDIATE MEDICAL CARE IF:   · Your pain increases, or you are very uncomfortable.  · You have a fever.  · Your chest pain becomes worse.  · You have new, unexplained symptoms.  · You have nausea or vomiting.  · You feel sweaty or lightheaded.  · You have a cough with phlegm (sputum), or you cough up blood.  MAKE SURE YOU:   · Understand these instructions.  · Will watch your condition.  · Will get help right away if you are not doing well or get worse.     This information is not intended to replace advice given to you by your health care provider. Make sure you discuss any questions you have with your health care provider.     Document Released: 12/18/2006 Document Revised: 03/11/2013 Document Reviewed: 03/14/2016  [employer] Interactive Patient Education ©2016 [employer]    Chest Pain, Nonspecific  It is often hard to give a specific diagnosis for the cause of chest pain. There is always a chance that your pain could be related to something serious, like a heart attack  or a blood clot in the lungs. You need to follow up with your caregiver for further evaluation. More lab tests or other studies such as X-rays, electrocardiography, stress testing, or cardiac imaging may be needed to find the cause of your pain.  Most of the time, nonspecific chest pain improves within 2 to 3 days with rest and mild pain medicine. For the next few days, avoid physical exertion or activities that bring on pain. Do not smoke. Avoid drinking alcohol. Call your caregiver for routine follow-up as advised.   SEEK IMMEDIATE MEDICAL CARE IF:  · You develop increased chest pain or pain that radiates to the arm, neck, jaw, back, or abdomen.   · You develop shortness of breath, increased coughing, or you start coughing up blood.   · You have severe back or abdominal pain, nausea, or vomiting.   · You develop severe weakness, fainting, fever, or chills.   Document Released: 12/18/2006 Document Revised: 03/11/2013 Document Reviewed: 06/06/2008  [employer]® Patient Information ©2013 [employer].  Hyperglycemia  High blood sugar (hyperglycemia) means that the level of sugar in your blood is higher than it should be. Signs of high blood sugar include:  · Feeling thirsty.  · Frequent peeing (urinating).  · Feeling tired or sleepy.  · Dry mouth.  · Vision changes.  · Feeling weak.  · Feeling hungry but losing weight.  · Numbness and tingling in your hands or feet.  · Headache.  When you ignore these signs, your blood sugar may keep going up. These problems may get worse, and other problems may begin.  HOME CARE  · Check your blood sugars as told by your doctor. Write down the numbers with the date and time.  · Take the right amount of insulin or diabetes pills at the right time. Write down the dose with date and time.  · Refill your insulin or diabetes pills before running out.  · Watch what you eat. Follow your meal plan.  · Drink liquids without sugar, such as water. Check with your doctor if you have kidney or  heart disease.  · Follow your doctor's orders for exercise. Exercise at the same time of day.  · Keep your doctor's appointments.  GET HELP RIGHT AWAY IF:   · You have trouble thinking or are confused.  · You have fast breathing with fruity smelling breath.  · You pass out (faint).  · You have 2 to 3 days of high blood sugars and you do not know why.  · You have chest pain.  · You are feeling sick to your stomach (nauseous) or throwing up (vomiting).  · You have sudden vision changes.  MAKE SURE YOU:   · Understand these instructions.  · Will watch your condition.  · Will get help right away if you are not doing well or get worse.     This information is not intended to replace advice given to you by your health care provider. Make sure you discuss any questions you have with your health care provider.     Document Released: 10/15/2010 Document Revised: 01/08/2016 Document Reviewed: 10/15/2010  [employer] Interactive Patient Education ©2016 [employer]

## 2017-07-29 NOTE — ED NOTES
All labs except BNP have resulted, pt is asking if he will be admitted to the hospital stating that he hopes he will be.

## 2017-07-29 NOTE — ED NOTES
Pt verbalized understanding of discharge and follow up instructions. PIV removed. VSS.  All questions answered, ambulates with steady gait to discharge.

## 2017-07-29 NOTE — ED PROVIDER NOTES
ED Provider Note    Scribed for Vikas Johns M.D. by An Duffy. 7/28/2017, 7:36 PM.    Primary care provider: Juancho Jenkins M.D.  Means of arrival: ambulance  History obtained from: patient  History limited by: none    CHIEF COMPLAINT  Chief Complaint   Patient presents with   • Chest Pain       HPI  Ryan Trevizo is a 52 y.o. Male with a history of several heart problems who presents to the Emergency Department for 10/10 chest pain onset around 7:30 PM. Discomfort is exacerbated with deep breathing. Patient states that the pain came on suddenly and has remained steady since onset with no relief after administration of Nitro and Aspirin. He was just recently discharged from the hospital after evaluation of chronic chest pain and has not gone on any recent long trips nor has he been sitting for extended periods of time.  Patient has no personal or family history of cardiac or respiratory problems and no history of blood clots.  No complaints of leg pain, hemoptysis.    Review of old medical records shows discharge from hospital on the 25th of this month. He was seen for chronic chest pain. Cardiac catheterization on 7/26/17 with no intervention needed.     Cardiac catheterization report      Procedure date July 26, 2017    PreOp Diagnosis: Known CAD and coronary aneurysm with CCS III-IV angina    PostOp Diagnosis:    1. Inferior wall hypokinesis with moderately reduced left ventricular systolic function. Ejection fraction 35%  2. Coronary artery disease;          - Aneurysmal change proximal left anterior descending artery (LAD) with diffuse moderate disease up to                      70% distal LAD. FFR of proximal and mid LAD above 0.8          - Chronic total occlusion of second obtuse marginal branch of the left circumflex artery          - 95% stenosis at the ostium of one of the dual posterior descending artery (2 mm diameter)          - Aneurysmal proximal and mid right coronary artery with  50-60% mid posterolateral branch but FFR > 0.8      Procedure(s) :  1.Left Heart Catheterization with Left Ventriculography  2. Selective coronary angiography  3. Fractional flow reserve measurement of the left anterior descending artery  4. Fractional flow reserve measurement of the right coronary artery    Surgeon(s):  Cheryl Rutledge M.D.    REVIEW OF SYSTEMS  Review of Systems   Respiratory:        Negative hemoptysis   Cardiovascular: Positive for chest pain.   Musculoskeletal:        Negative leg pain   All other systems reviewed and are negative.      PAST MEDICAL HISTORY   has a past medical history of Arthritis; HTN (hypertension) (7/15/2012); GERD (gastroesophageal reflux disease) (7/15/2012); STEMI (ST elevation myocardial infarction) (CMS-HCC); Anxiety disorder; MI (myocardial infarction) (CMS-HCC); Hyperlipemia; Upper GI bleed (7/26/2012); Glaucoma; SOB (shortness of breath); Claudication (CMS-HCC); Dizziness; CAD (coronary artery disease) (10/14/2013); ACS (acute coronary syndrome) (CMS-HCC) (7/25/2012); Pericarditis secondary to acute myocardial infarction (CMS-HCC) (7/19/2012); Right knee pain; Blood glucose elevated; Mental retardation; Osteoarthritis; Hypertriglyceridemia; A-fib (CMS-HCC); Ischemic cardiomyopathy; and Diabetes (CMS-HCC).    SURGICAL HISTORY   has past surgical history that includes other orthopedic surgery (7- ); other (knee surgery); other cardiac surgery; other; gastroscopy-endo (7/25/2012); gastroscopy-endo (7/26/2012); and gastroscopy-endo (5/19/2017).    SOCIAL HISTORY  Social History   Substance Use Topics   • Smoking status: Never Smoker    • Smokeless tobacco: Never Used   • Alcohol Use: No      History   Drug Use No       FAMILY HISTORY  No pertinent family history    CURRENT MEDICATIONS  Home Medications     Reviewed by Jhoana Bartholomew R.N. (Registered Nurse) on 07/28/17 at 1925  Med List Status: Not Addressed    Medication Last Dose Status     "atorvastatin (LIPITOR) 40 MG Tab 7/24/2017 Active    dicyclomine (BENTYL) 20 MG Tab 7/24/2017 Active    lisinopril (PRINIVIL) 5 MG Tab 7/28/2017 Active    loratadine (CLARITIN) 10 MG Tab 7/24/2017 Active    metformin (GLUCOPHAGE) 1000 MG tablet 7/24/2017 Active    metoprolol SR (TOPROL XL) 25 MG TABLET SR 24 HR 7/28/2017 Active    nitroGLYCERIN (NITROSTAT) 0.3 MG SL tablet  Active    omeprazole (PRILOSEC) 20 MG delayed-release capsule 7/24/2017 Active    ranolazine (RANEXA) 500 MG TABLET SR 12 HR  Active    rivaroxaban (XARELTO) 20 MG Tab tablet 7/24/2017 Active    vitamin D3, cholecalciferol, 1000 UNIT Tab 7/24/2017 Active                ALLERGIES  Allergies   Allergen Reactions   • Ritalin [Methylphenidate] Unspecified     \"Hyper\"       PHYSICAL EXAM  VITAL SIGNS: /77 mmHg  Pulse 136  Temp(Src) 37 °C (98.6 °F)  Resp 18  Ht 1.803 m (5' 11\")  Wt 82.101 kg (181 lb)  BMI 25.26 kg/m2  SpO2 94%    Constitutional: Well developed, Well nourished, No acute distress, Non-toxic appearance. Surprisingly requested admission from the onset.  HENT: Normocephalic, Atraumatic, Bilateral external ears normal, Oropharynx moist, no evidence of dehydration, No oral exudates, Nose normal. No JVD  Eyes: PERRLA, EOMI, Conjunctiva normal, No discharge.   Neck: Normal range of motion, No tenderness, Supple, No stridor. No masses. No evidence of meningitis or meningismus.   Lymphatic: No lymphadenopathy noted.   Cardiovascular: Normal heart rate, Normal rhythm, No murmurs, No rubs, No gallops.   Thorax & Lungs: Normal breath sounds, No respiratory distress, No wheezing or rhonchi, No chest tenderness.   Abdomen: Bowel sounds normal, Soft, No tenderness, No masses, No pulsatile masses. No guarding or rebound. No evidence of peritoneal findings.  Skin: Warm, Dry, No erythema, No rash. No exanthem.   Extremities:  No edema, No tenderness, No cyanosis, No clubbing.   Musculoskeletal: Good range of motion in all major joints. No " major deformities noted.   Neurologic: Alert & oriented x 3, Normal motor function, No focal deficits noted.   Psychiatric: Affect normal, mood normal.                                                              DIAGNOSTIC STUDIES / PROCEDURES    LABS  Results for orders placed or performed during the hospital encounter of 07/28/17   Troponin   Result Value Ref Range    Troponin I 0.01 0.00 - 0.04 ng/mL   Btype Natriuretic Peptide   Result Value Ref Range    B Natriuretic Peptide 4 0 - 100 pg/mL   CBC with Differential   Result Value Ref Range    WBC 8.5 4.8 - 10.8 K/uL    RBC 4.17 (L) 4.70 - 6.10 M/uL    Hemoglobin 12.5 (L) 14.0 - 18.0 g/dL    Hematocrit 36.2 (L) 42.0 - 52.0 %    MCV 86.8 81.4 - 97.8 fL    MCH 30.0 27.0 - 33.0 pg    MCHC 34.5 33.7 - 35.3 g/dL    RDW 41.0 35.9 - 50.0 fL    Platelet Count 370 164 - 446 K/uL    MPV 10.0 9.0 - 12.9 fL    Neutrophils-Polys 72.20 (H) 44.00 - 72.00 %    Lymphocytes 17.50 (L) 22.00 - 41.00 %    Monocytes 7.30 0.00 - 13.40 %    Eosinophils 1.70 0.00 - 6.90 %    Basophils 0.60 0.00 - 1.80 %    Immature Granulocytes 0.70 0.00 - 0.90 %    Nucleated RBC 0.00 /100 WBC    Neutrophils (Absolute) 6.11 1.82 - 7.42 K/uL    Lymphs (Absolute) 1.48 1.00 - 4.80 K/uL    Monos (Absolute) 0.62 0.00 - 0.85 K/uL    Eos (Absolute) 0.14 0.00 - 0.51 K/uL    Baso (Absolute) 0.05 0.00 - 0.12 K/uL    Immature Granulocytes (abs) 0.06 0.00 - 0.11 K/uL    NRBC (Absolute) 0.00 K/uL   Complete Metabolic Panel (CMP)   Result Value Ref Range    Sodium 132 (L) 135 - 145 mmol/L    Potassium 4.1 3.6 - 5.5 mmol/L    Chloride 100 96 - 112 mmol/L    Co2 19 (L) 20 - 33 mmol/L    Anion Gap 13.0 (H) 0.0 - 11.9    Glucose 384 (H) 65 - 99 mg/dL    Bun 20 8 - 22 mg/dL    Creatinine 1.35 0.50 - 1.40 mg/dL    Calcium 9.8 8.5 - 10.5 mg/dL    AST(SGOT) 20 12 - 45 U/L    ALT(SGPT) 35 2 - 50 U/L    Alkaline Phosphatase 67 30 - 99 U/L    Total Bilirubin 0.6 0.1 - 1.5 mg/dL    Albumin 4.2 3.2 - 4.9 g/dL    Total Protein 7.0  6.0 - 8.2 g/dL    Globulin 2.8 1.9 - 3.5 g/dL    A-G Ratio 1.5 g/dL   Prothrombin Time   Result Value Ref Range    PT 20.4 (H) 12.0 - 14.6 sec    INR 1.69 (H) 0.87 - 1.13   APTT   Result Value Ref Range    APTT 34.1 24.7 - 36.0 sec   Lipase   Result Value Ref Range    Lipase 42 11 - 82 U/L   ESTIMATED GFR   Result Value Ref Range    GFR If African American >60 >60 mL/min/1.73 m 2    GFR If Non African American 55 (A) >60 mL/min/1.73 m 2   EKG (ER)   Result Value Ref Range    Report       [facility] Emergency Dept.    Test Date:  2017  Pt Name:    NICHELLE MARQUEZ                   Department: ER  MRN:        [id number]                      Room:        11  Gender:     M                            Technician: [id number]  :        1965                   Requested By:ER TRIAGE PROTOCOL  Order #:    [id number]                    Reading MD:    Measurements  Intervals                                Axis  Rate:       133                          P:          53  IL:         148                          QRS:        -69  QRSD:       96                           T:          33  QT:         300  QTc:        447    Interpretive Statements  SINUS TACHYCARDIA  CONSIDER RIGHT VENTRICULAR HYPERTROPHY  INFERIOR INFARCT, AGE INDETERMINATE  LATERAL LEADS ARE ALSO INVOLVED  Compared to ECG 2017 05:54:16  Sinus rhythm no longer present  Myocardial infarct finding still present         All labs reviewed by me.    EKG  Interpreted by me    Rhythm: sinus tachycardia  Rate: 133   Axis: normal  Ectopy: none  Conduction: normal  ST Segments: no acute change  T Waves: no acute change  Q Waves: inferior Q waves    Clinical Impression: tachycardia    RADIOLOGY  CT-CTA CHEST PULMONARY ARTERY W/ RECONS   Final Result         1. No CT evidence of pulmonary embolism.   2. Diffuse hepatic steatosis.   3.Distended stomach with food. There is fluid in the distended lower esophagus.         DX-CHEST-LIMITED (1 VIEW)   Final  Result      1.  No acute cardiac or pulmonary abnormalities are identified.   2.  Lungs are hypoinflated.          The radiologist's interpretation of all radiological studies have been reviewed by me.    COURSE & MEDICAL DECISION MAKING  Nursing notes, VS, PMSFHx reviewed in chart.    Review of old medical records shows discharge from hospital on the 25th of this month. He was seen for chronic chest pain. Cardiac catheterization on 7/26/17 with no intervention needed.      7:36 PM - Patient seen and examined at bedside. Ordered chest pulmonary CTA-CT, chest xray, troponin, BNP, CBC, CMP, prothrombin time, APTT, lipase, and EKG to evaluate his symptoms. The differential diagnoses include but are not limited to: PE, non cardiac chest pain, chest wall pain, pleuritic chest pain., I informed the patient that his cardiac enzymes have all come back normal and that I would rule out any other acute causes for his discomfort at this time.     Patient negative cardiac catheterization recently. Patient presents with pleuritic chest pain concern about possible PE. Negative study for PE. Patient shows no evidence of acute cardiac or pulmonary abnormality. Patient's comfortable with discharge. Follow-up as previously arranged. Instruc sheet on chest wall pain.    Patient did have elevated glucose was given additional IV insulin.    FINAL IMPRESSION  1. Chest wall pain    2. Hyperglycemia           An MORRISSEY (Scribe), am scribing for, and in the presence of, Vikas Johns M.D..    Electronically signed by: An Duffy (Scribe), 7/28/2017    Vikas MORRISSEY M.D. personally performed the services described in this documentation, as scribed by An Duffy in my presence, and it is both accurate and complete.    The note accurately reflects work and decisions made by me.  Vikas Johns  7/28/2017  10:24 PM

## 2017-07-29 NOTE — ED PROVIDER NOTES
Cardiac catheterization report      Procedure date July 26, 2017    PreOp Diagnosis: Known CAD and coronary aneurysm with CCS III-IV angina    PostOp Diagnosis:    1. Inferior wall hypokinesis with moderately reduced left ventricular systolic function. Ejection fraction 35%  2. Coronary artery disease;          - Aneurysmal change proximal left anterior descending artery (LAD) with diffuse moderate disease up to                      70% distal LAD. FFR of proximal and mid LAD above 0.8          - Chronic total occlusion of second obtuse marginal branch of the left circumflex artery          - 95% stenosis at the ostium of one of the dual posterior descending artery (2 mm diameter)          - Aneurysmal proximal and mid right coronary artery with 50-60% mid posterolateral branch but FFR > 0.8      Procedure(s) :  1.Left Heart Catheterization with Left Ventriculography  2. Selective coronary angiography  3. Fractional flow reserve measurement of the left anterior descending artery  4. Fractional flow reserve measurement of the right coronary artery    Surgeon(s):  Cheryl Rutledge M.D.

## 2017-07-30 ENCOUNTER — HOSPITAL ENCOUNTER (EMERGENCY)
Facility: MEDICAL CENTER | Age: 52
End: 2017-07-30
Attending: EMERGENCY MEDICINE
Payer: MEDICARE

## 2017-07-30 ENCOUNTER — APPOINTMENT (OUTPATIENT)
Dept: RADIOLOGY | Facility: MEDICAL CENTER | Age: 52
End: 2017-07-30
Attending: EMERGENCY MEDICINE
Payer: MEDICARE

## 2017-07-30 VITALS
BODY MASS INDEX: 25.13 KG/M2 | TEMPERATURE: 97.3 F | WEIGHT: 180.12 LBS | OXYGEN SATURATION: 96 % | SYSTOLIC BLOOD PRESSURE: 123 MMHG | HEART RATE: 90 BPM | RESPIRATION RATE: 18 BRPM | DIASTOLIC BLOOD PRESSURE: 81 MMHG

## 2017-07-30 DIAGNOSIS — R42 DIZZINESS: ICD-10-CM

## 2017-07-30 DIAGNOSIS — R07.89 ATYPICAL CHEST PAIN: ICD-10-CM

## 2017-07-30 LAB
ALBUMIN SERPL BCP-MCNC: 4.2 G/DL (ref 3.2–4.9)
ALBUMIN/GLOB SERPL: 1.4 G/DL
ALP SERPL-CCNC: 65 U/L (ref 30–99)
ALT SERPL-CCNC: 33 U/L (ref 2–50)
ANION GAP SERPL CALC-SCNC: 11 MMOL/L (ref 0–11.9)
AST SERPL-CCNC: 18 U/L (ref 12–45)
BASOPHILS # BLD AUTO: 0.6 % (ref 0–1.8)
BASOPHILS # BLD: 0.07 K/UL (ref 0–0.12)
BILIRUB SERPL-MCNC: 0.7 MG/DL (ref 0.1–1.5)
BNP SERPL-MCNC: 4 PG/ML (ref 0–100)
BUN SERPL-MCNC: 21 MG/DL (ref 8–22)
CALCIUM SERPL-MCNC: 9.8 MG/DL (ref 8.5–10.5)
CHLORIDE SERPL-SCNC: 99 MMOL/L (ref 96–112)
CO2 SERPL-SCNC: 20 MMOL/L (ref 20–33)
CREAT SERPL-MCNC: 1 MG/DL (ref 0.5–1.4)
EKG IMPRESSION: NORMAL
EOSINOPHIL # BLD AUTO: 0.14 K/UL (ref 0–0.51)
EOSINOPHIL NFR BLD: 1.2 % (ref 0–6.9)
ERYTHROCYTE [DISTWIDTH] IN BLOOD BY AUTOMATED COUNT: 40.7 FL (ref 35.9–50)
GFR SERPL CREATININE-BSD FRML MDRD: >60 ML/MIN/1.73 M 2
GLOBULIN SER CALC-MCNC: 3 G/DL (ref 1.9–3.5)
GLUCOSE SERPL-MCNC: 211 MG/DL (ref 65–99)
HCT VFR BLD AUTO: 39 % (ref 42–52)
HGB BLD-MCNC: 13.6 G/DL (ref 14–18)
IMM GRANULOCYTES # BLD AUTO: 0.17 K/UL (ref 0–0.11)
IMM GRANULOCYTES NFR BLD AUTO: 1.5 % (ref 0–0.9)
INR PPP: 1.88 (ref 0.87–1.13)
LYMPHOCYTES # BLD AUTO: 2.55 K/UL (ref 1–4.8)
LYMPHOCYTES NFR BLD: 22.5 % (ref 22–41)
MAGNESIUM SERPL-MCNC: 1.4 MG/DL (ref 1.5–2.5)
MCH RBC QN AUTO: 30.3 PG (ref 27–33)
MCHC RBC AUTO-ENTMCNC: 34.9 G/DL (ref 33.7–35.3)
MCV RBC AUTO: 86.9 FL (ref 81.4–97.8)
MONOCYTES # BLD AUTO: 0.9 K/UL (ref 0–0.85)
MONOCYTES NFR BLD AUTO: 7.9 % (ref 0–13.4)
NEUTROPHILS # BLD AUTO: 7.52 K/UL (ref 1.82–7.42)
NEUTROPHILS NFR BLD: 66.3 % (ref 44–72)
NRBC # BLD AUTO: 0 K/UL
NRBC BLD AUTO-RTO: 0 /100 WBC
PLATELET # BLD AUTO: 434 K/UL (ref 164–446)
PMV BLD AUTO: 9.8 FL (ref 9–12.9)
POTASSIUM SERPL-SCNC: 4 MMOL/L (ref 3.6–5.5)
PROT SERPL-MCNC: 7.2 G/DL (ref 6–8.2)
PROTHROMBIN TIME: 22.2 SEC (ref 12–14.6)
RBC # BLD AUTO: 4.49 M/UL (ref 4.7–6.1)
SODIUM SERPL-SCNC: 130 MMOL/L (ref 135–145)
TROPONIN I SERPL-MCNC: <0.01 NG/ML (ref 0–0.04)
WBC # BLD AUTO: 11.4 K/UL (ref 4.8–10.8)

## 2017-07-30 PROCEDURE — 84484 ASSAY OF TROPONIN QUANT: CPT

## 2017-07-30 PROCEDURE — 93005 ELECTROCARDIOGRAM TRACING: CPT

## 2017-07-30 PROCEDURE — 700105 HCHG RX REV CODE 258: Performed by: EMERGENCY MEDICINE

## 2017-07-30 PROCEDURE — 83880 ASSAY OF NATRIURETIC PEPTIDE: CPT

## 2017-07-30 PROCEDURE — 700102 HCHG RX REV CODE 250 W/ 637 OVERRIDE(OP): Performed by: EMERGENCY MEDICINE

## 2017-07-30 PROCEDURE — 85025 COMPLETE CBC W/AUTO DIFF WBC: CPT

## 2017-07-30 PROCEDURE — 93005 ELECTROCARDIOGRAM TRACING: CPT | Performed by: EMERGENCY MEDICINE

## 2017-07-30 PROCEDURE — 83735 ASSAY OF MAGNESIUM: CPT

## 2017-07-30 PROCEDURE — 71010 DX-CHEST-PORTABLE (1 VIEW): CPT

## 2017-07-30 PROCEDURE — 99284 EMERGENCY DEPT VISIT MOD MDM: CPT

## 2017-07-30 PROCEDURE — 96360 HYDRATION IV INFUSION INIT: CPT

## 2017-07-30 PROCEDURE — 36415 COLL VENOUS BLD VENIPUNCTURE: CPT

## 2017-07-30 PROCEDURE — 85610 PROTHROMBIN TIME: CPT

## 2017-07-30 PROCEDURE — 80053 COMPREHEN METABOLIC PANEL: CPT

## 2017-07-30 PROCEDURE — A9270 NON-COVERED ITEM OR SERVICE: HCPCS | Performed by: EMERGENCY MEDICINE

## 2017-07-30 RX ORDER — SODIUM CHLORIDE 9 MG/ML
1000 INJECTION, SOLUTION INTRAVENOUS ONCE
Status: COMPLETED | OUTPATIENT
Start: 2017-07-30 | End: 2017-07-30

## 2017-07-30 RX ADMIN — MAGNESIUM GLUCONATE 500 MG ORAL TABLET 400 MG: 500 TABLET ORAL at 21:16

## 2017-07-30 RX ADMIN — SODIUM CHLORIDE 1000 ML: 9 INJECTION, SOLUTION INTRAVENOUS at 20:39

## 2017-07-30 ASSESSMENT — LIFESTYLE VARIABLES: DO YOU DRINK ALCOHOL: NO

## 2017-07-30 NOTE — ED AVS SNAPSHOT
7/30/2017    Ryan Trevizo  [address]  [place] [address]    Dear Ryan:    Pending sale to [facility] wants to ensure your discharge home is safe and you or your loved ones have had all of your questions answered regarding your care after you leave the hospital.    Below is a list of resources and contact information should you have any questions regarding your hospital stay, follow-up instructions, or active medical symptoms.    Questions or Concerns Regarding… Contact   Medical Questions Related to Your Discharge  (7 days a week, 8am-5pm) Contact a Nurse Care Coordinator   [phone]   Medical Questions Not Related to Your Discharge  (24 hours a day / 7 days a week)  Contact the Nurse Health Line   [phone]    Medications or Discharge Instructions Refer to your discharge packet   or contact your [facility] Primary Care Provider   [phone]   Follow-up Appointment(s) Schedule your appointment via [facility]   or contact Scheduling [phone]   Billing Review your statement via [facility]  or contact Billing [phone]   Medical Records Review your records via [facility]   or contact Medical Records [phone]     You may receive a telephone call within two days of discharge. This call is to make certain you understand your discharge instructions and have the opportunity to have any questions answered. You can also easily access your medical information, test results and upcoming appointments via the [facility] free online health management tool. You can learn more and sign up at [employer]. For assistance setting up your [facility] account, please call [phone].    Once again, we want to ensure your discharge home is safe and that you have a clear understanding of any next steps in your care. If you have any questions or concerns, please do not hesitate to contact us, we are here for you. Thank you for choosing [facility] for your healthcare needs.    Sincerely,    Your [facility] Healthcare Team

## 2017-07-30 NOTE — ED AVS SNAPSHOT
Home Care Instructions                                                                                                                Ryan Trevizo   MRN: [id number]    Department:  [facility], Emergency Dept   Date of Visit:  7/30/2017            [facility], Emergency Dept    [address] [facility] [address]    Phone:  [phone]      You were seen by     Enrique Rodriguez M.D.      Your Diagnosis Was     Dizziness     R42       These are the medications you received during your hospitalization from 07/30/2017 1851 to 07/30/2017 2142     Date/Time Order Dose Route Action    07/30/2017 2039 NS infusion 1,000 mL 1,000 mL Intravenous New Bag    07/30/2017 2116 magnesium oxide (MAG-OX) tablet 400 mg 400 mg Oral Given      Follow-up Information     1. Follow up with Juancho Jenkins M.D..    Specialty:  Internal Medicine    Contact information    [address]  [address] [address] [phone]        Medication Information     Review all of your home medications and newly ordered medications with your primary doctor and/or pharmacist as soon as possible. Follow medication instructions as directed by your doctor and/or pharmacist.     Please keep your complete medication list with you and share with your physician. Update the information when medications are discontinued, doses are changed, or new medications (including over-the-counter products) are added; and carry medication information at all times in the event of emergency situations.               Medication List      ASK your doctor about these medications        Instructions    Morning Afternoon Evening Bedtime    atorvastatin 40 MG Tabs   Commonly known as:  LIPITOR        Take 40 mg by mouth every evening.   Dose:  40 mg                        dicyclomine 20 MG Tabs   Commonly known as:  BENTYL        Take 1 Tab by mouth every 6 hours.   Dose:  20 mg                        lisinopril 5 MG Tabs      Commonly known as:  PRINIVIL        Take 1 Tab by mouth every day.   Dose:  5 mg                        loratadine 10 MG Tabs   Commonly known as:  CLARITIN        Take 10 mg by mouth every day.   Dose:  10 mg                        metformin 1000 MG tablet   Commonly known as:  GLUCOPHAGE        Take 1 Tab by mouth 2 times a day, with meals.   Dose:  1000 mg                        metoprolol SR 25 MG Tb24   Commonly known as:  TOPROL XL        Take 1 Tab by mouth every day.   Dose:  25 mg                        nitroGLYCERIN 0.3 MG SL tablet   Commonly known as:  NITROSTAT        Place 1 Tab under tongue 1 time daily as needed for Chest Pain.   Dose:  0.3 mg                        omeprazole 20 MG delayed-release capsule   Commonly known as:  PRILOSEC        Take 1 Cap by mouth 2 times a day.   Dose:  20 mg                        ranolazine 500 MG Tb12   Commonly known as:  RANEXA        Take 1 Tab by mouth 2 Times a Day.   Dose:  500 mg                        rivaroxaban 20 MG Tabs tablet   Commonly known as:  XARELTO        Take 1 Tab by mouth with dinner.   Dose:  20 mg                        vitamin D3 (cholecalciferol) 1000 UNIT Tabs        TAKE 1 TABLET BY MOUTH ONCE DAILY.                                Procedures and tests performed during your visit     Procedure/Test Number of Times Performed    Btype Natriuretic Peptide 1    CBC w/ Differential 1    Complete Metabolic Panel (CMP) 1    DX-CHEST-PORTABLE (1 VIEW) 1    EKG (ER) 2    EKG - STAT 1    ESTIMATED GFR 1    IV Saline Lock 1    Magnesium 1    PT/INR 1    Troponin STAT 1        Discharge Instructions       Dizziness  Dizziness is a common problem. It is a feeling of unsteadiness or light-headedness. You may feel like you are about to faint. Dizziness can lead to injury if you stumble or fall. Anyone can become dizzy, but dizziness is more common in older adults. This condition can be caused by a number of things, including medicines, dehydration,  or illness.  HOME CARE INSTRUCTIONS  Taking these steps may help with your condition:  Eating and Drinking  · Drink enough fluid to keep your urine clear or pale yellow. This helps to keep you from becoming dehydrated. Try to drink more clear fluids, such as water.  · Do not drink alcohol.  · Limit your caffeine intake if directed by your health care provider.  · Limit your salt intake if directed by your health care provider.  Activity  · Avoid making quick movements.  ¨ Rise slowly from chairs and steady yourself until you feel okay.  ¨ In the morning, first sit up on the side of the bed. When you feel okay, stand slowly while you hold onto something until you know that your balance is fine.  · Move your legs often if you need to  one place for a long time. Tighten and relax your muscles in your legs while you are standing.  · Do not drive or operate heavy machinery if you feel dizzy.  · Avoid bending down if you feel dizzy. Place items in your home so that they are easy for you to reach without leaning over.  Lifestyle  · Do not use any tobacco products, including cigarettes, chewing tobacco, or electronic cigarettes. If you need help quitting, ask your health care provider.  · Try to reduce your stress level, such as with yoga or meditation. Talk with your health care provider if you need help.  General Instructions  · Watch your dizziness for any changes.  · Take medicines only as directed by your health care provider. Talk with your health care provider if you think that your dizziness is caused by a medicine that you are taking.  · Tell a friend or a family member that you are feeling dizzy. If he or she notices any changes in your behavior, have this person call your health care provider.  · Keep all follow-up visits as directed by your health care provider. This is important.  SEEK MEDICAL CARE IF:  · Your dizziness does not go away.  · Your dizziness or light-headedness gets worse.  · You feel  nauseous.  · You have reduced hearing.  · You have new symptoms.  · You are unsteady on your feet or you feel like the room is spinning.  SEEK IMMEDIATE MEDICAL CARE IF:  · You vomit or have diarrhea and are unable to eat or drink anything.  · You have problems talking, walking, swallowing, or using your arms, hands, or legs.  · You feel generally weak.  · You are not thinking clearly or you have trouble forming sentences. It may take a friend or family member to notice this.  · You have chest pain, abdominal pain, shortness of breath, or sweating.  · Your vision changes.  · You notice any bleeding.  · You have a headache.  · You have neck pain or a stiff neck.  · You have a fever.     This information is not intended to replace advice given to you by your health care provider. Make sure you discuss any questions you have with your health care provider.     Document Released: 06/13/2002 Document Revised: 05/03/2016 Document Reviewed: 12/14/2015  [employer] Interactive Patient Education ©2016 [employer]              Patient Information     Patient Information    Following emergency treatment: all patient requiring follow-up care must return either to a private physician or a clinic if your condition worsens before you are able to obtain further medical attention, please return to the emergency room.     Billing Information    At [facility], we work to make the billing process streamlined for our patients.  Our Representatives are here to answer any questions you may have regarding your hospital bill.  If you have insurance coverage and have supplied your insurance information to us, we will submit a claim to your insurer on your behalf.  Should you have any questions regarding your bill, we can be reached online or by phone as follows:  Online: You are able pay your bills online or live chat with our representatives about any billing questions you may have. We are here to help Monday - Friday from 8:00am to  7:30pm and 9:00am - 12:00pm on Saturdays.  Please visit [email or web address]  for more information.   Phone:  [phone] or [phone]    Please note that your emergency physician, surgeon, pathologist, radiologist, anesthesiologist, and other specialists are not employed by [facility] and will therefore bill separately for their services.  Please contact them directly for any questions concerning their bills at the numbers below:     Emergency Physician Services:  [phone]  [facility]:  [phone]  [facility]:  [phone]  [facility]:  [phone]    1. Your final bill may vary from the amount quoted upon discharge if all procedures are not complete at that time, or if your doctor has additional procedures of which we are not aware. You will receive an additional bill if you return to the Emergency Department at [facility] for suture removal regardless of the facility of which the sutures were placed.     2. Please arrange for settlement of this account at the emergency registration.    3. All self-pay accounts are due in full at the time of treatment.  If you are unable to meet this obligation then payment is expected within 4-5 days.     4. If you have had radiology studies (CT, X-ray, Ultrasound, MRI), you have received a preliminary result during your emergency department visit. Please contact the radiology department [phone] to receive a copy of your final result. Please discuss the Final result with your primary physician or with the follow up physician provided.     Crisis Hotline:  [place] Crisis Hotline:  4-863-BTAQDDS or 1-519.817.1789  [place] Crisis Hotline:    1-983.185.1241 or 644-806-4354         ED Discharge Follow Up Questions    1. In order to provide you with very good care, we would like to follow up with a phone call in the next few days.  May we have your permission to contact you?     YES  /  NO    2. What is the best phone number to call you? (       )_____-__________    3. What is the best time to call you?      Morning  /  Afternoon  /  Evening                   Patient Signature:  ____________________________________________________________    Date:  ____________________________________________________________      Your appointments     Jul 31, 2017  1:00 PM   Established Patient with MAEVE Quarles   [facility]    [address]  [place] [facility] [place] [address]   [phone]           You will be receiving a confirmation call a few days before your appointment from our automated call confirmation system.            Sep 11, 2017  1:15 PM   Established Patient with Juancho Jenkins M.D.   [facility] - Internal Medicine (--)    [address] [place] [address]  [address]  [place] [id number] [phone]           You will be receiving a confirmation call a few days before your appointment from our automated call confirmation system.            Sep 12, 2017 11:30 AM   Anti-Coag Routine with VISTA PHARMACIST   [facility] [place]    [address] [facility] [place] [address]   [phone]            Oct 06, 2017  2:30 PM   FOLLOW UP with Joseph León M.D.   [facility] (--)    [address] [place] [address] [address]  [place] [address] [phone]

## 2017-07-30 NOTE — ED AVS SNAPSHOT
[employer] Access Code: [email or web address]  Expires: 8/3/2017  9:51 PM    Your email address is not on file at [facility].  Email Addresses are required for you to sign up for [employer], please contact [phone] to verify your personal information and to provide your email address prior to attempting to register for [employer].    Ryan Palomino Joseline  [address]  [place] [address]    [employer]  A secure, online tool to manage your health information     [employer]’s [employer]® is a secure, online tool that connects you to your personalized health information from the privacy of your home -- day or night - making it very easy for you to manage your healthcare. Once the activation process is completed, you can even access your medical information using the [employer] yoli, which is available for free in the Apple Yoli store or Google Play store.     To learn more about [employer], visit [email or web address]    There are two levels of access available (as shown below):   My Chart Features  [facility] Primary Care Doctor [facility]  Specialists [facility]  Urgent  Care Non-[facility] Primary Care Doctor   Email your healthcare team securely and privately 24/7 X X X    Manage appointments: schedule your next appointment; view details of past/upcoming appointments X      Request prescription refills. X      View recent personal medical records, including lab and immunizations X X X X   View health record, including health history, allergies, medications X X X X   Read reports about your outpatient visits, procedures, consult and ER notes X X X X   See your discharge summary, which is a recap of your hospital and/or ER visit that includes your diagnosis, lab results, and care plan X X  X     How to register for [employer]:  Once your e-mail address has been verified, follow the following steps to sign up for [employer].     1. Go to  [email or web address]  2. Click on the Sign Up Now box, which takes you to the New Member Sign Up page.  You will need to provide the following information:  a. Enter your [employer] Access Code exactly as it appears at the top of this page. (You will not need to use this code after you’ve completed the sign-up process. If you do not sign up before the expiration date, you must request a new code.)   b. Enter your date of birth.   c. Enter your home email address.   d. Click Submit, and follow the next screen’s instructions.  3. Create a [employer] ID. This will be your [employer] login ID and cannot be changed, so think of one that is secure and easy to remember.  4. Create a [employer] password. You can change your password at any time.  5. Enter your Password Reset Question and Answer. This can be used at a later time if you forget your password.   6. Enter your e-mail address. This allows you to receive e-mail notifications when new information is available in [employer].  7. Click Sign Up. You can now view your health information.    For assistance activating your [employer] account, call [phone]

## 2017-07-31 NOTE — ED PROVIDER NOTES
ED Provider Note    Scribed for Enrique Rodriguez M.D. by Dinah Barajas. 7/30/2017, 8:17 PM.    Primary care provider: Juancho Jenkins M.D.  Means of arrival: ambulance   History obtained from: Patient  History limited by: patient     CHIEF COMPLAINT  Chief Complaint   Patient presents with   • Lightheadedness   • Dizziness   • Near Syncopal       HPI  Ryan Trevizo is a 52 y.o. male who presents to the Emergency Department with complaints of lightheadedness and associated near syncopal event prior to arrival. The patient was in the kitchen getting something to drink when he started feeling dizzy and had a near syncopal event. The patient confirms eating and drinking regularly. He denies nausea, vomiting, diarrhea, chest pain, or shortness of breath.       REVIEW OF SYSTEMS  See HPI,  Negative for nausea, vomiting, diarrhea, chest pain, or shortness of breath. Remainder of ROS negative. C    PAST MEDICAL HISTORY   has a past medical history of Arthritis; HTN (hypertension) (7/15/2012); GERD (gastroesophageal reflux disease) (7/15/2012); STEMI (ST elevation myocardial infarction) (CMS-HCC); Anxiety disorder; MI (myocardial infarction) (CMS-HCC); Hyperlipemia; Upper GI bleed (7/26/2012); Glaucoma; SOB (shortness of breath); Claudication (CMS-HCC); Dizziness; CAD (coronary artery disease) (10/14/2013); ACS (acute coronary syndrome) (CMS-HCC) (7/25/2012); Pericarditis secondary to acute myocardial infarction (CMS-HCC) (7/19/2012); Right knee pain; Blood glucose elevated; Mental retardation; Osteoarthritis; Hypertriglyceridemia; A-fib (CMS-HCC); Ischemic cardiomyopathy; and Diabetes (CMS-HCC).    SURGICAL HISTORY   has past surgical history that includes other orthopedic surgery (7- ); other (knee surgery); other cardiac surgery; other; gastroscopy-endo (7/25/2012); gastroscopy-endo (7/26/2012); and gastroscopy-endo (5/19/2017).    SOCIAL HISTORY  Social History   Substance Use Topics   • Smoking status: Never  "Smoker    • Smokeless tobacco: Never Used   • Alcohol Use: No      History   Drug Use No       FAMILY HISTORY  No family history noted    CURRENT MEDICATIONS  Reviewed.  See Encounter Summary.     ALLERGIES  Allergies   Allergen Reactions   • Ritalin [Methylphenidate] Unspecified     \"Hyper\"       PHYSICAL EXAM  VITAL SIGNS: /81 mmHg  Pulse 131  Temp(Src) 36.3 °C (97.3 °F)  Resp 18  Wt 81.7 kg (180 lb 1.9 oz)  SpO2 97%  Constitutional: Awake, alert in no apparent distress.  HENT: Normocephalic, Bilateral external ears normal. Nose normal.   Eyes: Conjunctiva normal, non-icteric, EOMI.    Thorax & Lungs: Easy unlabored respirations, Clear to ascultation bilaterally.  Cardiovascular: Regular rate, Regular rhythm, No murmurs, rubs or gallops.  Abdomen:  Soft, nontender, no masses   Skin: Visualized skin is  Dry, No erythema, No rash.   Extremities:   No cyanosis, clubbing or edema.  Neurologic: Alert, Grossly non-focal.   Psychiatric: Normal affect, Normal mood    DIAGNOSTIC STUDIES / PROCEDURES     EKG  Interpreted by me    Rhythm:  Sinus tachycardic    Rate: 122  Axis: normal  Ectopy: none  Conduction: normal  ST Segments: no acute change  T Waves: no acute change  Q Waves: none  Clinical Impression: Normal EKG without acute changes     RADIOLOGY  DX-CHEST-PORTABLE (1 VIEW)   Final Result         1. No acute cardiopulmonary abnormalities are identified.        The radiologist's interpretation of all radiological studies have been reviewed by me.    COURSE & MEDICAL DECISION MAKING  Pertinent Labs & Imaging studies reviewed. (See chart for details)    Reviewed old medical records that showed recent angiogram with diffuse coronary artery disease, no intervention was conducted. The patient was determined to be managed medically.      8:17 PM - Patient seen and examined at bedside. Patient will be treated with Mag-ox 400 mg PO. The patient will be resuscitated with 1L NS IV. Ordered chest x-ray, estimated GFR, " CBC, CMP, troponin, magnesium, PT/INR, BMP to evaluate his symptoms.     9:23 PM- Upon re-eval patient is complaining a sharp pain in his chest and I will order a repeat EKG to further evaluate. Patient understands and agrees. He does not appear to be in any pain or distress.    9:31 PM- Reviewed the patient's repeat EKG which was unremarkable.     Decision Making:  This is a 52 y.o. year old male who presents with a chief complaint of dizziness. He arrives tachycardic, this appears to be his baseline. He was not hypotensive. The tachycardia did improve with IV fluids. Resting heart rate now is 90s. His magnesium was slightly low sodium was repleted with magnesium oxide.    Upon notification of discharge, the patient then complained of chest pain, he states it is sharp and stabbing like his typical chest pain. The patient does get chest pain on a near daily basis. He has been admitted several times this year. He had an angiogram less than 1 week ago. Serial EKGs are unremarkable, he does not have any ST elevation. He does not have any dynamic EKG changes.    At this time given his recent history, I do not feel that he requires a full rule out. His chest pain is quite atypical in nature, he does have known coronary artery disease but he is a candidate for medical management only at this point. I feel that there may be a component of anxiety as well to his chest pain.    He is instructed to follow-up with his cardiologist as well as his primary care physician. If he has any severe chest pain lasting longer than 5 minutes, associated nausea, diaphoresis or dizziness he should return to the emergency department for repeat evaluation.      DISPOSITION:  Patient will be discharged home in stable condition.    FOLLOW UP:  Juancho Jenkins M.D.  [address] [address]  [phone]            OUTPATIENT MEDICATIONS:  New Prescriptions    No medications on file       FINAL IMPRESSION  1. Dizziness    2.  Atypical chest pain          Dinah MORRISSEY (Scribe), am scribing for, and in the presence of, Enrique Rodriguez M.D..    Electronically signed by: Dinah Barajas ([employer]), 7/30/2017    Enrique MORRISSEY M.D. personally performed the services described in this documentation, as scribed by Dinah Barajas in my presence, and it is both accurate and complete.    The note accurately reflects work and decisions made by me.  Enrique Rodriguez  7/30/2017  9:42 PM

## 2017-07-31 NOTE — DISCHARGE INSTRUCTIONS
Dizziness  Dizziness is a common problem. It is a feeling of unsteadiness or light-headedness. You may feel like you are about to faint. Dizziness can lead to injury if you stumble or fall. Anyone can become dizzy, but dizziness is more common in older adults. This condition can be caused by a number of things, including medicines, dehydration, or illness.  HOME CARE INSTRUCTIONS  Taking these steps may help with your condition:  Eating and Drinking  · Drink enough fluid to keep your urine clear or pale yellow. This helps to keep you from becoming dehydrated. Try to drink more clear fluids, such as water.  · Do not drink alcohol.  · Limit your caffeine intake if directed by your health care provider.  · Limit your salt intake if directed by your health care provider.  Activity  · Avoid making quick movements.  ¨ Rise slowly from chairs and steady yourself until you feel okay.  ¨ In the morning, first sit up on the side of the bed. When you feel okay, stand slowly while you hold onto something until you know that your balance is fine.  · Move your legs often if you need to  one place for a long time. Tighten and relax your muscles in your legs while you are standing.  · Do not drive or operate heavy machinery if you feel dizzy.  · Avoid bending down if you feel dizzy. Place items in your home so that they are easy for you to reach without leaning over.  Lifestyle  · Do not use any tobacco products, including cigarettes, chewing tobacco, or electronic cigarettes. If you need help quitting, ask your health care provider.  · Try to reduce your stress level, such as with yoga or meditation. Talk with your health care provider if you need help.  General Instructions  · Watch your dizziness for any changes.  · Take medicines only as directed by your health care provider. Talk with your health care provider if you think that your dizziness is caused by a medicine that you are taking.  · Tell a friend or a family  member that you are feeling dizzy. If he or she notices any changes in your behavior, have this person call your health care provider.  · Keep all follow-up visits as directed by your health care provider. This is important.  SEEK MEDICAL CARE IF:  · Your dizziness does not go away.  · Your dizziness or light-headedness gets worse.  · You feel nauseous.  · You have reduced hearing.  · You have new symptoms.  · You are unsteady on your feet or you feel like the room is spinning.  SEEK IMMEDIATE MEDICAL CARE IF:  · You vomit or have diarrhea and are unable to eat or drink anything.  · You have problems talking, walking, swallowing, or using your arms, hands, or legs.  · You feel generally weak.  · You are not thinking clearly or you have trouble forming sentences. It may take a friend or family member to notice this.  · You have chest pain, abdominal pain, shortness of breath, or sweating.  · Your vision changes.  · You notice any bleeding.  · You have a headache.  · You have neck pain or a stiff neck.  · You have a fever.     This information is not intended to replace advice given to you by your health care provider. Make sure you discuss any questions you have with your health care provider.     Document Released: 06/13/2002 Document Revised: 05/03/2016 Document Reviewed: 12/14/2015  [employer]k Interactive Patient Education ©2016 Elsevier Inc.

## 2017-07-31 NOTE — ED NOTES
Pt notified RN he is having sharp chest pain, states its the same in nature as the chest pain he was having on 7/28/17 Dr. Rodriguez notified ordered EKG

## 2017-07-31 NOTE — ED NOTES
"Chief Complaint   Patient presents with   • Lightheadedness   • Dizziness   • Near Syncopal     Patient BIB [facility] to triage. States that he has been \"feeling like I am going to pass out\" since yesterday. Patient has had a recent cardiac stent placed. Patient tachycardic on arrival. No complaints of chest pain or SOB. EKG ordered. /81 mmHg  Pulse 131  Temp(Src) 36.3 °C (97.3 °F)  Resp 18  Wt 81.7 kg (180 lb 1.9 oz)  SpO2 97%    "

## 2017-08-01 ENCOUNTER — PATIENT OUTREACH (OUTPATIENT)
Dept: HEALTH INFORMATION MANAGEMENT | Facility: OTHER | Age: 52
End: 2017-08-01

## 2017-08-01 ENCOUNTER — APPOINTMENT (OUTPATIENT)
Dept: RADIOLOGY | Facility: MEDICAL CENTER | Age: 52
End: 2017-08-01
Attending: EMERGENCY MEDICINE
Payer: MEDICARE

## 2017-08-01 ENCOUNTER — HOSPITAL ENCOUNTER (EMERGENCY)
Facility: MEDICAL CENTER | Age: 52
End: 2017-08-02
Attending: EMERGENCY MEDICINE
Payer: MEDICARE

## 2017-08-01 DIAGNOSIS — R07.9 CHEST PAIN, UNSPECIFIED TYPE: ICD-10-CM

## 2017-08-01 LAB
BASOPHILS # BLD AUTO: 0.7 % (ref 0–1.8)
BASOPHILS # BLD: 0.06 K/UL (ref 0–0.12)
EOSINOPHIL # BLD AUTO: 0.14 K/UL (ref 0–0.51)
EOSINOPHIL NFR BLD: 1.5 % (ref 0–6.9)
ERYTHROCYTE [DISTWIDTH] IN BLOOD BY AUTOMATED COUNT: 40.2 FL (ref 35.9–50)
HCT VFR BLD AUTO: 35.8 % (ref 42–52)
HGB BLD-MCNC: 12.7 G/DL (ref 14–18)
IMM GRANULOCYTES # BLD AUTO: 0.09 K/UL (ref 0–0.11)
IMM GRANULOCYTES NFR BLD AUTO: 1 % (ref 0–0.9)
LYMPHOCYTES # BLD AUTO: 1.84 K/UL (ref 1–4.8)
LYMPHOCYTES NFR BLD: 20.3 % (ref 22–41)
MCH RBC QN AUTO: 30.6 PG (ref 27–33)
MCHC RBC AUTO-ENTMCNC: 35.5 G/DL (ref 33.7–35.3)
MCV RBC AUTO: 86.3 FL (ref 81.4–97.8)
MONOCYTES # BLD AUTO: 0.58 K/UL (ref 0–0.85)
MONOCYTES NFR BLD AUTO: 6.4 % (ref 0–13.4)
NEUTROPHILS # BLD AUTO: 6.36 K/UL (ref 1.82–7.42)
NEUTROPHILS NFR BLD: 70.1 % (ref 44–72)
NRBC # BLD AUTO: 0 K/UL
NRBC BLD AUTO-RTO: 0 /100 WBC
PLATELET # BLD AUTO: 397 K/UL (ref 164–446)
PMV BLD AUTO: 9.5 FL (ref 9–12.9)
RBC # BLD AUTO: 4.15 M/UL (ref 4.7–6.1)
WBC # BLD AUTO: 9.1 K/UL (ref 4.8–10.8)

## 2017-08-01 PROCEDURE — 99284 EMERGENCY DEPT VISIT MOD MDM: CPT

## 2017-08-01 PROCEDURE — 83690 ASSAY OF LIPASE: CPT

## 2017-08-01 PROCEDURE — 71010 DX-CHEST-PORTABLE (1 VIEW): CPT

## 2017-08-01 PROCEDURE — 85610 PROTHROMBIN TIME: CPT

## 2017-08-01 PROCEDURE — 85730 THROMBOPLASTIN TIME PARTIAL: CPT

## 2017-08-01 PROCEDURE — 84484 ASSAY OF TROPONIN QUANT: CPT

## 2017-08-01 PROCEDURE — 85025 COMPLETE CBC W/AUTO DIFF WBC: CPT

## 2017-08-01 PROCEDURE — 80053 COMPREHEN METABOLIC PANEL: CPT

## 2017-08-01 RX ORDER — ASPIRIN 300 MG/1
300 SUPPOSITORY RECTAL ONCE
Status: DISCONTINUED | OUTPATIENT
Start: 2017-08-02 | End: 2017-08-02 | Stop reason: HOSPADM

## 2017-08-01 RX ORDER — ASPIRIN 81 MG/1
324 TABLET, CHEWABLE ORAL ONCE
Status: DISCONTINUED | OUTPATIENT
Start: 2017-08-02 | End: 2017-08-02 | Stop reason: HOSPADM

## 2017-08-01 NOTE — PROGRESS NOTES
Patient Ryan Trevizo (Tom) was admitted on 5/17/2017 to 5/20/2017 at [facility] , and discharged with a diagnosis of congenital hiatus hernia and a LACE score of 27. They were discharged to home with instructions to follow up with their PCP as soon as possible after discharge. They were also ordered to follow up with their gastroenterologist after discharge. Patient Advocate was able to confirm that the patient picked up all of their medications. Patient was not discharged home with any DME, O2 or HHC. Patient understood all of their discharge orders. Patient Advocate was able to engage with the patient’s  and assist with their hospital recovery. At time of closing, the patient’s appointments are as follows:  • 06/01/17- Dr. Jenkins - Internist - appointment kept  • 06/01/17 - Dr. Willis - PCP - Patient No show

## 2017-08-01 NOTE — ED AVS SNAPSHOT
Home Care Instructions                                                                                                                Ryan Trevizo   MRN: [id number]    Department:  [facility], Emergency Dept   Date of Visit:  8/1/2017            [facility], Emergency Dept    [address] [place] [address]    Phone:  [phone]      You were seen by     Bob Muhammad M.D.      Your Diagnosis Was     Chest pain, unspecified type     R07.9       These are the medications you received during your hospitalization from 08/01/2017 2324 to 08/02/2017 0320     Date/Time Order Dose Route Action    08/02/2017 0040 hyoscyamine-maalox plus-lidocaine viscous (GI COCKTAIL) oral susp 30 mL 30 mL Oral Given      Follow-up Information     1. Follow up with [facility], Emergency Dept.    Specialty:  Emergency Medicine    Why:  If symptoms worsen    Contact information    [address] [place] [id number] [phone]        2. Follow up with Juancho Jenkins M.D. In 2 days.    Specialty:  Internal Medicine    Contact information    [address]  [address] [address] [phone]        Medication Information     Review all of your home medications and newly ordered medications with your primary doctor and/or pharmacist as soon as possible. Follow medication instructions as directed by your doctor and/or pharmacist.     Please keep your complete medication list with you and share with your physician. Update the information when medications are discontinued, doses are changed, or new medications (including over-the-counter products) are added; and carry medication information at all times in the event of emergency situations.               Medication List      ASK your doctor about these medications        Instructions    Morning Afternoon Evening Bedtime    atorvastatin 40 MG Tabs   Commonly known as:  LIPITOR        Take 40 mg by mouth  every evening.   Dose:  40 mg                        dicyclomine 20 MG Tabs   Commonly known as:  BENTYL        Take 1 Tab by mouth every 6 hours.   Dose:  20 mg                        lisinopril 5 MG Tabs   Commonly known as:  PRINIVIL        Take 1 Tab by mouth every day.   Dose:  5 mg                        loratadine 10 MG Tabs   Commonly known as:  CLARITIN        Take 10 mg by mouth every day.   Dose:  10 mg                        metformin 1000 MG tablet   Commonly known as:  GLUCOPHAGE        Take 1 Tab by mouth 2 times a day, with meals.   Dose:  1000 mg                        metoprolol SR 25 MG Tb24   Commonly known as:  TOPROL XL        Take 1 Tab by mouth every day.   Dose:  25 mg                        nitroGLYCERIN 0.3 MG SL tablet   Commonly known as:  NITROSTAT        Place 1 Tab under tongue 1 time daily as needed for Chest Pain.   Dose:  0.3 mg                        omeprazole 20 MG delayed-release capsule   Commonly known as:  PRILOSEC        Take 1 Cap by mouth 2 times a day.   Dose:  20 mg                        ranolazine 500 MG Tb12   Commonly known as:  RANEXA        Take 1 Tab by mouth 2 Times a Day.   Dose:  500 mg                        rivaroxaban 20 MG Tabs tablet   Commonly known as:  XARELTO        Take 1 Tab by mouth with dinner.   Dose:  20 mg                        vitamin D3 (cholecalciferol) 1000 UNIT Tabs        TAKE 1 TABLET BY MOUTH ONCE DAILY.                                Procedures and tests performed during your visit     APTT    CBC w/ Differential    Complete Metabolic Panel (CMP)    DX-CHEST-PORTABLE (1 VIEW)    EKG (ER)    ESTIMATED GFR    IV Saline Lock    Lipase    PT/INR    TROPONIN    Troponin STAT        Discharge Instructions       Chest Pain, Nonspecific  It is often hard to give a specific diagnosis for the cause of chest pain. There is always a chance that your pain could be related to something serious, like a heart attack or a blood clot in the lungs. You  need to follow up with your caregiver for further evaluation. More lab tests or other studies such as X-rays, electrocardiography, stress testing, or cardiac imaging may be needed to find the cause of your pain.  Most of the time, nonspecific chest pain improves within 2 to 3 days with rest and mild pain medicine. For the next few days, avoid physical exertion or activities that bring on pain. Do not smoke. Avoid drinking alcohol. Call your caregiver for routine follow-up as advised.   SEEK IMMEDIATE MEDICAL CARE IF:  · You develop increased chest pain or pain that radiates to the arm, neck, jaw, back, or abdomen.   · You develop shortness of breath, increased coughing, or you start coughing up blood.   · You have severe back or abdominal pain, nausea, or vomiting.   · You develop severe weakness, fainting, fever, or chills.   Document Released: 12/18/2006 Document Revised: 03/11/2013 Document Reviewed: 06/06/2008  ExitCare® Patient Information ©2013 [employer].          Patient Information     Patient Information    Following emergency treatment: all patient requiring follow-up care must return either to a private physician or a clinic if your condition worsens before you are able to obtain further medical attention, please return to the emergency room.     Billing Information    At [facility], we work to make the billing process streamlined for our patients.  Our Representatives are here to answer any questions you may have regarding your hospital bill.  If you have insurance coverage and have supplied your insurance information to us, we will submit a claim to your insurer on your behalf.  Should you have any questions regarding your bill, we can be reached online or by phone as follows:  Online: You are able pay your bills online or live chat with our representatives about any billing questions you may have. We are here to help Monday - Friday from 8:00am to 7:30pm and 9:00am - 12:00pm on Saturdays.   Please visit [email or web address]  for more information.   Phone:  [phone] or [phone]    Please note that your emergency physician, surgeon, pathologist, radiologist, anesthesiologist, and other specialists are not employed by [facility] and will therefore bill separately for their services.  Please contact them directly for any questions concerning their bills at the numbers below:     Emergency Physician Services:  [phone]  [facility]:  [phone]  [facility]:  [phone]  [facility]:  [phone]    1. Your final bill may vary from the amount quoted upon discharge if all procedures are not complete at that time, or if your doctor has additional procedures of which we are not aware. You will receive an additional bill if you return to the Emergency Department at [facility] for suture removal regardless of the facility of which the sutures were placed.     2. Please arrange for settlement of this account at the emergency registration.    3. All self-pay accounts are due in full at the time of treatment.  If you are unable to meet this obligation then payment is expected within 4-5 days.     4. If you have had radiology studies (CT, X-ray, Ultrasound, MRI), you have received a preliminary result during your emergency department visit. Please contact the radiology department [phone] to receive a copy of your final result. Please discuss the Final result with your primary physician or with the follow up physician provided.     Crisis Hotline:  [place] Crisis Hotline:  8-951-KZYMXUY or 1-352.778.1215  [place] Crisis Hotline:    1-506.769.6009 or 521-756-2212         ED Discharge Follow Up Questions    1. In order to provide you with very good care, we would like to follow up with a phone call in the next few days.  May we have your permission to contact you?     YES /  NO    2. What is the best phone number  to call you? (       )_____-__________    3. What is the best time to call you?      Morning  /  Afternoon  /  Evening                   Patient Signature:  ____________________________________________________________    Date:  ____________________________________________________________      Your appointments     Sep 11, 2017  1:15 PM   Established Patient with Juancho Jenkins M.D.   [facility] - Internal Medicine (--)    [address] [place] [address] [address]  [place] [address]   [phone]           You will be receiving a confirmation call a few days before your appointment from our automated call confirmation system.            Sep 12, 2017 11:30 AM   Anti-Coag Routine with VISTA PHARMACIST   [facility]    [address] [facility] [place] [address]   [phone]            Oct 06, 2017  2:30 PM   FOLLOW UP with Joseph León M.D.   [facility] (--)    [address] [facility] [address]  [place] [address]   [phone]

## 2017-08-01 NOTE — ED AVS SNAPSHOT
8/2/2017    Ryan Trevizo  [address]  [place] [address]    Dear Ryan:    [facility] wants to ensure your discharge home is safe and you or your loved ones have had all of your questions answered regarding your care after you leave the hospital.    Below is a list of resources and contact information should you have any questions regarding your hospital stay, follow-up instructions, or active medical symptoms.    Questions or Concerns Regarding… Contact   Medical Questions Related to Your Discharge  (7 days a week, 8am-5pm) Contact a Nurse Care Coordinator   [phone]   Medical Questions Not Related to Your Discharge  (24 hours a day / 7 days a week)  Contact the Nurse Health Line   [phone]    Medications or Discharge Instructions Refer to your discharge packet   or contact your [facility] Primary Care Provider   [phone]   Follow-up Appointment(s) Schedule your appointment via A[employer]ometrics   or contact Scheduling [phone]   Billing Review your statement via [employer]  or contact Billing [phone]   Medical Records Review your records via [employer]s   or contact Medical Records [phone]     You may receive a telephone call within two days of discharge. This call is to make certain you understand your discharge instructions and have the opportunity to have any questions answered. You can also easily access your medical information, test results and upcoming appointments via the Atonometrics free online health management tool. You can learn more and sign up at [email or web address]/[email or web address]. For assistance setting up your Atonometrics account, please call [phone].    Once again, we want to ensure your discharge home is safe and that you have a clear understanding of any next steps in your care. If you have any questions or concerns, please do not hesitate to contact us, we are here for you. Thank you for choosing [facility] for your healthcare needs.    Sincerely,    Your [facility] Healthcare Team

## 2017-08-01 NOTE — ED AVS SNAPSHOT
EnhanceWorks Access Code: [email or web address]  Expires: 8/3/2017  9:51 PM    Your email address is not on file at [facility].  Email Addresses are required for you to sign up for EnhanceWorks, please contact [phone] to verify your personal information and to provide your email address prior to attempting to register for EnhanceWorks.    Ryan Palomino Joseline  [address]  [place] [address]    EnhanceWorks  A secure, online tool to manage your health information     [facility]’s EnhanceWorks® is a secure, online tool that connects you to your personalized health information from the privacy of your home -- day or night - making it very easy for you to manage your healthcare. Once the activation process is completed, you can even access your medical information using the EnhanceWorks yoli, which is available for free in the Apple Yoli store or Google Play store.     To learn more about EnhanceWorks, visit [email or web address]    There are two levels of access available (as shown below):   My Chart Features  [facility] Primary Care Doctor [facility]  Specialists [facility]  Urgent  Care Non-[facility] Primary Care Doctor   Email your healthcare team securely and privately 24/7 X X X    Manage appointments: schedule your next appointment; view details of past/upcoming appointments X      Request prescription refills. X      View recent personal medical records, including lab and immunizations X X X X   View health record, including health history, allergies, medications X X X X   Read reports about your outpatient visits, procedures, consult and ER notes X X X X   See your discharge summary, which is a recap of your hospital and/or ER visit that includes your diagnosis, lab results, and care plan X X  X     How to register for EnhanceWorks:  Once your e-mail address has been verified, follow the following steps to sign up for EnhanceWorks.     1. Go to  [email or web address]  2. Click on the Sign Up Now box, which takes you to the New Member Sign Up page.  You will need to provide the following information:  a. Enter your Allihub Access Code exactly as it appears at the top of this page. (You will not need to use this code after you’ve completed the sign-up process. If you do not sign up before the expiration date, you must request a new code.)   b. Enter your date of birth.   c. Enter your home email address.   d. Click Submit, and follow the next screen’s instructions.  3. Create a [employer] ID. This will be your Allihub login ID and cannot be changed, so think of one that is secure and easy to remember.  4. Create a Allihub password. You can change your password at any time.  5. Enter your Password Reset Question and Answer. This can be used at a later time if you forget your password.   6. Enter your e-mail address. This allows you to receive e-mail notifications when new information is available in Allihub.  7. Click Sign Up. You can now view your health information.    For assistance activating your Allihub account, call [phone]

## 2017-08-02 VITALS
HEART RATE: 99 BPM | BODY MASS INDEX: 28 KG/M2 | RESPIRATION RATE: 22 BRPM | OXYGEN SATURATION: 95 % | TEMPERATURE: 96.6 F | WEIGHT: 200 LBS | HEIGHT: 71 IN

## 2017-08-02 LAB
ALBUMIN SERPL BCP-MCNC: 4.2 G/DL (ref 3.2–4.9)
ALBUMIN/GLOB SERPL: 1.6 G/DL
ALP SERPL-CCNC: 55 U/L (ref 30–99)
ALT SERPL-CCNC: 26 U/L (ref 2–50)
ANION GAP SERPL CALC-SCNC: 8 MMOL/L (ref 0–11.9)
APTT PPP: 32.8 SEC (ref 24.7–36)
AST SERPL-CCNC: 16 U/L (ref 12–45)
BILIRUB SERPL-MCNC: 0.5 MG/DL (ref 0.1–1.5)
BUN SERPL-MCNC: 21 MG/DL (ref 8–22)
CALCIUM SERPL-MCNC: 9.6 MG/DL (ref 8.5–10.5)
CHLORIDE SERPL-SCNC: 100 MMOL/L (ref 96–112)
CO2 SERPL-SCNC: 21 MMOL/L (ref 20–33)
CREAT SERPL-MCNC: 0.91 MG/DL (ref 0.5–1.4)
EKG IMPRESSION: NORMAL
GFR SERPL CREATININE-BSD FRML MDRD: >60 ML/MIN/1.73 M 2
GLOBULIN SER CALC-MCNC: 2.7 G/DL (ref 1.9–3.5)
GLUCOSE SERPL-MCNC: 194 MG/DL (ref 65–99)
INR PPP: 1.57 (ref 0.87–1.13)
LIPASE SERPL-CCNC: 78 U/L (ref 11–82)
POTASSIUM SERPL-SCNC: 4.5 MMOL/L (ref 3.6–5.5)
PROT SERPL-MCNC: 6.9 G/DL (ref 6–8.2)
PROTHROMBIN TIME: 19.3 SEC (ref 12–14.6)
SODIUM SERPL-SCNC: 129 MMOL/L (ref 135–145)
TROPONIN I SERPL-MCNC: <0.01 NG/ML (ref 0–0.04)
TROPONIN I SERPL-MCNC: <0.01 NG/ML (ref 0–0.04)

## 2017-08-02 PROCEDURE — A9270 NON-COVERED ITEM OR SERVICE: HCPCS | Performed by: EMERGENCY MEDICINE

## 2017-08-02 PROCEDURE — 84484 ASSAY OF TROPONIN QUANT: CPT

## 2017-08-02 PROCEDURE — 93005 ELECTROCARDIOGRAM TRACING: CPT | Performed by: EMERGENCY MEDICINE

## 2017-08-02 PROCEDURE — 700102 HCHG RX REV CODE 250 W/ 637 OVERRIDE(OP): Performed by: EMERGENCY MEDICINE

## 2017-08-02 RX ADMIN — LIDOCAINE HYDROCHLORIDE 30 ML: 20 SOLUTION OROPHARYNGEAL at 00:40

## 2017-08-02 NOTE — DISCHARGE INSTRUCTIONS
Chest Pain, Nonspecific  It is often hard to give a specific diagnosis for the cause of chest pain. There is always a chance that your pain could be related to something serious, like a heart attack or a blood clot in the lungs. You need to follow up with your caregiver for further evaluation. More lab tests or other studies such as X-rays, electrocardiography, stress testing, or cardiac imaging may be needed to find the cause of your pain.  Most of the time, nonspecific chest pain improves within 2 to 3 days with rest and mild pain medicine. For the next few days, avoid physical exertion or activities that bring on pain. Do not smoke. Avoid drinking alcohol. Call your caregiver for routine follow-up as advised.   SEEK IMMEDIATE MEDICAL CARE IF:  · You develop increased chest pain or pain that radiates to the arm, neck, jaw, back, or abdomen.   · You develop shortness of breath, increased coughing, or you start coughing up blood.   · You have severe back or abdominal pain, nausea, or vomiting.   · You develop severe weakness, fainting, fever, or chills.   Document Released: 12/18/2006 Document Revised: 03/11/2013 Document Reviewed: 06/06/2008  A[employer]® Patient Information ©2013 [employer].

## 2017-08-02 NOTE — ED NOTES
Chief Complaint   Patient presents with   • Chest Pain     Per EMS, pt with chronic CP with hx of cardiac stent placement

## 2017-08-02 NOTE — ED PROVIDER NOTES
ED Provider Note    CHIEF COMPLAINT  Chief Complaint   Patient presents with   • Chest Pain     Per EMS, pt with chronic CP with hx of cardiac stent placement       [facility]  Ryan Trevizo is a 52 y.o. male who presents to the emergency department with chest discomfort. The patient states the chest pain started when he was sleeping. He states that the substernal region described as burning. He is unaware of any exacerbating or relieving factors. He states he does have some dyspnea as well as nausea. He does not have any fevers. The patient did have a cardiac catheterization on 27 July of this year that did not require any further intervention but he does have a known history of significant cardiac disease. He does take anticoagulation. He does not have any pain or swelling to his lower extremities. His pain is currently mild to moderate in intensity.    REVIEW OF SYSTEMS  See HPI for further details. All other systems are negative.     PAST MEDICAL HISTORY  Past Medical History   Diagnosis Date   • Arthritis    • HTN (hypertension) 7/15/2012   • GERD (gastroesophageal reflux disease) 7/15/2012   • STEMI (ST elevation myocardial infarction) (CMS-HCC)    • Anxiety disorder    • MI (myocardial infarction) (CMS-HCC)    • Hyperlipemia    • Upper GI bleed 7/26/2012   • Glaucoma    • SOB (shortness of breath)    • Claudication (CMS-HCC)    • Dizziness    • CAD (coronary artery disease) 10/14/2013   • ACS (acute coronary syndrome) (CMS-[facility]) 7/25/2012   • Pericarditis secondary to acute myocardial infarction (CMS-HCC) 7/19/2012   • Right knee pain    • Blood glucose elevated    • Mental retardation    • Osteoarthritis    • Hypertriglyceridemia    • A-fib (CMS-HCC)    • Ischemic cardiomyopathy    • [place]        SOCIAL HISTORY  Social History     Social History   • Marital Status: Single     Spouse Name: N/A   • Number of Children: N/A   • Years of Education: N/A     Social History Main Topics   • Smoking status:  "Never Smoker    • Smokeless tobacco: Never Used   • Alcohol Use: No   • Drug Use: No   • Sexual Activity: Not on file      Comment: Single, has no children, works as an .     Other Topics Concern   • Not on file     Social History Narrative    ** Merged History Encounter **         ** Merged History Encounter **                PHYSICAL EXAM  VITAL SIGNS: Pulse 117  Temp(Src) 36.1 °C (97 °F)  Ht 1.803 m (5' 11\")  Wt 90.719 kg (200 lb)  BMI 27.91 kg/m2  SpO2 93%  Constitutional: Anxious   HENT: Normocephalic, Atraumatic, tympanic membranes are intact and nonerythematous bilaterally, Oropharynx moist without exudates or erythema, Nose normal.   Eyes: PERRLA, EOMI, Conjunctiva normal.  Neck: Supple without meningismus  Lymphatic: No lymphadenopathy noted.   Cardiovascular: Normal heart rate, Normal rhythm, No murmurs, No rubs, No gallops.   Thorax & Lungs: Normal breath sounds, No respiratory distress, No wheezing, No chest tenderness.   Abdomen: Epigastric discomfort to deep palpation, no rebound, no guarding, normal bowel sounds  Skin: Warm, Dry, No erythema, No rash.   Back: No tenderness, No CVA tenderness.   Extremities: Atraumatic with symmetric distal pulses, No edema, No tenderness, No cyanosis, No clubbing.   Neurologic: Alert & oriented x 3, cranial nerves II through XII are intact, Normal motor function, Normal sensory function, No focal deficits noted.   Psychiatric: Affect normal, Judgment normal, Mood normal.     EKG  Twelve-lead EKG interpreted by myself shows a sinus tachycardia with a ventricular rate of 114, no ST segment elevation that meets criteria, no ST segment depression, T-wave is flat inferiorly, overall no dynamic changes from prior    Repeat 12-lead EKG interpreted by myself shows a normal sinus rhythm with a ventricular rate 94, normal intervals, normal QRS, no ST segment elevation or depression, T-wave is unchanged from before    RADIOLOGY/PROCEDURES  DX-CHEST-PORTABLE (1 " VIEW)   Final Result      Atelectasis within the left lung base.            COURSE & MEDICAL DECISION MAKING  Pertinent Labs & Imaging studies reviewed. (See chart for details)  This a 52-year-old gentleman who presents to the emergency department with chest discomfort. The patient has been here multiple times for this discomfort. He recently had a CT scan of his chest that ruled out a pulmonary embolus. He also had a catheterization that did show significant disease but nothing that would require acute stenting. The patient was treated medically. His pain seems to be more gastric as he does have burning discomfort into his chest however he did not respond to a GI cocktail. Therefore this may not be the source. Chest x-ray does not show any evidence of a pulmonary source. At this time and on a clear etiology. The patient has 2 EKGs  by 2 hours as well as 2 troponins that did not show any evidence of ischemia. Therefore we'll continue medical management have the patient call cardiology for follow-up. He will also follow up with his primary care doctor. The patient will return to the emergency department if he is acutely worse.    FINAL IMPRESSION  1. Chest pain     Disposition  The patient will be discharged in stable condition    Electronically signed by: Bob Muhammad, 8/1/2017 11:54 PM

## 2017-08-02 NOTE — ED NOTES
Pt provided discharge instructions.  In such instructions, the patient made aware of medical diagnosis, treatment team, follow up recommendations for continuity of care, how to access [employer] health information online, information on medical prescriptions (how to take, when to take/not to take, side effects and adverse effects), and other health information pertinent to patient's diagnosis.  Patient verbalized understanding of discharge information.

## 2017-08-04 NOTE — TELEPHONE ENCOUNTER
Was the patient seen in the last year in this department? Yes Last seen 6/20/17 Dr Jenkins next appt 9/11/17 Dr Jenkins    Does patient have an active prescription for medications requested? No     Received Request Via: Pharmacy

## 2017-08-05 ENCOUNTER — HOSPITAL ENCOUNTER (EMERGENCY)
Facility: MEDICAL CENTER | Age: 52
End: 2017-08-05
Attending: EMERGENCY MEDICINE
Payer: MEDICARE

## 2017-08-05 ENCOUNTER — APPOINTMENT (OUTPATIENT)
Dept: RADIOLOGY | Facility: MEDICAL CENTER | Age: 52
End: 2017-08-05
Attending: EMERGENCY MEDICINE
Payer: MEDICARE

## 2017-08-05 VITALS
BODY MASS INDEX: 27.91 KG/M2 | SYSTOLIC BLOOD PRESSURE: 117 MMHG | OXYGEN SATURATION: 96 % | WEIGHT: 200 LBS | RESPIRATION RATE: 20 BRPM | DIASTOLIC BLOOD PRESSURE: 74 MMHG | TEMPERATURE: 97.3 F | HEART RATE: 99 BPM

## 2017-08-05 DIAGNOSIS — I25.119 CORONARY ARTERY DISEASE INVOLVING NATIVE HEART WITH ANGINA PECTORIS, UNSPECIFIED VESSEL OR LESION TYPE (HCC): ICD-10-CM

## 2017-08-05 DIAGNOSIS — E13.8 DIABETES MELLITUS OF OTHER TYPE WITH COMPLICATION: ICD-10-CM

## 2017-08-05 DIAGNOSIS — I15.9 SECONDARY HYPERTENSION: ICD-10-CM

## 2017-08-05 DIAGNOSIS — R07.9 CHEST PAIN, UNSPECIFIED TYPE: ICD-10-CM

## 2017-08-05 LAB
ALBUMIN SERPL BCP-MCNC: 3.9 G/DL (ref 3.2–4.9)
ALBUMIN/GLOB SERPL: 1.5 G/DL
ALP SERPL-CCNC: 45 U/L (ref 30–99)
ALT SERPL-CCNC: 61 U/L (ref 2–50)
ANION GAP SERPL CALC-SCNC: 9 MMOL/L (ref 0–11.9)
APTT PPP: 24.1 SEC (ref 24.7–36)
AST SERPL-CCNC: 45 U/L (ref 12–45)
BASOPHILS # BLD AUTO: 0.5 % (ref 0–1.8)
BASOPHILS # BLD: 0.04 K/UL (ref 0–0.12)
BILIRUB SERPL-MCNC: 0.4 MG/DL (ref 0.1–1.5)
BNP SERPL-MCNC: 5 PG/ML (ref 0–100)
BUN SERPL-MCNC: 13 MG/DL (ref 8–22)
CALCIUM SERPL-MCNC: 8.8 MG/DL (ref 8.5–10.5)
CHLORIDE SERPL-SCNC: 107 MMOL/L (ref 96–112)
CO2 SERPL-SCNC: 19 MMOL/L (ref 20–33)
CREAT SERPL-MCNC: 0.84 MG/DL (ref 0.5–1.4)
EKG IMPRESSION: NORMAL
EOSINOPHIL # BLD AUTO: 0.12 K/UL (ref 0–0.51)
EOSINOPHIL NFR BLD: 1.5 % (ref 0–6.9)
ERYTHROCYTE [DISTWIDTH] IN BLOOD BY AUTOMATED COUNT: 42.6 FL (ref 35.9–50)
GFR SERPL CREATININE-BSD FRML MDRD: >60 ML/MIN/1.73 M 2
GLOBULIN SER CALC-MCNC: 2.6 G/DL (ref 1.9–3.5)
GLUCOSE SERPL-MCNC: 156 MG/DL (ref 65–99)
HCT VFR BLD AUTO: 34.5 % (ref 42–52)
HGB BLD-MCNC: 11.9 G/DL (ref 14–18)
IMM GRANULOCYTES # BLD AUTO: 0.06 K/UL (ref 0–0.11)
IMM GRANULOCYTES NFR BLD AUTO: 0.8 % (ref 0–0.9)
INR PPP: 1.02 (ref 0.87–1.13)
LIPASE SERPL-CCNC: 70 U/L (ref 11–82)
LYMPHOCYTES # BLD AUTO: 1.46 K/UL (ref 1–4.8)
LYMPHOCYTES NFR BLD: 18.3 % (ref 22–41)
MCH RBC QN AUTO: 31 PG (ref 27–33)
MCHC RBC AUTO-ENTMCNC: 34.5 G/DL (ref 33.7–35.3)
MCV RBC AUTO: 89.8 FL (ref 81.4–97.8)
MONOCYTES # BLD AUTO: 0.45 K/UL (ref 0–0.85)
MONOCYTES NFR BLD AUTO: 5.6 % (ref 0–13.4)
NEUTROPHILS # BLD AUTO: 5.86 K/UL (ref 1.82–7.42)
NEUTROPHILS NFR BLD: 73.3 % (ref 44–72)
NRBC # BLD AUTO: 0 K/UL
NRBC BLD AUTO-RTO: 0 /100 WBC
PLATELET # BLD AUTO: 366 K/UL (ref 164–446)
PMV BLD AUTO: 9.5 FL (ref 9–12.9)
POTASSIUM SERPL-SCNC: 4.4 MMOL/L (ref 3.6–5.5)
PROT SERPL-MCNC: 6.5 G/DL (ref 6–8.2)
PROTHROMBIN TIME: 13.7 SEC (ref 12–14.6)
RBC # BLD AUTO: 3.84 M/UL (ref 4.7–6.1)
SODIUM SERPL-SCNC: 135 MMOL/L (ref 135–145)
TROPONIN I SERPL-MCNC: <0.01 NG/ML (ref 0–0.04)
TROPONIN I SERPL-MCNC: <0.01 NG/ML (ref 0–0.04)
WBC # BLD AUTO: 8 K/UL (ref 4.8–10.8)

## 2017-08-05 PROCEDURE — 85025 COMPLETE CBC W/AUTO DIFF WBC: CPT

## 2017-08-05 PROCEDURE — 99285 EMERGENCY DEPT VISIT HI MDM: CPT

## 2017-08-05 PROCEDURE — 93005 ELECTROCARDIOGRAM TRACING: CPT

## 2017-08-05 PROCEDURE — 85730 THROMBOPLASTIN TIME PARTIAL: CPT

## 2017-08-05 PROCEDURE — 83880 ASSAY OF NATRIURETIC PEPTIDE: CPT

## 2017-08-05 PROCEDURE — 80053 COMPREHEN METABOLIC PANEL: CPT

## 2017-08-05 PROCEDURE — 71010 DX-CHEST-LIMITED (1 VIEW): CPT

## 2017-08-05 PROCEDURE — 36415 COLL VENOUS BLD VENIPUNCTURE: CPT

## 2017-08-05 PROCEDURE — 85610 PROTHROMBIN TIME: CPT

## 2017-08-05 PROCEDURE — 84484 ASSAY OF TROPONIN QUANT: CPT

## 2017-08-05 PROCEDURE — 93005 ELECTROCARDIOGRAM TRACING: CPT | Performed by: EMERGENCY MEDICINE

## 2017-08-05 PROCEDURE — 83690 ASSAY OF LIPASE: CPT

## 2017-08-05 RX ORDER — PANTOPRAZOLE SODIUM 40 MG/1
TABLET, DELAYED RELEASE ORAL
Qty: 30 TAB | Refills: 5 | Status: SHIPPED | OUTPATIENT
Start: 2017-08-05 | End: 2017-12-29 | Stop reason: SDUPTHER

## 2017-08-05 RX ORDER — HYDROCODONE BITARTRATE AND ACETAMINOPHEN 5; 325 MG/1; MG/1
1 TABLET ORAL EVERY 4 HOURS PRN
Qty: 20 TAB | Refills: 0 | Status: SHIPPED | OUTPATIENT
Start: 2017-08-05 | End: 2017-09-11

## 2017-08-05 ASSESSMENT — PAIN SCALES - GENERAL
PAINLEVEL_OUTOF10: 8
PAINLEVEL_OUTOF10: 8

## 2017-08-05 ASSESSMENT — LIFESTYLE VARIABLES: DO YOU DRINK ALCOHOL: NO

## 2017-08-05 NOTE — ED AVS SNAPSHOT
Home Care Instructions                                                                                                                Ryan Trevizo   MRN: [id number]    Department:  [facility], Emergency Dept   Date of Visit:  8/5/2017            [facility], Emergency Dept    [address] [address]    Phone:  [phone]      You were seen by     Gary Gansert, M.D.      Your Diagnosis Was     Chest pain, unspecified type     R07.9       Follow-up Information     1. Follow up with Juancho Jenkins M.D.. Schedule an appointment as soon as possible for a visit in 2 days.    Specialty:  Internal Medicine    Contact information    [address]  [facility] [address]  [place] [address] [phone]        Medication Information     Review all of your home medications and newly ordered medications with your primary doctor and/or pharmacist as soon as possible. Follow medication instructions as directed by your doctor and/or pharmacist.     Please keep your complete medication list with you and share with your physician. Update the information when medications are discontinued, doses are changed, or new medications (including over-the-counter products) are added; and carry medication information at all times in the event of emergency situations.               Medication List      START taking these medications        Instructions    Morning Afternoon Evening Bedtime    hydrocodone-acetaminophen 5-325 MG Tabs per tablet   Commonly known as:  NORCO        Take 1 Tab by mouth every four hours as needed.   Dose:  1 Tab                          ASK your doctor about these medications        Instructions    Morning Afternoon Evening Bedtime    atorvastatin 40 MG Tabs   Commonly known as:  LIPITOR        Take 40 mg by mouth every evening.   Dose:  40 mg                        dicyclomine 20 MG Tabs   Commonly known as:  BENTYL        Take 1 Tab by mouth every 6 hours.   Dose:  20  mg                        lisinopril 5 MG Tabs   Commonly known as:  PRINIVIL        Take 1 Tab by mouth every day.   Dose:  5 mg                        loratadine 10 MG Tabs   Commonly known as:  CLARITIN        Take 10 mg by mouth every day.   Dose:  10 mg                        metformin 1000 MG tablet   Commonly known as:  GLUCOPHAGE        Take 1 Tab by mouth 2 times a day, with meals.   Dose:  1000 mg                        metoprolol SR 25 MG Tb24   Commonly known as:  TOPROL XL        Take 1 Tab by mouth every day.   Dose:  25 mg                        nitroGLYCERIN 0.3 MG SL tablet   Commonly known as:  NITROSTAT        Place 1 Tab under tongue 1 time daily as needed for Chest Pain.   Dose:  0.3 mg                        omeprazole 20 MG delayed-release capsule   Commonly known as:  PRILOSEC        Take 1 Cap by mouth 2 times a day.   Dose:  20 mg                        pantoprazole 40 MG Tbec   Commonly known as:  PROTONIX        TAKE 1 TABLET BY MOUTH ONCE DAILY.                        ranolazine 500 MG Tb12   Commonly known as:  RANEXA        Take 1 Tab by mouth 2 Times a Day.   Dose:  500 mg                        rivaroxaban 20 MG Tabs tablet   Commonly known as:  XARELTO        Take 1 Tab by mouth with dinner.   Dose:  20 mg                        vitamin D3 (cholecalciferol) 1000 UNIT Tabs        TAKE 1 TABLET BY MOUTH ONCE DAILY.                             Where to Get Your Medications      You can get these medications from any pharmacy     Bring a paper prescription for each of these medications    - hydrocodone-acetaminophen 5-325 MG Tabs per tablet            Procedures and tests performed during your visit     APTT    Btype Natriuretic Peptide    CBC with Differential    Cardiac Monitoring    Complete Metabolic Panel (CMP)    DX-CHEST-LIMITED (1 VIEW)    EKG (NOW)    EKG NOW    ESTIMATED GFR    Lipase    Maintain O2 sats greater than 94%    NURSING COMMUNICATION    Oxygen Therapy per Protocol      Prothrombin Time    Saline Lock    TROPONIN    Troponin        Discharge Instructions       Chest Pain, Nonspecific  It is often hard to give a specific diagnosis for the cause of chest pain. There is always a chance that your pain could be related to something serious, like a heart attack or a blood clot in the lungs. You need to follow up with your caregiver for further evaluation. More lab tests or other studies such as X-rays, electrocardiography, stress testing, or cardiac imaging may be needed to find the cause of your pain.  Most of the time, nonspecific chest pain improves within 2 to 3 days with rest and mild pain medicine. For the next few days, avoid physical exertion or activities that bring on pain. Do not smoke. Avoid drinking alcohol. Call your caregiver for routine follow-up as advised.   SEEK IMMEDIATE MEDICAL CARE IF:  · You develop increased chest pain or pain that radiates to the arm, neck, jaw, back, or abdomen.   · You develop shortness of breath, increased coughing, or you start coughing up blood.   · You have severe back or abdominal pain, nausea, or vomiting.   · You develop severe weakness, fainting, fever, or chills.   Document Released: 12/18/2006 Document Revised: 03/11/2013 Document Reviewed: 06/06/2008  [employer]Care® Patient Information ©2013 [employer].          Patient Information     Patient Information    Following emergency treatment: all patient requiring follow-up care must return either to a private physician or a clinic if your condition worsens before you are able to obtain further medical attention, please return to the emergency room.     Billing Information    At [facility], we work to make the billing process streamlined for our patients.  Our Representatives are here to answer any questions you may have regarding your hospital bill.  If you have insurance coverage and have supplied your insurance information to us, we will submit a claim to your insurer on your  behalf.  Should you have any questions regarding your bill, we can be reached online or by phone as follows:  Online: You are able pay your bills online or live chat with our representatives about any billing questions you may have. We are here to help Monday - Friday from 8:00am to 7:30pm and 9:00am - 12:00pm on Saturdays.  Please visit [email or web address]  for more information.   Phone:  [phone] or [phone]    Please note that your emergency physician, surgeon, pathologist, radiologist, anesthesiologist, and other specialists are not employed by [facility] and will therefore bill separately for their services.  Please contact them directly for any questions concerning their bills at the numbers below:     Emergency Physician Services:  [phone]  [facility]:  [phone]  [facility]:  [phone]  [facility]:  [phone]    1. Your final bill may vary from the amount quoted upon discharge if all procedures are not complete at that time, or if your doctor has additional procedures of which we are not aware. You will receive an additional bill if you return to the Emergency Department at [facility] for suture removal regardless of the facility of which the sutures were placed.     2. Please arrange for settlement of this account at the emergency registration.    3. All self-pay accounts are due in full at the time of treatment.  If you are unable to meet this obligation then payment is expected within 4-5 days.     4. If you have had radiology studies (CT, X-ray, Ultrasound, MRI), you have received a preliminary result during your emergency department visit. Please contact the radiology department [phone] to receive a copy of your final result. Please discuss the Final result with your primary physician or with the follow up physician provided.     Crisis Hotline:  National Crisis Hotline:  9-470-PCFQOPF or  1-953.885.7361  [place] Crisis Hotline:    1-214.494.9559 or 370-003-7592         ED Discharge Follow Up Questions    1. In order to provide you with very good care, we would like to follow up with a phone call in the next few days.  May we have your permission to contact you?     YES /  NO    2. What is the best phone number to call you? (       )_____-__________    3. What is the best time to call you?      Morning  /  Afternoon  /  Evening                   Patient Signature:  ____________________________________________________________    Date:  ____________________________________________________________      Your appointments     Sep 11, 2017  1:15 PM   Established Patient with Juancho Jenkins M.D.   [facility] - Internal Medicine (--)    [address] [place] [address]  [facility] [id number] [phone]           You will be receiving a confirmation call a few days before your appointment from our automated call confirmation system.            Sep 12, 2017 11:30 AM   Anti-Coag Routine with VISTA PHARMACIST   [facility] [place]    [address] [facility] [address] [phone]            Oct 06, 2017  2:30 PM   FOLLOW UP with Joseph León M.D.   [facility] (--)    [address] [place] [address] [address]  [facility] [id number] [phone]

## 2017-08-05 NOTE — ED AVS SNAPSHOT
[facility] Access Code: [email or web address]  Expires: 9/4/2017  6:17 PM    Your email address is not on file at [employer].  Email Addresses are required for you to sign up for [employer], please contact [phone] to verify your personal information and to provide your email address prior to attempting to register for [employer].    Ryan Palomino Joseline  [address]  [place] [address]    [employer]  A secure, online tool to manage your health information     [employer]’s [facility]® is a secure, online tool that connects you to your personalized health information from the privacy of your home -- day or night - making it very easy for you to manage your healthcare. Once the activation process is completed, you can even access your medical information using the [facility] [employer] yoli, which is available for free in the Apple Yoli store or Google Play store.     To learn more about [employer], visit [email or web address]    There are two levels of access available (as shown below):   My Chart Features  [facility] Primary Care Doctor [facility]  Specialists [facility]  Urgent  Care [facility] Primary Care Doctor   Email your healthcare team securely and privately 24/7 X X X    Manage appointments: schedule your next appointment; view details of past/upcoming appointments X      Request prescription refills. X      View recent personal medical records, including lab and immunizations X X X X   View health record, including health history, allergies, medications X X X X   Read reports about your outpatient visits, procedures, consult and ER notes X X X X   See your discharge summary, which is a recap of your hospital and/or ER visit that includes your diagnosis, lab results, and care plan X X  X     How to register for [employer]:  Once your e-mail address has been verified, follow the following steps to sign up for [facility].     1. Go to  [email or web address]  2. Click on the Sign Up Now box, which takes you to the New Member Sign Up page.  You will need to provide the following information:  a. Enter your [employer] Access Code exactly as it appears at the top of this page. (You will not need to use this code after you’ve completed the sign-up process. If you do not sign up before the expiration date, you must request a new code.)   b. Enter your date of birth.   c. Enter your home email address.   d. Click Submit, and follow the next screen’s instructions.  3. Create a [employer] ID. This will be your [employer] login ID and cannot be changed, so think of one that is secure and easy to remember.  4. Create a [employer] password. You can change your password at any time.  5. Enter your Password Reset Question and Answer. This can be used at a later time if you forget your password.   6. Enter your e-mail address. This allows you to receive e-mail notifications when new information is available in [employer].  7. Click Sign Up. You can now view your health information.    For assistance activating your [employer] account, call [phone]

## 2017-08-05 NOTE — ED NOTES
"Ryan Trevizo  Chief Complaint   Patient presents with   • Chest Pain     pt biba from home for sudden onset of sharp mid chest pain approx 1 hour ago.  increased pain with coughing, denies n/v.  has been seen here multiple times for same in last month.       In hospital gown, on monitor.  States pain is 8/10 at this time,  Given 2 nitro sprays PTA, \"a little relief\".  Hx of MI 2012.   FSBS 159.   EKG done.  Call light in reach.  "

## 2017-08-05 NOTE — ED PROVIDER NOTES
"ED Provider Note  CHIEF COMPLAINT  Chief Complaint   Patient presents with   • Chest Pain     pt biba from home for sudden onset of sharp mid chest pain approx 1 hour ago.  increased pain with coughing, denies n/v.  has been seen here multiple times for same in last month.         HPI  Ryan Trevizo is a 52 y.o. male who presents complaining of some moderate sharp sometimes achy substernal chest pain that started about 1-2 hours ago. Seems to be worse with coughing. No nausea vomiting or sweating. He is been seen here multiple times within the past month. Get a cardiac stress test in April 2016. He's had a cardiac echocardiogram in April 2016 with showed an ejection fraction of 45% and and some ventricular hypokinesis. He has a history of GE reflux, hypertension, coronary disease, MI, anxiety disorder, acute coronary syndrome, mental retardation, and multiple visits to the ED. he has an appointment to see his primary care physician on Monday. He said he would just like some pain medicine for his chest pain when he coughs.    REVIEW OF SYSTEMS  No Headache, no jaw pain, no difficulty breathing. No abdominal pain. No nausea or vomiting.  ALL OTHER SYSTEMS NEGATIVE    ALLERGIES  Allergies   Allergen Reactions   • Ritalin [Methylphenidate] Unspecified     \"Hyper\"       CURRENT MEDICATIONS  Home Medications     Reviewed by Li Ordonez R.N. (Registered Nurse) on 08/05/17 at 1446  Med List Status: Not Addressed    Medication Last Dose Status    atorvastatin (LIPITOR) 40 MG Tab 7/24/2017 Active    dicyclomine (BENTYL) 20 MG Tab 7/24/2017 Active    lisinopril (PRINIVIL) 5 MG Tab 7/28/2017 Active    loratadine (CLARITIN) 10 MG Tab 7/24/2017 Active    metformin (GLUCOPHAGE) 1000 MG tablet 7/24/2017 Active    metoprolol SR (TOPROL XL) 25 MG TABLET SR 24 HR 7/28/2017 Active    nitroGLYCERIN (NITROSTAT) 0.3 MG SL tablet  Active    omeprazole (PRILOSEC) 20 MG delayed-release capsule 7/24/2017 Active    pantoprazole " (PROTONIX) 40 MG Tablet Delayed Response  Active    ranolazine (RANEXA) 500 MG TABLET SR 12 HR  Active    rivaroxaban (XARELTO) 20 MG Tab tablet 7/24/2017 Active    vitamin D3, cholecalciferol, 1000 UNIT Tab 7/24/2017 Active                PAST MEDICAL HISTORY  Past Medical History   Diagnosis Date   • Arthritis    • HTN (hypertension) 7/15/2012   • GERD (gastroesophageal reflux disease) 7/15/2012   • STEMI (ST elevation myocardial infarction) (CMS-HCC)    • Anxiety disorder    • MI (myocardial infarction) (CMS-HCC)    • Hyperlipemia    • Upper GI bleed 7/26/2012   • Glaucoma    • SOB (shortness of breath)    • Claudication (CMS-HCC)    • Dizziness    • CAD (coronary artery disease) 10/14/2013   • ACS (acute coronary syndrome) (CMS-HCC) 7/25/2012   • Pericarditis secondary to acute myocardial infarction (CMS-HCC) 7/19/2012   • Right knee pain    • Blood glucose elevated    • Mental retardation    • Osteoarthritis    • Hypertriglyceridemia    • A-fib (CMS-HCC)    • Ischemic cardiomyopathy    • Diabetes (CMS-HCC)        SURGICAL HISTORY  Past Surgical History   Procedure Laterality Date   • Other orthopedic surgery  7-      R knee surgery   • Other  knee surgery   • Other cardiac surgery       stent placement   • Other     • Gastroscopy-endo  7/25/2012     Performed by JORDIN VERA at ENDOSCOPY [facility]   • Gastroscopy-endo  7/26/2012     Performed by JORDIN VERA at ENDOSCOPY [facility]   • Gastroscopy-endo  5/19/2017     Procedure: GASTROSCOPY-ENDO;  Surgeon: Reinaldo Berry M.D.;  Location: [facility];  Service:        FAMILY HISTORY  No family history on file.    SOCIAL HISTORY  Social History     Social History   • Marital Status: Single     Spouse Name: N/A   • Number of Children: N/A   • Years of Education: N/A     Social History Main Topics   • Smoking status: Never Smoker    • Smokeless tobacco: Never Used   • Alcohol Use: No   • Drug Use: No   • Sexual Activity:  Not Asked      Comment: Single, has no children, works as an .     Other Topics Concern   • None     Social History Narrative    ** Merged History Encounter **         ** Merged History Encounter **            PHYSICAL EXAM  GENERAL: Alert male adult  VITAL SIGNS: /74 mmHg  Pulse 114  Temp(Src) 36.3 °C (97.3 °F)  Resp 20  Wt 90.719 kg (200 lb)  SpO2 96%   Constitutional: Alert healthy-appearing adult HENT: Scalp is normal size and nontender. Ears are clear. Nose is clear. Throat is clear with no stridor no drooling no trismus. Teeth are all intact.  Eyes: Pupils equal round and reactive to light, extraocular motor fall. There is no scleral icterus.  Neck: Neck is supple and nontender. There is no meningismus. No adenitis. No thyromegaly.  Lymphatic: No adenopathy.   Cardiovascular: Heart regular rhythm without murmurs or gallops   Thorax & Lungs: No chest wall tenderness. Lungs are clear. Patient has good breath sounds bilateral. No rales, no rhonchi, no wheezes.  Abdomen: Abdomen is soft, nontender, not rigid, no guarding, and no organomegaly. There is no palpable hernia   Skin: Warm, pink, and dry with no erythema and no rash.   Back: Nontender, no midline bony tenderness, no flank tenderness.    Extremities: Full range of motion  No tenderness to palpation and no deformities noted. No calf or thigh swelling. No calf or thigh tenderness. No clinical DVT.  Neurologic: Alert & oriented . Cranial nerves are grossly intact as tested. Patient moves all 4 extremities well. Patient has good strong flexion and extension of the ankle joints knee joints hip joints and elbow joints. Sensation is normal and symmetrical in the upper and lower extremities.   Psychiatric: Patient is alert oriented coherent and rational.     EKG  EKG Interpretation    Interpreted by me    Rhythm: normal sinus   Rate: normal  Axis: normal  Ectopy: none  Conduction: normal  ST Segments: no acute change  T Waves: no acute  change  Q Waves: none    Clinical Impression: Sinus rhythm with no specific ST-T wave change.    RADIOLOGY/PROCEDURES  DX-CHEST-LIMITED (1 VIEW)   Final Result      Stable chest. Unchanged minimal left lower lobe atelectasis. No acute bony process identified.            COURSE & MEDICAL DECISION MAKING  Patient's had multiple recent visits to the ED. He comes in with some substernal chest pain. He had an echocardiogram in April 2016 which showed some hypokinesis and an ejection fraction of 45%. He had a nuclear medicine study which is unremarkable at the same time. Differential diagnosis: Chest pain, acute coronary syndrome, unstable angina, chest wall pain, anxiety.    Plan: #1 IV #2 cardiac monitor, pulse ox monitor, blood pressure monitor. #3. Chest x-ray and EKG #4. Lab including CBC, CMP, troponin, ProTime, #5. Observation in the ED #6. Review previous medical records #7. Repeat EKG and troponin 2 hours after the 1st levels.    Review of previous medical records: Multiple visits to the ED. Recently. Hypertension, coronary disease, hyperlipidemia, MI, cardiomyopathy. Medications include Nitrostat, sore toe, Toprol, Prinivil, Glucophage.    I reviewed his recent admissions and he had a negative troponin on July 30, August 1, August 2. EKGs were unremarkable. Laboratory and reexamination: Troponin #1 is less than 0.01. Troponin #2 is less than 0.01.  EKG #1 shows no acute change. Normal sinus rhythm. EKG #2 shows normal sinus rhythm no acute change. CBC is normal no anemia and no bandemia. Chemistry panel is normal. Glucose is slightly elevated 156 and CO2 slightly low at 19. Slight elevation in liver enzyme SGPT. Otherwise unremarkable.  Results for orders placed or performed during the hospital encounter of 08/05/17   Troponin   Result Value Ref Range    Troponin I <0.01 0.00 - 0.04 ng/mL   Btype Natriuretic Peptide   Result Value Ref Range    B Natriuretic Peptide 5 0 - 100 pg/mL   CBC with Differential    Result Value Ref Range    WBC 8.0 4.8 - 10.8 K/uL    RBC 3.84 (L) 4.70 - 6.10 M/uL    Hemoglobin 11.9 (L) 14.0 - 18.0 g/dL    Hematocrit 34.5 (L) 42.0 - 52.0 %    MCV 89.8 81.4 - 97.8 fL    MCH 31.0 27.0 - 33.0 pg    MCHC 34.5 33.7 - 35.3 g/dL    RDW 42.6 35.9 - 50.0 fL    Platelet Count 366 164 - 446 K/uL    MPV 9.5 9.0 - 12.9 fL    Neutrophils-Polys 73.30 (H) 44.00 - 72.00 %    Lymphocytes 18.30 (L) 22.00 - 41.00 %    Monocytes 5.60 0.00 - 13.40 %    Eosinophils 1.50 0.00 - 6.90 %    Basophils 0.50 0.00 - 1.80 %    Immature Granulocytes 0.80 0.00 - 0.90 %    Nucleated RBC 0.00 /100 WBC    Neutrophils (Absolute) 5.86 1.82 - 7.42 K/uL    Lymphs (Absolute) 1.46 1.00 - 4.80 K/uL    Monos (Absolute) 0.45 0.00 - 0.85 K/uL    Eos (Absolute) 0.12 0.00 - 0.51 K/uL    Baso (Absolute) 0.04 0.00 - 0.12 K/uL    Immature Granulocytes (abs) 0.06 0.00 - 0.11 K/uL    NRBC (Absolute) 0.00 K/uL   Complete Metabolic Panel (CMP)   Result Value Ref Range    Sodium 135 135 - 145 mmol/L    Potassium 4.4 3.6 - 5.5 mmol/L    Chloride 107 96 - 112 mmol/L    Co2 19 (L) 20 - 33 mmol/L    Anion Gap 9.0 0.0 - 11.9    Glucose 156 (H) 65 - 99 mg/dL    Bun 13 8 - 22 mg/dL    Creatinine 0.84 0.50 - 1.40 mg/dL    Calcium 8.8 8.5 - 10.5 mg/dL    AST(SGOT) 45 12 - 45 U/L    ALT(SGPT) 61 (H) 2 - 50 U/L    Alkaline Phosphatase 45 30 - 99 U/L    Total Bilirubin 0.4 0.1 - 1.5 mg/dL    Albumin 3.9 3.2 - 4.9 g/dL    Total Protein 6.5 6.0 - 8.2 g/dL    Globulin 2.6 1.9 - 3.5 g/dL    A-G Ratio 1.5 g/dL   Prothrombin Time   Result Value Ref Range    PT 13.7 12.0 - 14.6 sec    INR 1.02 0.87 - 1.13   APTT   Result Value Ref Range    APTT 24.1 (L) 24.7 - 36.0 sec   Lipase   Result Value Ref Range    Lipase 70 11 - 82 U/L   ESTIMATED GFR   Result Value Ref Range    GFR If African American >60 >60 mL/min/1.73 m 2    GFR If Non African American >60 >60 mL/min/1.73 m 2   TROPONIN   Result Value Ref Range    Troponin I <0.01 0.00 - 0.04 ng/mL   EKG (NOW)   Result  Value Ref Range    Report       [facility]    Test Date:  2017  Pt Name:    NICHELLE MARQUEZ                   Department: ER  MRN:        [id number]                      Room:       GR 26  Gender:     M                            Technician: [id number]  :        1965                   Requested By:ER TRIAGE PROTOCOL  Order #:    [id number]                    Reading MD:    Measurements  Intervals                                Axis  Rate:       109                          P:          35  IN:         136                          QRS:        -22  QRSD:       92                           T:          14  QT:         320  QTc:        431    Interpretive Statements  SINUS TACHYCARDIA  PROBABLE INFERIOR INFARCT, AGE INDETERMINATE  Compared to ECG 2017 01:53:44  Sinus rhythm no longer present  Myocardial infarct finding still present     EKG NOW   Result Value Ref Range    Report       [facility]    Test Date:  2017  Pt Name:    NICHELLE MARQUEZ                   Department: ER  MRN:        [id number]                      Room:       GR 26  Gender:     M                            Technician: [id number]  :        1965                   Requested By:GARY GANSERT  Order #:    [id number]                    Reading MD:    Measurements  Intervals                                Axis  Rate:       98                           P:          33  IN:         140                          QRS:        -37  QRSD:       102                          T:          13  QT:         340  QTc:        435    Interpretive Statements  SINUS RHYTHM  CONSIDER LEFT ATRIAL ABNORMALITY  LEFT AXIS DEVIATION  Compared to ECG 2017 14:43:49  Left-axis deviation now present  Sinus tachycardia no longer present  Myocardial infarct finding no longer present        Patient was also evaluated with a complete chest pain evaluation on  by Dr. Bob Muhammad and the exam  then was normal. He was given follow-up. At 6 PM the patient states that he does not want to be admitted to the hospital. In fact he declines admission to the hospital but he wants some pain medicine for this chronic pain in his chest. He said it's not at rest but it hurts when he coughs. He has an appointment to see his primary care physician Dr. Jenkins on Monday. Stable for discharge.    Home treatment: #1 hydrocodone No. 2 is given a copy of all of his reports #3 he'll see Dr. Jenkins on Monday. Number for return as needed.  FINAL IMPRESSION  1. Chest pain  2. Chronic pain syndrome.  3. Multiple ER visits.         Electronically signed by: Gary Gansert, 8/5/2017 6:15 PM

## 2017-08-06 NOTE — DISCHARGE INSTRUCTIONS
Chest Pain, Nonspecific  It is often hard to give a specific diagnosis for the cause of chest pain. There is always a chance that your pain could be related to something serious, like a heart attack or a blood clot in the lungs. You need to follow up with your caregiver for further evaluation. More lab tests or other studies such as X-rays, electrocardiography, stress testing, or cardiac imaging may be needed to find the cause of your pain.  Most of the time, nonspecific chest pain improves within 2 to 3 days with rest and mild pain medicine. For the next few days, avoid physical exertion or activities that bring on pain. Do not smoke. Avoid drinking alcohol. Call your caregiver for routine follow-up as advised.   SEEK IMMEDIATE MEDICAL CARE IF:  · You develop increased chest pain or pain that radiates to the arm, neck, jaw, back, or abdomen.   · You develop shortness of breath, increased coughing, or you start coughing up blood.   · You have severe back or abdominal pain, nausea, or vomiting.   · You develop severe weakness, fainting, fever, or chills.   Document Released: 12/18/2006 Document Revised: 03/11/2013 Document Reviewed: 06/06/2008  [employer]® Patient Information ©2013 [employer].

## 2017-08-11 ENCOUNTER — TELEPHONE (OUTPATIENT)
Dept: INTERNAL MEDICINE | Facility: MEDICAL CENTER | Age: 52
End: 2017-08-11

## 2017-08-11 NOTE — TELEPHONE ENCOUNTER
Nimisha from [facility] pharmacy would like to clarify multiple medications including the pantoprazole and omeprazole.  Does one need to be cancelled.

## 2017-08-18 LAB
EKG IMPRESSION: NORMAL
EKG IMPRESSION: NORMAL

## 2017-08-21 LAB — EKG IMPRESSION: NORMAL

## 2017-09-11 ENCOUNTER — OFFICE VISIT (OUTPATIENT)
Dept: INTERNAL MEDICINE | Facility: MEDICAL CENTER | Age: 52
End: 2017-09-11
Payer: MEDICARE

## 2017-09-11 VITALS
SYSTOLIC BLOOD PRESSURE: 149 MMHG | BODY MASS INDEX: 31.82 KG/M2 | HEIGHT: 65 IN | OXYGEN SATURATION: 96 % | TEMPERATURE: 98.5 F | RESPIRATION RATE: 16 BRPM | WEIGHT: 191 LBS | DIASTOLIC BLOOD PRESSURE: 100 MMHG | HEART RATE: 71 BPM

## 2017-09-11 DIAGNOSIS — E11.9 TYPE 2 DIABETES MELLITUS WITHOUT COMPLICATION, WITHOUT LONG-TERM CURRENT USE OF INSULIN (HCC): ICD-10-CM

## 2017-09-11 DIAGNOSIS — E78.2 MIXED HYPERLIPIDEMIA: ICD-10-CM

## 2017-09-11 DIAGNOSIS — I25.10 CORONARY ARTERY DISEASE INVOLVING NATIVE CORONARY ARTERY OF NATIVE HEART WITHOUT ANGINA PECTORIS: ICD-10-CM

## 2017-09-11 DIAGNOSIS — I48.91 ATRIAL FIBRILLATION WITH RAPID VENTRICULAR RESPONSE (HCC): ICD-10-CM

## 2017-09-11 DIAGNOSIS — I10 ESSENTIAL HYPERTENSION: ICD-10-CM

## 2017-09-11 DIAGNOSIS — I25.5 CARDIOMYOPATHY, ISCHEMIC: ICD-10-CM

## 2017-09-11 DIAGNOSIS — I48.0 PAROXYSMAL ATRIAL FIBRILLATION (HCC): ICD-10-CM

## 2017-09-11 DIAGNOSIS — K21.9 GASTROESOPHAGEAL REFLUX DISEASE, ESOPHAGITIS PRESENCE NOT SPECIFIED: ICD-10-CM

## 2017-09-11 LAB
HBA1C MFR BLD: 7.3 % (ref ?–5.8)
INT CON NEG: NEGATIVE
INT CON POS: POSITIVE

## 2017-09-11 PROCEDURE — 99213 OFFICE O/P EST LOW 20 MIN: CPT | Mod: GE | Performed by: INTERNAL MEDICINE

## 2017-09-11 PROCEDURE — 83036 HEMOGLOBIN GLYCOSYLATED A1C: CPT | Mod: GC | Performed by: INTERNAL MEDICINE

## 2017-09-11 RX ORDER — ATORVASTATIN CALCIUM 40 MG/1
80 TABLET, FILM COATED ORAL DAILY
Qty: 30 TAB | Refills: 11 | Status: SHIPPED | OUTPATIENT
Start: 2017-09-11 | End: 2018-07-18 | Stop reason: SDUPTHER

## 2017-09-11 RX ORDER — ATORVASTATIN CALCIUM 40 MG/1
40 TABLET, FILM COATED ORAL DAILY
Qty: 30 TAB | Refills: 11 | Status: SHIPPED | OUTPATIENT
Start: 2017-09-11 | End: 2017-09-11 | Stop reason: SDUPTHER

## 2017-09-11 RX ORDER — LORATADINE 10 MG/1
10 TABLET ORAL DAILY
Qty: 30 TAB | Refills: 11 | Status: SHIPPED | OUTPATIENT
Start: 2017-09-11 | End: 2018-08-27 | Stop reason: SDUPTHER

## 2017-09-11 ASSESSMENT — PAIN SCALES - GENERAL: PAINLEVEL: NO PAIN

## 2017-09-11 NOTE — PROGRESS NOTES
Established Patient    Ryan presents today with the following:    CC: Hospital follow up for frequent chest pain and occasional abdominal pain    HPI: 52 year old male presents to the clinic today for a 3 month check up and hospital follow up for chest pain. He is accompanied by his .    Chest pain: Candelario has been seen and assessed in the emergency department 9 times since being seen in this office 3 months prior, most of which was for chest pain. He has a history of MI, advanced coronary artery disease, ischemic cardiomyopathy and paroxysmal atrial fibrillation and is followed by Dr. León outpatient. He was hospitalized twice and one of these admissions was an admission for chest pain. During that admission the patient underwent a cardiac catheterization which showed inferior wall hypokinesis with moderately reduced left ventricular systolic function, ejection fraction 35%, coronary artery disease (aneurysmal change proximal left anterior descending artery (LAD) with diffuse moderate disease up to 70% distal LAD. 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 At some point the patient was taken off isosorbide mononitrate and placed on ranolazine and his atorvastatin was increased from 40mg to 80mg daily. The patient's  reports that the patient hoards his medications and is non-compliant with his meds. During this visit he denies any chest pain, is in normal sinus rhythm and has no acute complaints.    Type 2 diabetes mellitus: The patient's HbA1c on 7/20 was 7.6% and at todays visit a POC HbA1c was 7.3%. He does walk often as he uses public transportation and is overall fairly active. His diet is unchanged from our  previous visit. He was counseled in depth regarding being compliant with his medications and diet + exercise.     Gastroesophageal reflux disease: Well controlled on his current dose of pantoprazole. Case was discussed with [facility] as the patient had been prescribed both pantoprazole and omeprazole and pantoprazole was continued.    Health maintenance: Patient has a colonoscopy scheduled for September 28th, 2017.    Patient Active Problem List    Diagnosis Date Noted   • Atrial fibrillation with rapid ventricular response (CMS-HCC) 05/18/2017     Priority: Medium   • Type 2 diabetes mellitus without complication, without long-term current use of insulin ([facility]) 02/16/2017     Priority: Medium   • CAD with ischemic cardiomyopathy 10/14/2013     Priority: Medium   • Glaucoma      Priority: Low   • Hyperlipidemia 07/16/2012     Priority: Low   • HTN (hypertension) 07/15/2012     Priority: Low   • GERD with Nieves's esophagus 07/15/2012     Priority: Low   • AF (atrial fibrillation) (CMS-HCC) 07/25/2017   • Nieves esophagus 05/19/2017   • Tachycardia 04/17/2017   • Claudication (CMS-HCC)    • Ischemic Cardiomyopathy EF 35% 07/17/2012       Current Outpatient Prescriptions   Medication Sig Dispense Refill   • atorvastatin (LIPITOR) 40 MG Tab Take 1 Tab by mouth every day. 30 Tab 11   • loratadine (CLARITIN) 10 MG Tab Take 1 Tab by mouth every day. 30 Tab 11   • pantoprazole (PROTONIX) 40 MG Tablet Delayed Response TAKE 1 TABLET BY MOUTH ONCE DAILY. 30 Tab 5   • ranolazine (RANEXA) 500 MG TABLET SR 12 HR Take 1 Tab by mouth 2 Times a Day. 60 Tab 3   • nitroGLYCERIN (NITROSTAT) 0.3 MG SL tablet Place 1 Tab under tongue 1 time daily as needed for Chest Pain. 100 Tab 0   • rivaroxaban (XARELTO) 20 MG Tab tablet Take 1 Tab by mouth with dinner. 30 Tab 11   • metoprolol SR (TOPROL XL) 25 MG TABLET SR 24 HR Take 1 Tab by mouth every day. 30 Tab 5   • lisinopril (PRINIVIL) 5 MG Tab Take 1 Tab by mouth every day.  "30 Tab 11   • metformin (GLUCOPHAGE) 1000 MG tablet Take 1 Tab by mouth 2 times a day, with meals. 60 Tab 11   • vitamin D3, cholecalciferol, 1000 UNIT Tab TAKE 1 TABLET BY MOUTH ONCE DAILY. 30 Tab 11     No current facility-administered medications for this visit.        ROS: As per HPI. Additional pertinent symptoms as noted below.        /100   Pulse 71   Temp 36.9 °C (98.5 °F)   Resp 16   Ht 1.651 m (5' 5\")   Wt 86.6 kg (191 lb)   SpO2 96%   BMI 31.78 kg/m²     Physical Exam   General:  Alert and oriented, No apparent distress. Disheveled male appears stated age, well nourished, appears anxious.  Eyes: Pupils equal and reactive. No scleral icterus. EOMI, no injected conjunctiva.  Throat: Clear no erythema or exudates noted.  Neck: Supple. No lymphadenopathy noted. Thyroid not enlarged.  Lungs: Clear to auscultation and percussion bilaterally. No wheezing, crackles, rhales.  Cardiovascular: Regular rate and rhythm. No murmurs, rubs or gallops.  Abdomen:  Benign. No rebound or guarding noted. Non-distended, non-tender.  Extremities: No clubbing, cyanosis, edema. Surgical scar on right knee, knees non-tender to touch, non-erythematous, no swelling present.  Skin: Clear. No rash or suspicious skin lesions noted.      Assessment and Plan    1. Coronary artery disease  - Continue metoprolol, rivaroxaban, atorvastatin  - Cardiology assessed while inpatient and stopped his isosorbide mononitrate and started ranolazine   - 9 ED visits, mostly for chest pain  - Cardiac cath on 7/26/2017 showed inferior wall hypokinesis with moderately reduced left ventricular systolic function, ejection fraction 35%, coronary artery disease (aneurysmal change proximal left anterior descending artery (LAD) with diffuse moderate disease up to 70% distal LAD. 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 Will see Dr. León on 10/6/17.                2. Ischemic Cardiomyopathy EF 35%  - Likely 2/2 CAD, Hx MI  - Saw cardiologist, Dr. Calixto, on 9/29. No changes were made to medication regimen at that time.  - Saw Dr. León on 4/13. No changes were made to medication regimen, but will see on 10/6/17 for repeat echo and lipid panel.  - Continue metoprolol, lisinopril, atorvastatin, ranolazine  - May be candidate for spironolactone. Management per cardio.  - Euvolemic with clear lungs at todays visit  - Will see Dr. León on 10/6/17.    3. Paroxysmal atrial fibrillation with RVR  - Admitted from 5/18-5/20 with atrial fibrillation  - Converted back into NSR on IV cardizem drip  - Currently in NSR  - Denies CP, SOB, weakness, lightheadedness at today's visit  - Continue metoprolol ER 25mg daily  - CHADS vasc 2. Continue rivaroxaban.  - Appt with Dr. León on 10/6/2017.     4. Type 2 diabetes mellitus  - HbA1c 7.7% on 5/20/16  - HbA1c 9.8% on 3/23/17 indicating worsening control  - HbA1c 7.3% on 9/11/17  - Increase metformin from 500mg BID to 1000mg BID on 3/23/17  - Urine microalbumin 69.1. Already on lisinopril for renal protection.  - Continue to emphasize the importance of diet and weight loss  - Eye exam for diabetic, microfilament at next visit     5. Hyperlipidemia  - Atorvastatin 40mg daily increased to 80mg on 3/23/17. Medications updated to reflect this.  - Lipid panel 3/20 showed T. Chol 192, , HDL 37 and   - ASCVD score 11.2% based on most recent lipid panel results     6. Essential hypertension  - /100 at today's visit  - Continue metoprolol, lisinopril  - Stable     7. Gastroesophageal reflux disease  -  Continue protonix daily  - EGD performed on 5/19/17 with salmon colored distal esophagus mucosa indicative of duarte's and a 10cm focal area of gastric fundus with erythema, edema, friability and slight ulceration was seen. May be indicative of ischemic gastritis associated with hypotension when he was in atrial fibrillation.  - CTA abdomen 5/19 did not indicate any vascular abnormality that would cause ischemic gastritis  - GI referral     8. Duarte's esophagus  - EGD performed on 5/19/17 with salmon colored distal esophagus mucosa indicative of duarte's and a 10cm focal area of gastric fundus with erythema, edema, friability and slight ulceration was seen.  - Pathology of esophageal biopsies 5/19/17 with intestinal metaplasia  - CT abd 12/11/16 shows changes in the distal esophagus likely related to inflammatory esophagitis  - Continue protonix  - Referral to GI for management of duarte's esophagus and screening colonoscopy     9. Vitamin D deficiency  - Continue Vitamin D replacement  - Vit D 3/28/17 was 29.9     10. Health maintenance  - Colonoscopy scheduled for 9/28/2017      PLAN: Follow up in 5 weeks      Signed by: Juancho Jenkins M.D.

## 2017-09-12 ENCOUNTER — ANTICOAGULATION VISIT (OUTPATIENT)
Dept: MEDICAL GROUP | Facility: PHYSICIAN GROUP | Age: 52
End: 2017-09-12
Payer: MEDICARE

## 2017-09-12 VITALS — SYSTOLIC BLOOD PRESSURE: 140 MMHG | DIASTOLIC BLOOD PRESSURE: 106 MMHG | HEART RATE: 70 BPM

## 2017-09-12 DIAGNOSIS — I48.91 ATRIAL FIBRILLATION WITH RAPID VENTRICULAR RESPONSE (HCC): ICD-10-CM

## 2017-09-12 LAB — INR PPP: 1.2 (ref 2–3.5)

## 2017-09-12 PROCEDURE — 85610 PROTHROMBIN TIME: CPT | Performed by: FAMILY MEDICINE

## 2017-09-12 NOTE — PROGRESS NOTES
"Anticoagulation Summary  As of 9/12/2017    INR goal:      TTR:   --   Today's INR:   1.2   Maintenance plan:   No maintenance plan   Plan last modified:   Reinier Bah, PharmD (6/20/2017)   Next INR check:   2/27/2018   Target end date:   Indefinite    Indications    Atrial fibrillation with rapid ventricular response (CMS-HCC) [I48.91]             Anticoagulation Episode Summary     INR check location:       Preferred lab:       Send INR reminders to:       Comments:         Anticoagulation Care Providers     Provider Role Specialty Phone number    Juancho Jenkins M.D. Referring Internal Medicine [phone]    [facility] Responsible  [phone]        Anticoagulation Patient Findings     Patient seen today for follow up on his Xarelto therapy.  He is apparently tolerating medication well with no AE's or bruising/bleeding issues to report.  His  does report that patient was hoarding medication and not taking properly recently and may have missed as much as two months of his medications.  He also was seen in ER nine times since last visit with c/o chest pain.  No significant findings in all cases,  states patient does this on a regular basis when he \"gets lonely\".  They have already had a long talk about compliance and things seem to be back in good order.                          Target end date:Indefinite     Indication: Atrial Fibrillation     Drug: Xarelto      CHADsVASC = at least 2    Health Status Since Last Assessment   Patient denies any new relevant medical problems, ED visits or hospitalizations   Patient denies any embolic events (stroke/tia/systemic embolism)    Adherence with DOAC Therapy   Pt has NO missed any doses in the average week at this time    Bleeding Risk Assessment     Negative Epistaxis   Pt denies any excessive or unusual bleeding/hematomas.  Pt denies any GI bleeds or hematemesis.  Pt denies any concerning daily headache or sub " dural hematoma symptoms.     Pt denies any hematuria or abnormal vaginal bleeding.   Latest Hemoglobin 11.9   ETOH overuse Negative     Creatinine Clearance/Renal Function     Latest ClCr WNL     Drug Interactions   ASA/other antiplatelets Negative   NSAID Negative   Other drug interactions Negative    Examination   Blood Pressure 140/106 patient declines recheck   Symptomatic hypotension Negative   Significant gait impairment/imbalance/high risk for falls? Negative    Final Assessment and Recommendations:   Patient appears stable from the anticoagulation staindpoint.     Benefits of continued DOAC therapy outweigh risks for this patient   Recommend pt continue with current DOAC therapy Xarelto 20mg one time daily (with dose adjustment)     Other Actions:    Follow up:   Will follow up with patient in clinic in 6 months.    Reinier Bah, PharmD

## 2017-09-20 NOTE — PROGRESS NOTES
Report received from ED RN. Patient to room 823-2. Patient alert and oriented. No s/s os distress. Patient shows afib VR 70's. No c/o or concerns voiced at this time. Justin Zelaya, patient  with disability resources aware of patient's current situation. Call light within reach. Safety maintained.    Subjective:      Romero Monge is a 4 y.o. male here with mother. Patient brought in for Fever (on and off since thursday, 102 yesterday afternoon, has gotten to almost 104 but went down with med ); Cough (bad cough, croupy ); Abdominal Pain; Nasal Congestion (green yellow drainage ); and Eye Drainage (yellow green drainage and his eyes are pink and itchy)      History of Present Illness:  Fever   This is a new problem. The current episode started in the past 7 days (9/14). The problem has been waxing and waning (102-104). Associated symptoms include abdominal pain, congestion (green-yellow), coughing (croupy), a fever and myalgias. Pertinent negatives include no vomiting. Exacerbated by: exposed to flu at school.       Patient Active Problem List    Diagnosis Date Noted    Abdominal pain, periumbilical 12/16/2015    Loose bowel movement 12/16/2015    Fecal soiling 12/16/2015    Flatulence, eructation and gas pain 12/16/2015    Abdominal pain, generalized 07/28/2014    Diarrhea 07/28/2014    Patent foramen ovale 03/12/2014    Atopic dermatitis 02/28/2014    Milk intolerance 12/03/2012    Gastroesophageal reflux 05/18/2012         Review of Systems   Constitutional: Positive for activity change, appetite change and fever.   HENT: Positive for congestion (green-yellow).    Eyes: Positive for discharge (yellow), redness and itching.   Respiratory: Positive for cough (croupy).    Gastrointestinal: Positive for abdominal pain. Negative for diarrhea and vomiting.   Musculoskeletal: Positive for myalgias.       Objective:     Physical Exam   Constitutional: He is cooperative.  Non-toxic appearance. He appears ill. No distress.   HENT:   Right Ear: A middle ear effusion (cloudy, R>L) is present.   Left Ear: A middle ear effusion (cloudy) is present.   Nose: Congestion present.   Mouth/Throat: Mucous membranes are moist. No oropharyngeal exudate or pharynx erythema. Oropharynx is clear.   Eyes: Conjunctivae are  normal.   Neck: Neck supple. No neck adenopathy.   Cardiovascular: Normal rate and regular rhythm.    No murmur heard.  Pulmonary/Chest: Effort normal and breath sounds normal. No respiratory distress. He has no wheezes. He has no rhonchi.   Harsh, frequent cough   Neurological: He is alert.   Skin: Skin is warm. No rash noted. No pallor.       Assessment:        1. Fever, unspecified fever cause    2. Cough    3. Conjunctivitis, bacterial    4. Acute suppurative otitis media of both ears without spontaneous rupture of tympanic membranes, recurrence not specified         Plan:       Flu negative.  Discussed possible false positive.    Needs antibiotic for ear infections, but also would like to cover for possible early pneumonia.  Nothing heard on exam, but some suspicion with prolonged fever and cough.  History of pneumonia.  Start on Omnicef today.  Recheck in 2-3 days if still having fever.      Patient Instructions   · Flu test negative.  · Start Omnicef for ear infection.  · Rx drops sent for eye drainage.  · Tylenol (acetaminophen) or Motrin/Advil (ibuprofen) as needed for fever (> 100.3) or pain.  · Mucinex or Delsym as needed.  · Saline spray to nose as needed.  Steam or cool mist humidifier for cough and congestion.  Keep head elevated.

## 2017-10-06 ENCOUNTER — OFFICE VISIT (OUTPATIENT)
Dept: CARDIOLOGY | Facility: MEDICAL CENTER | Age: 52
End: 2017-10-06
Payer: MEDICARE

## 2017-10-06 VITALS
DIASTOLIC BLOOD PRESSURE: 90 MMHG | SYSTOLIC BLOOD PRESSURE: 150 MMHG | BODY MASS INDEX: 28.44 KG/M2 | HEIGHT: 69 IN | WEIGHT: 192 LBS | OXYGEN SATURATION: 96 % | HEART RATE: 88 BPM

## 2017-10-06 DIAGNOSIS — E78.5 DYSLIPIDEMIA: ICD-10-CM

## 2017-10-06 DIAGNOSIS — I25.118 CORONARY ARTERY DISEASE OF NATIVE ARTERY OF NATIVE HEART WITH STABLE ANGINA PECTORIS (HCC): Primary | ICD-10-CM

## 2017-10-06 DIAGNOSIS — I48.0 PAF (PAROXYSMAL ATRIAL FIBRILLATION) (HCC): ICD-10-CM

## 2017-10-06 DIAGNOSIS — I25.5 CARDIOMYOPATHY, ISCHEMIC: ICD-10-CM

## 2017-10-06 DIAGNOSIS — I10 ESSENTIAL HYPERTENSION: ICD-10-CM

## 2017-10-06 PROBLEM — I48.91 AF (ATRIAL FIBRILLATION) (HCC): Status: RESOLVED | Noted: 2017-07-25 | Resolved: 2017-10-06

## 2017-10-06 LAB — EKG IMPRESSION: NORMAL

## 2017-10-06 PROCEDURE — 99214 OFFICE O/P EST MOD 30 MIN: CPT | Performed by: INTERNAL MEDICINE

## 2017-10-06 PROCEDURE — 93000 ELECTROCARDIOGRAM COMPLETE: CPT | Performed by: INTERNAL MEDICINE

## 2017-10-06 ASSESSMENT — ENCOUNTER SYMPTOMS: CARDIOVASCULAR NEGATIVE: 1

## 2017-10-06 NOTE — LETTER
Name:          Ryan Trevizo   YOB: 1965  Date:     10/6/2017      Juancho Jenkins M.D.  [address]  [place] [address]     Joseph León MD  [address], [address]  [place] [address]  Phone: [phone]  Back Line: [phone]  Fax: [phone]  E-mail: [email or web address]   Dear Dr. Jenkins,    We had the pleasure of seeing your patient, Ryan Trevizo, in Cardiology Clinic at [facility] today.    As you know, he is a 52-year-old man with a history of ischemic systolic congestive heart failure with an ejection fraction of by echocardiogram done in April, 2017. He has complex multivessel coronary artery disease with no good targets for revascularization, paroxysmal atrial fibrillation, and was hospitalized with ischemic gastritis and a GI bleed in May, 2017.    He denies any chest discomfort whatsoever today, which is very curious in the setting of multiple trips to the emergency room. Amongst those multiple trips to the emergency room since April he has at no point in time sustained a myocardial infarction. He was documented in July as having progression in his coronary artery disease. I certainly suspect issues with secondary gain prompting his multiple emergency room trips, and I would recommend upon presentation to the emergency room withholding narcotic pain medications, and redirecting towards clinic follow-up after an EKG and one set of biomarkers. I will direct this communication to some of the emergency room physicians who have previously treated him.    Otherwise, his QTC on his EKG is narrow and certainly has ranolazine can be increased to 1,000 milligrams by mouth twice a day for better angina control. Again, however, he denies angina today.    Return in about 3 months (around 1/6/2018).    Thank you for the referral and please do not hesitate to contact me at any time. My contact information is listed above.    This note was dictated using Dragon  speech recognition software.     A full note including my physical examination and a full list of rectified medications is available in our medical record, and can be faxed as well.    Joseph León MD  Cardiologist  [facility]    cc: All of the emergency room physicians who have seen him since my last visit

## 2017-10-06 NOTE — PROGRESS NOTES
Subjective:   Ryan Trevizo is a 52 -year-old man with a history of ischemic systolic congestive heart failure with an ejection fraction of by echocardiogram done in April, 2017. He has complex multivessel coronary artery disease with no good targets for revascularization, paroxysmal atrial fibrillation, and was hospitalized with ischemic gastritis and a GI bleed in May, 2017.    He has no cardiovascular complaints, but has had several trips to the emergency room. He did have recent cardiac catheterization that demonstrated high-grade disease of a posterior descending coronary artery, an FFR negative moderate disease of his LAD.    Again, he denies strictly any chest discomfort today, which is curious at best in relation to his multiple recurrent trips to the emergency room. He comes in with a caregiver who confesses that the patient is imperfectly adherent to his medical regimen.    Past Medical History:   Diagnosis Date   • CAD (coronary artery disease) 10/14/2013   • Upper GI bleed 7/26/2012   • ACS (acute coronary syndrome) (CMS-HCC) 7/25/2012   • Pericarditis secondary to acute myocardial infarction 7/19/2012   • HTN (hypertension) 7/15/2012   • GERD (gastroesophageal reflux disease) 7/15/2012   • A-fib (CMS-HCC)    • Anxiety disorder    • Arthritis    • Blood glucose elevated    • Claudication (CMS-HCC)    • Diabetes (CMS-HCC)    • Dizziness    • Glaucoma    • Hyperlipemia    • Hypertriglyceridemia    • Ischemic cardiomyopathy    • Mental retardation    • MI (myocardial infarction)    • Osteoarthritis    • Right knee pain    • SOB (shortness of breath)    • STEMI (ST elevation myocardial infarction) (CMS-HCC)      Past Surgical History:   Procedure Laterality Date   • GASTROSCOPY-ENDO  5/19/2017    Procedure: GASTROSCOPY-ENDO;  Surgeon: Reinaldo Berry M.D.;  Location: ENDOSCOPY [facility];  Service:    • GASTROSCOPY-ENDO  7/26/2012    Performed by JORDIN VERA at ENDOSCOPY [facility]   •  "GASTROSCOPY-ENDO  7/25/2012    Performed by JORDIN VERA at ENDOSCOPY [facility]   • OTHER ORTHOPEDIC SURGERY  7-     R knee surgery   • OTHER  knee surgery   • OTHER     • OTHER CARDIAC SURGERY      stent placement     History reviewed. No pertinent family history.  History   Smoking Status   • Never Smoker   Smokeless Tobacco   • Never Used     Allergies   Allergen Reactions   • Ritalin [Methylphenidate] Unspecified     \"Hyper\"     Outpatient Encounter Prescriptions as of 10/6/2017   Medication Sig Dispense Refill   • loratadine (CLARITIN) 10 MG Tab Take 1 Tab by mouth every day. 30 Tab 11   • atorvastatin (LIPITOR) 40 MG Tab Take 2 Tabs by mouth every day. 30 Tab 11   • pantoprazole (PROTONIX) 40 MG Tablet Delayed Response TAKE 1 TABLET BY MOUTH ONCE DAILY. 30 Tab 5   • ranolazine (RANEXA) 500 MG TABLET SR 12 HR Take 1 Tab by mouth 2 Times a Day. 60 Tab 3   • vitamin D3, cholecalciferol, 1000 UNIT Tab TAKE 1 TABLET BY MOUTH ONCE DAILY. 30 Tab 11   • rivaroxaban (XARELTO) 20 MG Tab tablet Take 1 Tab by mouth with dinner. 30 Tab 11   • metoprolol SR (TOPROL XL) 25 MG TABLET SR 24 HR Take 1 Tab by mouth every day. 30 Tab 5   • lisinopril (PRINIVIL) 5 MG Tab Take 1 Tab by mouth every day. 30 Tab 11   • metformin (GLUCOPHAGE) 1000 MG tablet Take 1 Tab by mouth 2 times a day, with meals. 60 Tab 11   • nitroGLYCERIN (NITROSTAT) 0.3 MG SL tablet Place 1 Tab under tongue 1 time daily as needed for Chest Pain. 100 Tab 0     No facility-administered encounter medications on file as of 10/6/2017.      Review of Systems   Cardiovascular: Negative.    All other systems reviewed and are negative.       Objective:   /90   Pulse 88   Ht 1.753 m (5' 9\")   Wt 87.1 kg (192 lb)   SpO2 96%   BMI 28.35 kg/m²     Physical Exam   Constitutional: He is oriented to person, place, and time. He appears well-developed and well-nourished. No distress.   Pleasant, fairly soft spoken, mildly disheveled, middle-aged man " accompanied by a caregiver in no distress   HENT:   Head: Normocephalic and atraumatic.   Eyes: Conjunctivae and EOM are normal. Pupils are equal, round, and reactive to light. No scleral icterus.   Neck: Neck supple. No JVD present. No tracheal deviation present.   Cardiovascular: Normal rate, regular rhythm, normal heart sounds and intact distal pulses.  Exam reveals no gallop and no friction rub.    No murmur heard.  Pulses:       Dorsalis pedis pulses are 2+ on the right side, and 2+ on the left side.   No carotid bruits   Pulmonary/Chest: Effort normal and breath sounds normal. No stridor. No respiratory distress. He has no wheezes. He has no rales.   Abdominal: Soft. Bowel sounds are normal. He exhibits no distension.   Musculoskeletal: He exhibits no edema.   Neurological: He is alert and oriented to person, place, and time.   Skin: Skin is warm and dry. No rash noted. He is not diaphoretic. No erythema. No pallor.   Psychiatric: He has a normal mood and affect. Judgment and thought content normal.   Vitals reviewed.    Lab Results   Component Value Date/Time    WBC 8.0 08/05/2017 02:50 PM    RBC 3.84 (L) 08/05/2017 02:50 PM    HEMOGLOBIN 11.9 (L) 08/05/2017 02:50 PM    HEMATOCRIT 34.5 (L) 08/05/2017 02:50 PM    MCV 89.8 08/05/2017 02:50 PM    MCH 31.0 08/05/2017 02:50 PM    MCHC 34.5 08/05/2017 02:50 PM    MPV 9.5 08/05/2017 02:50 PM        Lab Results   Component Value Date/Time    SODIUM 135 08/05/2017 02:50 PM    POTASSIUM 4.4 08/05/2017 02:50 PM    CHLORIDE 107 08/05/2017 02:50 PM    CO2 19 (L) 08/05/2017 02:50 PM    GLUCOSE 156 (H) 08/05/2017 02:50 PM    BUN 13 08/05/2017 02:50 PM    CREATININE 0.84 08/05/2017 02:50 PM    CREATININE 1.0 07/12/2007 05:40 PM    BUNCREATRAT 18 06/13/2014 08:40 AM        Lab Results   Component Value Date/Time    ASTSGOT 45 08/05/2017 02:50 PM    ALTSGPT 61 (H) 08/05/2017 02:50 PM        Lab Results   Component Value Date/Time    CHOLSTRLTOT 140 07/21/2017 04:32 AM    LDL  "82 07/21/2017 04:32 AM    HDL 31 (A) 07/21/2017 04:32 AM    TRIGLYCERIDE 134 07/21/2017 04:32 AM       Cardiac catheterization, 4/9/2016:  \"IMPRESSION:  1.  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  2.  A 40% lesion in the very proximal right coronary artery.  3.  A 90% stenosis at the origin of one of the tandem posterior descending    artery vessels.  4.  An 80% lesion in the mid portion of the posterior left ventricular artery.  5.  Elongated area of ectasia of the proximal left anterior descending.  6.  A 90% stenosis involving the origin of the small caliber first diagonal    artery.  7.  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  8.  Diffuse nonobstructive plaquing in the mid and distal left anterior    descending.  9.  Complete occlusion of the proximal circumflex, which is known from    previous angiogram.  10.  Ventricular dysfunction with hypokinesis of the mid anterior wall and    distal inferior wall\"    Exercise stress echocardiogram, 10/7/2014:  \"CONCLUSIONS  Exercise capacity is average.  EKG response to exercise was indeterminate because of baseline EKG   abnormality  Dense inferior infarct with preserved overall function.  Estimated LVEF   50%\"    Myocardial perfusion imaging, 4/5/2016:  \" IMPRESSIONS   Fixed defect in the basal and mid myocardium.   inferoNormal left ventricular size, ejection fraction, and wall motion\"    Echocardiogram, 4/16/2017:  \"CONCLUSIONS  Mildly reduced left ventricular systolic function.  Left ventricular ejection fraction is visually estimated to be 45%.  Inferolateral and inferior hypokinesis.  Grade I diastolic dysfunction.  Moderate asymmetric septal hypertrophy.  Mild aortic insufficiency.  Compared to the report of the study done on 07/25/2012 there has been   no significant change\"    Cardiac catheterization, 7/26/2017:  \"Coronary artery disease and coronary aeruysm     This is right dominant system.     Left main " "is large and without flow limiting disease. It has mild aneurysmal change.  It bifurcated into left anterior descending and left circumflex artery.     Left anterior descending artery (LAD) is large caliber vessel and extends to the apex.  It gives rises to several small to medium sized diagonal branches.  Ectatic/aneruysmal change was noted in the proximal LAD.  Moderate diffuse disease was seen in the mid to distal LAD up to 70% stenosis in the distal portion.  The antegrade flow is normal.     FFR of the distal LAD was 0.78 but above 0.8 for the proximal and mid lesions.     Left circumflex artery is large caliber. It gives rise to large first and medium sized second obtuse marginal (OM) branches and small AV groove branch.   There was chronic total occlusion at the origin of the second OM with bridging collateral and LISA II flow.  There is no clear visible stump.     Right coronary is large caliber. It gives rise to several medium sized acute marginal branch, dual posterior descending artery and posterolateral branch.  There was 95% stenosis at the ostium of one of the PDA.  Aneurysmal change was also seen in the proximal and mid RCA.  The antegrade flow is normal.     FFR of the main RCA was 0.86     Recommendations; Maximize medical therapy and risk factor modification\"    Assessment:     1. Coronary artery disease of native artery of native heart with stable angina pectoris (CMS-[facility])  EKG   2. PAF (paroxysmal atrial fibrillation) (CMS-[facility])     3. Essential hypertension     4. Ischemic Cardiomyopathy EF 35%     5. Dyslipidemia         Medical Decision Making:  Today's Assessment / Status / Plan:     He denies any chest discomfort whatsoever today, which is very curious in the setting of multiple trips to the emergency room. Amongst those multiple trips to the emergency room since April he has at no point in time sustained a myocardial infarction. He was documented in July as having progression in his coronary " artery disease. I certainly suspect issues with secondary gain prompting his multiple emergency room trips, and I would recommend upon presentation to the emergency room withholding narcotic pain medications, and redirecting towards clinic follow-up after an EKG and one set of biomarkers. I will direct this communication to some of the emergency room physicians who have previously treated him.    Otherwise, his QTC on his EKG is narrow and certainly has internal losing can be increased to thousand milligrams by mouth twice a day for better angina control. Again, however, he denies angina today.    Joseph León MD  Cardiologist, [facility] [place]     Return in about 3 months (around 1/6/2018).

## 2017-10-18 ENCOUNTER — OFFICE VISIT (OUTPATIENT)
Dept: INTERNAL MEDICINE | Facility: MEDICAL CENTER | Age: 52
End: 2017-10-18
Payer: MEDICARE

## 2017-10-18 ENCOUNTER — NON-PROVIDER VISIT (OUTPATIENT)
Dept: OCCUPATIONAL MEDICINE | Facility: CLINIC | Age: 52
End: 2017-10-18

## 2017-10-18 VITALS
HEART RATE: 67 BPM | HEIGHT: 69 IN | TEMPERATURE: 98 F | SYSTOLIC BLOOD PRESSURE: 124 MMHG | WEIGHT: 189 LBS | RESPIRATION RATE: 16 BRPM | OXYGEN SATURATION: 97 % | BODY MASS INDEX: 27.99 KG/M2 | DIASTOLIC BLOOD PRESSURE: 88 MMHG

## 2017-10-18 DIAGNOSIS — E78.5 DYSLIPIDEMIA: ICD-10-CM

## 2017-10-18 DIAGNOSIS — I25.118 CORONARY ARTERY DISEASE OF NATIVE ARTERY OF NATIVE HEART WITH STABLE ANGINA PECTORIS (HCC): ICD-10-CM

## 2017-10-18 DIAGNOSIS — I48.0 PAF (PAROXYSMAL ATRIAL FIBRILLATION) (HCC): ICD-10-CM

## 2017-10-18 DIAGNOSIS — Z02.1 PRE-EMPLOYMENT DRUG SCREENING: ICD-10-CM

## 2017-10-18 DIAGNOSIS — I25.5 CARDIOMYOPATHY, ISCHEMIC: ICD-10-CM

## 2017-10-18 DIAGNOSIS — K21.9 GASTROESOPHAGEAL REFLUX DISEASE, ESOPHAGITIS PRESENCE NOT SPECIFIED: ICD-10-CM

## 2017-10-18 DIAGNOSIS — I10 ESSENTIAL HYPERTENSION: ICD-10-CM

## 2017-10-18 DIAGNOSIS — Z02.89 ENCOUNTER FOR OCCUPATIONAL HEALTH EXAMINATION: ICD-10-CM

## 2017-10-18 DIAGNOSIS — E11.9 TYPE 2 DIABETES MELLITUS WITHOUT COMPLICATION, WITHOUT LONG-TERM CURRENT USE OF INSULIN (HCC): ICD-10-CM

## 2017-10-18 LAB
AMP AMPHETAMINE: NORMAL
COC COCAINE: NORMAL
INT CON NEG: NORMAL
INT CON POS: NORMAL
MET METHAMPHETAMINES: NORMAL
OPI OPIATES: NORMAL
PCP PHENCYCLIDINE: NORMAL
POC DRUG COMMENT 753798-OCCUPATIONAL HEALTH: NEGATIVE
THC: NORMAL

## 2017-10-18 PROCEDURE — 99214 OFFICE O/P EST MOD 30 MIN: CPT | Mod: GC | Performed by: INTERNAL MEDICINE

## 2017-10-18 PROCEDURE — 80305 DRUG TEST PRSMV DIR OPT OBS: CPT | Performed by: PREVENTIVE MEDICINE

## 2017-10-18 RX ORDER — RANOLAZINE 500 MG/1
1000 TABLET, EXTENDED RELEASE ORAL 2 TIMES DAILY
Qty: 60 TAB | Refills: 3 | Status: SHIPPED | OUTPATIENT
Start: 2017-10-18 | End: 2017-12-01 | Stop reason: SDUPTHER

## 2017-10-18 ASSESSMENT — PAIN SCALES - GENERAL: PAINLEVEL: NO PAIN

## 2017-10-18 NOTE — PROGRESS NOTES
Reason for use?***  How long has patient been on it?***  Last time patient was off? Did symptoms resolve?***  Alternative prescriptions attempted?***  H. Pylori checked? Seen by GI? EGD in past?***

## 2017-10-18 NOTE — PROGRESS NOTES
Established Patient    Ryan presents today with the following:    CC: 5 week follow up, management of diabetes, chest pain, GERD    HPI: 52 year old male presents to the clinic today for a 3 month check up and hospital follow up for chest pain. He is accompanied by his , Humza, who helps with history. No acute complaints at today's visit, feels well.     Chest pain: Patient was seen by cardiologist, Dr. León, on 10/6/2017 for frequent chest pain with multiple ED visits. A that visit and during this visit he denies any symptoms of chest pain. He states that at no time did Candelario have an MI. During that visit he increased his ranolazine from 500mg BID to 1000mg BID with return to clinic in 3 months. He denies any cardiac or respiratory symptoms during this visit.     Type 2 diabetes mellitus: POC HbA1c was 7.3% on 9/1/2017. He does walk often as he uses public transportation and is overall fairly active. He has recently acquired a new job which keeps him more active as he is moving more. He has lost 3 lbs in the past months and was encouraged to continue this trend.     Gastroesophageal reflux disease: Well controlled with no issues. States that he is compliant with his protonix     Health maintenance: Patient has a colonoscopy scheduled for 12/6/2017 with Dr. Berry.    Patient Active Problem List    Diagnosis Date Noted   • PAF (paroxysmal atrial fibrillation) (CMS-[facility]) 05/18/2017     Priority: Medium   • Type 2 diabetes mellitus without complication, without long-term current use of insulin ([facility]) 02/16/2017     Priority: Medium   • CAD with ischemic cardiomyopathy 10/14/2013     Priority: Medium   • Glaucoma      Priority: Low   • Dyslipidemia 07/16/2012     Priority: Low   • Essential hypertension 07/15/2012     Priority: Low   • GERD with Nieves's esophagus 07/15/2012     Priority: Low   • Nieves esophagus 05/19/2017   • Tachycardia 04/17/2017   • Claudication (CMS-[facility])    • Ischemic  "Cardiomyopathy EF 35% 07/17/2012       Current Outpatient Prescriptions   Medication Sig Dispense Refill   • ranolazine (RANEXA) 500 MG TABLET SR 12 HR Take 2 Tabs by mouth 2 Times a Day. 60 Tab 3   • loratadine (CLARITIN) 10 MG Tab Take 1 Tab by mouth every day. 30 Tab 11   • atorvastatin (LIPITOR) 40 MG Tab Take 2 Tabs by mouth every day. 30 Tab 11   • pantoprazole (PROTONIX) 40 MG Tablet Delayed Response TAKE 1 TABLET BY MOUTH ONCE DAILY. 30 Tab 5   • vitamin D3, cholecalciferol, 1000 UNIT Tab TAKE 1 TABLET BY MOUTH ONCE DAILY. 30 Tab 11   • rivaroxaban (XARELTO) 20 MG Tab tablet Take 1 Tab by mouth with dinner. 30 Tab 11   • metoprolol SR (TOPROL XL) 25 MG TABLET SR 24 HR Take 1 Tab by mouth every day. 30 Tab 5   • lisinopril (PRINIVIL) 5 MG Tab Take 1 Tab by mouth every day. 30 Tab 11   • metformin (GLUCOPHAGE) 1000 MG tablet Take 1 Tab by mouth 2 times a day, with meals. 60 Tab 11   • nitroGLYCERIN (NITROSTAT) 0.3 MG SL tablet Place 1 Tab under tongue 1 time daily as needed for Chest Pain. 100 Tab 0     No current facility-administered medications for this visit.        ROS: As per HPI. Additional pertinent symptoms as noted below.      /88   Pulse 67   Temp 36.7 °C (98 °F)   Resp 16   Ht 1.753 m (5' 9\")   Wt 85.7 kg (189 lb)   SpO2 97%   BMI 27.91 kg/m²     Physical Exam   General:  Alert and oriented, No apparent distress. Disheveled male appears stated age, well nourished, appears anxious.  Eyes: Pupils equal and reactive. No scleral icterus. EOMI, no injected conjunctiva.  Throat: Clear no erythema or exudates noted.  Neck: Supple. No lymphadenopathy noted. Thyroid not enlarged.  Lungs: Clear to auscultation and percussion bilaterally. No wheezing, crackles, rhales.  Cardiovascular: Regular rate and rhythm. No murmurs, rubs or gallops.  Abdomen:  Benign. No rebound or guarding noted. Non-distended, non-tender.  Extremities: No clubbing, cyanosis, edema. Surgical scar on right knee, knees " non-tender to touch, non-erythematous, no swelling present.  Skin: Clear. No rash or suspicious skin lesions noted.      Assessment and Plan    1. Coronary artery disease  - Continue metoprolol, rivaroxaban, atorvastatin  - Previously on isosorbide mononitrate, now on ranolazine (increased from 500mg BID to 1000mg BID on 10/6/2017) per cardio rec's  - Cardiac cath on 7/26/2017 showed inferior wall hypokinesis with moderately reduced left ventricular systolic function, ejection fraction 35%, coronary artery disease (aneurysmal change proximal left anterior descending artery (LAD) with diffuse moderate disease up to 70% distal LAD. 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 Saw Dr. León on 10/6 with increased ranolazine during that visit. Scheduled next on 1/9/2018.                2. Ischemic Cardiomyopathy EF 35%  - 2/2 CAD  - Continue metoprolol, lisinopril, atorvastatin, ranolazine  - Management per cardiology  - Euvolemic with clear lungs at todays visit  - Will see Dr. León on 1/9/2017.     3. Paroxysmal atrial fibrillation with RVR  - Admitted from 5/18-5/20 with atrial fibrillation  - Converted back into NSR on IV cardizem drip  - Currently in NSR  - Denies CP, SOB, weakness, lightheadedness at today's visit  - Continue metoprolol ER 25mg daily  - CHADS vasc 2. Continue rivaroxaban. See's pharm for anticoagulation on 2/27/2018  - Appt with Dr. León on 10/6/2017.     4. Type 2 diabetes mellitus  - HbA1c 7.7% on 5/20/16  - HbA1c 9.8% on 3/23/17 indicating worsening control  - HbA1c 7.3% on 9/11/17  -  Increase metformin from 500mg BID to 1000mg BID on 3/23/17  - Urine microalbumin 69.1. Already on lisinopril for renal protection.  - Continue to emphasize the importance of diet and weight loss  - Eye exam for diabetic, microfilament at next visit     5. Hyperlipidemia  - Continue atorvastatin 80mg daily  - Lipid panel 3/20 showed T. Chol 192, , HDL 37 and   - ASCVD score 11.2% based on most recent lipid panel results     6. Essential hypertension  - /88 at today's visit  - Continue metoprolol, lisinopril  - Stable     7. Gastroesophageal reflux disease  - Continue protonix daily  - EGD performed on 5/19/17 with salmon colored distal esophagus mucosa indicative of duarte's and a 10cm focal area of gastric fundus with erythema, edema, friability and slight ulceration was seen. May be indicative of ischemic gastritis associated with hypotension when he was in atrial fibrillation.  - CTA abdomen 5/19 did not indicate any vascular abnormality that would cause ischemic gastritis  - GI following  Reason for use? Barretts  How long has patient been on it? 5 months  Last time patient was off? Never Did symptoms resolve? NA  Alternative prescriptions attempted? None  H. Pylori checked? S/p biopsy 5/19, no H. Pylori seen Seen by GI? Yes EGD in past? Yes, on 5/19    8. Duarte's esophagus  - EGD performed on 5/19/17 with salmon colored distal esophagus mucosa indicative of duarte's and a 10cm focal area of gastric fundus with erythema, edema, friability and slight ulceration was seen.  - Pathology of esophageal biopsies 5/19/17 with intestinal metaplasia  - CT abd 12/11/16 shows changes in the distal esophagus likely related to inflammatory esophagitis  - Continue protonix  - Referral to GI for management of duarte's esophagus and screening colonoscopy     9. Vitamin D deficiency  - Continue Vitamin D replacement  - Vit D 3/28/17 was 29.9     10. Health maintenance  - Colonoscopy scheduled for  12/6/2017  - Received influenza vaccine this year        PLAN: Follow up in 6 months with basic labs      Signed by: Juancho Jenkins M.D.

## 2018-01-04 ENCOUNTER — HOSPITAL ENCOUNTER (OUTPATIENT)
Facility: MEDICAL CENTER | Age: 53
End: 2018-01-06
Attending: EMERGENCY MEDICINE | Admitting: INTERNAL MEDICINE
Payer: MEDICARE

## 2018-01-04 ENCOUNTER — APPOINTMENT (OUTPATIENT)
Dept: RADIOLOGY | Facility: MEDICAL CENTER | Age: 53
End: 2018-01-04
Attending: EMERGENCY MEDICINE
Payer: MEDICARE

## 2018-01-04 DIAGNOSIS — I25.118 CORONARY ARTERY DISEASE OF NATIVE ARTERY OF NATIVE HEART WITH STABLE ANGINA PECTORIS (HCC): ICD-10-CM

## 2018-01-04 DIAGNOSIS — R55 SYNCOPE, UNSPECIFIED SYNCOPE TYPE: ICD-10-CM

## 2018-01-04 LAB
ALBUMIN SERPL BCP-MCNC: 4.9 G/DL (ref 3.2–4.9)
ALBUMIN/GLOB SERPL: 1.7 G/DL
ALP SERPL-CCNC: 84 U/L (ref 30–99)
ALT SERPL-CCNC: 26 U/L (ref 2–50)
ANION GAP SERPL CALC-SCNC: 10 MMOL/L (ref 0–11.9)
APTT PPP: 30.4 SEC (ref 24.7–36)
AST SERPL-CCNC: 15 U/L (ref 12–45)
BASOPHILS # BLD AUTO: 0.6 % (ref 0–1.8)
BASOPHILS # BLD: 0.05 K/UL (ref 0–0.12)
BILIRUB SERPL-MCNC: 1.6 MG/DL (ref 0.1–1.5)
BUN SERPL-MCNC: 14 MG/DL (ref 8–22)
CALCIUM SERPL-MCNC: 9.4 MG/DL (ref 8.5–10.5)
CHLORIDE SERPL-SCNC: 101 MMOL/L (ref 96–112)
CO2 SERPL-SCNC: 23 MMOL/L (ref 20–33)
CREAT SERPL-MCNC: 0.84 MG/DL (ref 0.5–1.4)
EKG IMPRESSION: NORMAL
EOSINOPHIL # BLD AUTO: 0.12 K/UL (ref 0–0.51)
EOSINOPHIL NFR BLD: 1.5 % (ref 0–6.9)
ERYTHROCYTE [DISTWIDTH] IN BLOOD BY AUTOMATED COUNT: 44.8 FL (ref 35.9–50)
GFR SERPL CREATININE-BSD FRML MDRD: >60 ML/MIN/1.73 M 2
GLOBULIN SER CALC-MCNC: 2.9 G/DL (ref 1.9–3.5)
GLUCOSE SERPL-MCNC: 134 MG/DL (ref 65–99)
HCT VFR BLD AUTO: 40.3 % (ref 42–52)
HGB BLD-MCNC: 13.9 G/DL (ref 14–18)
IMM GRANULOCYTES # BLD AUTO: 0.03 K/UL (ref 0–0.11)
IMM GRANULOCYTES NFR BLD AUTO: 0.4 % (ref 0–0.9)
INR PPP: 1.08 (ref 0.87–1.13)
LYMPHOCYTES # BLD AUTO: 2.07 K/UL (ref 1–4.8)
LYMPHOCYTES NFR BLD: 26.6 % (ref 22–41)
MCH RBC QN AUTO: 29.3 PG (ref 27–33)
MCHC RBC AUTO-ENTMCNC: 34.5 G/DL (ref 33.7–35.3)
MCV RBC AUTO: 84.8 FL (ref 81.4–97.8)
MONOCYTES # BLD AUTO: 0.68 K/UL (ref 0–0.85)
MONOCYTES NFR BLD AUTO: 8.7 % (ref 0–13.4)
NEUTROPHILS # BLD AUTO: 4.83 K/UL (ref 1.82–7.42)
NEUTROPHILS NFR BLD: 62.2 % (ref 44–72)
NRBC # BLD AUTO: 0 K/UL
NRBC BLD-RTO: 0 /100 WBC
PLATELET # BLD AUTO: 301 K/UL (ref 164–446)
PMV BLD AUTO: 9.8 FL (ref 9–12.9)
POTASSIUM SERPL-SCNC: 3.8 MMOL/L (ref 3.6–5.5)
PROT SERPL-MCNC: 7.8 G/DL (ref 6–8.2)
PROTHROMBIN TIME: 13.7 SEC (ref 12–14.6)
RBC # BLD AUTO: 4.75 M/UL (ref 4.7–6.1)
SODIUM SERPL-SCNC: 134 MMOL/L (ref 135–145)
TROPONIN I SERPL-MCNC: <0.01 NG/ML (ref 0–0.04)
WBC # BLD AUTO: 7.8 K/UL (ref 4.8–10.8)

## 2018-01-04 PROCEDURE — 93005 ELECTROCARDIOGRAM TRACING: CPT | Performed by: EMERGENCY MEDICINE

## 2018-01-04 PROCEDURE — 85610 PROTHROMBIN TIME: CPT

## 2018-01-04 PROCEDURE — 80053 COMPREHEN METABOLIC PANEL: CPT

## 2018-01-04 PROCEDURE — 36415 COLL VENOUS BLD VENIPUNCTURE: CPT

## 2018-01-04 PROCEDURE — 73080 X-RAY EXAM OF ELBOW: CPT | Mod: RT

## 2018-01-04 PROCEDURE — 85730 THROMBOPLASTIN TIME PARTIAL: CPT

## 2018-01-04 PROCEDURE — 71046 X-RAY EXAM CHEST 2 VIEWS: CPT

## 2018-01-04 PROCEDURE — 93005 ELECTROCARDIOGRAM TRACING: CPT

## 2018-01-04 PROCEDURE — 85025 COMPLETE CBC W/AUTO DIFF WBC: CPT

## 2018-01-04 PROCEDURE — 70450 CT HEAD/BRAIN W/O DYE: CPT

## 2018-01-04 PROCEDURE — 99285 EMERGENCY DEPT VISIT HI MDM: CPT

## 2018-01-04 PROCEDURE — 84484 ASSAY OF TROPONIN QUANT: CPT

## 2018-01-04 ASSESSMENT — PAIN SCALES - GENERAL
PAINLEVEL_OUTOF10: 10
PAINLEVEL_OUTOF10: 8

## 2018-01-04 NOTE — ED NOTES
"PT ambulated to triage c/o a syncopal event while at work. PT reports he \"just went out\"  Chief Complaint   Patient presents with   • Syncope     while at work      Blood pressure 140/92, pulse 96, temperature 36.2 °C (97.2 °F), temperature source Temporal, resp. rate 16, height 1.803 m (5' 11\"), weight 81.1 kg (178 lb 12.7 oz), SpO2 95 %.      "
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
## 2018-01-05 ENCOUNTER — RESOLUTE PROFESSIONAL BILLING HOSPITAL PROF FEE (OUTPATIENT)
Dept: HOSPITALIST | Facility: MEDICAL CENTER | Age: 53
End: 2018-01-05
Payer: MEDICARE

## 2018-01-05 PROBLEM — E55.9 VITAMIN D DEFICIENCY: Status: ACTIVE | Noted: 2018-01-05

## 2018-01-05 PROBLEM — E11.9 TYPE 2 DIABETES MELLITUS WITHOUT COMPLICATION, WITHOUT LONG-TERM CURRENT USE OF INSULIN (HCC): Chronic | Status: ACTIVE | Noted: 2017-02-16

## 2018-01-05 PROBLEM — J06.9 VIRAL URI WITH COUGH: Status: ACTIVE | Noted: 2018-01-05

## 2018-01-05 PROBLEM — I48.0 PAF (PAROXYSMAL ATRIAL FIBRILLATION) (HCC): Chronic | Status: ACTIVE | Noted: 2017-05-18

## 2018-01-05 PROBLEM — E53.8 VITAMIN B12 DEFICIENCY: Status: ACTIVE | Noted: 2018-01-05

## 2018-01-05 PROBLEM — R55 SYNCOPE: Status: ACTIVE | Noted: 2018-01-05

## 2018-01-05 LAB
25(OH)D3 SERPL-MCNC: 25 NG/ML (ref 30–100)
AMPHET UR QL SCN: NEGATIVE
BARBITURATES UR QL SCN: NEGATIVE
BENZODIAZ UR QL SCN: NEGATIVE
BZE UR QL SCN: NEGATIVE
CANNABINOIDS UR QL SCN: NEGATIVE
CRP SERPL HS-MCNC: 4.2 MG/L (ref 0–7.5)
EKG IMPRESSION: NORMAL
ERYTHROCYTE [DISTWIDTH] IN BLOOD BY AUTOMATED COUNT: 45.5 FL (ref 35.9–50)
ERYTHROCYTE [SEDIMENTATION RATE] IN BLOOD BY WESTERGREN METHOD: 19 MM/HOUR (ref 0–20)
EST. AVERAGE GLUCOSE BLD GHB EST-MCNC: 171 MG/DL
ETHANOL BLD-MCNC: 0 G/DL
FLUAV RNA SPEC QL NAA+PROBE: NEGATIVE
FLUBV RNA SPEC QL NAA+PROBE: NEGATIVE
GLUCOSE BLD-MCNC: 218 MG/DL (ref 65–99)
HBA1C MFR BLD: 7.6 % (ref 0–5.6)
HCT VFR BLD AUTO: 36.9 % (ref 42–52)
HGB BLD-MCNC: 12.6 G/DL (ref 14–18)
LV EJECT FRACT  99904: 50
LV EJECT FRACT MOD 2C 99903: 46.6
LV EJECT FRACT MOD 4C 99902: 39.8
LV EJECT FRACT MOD BP 99901: 52.98
MAGNESIUM SERPL-MCNC: 2 MG/DL (ref 1.5–2.5)
MCH RBC QN AUTO: 29.2 PG (ref 27–33)
MCHC RBC AUTO-ENTMCNC: 34.1 G/DL (ref 33.7–35.3)
MCV RBC AUTO: 85.4 FL (ref 81.4–97.8)
METHADONE UR QL SCN: NEGATIVE
OPIATES UR QL SCN: NEGATIVE
OXYCODONE UR QL SCN: NEGATIVE
PCP UR QL SCN: NEGATIVE
PLATELET # BLD AUTO: 274 K/UL (ref 164–446)
PMV BLD AUTO: 10.3 FL (ref 9–12.9)
PROPOXYPH UR QL SCN: NEGATIVE
RBC # BLD AUTO: 4.32 M/UL (ref 4.7–6.1)
TROPONIN I SERPL-MCNC: <0.01 NG/ML (ref 0–0.04)
TROPONIN I SERPL-MCNC: <0.01 NG/ML (ref 0–0.04)
TSH SERPL DL<=0.005 MIU/L-ACNC: 3.78 UIU/ML (ref 0.38–5.33)
VIT B12 SERPL-MCNC: 275 PG/ML (ref 211–911)
WBC # BLD AUTO: 5.3 K/UL (ref 4.8–10.8)

## 2018-01-05 PROCEDURE — A9270 NON-COVERED ITEM OR SERVICE: HCPCS | Performed by: STUDENT IN AN ORGANIZED HEALTH CARE EDUCATION/TRAINING PROGRAM

## 2018-01-05 PROCEDURE — 96372 THER/PROPH/DIAG INJ SC/IM: CPT | Mod: XU

## 2018-01-05 PROCEDURE — 80307 DRUG TEST PRSMV CHEM ANLYZR: CPT

## 2018-01-05 PROCEDURE — 85027 COMPLETE CBC AUTOMATED: CPT

## 2018-01-05 PROCEDURE — 93306 TTE W/DOPPLER COMPLETE: CPT | Mod: 26 | Performed by: INTERNAL MEDICINE

## 2018-01-05 PROCEDURE — 93005 ELECTROCARDIOGRAM TRACING: CPT | Performed by: INTERNAL MEDICINE

## 2018-01-05 PROCEDURE — 93880 EXTRACRANIAL BILAT STUDY: CPT

## 2018-01-05 PROCEDURE — 36415 COLL VENOUS BLD VENIPUNCTURE: CPT

## 2018-01-05 PROCEDURE — 82607 VITAMIN B-12: CPT

## 2018-01-05 PROCEDURE — 82306 VITAMIN D 25 HYDROXY: CPT

## 2018-01-05 PROCEDURE — 82962 GLUCOSE BLOOD TEST: CPT

## 2018-01-05 PROCEDURE — G0378 HOSPITAL OBSERVATION PER HR: HCPCS

## 2018-01-05 PROCEDURE — 84484 ASSAY OF TROPONIN QUANT: CPT | Mod: 91

## 2018-01-05 PROCEDURE — 306588 SLEEVE,VASO CALF MED: Performed by: INTERNAL MEDICINE

## 2018-01-05 PROCEDURE — 700102 HCHG RX REV CODE 250 W/ 637 OVERRIDE(OP): Performed by: STUDENT IN AN ORGANIZED HEALTH CARE EDUCATION/TRAINING PROGRAM

## 2018-01-05 PROCEDURE — 93010 ELECTROCARDIOGRAM REPORT: CPT | Performed by: INTERNAL MEDICINE

## 2018-01-05 PROCEDURE — 85652 RBC SED RATE AUTOMATED: CPT

## 2018-01-05 PROCEDURE — 87502 INFLUENZA DNA AMP PROBE: CPT

## 2018-01-05 PROCEDURE — 84443 ASSAY THYROID STIM HORMONE: CPT

## 2018-01-05 PROCEDURE — 83735 ASSAY OF MAGNESIUM: CPT

## 2018-01-05 PROCEDURE — 700105 HCHG RX REV CODE 258: Performed by: STUDENT IN AN ORGANIZED HEALTH CARE EDUCATION/TRAINING PROGRAM

## 2018-01-05 PROCEDURE — 99218 PR INITIAL OBSERVATION CARE,LEVL I: CPT | Mod: GC | Performed by: INTERNAL MEDICINE

## 2018-01-05 PROCEDURE — 83036 HEMOGLOBIN GLYCOSYLATED A1C: CPT

## 2018-01-05 PROCEDURE — 700111 HCHG RX REV CODE 636 W/ 250 OVERRIDE (IP): Performed by: STUDENT IN AN ORGANIZED HEALTH CARE EDUCATION/TRAINING PROGRAM

## 2018-01-05 PROCEDURE — 86141 C-REACTIVE PROTEIN HS: CPT

## 2018-01-05 PROCEDURE — 93306 TTE W/DOPPLER COMPLETE: CPT

## 2018-01-05 RX ORDER — RANOLAZINE 500 MG/1
1000 TABLET, EXTENDED RELEASE ORAL 2 TIMES DAILY
Status: DISCONTINUED | OUTPATIENT
Start: 2018-01-05 | End: 2018-01-06 | Stop reason: HOSPADM

## 2018-01-05 RX ORDER — AMOXICILLIN 250 MG
2 CAPSULE ORAL 2 TIMES DAILY
Status: DISCONTINUED | OUTPATIENT
Start: 2018-01-05 | End: 2018-01-06 | Stop reason: HOSPADM

## 2018-01-05 RX ORDER — BISACODYL 10 MG
10 SUPPOSITORY, RECTAL RECTAL
Status: DISCONTINUED | OUTPATIENT
Start: 2018-01-05 | End: 2018-01-06 | Stop reason: HOSPADM

## 2018-01-05 RX ORDER — ATORVASTATIN CALCIUM 80 MG/1
80 TABLET, FILM COATED ORAL DAILY
Status: DISCONTINUED | OUTPATIENT
Start: 2018-01-05 | End: 2018-01-06 | Stop reason: HOSPADM

## 2018-01-05 RX ORDER — POTASSIUM CHLORIDE 20 MEQ/1
40 TABLET, EXTENDED RELEASE ORAL ONCE
Status: COMPLETED | OUTPATIENT
Start: 2018-01-05 | End: 2018-01-05

## 2018-01-05 RX ORDER — PANTOPRAZOLE SODIUM 40 MG/1
40 TABLET, DELAYED RELEASE ORAL DAILY
Status: DISCONTINUED | OUTPATIENT
Start: 2018-01-05 | End: 2018-01-05

## 2018-01-05 RX ORDER — FLUTICASONE PROPIONATE 50 MCG
2 SPRAY, SUSPENSION (ML) NASAL DAILY
Status: DISCONTINUED | OUTPATIENT
Start: 2018-01-05 | End: 2018-01-06 | Stop reason: HOSPADM

## 2018-01-05 RX ORDER — CYANOCOBALAMIN 1000 UG/ML
1000 INJECTION, SOLUTION INTRAMUSCULAR; SUBCUTANEOUS DAILY
Status: DISCONTINUED | OUTPATIENT
Start: 2018-01-05 | End: 2018-01-06 | Stop reason: HOSPADM

## 2018-01-05 RX ORDER — METOPROLOL SUCCINATE 25 MG/1
25 TABLET, EXTENDED RELEASE ORAL
Status: DISCONTINUED | OUTPATIENT
Start: 2018-01-05 | End: 2018-01-05

## 2018-01-05 RX ORDER — LORATADINE 10 MG/1
10 TABLET ORAL DAILY
Status: DISCONTINUED | OUTPATIENT
Start: 2018-01-05 | End: 2018-01-06 | Stop reason: HOSPADM

## 2018-01-05 RX ORDER — POLYETHYLENE GLYCOL 3350 17 G/17G
1 POWDER, FOR SOLUTION ORAL
Status: DISCONTINUED | OUTPATIENT
Start: 2018-01-05 | End: 2018-01-06 | Stop reason: HOSPADM

## 2018-01-05 RX ORDER — SODIUM CHLORIDE 9 MG/ML
INJECTION, SOLUTION INTRAVENOUS CONTINUOUS
Status: DISCONTINUED | OUTPATIENT
Start: 2018-01-05 | End: 2018-01-06 | Stop reason: HOSPADM

## 2018-01-05 RX ORDER — LISINOPRIL 10 MG/1
5 TABLET ORAL DAILY
Status: DISCONTINUED | OUTPATIENT
Start: 2018-01-05 | End: 2018-01-06 | Stop reason: HOSPADM

## 2018-01-05 RX ORDER — ACETAMINOPHEN 325 MG/1
650 TABLET ORAL EVERY 6 HOURS PRN
Status: DISCONTINUED | OUTPATIENT
Start: 2018-01-05 | End: 2018-01-06 | Stop reason: HOSPADM

## 2018-01-05 RX ORDER — OMEPRAZOLE 20 MG/1
20 CAPSULE, DELAYED RELEASE ORAL DAILY
Status: DISCONTINUED | OUTPATIENT
Start: 2018-01-05 | End: 2018-01-06 | Stop reason: HOSPADM

## 2018-01-05 RX ADMIN — FLUTICASONE PROPIONATE 100 MCG: 50 SPRAY, METERED NASAL at 09:18

## 2018-01-05 RX ADMIN — OMEPRAZOLE 20 MG: 20 CAPSULE, DELAYED RELEASE ORAL at 09:17

## 2018-01-05 RX ADMIN — VITAMIN D, TAB 1000IU (100/BT) 1000 UNITS: 25 TAB at 09:18

## 2018-01-05 RX ADMIN — SODIUM CHLORIDE: 9 INJECTION, SOLUTION INTRAVENOUS at 02:12

## 2018-01-05 RX ADMIN — ACETAMINOPHEN 650 MG: 325 TABLET, FILM COATED ORAL at 02:12

## 2018-01-05 RX ADMIN — CYANOCOBALAMIN 1000 MCG: 1000 INJECTION, SOLUTION INTRAMUSCULAR; SUBCUTANEOUS at 09:17

## 2018-01-05 RX ADMIN — ACETAMINOPHEN 650 MG: 325 TABLET, FILM COATED ORAL at 20:51

## 2018-01-05 RX ADMIN — ATORVASTATIN CALCIUM 80 MG: 80 TABLET, FILM COATED ORAL at 09:17

## 2018-01-05 RX ADMIN — STANDARDIZED SENNA CONCENTRATE AND DOCUSATE SODIUM 2 TABLET: 8.6; 5 TABLET, FILM COATED ORAL at 02:13

## 2018-01-05 RX ADMIN — SODIUM CHLORIDE: 9 INJECTION, SOLUTION INTRAVENOUS at 16:04

## 2018-01-05 RX ADMIN — LISINOPRIL 5 MG: 10 TABLET ORAL at 09:17

## 2018-01-05 RX ADMIN — POTASSIUM CHLORIDE 40 MEQ: 1500 TABLET, EXTENDED RELEASE ORAL at 09:12

## 2018-01-05 RX ADMIN — STANDARDIZED SENNA CONCENTRATE AND DOCUSATE SODIUM 2 TABLET: 8.6; 5 TABLET, FILM COATED ORAL at 09:17

## 2018-01-05 RX ADMIN — LORATADINE 10 MG: 10 TABLET ORAL at 09:19

## 2018-01-05 RX ADMIN — ASPIRIN 81 MG: 81 TABLET, COATED ORAL at 09:18

## 2018-01-05 RX ADMIN — RANOLAZINE 1000 MG: 500 TABLET, FILM COATED, EXTENDED RELEASE ORAL at 20:49

## 2018-01-05 RX ADMIN — ENOXAPARIN SODIUM 40 MG: 100 INJECTION SUBCUTANEOUS at 21:23

## 2018-01-05 RX ADMIN — RANOLAZINE 1000 MG: 500 TABLET, FILM COATED, EXTENDED RELEASE ORAL at 09:22

## 2018-01-05 ASSESSMENT — PAIN SCALES - GENERAL
PAINLEVEL_OUTOF10: 5
PAINLEVEL_OUTOF10: 8
PAINLEVEL_OUTOF10: 9

## 2018-01-05 ASSESSMENT — ENCOUNTER SYMPTOMS
DIARRHEA: 0
SPUTUM PRODUCTION: 0
FEVER: 0
NAUSEA: 0
VOMITING: 0
SEIZURES: 0
HEADACHES: 0
WEAKNESS: 0
COUGH: 0
DOUBLE VISION: 0
COUGH: 1
ORTHOPNEA: 1
PND: 0
ABDOMINAL PAIN: 0
HEMOPTYSIS: 0
SORE THROAT: 1
SHORTNESS OF BREATH: 0
ORTHOPNEA: 0
CHILLS: 0
LOSS OF CONSCIOUSNESS: 0
PHOTOPHOBIA: 0
HEARTBURN: 0
WEAKNESS: 1
SORE THROAT: 0
DIZZINESS: 0
FOCAL WEAKNESS: 0
CLAUDICATION: 0
DEPRESSION: 0
BLURRED VISION: 0
PALPITATIONS: 0
TINGLING: 0
SHORTNESS OF BREATH: 1
DIZZINESS: 1
CONSTIPATION: 0
MYALGIAS: 0

## 2018-01-05 ASSESSMENT — PATIENT HEALTH QUESTIONNAIRE - PHQ9
SUM OF ALL RESPONSES TO PHQ QUESTIONS 1-9: 0
1. LITTLE INTEREST OR PLEASURE IN DOING THINGS: NOT AT ALL
SUM OF ALL RESPONSES TO PHQ9 QUESTIONS 1 AND 2: 0

## 2018-01-05 ASSESSMENT — CHA2DS2 SCORE
CHA2DS2 VASC SCORE: 3
HYPERTENSION: NO
SEX: MALE
DIABETES: YES
AGE 75 OR GREATER: NO
CHF OR LEFT VENTRICULAR DYSFUNCTION: YES
AGE 65 TO 74: NO
PRIOR STROKE OR TIA OR THROMBOEMBOLISM: NO
VASCULAR DISEASE: YES

## 2018-01-05 ASSESSMENT — LIFESTYLE VARIABLES
EVER_SMOKED: NEVER
REASON UNABLE TO ASSESS: N
ALCOHOL_USE: NO

## 2018-01-05 NOTE — ED PROVIDER NOTES
ED Provider Note    Scribed for Kim Bianchi M.D. by An Duffy. 1/4/2018  9:19 PM    Means of arrival: walk-in  History obtained from: patient  History limited by: none      CHIEF COMPLAINT  Chief Complaint   Patient presents with   • Syncope     while at work        HPI  Ryan Trevizo is a 52 y.o. male with history of ischemic cardiomyopathy along with diabetes and hypertension who presents to the Emergency Department for evaluation following a syncopal event that occurred around 12PM today. Patient states that he felt completely normal prior to the incident, however, began to feel lightheaded prior to falling to the floor, striking his head and right elbow while walking at work. Since the incident patient has developed headache, right elbow, and neck pain.  Denies headache or chest pain prior to syncopal event. He has been experiencing cough, subjective fever and rhinorrhea over the last few days as well. Patient takes daily Xarelto to manage his atrial fibrillation.  No recent complaints of fevers, chest pain, abdominal pain, vomiting, diarrhea.      REVIEW OF SYSTEMS  Pertinent positive include syncope, headache, right elbow pain, neck pain, short of breath, lightheadedness. Pertinent negative include chest pain, abdominal pain, vomiting, diarrhea.   All other systems reviewed and are negative.      PAST MEDICAL HISTORY   has a past medical history of A-fib (CMS-HCC); ACS (acute coronary syndrome) (CMS-HCC) (7/25/2012); Anxiety disorder; Arthritis; Blood glucose elevated; CAD (coronary artery disease) (10/14/2013); Claudication (CMS-HCC); Diabetes (CMS-HCC); Dizziness; GERD (gastroesophageal reflux disease) (7/15/2012); Glaucoma; HTN (hypertension) (7/15/2012); Hyperlipemia; Hypertriglyceridemia; Ischemic cardiomyopathy; Mental retardation; MI (myocardial infarction); Osteoarthritis; Pericarditis secondary to acute myocardial infarction (7/19/2012); Right knee pain; SOB (shortness of breath); STEMI  "(ST elevation myocardial infarction) (CMS-[facility]); and Upper GI bleed (7/26/2012).    SOCIAL HISTORY  Social History     Social History Main Topics   • Smoking status: Never Smoker   • Smokeless tobacco: Never Used   • Alcohol use No   • Drug use: No      Comment: Single, has no children, works as an .       SURGICAL HISTORY   has a past surgical history that includes other orthopedic surgery (7- ); other (knee surgery); other cardiac surgery; other; gastroscopy-endo (7/25/2012); gastroscopy-endo (7/26/2012); and gastroscopy-endo (5/19/2017).    CURRENT MEDICATIONS  Home Medications     Reviewed by Brian Lopez (Pharmacy Tech) on 01/04/18 at 2159  Med List Status: Complete   Medication Last Dose Status   ASPIRIN LOW DOSE 81 MG EC tablet 1/4/2018 Active   atorvastatin (LIPITOR) 40 MG Tab 1/3/2018 Active   lisinopril (PRINIVIL) 5 MG Tab 1/4/2018 Active   loratadine (CLARITIN) 10 MG Tab 1/4/2018 Active   metformin (GLUCOPHAGE) 1000 MG tablet 1/4/2018 Active   metoprolol SR (TOPROL XL) 25 MG TABLET SR 24 HR 1/4/2018 Active   pantoprazole (PROTONIX) 40 MG Tablet Delayed Response 1/3/2018 Active   RANEXA 500 MG TABLET SR 12 HR 1/4/2018 Active   rivaroxaban (XARELTO) 20 MG Tab tablet 1/3/2018 Active   vitamin D3, cholecalciferol, 1000 UNIT Tab 1/4/2018 Active                ALLERGIES  Allergies   Allergen Reactions   • Ritalin [Methylphenidate] Unspecified     \"Hyper\"       PHYSICAL EXAM   VITAL SIGNS: /92   Pulse 96   Temp 36.2 °C (97.2 °F) (Temporal)   Resp 16   Ht 1.803 m (5' 11\")   Wt 81.1 kg (178 lb 12.7 oz)   SpO2 95%   BMI 24.94 kg/m²      Constitutional: Developmentally delayed  male,  Alert in no apparent distress.  HENT: Normocephalic, Atraumatic. Bilateral external ears normal. Nose normal.  Moist mucous membranes.  Oropharynx clear.  Eyes: Pupils are equal and reactive. Conjunctiva normal.   Neck: Supple, full range of motion  Heart: Regular rate and rhythm.  No " murmurs.    Lungs: No respiratory distress, normal work of breathing. Lungs clear to auscultation bilaterally.  Abdomen Soft, no distention.  No tenderness to palpation.  Musculoskeletal: Atraumatic. No obvious deformities noted.  No lower extremity edema. Back is atraumatic with no midline CTL tenderness  Skin: Warm, Dry.  No erythema, No rash.   Neurologic: Alert and oriented x3. Moving all extremities spontaneously without focal deficits.  Psychiatric: Affect normal, Mood normal, Appears appropriate and not intoxicated.      DIAGNOSTIC STUDIES    EKG  EKG personally reviewed by myself in the absence of a cardiologist showed:  NSR with rate of 81  Normal axis and intervals  No ectopy or hypertrophy  No ST or T wave change  Impression: normal EKG  No change from prior 8/5/17    LABS  Personally reviewed by me  Labs Reviewed   CBC WITH DIFFERENTIAL - Abnormal; Notable for the following:        Result Value    Hemoglobin 13.9 (*)     Hematocrit 40.3 (*)     All other components within normal limits    Narrative:     Indicate which anticoagulants the patient is on:->UNKNOWN   COMP METABOLIC PANEL - Abnormal; Notable for the following:     Sodium 134 (*)     Glucose 134 (*)     Total Bilirubin 1.6 (*)     All other components within normal limits    Narrative:     Indicate which anticoagulants the patient is on:->UNKNOWN   TROPONIN    Narrative:     Indicate which anticoagulants the patient is on:->UNKNOWN   PROTHROMBIN TIME    Narrative:     Indicate which anticoagulants the patient is on:->UNKNOWN   APTT    Narrative:     Indicate which anticoagulants the patient is on:->UNKNOWN   ESTIMATED GFR    Narrative:     Indicate which anticoagulants the patient is on:->UNKNOWN   CBC WITHOUT DIFFERENTIAL   MAGNESIUM   INFLUENZA A/B BY PCR     RADIOLOGY  Personally reviewed by me  DX-ELBOW-COMPLETE 3+ RIGHT   Final Result      No acute bony abnormality.      CT-HEAD W/O   Final Result      Normal CT scan of the head without  "contrast.               INTERPRETING LOCATION:  [address] [place], [address]      DX-CHEST-2 VIEWS   Final Result      No acute cardiopulmonary abnormality.               INTERPRETING LOCATION: [place], [address]        ED COURSE  Vitals:    01/04/18 1412 01/04/18 1523   BP: 140/92    Pulse: 96    Resp: 16    Temp: 36.2 °C (97.2 °F)    TempSrc: Temporal    SpO2: 95%    Weight:  81.1 kg (178 lb 12.7 oz)   Height: 1.803 m (5' 11\")          Medications administered:      Old records personally reviewed:  Patient has had multiple visits to ED with a hx of CAD and developmental delay. Negative cardiac stress test in 04/2016, ECC in 2016 with EF 45%. Hx of afib.    9:19 PM Patient seen and examined at bedside. The patient presents following a syncopal episode that occurred earlier today. Ordered for right elbow xray, head CT, chest xray, CBC, CMP, troponin, prothrombin time, APTT, and EKG to evaluate. I informed the patient that we would evaluate for any acute origins for his symptoms and most likely keep him overnight for further evaluation secondary to his cardiac history.    11:05 PM Spoke with [facility], about the patient's condition. Agrees to admit.      MEDICAL DECISION MAKING  Patient with history of developmental delay, ischemic heart cardiomyopathy, diabetes and hypertension who presents after a syncopal episode which occurred earlier today. The patient is alert with reassuring vitals on arrival. He has no focal neurologic deficits concerning for CVA. Patient is on anticoagulation with which he reports compliance. CT head is negative for intracranial hemorrhage, subarachnoid hemorrhage, or skull fracture. EKG without evidence of ischemia or arrhythmia, troponin negative, doubt ACS. No symptoms concerning for aortic dissection or pulmonary embolism at this time. Chest x-ray without evidence of widened mediastinum, pneumonia, pneumothorax, pulmonary edema to suggest decompensated heart " failure. Patient has no significant electrolyte abnormalities or anemia. Due to the patient's cardiac history, will plan for admission for overnight telemetry monitoring.  Patient is agreeable with this plan.    Discussed the patient with internal medicine resident, who accepts admission of the patient. Patient is stable at this time for transfer to the floor.    DISPOSITION:  Patient will be admitted to [facility] in guarded condition.      IMPRESSION  (R55) Syncope, unspecified syncope type  (I25.118) Coronary artery disease of native artery of native heart with stable angina pectoris (CMS-[facility])    Results, diagnoses, and treatment options were discussed with the patient and/or family. Patient verbalized understanding of plan of care.   An MORRISSEY (Scribe), am scribing for, and in the presence of, Kim Bianchi M.D..    Electronically signed by: An Duffy (Scribe), 1/4/2018    Kim MORRISSEY M.D. personally performed the services described in this documentation, as scribed by An Duffy in my presence, and it is both accurate and complete.    The note accurately reflects work and decisions made by me.  Kim Bianchi  1/5/2018  1:21 AM

## 2018-01-05 NOTE — PROGRESS NOTES
Pt in bed,awake,a&ox4,no c/o pain on tele with SB,Pt not in distress,poc explained,pt  Ambulates steady.

## 2018-01-05 NOTE — ED NOTES
"Pt amb to rm 17. Agree with triage note.   Reports 1 episode syncope today. States he was dizzy before the event, fell backward and \"hit back of head on side walk\". No wound or hematoma noted to head.   Report of RT elbow pain, no deformity noted. Abrasion to RT elbow noted.  Unsure down time. A&Ox3. GCS 15. Changed into gown, connected to monitor. Chart up for ERP.   "

## 2018-01-05 NOTE — H&P
"      Internal Medicine Admitting History and Physical    Note Author: Marialuisa Stafford M.D.       Name Ryan Trevizo     1965   Age/Sex 52 y.o. male   MRN [id number]   Code Status Full Code     After 5PM or if no immediate response to page, please call for cross-coverage  Attending/Team: Dr. Tuttle/Joshua See Patient List for primary contact information  Call [phone] to page    1st Call - Day Intern (R1):   Dr. Marquez 2nd Call - Day Sr. Resident (R2/R3):   Dr. Hamilton       Chief Complaint:  Syncope    HPI:  52 year old male with PMHx of paroxysmal atrial fibrillation (on xarelto), CAD, DM, developmental delay  Patient reports around 12:00 pm in the afternoon he was at work, walking to get lunch when he \"blacked out\" for a few seconds and feel to the ground. Denied urinary/fecal incontinence or tongue bite. No h/o seizure disorder. He sat up for a few minutes, which helped him feel better however upon standing he felt dizzy and weak so he came to the hospital. Prior to syncopal episode, he felt well with no complaints.     Patient has had these episodes in the past. He feels they are getting more frequent over the past year. Episodes are not associated with chest pain, diaphoresis, shortness of breath or focal weakness. Since yesterday he has noticed a dry cough, rhinorrhea and sore throat. No fevers or chills.     Review of Systems   Constitutional: Negative for chills and fever.   HENT: Positive for congestion and sore throat.    Eyes: Negative for blurred vision, double vision and photophobia.   Respiratory: Positive for cough and shortness of breath. Negative for hemoptysis and sputum production.    Cardiovascular: Negative for chest pain, palpitations, orthopnea, leg swelling and PND.   Gastrointestinal: Negative for abdominal pain, constipation, diarrhea, heartburn, nausea and vomiting.   Genitourinary: Negative for dysuria, frequency, hematuria and urgency.   Skin: Negative for itching and rash. "   Neurological: Positive for dizziness and weakness. Negative for focal weakness, seizures, loss of consciousness and headaches.   Psychiatric/Behavioral: Negative for depression and suicidal ideas.             Past Medical History:   Past Medical History:   Diagnosis Date   • A-fib (CMS-HCC)    • ACS (acute coronary syndrome) (CMS-HCC) 7/25/2012   • Anxiety disorder    • Arthritis    • Blood glucose elevated    • CAD (coronary artery disease) 10/14/2013   • Claudication (CMS-HCC)    • Diabetes (CMS-HCC)    • Dizziness    • GERD (gastroesophageal reflux disease) 7/15/2012   • Glaucoma    • HTN (hypertension) 7/15/2012   • Hyperlipemia    • Hypertriglyceridemia    • Ischemic cardiomyopathy    • Mental retardation    • MI (myocardial infarction)    • Osteoarthritis    • Pericarditis secondary to acute myocardial infarction 7/19/2012   • Right knee pain    • SOB (shortness of breath)    • STEMI (ST elevation myocardial infarction) (CMS-HCC)    • Upper GI bleed 7/26/2012   • Viral URI with cough 1/5/2018       Past Surgical History:  Past Surgical History:   Procedure Laterality Date   • GASTROSCOPY-ENDO  5/19/2017    Procedure: GASTROSCOPY-ENDO;  Surgeon: Reinaldo Berry M.D.;  Location: [facility];  Service:    • GASTROSCOPY-ENDO  7/26/2012    Performed by JORDIN VERA at [facility]COPY [facility]   • GASTROSCOPY-ENDO  7/25/2012    Performed by JORDIN VERA at [facility]   • OTHER ORTHOPEDIC SURGERY  7-     R knee surgery   • OTHER  knee surgery   • OTHER     • OTHER CARDIAC SURGERY      stent placement       Current Outpatient Medications:  Home Medications     Reviewed by Brian Lopez (Pharmacy Tech) on 01/04/18 at 2159  Med List Status: Complete   Medication Last Dose Status   ASPIRIN LOW DOSE 81 MG EC tablet 1/4/2018 Active   atorvastatin (LIPITOR) 40 MG Tab 1/3/2018 Active   lisinopril (PRINIVIL) 5 MG Tab 1/4/2018 Active   loratadine (CLARITIN) 10 MG Tab  "1/4/2018 Active   metformin (GLUCOPHAGE) 1000 MG tablet 1/4/2018 Active   metoprolol SR (TOPROL XL) 25 MG TABLET SR 24 HR 1/4/2018 Active   pantoprazole (PROTONIX) 40 MG Tablet Delayed Response 1/3/2018 Active   RANEXA 500 MG TABLET SR 12 HR 1/4/2018 Active   rivaroxaban (XARELTO) 20 MG Tab tablet 1/3/2018 Active   vitamin D3, cholecalciferol, 1000 UNIT Tab 1/4/2018 Active                Medication Allergy/Sensitivities:  Allergies   Allergen Reactions   • Ritalin [Methylphenidate] Unspecified     \"Hyper\"         Family History:  No family history on file.    Social History:  Social History     Social History   • Marital status: Single     Spouse name: N/A   • Number of children: N/A   • Years of education: N/A     Occupational History   • Not on file.     Social History Main Topics   • Smoking status: Never Smoker   • Smokeless tobacco: Never Used   • Alcohol use No   • Drug use: No   • Sexual activity: Not on file      Comment: Single, has no children, works as an .     Other Topics Concern   • Not on file     Social History Narrative    ** Merged History Encounter **         ** Merged History Encounter **              Physical Exam     Vitals:    01/05/18 0201 01/05/18 0202 01/05/18 0203 01/05/18 0432   BP: 130/80 136/86 145/93 118/83   Pulse: 68 80 88 78   Resp: 16 18 18 18   Temp:   36.6 °C (97.8 °F) 35.9 °C (96.6 °F)   TempSrc:       SpO2:   95% 90%   Weight:   80.1 kg (176 lb 9.4 oz)    Height:   1.803 m (5' 11\")      Body mass index is 24.63 kg/m².  /83   Pulse 78   Temp 35.9 °C (96.6 °F)   Resp 18   Ht 1.803 m (5' 11\")   Wt 80.1 kg (176 lb 9.4 oz)   SpO2 90%   BMI 24.63 kg/m²   O2 therapy: Pulse Oximetry: 90 %, O2 (LPM): 0, O2 Delivery: None (Room Air)    Physical Exam   Constitutional: He is well-developed, well-nourished, and in no distress.   HENT:   Head: Normocephalic and atraumatic.   Mouth/Throat: No oropharyngeal exudate.   Dry mucous membranes   Eyes: Conjunctivae are normal. " Pupils are equal, round, and reactive to light. No scleral icterus.   Neck: Normal range of motion.   Cardiovascular: Normal rate, regular rhythm and normal heart sounds.    Pulmonary/Chest: Effort normal and breath sounds normal. No respiratory distress. He has no wheezes. He has no rales.   Abdominal: Soft. There is no tenderness.   Musculoskeletal: Normal range of motion. He exhibits no edema or tenderness.   Neurological: He is alert. No cranial nerve deficit.   Psychiatric:   Developmental delay             Data Review   Old Records Request:   Completed  Current Records review and summary: Completed    Lab Data Review:  Recent Results (from the past 24 hour(s))   EKG (ER)    Collection Time: 18  2:22 PM   Result Value Ref Range    Report       [facility] Emergency Dept.    Test Date:  2018  Pt Name:    NICHELLE MARQUEZ                   Department: ER  MRN:        [id number]                      Room:  Gender:     Male                         Technician: [id number]  :        1965                   Requested By:ER TRIAGE PROTOCOL  Order #:    [id number]                    Reading MD:    Measurements  Intervals                                Axis  Rate:       81                           P:          34  NC:         160                          QRS:        7  QRSD:       94                           T:          16  QT:         372  QTc:        432    Interpretive Statements  SINUS RHYTHM  Compared to ECG 10/06/2017 15:28:21  ST (T wave) deviation no longer present     CBC WITH DIFFERENTIAL    Collection Time: 18  9:20 PM   Result Value Ref Range    WBC 7.8 4.8 - 10.8 K/uL    RBC 4.75 4.70 - 6.10 M/uL    Hemoglobin 13.9 (L) 14.0 - 18.0 g/dL    Hematocrit 40.3 (L) 42.0 - 52.0 %    MCV 84.8 81.4 - 97.8 fL    MCH 29.3 27.0 - 33.0 pg    MCHC 34.5 33.7 - 35.3 g/dL    RDW 44.8 35.9 - 50.0 fL    Platelet Count 301 164 - 446 K/uL    MPV 9.8 9.0 - 12.9 fL    Neutrophils-Polys 62.20 44.00 -  72.00 %    Lymphocytes 26.60 22.00 - 41.00 %    Monocytes 8.70 0.00 - 13.40 %    Eosinophils 1.50 0.00 - 6.90 %    Basophils 0.60 0.00 - 1.80 %    Immature Granulocytes 0.40 0.00 - 0.90 %    Nucleated RBC 0.00 /100 WBC    Neutrophils (Absolute) 4.83 1.82 - 7.42 K/uL    Lymphs (Absolute) 2.07 1.00 - 4.80 K/uL    Monos (Absolute) 0.68 0.00 - 0.85 K/uL    Eos (Absolute) 0.12 0.00 - 0.51 K/uL    Baso (Absolute) 0.05 0.00 - 0.12 K/uL    Immature Granulocytes (abs) 0.03 0.00 - 0.11 K/uL    NRBC (Absolute) 0.00 K/uL   COMP METABOLIC PANEL    Collection Time: 01/04/18  9:20 PM   Result Value Ref Range    Sodium 134 (L) 135 - 145 mmol/L    Potassium 3.8 3.6 - 5.5 mmol/L    Chloride 101 96 - 112 mmol/L    Co2 23 20 - 33 mmol/L    Anion Gap 10.0 0.0 - 11.9    Glucose 134 (H) 65 - 99 mg/dL    Bun 14 8 - 22 mg/dL    Creatinine 0.84 0.50 - 1.40 mg/dL    Calcium 9.4 8.5 - 10.5 mg/dL    AST(SGOT) 15 12 - 45 U/L    ALT(SGPT) 26 2 - 50 U/L    Alkaline Phosphatase 84 30 - 99 U/L    Total Bilirubin 1.6 (H) 0.1 - 1.5 mg/dL    Albumin 4.9 3.2 - 4.9 g/dL    Total Protein 7.8 6.0 - 8.2 g/dL    Globulin 2.9 1.9 - 3.5 g/dL    A-G Ratio 1.7 g/dL   TROPONIN    Collection Time: 01/04/18  9:20 PM   Result Value Ref Range    Troponin I <0.01 0.00 - 0.04 ng/mL   PROTHROMBIN TIME (INR)    Collection Time: 01/04/18  9:20 PM   Result Value Ref Range    PT 13.7 12.0 - 14.6 sec    INR 1.08 0.87 - 1.13   APTT    Collection Time: 01/04/18  9:20 PM   Result Value Ref Range    APTT 30.4 24.7 - 36.0 sec   ESTIMATED GFR    Collection Time: 01/04/18  9:20 PM   Result Value Ref Range    GFR If African American >60 >60 mL/min/1.73 m 2    GFR If Non African American >60 >60 mL/min/1.73 m 2   INFLUENZA A/B BY PCR    Collection Time: 01/05/18  1:21 AM   Result Value Ref Range    Influenza virus A RNA Negative Negative    Influenza virus B, PCR Negative Negative   EKG    Collection Time: 01/05/18  5:02 AM   Result Value Ref Range    Report       [facility]    Test Date:  2018  Pt Name:    NICHELLE MARQUEZ                   Department: CPU  MRN:        [id number]                      Room:       T209  Gender:     Male                         Technician: SARAHI  :        1965                   Requested By:NURIA LOPES  Order #:    [id number]                    Reading MD:    Measurements  Intervals                                Axis  Rate:       68                           P:          36  DE:         160                          QRS:        6  QRSD:       100                          T:          41  QT:         404  QTc:        430    Interpretive Statements  SINUS RHYTHM  Compared to ECG 2018 14:22:46  No significant changes         Imaging/Procedures Review:    ndependant Imaging Review: Completed  DX-ELBOW-COMPLETE 3+ RIGHT   Final Result      No acute bony abnormality.      CT-HEAD W/O   Final Result      Normal CT scan of the head without contrast.               INTERPRETING LOCATION:  [address], [place], [address]      DX-CHEST-2 VIEWS   Final Result      No acute cardiopulmonary abnormality.               INTERPRETING LOCATION: [address], [place], [address]      ECHOCARDIOGRAM COMP W/O CONT    (Results Pending)          EKG:   EKG Independant Review: Completed  QTc: 432, HR: 81, Normal Sinus Rhythm, no ST/T changes. Similar to previous EKG       Assessment/Plan     Syncope- (present on admission)   Assessment & Plan    Per patient, he is having an increase frequency of syncopal episodes. No significant findings on examination. Vitals stable.  CXR clear, no significant EKG changes. Initial troponin < 0.01. Electrolytes within normal limits. Hb 13.9; denies acute bleeding. Glucose 134, no evidence of hypoglycemic event    Plan  - Tele monitoring for observation  - Trend troponins, EKG  - Accuchecks; hypoglycemia management protocol PRN  - IV fluids for hydration (EF 45% in 2017)  - Echo in AM  - Check orthostatics  - B12, TSH  pending  - Monitor electrolytes and replace as needed  - Aspiration, fall and seizure precautions        CAD with ischemic cardiomyopathy- (present on admission)   Assessment & Plan    Plan  - Continue outpatient aspirin, statin, lisinopril, metoprolol and ranolazine  - Tele monitoring  - Echo in AM  - As patient is getting gentle hydration with IV fluids, monitor for overload. CXR was clear, no evidence of pulmonary edema or pleural effusion in the ED.  - No EKG changes noted on ED EKG  - Trending troponins and EKG        Viral URI with cough- (present on admission)   Assessment & Plan    Upper respiratory symptoms may be due to URI, most likely viral. Influenza negative.     Plan  - IV fluids  - Symptomatic management  - Continue loratadine  - Started patient on flonase        PAF (paroxysmal atrial fibrillation) (CMS-HCC)- (present on admission)   Assessment & Plan    Currently on xarelto and metoprolol    Plan  - Continue xarelto and metoprolol          Type 2 diabetes mellitus without complication, without long-term current use of insulin ([facility])- (present on admission)   Assessment & Plan    Patient is on metformin BID for his DM management. Checks his blood sugars but hasn't checked his blood glucose immediately before or after a syncopal episode. On admission, glucose was 134    Plan  - Holding metformin for now  - Accuchecks  - Hypoglycemia management protocol PRN  - HbA1c ordered (previous was from September 2017 at 7.3). Follow-up with result                  Anticipated Hospital stay: Observation admit        Quality Measures    Reviewed items::  Radiology images reviewed, Labs reviewed, Medications reviewed and EKG reviewed  Luque catheter::  No Luque  DVT: On xarelto for paxosysmal atrial fibrillation.  DVT prophylaxis - mechanical:  SCDs  Ulcer Prophylaxis::  Not indicated

## 2018-01-05 NOTE — PROGRESS NOTES
Picked up patient from ER via WC.  VS stable.  Assessment complete. Plan of care discussed and understood.  No signs of distress noted at this time. Pt reports pain 9/10, headache, medicated per MAR. IV fluids running per MAR. SR on heart monitor. Pt educated regarding fall precautions, encouraged to use call light. Pt denies any additional needs at this time.  Call light within reach. Will continue to monitor.

## 2018-01-05 NOTE — ED NOTES
Med rec complete per pt at bedside  Allergies reviewed   No ABX in last month  Pt took all of his morning medications but none of his evening doses

## 2018-01-05 NOTE — ASSESSMENT & PLAN NOTE
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 95% stenosis at the ostium of one of the dual posterior descending artery .  Prior admission- Cardiology had opted to pursue medical management and consider CABG for palliative pain relief only if required.    Denied any CP  Trops x2  EKG negative for acute ischemia  Plan  Continue outpatient aspirin, statin, metoprolol, lisinopril and ranolazine  To f/u with cardiology for further f/u

## 2018-01-05 NOTE — SENIOR ADMIT NOTE
"Mr. Marquez is a 52 y.o. male with PMHx of developmental delay, ischemic cardiomyopathy, PAfib on OAC, and T2DM who presented for evaluation after having a syncopal episode at work today.  He reports no symptoms prior to the episode and states that aside from a little dizziness he feels fine now as well. He lost consciousness for a few seconds only and does report hitting his head.  He states he has had many episodes like this over the past year and the dizziness occurs pretty much every day though syncopal episodes occur less frequently.  He does report also that for the past 2 days he has noticed a mild sore throat and rhinorrhea and nasal congestion but denies fever, chills, nausea, myalgias, cough.      On chart review, it appears patient has had multiple hospital admissions for chest pain and cath in 2017 showed adequate flow in LAD and RCA though OM2 had a high-grade lesion, and at that time cardiology had opted to pursue medical management and consider CABG for palliative pain relief only if required.      Exam:  /93   Pulse 88   Temp 36.6 °C (97.8 °F)   Resp 18   Ht 1.803 m (5' 11\")   Wt 80.1 kg (176 lb 9.4 oz)   SpO2 95%   BMI 24.63 kg/m²     - no acute distress  - orthostatic vitals negative  - RRR, no m/r/g; no pedal edema  - CTABL, no rales/ronchi  - no pharyngeal exudates/erythema; no sinus tenderness; PERRL; EOMI  - abd soft, nontender, BS+    Labs:  Recent Results (from the past 24 hour(s))   EKG (ER)    Collection Time: 18  2:22 PM   Result Value Ref Range    Report       [facility]    Test Date:  2018  Pt Name:    NICHELLE MARQUEZ                   Department: ER  MRN:        [id number]                      Room:  Gender:     Male                         Technician: [id number]  :        1965                   Requested By:ER TRIAGE PROTOCOL  Order #:    [id number]                    Katina MD:    Measurements  Intervals                           "      Axis  Rate:       81                           P:          34  MA:         160                          QRS:        7  QRSD:       94                           T:          16  QT:         372  QTc:        432    Interpretive Statements  SINUS RHYTHM  Compared to ECG 10/06/2017 15:28:21  ST (T wave) deviation no longer present     CBC WITH DIFFERENTIAL    Collection Time: 01/04/18  9:20 PM   Result Value Ref Range    WBC 7.8 4.8 - 10.8 K/uL    RBC 4.75 4.70 - 6.10 M/uL    Hemoglobin 13.9 (L) 14.0 - 18.0 g/dL    Hematocrit 40.3 (L) 42.0 - 52.0 %    MCV 84.8 81.4 - 97.8 fL    MCH 29.3 27.0 - 33.0 pg    MCHC 34.5 33.7 - 35.3 g/dL    RDW 44.8 35.9 - 50.0 fL    Platelet Count 301 164 - 446 K/uL    MPV 9.8 9.0 - 12.9 fL    Neutrophils-Polys 62.20 44.00 - 72.00 %    Lymphocytes 26.60 22.00 - 41.00 %    Monocytes 8.70 0.00 - 13.40 %    Eosinophils 1.50 0.00 - 6.90 %    Basophils 0.60 0.00 - 1.80 %    Immature Granulocytes 0.40 0.00 - 0.90 %    Nucleated RBC 0.00 /100 WBC    Neutrophils (Absolute) 4.83 1.82 - 7.42 K/uL    Lymphs (Absolute) 2.07 1.00 - 4.80 K/uL    Monos (Absolute) 0.68 0.00 - 0.85 K/uL    Eos (Absolute) 0.12 0.00 - 0.51 K/uL    Baso (Absolute) 0.05 0.00 - 0.12 K/uL    Immature Granulocytes (abs) 0.03 0.00 - 0.11 K/uL    NRBC (Absolute) 0.00 K/uL   COMP METABOLIC PANEL    Collection Time: 01/04/18  9:20 PM   Result Value Ref Range    Sodium 134 (L) 135 - 145 mmol/L    Potassium 3.8 3.6 - 5.5 mmol/L    Chloride 101 96 - 112 mmol/L    Co2 23 20 - 33 mmol/L    Anion Gap 10.0 0.0 - 11.9    Glucose 134 (H) 65 - 99 mg/dL    Bun 14 8 - 22 mg/dL    Creatinine 0.84 0.50 - 1.40 mg/dL    Calcium 9.4 8.5 - 10.5 mg/dL    AST(SGOT) 15 12 - 45 U/L    ALT(SGPT) 26 2 - 50 U/L    Alkaline Phosphatase 84 30 - 99 U/L    Total Bilirubin 1.6 (H) 0.1 - 1.5 mg/dL    Albumin 4.9 3.2 - 4.9 g/dL    Total Protein 7.8 6.0 - 8.2 g/dL    Globulin 2.9 1.9 - 3.5 g/dL    A-G Ratio 1.7 g/dL   TROPONIN    Collection Time: 01/04/18  9:20 PM    Result Value Ref Range    Troponin I <0.01 0.00 - 0.04 ng/mL   PROTHROMBIN TIME (INR)    Collection Time: 01/04/18  9:20 PM   Result Value Ref Range    PT 13.7 12.0 - 14.6 sec    INR 1.08 0.87 - 1.13   APTT    Collection Time: 01/04/18  9:20 PM   Result Value Ref Range    APTT 30.4 24.7 - 36.0 sec   ESTIMATED GFR    Collection Time: 01/04/18  9:20 PM   Result Value Ref Range    GFR If African American >60 >60 mL/min/1.73 m 2    GFR If Non African American >60 >60 mL/min/1.73 m 2   INFLUENZA A/B BY PCR    Collection Time: 01/05/18  1:21 AM   Result Value Ref Range    Influenza virus A RNA Negative Negative    Influenza virus B, PCR Negative Negative       DX-ELBOW-COMPLETE 3+ RIGHT   Final Result      No acute bony abnormality.      CT-HEAD W/O   Final Result      Normal CT scan of the head without contrast.               INTERPRETING LOCATION:  [address] , [place], [address]      DX-CHEST-2 VIEWS   Final Result      No acute cardiopulmonary abnormality.               INTERPRETING LOCATION: [address] , [place], [address]          Assessment and Plan:    SYNCOPE  - patient asymptomatic at present  - cbc, cmp, trop, ekg, ct head, cxr unremarkable; orthostatics neg; glucose wnl  - will monitor on tele obs overnight to ensure no arrythmias present  - trend trop/ekg and repeat echo in AM; last echo showed EF 45% IN 4/2017  - will gently hydrate  - may need further o/p cardiology workup for recurrent syncopal episode if no events noted on overnight tele, especially if they are occurring daily  - may need to reassess anticoagulation if patient is really having as many syncopal episodes as he reports    VIRAL URI  - symptomatic management  - flu a/b neg    ISCHEMIC CARDIOMYOPATHY  CAD  T2DM  PAFIB ON OAC    For further details of Assessment & Plan, please refer to H&P by Dr. Stafford from 1/5/2018.    CODE STATUS: full  DVT PROPHYLAXIS: monica Mejia MD

## 2018-01-05 NOTE — ASSESSMENT & PLAN NOTE
Upper respiratory symptoms may be due to URI, most likely viral.  Influenza negative.   Continue symptomatic management with loratadine and flonase

## 2018-01-05 NOTE — DISCHARGE PLANNING
This CM spoke to pt and gave him an Advance Directives packet.  Pt verbalized understanding to take it home and read through it.  No safety or other needs identified at this time, and this does not appear to be a difficult discharge.      Care Transition Team Assessment    Information Source  Orientation : Oriented x 4  Information Given By: Patient    Readmission Evaluation  Is this a readmission?: No    Elopement Risk  Legal Hold: No  Ambulatory or Self Mobile in Wheelchair: No-Not an Elopement Risk  Elopement Risk: Not at Risk for Elopement    Interdisciplinary Discharge Planning  Does Admitting Nurse Feel This Could be a Complex Discharge?: No  Lives with - Patient's Self Care Capacity: Alone and Able to Care For Self  Support Systems: None  Housing / Facility: 1 Story Apartment / Condo  Do You Take your Prescribed Medications Regularly: Yes  Able to Return to Previous ADL's: Yes  Mobility Issues: No  Prior Services: None  Patient Expects to be Discharged to:: Home  Assistance Needed: No  Durable Medical Equipment: Not Applicable    Discharge Preparedness  What is your plan after discharge?: Other (comment) (Home - independent)  Prior Functional Level: Ambulatory, Independent with Activities of Daily Living  Difficulity with ADLs: None    Functional Assesment  Prior Functional Level: Ambulatory, Independent with Activities of Daily Living    Finances  Financial Barriers to Discharge: No  Prescription Coverage: Yes         Values / Beliefs / Concerns  Values / Beliefs Concerns : No    Advance Directive  Advance Directive?: None  Advance Directive offered?: AD Booklet given    Domestic Abuse  Have you ever been the victim of abuse or violence?: No  Physical Abuse or Sexual Abuse: No  Verbal Abuse or Emotional Abuse: No  Possible Abuse Reported to:: Not Applicable         Discharge Risks or Barriers  Discharge risks or barriers?: No    Anticipated Discharge Information  Anticipated discharge disposition:  Home

## 2018-01-05 NOTE — ED NOTES
PT IS  AAOX4, , PT IS REST, NAD. PT IS BEING ADMITTED, PT WAS TOLD OF POC. PT IS IN NAD. PT HAS NO LOC AT THIS TIME.

## 2018-01-05 NOTE — ASSESSMENT & PLAN NOTE
Increasing  frequency of syncopal episodes in the past few months  Vitals stable.    Orthostatics negative  CXR clear, no significant EKG changes.  Electrolytes within normal limits.   Hb 13.9; denies acute bleeding.   Glucose 134, no evidence of hypoglycemia  Likely cardiac syncope given his echo showing mild asymmetric septal hypertrophy.No AS  D/mita metoprolol ?HOCM  Cardiology on board  Repeat echo showed mild asymmetric hypertrophy with no evidence of outflow tract obstruction or gradient.  No clinical or objective evidence of HOCM and pt was resumed metoprolol and xarelto on discharge as per cardiology.   Patient was sent home with instructions to get further work up of syncope such as EEG and to be f/u with his cardiologist Dr. León for loop recorder as an outpatient.

## 2018-01-06 ENCOUNTER — PATIENT OUTREACH (OUTPATIENT)
Dept: HEALTH INFORMATION MANAGEMENT | Facility: OTHER | Age: 53
End: 2018-01-06

## 2018-01-06 VITALS
BODY MASS INDEX: 24.72 KG/M2 | HEART RATE: 74 BPM | WEIGHT: 176.59 LBS | DIASTOLIC BLOOD PRESSURE: 66 MMHG | HEIGHT: 71 IN | SYSTOLIC BLOOD PRESSURE: 120 MMHG | RESPIRATION RATE: 19 BRPM | TEMPERATURE: 96.7 F | OXYGEN SATURATION: 95 %

## 2018-01-06 LAB — GLUCOSE BLD-MCNC: 174 MG/DL (ref 65–99)

## 2018-01-06 PROCEDURE — 700111 HCHG RX REV CODE 636 W/ 250 OVERRIDE (IP): Performed by: STUDENT IN AN ORGANIZED HEALTH CARE EDUCATION/TRAINING PROGRAM

## 2018-01-06 PROCEDURE — 99217 PR OBSERVATION CARE DISCHARGE: CPT | Mod: GC | Performed by: INTERNAL MEDICINE

## 2018-01-06 PROCEDURE — 700102 HCHG RX REV CODE 250 W/ 637 OVERRIDE(OP): Performed by: STUDENT IN AN ORGANIZED HEALTH CARE EDUCATION/TRAINING PROGRAM

## 2018-01-06 PROCEDURE — 96372 THER/PROPH/DIAG INJ SC/IM: CPT

## 2018-01-06 PROCEDURE — G0378 HOSPITAL OBSERVATION PER HR: HCPCS

## 2018-01-06 PROCEDURE — A9270 NON-COVERED ITEM OR SERVICE: HCPCS | Performed by: STUDENT IN AN ORGANIZED HEALTH CARE EDUCATION/TRAINING PROGRAM

## 2018-01-06 PROCEDURE — 82962 GLUCOSE BLOOD TEST: CPT

## 2018-01-06 PROCEDURE — 700105 HCHG RX REV CODE 258: Performed by: STUDENT IN AN ORGANIZED HEALTH CARE EDUCATION/TRAINING PROGRAM

## 2018-01-06 RX ORDER — ERGOCALCIFEROL 1.25 MG/1
50000 CAPSULE ORAL
Qty: 8 CAP | Refills: 0 | Status: SHIPPED | OUTPATIENT
Start: 2018-01-06 | End: 2018-01-15

## 2018-01-06 RX ORDER — ERGOCALCIFEROL 1.25 MG/1
50000 CAPSULE ORAL
Status: DISCONTINUED | OUTPATIENT
Start: 2018-01-06 | End: 2018-01-06 | Stop reason: HOSPADM

## 2018-01-06 RX ORDER — METOPROLOL SUCCINATE 25 MG/1
25 TABLET, EXTENDED RELEASE ORAL
Status: DISCONTINUED | OUTPATIENT
Start: 2018-01-06 | End: 2018-01-06 | Stop reason: HOSPADM

## 2018-01-06 RX ADMIN — ERGOCALCIFEROL 50000 UNITS: 1.25 CAPSULE ORAL at 12:14

## 2018-01-06 RX ADMIN — STANDARDIZED SENNA CONCENTRATE AND DOCUSATE SODIUM 2 TABLET: 8.6; 5 TABLET, FILM COATED ORAL at 09:24

## 2018-01-06 RX ADMIN — ATORVASTATIN CALCIUM 80 MG: 80 TABLET, FILM COATED ORAL at 09:23

## 2018-01-06 RX ADMIN — RANOLAZINE 1000 MG: 500 TABLET, FILM COATED, EXTENDED RELEASE ORAL at 09:25

## 2018-01-06 RX ADMIN — OMEPRAZOLE 20 MG: 20 CAPSULE, DELAYED RELEASE ORAL at 09:24

## 2018-01-06 RX ADMIN — CYANOCOBALAMIN 1000 MCG: 1000 INJECTION, SOLUTION INTRAMUSCULAR; SUBCUTANEOUS at 09:30

## 2018-01-06 RX ADMIN — METOPROLOL SUCCINATE 25 MG: 25 TABLET, EXTENDED RELEASE ORAL at 09:25

## 2018-01-06 RX ADMIN — SODIUM CHLORIDE: 9 INJECTION, SOLUTION INTRAVENOUS at 05:14

## 2018-01-06 RX ADMIN — ACETAMINOPHEN 650 MG: 325 TABLET, FILM COATED ORAL at 03:32

## 2018-01-06 RX ADMIN — FLUTICASONE PROPIONATE 100 MCG: 50 SPRAY, METERED NASAL at 09:24

## 2018-01-06 RX ADMIN — LISINOPRIL 5 MG: 10 TABLET ORAL at 09:23

## 2018-01-06 RX ADMIN — ASPIRIN 81 MG: 81 TABLET, COATED ORAL at 09:24

## 2018-01-06 RX ADMIN — LORATADINE 10 MG: 10 TABLET ORAL at 09:24

## 2018-01-06 ASSESSMENT — PATIENT HEALTH QUESTIONNAIRE - PHQ9
SUM OF ALL RESPONSES TO PHQ9 QUESTIONS 1 AND 2: 0
SUM OF ALL RESPONSES TO PHQ QUESTIONS 1-9: 0
1. LITTLE INTEREST OR PLEASURE IN DOING THINGS: NOT AT ALL

## 2018-01-06 NOTE — PROGRESS NOTES
MD visited pt,for discharge, IV D/C'd. Discharge instructions provided to pt. Pt states understanding. Pt states all questions have been answered. Copy of discharge provided to pt. Signed copy in chart. Prescriptions provided to pt, copy in chart. Pt states that all personal belongings are in possession. Pt awaiting for his ride.

## 2018-01-06 NOTE — CONSULTS
Cardiology History and Physical    Note Author: Amber Lombardi M.D.       Name Ryan Trevizo     1965   Age/Sex 52 y.o. male   MRN [id number]   Code Status Full Code     Chief Complaint:  Concerning for Asymmetrical Septical Hypertrophy/HOCM causing Syncope.    HPI:  52-year-old male with past medical history of STEMI in 2012 ischemic systolic congestive cardiomyopathy, multivessel CAD high-grade disease in one of the dual posterior descending artery, chronic total occlusion of second obtuse marginal branch of left circumflex artery, 70% distal LAD stenosis as well as mid RCA 50-60% stenosis,, uncontrolled diabetes type 2 with HbA1c of 7.6, history of GERD and possible baretts esophagus, hypertension, paroxysmal A. fib, right lower extremity DVT status post IVC filter is coming in with syncope.    She reports that he has been experiencing dizziness and lightheadedness on and off for a long time sometimes associated with certain positions as well as from sudden sitting to standing position. He denies any episodes of syncope until this past Thursday when he was working outside with his coworker and putting out trash he started experiencing lightheadedness and dizziness which led to loss of consciousness and he ended up hitting his head. He reports that he noticed his right arm was shaking before he passed out but denies any prior history of seizures and/or epilepsy. He denied any prodromal symptoms before passing out such as chest pain, shortness of breath, palpitations and/or profuse sweating. After he woke up he felt lightheaded and dizzy again and ended up going to his  who brought him to the hospital here.Denies any bowel or urinary incontinence or seizure-like activity noticed by his coworker or tongue bites.    He does reports that he's been experiencing dyspnea on exertion and could walk half a mile before getting short of breath. He also endorses orthopnea, PND but denies any  lower extremity swelling. He does have extensive history of CAD with multivessel disease not amenble to  revascularization or stenting.     Review of Systems   Constitutional: Negative for fever.   Eyes: Negative for blurred vision, double vision and photophobia.   Respiratory: Positive for shortness of breath. Negative for cough and hemoptysis.    Cardiovascular: Positive for orthopnea. Negative for chest pain, palpitations, claudication and leg swelling.   Gastrointestinal: Negative for abdominal pain, diarrhea, heartburn, nausea and vomiting.   Genitourinary: Negative for dysuria and urgency.   Musculoskeletal: Negative for myalgias.   Neurological: Negative for dizziness and tingling.   Psychiatric/Behavioral: Negative for depression.             Past Medical History:   Past Medical History:   Diagnosis Date   • A-fib (CMS-HCC)    • ACS (acute coronary syndrome) (CMS-HCC) 7/25/2012   • Anxiety disorder    • Arthritis    • Blood glucose elevated    • CAD (coronary artery disease) 10/14/2013   • Claudication (CMS-HCC)    • Diabetes (CMS-HCC)    • Dizziness    • GERD (gastroesophageal reflux disease) 7/15/2012   • Glaucoma    • HTN (hypertension) 7/15/2012   • Hyperlipemia    • Hypertriglyceridemia    • Ischemic cardiomyopathy    • Mental retardation    • MI (myocardial infarction)    • Osteoarthritis    • Pericarditis secondary to acute myocardial infarction 7/19/2012   • Right knee pain    • SOB (shortness of breath)    • STEMI (ST elevation myocardial infarction) (CMS-HCC)    • Upper GI bleed 7/26/2012   • Viral URI with cough 1/5/2018       Past Surgical History:  Past Surgical History:   Procedure Laterality Date   • GASTROSCOPY-ENDO  5/19/2017    Procedure: GASTROSCOPY-ENDO;  Surgeon: Reinaldo Berry M.D.;  Location: ENDOSCOPY [facility];  Service:    • GASTROSCOPY-ENDO  7/26/2012    Performed by JORDIN VERA at ENDOSCOPY [facility]   • GASTROSCOPY-ENDO  7/25/2012    Performed by JORDIN VERA  "P at ENDOSCOPY [facility] ORS   • OTHER ORTHOPEDIC SURGERY  7-     R knee surgery   • OTHER  knee surgery   • OTHER     • OTHER CARDIAC SURGERY      stent placement       Current Outpatient Medications:  Home Medications     Reviewed by Cinthya Garcia PhT (Pharmacy Tech) on 01/04/18 at 2159  Med List Status: Complete   Medication Last Dose Status   ASPIRIN LOW DOSE 81 MG EC tablet 1/4/2018 Active   atorvastatin (LIPITOR) 40 MG Tab 1/3/2018 Active   lisinopril (PRINIVIL) 5 MG Tab 1/4/2018 Active   loratadine (CLARITIN) 10 MG Tab 1/4/2018 Active   metformin (GLUCOPHAGE) 1000 MG tablet 1/4/2018 Active   metoprolol SR (TOPROL XL) 25 MG TABLET SR 24 HR 1/4/2018 Active   pantoprazole (PROTONIX) 40 MG Tablet Delayed Response 1/3/2018 Active   RANEXA 500 MG TABLET SR 12 HR 1/4/2018 Active   rivaroxaban (XARELTO) 20 MG Tab tablet 1/3/2018 Active   vitamin D3, cholecalciferol, 1000 UNIT Tab 1/4/2018 Active                Medication Allergy/Sensitivities:  Allergies   Allergen Reactions   • Ritalin [Methylphenidate] Unspecified     \"Hyper\"         Family History:  No family history on file.    Social History:  Social History     Social History   • Marital status: Single     Spouse name: N/A   • Number of children: N/A   • Years of education: N/A     Occupational History   • Not on file.     Social History Main Topics   • Smoking status: Never Smoker   • Smokeless tobacco: Never Used   • Alcohol use No   • Drug use: No   • Sexual activity: Not on file      Comment: Single, has no children, works as an .     Other Topics Concern   • Not on file     Social History Narrative    ** Merged History Encounter **         ** Merged History Encounter **                Physical Exam     Vitals:    01/05/18 0754 01/05/18 0900 01/05/18 1216 01/05/18 1612   BP: 128/82  137/79 133/78   Pulse: 63 (!) 59 77 66   Resp: 16  16 (!) 21   Temp: 36.3 °C (97.4 °F)  36.4 °C (97.6 °F) 36.4 °C (97.5 °F)   TempSrc:       SpO2: 93%  " "95% 95%   Weight:       Height:         Body mass index is 24.63 kg/m².  /78   Pulse 66   Temp 36.4 °C (97.5 °F)   Resp (!) 21   Ht 1.803 m (5' 11\")   Wt 80.1 kg (176 lb 9.4 oz)   SpO2 95%   BMI 24.63 kg/m²   O2 therapy: Pulse Oximetry: 95 %, O2 (LPM): 0, O2 Delivery: None (Room Air)    Physical Exam   Constitutional: He is oriented to person, place, and time.   Neck: No JVD present.   Cardiovascular: Normal rate, regular rhythm and normal heart sounds.  Exam reveals no gallop and no friction rub.    No murmur heard.  Pulmonary/Chest: Effort normal and breath sounds normal. No respiratory distress. He has no wheezes. He has no rales.   Abdominal: Soft. Bowel sounds are normal. He exhibits no distension. There is no tenderness.   Musculoskeletal: Normal range of motion. He exhibits no edema, tenderness or deformity.   Neurological: He is alert and oriented to person, place, and time.             Data Review       Old Records Request:   Completed  Current Records review and summary: Completed    Lab Data Review:  Recent Results (from the past 24 hour(s))   CBC WITH DIFFERENTIAL    Collection Time: 01/04/18  9:20 PM   Result Value Ref Range    WBC 7.8 4.8 - 10.8 K/uL    RBC 4.75 4.70 - 6.10 M/uL    Hemoglobin 13.9 (L) 14.0 - 18.0 g/dL    Hematocrit 40.3 (L) 42.0 - 52.0 %    MCV 84.8 81.4 - 97.8 fL    MCH 29.3 27.0 - 33.0 pg    MCHC 34.5 33.7 - 35.3 g/dL    RDW 44.8 35.9 - 50.0 fL    Platelet Count 301 164 - 446 K/uL    MPV 9.8 9.0 - 12.9 fL    Neutrophils-Polys 62.20 44.00 - 72.00 %    Lymphocytes 26.60 22.00 - 41.00 %    Monocytes 8.70 0.00 - 13.40 %    Eosinophils 1.50 0.00 - 6.90 %    Basophils 0.60 0.00 - 1.80 %    Immature Granulocytes 0.40 0.00 - 0.90 %    Nucleated RBC 0.00 /100 WBC    Neutrophils (Absolute) 4.83 1.82 - 7.42 K/uL    Lymphs (Absolute) 2.07 1.00 - 4.80 K/uL    Monos (Absolute) 0.68 0.00 - 0.85 K/uL    Eos (Absolute) 0.12 0.00 - 0.51 K/uL    Baso (Absolute) 0.05 0.00 - 0.12 K/uL    " Immature Granulocytes (abs) 0.03 0.00 - 0.11 K/uL    NRBC (Absolute) 0.00 K/uL   COMP METABOLIC PANEL    Collection Time: 18  9:20 PM   Result Value Ref Range    Sodium 134 (L) 135 - 145 mmol/L    Potassium 3.8 3.6 - 5.5 mmol/L    Chloride 101 96 - 112 mmol/L    Co2 23 20 - 33 mmol/L    Anion Gap 10.0 0.0 - 11.9    Glucose 134 (H) 65 - 99 mg/dL    Bun 14 8 - 22 mg/dL    Creatinine 0.84 0.50 - 1.40 mg/dL    Calcium 9.4 8.5 - 10.5 mg/dL    AST(SGOT) 15 12 - 45 U/L    ALT(SGPT) 26 2 - 50 U/L    Alkaline Phosphatase 84 30 - 99 U/L    Total Bilirubin 1.6 (H) 0.1 - 1.5 mg/dL    Albumin 4.9 3.2 - 4.9 g/dL    Total Protein 7.8 6.0 - 8.2 g/dL    Globulin 2.9 1.9 - 3.5 g/dL    A-G Ratio 1.7 g/dL   TROPONIN    Collection Time: 18  9:20 PM   Result Value Ref Range    Troponin I <0.01 0.00 - 0.04 ng/mL   PROTHROMBIN TIME (INR)    Collection Time: 18  9:20 PM   Result Value Ref Range    PT 13.7 12.0 - 14.6 sec    INR 1.08 0.87 - 1.13   APTT    Collection Time: 18  9:20 PM   Result Value Ref Range    APTT 30.4 24.7 - 36.0 sec   ESTIMATED GFR    Collection Time: 18  9:20 PM   Result Value Ref Range    GFR If African American >60 >60 mL/min/1.73 m 2    GFR If Non African American >60 >60 mL/min/1.73 m 2   INFLUENZA A/B BY PCR    Collection Time: 18  1:21 AM   Result Value Ref Range    Influenza virus A RNA Negative Negative    Influenza virus B, PCR Negative Negative   EKG    Collection Time: 18  5:02 AM   Result Value Ref Range    Report       [facility]    Test Date:  2018  Pt Name:    NICHELLE MARQUEZ                   Department: CPU  MRN:        [id number]                      Room:       T209  Gender:     Male                         Technician: SARAHI  :        1965                   Requested By:NURIA LOPES  Order #:    [id number]                    Reading MD: Viky Chong MD    Measurements  Intervals                                Axis  Rate:       68                            P:          36  VT:         160                          QRS:        6  QRSD:       100                          T:          41  QT:         404  QTc:        430    Interpretive Statements  SINUS RHYTHM  Compared to ECG 01/04/2018 14:22:46  No significant changes    Electronically Signed On 1-5-2018 18:34:59 PST by Viky Chong MD     HEMOGLOBIN A1C    Collection Time: 01/05/18  5:36 AM   Result Value Ref Range    Glycohemoglobin 7.6 (H) 0.0 - 5.6 %    Est Avg Glucose 171 mg/dL   VITAMIN B12    Collection Time: 01/05/18  5:36 AM   Result Value Ref Range    Vitamin B12 -True Cobalamin 275 211 - 911 pg/mL   TSH WITH REFLEX TO FT4    Collection Time: 01/05/18  5:36 AM   Result Value Ref Range    TSH 3.780 0.380 - 5.330 uIU/mL   TROPONIN    Collection Time: 01/05/18  5:36 AM   Result Value Ref Range    Troponin I <0.01 0.00 - 0.04 ng/mL   CBC WITHOUT DIFFERENTIAL    Collection Time: 01/05/18  5:36 AM   Result Value Ref Range    WBC 5.3 4.8 - 10.8 K/uL    RBC 4.32 (L) 4.70 - 6.10 M/uL    Hemoglobin 12.6 (L) 14.0 - 18.0 g/dL    Hematocrit 36.9 (L) 42.0 - 52.0 %    MCV 85.4 81.4 - 97.8 fL    MCH 29.2 27.0 - 33.0 pg    MCHC 34.1 33.7 - 35.3 g/dL    RDW 45.5 35.9 - 50.0 fL    Platelet Count 274 164 - 446 K/uL    MPV 10.3 9.0 - 12.9 fL   MAGNESIUM    Collection Time: 01/05/18  5:36 AM   Result Value Ref Range    Magnesium 2.0 1.5 - 2.5 mg/dL   VITAMIN D,25 HYDROXY    Collection Time: 01/05/18  5:36 AM   Result Value Ref Range    25-Hydroxy   Vitamin D 25 25 (L) 30 - 100 ng/mL   ACCU-CHEK GLUCOSE    Collection Time: 01/05/18  6:29 AM   Result Value Ref Range    Glucose - Accu-Ck 218 (H) 65 - 99 mg/dL   ECHOCARDIOGRAM-COMP W/ CONT    Collection Time: 01/05/18 10:39 AM   Result Value Ref Range    Eject.Frac. MOD BP 52.98     Eject.Frac. MOD 4C 39.8     Eject.Frac. MOD 2C 46.6     Left Ventrical Ejection Fraction 50    DIAGNOSTIC ALCOHOL    Collection Time: 01/05/18 12:35 PM   Result Value Ref Range    Diagnostic  Alcohol 0.00 0.00 g/dL   WESTERGREN SED RATE    Collection Time: 01/05/18 12:35 PM   Result Value Ref Range    Sed Rate Westergren 19 0 - 20 mm/hour   CRP HIGH SENSITIVE (CARDIAC)    Collection Time: 01/05/18 12:35 PM   Result Value Ref Range    C Reactive Protein High Sensitive 4.2 0.0 - 7.5 mg/L   TROPONIN    Collection Time: 01/05/18 12:35 PM   Result Value Ref Range    Troponin I <0.01 0.00 - 0.04 ng/mL   URINE DRUG SCREEN    Collection Time: 01/05/18  4:19 PM   Result Value Ref Range    Amphetamines Urine Negative Negative    Barbiturates Negative Negative    Benzodiazepines Negative Negative    Cocaine Metabolite Negative Negative    Methadone Negative Negative    Opiates Negative Negative    Oxycodone Negative Negative    Phencyclidine -Pcp Negative Negative    Propoxyphene Negative Negative    Cannabinoid Metab Negative Negative       Imaging/Procedures Review:    ndependant Imaging Review: Completed  ECHOCARDIOGRAM-COMP W/ CONT   Final Result      ECHOCARDIOGRAM COMP W/O CONT         CAROTID DUPLEX   Final Result      DX-ELBOW-COMPLETE 3+ RIGHT   Final Result      No acute bony abnormality.      CT-HEAD W/O   Final Result      Normal CT scan of the head without contrast.               INTERPRETING LOCATION:  [address], [place], [address]      DX-CHEST-2 VIEWS   Final Result      No acute cardiopulmonary abnormality.               INTERPRETING LOCATION: [address], [place], [address]               EKG:   EKG Independant Review: Completed  QTc:430, HR: 68, Normal Sinus Rhythm, no ST/T changes Sinus rhythm     (Yes) Records reviewed and summarized in current documentation             Assessment/Plan     No new Assessment & Plan notes have been filed under this hospital service since the last note was generated.  Service: Cardiology    1) Syncope-Multifactorial  arrhythmia ? Orthostatic hypotension versus Seizures versus Others  2) Multivessel CAD not amenable to revascularization  3) Uncontrolled Diabetes  mellitus type 2  4) Hypertension  5) Paroxysmal Afib    Recommendations:  -Mild Asymmetrical Hypertrophy noticed on echocardiogram with no evidence of outflow tract obstruction and/or gradient. No clinical and/or objective evidence of HOCM  -Resume Metoprolol and Xarelto.   -Further work up of syncope such as EEG and loop recorder as an outpatient(Follows up with Dr León as an outpatient)    Thanks for the consult and cardiology will sign off.         Anticipated Hospital stay: Observation admit        Quality Measures  Quality-Core Measures

## 2018-01-06 NOTE — PROGRESS NOTES
Pt in bed awake,alert,communicates well,ambulates,at times c/o little dizziness,informed MD,tele with SR with very occasional pvc's.

## 2018-01-06 NOTE — ASSESSMENT & PLAN NOTE
Lab Results   Component Value Date/Time    CHOLSTRLTOT 140 07/21/2017 04:32 AM    LDL 82 07/21/2017 04:32 AM    HDL 31 (A) 07/21/2017 04:32 AM    TRIGLYCERIDE 134 07/21/2017 04:32 AM       Continue atorvastatin 40

## 2018-01-06 NOTE — DISCHARGE INSTRUCTIONS
Discharge Instructions    Discharged to home by car with relative. Discharged via wheelchair, hospital escort: Yes.  Special equipment needed: Not Applicable    Be sure to schedule a follow-up appointment with your primary care doctor or any specialists as instructed.     Discharge Plan:   Diet Plan: Discussed  Activity Level: Discussed  Confirmed Follow up Appointment: Patient to Call and Schedule Appointment  Confirmed Symptoms Management: Discussed  Medication Reconciliation Updated: Yes  Influenza Vaccine Indication: Indicated: 9 to 64 years of age    I understand that a diet low in cholesterol, fat, and sodium is recommended for good health. Unless I have been given specific instructions below for another diet, I accept this instruction as my diet prescription.   Other diet:     Special Instructions: None    · Is patient discharged on Warfarin / Coumadin?   No     · Is patient Post Blood Transfusion?  No    Pt to f/u with PCP in 1 week             Pt to f/u with cardiology in 1 week for loop recorder             Continue all home meds             Take vitamin b12 and D as prescribed    Depression / Suicide Risk    As you are discharged from this [facility] facility, it is important to learn how to keep safe from harming yourself.    Recognize the warning signs:  · Abrupt changes in personality, positive or negative- including increase in energy   · Giving away possessions  · Change in eating patterns- significant weight changes-  positive or negative  · Change in sleeping patterns- unable to sleep or sleeping all the time   · Unwillingness or inability to communicate  · Depression  · Unusual sadness, discouragement and loneliness  · Talk of wanting to die  · Neglect of personal appearance   · Rebelliousness- reckless behavior  · Withdrawal from people/activities they love  · Confusion- inability to concentrate     If you or a loved one observes any of these behaviors or has concerns about self-harm, here's  what you can do:  · Talk about it- your feelings and reasons for harming yourself  · Remove any means that you might use to hurt yourself (examples: pills, rope, extension cords, firearm)  · Get professional help from the community (Mental Health, Substance Abuse, psychological counseling)  · Do not be alone:Call your Safe Contact- someone whom you trust who will be there for you.  · Call your local CRISIS HOTLINE 813-2656 or 338-170-3550  · Call your local Children's Mobile Crisis Response Team [place] (618) 816-8385 or www.[place]  · Call the toll free National Suicide Prevention Hotlines   · National Suicide Prevention Lifeline 941-823-LFDF (0029)  · National Hope Line Network 800-SUICIDE (437-6489)

## 2018-01-06 NOTE — PROGRESS NOTES
[facility] [place] Internal Medicine Interval Note    Name Ryan Trevizo     1965   Age/Sex 52 y.o. male   MRN [id number]   Code Status FULL     After 5PM or if no immediate response to page, please call for cross-coverage  Attending/Team: Dr. LONGORIA/STEVEN Use Tiger Text to page  6AM-5PM  Call [phone] to page afterhours   1st Call - Day Intern (R1):   OLEG ARENAS 2nd Call - Day Sr. Resident (R2/R3):   MAURICE         Chief complaint/reason for interval visit  Syncope    Interval Problem Update  Pt still c/o dizziness  Active Hospital Problems    Diagnosis   • Syncope- likley cardiac, orthostatics negative, not vasovagal. Echo showed mild asymmetric septal  hypertrophy. D/mita metoprolol. Cardiology consulted- appreciate recs   • CAD with ischemic cardiomyopathy - no CP, EKG, trops negative. On ASA, statin, ACE.   • PAF - CHADVASC 2, D/mita xarelto for now   • Type 2 diabetes mellitus - POCs stable, held metformin, not on ISS as pt came in with dizziness   • Viral URI with cough- improving, symptomatic management with flonase and claritin       Review of Systems   Constitutional: Positive for malaise/fatigue. Negative for chills and fever.   HENT: Positive for congestion. Negative for sore throat.    Eyes: Negative for blurred vision and double vision.   Respiratory: Negative for cough, sputum production and shortness of breath.    Cardiovascular: Negative for chest pain, orthopnea and leg swelling.   Gastrointestinal: Negative for abdominal pain, nausea and vomiting.   Genitourinary: Negative for dysuria.   Musculoskeletal: Negative for myalgias.   Skin: Negative for rash.   Neurological: Positive for dizziness. Negative for focal weakness and weakness.       Consultants/Specialty  Cardiology    Disposition  Inpatient for evaluation of syncope    Quality Measures    Reviewed items::  Labs reviewed, Medications reviewed and EKG reviewed  Luque catheter::  No Luque  DVT prophylaxis pharmacological::  Enoxaparin  (Lovenox)  Ulcer Prophylaxis::  No         Physical Exam   Vitals:    01/05/18 0754 01/05/18 0900 01/05/18 1216 01/05/18 1612   BP: 128/82  137/79 133/78   Pulse: 63 (!) 59 77 66   Resp: 16  16 (!) 21   Temp: 36.3 °C (97.4 °F)  36.4 °C (97.6 °F) 36.4 °C (97.5 °F)   TempSrc:       SpO2: 93%  95% 95%   Weight:       Height:         Body mass index is 24.63 kg/m².  Oxygen Therapy:  Pulse Oximetry: 95 %, O2 (LPM): 0, O2 Delivery: None (Room Air)    Physical Exam   Constitutional: He is oriented to person, place, and time and well-developed, well-nourished, and in no distress.   HENT:   Head: Normocephalic and atraumatic.   Eyes: EOM are normal. Pupils are equal, round, and reactive to light.   Neck: Normal range of motion.   Cardiovascular: Normal rate, regular rhythm and normal heart sounds.    Pulmonary/Chest: Effort normal and breath sounds normal.   Abdominal: Soft. Bowel sounds are normal.   Musculoskeletal: Normal range of motion.   Neurological: He is alert and oriented to person, place, and time.   Skin: Skin is warm.   Psychiatric: Affect and judgment normal.         Lab Data Review:  Recent Results (from the past 24 hour(s))   CBC WITH DIFFERENTIAL    Collection Time: 01/04/18  9:20 PM   Result Value Ref Range    WBC 7.8 4.8 - 10.8 K/uL    RBC 4.75 4.70 - 6.10 M/uL    Hemoglobin 13.9 (L) 14.0 - 18.0 g/dL    Hematocrit 40.3 (L) 42.0 - 52.0 %    MCV 84.8 81.4 - 97.8 fL    MCH 29.3 27.0 - 33.0 pg    MCHC 34.5 33.7 - 35.3 g/dL    RDW 44.8 35.9 - 50.0 fL    Platelet Count 301 164 - 446 K/uL    MPV 9.8 9.0 - 12.9 fL    Neutrophils-Polys 62.20 44.00 - 72.00 %    Lymphocytes 26.60 22.00 - 41.00 %    Monocytes 8.70 0.00 - 13.40 %    Eosinophils 1.50 0.00 - 6.90 %    Basophils 0.60 0.00 - 1.80 %    Immature Granulocytes 0.40 0.00 - 0.90 %    Nucleated RBC 0.00 /100 WBC    Neutrophils (Absolute) 4.83 1.82 - 7.42 K/uL    Lymphs (Absolute) 2.07 1.00 - 4.80 K/uL    Monos (Absolute) 0.68 0.00 - 0.85 K/uL    Eos (Absolute)  0.12 0.00 - 0.51 K/uL    Baso (Absolute) 0.05 0.00 - 0.12 K/uL    Immature Granulocytes (abs) 0.03 0.00 - 0.11 K/uL    NRBC (Absolute) 0.00 K/uL   COMP METABOLIC PANEL    Collection Time: 18  9:20 PM   Result Value Ref Range    Sodium 134 (L) 135 - 145 mmol/L    Potassium 3.8 3.6 - 5.5 mmol/L    Chloride 101 96 - 112 mmol/L    Co2 23 20 - 33 mmol/L    Anion Gap 10.0 0.0 - 11.9    Glucose 134 (H) 65 - 99 mg/dL    Bun 14 8 - 22 mg/dL    Creatinine 0.84 0.50 - 1.40 mg/dL    Calcium 9.4 8.5 - 10.5 mg/dL    AST(SGOT) 15 12 - 45 U/L    ALT(SGPT) 26 2 - 50 U/L    Alkaline Phosphatase 84 30 - 99 U/L    Total Bilirubin 1.6 (H) 0.1 - 1.5 mg/dL    Albumin 4.9 3.2 - 4.9 g/dL    Total Protein 7.8 6.0 - 8.2 g/dL    Globulin 2.9 1.9 - 3.5 g/dL    A-G Ratio 1.7 g/dL   TROPONIN    Collection Time: 18  9:20 PM   Result Value Ref Range    Troponin I <0.01 0.00 - 0.04 ng/mL   PROTHROMBIN TIME (INR)    Collection Time: 18  9:20 PM   Result Value Ref Range    PT 13.7 12.0 - 14.6 sec    INR 1.08 0.87 - 1.13   APTT    Collection Time: 18  9:20 PM   Result Value Ref Range    APTT 30.4 24.7 - 36.0 sec   ESTIMATED GFR    Collection Time: 18  9:20 PM   Result Value Ref Range    GFR If African American >60 >60 mL/min/1.73 m 2    GFR If Non African American >60 >60 mL/min/1.73 m 2   INFLUENZA A/B BY PCR    Collection Time: 18  1:21 AM   Result Value Ref Range    Influenza virus A RNA Negative Negative    Influenza virus B, PCR Negative Negative   EKG    Collection Time: 18  5:02 AM   Result Value Ref Range    Report       [facility]    Test Date:  2018  Pt Name:    NICHELLE MARQUEZ                   Department: CPU  MRN:        [id number]                      Room:       T209  Gender:     Male                         Technician: SARAHI  :        1965                   Requested By:NURIA LOPES  Order #:    [id number]                    Reading MD:    Measurements  Intervals                                 Axis  Rate:       68                           P:          36  IL:         160                          QRS:        6  QRSD:       100                          T:          41  QT:         404  QTc:        430    Interpretive Statements  SINUS RHYTHM  Compared to ECG 01/04/2018 14:22:46  No significant changes     HEMOGLOBIN A1C    Collection Time: 01/05/18  5:36 AM   Result Value Ref Range    Glycohemoglobin 7.6 (H) 0.0 - 5.6 %    Est Avg Glucose 171 mg/dL   VITAMIN B12    Collection Time: 01/05/18  5:36 AM   Result Value Ref Range    Vitamin B12 -True Cobalamin 275 211 - 911 pg/mL   TSH WITH REFLEX TO FT4    Collection Time: 01/05/18  5:36 AM   Result Value Ref Range    TSH 3.780 0.380 - 5.330 uIU/mL   TROPONIN    Collection Time: 01/05/18  5:36 AM   Result Value Ref Range    Troponin I <0.01 0.00 - 0.04 ng/mL   CBC WITHOUT DIFFERENTIAL    Collection Time: 01/05/18  5:36 AM   Result Value Ref Range    WBC 5.3 4.8 - 10.8 K/uL    RBC 4.32 (L) 4.70 - 6.10 M/uL    Hemoglobin 12.6 (L) 14.0 - 18.0 g/dL    Hematocrit 36.9 (L) 42.0 - 52.0 %    MCV 85.4 81.4 - 97.8 fL    MCH 29.2 27.0 - 33.0 pg    MCHC 34.1 33.7 - 35.3 g/dL    RDW 45.5 35.9 - 50.0 fL    Platelet Count 274 164 - 446 K/uL    MPV 10.3 9.0 - 12.9 fL   MAGNESIUM    Collection Time: 01/05/18  5:36 AM   Result Value Ref Range    Magnesium 2.0 1.5 - 2.5 mg/dL   VITAMIN D,25 HYDROXY    Collection Time: 01/05/18  5:36 AM   Result Value Ref Range    25-Hydroxy   Vitamin D 25 25 (L) 30 - 100 ng/mL   ACCU-CHEK GLUCOSE    Collection Time: 01/05/18  6:29 AM   Result Value Ref Range    Glucose - Accu-Ck 218 (H) 65 - 99 mg/dL   ECHOCARDIOGRAM-COMP W/ CONT    Collection Time: 01/05/18 10:39 AM   Result Value Ref Range    Eject.Frac. MOD BP 52.98     Eject.Frac. MOD 4C 39.8     Eject.Frac. MOD 2C 46.6     Left Ventrical Ejection Fraction 50    DIAGNOSTIC ALCOHOL    Collection Time: 01/05/18 12:35 PM   Result Value Ref Range    Diagnostic Alcohol 0.00 0.00  g/dL   WESTERGREN SED RATE    Collection Time: 01/05/18 12:35 PM   Result Value Ref Range    Sed Rate Westergren 19 0 - 20 mm/hour   CRP HIGH SENSITIVE (CARDIAC)    Collection Time: 01/05/18 12:35 PM   Result Value Ref Range    C Reactive Protein High Sensitive 4.2 0.0 - 7.5 mg/L   TROPONIN    Collection Time: 01/05/18 12:35 PM   Result Value Ref Range    Troponin I <0.01 0.00 - 0.04 ng/mL   URINE DRUG SCREEN    Collection Time: 01/05/18  4:19 PM   Result Value Ref Range    Amphetamines Urine Negative Negative    Barbiturates Negative Negative    Benzodiazepines Negative Negative    Cocaine Metabolite Negative Negative    Methadone Negative Negative    Opiates Negative Negative    Oxycodone Negative Negative    Phencyclidine -Pcp Negative Negative    Propoxyphene Negative Negative    Cannabinoid Metab Negative Negative       1/5/2018  5:37 PM    Recent Labs      01/04/18 2120 01/05/18   0536   SODIUM  134*   --    POTASSIUM  3.8   --    CHLORIDE  101   --    CO2  23   --    BUN  14   --    CREATININE  0.84   --    MAGNESIUM   --   2.0   CALCIUM  9.4   --        Recent Labs      01/04/18 2120   ALTSGPT  26   ASTSGOT  15   ALKPHOSPHAT  84   TBILIRUBIN  1.6*   GLUCOSE  134*       Recent Labs      01/04/18 2120 01/05/18   0536   RBC  4.75  4.32*   HEMOGLOBIN  13.9*  12.6*   HEMATOCRIT  40.3*  36.9*   PLATELETCT  301  274   PROTHROMBTM  13.7   --    APTT  30.4   --    INR  1.08   --        Recent Labs      01/04/18 2120 01/05/18   0536   WBC  7.8  5.3   NEUTSPOLYS  62.20   --    LYMPHOCYTES  26.60   --    MONOCYTES  8.70   --    EOSINOPHILS  1.50   --    BASOPHILS  0.60   --    ASTSGOT  15   --    ALTSGPT  26   --    ALKPHOSPHAT  84   --    TBILIRUBIN  1.6*   --           Assessment/Plan   Syncope- (present on admission)   Assessment & Plan    Increasing  frequency of syncopal episodes in the past few months  Vitals stable.    Orthostatics negative  CXR clear, no significant EKG changes.  Electrolytes within  normal limits.   Hb 13.9; denies acute bleeding.   Glucose 134, no evidence of hypoglycemia  Likely cardiac syncope given his echo showing mild asymmetric septal hypertrophy.No AS  D/mita metoprolol ?HOCM  Cardiology on board- appreciate recs  Plan  On telemetry  Accuchecks; hypoglycemia management protocol PRN  IV fluids for hydration (EF 50%)  Monitor electrolytes and replace as needed  Aspiration, fall and seizure precautions        CAD with ischemic cardiomyopathy- (present on admission)   Assessment & Plan    Cath 7/17 showed Aneurysmal change proximal left anterior descending artery (LAD) with diffuse moderate disease up to 70% distal LAD. 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 95% stenosis at the ostium of one of the dual posterior descending artery .  Prior admission- Cardiology had opted to pursue medical management and consider CABG for palliative pain relief only if required.    Denies any CP  Trops x2  EKG negative for acute ischemia  Plan  Continue outpatient aspirin, statin, lisinopril and ranolazine  Held metoprolol for now   Tele monitoring  Cardiology on board- appreciate recs        Vitamin D deficiency- (present on admission)   Assessment & Plan    Levels low at 25  Continue supplements        Vitamin B12 deficiency   Assessment & Plan    Levels low at 275  Started IM injections        Viral URI with cough- (present on admission)   Assessment & Plan    Upper respiratory symptoms may be due to URI, most likely viral.  Influenza negative.   Plan  IV fluids  Symptomatic management  Continue loratadine and flonase        PAF (paroxysmal atrial fibrillation) (CMS-HCC)- (present on admission)   Assessment & Plan    CHADVASC2  Held xarelto           Type 2 diabetes mellitus without complication, without long-term current use of insulin ([facility])- (present on admission)   Assessment & Plan    Patient is on metformin BID for his DM management.   Checks his blood  sugars but hasn't checked his blood glucose immediately before or after a syncopal episode.   On admission, glucose was 134  A1C at 7.6  Plan  Holding metformin for now  Accuchecks  Hypoglycemia management protocol PRN          Dyslipidemia- (present on admission)   Assessment & Plan    Lab Results   Component Value Date/Time    CHOLSTRLTOT 140 07/21/2017 04:32 AM    LDL 82 07/21/2017 04:32 AM    HDL 31 (A) 07/21/2017 04:32 AM    TRIGLYCERIDE 134 07/21/2017 04:32 AM       Continue atorvastatin 40        Essential hypertension- (present on admission)   Assessment & Plan    BP stable since admission  Continue lisinopril

## 2018-01-06 NOTE — RESPIRATORY CARE
COPD EDUCATION by COPD CLINICAL EDUCATOR  1/6/2018 at 6:53 AM by Anuja Wilkins     Patient reviewed by COPD education team. Patient does not qualify for COPD program.

## 2018-01-07 NOTE — DISCHARGE SUMMARY
[facility] [place] [facility] Discharge Summary      Admit Date:  1/4/2018       Discharge Date:   1/6/2018    Service:   R Internal Medicine [place] Team  Attending Physician(s):   Dr. Tuttle       Senior Resident(s):    Dr. Hamilton  Luis Felipe Resident(s):   Dr. Marquez      Diagnoses:                Active Problems:    Syncope POA: Yes    CAD with ischemic cardiomyopathy (Chronic) POA: Yes    Essential hypertension POA: Yes    Dyslipidemia POA: Yes    Type 2 diabetes mellitus without complication, without long-term current use of insulin (HCC) (Chronic) POA: Yes    PAF (paroxysmal atrial fibrillation) (CMS-HCC) (Chronic) POA: Yes    Viral URI with cough POA: Yes    Vitamin B12 deficiency POA: Yes    Vitamin D deficiency POA: Yes        Hospital Summary (Brief Narrative):       Patient is a 51 y/o male with a PMH of STEMI in 2012, ischemic CMP, multivessel CAD, uncontrolled DM II, GERD with possible duarte's esophagus, HTN, paroxysmal afib and DVT s/p IVC filter who presents to the ED with syncope. He states that he has been dizzy on and off since a long time, especially with sudden sitting to standing positions. Denies any syncopal episodes in the past, except PTA when he had an episode of syncope at work where he first felt dizzy, then he had LOC and hit his head. Also states that he felt shakiness of his right arm before passing out. Denies any chest pain, palpitations, diaphoresis, sob or seizures before the syncope.   He does have extensive CAD. 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 95% stenosis at the ostium of one of the dual posterior descending artery. Patient was evaluated by cardiology in the prior admission and  had opted to pursue medical management and consider CABG for palliative pain relief only if required.   On arrival  vitals were stable. Orthostatics were negative. Electrolytes were within normal limits. Hb 13.9; denies acute bleeding. Glucose 134, no evidence of hypoglycemia.Troponins negative x2 and EKG showed no ischemic changes. CXR was clear. Etiology of his syncope was likely cardiac, patient was placed on a telemonitor and cardiology was consulted as the prior echo 4/2017 showed mild asymmetric septal hypertrophy. Metoprolol and xarelto were held and an Echo was done which showed mild asymmetric hypertrophy with no evidence of outflow tract obstruction or gradient. No clinical or objective evidence of HOCM and pt was resumed metoprolol and xarelto on discharge as per cardiology. Patient was sent home with instructions to get further work up of syncope such as EEG and to be f/u with his cardiologist Dr. León for loop recorder as an outpatient.    Patient was continued medical management for his CAD with ASA, statin, metoprolol, lisinopril and ranolazine. He was found to have low vitamin B12 and vitamin D, was given IM B12 while inpatient and was sent home with oral supplements.      Patient /Hospital Summary (Details -- Problem Oriented) :          Syncope   Assessment & Plan    Increasing  frequency of syncopal episodes in the past few months  Vitals stable.    Orthostatics negative  CXR clear, no significant EKG changes.  Electrolytes within normal limits.   Hb 13.9; denies acute bleeding.   Glucose 134, no evidence of hypoglycemia  Likely cardiac syncope given his echo showing mild asymmetric septal hypertrophy.No AS  D/mita metoprolol ?HOCM  Cardiology on board  Repeat echo showed mild asymmetric hypertrophy with no evidence of outflow tract obstruction or gradient.  No clinical or objective evidence of HOCM and pt was resumed metoprolol and xarelto on discharge as per cardiology.   Patient was sent home with instructions to get further work up of syncope such as EEG and to be f/u with his cardiologist Dr. León  for loop recorder as an outpatient.          CAD with ischemic cardiomyopathy   Assessment & Plan    Cath 7/17 showed Aneurysmal change proximal left anterior descending artery (LAD) with diffuse moderate disease up to 70% distal LAD. 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 95% stenosis at the ostium of one of the dual posterior descending artery .  Prior admission- Cardiology had opted to pursue medical management and consider CABG for palliative pain relief only if required.    Denied any CP  Trops x2  EKG negative for acute ischemia  Plan  Continue outpatient aspirin, statin, metoprolol, lisinopril and ranolazine  To f/u with cardiology for further f/u        Vitamin D deficiency   Assessment & Plan    Levels low at 25  Continue high dose supplements weekly        Vitamin B12 deficiency   Assessment & Plan    Levels low at 275  Got IM injections while in hospital  Discharged on po vitamin b12        Viral URI with cough   Assessment & Plan    Upper respiratory symptoms may be due to URI, most likely viral.  Influenza negative.   Continue symptomatic management with loratadine and flonase        PAF (paroxysmal atrial fibrillation) (CMS-[facility])   Assessment & Plan    CHADVASC2  Was in NSR during inpatient  Continue xarelto and metoprolol          Type 2 diabetes mellitus without complication, without long-term current use of insulin ([facility])   Assessment & Plan    Patient is on metformin BID for his DM management.   Checks his blood sugars but hasn't checked his blood glucose immediately before or after a syncopal episode.   On admission, glucose was 134  A1C at 7.6  continue metformin   To be f/u by PCP          Dyslipidemia   Assessment & Plan    Lab Results   Component Value Date/Time    CHOLSTRLTOT 140 07/21/2017 04:32 AM    LDL 82 07/21/2017 04:32 AM    HDL 31 (A) 07/21/2017 04:32 AM    TRIGLYCERIDE 134 07/21/2017 04:32 AM       Continue atorvastatin 40         Essential hypertension   Assessment & Plan    BP stable since admission  Continue lisinopril            Consultants:     Cardiology    Procedures:        None    Imaging/ Testing:      ECHOCARDIOGRAM-COMP W/ CONT   Final Result      ECHOCARDIOGRAM COMP W/O CONT   Compared to the images of the prior study done 4/16/17, no significant   change noted. RV not well visualized on prior study.   Left ventricular systolic function is low normal.  Left ventricular ejection fraction is visually estimated to be 50%.  Grade I diastolic dysfunction.  Mildly dilated right ventricle. Reduced right ventricular systolic   function.   Aortic sclerosis without stenosis.  Asymmetric septal hypertrophy.      CAROTID DUPLEX   Final Result   Normal right carotid exam.                                         Left carotid.   Very mild plaque of the carotid bifurcation. No stenosis   DX-ELBOW-COMPLETE 3+ RIGHT   Final Result      No acute bony abnormality.      CT-HEAD W/O   Final Result      Normal CT scan of the head without contrast.               INTERPRETING LOCATION:  [address] [facility], [facility], [address]      DX-CHEST-2 VIEWS   Final Result      No acute cardiopulmonary abnormality.               INTERPRETING LOCATION: [address] [facility], [facility], [address]          Discharge Medications:         Medication Reconciliation: Reviewed <y>     Medication List      START taking these medications      Instructions   cyanocobalamin 1000 MCG Tabs  Commonly known as:  VITAMIN B12   Take 1 Tab by mouth every day.  Dose:  1000 mcg     vitamin D (Ergocalciferol) 31136 units Caps capsule  Commonly known as:  DRISDOL   Take 1 Cap by mouth every 7 days.  Dose:  40001 Units        CONTINUE taking these medications      Instructions   ASPIRIN LOW DOSE 81 MG EC tablet  Generic drug:  aspirin   TAKE 1 TABLET BY MOUTH ONCE DAILY.     atorvastatin 40 MG Tabs  Commonly known as:  LIPITOR   Take 2 Tabs by mouth every day.  Dose:  80 mg     lisinopril 5 MG Tabs  Commonly  known as:  PRINIVIL   Take 1 Tab by mouth every day.  Dose:  5 mg     loratadine 10 MG Tabs  Commonly known as:  CLARITIN   Take 1 Tab by mouth every day.  Dose:  10 mg     metformin 1000 MG tablet  Commonly known as:  GLUCOPHAGE   Take 1 Tab by mouth 2 times a day, with meals.  Dose:  1000 mg     metoprolol SR 25 MG Tb24  Commonly known as:  TOPROL XL   TAKE 1 TABLET BY MOUTH ONCE DAILY.     pantoprazole 40 MG Tbec  Commonly known as:  PROTONIX   TAKE 1 TABLET BY MOUTH ONCE DAILY.     RANEXA 500 MG Tb12  Generic drug:  ranolazine   TAKE (2) TABLETS BY MOUTH TWICE DAILY     rivaroxaban 20 MG Tabs tablet  Commonly known as:  XARELTO   Take 1 Tab by mouth with dinner.  Dose:  20 mg        STOP taking these medications    vitamin D3 (cholecalciferol) 1000 UNIT Tabs                Disposition:   Home in a stable condition    Diet:   Healthy diet    Activity:   As tolerated    Instructions:         The patient was instructed to return to the ER in the event of worsening symptoms. I have counseled the patient on the importance of compliance and the patient has agreed to proceed with all medical recommendations and follow up plan indicated above.   The patient understands that all medications come with benefits and risks. Risks may include permanent injury or death and these risks can be minimized with close reassessment and monitoring.        Primary Care Provider:    Juancho Jenkins M.D.    Discharge summary faxed to primary care provider:  <n>  Copy of discharge summary given to the patient: <n>    Follow up appointment details :      Pt to f/u with PCP in a week for further syncope work up - possible an EEG  Pt to f/u with cardiologist Dr. León for loop recorder    Pending Studies:        none    Time spent on discharge day patient visit, preparing discharge paperwork and arranging for patient follow up.    Discharge Time (Minutes) :     55mins    ______________________________________________________________________    Interval history/exam for day of discharge:     patient feels that his dizziness got better     Vitals:    01/05/18 2346 01/06/18 0330 01/06/18 0820 01/06/18 1133   BP: 120/81 117/82 126/81 120/66   Pulse: 75 79 60 74   Resp: 15 16 16 19   Temp: 35.8 °C (96.5 °F) 36.1 °C (96.9 °F) 36.2 °C (97.1 °F) 35.9 °C (96.7 °F)   TempSrc:       SpO2: 90% 91% 93% 95%   Weight:       Height:         Weight/BMI: Body mass index is 24.63 kg/m².  Pulse Oximetry: 95 %, O2 (LPM): 0, O2 Delivery: None (Room Air)    General: not in any acute distress  CVS: s1 s2 heard, no m/r/g  PULM: CTA B/L      Most Recent Labs:    Lab Results   Component Value Date/Time    WBC 5.3 01/05/2018 05:36 AM    RBC 4.32 (L) 01/05/2018 05:36 AM    HEMOGLOBIN 12.6 (L) 01/05/2018 05:36 AM    HEMATOCRIT 36.9 (L) 01/05/2018 05:36 AM    MCV 85.4 01/05/2018 05:36 AM    MCH 29.2 01/05/2018 05:36 AM    MCHC 34.1 01/05/2018 05:36 AM    MPV 10.3 01/05/2018 05:36 AM    NEUTSPOLYS 62.20 01/04/2018 09:20 PM    LYMPHOCYTES 26.60 01/04/2018 09:20 PM    MONOCYTES 8.70 01/04/2018 09:20 PM    EOSINOPHILS 1.50 01/04/2018 09:20 PM    BASOPHILS 0.60 01/04/2018 09:20 PM    ANISOCYTOSIS 1+ 07/26/2012 11:00 AM      Lab Results   Component Value Date/Time    SODIUM 134 (L) 01/04/2018 09:20 PM    POTASSIUM 3.8 01/04/2018 09:20 PM    CHLORIDE 101 01/04/2018 09:20 PM    CO2 23 01/04/2018 09:20 PM    GLUCOSE 134 (H) 01/04/2018 09:20 PM    BUN 14 01/04/2018 09:20 PM    CREATININE 0.84 01/04/2018 09:20 PM    CREATININE 1.0 07/12/2007 05:40 PM    BUNCREATRAT 18 06/13/2014 08:40 AM      Lab Results   Component Value Date/Time    ALTSGPT 26 01/04/2018 09:20 PM    ASTSGOT 15 01/04/2018 09:20 PM    ALKPHOSPHAT 84 01/04/2018 09:20 PM    TBILIRUBIN 1.6 (H) 01/04/2018 09:20 PM    DBILIRUBIN 0.3 09/14/2012 09:10 PM    LIPASE 70 08/05/2017 02:50 PM    ALBUMIN 4.9 01/04/2018 09:20 PM    GLOBULIN 2.9 01/04/2018 09:20  PM    INR 1.08 01/04/2018 09:20 PM     Lab Results   Component Value Date/Time    PROTHROMBTM 13.7 01/04/2018 09:20 PM    INR 1.08 01/04/2018 09:20 PM

## 2018-01-09 ENCOUNTER — HOSPITAL ENCOUNTER (EMERGENCY)
Facility: MEDICAL CENTER | Age: 53
End: 2018-01-09
Attending: EMERGENCY MEDICINE
Payer: MEDICARE

## 2018-01-09 ENCOUNTER — APPOINTMENT (OUTPATIENT)
Dept: RADIOLOGY | Facility: MEDICAL CENTER | Age: 53
End: 2018-01-09
Attending: EMERGENCY MEDICINE
Payer: MEDICARE

## 2018-01-09 VITALS
WEIGHT: 190 LBS | DIASTOLIC BLOOD PRESSURE: 94 MMHG | OXYGEN SATURATION: 92 % | RESPIRATION RATE: 18 BRPM | BODY MASS INDEX: 26.6 KG/M2 | SYSTOLIC BLOOD PRESSURE: 127 MMHG | HEART RATE: 61 BPM | TEMPERATURE: 98.3 F | HEIGHT: 71 IN

## 2018-01-09 DIAGNOSIS — R55 NEAR SYNCOPE: ICD-10-CM

## 2018-01-09 LAB
ALBUMIN SERPL BCP-MCNC: 4.6 G/DL (ref 3.2–4.9)
ALBUMIN/GLOB SERPL: 1.7 G/DL
ALP SERPL-CCNC: 87 U/L (ref 30–99)
ALT SERPL-CCNC: 36 U/L (ref 2–50)
ANION GAP SERPL CALC-SCNC: 7 MMOL/L (ref 0–11.9)
APTT PPP: 28.8 SEC (ref 24.7–36)
AST SERPL-CCNC: 15 U/L (ref 12–45)
BASOPHILS # BLD AUTO: 0.7 % (ref 0–1.8)
BASOPHILS # BLD: 0.05 K/UL (ref 0–0.12)
BILIRUB SERPL-MCNC: 0.8 MG/DL (ref 0.1–1.5)
BNP SERPL-MCNC: 16 PG/ML (ref 0–100)
BUN SERPL-MCNC: 11 MG/DL (ref 8–22)
CALCIUM SERPL-MCNC: 10 MG/DL (ref 8.5–10.5)
CHLORIDE SERPL-SCNC: 103 MMOL/L (ref 96–112)
CO2 SERPL-SCNC: 25 MMOL/L (ref 20–33)
CREAT SERPL-MCNC: 0.75 MG/DL (ref 0.5–1.4)
DEPRECATED D DIMER PPP IA-ACNC: <200 NG/ML(D-DU)
EKG IMPRESSION: NORMAL
EOSINOPHIL # BLD AUTO: 0.14 K/UL (ref 0–0.51)
EOSINOPHIL NFR BLD: 2 % (ref 0–6.9)
ERYTHROCYTE [DISTWIDTH] IN BLOOD BY AUTOMATED COUNT: 44.9 FL (ref 35.9–50)
GLOBULIN SER CALC-MCNC: 2.7 G/DL (ref 1.9–3.5)
GLUCOSE SERPL-MCNC: 202 MG/DL (ref 65–99)
HCT VFR BLD AUTO: 39.5 % (ref 42–52)
HGB BLD-MCNC: 13.4 G/DL (ref 14–18)
IMM GRANULOCYTES # BLD AUTO: 0.06 K/UL (ref 0–0.11)
IMM GRANULOCYTES NFR BLD AUTO: 0.9 % (ref 0–0.9)
INR PPP: 1.18 (ref 0.87–1.13)
LIPASE SERPL-CCNC: 37 U/L (ref 11–82)
LYMPHOCYTES # BLD AUTO: 1.61 K/UL (ref 1–4.8)
LYMPHOCYTES NFR BLD: 22.9 % (ref 22–41)
MCH RBC QN AUTO: 28.9 PG (ref 27–33)
MCHC RBC AUTO-ENTMCNC: 33.9 G/DL (ref 33.7–35.3)
MCV RBC AUTO: 85.1 FL (ref 81.4–97.8)
MONOCYTES # BLD AUTO: 0.43 K/UL (ref 0–0.85)
MONOCYTES NFR BLD AUTO: 6.1 % (ref 0–13.4)
NEUTROPHILS # BLD AUTO: 4.74 K/UL (ref 1.82–7.42)
NEUTROPHILS NFR BLD: 67.4 % (ref 44–72)
NRBC # BLD AUTO: 0 K/UL
NRBC BLD-RTO: 0 /100 WBC
PLATELET # BLD AUTO: 301 K/UL (ref 164–446)
PMV BLD AUTO: 10 FL (ref 9–12.9)
POTASSIUM SERPL-SCNC: 3.8 MMOL/L (ref 3.6–5.5)
PROT SERPL-MCNC: 7.3 G/DL (ref 6–8.2)
PROTHROMBIN TIME: 14.7 SEC (ref 12–14.6)
RBC # BLD AUTO: 4.64 M/UL (ref 4.7–6.1)
SODIUM SERPL-SCNC: 135 MMOL/L (ref 135–145)
TROPONIN I SERPL-MCNC: <0.01 NG/ML (ref 0–0.04)
WBC # BLD AUTO: 7 K/UL (ref 4.8–10.8)

## 2018-01-09 PROCEDURE — 80053 COMPREHEN METABOLIC PANEL: CPT

## 2018-01-09 PROCEDURE — 85379 FIBRIN DEGRADATION QUANT: CPT

## 2018-01-09 PROCEDURE — 85730 THROMBOPLASTIN TIME PARTIAL: CPT

## 2018-01-09 PROCEDURE — 85610 PROTHROMBIN TIME: CPT

## 2018-01-09 PROCEDURE — 93005 ELECTROCARDIOGRAM TRACING: CPT | Performed by: EMERGENCY MEDICINE

## 2018-01-09 PROCEDURE — 83690 ASSAY OF LIPASE: CPT

## 2018-01-09 PROCEDURE — 85025 COMPLETE CBC W/AUTO DIFF WBC: CPT

## 2018-01-09 PROCEDURE — 83880 ASSAY OF NATRIURETIC PEPTIDE: CPT

## 2018-01-09 PROCEDURE — 84484 ASSAY OF TROPONIN QUANT: CPT

## 2018-01-09 PROCEDURE — 71045 X-RAY EXAM CHEST 1 VIEW: CPT

## 2018-01-09 PROCEDURE — 99284 EMERGENCY DEPT VISIT MOD MDM: CPT

## 2018-01-09 PROCEDURE — 36415 COLL VENOUS BLD VENIPUNCTURE: CPT

## 2018-01-09 ASSESSMENT — ENCOUNTER SYMPTOMS
FEVER: 0
ABDOMINAL PAIN: 0
NAUSEA: 0
VOMITING: 0
CHILLS: 0
DIZZINESS: 1
SHORTNESS OF BREATH: 0

## 2018-01-10 ENCOUNTER — OFFICE VISIT (OUTPATIENT)
Dept: CARDIOLOGY | Facility: MEDICAL CENTER | Age: 53
End: 2018-01-10
Payer: MEDICARE

## 2018-01-10 ENCOUNTER — PATIENT OUTREACH (OUTPATIENT)
Dept: HEALTH INFORMATION MANAGEMENT | Facility: OTHER | Age: 53
End: 2018-01-10

## 2018-01-10 ENCOUNTER — TELEPHONE (OUTPATIENT)
Dept: CARDIOLOGY | Facility: MEDICAL CENTER | Age: 53
End: 2018-01-10

## 2018-01-10 VITALS
OXYGEN SATURATION: 93 % | WEIGHT: 177 LBS | HEIGHT: 69 IN | SYSTOLIC BLOOD PRESSURE: 130 MMHG | HEART RATE: 100 BPM | BODY MASS INDEX: 26.22 KG/M2 | DIASTOLIC BLOOD PRESSURE: 80 MMHG

## 2018-01-10 DIAGNOSIS — I10 ESSENTIAL HYPERTENSION: ICD-10-CM

## 2018-01-10 DIAGNOSIS — E78.5 DYSLIPIDEMIA: ICD-10-CM

## 2018-01-10 DIAGNOSIS — I25.10 CORONARY ARTERY DISEASE INVOLVING NATIVE CORONARY ARTERY OF NATIVE HEART WITHOUT ANGINA PECTORIS: ICD-10-CM

## 2018-01-10 DIAGNOSIS — R55 SYNCOPE AND COLLAPSE: Primary | ICD-10-CM

## 2018-01-10 DIAGNOSIS — I25.5 CARDIOMYOPATHY, ISCHEMIC: ICD-10-CM

## 2018-01-10 DIAGNOSIS — I48.0 PAF (PAROXYSMAL ATRIAL FIBRILLATION) (HCC): ICD-10-CM

## 2018-01-10 PROCEDURE — 99214 OFFICE O/P EST MOD 30 MIN: CPT | Performed by: INTERNAL MEDICINE

## 2018-01-10 ASSESSMENT — ENCOUNTER SYMPTOMS: CARDIOVASCULAR NEGATIVE: 1

## 2018-01-10 NOTE — TELEPHONE ENCOUNTER
Patient scheduled for loop insert on 1-12-18 at [facility] with Dr. Blake at 12:00. FYI to Dr. León.

## 2018-01-10 NOTE — ED NOTES
Discharge instructions given and understood by pt, all questions answered, pt ambulates out of dept in stable condition.

## 2018-01-10 NOTE — ED PROVIDER NOTES
ED Provider Note    Scribed for Ashley Larios M.D. by Elba Escobar. 1/9/2018, 4:33 PM.    Primary care provider: Juancho Jenkins M.D.  Means of arrival: EMS  History obtained from: Patient   History limited by: None    CHIEF COMPLAINT  Chief Complaint   Patient presents with   • Dizziness   • Near Syncopal       HPI  Ryan Trevizo is a 52 y.o. male who presents to the Emergency Department for light-headedness that began after the patient ambulated to the restroom at approximately 3PM today. He was able to sit down which alleviated his light-headedness. When he went to stand up he felt light-headed again. He states he has stayed hydrated today and ate breakfast at 9AM. He denies any abdominal pain, nausea, vomiting, chest pain, shortness of breath, or fever associated. Patient lives alone.  He cannot remember what his sugar have been before reporting to ED. He currently denies chest pain, shortness of breath, nausea, vomiting, and abdominal pain.    Per chart review, patient has been seen multiple times for syncope and was admitted and discharged on 01/06/2018 where he was evaluated by cardiology and advised to have his medical management optimized, and to follow-up with Cardiologist as an outpatient.  Cath in July 2017 which demonstrated chronic occlusion of LAD. Last recent echo was reviewed by cardiology.  Patient has not followed up with Dr. León (Cardiology) but has an appointment for 01/29/2018.     REVIEW OF SYSTEMS  Review of Systems   Constitutional: Negative for chills and fever.   Respiratory: Negative for shortness of breath.    Cardiovascular: Negative for chest pain.   Gastrointestinal: Negative for abdominal pain, nausea and vomiting.   Neurological: Positive for dizziness.   All other systems reviewed and are negative.  C.      PAST MEDICAL HISTORY   has a past medical history of A-fib (CMS-[facility]); ACS (acute coronary syndrome) (CMS-[facility]) (7/25/2012); Anxiety disorder; Arthritis;  Blood glucose elevated; CAD (coronary artery disease) (10/14/2013); Claudication (CMS-HCC); Diabetes (CMS-HCC); Dizziness; GERD (gastroesophageal reflux disease) (7/15/2012); Glaucoma; HTN (hypertension) (7/15/2012); Hyperlipemia; Hypertriglyceridemia; Ischemic cardiomyopathy; Mental retardation; MI (myocardial infarction); Osteoarthritis; Pericarditis secondary to acute myocardial infarction (7/19/2012); Right knee pain; SOB (shortness of breath); STEMI (ST elevation myocardial infarction) (CMS-HCC); Upper GI bleed (7/26/2012); and Viral URI with cough (1/5/2018).    SURGICAL HISTORY   has a past surgical history that includes other orthopedic surgery (7- ); other (knee surgery); other cardiac surgery; other; gastroscopy-endo (7/25/2012); gastroscopy-endo (7/26/2012); and gastroscopy-endo (5/19/2017).    SOCIAL HISTORY  Social History   Substance Use Topics   • Smoking status: Never Smoker   • Smokeless tobacco: Never Used   • Alcohol use No      History   Drug Use No       FAMILY HISTORY  No family history noted    CURRENT MEDICATIONS  No current facility-administered medications on file prior to encounter.      Current Outpatient Prescriptions on File Prior to Encounter   Medication Sig Dispense Refill   • vitamin D, Ergocalciferol, (DRISDOL) 31168 units Cap capsule Take 1 Cap by mouth every 7 days. 8 Cap 0   • cyanocobalamin (VITAMIN B12) 1000 MCG Tab Take 1 Tab by mouth every day. 30 Tab 3   • pantoprazole (PROTONIX) 40 MG Tablet Delayed Response TAKE 1 TABLET BY MOUTH ONCE DAILY. 30 Tab 5   • ASPIRIN LOW DOSE 81 MG EC tablet TAKE 1 TABLET BY MOUTH ONCE DAILY. 30 Tab 11   • RANEXA 500 MG TABLET SR 12 HR TAKE (2) TABLETS BY MOUTH TWICE DAILY 120 Tab 5   • metoprolol SR (TOPROL XL) 25 MG TABLET SR 24 HR TAKE 1 TABLET BY MOUTH ONCE DAILY. 30 Tab 11   • loratadine (CLARITIN) 10 MG Tab Take 1 Tab by mouth every day. 30 Tab 11   • atorvastatin (LIPITOR) 40 MG Tab Take 2 Tabs by mouth every day. 30 Tab 11   •  "rivaroxaban (XARELTO) 20 MG Tab tablet Take 1 Tab by mouth with dinner. 30 Tab 11   • lisinopril (PRINIVIL) 5 MG Tab Take 1 Tab by mouth every day. 30 Tab 11   • metformin (GLUCOPHAGE) 1000 MG tablet Take 1 Tab by mouth 2 times a day, with meals. 60 Tab 11       ALLERGIES  Allergies   Allergen Reactions   • Ritalin [Methylphenidate] Unspecified     \"Hyper\"       PHYSICAL EXAM  VITAL SIGNS: /107   Pulse 66   Temp 36.8 °C (98.3 °F)   Resp 16   Ht 1.803 m (5' 11\")   Wt 86.2 kg (190 lb)   BMI 26.50 kg/m²   Vitals reviewed by myself.  Physical Exam  Nursing note and vitals reviewed.  Constitutional: Well-developed and well-nourished. No distress.   HENT: Head is normocephalic and atraumatic. Oropharynx is clear and moist without exudate or erythema.   Eyes: Pupils are equal, round, and reactive to light. No horizontal or vertical nystagmus. Conjunctiva are normal.   Cardiovascular: Normal rate and regular rhythm. No murmur heard. Normal radial pulses.  Pulmonary/Chest: Breath sounds normal. No wheezes or rales.   Abdominal: Soft and non-tender. No distention.    Musculoskeletal: Extremities exhibit normal range of motion without edema or tenderness. Patient ambulates with a normal narrow-based steady gait.   Neurological: Awake, alert and oriented to person, place, and time. No focal deficits noted. Cranial nerves II - XII intact. No pronator drift.  No dysmetria on cerebellar testing. Normal speech and language. Normal strength and sensation in bilateral upper and lower extremities.   Skin: Skin is warm and dry. No rash.   Psychiatric: Normal mood and affect. Appropriate for clinical situation.      DIAGNOSTIC STUDIES /  LABS  Labs Reviewed   CBC WITH DIFFERENTIAL - Abnormal; Notable for the following:        Result Value    RBC 4.64 (*)     Hemoglobin 13.4 (*)     Hematocrit 39.5 (*)     All other components within normal limits    Narrative:     Indicate which anticoagulants the patient is on:->UNKNOWN "   COMP METABOLIC PANEL - Abnormal; Notable for the following:     Glucose 202 (*)     All other components within normal limits    Narrative:     Indicate which anticoagulants the patient is on:->UNKNOWN   PROTHROMBIN TIME - Abnormal; Notable for the following:     PT 14.7 (*)     INR 1.18 (*)     All other components within normal limits    Narrative:     Indicate which anticoagulants the patient is on:->UNKNOWN   BTYPE NATRIURETIC PEPTIDE    Narrative:     Indicate which anticoagulants the patient is on:->UNKNOWN   APTT    Narrative:     Indicate which anticoagulants the patient is on:->UNKNOWN   LIPASE    Narrative:     Indicate which anticoagulants the patient is on:->UNKNOWN   TROPONIN    Narrative:     Indicate which anticoagulants the patient is on:->UNKNOWN   D-DIMER    Narrative:     Indicate which anticoagulants the patient is on:->UNKNOWN   ESTIMATED GFR    Narrative:     Indicate which anticoagulants the patient is on:->UNKNOWN      All labs reviewed by me.    EKG Interpretation:  Interpreted by myself    12 Lead EKG interpreted by me to show:  Time: 17:07  Normal sinus rhythm  Rate 60  Axis: Normal  Intervals: Normal  Normal T waves  Normal ST segments  My impression of this EKG: Does not indicate ischemia or arrhythmia at this time.    RADIOLOGY  DX-CHEST-PORTABLE (1 VIEW)   Final Result      Hypoinflation without other evidence for acute cardiopulmonary disease.        The radiologist's interpretation of all radiological studies have been reviewed by me.      REASSESSMENT    5:00 PM - Patient seen and examined at bedside. Discussed with patient that I will order laboratory and radiology tests to further evaluate.     6:24 PM Recheck: Patient re-evaluated at beside. Discussed patient's lab and radiology results discussed. Advised patient to follow-up with Cardiology. Patient was counseled to return to ED for any new or worsening symptoms. Patient understood and is in agreement for discharge.       COURSE & MEDICAL DECISION MAKING  Nursing notes, VS, PMSFHx reviewed in chart.    Patient is a52-year-old male who comes in for lightheadedness. Differential diagnosis includes arrhythmia, acute coronary syndrome, dehydration, left side of her body, pulmonary embolus. Diagnostic workup includes labs, EKG, chest x-ray. Patient recently has had multiple workups near syncope including recent admission. I believe the fall his labs and EKG are unremarkable patient can continue plan to follow up with cardiologist. He was evaluated by cardiologist just a few days ago during admission who recommended this plan.    Patient's initial vitals are within normal limits. He is mildly hypertensive and advised to have this followed up by his cardiologist. He is neurologically intact on physical exam. EKG returns demonstrates no evidence of acute arrhythmia or ischemia. Labs returned and are unremarkable. Troponin is negative. D-dimer is within normal limits making pulmonary embolus unlikely. Chest x-ray returns and demonstrates no acute cardio processes. Patient is not symptomatic while laying on the gurney and is able to ambulate without being symptomatic. Therefore he is reassured, advised follow-up with cardiologist, given strict return precautions. Patient was then discharged home in stable condition.    FINAL IMPRESSION  1. Near syncope        Elba MORRISSEY (Scribe), am scribing for, and in the presence of, Ashley Larios M.D..    Electronically signed by: Elba Escobar (Scribe), 1/9/2018    Ashley MORRISSEY M.D. personally performed the services described in this documentation, as scribed by Elba Escobar in my presence, and it is both accurate and complete.    The note accurately reflects work and decisions made by me.  Ashley Larios  1/10/2018  12:29 AM

## 2018-01-10 NOTE — LETTER
[facility]   [address] [place] [address]  [place] [address]  Phone: [phone]  Fax: [phone]              Ryan Trevizo  1965    Encounter Date: 1/10/2018    Joseph León M.D.          PROGRESS NOTE:  Subjective:   Ryan Trevizo is a 52 -year-old man with a history of ischemic systolic congestive heart failure with an ejection fraction of by echocardiogram done in April, 2017. He has complex multivessel coronary artery disease with no good targets for revascularization, paroxysmal atrial fibrillation, and was hospitalized with ischemic gastritis and a GI bleed in May, 2017.    He continues to visit the emergency room on a regular basis though workup is almost invariably fruitless.    He describes his syncopal episodes telling me about frequent orthostasis, but unable to give anything more than a vague history about his most recent episode. He denies any palpitations, and has no other cardiovascular complaints. He does mention that his systolic blood pressure was around 130-140 mmHg he believes when checked by emergency medical services at the time of his last syncopal event.    He has no other cardiovascular complaints today, and denies any side effects with his medications.    I did attempt to call his primary care physician to discuss further his care though at the time of this note I received no call back.    Past Medical History:   Diagnosis Date   • A-fib (CMS-HCC)    • ACS (acute coronary syndrome) (CMS-HCC) 7/25/2012   • Anxiety disorder    • Arthritis    • Blood glucose elevated    • CAD (coronary artery disease) 10/14/2013   • Claudication (CMS-HCC)    • Diabetes (CMS-HCC)    • Dizziness    • GERD (gastroesophageal reflux disease) 7/15/2012   • Glaucoma    • HTN (hypertension) 7/15/2012   • Hyperlipemia    • Hypertriglyceridemia    • Ischemic cardiomyopathy    • Mental retardation    • MI (myocardial infarction)    • Osteoarthritis    •  "Pericarditis secondary to acute myocardial infarction 7/19/2012   • Right knee pain    • SOB (shortness of breath)    • STEMI (ST elevation myocardial infarction) (CMS-[facility])    • Upper GI bleed 7/26/2012   • Viral URI with cough 1/5/2018     Past Surgical History:   Procedure Laterality Date   • GASTROSCOPY-ENDO  5/19/2017    Procedure: GASTROSCOPY-ENDO;  Surgeon: Reinaldo Berry M.D.;  Location: ENDOSCOPY [facility];  Service:    • GASTROSCOPY-ENDO  7/26/2012    Performed by JORDIN VERA at ENDOSCOPY [facility]   • GASTROSCOPY-ENDO  7/25/2012    Performed by JORDIN VERA at ENDOSCOPY [facility]   • OTHER ORTHOPEDIC SURGERY  7-     R knee surgery   • OTHER  knee surgery   • OTHER     • OTHER CARDIAC SURGERY      stent placement     History reviewed. No pertinent family history.  History   Smoking Status   • Never Smoker   Smokeless Tobacco   • Never Used     Allergies   Allergen Reactions   • Ritalin [Methylphenidate] Unspecified     \"Hyper\"     Outpatient Encounter Prescriptions as of 1/10/2018   Medication Sig Dispense Refill   • vitamin D, Ergocalciferol, (DRISDOL) 24950 units Cap capsule Take 1 Cap by mouth every 7 days. 8 Cap 0   • cyanocobalamin (VITAMIN B12) 1000 MCG Tab Take 1 Tab by mouth every day. 30 Tab 3   • pantoprazole (PROTONIX) 40 MG Tablet Delayed Response TAKE 1 TABLET BY MOUTH ONCE DAILY. 30 Tab 5   • ASPIRIN LOW DOSE 81 MG EC tablet TAKE 1 TABLET BY MOUTH ONCE DAILY. 30 Tab 11   • RANEXA 500 MG TABLET SR 12 HR TAKE (2) TABLETS BY MOUTH TWICE DAILY 120 Tab 5   • metoprolol SR (TOPROL XL) 25 MG TABLET SR 24 HR TAKE 1 TABLET BY MOUTH ONCE DAILY. 30 Tab 11   • loratadine (CLARITIN) 10 MG Tab Take 1 Tab by mouth every day. 30 Tab 11   • atorvastatin (LIPITOR) 40 MG Tab Take 2 Tabs by mouth every day. 30 Tab 11   • rivaroxaban (XARELTO) 20 MG Tab tablet Take 1 Tab by mouth with dinner. 30 Tab 11   • lisinopril (PRINIVIL) 5 MG Tab Take 1 Tab by mouth every day. 30 Tab 11  " "  • metformin (GLUCOPHAGE) 1000 MG tablet Take 1 Tab by mouth 2 times a day, with meals. 60 Tab 11     No facility-administered encounter medications on file as of 1/10/2018.      Review of Systems   Cardiovascular: Negative.    All other systems reviewed and are negative.       Objective:   /80   Pulse 100   Ht 1.753 m (5' 9\")   Wt 80.3 kg (177 lb)   SpO2 93%   BMI 26.14 kg/m²      Physical Exam   Constitutional: He is oriented to person, place, and time. He appears well-developed and well-nourished. No distress.   Pleasant, fairly soft spoken, mildly disheveled, middle-aged man accompanied by a caregiver in no distress   HENT:   Head: Normocephalic and atraumatic.   Eyes: Conjunctivae and EOM are normal. Pupils are equal, round, and reactive to light. No scleral icterus.   Neck: Neck supple. No JVD present. No tracheal deviation present.   Cardiovascular: Normal rate, regular rhythm, normal heart sounds and intact distal pulses.  Exam reveals no gallop and no friction rub.    No murmur heard.  Pulses:       Dorsalis pedis pulses are 2+ on the right side, and 2+ on the left side.   No carotid bruits   Pulmonary/Chest: Effort normal and breath sounds normal. No stridor. No respiratory distress. He has no wheezes. He has no rales.   Abdominal: Soft. Bowel sounds are normal. He exhibits no distension.   Musculoskeletal: He exhibits no edema.   Neurological: He is alert and oriented to person, place, and time.   Skin: Skin is warm and dry. No rash noted. He is not diaphoretic. No erythema. No pallor.   Psychiatric: He has a normal mood and affect. Judgment and thought content normal.   Vitals reviewed.    Lab Results   Component Value Date/Time    WBC 7.0 01/09/2018 04:45 PM    RBC 4.64 (L) 01/09/2018 04:45 PM    HEMOGLOBIN 13.4 (L) 01/09/2018 04:45 PM    HEMATOCRIT 39.5 (L) 01/09/2018 04:45 PM    MCV 85.1 01/09/2018 04:45 PM    MCH 28.9 01/09/2018 04:45 PM    MCHC 33.9 01/09/2018 04:45 PM    MPV 10.0 " "01/09/2018 04:45 PM        Lab Results   Component Value Date/Time    SODIUM 135 01/09/2018 04:45 PM    POTASSIUM 3.8 01/09/2018 04:45 PM    CHLORIDE 103 01/09/2018 04:45 PM    CO2 25 01/09/2018 04:45 PM    GLUCOSE 202 (H) 01/09/2018 04:45 PM    BUN 11 01/09/2018 04:45 PM    CREATININE 0.75 01/09/2018 04:45 PM    CREATININE 1.0 07/12/2007 05:40 PM    BUNCREATRAT 18 06/13/2014 08:40 AM        Lab Results   Component Value Date/Time    ASTSGOT 15 01/09/2018 04:45 PM    ALTSGPT 36 01/09/2018 04:45 PM        Lab Results   Component Value Date/Time    CHOLSTRLTOT 140 07/21/2017 04:32 AM    LDL 82 07/21/2017 04:32 AM    HDL 31 (A) 07/21/2017 04:32 AM    TRIGLYCERIDE 134 07/21/2017 04:32 AM       Cardiac catheterization, 4/9/2016:  \"IMPRESSION:  1.  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  2.  A 40% lesion in the very proximal right coronary artery.  3.  A 90% stenosis at the origin of one of the tandem posterior descending artery vessels.  4.  An 80% lesion in the mid portion of the posterior left ventricular artery.  5.  Elongated area of ectasia of the proximal left anterior descending.  6.  A 90% stenosis involving the origin of the small caliber first diagonal artery.  7.  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  8.  Diffuse nonobstructive plaquing in the mid and distal left anterior descending.  9.  Complete occlusion of the proximal circumflex, which is known from previous angiogram.  10.  Ventricular dysfunction with hypokinesis of the mid anterior wall and distal inferior wall\"    Exercise stress echocardiogram, 10/7/2014:  \"CONCLUSIONS  Exercise capacity is average.  EKG response to exercise was indeterminate because of baseline EKG   abnormality  Dense inferior infarct with preserved overall function.  Estimated LVEF   50%\"    Myocardial perfusion imaging, 4/5/2016:  \" IMPRESSIONS   Fixed defect in the basal and mid myocardium.   inferoNormal left ventricular " "size, ejection fraction, and wall motion\"    Echocardiogram, 4/16/2017:  \"CONCLUSIONS  Mildly reduced left ventricular systolic function.  Left ventricular ejection fraction is visually estimated to be 45%.  Inferolateral and inferior hypokinesis.  Grade I diastolic dysfunction.  Moderate asymmetric septal hypertrophy.  Mild aortic insufficiency.  Compared to the report of the study done on 07/25/2012 there has been   no significant change\"    Cardiac catheterization, 7/26/2017:  \"Coronary artery disease and coronary aeruysm     This is right dominant system.     Left main is large and without flow limiting disease. It has mild aneurysmal change.  It bifurcated into left anterior descending and left circumflex artery.     Left anterior descending artery (LAD) is large caliber vessel and extends to the apex.  It gives rises to several small to medium sized diagonal branches.  Ectatic/aneruysmal change was noted in the proximal LAD.  Moderate diffuse disease was seen in the mid to distal LAD up to 70% stenosis in the distal portion.  The antegrade flow is normal.     FFR of the distal LAD was 0.78 but above 0.8 for the proximal and mid lesions.     Left circumflex artery is large caliber. It gives rise to large first and medium sized second obtuse marginal (OM) branches and small AV groove branch.   There was chronic total occlusion at the origin of the second OM with bridging collateral and LISA II flow.  There is no clear visible stump.     Right coronary is large caliber. It gives rise to several medium sized acute marginal branch, dual posterior descending artery and posterolateral branch.  There was 95% stenosis at the ostium of one of the PDA.  Aneurysmal change was also seen in the proximal and mid RCA.  The antegrade flow is normal.     FFR of the main RCA was 0.86     Recommendations; Maximize medical therapy and risk factor modification\"    Assessment:     1. Syncope and collapse     2. PAF (paroxysmal " atrial fibrillation) (CMS-[facility])     3. Ischemic Cardiomyopathy EF 50%     4. Coronary artery disease involving native coronary artery of native heart without angina pectoris     5. Essential hypertension     6. Dyslipidemia         Medical Decision Making:  Today's Assessment / Status / Plan:     He comes in today in the aftermath again of several visits to the emergency room. Most recently, he has been having syncope. The [facility] service and evaluated him and there were no events on telemetry when he was hospitalized recently. I did arrange for an implantable monitor today ([employer]). There is no evidence at this time that hypotension is contributing to his recurrent presentations related to which I have not changed his ACE inhibitor or beta blocker. He is not complaining much of exertional angina in the setting of his complex multivessel coronary disease.    As per previous notes, I continue to be moderately suspicious about the possibility of secondary gain with his recurrent trips to the emergency room.    Joseph León MD  Cardiologist, [facility] [place]     Return in about 3 months (around 4/10/2018).        Juancho Jenkins M.D.  [address] [address]  VIA In Basket

## 2018-01-10 NOTE — ED NOTES
"Chief Complaint   Patient presents with   • Dizziness   • Near Syncopal     Patient bib [facility], states he was walking in hallway and became dizzy, felt like he was going to \"black out\". Patient has been here 2 times past week for same. AAOx4, VSS, FSBS 227. ERP to eval.    "

## 2018-01-10 NOTE — DISCHARGE INSTRUCTIONS
Please follow up with her cardiologist as scheduled    Near-Syncope  Near-syncope (commonly known as near fainting) is sudden weakness, dizziness, or feeling like you might pass out. During an episode of near-syncope, you may also develop pale skin, have tunnel vision, or feel sick to your stomach (nauseous). Near-syncope may occur when getting up after sitting or while standing for a long time. It is caused by a sudden decrease in blood flow to the brain. This decrease can result from various causes or triggers, most of which are not serious. However, because near-syncope can sometimes be a sign of something serious, a medical evaluation is required. The specific cause is often not determined.  HOME CARE INSTRUCTIONS   Monitor your condition for any changes. The following actions may help to alleviate any discomfort you are experiencing:  · Have someone stay with you until you feel stable.  · Lie down right away and prop your feet up if you start feeling like you might faint. Breathe deeply and steadily. Wait until all the symptoms have passed. Most of these episodes last only a few minutes. You may feel tired for several hours.    · Drink enough fluids to keep your urine clear or pale yellow.    · If you are taking blood pressure or heart medicine, get up slowly when seated or lying down. Take several minutes to sit and then stand. This can reduce dizziness.  · Follow up with your health care provider as directed.   SEEK IMMEDIATE MEDICAL CARE IF:   · You have a severe headache.    · You have unusual pain in the chest, abdomen, or back.    · You are bleeding from the mouth or rectum, or you have black or tarry stool.    · You have an irregular or very fast heartbeat.    · You have repeated fainting or have seizure-like jerking during an episode.    · You faint when sitting or lying down.    · You have confusion.    · You have difficulty walking.    · You have severe weakness.    · You have vision problems.    MAKE  SURE YOU:   · Understand these instructions.  · Will watch your condition.  · Will get help right away if you are not doing well or get worse.     This information is not intended to replace advice given to you by your health care provider. Make sure you discuss any questions you have with your health care provider.     Document Released: 12/18/2006 Document Revised: 12/23/2014 Document Reviewed: 05/23/2014  Elsevier Interactive Patient Education ©2016 [employer]

## 2018-01-11 NOTE — PROGRESS NOTES
Subjective:   Ryan Trevizo is a 52 -year-old man with a history of ischemic systolic congestive heart failure with an ejection fraction of by echocardiogram done in April, 2017. He has complex multivessel coronary artery disease with no good targets for revascularization, paroxysmal atrial fibrillation, and was hospitalized with ischemic gastritis and a GI bleed in May, 2017.    He continues to visit the emergency room on a regular basis though workup is almost invariably fruitless.    He describes his syncopal episodes telling me about frequent orthostasis, but unable to give anything more than a vague history about his most recent episode. He denies any palpitations, and has no other cardiovascular complaints. He does mention that his systolic blood pressure was around 130-140 mmHg he believes when checked by emergency medical services at the time of his last syncopal event.    He has no other cardiovascular complaints today, and denies any side effects with his medications.    I did attempt to call his primary care physician to discuss further his care though at the time of this note I received no call back.    Past Medical History:   Diagnosis Date   • A-fib (CMS-HCC)    • ACS (acute coronary syndrome) (CMS-HCC) 7/25/2012   • Anxiety disorder    • Arthritis    • Blood glucose elevated    • CAD (coronary artery disease) 10/14/2013   • Claudication (CMS-HCC)    • Diabetes (CMS-HCC)    • Dizziness    • GERD (gastroesophageal reflux disease) 7/15/2012   • Glaucoma    • HTN (hypertension) 7/15/2012   • Hyperlipemia    • Hypertriglyceridemia    • Ischemic cardiomyopathy    • Mental retardation    • MI (myocardial infarction)    • Osteoarthritis    • Pericarditis secondary to acute myocardial infarction 7/19/2012   • Right knee pain    • SOB (shortness of breath)    • STEMI (ST elevation myocardial infarction) (CMS-HCC)    • Upper GI bleed 7/26/2012   • Viral URI with cough 1/5/2018     Past Surgical History:  "  Procedure Laterality Date   • GASTROSCOPY-ENDO  5/19/2017    Procedure: GASTROSCOPY-ENDO;  Surgeon: Reinaldo Berry M.D.;  Location: ENDOSCOPY [facility];  Service:    • GASTROSCOPY-ENDO  7/26/2012    Performed by JORDIN VERA at ENDOSCOPY [facility]   • GASTROSCOPY-ENDO  7/25/2012    Performed by JORDIN VERA at ENDOSCOPY [facility]   • OTHER ORTHOPEDIC SURGERY  7-     R knee surgery   • OTHER  knee surgery   • OTHER     • OTHER CARDIAC SURGERY      stent placement     History reviewed. No pertinent family history.  History   Smoking Status   • Never Smoker   Smokeless Tobacco   • Never Used     Allergies   Allergen Reactions   • Ritalin [Methylphenidate] Unspecified     \"Hyper\"     Outpatient Encounter Prescriptions as of 1/10/2018   Medication Sig Dispense Refill   • vitamin D, Ergocalciferol, (DRISDOL) 70531 units Cap capsule Take 1 Cap by mouth every 7 days. 8 Cap 0   • cyanocobalamin (VITAMIN B12) 1000 MCG Tab Take 1 Tab by mouth every day. 30 Tab 3   • pantoprazole (PROTONIX) 40 MG Tablet Delayed Response TAKE 1 TABLET BY MOUTH ONCE DAILY. 30 Tab 5   • ASPIRIN LOW DOSE 81 MG EC tablet TAKE 1 TABLET BY MOUTH ONCE DAILY. 30 Tab 11   • RANEXA 500 MG TABLET SR 12 HR TAKE (2) TABLETS BY MOUTH TWICE DAILY 120 Tab 5   • metoprolol SR (TOPROL XL) 25 MG TABLET SR 24 HR TAKE 1 TABLET BY MOUTH ONCE DAILY. 30 Tab 11   • loratadine (CLARITIN) 10 MG Tab Take 1 Tab by mouth every day. 30 Tab 11   • atorvastatin (LIPITOR) 40 MG Tab Take 2 Tabs by mouth every day. 30 Tab 11   • rivaroxaban (XARELTO) 20 MG Tab tablet Take 1 Tab by mouth with dinner. 30 Tab 11   • lisinopril (PRINIVIL) 5 MG Tab Take 1 Tab by mouth every day. 30 Tab 11   • metformin (GLUCOPHAGE) 1000 MG tablet Take 1 Tab by mouth 2 times a day, with meals. 60 Tab 11     No facility-administered encounter medications on file as of 1/10/2018.      Review of Systems   Cardiovascular: Negative.    All other systems reviewed and are " "negative.       Objective:   /80   Pulse 100   Ht 1.753 m (5' 9\")   Wt 80.3 kg (177 lb)   SpO2 93%   BMI 26.14 kg/m²     Physical Exam   Constitutional: He is oriented to person, place, and time. He appears well-developed and well-nourished. No distress.   Pleasant, fairly soft spoken, mildly disheveled, middle-aged man accompanied by a caregiver in no distress   HENT:   Head: Normocephalic and atraumatic.   Eyes: Conjunctivae and EOM are normal. Pupils are equal, round, and reactive to light. No scleral icterus.   Neck: Neck supple. No JVD present. No tracheal deviation present.   Cardiovascular: Normal rate, regular rhythm, normal heart sounds and intact distal pulses.  Exam reveals no gallop and no friction rub.    No murmur heard.  Pulses:       Dorsalis pedis pulses are 2+ on the right side, and 2+ on the left side.   No carotid bruits   Pulmonary/Chest: Effort normal and breath sounds normal. No stridor. No respiratory distress. He has no wheezes. He has no rales.   Abdominal: Soft. Bowel sounds are normal. He exhibits no distension.   Musculoskeletal: He exhibits no edema.   Neurological: He is alert and oriented to person, place, and time.   Skin: Skin is warm and dry. No rash noted. He is not diaphoretic. No erythema. No pallor.   Psychiatric: He has a normal mood and affect. Judgment and thought content normal.   Vitals reviewed.    Lab Results   Component Value Date/Time    WBC 7.0 01/09/2018 04:45 PM    RBC 4.64 (L) 01/09/2018 04:45 PM    HEMOGLOBIN 13.4 (L) 01/09/2018 04:45 PM    HEMATOCRIT 39.5 (L) 01/09/2018 04:45 PM    MCV 85.1 01/09/2018 04:45 PM    MCH 28.9 01/09/2018 04:45 PM    MCHC 33.9 01/09/2018 04:45 PM    MPV 10.0 01/09/2018 04:45 PM        Lab Results   Component Value Date/Time    SODIUM 135 01/09/2018 04:45 PM    POTASSIUM 3.8 01/09/2018 04:45 PM    CHLORIDE 103 01/09/2018 04:45 PM    CO2 25 01/09/2018 04:45 PM    GLUCOSE 202 (H) 01/09/2018 04:45 PM    BUN 11 01/09/2018 04:45 " "PM    CREATININE 0.75 01/09/2018 04:45 PM    CREATININE 1.0 07/12/2007 05:40 PM    BUNCREATRAT 18 06/13/2014 08:40 AM        Lab Results   Component Value Date/Time    ASTSGOT 15 01/09/2018 04:45 PM    ALTSGPT 36 01/09/2018 04:45 PM        Lab Results   Component Value Date/Time    CHOLSTRLTOT 140 07/21/2017 04:32 AM    LDL 82 07/21/2017 04:32 AM    HDL 31 (A) 07/21/2017 04:32 AM    TRIGLYCERIDE 134 07/21/2017 04:32 AM       Cardiac catheterization, 4/9/2016:  \"IMPRESSION:  1.  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  2.  A 40% lesion in the very proximal right coronary artery.  3.  A 90% stenosis at the origin of one of the tandem posterior descending artery vessels.  4.  An 80% lesion in the mid portion of the posterior left ventricular artery.  5.  Elongated area of ectasia of the proximal left anterior descending.  6.  A 90% stenosis involving the origin of the small caliber first diagonal artery.  7.  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  8.  Diffuse nonobstructive plaquing in the mid and distal left anterior descending.  9.  Complete occlusion of the proximal circumflex, which is known from previous angiogram.  10.  Ventricular dysfunction with hypokinesis of the mid anterior wall and distal inferior wall\"    Exercise stress echocardiogram, 10/7/2014:  \"CONCLUSIONS  Exercise capacity is average.  EKG response to exercise was indeterminate because of baseline EKG   abnormality  Dense inferior infarct with preserved overall function.  Estimated LVEF   50%\"    Myocardial perfusion imaging, 4/5/2016:  \" IMPRESSIONS   Fixed defect in the basal and mid myocardium.   inferoNormal left ventricular size, ejection fraction, and wall motion\"    Echocardiogram, 4/16/2017:  \"CONCLUSIONS  Mildly reduced left ventricular systolic function.  Left ventricular ejection fraction is visually estimated to be 45%.  Inferolateral and inferior hypokinesis.  Grade I diastolic " "dysfunction.  Moderate asymmetric septal hypertrophy.  Mild aortic insufficiency.  Compared to the report of the study done on 07/25/2012 there has been   no significant change\"    Cardiac catheterization, 7/26/2017:  \"Coronary artery disease and coronary aeruysm     This is right dominant system.     Left main is large and without flow limiting disease. It has mild aneurysmal change.  It bifurcated into left anterior descending and left circumflex artery.     Left anterior descending artery (LAD) is large caliber vessel and extends to the apex.  It gives rises to several small to medium sized diagonal branches.  Ectatic/aneruysmal change was noted in the proximal LAD.  Moderate diffuse disease was seen in the mid to distal LAD up to 70% stenosis in the distal portion.  The antegrade flow is normal.     FFR of the distal LAD was 0.78 but above 0.8 for the proximal and mid lesions.     Left circumflex artery is large caliber. It gives rise to large first and medium sized second obtuse marginal (OM) branches and small AV groove branch.   There was chronic total occlusion at the origin of the second OM with bridging collateral and LISA II flow.  There is no clear visible stump.     Right coronary is large caliber. It gives rise to several medium sized acute marginal branch, dual posterior descending artery and posterolateral branch.  There was 95% stenosis at the ostium of one of the PDA.  Aneurysmal change was also seen in the proximal and mid RCA.  The antegrade flow is normal.     FFR of the main RCA was 0.86     Recommendations; Maximize medical therapy and risk factor modification\"    Assessment:     1. Syncope and collapse     2. PAF (paroxysmal atrial fibrillation) (CMS-HCC)     3. Ischemic Cardiomyopathy EF 50%     4. Coronary artery disease involving native coronary artery of native heart without angina pectoris     5. Essential hypertension     6. Dyslipidemia         Medical Decision Making:  Today's " Assessment / Status / Plan:     He comes in today in the aftermath again of several visits to the emergency room. Most recently, he has been having syncope. The academic service and evaluated him and there were no events on telemetry when he was hospitalized recently. I did arrange for an implantable monitor today (Linq). There is no evidence at this time that hypotension is contributing to his recurrent presentations related to which I have not changed his ACE inhibitor or beta blocker. He is not complaining much of exertional angina in the setting of his complex multivessel coronary disease.    As per previous notes, I continue to be moderately suspicious about the possibility of secondary gain with his recurrent trips to the emergency room.    Joseph León MD  Cardiologist, [facility] [place]     Return in about 3 months (around 4/10/2018).

## 2018-01-12 ENCOUNTER — TELEPHONE (OUTPATIENT)
Dept: CARDIOLOGY | Facility: MEDICAL CENTER | Age: 53
End: 2018-01-12

## 2018-01-12 ENCOUNTER — HOSPITAL ENCOUNTER (OUTPATIENT)
Facility: MEDICAL CENTER | Age: 53
End: 2018-01-12
Attending: INTERNAL MEDICINE | Admitting: INTERNAL MEDICINE
Payer: MEDICARE

## 2018-01-12 VITALS
HEIGHT: 70 IN | WEIGHT: 177.69 LBS | OXYGEN SATURATION: 94 % | RESPIRATION RATE: 16 BRPM | DIASTOLIC BLOOD PRESSURE: 87 MMHG | BODY MASS INDEX: 25.44 KG/M2 | SYSTOLIC BLOOD PRESSURE: 140 MMHG | HEART RATE: 63 BPM | TEMPERATURE: 98 F

## 2018-01-12 PROCEDURE — C1764 EVENT RECORDER, CARDIAC: HCPCS

## 2018-01-12 PROCEDURE — 160002 HCHG RECOVERY MINUTES (STAT)

## 2018-01-12 PROCEDURE — 33282 HCHG CARDIAC LR INSERTION: CPT

## 2018-01-12 PROCEDURE — 302736 HCHG ADHESIVE DERMABOND

## 2018-01-12 PROCEDURE — 700101 HCHG RX REV CODE 250

## 2018-01-12 RX ORDER — LIDOCAINE HYDROCHLORIDE AND EPINEPHRINE BITARTRATE 20; .01 MG/ML; MG/ML
INJECTION, SOLUTION SUBCUTANEOUS
Status: DISCONTINUED
Start: 2018-01-12 | End: 2018-01-12 | Stop reason: HOSPADM

## 2018-01-12 ASSESSMENT — PAIN SCALES - GENERAL: PAINLEVEL_OUTOF10: 0

## 2018-01-12 NOTE — TELEPHONE ENCOUNTER
"----- Message from Elena Ramsey sent at 1/12/2018  1:15 PM PST -----  Regarding: d/c planning  Contact: [phone]  IA/james Bales, with [employer] (intermittent home provider), calling to discuss pt's d/c planning (d/c from [facility] may be today).    Malinda needs IA's input, she is making sure all pt's providers are \"on the same page\" with regard to pt care  [phone].    =============================================================================    Attempted to call Malinda back, no answer, left vm to call back   "

## 2018-01-12 NOTE — TELEPHONE ENCOUNTER
S/w Malinda from [employer], per Malinda they are the intermittent home provider for pt as pt have intellectual disability, Malinda wants to discussed dc planning in regards to recent procedure that pt had, upon chart review, pt had Loop Recorder implanted today, Malinda just wants to make sure that pt could safely use this at home and she would like to know more about it also in order to teach pt (uses and procedures how to use it) as they are one following the pt for his needs. Informed pt that will have the Device Tech give her a call and give her further instructions, Malinda appreciative and verbalizes understanding     To Jamia or Lupe please call Malinda to instruct and educate on Implantable loop recorder, Thank You.

## 2018-01-12 NOTE — LETTER
January 19, 2018        Ryan Trevizo  [address]  [facility] [address]        To whom it may concern:     This letter is to inform you that the above mentioned pt are under our care and base on our recent assessment, it was determined that the above mentioned patient May go back to work with no restrictions.    If you have any questions or concerns, please don't hesitate to call our office, [phone].       Sincerely,        _________________________  Joseph León M.D.  Cardiologist          _________________________  Ibrahima Blake M.D.  Electrophysiologist

## 2018-01-12 NOTE — PROGRESS NOTES
Report received and Pt admitted to [facility] s/p loop recorder placement. Site CDI with no s/s of bleeding or hematoma.  VSS with no complaints of pain or nausea. Fluids and food offered. Orders reviewed.

## 2018-01-12 NOTE — DISCHARGE INSTRUCTIONS
SPECIAL INSTRUCTIONS: Refer to [employer] for information. Follow up with Device clinic in 1 week. [phone]    DIET: To avoid nausea, slowly advance diet as tolerated, avoiding spicy or greasy foods for the first day.  Add more substantial food to your diet according to your physician's instructions.  Babies can be fed formula or breast milk as soon as they are hungry.  INCREASE FLUIDS AND FIBER TO AVOID CONSTIPATION.    SURGICAL DRESSING/BATHING: May remove dressing in 24 hours. May shower in 24 hours. Do not submerge site until steri strips fall off.     FOLLOW-UP APPOINTMENT:  A follow-up appointment should be arranged with your doctor; call to schedule.    You should CALL YOUR PHYSICIAN if you develop:  Fever greater than 101 degrees F.  Pain not relieved by medication, or persistent nausea or vomiting.  Excessive bleeding (blood soaking through dressing) or unexpected drainage from the wound.  Extreme redness or swelling around the incision site, drainage of pus or foul smelling drainage.  Inability to urinate or empty your bladder within 8 hours.  Problems with breathing or chest pain.    You should call 911 if you develop problems with breathing or chest pain.  If you are unable to contact your doctor or surgical center, you should go to the nearest emergency room or urgent care center.  Physician's telephone #: [phone]    If any questions arise, call your doctor.  If your doctor is not available, please feel free to call the Surgical Center at [phone].  The Center is open Monday through Friday from 7AM to 7PM.  You can also call the HEALTH HOTLINE open 24 hours/day, 7 days/week and speak to a nurse at [phone], or toll free at [phone].    A registered nurse may call you a few days after your surgery to see how you are doing after your procedure.    MEDICATIONS: Resume taking daily medication.  Take prescribed pain medication with food.  If no medication is prescribed, you may take  non-aspirin pain medication if needed.  PAIN MEDICATION CAN BE VERY CONSTIPATING.  Take a stool softener or laxative such as senokot, pericolace, or milk of magnesia if needed.  If your physician has prescribed pain medication that includes Acetaminophen (Tylenol), do not take additional Acetaminophen (Tylenol) while taking the prescribed medication.    Depression / Suicide Risk    As you are discharged from this [facility] facility, it is important to learn how to keep safe from harming yourself.    Recognize the warning signs:  · Abrupt changes in personality, positive or negative- including increase in energy   · Giving away possessions  · Change in eating patterns- significant weight changes-  positive or negative  · Change in sleeping patterns- unable to sleep or sleeping all the time   · Unwillingness or inability to communicate  · Depression  · Unusual sadness, discouragement and loneliness  · Talk of wanting to die  · Neglect of personal appearance   · Rebelliousness- reckless behavior  · Withdrawal from people/activities they love  · Confusion- inability to concentrate     If you or a loved one observes any of these behaviors or has concerns about self-harm, here's what you can do:  · Talk about it- your feelings and reasons for harming yourself  · Remove any means that you might use to hurt yourself (examples: pills, rope, extension cords, firearm)  · Get professional help from the community (Mental Health, Substance Abuse, psychological counseling)  · Do not be alone:Call your Safe Contact- someone whom you trust who will be there for you.  · Call your local CRISIS HOTLINE 663-6540 or 964-030-0890  · Call your local Children's Mobile Crisis Response Team [place] (148) 437-9436 or www.[employer]  · Call the toll free National Suicide Prevention Hotlines   · National Suicide Prevention Lifeline 743-690-NDMB (9918)  · National Hope Line Network 800-SUICIDE (370-8375)

## 2018-01-12 NOTE — OP REPORT
PROCEDURE PERFORMED: Implantable Loop Recorder    : DANIEL Blake M.D.    ASSISTANT: None    ANESTHESIA: Local    EBL: <5 cc    SPECIMENS: None    INDICATION: syncope    COMPLICATIONS: None    PRE-PROCEDURE ECG: SR    POST-PROCEDURE ECG: SR    DESCRIPTION OF PROCEDURE:  After informed written consent, the patient was brought to the cath lab pre/post procedure area. The patient was prepped and draped in the usual sterile fashion. The procedure was performed with local anesthetic. Using the supplied incision tool, a <1 cm incision was made in the skin about 2 cm leftward and lateral to the sternal border. Using the supplied insertion tool, a tunnel was made in the subcutaneous tissue at a 45 degree angle to the sternum and the device was inserted with the supplied plunger. Manual compression was used until hemostasis achieved. Dermabond used for closure. Sterile dressing was applied. Sensing checked and adequate.    IMPLANTED DEVICE INFORMATION:  Model: [employer] LINQ 11  Serial number: [id number]     IMPRESSIONS:  1. Successful implantable loop recorder implantation    RECOMMENDATIONS:  1. Routine follow-up and device interrogation

## 2018-01-12 NOTE — PROGRESS NOTES
Pt. Loop recorder site CDI with no s/s of bleeding or hematoma. Pt. Meets discharge criteria. Pt. And responsible adult given discharge instructions. Pt. And responsible adult educated and all questions answered. Signed copy on chart. All lines and drains DC'd. Pt. Belongings with Pt and Pt. Transported via wheel chair to car with escort.

## 2018-01-15 ENCOUNTER — TELEPHONE (OUTPATIENT)
Dept: INTERNAL MEDICINE | Facility: MEDICAL CENTER | Age: 53
End: 2018-01-15

## 2018-01-15 NOTE — TELEPHONE ENCOUNTER
"Pharmacy Faxed stating \"Please advise, today we received a Vitamin D 50,000 units prescription for the patient when he's already taking D3 1,000units once daily already. Do you want to change the therapy or continuewith his current dose?\" - Flying adria gonzales. [phone]  "

## 2018-01-16 NOTE — TELEPHONE ENCOUNTER
From EP/loop standpoint:    Ibrahima Blake M.D.   You Just now (3:44 PM)      No restriction to go back to work, should ask Joseph oneal if there are any medical reasons why he cannot go back to work (Routing comment)

## 2018-01-17 NOTE — TELEPHONE ENCOUNTER
Spoke with Malinda--[employer].  Answered all questions regarding monitor and loop recorder--verbalized understanding.

## 2018-01-18 ENCOUNTER — APPOINTMENT (OUTPATIENT)
Dept: RADIOLOGY | Facility: MEDICAL CENTER | Age: 53
End: 2018-01-18
Attending: EMERGENCY MEDICINE
Payer: MEDICARE

## 2018-01-18 ENCOUNTER — HOSPITAL ENCOUNTER (EMERGENCY)
Facility: MEDICAL CENTER | Age: 53
End: 2018-01-18
Attending: EMERGENCY MEDICINE
Payer: MEDICARE

## 2018-01-18 VITALS
SYSTOLIC BLOOD PRESSURE: 138 MMHG | RESPIRATION RATE: 18 BRPM | TEMPERATURE: 97.9 F | HEIGHT: 70 IN | DIASTOLIC BLOOD PRESSURE: 84 MMHG | OXYGEN SATURATION: 93 % | HEART RATE: 67 BPM | WEIGHT: 177 LBS | BODY MASS INDEX: 25.34 KG/M2

## 2018-01-18 DIAGNOSIS — R07.9 CHEST PAIN, UNSPECIFIED TYPE: ICD-10-CM

## 2018-01-18 LAB
ALBUMIN SERPL BCP-MCNC: 4.4 G/DL (ref 3.2–4.9)
ALBUMIN/GLOB SERPL: 1.6 G/DL
ALP SERPL-CCNC: 60 U/L (ref 30–99)
ALT SERPL-CCNC: 15 U/L (ref 2–50)
ANION GAP SERPL CALC-SCNC: 10 MMOL/L (ref 0–11.9)
APTT PPP: 31.8 SEC (ref 24.7–36)
AST SERPL-CCNC: 11 U/L (ref 12–45)
BASOPHILS # BLD AUTO: 0.6 % (ref 0–1.8)
BASOPHILS # BLD: 0.04 K/UL (ref 0–0.12)
BILIRUB SERPL-MCNC: 0.8 MG/DL (ref 0.1–1.5)
BNP SERPL-MCNC: 9 PG/ML (ref 0–100)
BUN SERPL-MCNC: 17 MG/DL (ref 8–22)
CALCIUM SERPL-MCNC: 9.6 MG/DL (ref 8.5–10.5)
CHLORIDE SERPL-SCNC: 104 MMOL/L (ref 96–112)
CO2 SERPL-SCNC: 22 MMOL/L (ref 20–33)
CREAT SERPL-MCNC: 0.88 MG/DL (ref 0.5–1.4)
EKG IMPRESSION: NORMAL
EOSINOPHIL # BLD AUTO: 0.09 K/UL (ref 0–0.51)
EOSINOPHIL NFR BLD: 1.3 % (ref 0–6.9)
ERYTHROCYTE [DISTWIDTH] IN BLOOD BY AUTOMATED COUNT: 46.5 FL (ref 35.9–50)
GLOBULIN SER CALC-MCNC: 2.7 G/DL (ref 1.9–3.5)
GLUCOSE SERPL-MCNC: 180 MG/DL (ref 65–99)
HCT VFR BLD AUTO: 39.4 % (ref 42–52)
HGB BLD-MCNC: 13.5 G/DL (ref 14–18)
IMM GRANULOCYTES # BLD AUTO: 0.03 K/UL (ref 0–0.11)
IMM GRANULOCYTES NFR BLD AUTO: 0.4 % (ref 0–0.9)
INR PPP: 1.55 (ref 0.87–1.13)
LIPASE SERPL-CCNC: 41 U/L (ref 11–82)
LYMPHOCYTES # BLD AUTO: 1.3 K/UL (ref 1–4.8)
LYMPHOCYTES NFR BLD: 18.4 % (ref 22–41)
MCH RBC QN AUTO: 30 PG (ref 27–33)
MCHC RBC AUTO-ENTMCNC: 34.3 G/DL (ref 33.7–35.3)
MCV RBC AUTO: 87.6 FL (ref 81.4–97.8)
MONOCYTES # BLD AUTO: 0.49 K/UL (ref 0–0.85)
MONOCYTES NFR BLD AUTO: 6.9 % (ref 0–13.4)
NEUTROPHILS # BLD AUTO: 5.12 K/UL (ref 1.82–7.42)
NEUTROPHILS NFR BLD: 72.4 % (ref 44–72)
NRBC # BLD AUTO: 0 K/UL
NRBC BLD-RTO: 0 /100 WBC
PLATELET # BLD AUTO: 298 K/UL (ref 164–446)
PMV BLD AUTO: 9.8 FL (ref 9–12.9)
POTASSIUM SERPL-SCNC: 4.3 MMOL/L (ref 3.6–5.5)
PROT SERPL-MCNC: 7.1 G/DL (ref 6–8.2)
PROTHROMBIN TIME: 18.3 SEC (ref 12–14.6)
RBC # BLD AUTO: 4.5 M/UL (ref 4.7–6.1)
SODIUM SERPL-SCNC: 136 MMOL/L (ref 135–145)
TROPONIN I SERPL-MCNC: <0.01 NG/ML (ref 0–0.04)
TROPONIN I SERPL-MCNC: <0.01 NG/ML (ref 0–0.04)
WBC # BLD AUTO: 7.1 K/UL (ref 4.8–10.8)

## 2018-01-18 PROCEDURE — 96374 THER/PROPH/DIAG INJ IV PUSH: CPT

## 2018-01-18 PROCEDURE — 99285 EMERGENCY DEPT VISIT HI MDM: CPT

## 2018-01-18 PROCEDURE — 93005 ELECTROCARDIOGRAM TRACING: CPT | Performed by: EMERGENCY MEDICINE

## 2018-01-18 PROCEDURE — 84484 ASSAY OF TROPONIN QUANT: CPT

## 2018-01-18 PROCEDURE — 80053 COMPREHEN METABOLIC PANEL: CPT

## 2018-01-18 PROCEDURE — 700111 HCHG RX REV CODE 636 W/ 250 OVERRIDE (IP)

## 2018-01-18 PROCEDURE — 83690 ASSAY OF LIPASE: CPT

## 2018-01-18 PROCEDURE — 83880 ASSAY OF NATRIURETIC PEPTIDE: CPT

## 2018-01-18 PROCEDURE — 85025 COMPLETE CBC W/AUTO DIFF WBC: CPT

## 2018-01-18 PROCEDURE — 85730 THROMBOPLASTIN TIME PARTIAL: CPT

## 2018-01-18 PROCEDURE — 71045 X-RAY EXAM CHEST 1 VIEW: CPT

## 2018-01-18 PROCEDURE — 93005 ELECTROCARDIOGRAM TRACING: CPT

## 2018-01-18 PROCEDURE — 85610 PROTHROMBIN TIME: CPT

## 2018-01-18 RX ORDER — KETOROLAC TROMETHAMINE 30 MG/ML
30 INJECTION, SOLUTION INTRAMUSCULAR; INTRAVENOUS ONCE
Status: COMPLETED | OUTPATIENT
Start: 2018-01-18 | End: 2018-01-18

## 2018-01-18 RX ORDER — KETOROLAC TROMETHAMINE 30 MG/ML
30 INJECTION, SOLUTION INTRAMUSCULAR; INTRAVENOUS ONCE
Status: DISCONTINUED | OUTPATIENT
Start: 2018-01-18 | End: 2018-01-18

## 2018-01-18 RX ORDER — KETOROLAC TROMETHAMINE 30 MG/ML
INJECTION, SOLUTION INTRAMUSCULAR; INTRAVENOUS
Status: COMPLETED
Start: 2018-01-18 | End: 2018-01-18

## 2018-01-18 RX ADMIN — KETOROLAC TROMETHAMINE 30 MG: 30 INJECTION, SOLUTION INTRAMUSCULAR; INTRAVENOUS at 14:57

## 2018-01-18 RX ADMIN — KETOROLAC TROMETHAMINE 30 MG: 30 INJECTION, SOLUTION INTRAMUSCULAR at 14:57

## 2018-01-18 ASSESSMENT — PAIN SCALES - GENERAL: PAINLEVEL_OUTOF10: 10

## 2018-01-18 NOTE — ED NOTES
Pt arrived via [facility] from home. Pt states while sitting down watching TV he starting having LUQ pain that radiates to L chest. Pt also states L arm weakness with pain. Describes pain as sharp. Pt also states SOB and nausea. Hx of MI with IVC filter, has multiple ED visits. Pt had loop recorder placed 01/12/2018, no evidence of infection to incision site.     A/o x4, speaking in full sentences.

## 2018-01-18 NOTE — ED PROVIDER NOTES
ED Provider Note    CHIEF COMPLAINT  Chief Complaint   Patient presents with   • Chest Pain   • LUQ Pain       HPI  Ryan Trevizo is a 52 y.o. male who presents with acute onset of left lateral chest pain today.  [facility].  I did review his most recent hospitalization records over the past year.  The patient has sharp pain in the left lateral chest area.  It worsens when he takes a deep breath.  The patient denies: Fever, chills, cough, sputum, hemoptysis, abdominal pain, nausea, vomiting, hematemesis, melena, hematochezia, hematuria, dysuria, neurological symptoms of substernal chest pain, aching in the neck/jaw/shoulders/back.  No other acute symptomatology or complaints.    REVIEW OF SYSTEMS  See HPI for further details.  A recent cardiac catheterization showed:   Cardiac catheterization report  Procedure date July 26, 2017  PreOp Diagnosis: Known CAD and coronary aneurysm with CCS III-IV angina  PostOp Diagnosis:   1. Inferior wall hypokinesis with moderately reduced left ventricular systolic function. Ejection fraction 35%  2. Coronary artery disease;          - Aneurysmal change proximal left anterior descending artery (LAD) with diffuse moderate disease up to                     70% distal LAD. FFR of proximal and mid LAD above 0.8          - Chronic total occlusion of second obtuse marginal branch of the left circumflex artery          - 95% stenosis at the ostium of one of the dual posterior descending artery (2 mm diameter)          - Aneurysmal proximal and mid right coronary artery with 50-60% mid posterolateral branch but FFR > 0.8       All other systems negative.    PAST MEDICAL HISTORY  Past Medical History:   Diagnosis Date   • A-fib (CMS-HCC)    • ACS (acute coronary syndrome) (CMS-HCC) 7/25/2012   • Anxiety disorder    • Arthritis    • Blood glucose elevated    • CAD (coronary artery disease) 10/14/2013   • Claudication  (CMS-HCC)    • Diabetes (CMS-HCC)    • Dizziness    • GERD (gastroesophageal reflux disease) 7/15/2012   • Glaucoma    • HTN (hypertension) 7/15/2012   • Hyperlipemia    • Hypertriglyceridemia    • Ischemic cardiomyopathy    • Mental retardation    • MI (myocardial infarction)    • Osteoarthritis    • Pericarditis secondary to acute myocardial infarction 7/19/2012   • Right knee pain    • SOB (shortness of breath)    • STEMI (ST elevation myocardial infarction) (CMS-HCC)    • Upper GI bleed 7/26/2012   • Viral URI with cough 1/5/2018       FAMILY HISTORY  History reviewed. No pertinent family history.    SOCIAL HISTORY  Resides locally;    SURGICAL HISTORY  Past Surgical History:   Procedure Laterality Date   • GASTROSCOPY-ENDO  5/19/2017    Procedure: GASTROSCOPY-ENDO;  Surgeon: Reinaldo Berry M.D.;  Location: [facility];  Service:    • GASTROSCOPY-ENDO  7/26/2012    Performed by JORDIN VERA at [facility]COPY [facility]   • GASTROSCOPY-ENDO  7/25/2012    Performed by JORDIN VERA at ENDOSCOPY [facility]   • OTHER ORTHOPEDIC SURGERY  7-     R knee surgery   • OTHER  knee surgery   • OTHER     • OTHER CARDIAC SURGERY      stent placement       CURRENT MEDICATIONS  Home Medications     Reviewed by Kesha Nieves R.N. (Registered Nurse) on 01/18/18 at 1406  Med List Status: Partial   Medication Last Dose Status   ASPIRIN LOW DOSE 81 MG EC tablet 1/11/2018 Active   atorvastatin (LIPITOR) 40 MG Tab 1/11/2018 Active   Cholecalciferol (VITAMIND D3) 1000 UNIT Tab  Active   cyanocobalamin (VITAMIN B12) 1000 MCG Tab 1/11/2018 Active   lisinopril (PRINIVIL) 5 MG Tab 1/11/2018 Active   loratadine (CLARITIN) 10 MG Tab 1/11/2018 Active   metformin (GLUCOPHAGE) 1000 MG tablet 1/11/2018 Active   metoprolol SR (TOPROL XL) 25 MG TABLET SR 24 HR 1/11/2018 Active   pantoprazole (PROTONIX) 40 MG Tablet Delayed Response 1/11/2018 Active   RANEXA 500 MG TABLET SR 12 HR 1/11/2018 Active  "  rivaroxaban (XARELTO) 20 MG Tab tablet 1/11/2018 Active                ALLERGIES  Allergies   Allergen Reactions   • Ritalin [Methylphenidate] Unspecified     \"Hyper\"       PHYSICAL EXAM  VITAL SIGNS: /84   Pulse 77   Temp 36.6 °C (97.9 °F)   Resp 16   Ht 1.778 m (5' 10\")   Wt 80.3 kg (177 lb)   BMI 25.40 kg/m²    Constitutional: A 52-year-old male, appears older than his stated age, unkempt, oriented ×3  HENT: ,Atraumatic, Bilateral external ears normal, tympanic membranes clear, Oropharynx moist, No oral exudates, Nose normal.   Eyes: PERRL, EOMI, Conjunctiva normal, No discharge.   Neck: Normal range of motion, No tenderness, Supple, No stridor.   Lymphatic: No lymphadenopathy noted.   Cardiovascular: Normal heart rate, Normal rhythm, No murmurs, No rubs, No gallops.   Thorax & Lungs: Normal Equal breath sounds, No respiratory distress, No wheezing, no stridor, no rales.  Tenderness over the left lateral chest area.  Palpation to the area will exacerbate reproducible pain; surgical scar identified;  Abdomen: Soft, nontender, nondistended, no organomegaly, positive bowel sounds normal in quality. No guarding or rebound.  Skin: Good skin turgor, pink, warm, dry. No rashes, petechiae, purpura. Normal capillary refill.   Back: No tenderness, No CVA tenderness.   Extremities: Intact distal pulses, No edema, No tenderness, No cyanosis, No clubbing. Vascular: Pulses are 2+, symmetric in the upper and lower extremities.  Musculoskeletal: Mild diffuse arthritic changes. No tenderness to palpation or major deformities noted.   Neurologic: Alert & oriented x 3, Normal motor function, Normal sensory function, No gross focal deficits noted.   Psychiatric: Affect anxious, Judgment normal, Mood normal.     EKG  I have interpreted: Rate 80, rhythm sinus, left axis deviation, PA QRS QT intervals normal, no acute ischemic or injury changes, 12-lead EKG, no acute change compared to previous tracing;    Repeat EKG was " performed which showed: Rate 70, rhythm sinus, left axis deviation, NH QRS QT intervals normal, no acute STEMI, 12-lead EKG, no acute change compared to the previous tracing;    RADIOLOGY/PROCEDURES  DX-CHEST-LIMITED (1 VIEW)   Final Result      Stable cardiomegaly.          COURSE & MEDICAL DECISION MAKING  Pertinent Labs & Imaging studies reviewed. (See chart for details)  1.  Saline lock  2.  Toradol 30 mg IV    Laboratory studies: CBC shows white count of 7.1, 72% neutrophils, 18% monocytes, 6% monocytes, hemoglobin 13.5, hematocrit 39.4; CMP shows a random glucose 180 otherwise within normal; lipase 41; troponin less than 0.01; PT 18.3/1.5 x 5, PTT 31; repeat troponin is less than 0.01;    Discussion: At this time, patient presents for evaluation of chest pain.  The patient's symptoms do not suggest cardiac ischemia or unstable angina.  Patient's EKG and troponin are negative.  Repeat troponin was performed which was also normal.  The patient has palpable reproducible pain.  Patient is adequately anticoagulated.  At this time, I see no indication for emergent hospitalization.  I discussed the findings treat plan with the patient.  He indicates he is comfortable with this explanation and disposition.    FINAL IMPRESSION  1. Chest pain, unspecified type       PLAN  1.  Appropriate discharge instructions given  2.  Follow-up with primary care  3.  Recheck at any time should he feel there is a change worsening symptoms or any disconcerting symptoms is not experiencing at this time.    Electronically signed by: Guy G Gansert, 1/18/2018 2:27 PM

## 2018-01-19 ENCOUNTER — PATIENT OUTREACH (OUTPATIENT)
Dept: HEALTH INFORMATION MANAGEMENT | Facility: OTHER | Age: 53
End: 2018-01-19

## 2018-01-19 NOTE — TELEPHONE ENCOUNTER
Patient needs release to go back to work   Received: Today   Message Contents   MARGOT Florentino     Please call [employer],  for patient, at [phone] ext [phone]. His fax number is [phone]. He needs to get a release for the patient to go back to work.      ==========================================================================      Contacted patient's  [employer], no answer. Left message to call back.

## 2018-01-19 NOTE — TELEPHONE ENCOUNTER
Return to work letter drafted w/ Dr Jeffries and Dr Blake recommendations, Faxed back to [employer] , F#[phone].    Called [employer] back to notify, no answer, detailed vm left

## 2018-01-19 NOTE — DISCHARGE INSTRUCTIONS
Chest Pain, Nonspecific  It is often hard to give a specific diagnosis for the cause of chest pain. There is always a chance that your pain could be related to something serious, like a heart attack or a blood clot in the lungs. You need to follow up with your caregiver for further evaluation. More lab tests or other studies such as X-rays, electrocardiography, stress testing, or cardiac imaging may be needed to find the cause of your pain.  Most of the time, nonspecific chest pain improves within 2 to 3 days with rest and mild pain medicine. For the next few days, avoid physical exertion or activities that bring on pain. Do not smoke. Avoid drinking alcohol. Call your caregiver for routine follow-up as advised.   SEEK IMMEDIATE MEDICAL CARE IF:  · You develop increased chest pain or pain that radiates to the arm, neck, jaw, back, or abdomen.   · You develop shortness of breath, increased coughing, or you start coughing up blood.   · You have severe back or abdominal pain, nausea, or vomiting.   · You develop severe weakness, fainting, fever, or chills.   Document Released: 12/18/2006 Document Revised: 03/11/2013 Document Reviewed: 06/06/2008  [employer]® Patient Information ©2013 [employer].    Aspirin and Your Heart   Aspirin is a medicine that affects the way blood clots. Aspirin can be used to help reduce the risk of blood clots, heart attacks, and other heart-related problems.   SHOULD I TAKE ASPIRIN?  Your health care provider will help you determine whether it is safe and beneficial for you to take aspirin daily. Taking aspirin daily may be beneficial if you:  · Have had a heart attack or chest pain.  · Have undergone open heart surgery such as coronary artery bypass surgery (CABG).  · Have had coronary angioplasty.  · Have experienced a stroke or transient ischemic attack (TIA).  · Have peripheral vascular disease (PVD).  · Have chronic heart rhythm problems such as atrial fibrillation.  ARE THERE ANY RISKS  OF TAKING ASPIRIN DAILY?  Daily use of aspirin can increase your risk of side effects. Some of these include:  · Bleeding. Bleeding problems can be minor or serious. An example of a minor problem is a cut that does not stop bleeding. An example of a more serious problem is stomach bleeding or bleeding into the brain. Your risk of bleeding is increased if you are also taking non-steroidal anti-inflammatory medicine (NSAIDs).  · Increased bruising.  · Upset stomach.  · An allergic reaction. People who have nasal polyps have an increased risk of developing an aspirin allergy.  WHAT ARE SOME GUIDELINES I SHOULD FOLLOW WHEN TAKING ASPIRIN?   · Take aspirin only as directed by your health care provider. Make sure you understand how much you should take and what form you should take. The two forms of aspirin are:  ¨ Non-enteric-coated. This type of aspirin does not have a coating and is absorbed quickly. Non-enteric-coated aspirin is usually recommended for people with chest pain. This type of aspirin also comes in a chewable form.  ¨ Enteric-coated. This type of aspirin has a special coating that releases the medicine very slowly. Enteric-coated aspirin causes less stomach upset than non-enteric-coated aspirin. This type of aspirin should not be chewed or crushed.  · Drink alcohol in moderation. Drinking alcohol increases your risk of bleeding.  WHEN SHOULD I SEEK MEDICAL CARE?   · You have unusual bleeding or bruising.  · You have stomach pain.  · You have an allergic reaction. Symptoms of an allergic reaction include:  ¨ Hives.  ¨ Itchy skin.  ¨ Swelling of the lips, tongue, or face.  · You have ringing in your ears.  WHEN SHOULD I SEEK IMMEDIATE MEDICAL CARE?   · Your bowel movements are bloody, dark red, or black in color.  · You vomit or cough up blood.  · You have blood in your urine.  · You cough, wheeze, or feel short of breath.  If you have any of the following symptoms, this is an emergency. Do not wait to see  if the pain will go away. Get medical help at once. Call your local emergency services (911 in the [place] Do not drive yourself to the hospital.  · You have severe chest pain, especially if the pain is crushing or pressure-like and spreads to the arms, back, neck, or jaw.   · You have stroke-like symptoms, such as:    ¨ Loss of vision.    ¨ Difficulty talking.    ¨ Numbness or weakness on one side of your body.    ¨ Numbness or weakness in your arm or leg.    ¨ Not thinking clearly or feeling confused.       This information is not intended to replace advice given to you by your health care provider. Make sure you discuss any questions you have with your health care provider.     Document Released: 11/30/2009 Document Revised: 01/08/2016 Document Reviewed: 03/25/2015  [employer] Interactive Patient Education ©2016 [employer]

## 2018-01-19 NOTE — TELEPHONE ENCOUNTER
[employer] called back.  Informed [employer] of pending finalized work clearance. Dr. Joseph León is out of the office today and will return on Monday 1/22.  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

## 2018-01-19 NOTE — TELEPHONE ENCOUNTER
S/w Dr León over the phone, per Dr León pt may go back to work with no restrictions.     FYI to ANASTASIA Briggs RN

## 2018-01-23 NOTE — TELEPHONE ENCOUNTER
[employer] called back, he confirmed that they received the letter and pt actually already went back to work

## 2018-01-24 ENCOUNTER — TELEPHONE (OUTPATIENT)
Dept: CARDIOLOGY | Facility: MEDICAL CENTER | Age: 53
End: 2018-01-24

## 2018-01-24 DIAGNOSIS — E78.5 DYSLIPIDEMIA: ICD-10-CM

## 2018-01-24 DIAGNOSIS — I10 ESSENTIAL HYPERTENSION: ICD-10-CM

## 2018-01-24 DIAGNOSIS — I25.10 CORONARY ARTERY DISEASE INVOLVING NATIVE CORONARY ARTERY OF NATIVE HEART WITHOUT ANGINA PECTORIS: ICD-10-CM

## 2018-01-24 NOTE — TELEPHONE ENCOUNTER
----- Message from Edmund Guerra sent at 1/24/2018  1:13 PM PST -----  Regarding: Humza pt's  with [employer] needs another form faxed  IA/Laura [employer], pt's  with [employer] called back. He is also needing a updated list with pt's future appt's and any labs required faxed to [phone]. If question's can be reached at [phone] x [phone].    ======================================================================    Upon chart review last OV notes 1/10/2018:    Return in about 3 months (around 4/10/2018).    Called [employer] back, informed him that no FV scheduled yet for pt but Dr León recommended for him to come back around 4/10/18. Informed him also that will order fasting blood test for pt prior to this appt, Humza stated that pt is currently moving and requested the lab slip to be mailed on their office:     ATTN: Humza Zelaya  [address] [address]    Humza requesting for pt's address to be changed, informed him that we couldn't do that unless we could get authorization from pt, per Humza he would fax over [facility], office fax number provided to Humza.    Humza transferred to scheduling dept to schedule pt's appt [phone].     Pt's lab slip mailed to his  as requested.

## 2018-01-28 LAB — EKG IMPRESSION: NORMAL

## 2018-01-30 ENCOUNTER — TELEPHONE (OUTPATIENT)
Dept: CARDIOLOGY | Facility: MEDICAL CENTER | Age: 53
End: 2018-01-30

## 2018-01-30 NOTE — TELEPHONE ENCOUNTER
----- Message from Eryn Poe sent at 1/30/2018  7:55 AM PST -----  Regarding: Patient's  wants call back  Sumeet    Patient's , Humza, wants a call back at [phone], but didn't say what the call is about.    =============================================================================    Called Humza back, per Humza he received the lab order and he just want to check when to have it done, informed Humza that I've told him the last I've spoke with him that pt needs to get it done 1-2 weeks before his appt on April, Humza verbalizes understanding.

## 2018-02-27 ENCOUNTER — ANTICOAGULATION VISIT (OUTPATIENT)
Dept: MEDICAL GROUP | Facility: PHYSICIAN GROUP | Age: 53
End: 2018-02-27
Payer: MEDICARE

## 2018-02-27 DIAGNOSIS — I48.0 PAF (PAROXYSMAL ATRIAL FIBRILLATION) (HCC): ICD-10-CM

## 2018-02-27 LAB — INR PPP: 1.1 (ref 2–3.5)

## 2018-02-27 PROCEDURE — 85610 PROTHROMBIN TIME: CPT | Performed by: FAMILY MEDICINE

## 2018-02-27 PROCEDURE — 99211 OFF/OP EST MAY X REQ PHY/QHP: CPT | Performed by: FAMILY MEDICINE

## 2018-02-27 NOTE — PROGRESS NOTES
Anticoagulation Summary  As of 2/27/2018    INR goal:      TTR:   --   Today's INR:   1.1   Maintenance plan:   No maintenance plan   Plan last modified:   Reinier Bah, PharmD (6/20/2017)   Next INR check:   8/28/2018   Target end date:   Indefinite    Indications    PAF (paroxysmal atrial fibrillation) (CMS-HCC) [I48.0]             Anticoagulation Episode Summary     INR check location:       Preferred lab:       Send INR reminders to:       Comments:         Anticoagulation Care Providers     Provider Role Specialty Phone number    Juancho Jenkins M.D. Referring Internal Medicine [phone]    [facility] Responsible  [phone]        Anticoagulation Patient Findings       Target end date:Indefinite     Indication: Paroxysmal Atrial Fibrillation     Drug: Xarelto 20mg     CHADsVASC = at least 3    Health Status Since Last Assessment   Patient denies any new relevant medical problems, ED visits or hospitalizations   Patient denies any embolic events (stroke/tia/systemic embolism)    Adherence with DOAC Therapy   Pt has NO missed any doses in the average week    Bleeding Risk Assessment     Negative Epistaxis   Pt denies any excessive or unusual bleeding/hematomas.  Pt denies any GI bleeds or hematemesis.  Pt denies any concerning daily headache or sub dural hematoma symptoms.     Pt denies any hematuria or abnormal vaginal bleeding.   Latest Hemoglobin 13.5   ETOH overuse Negative     Creatinine Clearance/Renal Function     Latest ClCr >75 mL/min     Drug Interactions   ASA/other antiplatelets ASA 81mg daily   NSAID Negative   Other drug interactions Negative   Verified no anticonvulsant or azole therapy, education provided for future use.     Examination   Blood Pressure WNL   Symptomatic hypotension Negative   Significant gait impairment/imbalance/high risk for falls? Negative    Final Assessment and Recommendations:   Patient appears stable from the anticoagulation staindpoint.      Benefits of continued DOAC therapy outweigh risks for this patient   Recommend pt continue with current DOAC therapy Xarelto 20mg one time daily (with dose adjustment)     Other Actions: cmp/ cbc hemogram ordered prior to next visit    Follow up:   Will follow up with patient via telephone in 6 months.  I have added this patient to my list for follow up.    Reinier Bah, BalbinaD

## 2018-04-10 ENCOUNTER — HOSPITAL ENCOUNTER (OUTPATIENT)
Dept: LAB | Facility: MEDICAL CENTER | Age: 53
End: 2018-04-10
Attending: INTERNAL MEDICINE
Payer: MEDICARE

## 2018-04-10 DIAGNOSIS — I10 ESSENTIAL HYPERTENSION: ICD-10-CM

## 2018-04-10 DIAGNOSIS — I25.10 CORONARY ARTERY DISEASE INVOLVING NATIVE CORONARY ARTERY OF NATIVE HEART WITHOUT ANGINA PECTORIS: ICD-10-CM

## 2018-04-10 DIAGNOSIS — E78.5 DYSLIPIDEMIA: ICD-10-CM

## 2018-04-10 LAB
ALBUMIN SERPL BCP-MCNC: 4.7 G/DL (ref 3.2–4.9)
ALBUMIN/GLOB SERPL: 2.4 G/DL
ALP SERPL-CCNC: 36 U/L (ref 30–99)
ALT SERPL-CCNC: 29 U/L (ref 2–50)
ANION GAP SERPL CALC-SCNC: 5 MMOL/L (ref 0–11.9)
AST SERPL-CCNC: 17 U/L (ref 12–45)
BILIRUB SERPL-MCNC: 0.5 MG/DL (ref 0.1–1.5)
BUN SERPL-MCNC: 21 MG/DL (ref 8–22)
CALCIUM SERPL-MCNC: 9.4 MG/DL (ref 8.5–10.5)
CHLORIDE SERPL-SCNC: 105 MMOL/L (ref 96–112)
CHOLEST SERPL-MCNC: 123 MG/DL (ref 100–199)
CO2 SERPL-SCNC: 25 MMOL/L (ref 20–33)
CREAT SERPL-MCNC: 0.85 MG/DL (ref 0.5–1.4)
GLOBULIN SER CALC-MCNC: 2 G/DL (ref 1.9–3.5)
GLUCOSE SERPL-MCNC: 174 MG/DL (ref 65–99)
HDLC SERPL-MCNC: 44 MG/DL
LDLC SERPL CALC-MCNC: 54 MG/DL
POTASSIUM SERPL-SCNC: 4.8 MMOL/L (ref 3.6–5.5)
PROT SERPL-MCNC: 6.7 G/DL (ref 6–8.2)
SODIUM SERPL-SCNC: 135 MMOL/L (ref 135–145)
TRIGL SERPL-MCNC: 127 MG/DL (ref 0–149)

## 2018-04-10 PROCEDURE — 36415 COLL VENOUS BLD VENIPUNCTURE: CPT

## 2018-04-10 PROCEDURE — 80053 COMPREHEN METABOLIC PANEL: CPT

## 2018-04-10 PROCEDURE — 80061 LIPID PANEL: CPT

## 2018-04-17 ENCOUNTER — OFFICE VISIT (OUTPATIENT)
Dept: CARDIOLOGY | Facility: MEDICAL CENTER | Age: 53
End: 2018-04-17
Payer: MEDICARE

## 2018-04-17 VITALS
DIASTOLIC BLOOD PRESSURE: 86 MMHG | WEIGHT: 180 LBS | HEART RATE: 72 BPM | OXYGEN SATURATION: 100 % | HEIGHT: 70 IN | SYSTOLIC BLOOD PRESSURE: 124 MMHG | BODY MASS INDEX: 25.77 KG/M2

## 2018-04-17 DIAGNOSIS — I25.5 CARDIOMYOPATHY, ISCHEMIC: ICD-10-CM

## 2018-04-17 DIAGNOSIS — I10 ESSENTIAL HYPERTENSION: ICD-10-CM

## 2018-04-17 DIAGNOSIS — E78.5 DYSLIPIDEMIA: ICD-10-CM

## 2018-04-17 DIAGNOSIS — I25.10 CORONARY ARTERY DISEASE INVOLVING NATIVE CORONARY ARTERY OF NATIVE HEART WITHOUT ANGINA PECTORIS: Primary | ICD-10-CM

## 2018-04-17 DIAGNOSIS — I48.0 PAF (PAROXYSMAL ATRIAL FIBRILLATION) (HCC): Chronic | ICD-10-CM

## 2018-04-17 PROCEDURE — 99214 OFFICE O/P EST MOD 30 MIN: CPT | Performed by: INTERNAL MEDICINE

## 2018-04-17 ASSESSMENT — ENCOUNTER SYMPTOMS: CARDIOVASCULAR NEGATIVE: 1

## 2018-04-17 NOTE — LETTER
Name:          Ryan Trevizo   YOB: 1965  Date:     04/17/2018      Juancho Jenkins M.D.  [address]  [place] [address]     Joseph León MD  [address], [address]  [place] [address]  Phone: [phone]  Back Line: [phone]  Fax: [phone]  E-mail: [email or web address]   Dear Dr. Jenkins,    We had the pleasure of seeing your patient, Ryan Trevizo, in Cardiology Clinic at [facility] today.    As you know, he is a 52-year-old man with a history of ischemic congestive heart failure with an EF of 50% (recovered to near normal on medical therapy by echocardiogram 1/2018). He has complex multivessel coronary artery disease with no good targets for revascularization, paroxysmal atrial fibrillation, and was hospitalized with ischemic gastritis and a GI bleed in May, 2017 though able to tolerate an oral anticoagulant with no bleeding complications presently.    He is doing well today, and appears much better put together on physical examination just based on general appearance. He is taking his medications, and his left ventricular ejection fraction is nearly normalized measured at 50% by his echocardiogram in January. He has not had any recent angina, and did present to the emergency room once in January where he was ruled out for acute coronary syndrome with biomarkers and EKG. He neither has had a syncopal episode since implantation of his monitor. He continues on his blood thinner for stroke prevention with paroxysmal atrial fibrillation.    He does have some episodes of what sounds to be hypoglycemia after skipped meals, and tells me that he has run out of glucose test strips. I will defer refilling those to the primary care setting.    Return in about 6 months (around 10/17/2018) for NP in 3 months, me in 6 months.    Thank you for the referral and please do not hesitate to contact me at any time. My contact information is listed above.    This note was  dictated using Dragon speech recognition software.     A full note including my physical examination and a full list of rectified medications is available in our medical record, and can be faxed as well.    Joseph León MD  Cardiologist  [facility]

## 2018-04-17 NOTE — PROGRESS NOTES
Subjective:   Ryan Trevizo is a 52 -year-old man with a history of ischemic congestive heart failure with an EF of 50% (recovered to near normal on medical therapy by echocardiogram 1/2018). He has complex multivessel coronary artery disease with no good targets for revascularization, paroxysmal atrial fibrillation, and was hospitalized with ischemic gastritis and a GI bleed in May, 2017 though able to tolerate an oral anticoagulant with no bleeding complications presently.    He is doing well today, and has no cardiovascular complaints especially no chest discomfort or dyspnea in conjunction with his coronary artery disease.    He is tolerating his medical regimen well, and has no complaints of bleeding complications in conjunction with his blood thinner.    He and his friend who accompanies him to his visit to tell me that he has episodes of lightheadedness when he skips meals in conjunction with his oral hypoglycemics that they suspect is related to his diabetes. These resolved quickly after meals, but he has not been checking his blood sugar or blood pressure in conjunction with them. He has no test strips for his glucometer.    Past Medical History:   Diagnosis Date   • A-fib (CMS-HCC)    • ACS (acute coronary syndrome) (CMS-HCC) 7/25/2012   • Anxiety disorder    • Arthritis    • Blood glucose elevated    • CAD (coronary artery disease) 10/14/2013   • Claudication (CMS-HCC)    • Diabetes (CMS-HCC)    • Dizziness    • GERD (gastroesophageal reflux disease) 7/15/2012   • Glaucoma    • HTN (hypertension) 7/15/2012   • Hyperlipemia    • Hypertriglyceridemia    • Ischemic cardiomyopathy    • Mental retardation    • MI (myocardial infarction)    • Osteoarthritis    • Pericarditis secondary to acute myocardial infarction 7/19/2012   • Right knee pain    • SOB (shortness of breath)    • STEMI (ST elevation myocardial infarction) (CMS-HCC)    • Upper GI bleed 7/26/2012   • Viral URI with cough 1/5/2018     Past  "Surgical History:   Procedure Laterality Date   • GASTROSCOPY-ENDO  5/19/2017    Procedure: GASTROSCOPY-ENDO;  Surgeon: Reinaldo Berry M.D.;  Location: [facility];  Service:    • GASTROSCOPY-ENDO  7/26/2012    Performed by JORDIN VERA at ENDOSCOPY [facility]   • GASTROSCOPY-ENDO  7/25/2012    Performed by JORDIN VERA at ENDOSCOPY [facility]   • OTHER ORTHOPEDIC SURGERY  7-     R knee surgery   • OTHER  knee surgery   • OTHER     • OTHER CARDIAC SURGERY      stent placement     History reviewed. No pertinent family history.  History   Smoking Status   • Never Smoker   Smokeless Tobacco   • Never Used     Allergies   Allergen Reactions   • Ritalin [Methylphenidate] Unspecified     \"Hyper\"     Outpatient Encounter Prescriptions as of 4/17/2018   Medication Sig Dispense Refill   • metformin (GLUCOPHAGE) 1000 MG tablet TAKE 1 TABLET BY MOUTH TWICE DAILY WITH MEALS. 180 Tab 3   • Cholecalciferol (VITAMIND D3) 1000 UNIT Tab Take 1 Tab by mouth every day. 60 Tab 3   • cyanocobalamin (VITAMIN B12) 1000 MCG Tab Take 1 Tab by mouth every day. 30 Tab 3   • pantoprazole (PROTONIX) 40 MG Tablet Delayed Response TAKE 1 TABLET BY MOUTH ONCE DAILY. 30 Tab 5   • ASPIRIN LOW DOSE 81 MG EC tablet TAKE 1 TABLET BY MOUTH ONCE DAILY. 30 Tab 11   • RANEXA 500 MG TABLET SR 12 HR TAKE (2) TABLETS BY MOUTH TWICE DAILY 120 Tab 5   • metoprolol SR (TOPROL XL) 25 MG TABLET SR 24 HR TAKE 1 TABLET BY MOUTH ONCE DAILY. 30 Tab 11   • loratadine (CLARITIN) 10 MG Tab Take 1 Tab by mouth every day. 30 Tab 11   • atorvastatin (LIPITOR) 40 MG Tab Take 2 Tabs by mouth every day. 30 Tab 11   • rivaroxaban (XARELTO) 20 MG Tab tablet Take 1 Tab by mouth with dinner. 30 Tab 11   • lisinopril (PRINIVIL) 5 MG Tab Take 1 Tab by mouth every day. 30 Tab 11     No facility-administered encounter medications on file as of 4/17/2018.      Review of Systems   Cardiovascular: Negative.    All other systems reviewed and are " "negative.       Objective:   /86   Pulse 72   Ht 1.778 m (5' 10\")   Wt 81.6 kg (180 lb)   SpO2 100%   BMI 25.83 kg/m²     Physical Exam   Constitutional: He is oriented to person, place, and time. He appears well-developed and well-nourished. No distress.   Pleasant, fairly soft spoken, mildly disheveled, middle-aged man accompanied by a caregiver in no distress   HENT:   Head: Normocephalic and atraumatic.   Eyes: Conjunctivae and EOM are normal. Pupils are equal, round, and reactive to light. No scleral icterus.   Neck: Neck supple. No JVD present. No tracheal deviation present.   Cardiovascular: Normal rate, regular rhythm, normal heart sounds and intact distal pulses.  Exam reveals no gallop and no friction rub.    No murmur heard.  Pulses:       Dorsalis pedis pulses are 2+ on the right side, and 2+ on the left side.   No carotid bruits   Pulmonary/Chest: Effort normal and breath sounds normal. No stridor. No respiratory distress. He has no wheezes. He has no rales.   Abdominal: Soft. Bowel sounds are normal. He exhibits no distension.   Musculoskeletal: He exhibits no edema (no lower extremity edema bilaterally).   Neurological: He is alert and oriented to person, place, and time.   Skin: Skin is warm and dry. No rash noted. He is not diaphoretic. No erythema. No pallor.   Psychiatric: He has a normal mood and affect. Judgment and thought content normal.   Vitals reviewed.    Lab Results   Component Value Date/Time    WBC 7.1 01/18/2018 02:04 PM    RBC 4.50 (L) 01/18/2018 02:04 PM    HEMOGLOBIN 13.5 (L) 01/18/2018 02:04 PM    HEMATOCRIT 39.4 (L) 01/18/2018 02:04 PM    MCV 87.6 01/18/2018 02:04 PM    MCH 30.0 01/18/2018 02:04 PM    MCHC 34.3 01/18/2018 02:04 PM    MPV 9.8 01/18/2018 02:04 PM        Lab Results   Component Value Date/Time    SODIUM 135 04/10/2018 09:29 AM    POTASSIUM 4.8 04/10/2018 09:29 AM    CHLORIDE 105 04/10/2018 09:29 AM    CO2 25 04/10/2018 09:29 AM    GLUCOSE 174 (H) " "04/10/2018 09:29 AM    BUN 21 04/10/2018 09:29 AM    CREATININE 0.85 04/10/2018 09:29 AM    CREATININE 1.0 07/12/2007 05:40 PM    BUNCREATRAT 18 06/13/2014 08:40 AM        Lab Results   Component Value Date/Time    ASTSGOT 17 04/10/2018 09:29 AM    ALTSGPT 29 04/10/2018 09:29 AM        Lab Results   Component Value Date/Time    CHOLSTRLTOT 123 04/10/2018 09:29 AM    LDL 54 04/10/2018 09:29 AM    HDL 44 04/10/2018 09:29 AM    TRIGLYCERIDE 127 04/10/2018 09:29 AM       Cardiac catheterization, 4/9/2016:  \"IMPRESSION:  1.  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  2.  A 40% lesion in the very proximal right coronary artery.  3.  A 90% stenosis at the origin of one of the tandem posterior descending artery vessels.  4.  An 80% lesion in the mid portion of the posterior left ventricular artery.  5.  Elongated area of ectasia of the proximal left anterior descending.  6.  A 90% stenosis involving the origin of the small caliber first diagonal artery.  7.  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  8.  Diffuse nonobstructive plaquing in the mid and distal left anterior descending.  9.  Complete occlusion of the proximal circumflex, which is known from previous angiogram.  10.  Ventricular dysfunction with hypokinesis of the mid anterior wall and distal inferior wall\"    Exercise stress echocardiogram, 10/7/2014:  \"CONCLUSIONS  Exercise capacity is average.  EKG response to exercise was indeterminate because of baseline EKG   abnormality  Dense inferior infarct with preserved overall function.  Estimated LVEF   50%\"    Myocardial perfusion imaging, 4/5/2016:  \" IMPRESSIONS   Fixed defect in the basal and mid myocardium.   inferoNormal left ventricular size, ejection fraction, and wall motion\"    Echocardiogram, 4/16/2017:  \"CONCLUSIONS  Mildly reduced left ventricular systolic function.  Left ventricular ejection fraction is visually estimated to be 45%.  Inferolateral and " "inferior hypokinesis.  Grade I diastolic dysfunction.  Moderate asymmetric septal hypertrophy.  Mild aortic insufficiency.  Compared to the report of the study done on 07/25/2012 there has been   no significant change\"    Cardiac catheterization, 7/26/2017:  \"Coronary artery disease and coronary aeruysm     This is right dominant system.     Left main is large and without flow limiting disease. It has mild aneurysmal change.  It bifurcated into left anterior descending and left circumflex artery.     Left anterior descending artery (LAD) is large caliber vessel and extends to the apex.  It gives rises to several small to medium sized diagonal branches.  Ectatic/aneruysmal change was noted in the proximal LAD.  Moderate diffuse disease was seen in the mid to distal LAD up to 70% stenosis in the distal portion.  The antegrade flow is normal.     FFR of the distal LAD was 0.78 but above 0.8 for the proximal and mid lesions.     Left circumflex artery is large caliber. It gives rise to large first and medium sized second obtuse marginal (OM) branches and small AV groove branch.   There was chronic total occlusion at the origin of the second OM with bridging collateral and LISA II flow.  There is no clear visible stump.     Right coronary is large caliber. It gives rise to several medium sized acute marginal branch, dual posterior descending artery and posterolateral branch.  There was 95% stenosis at the ostium of one of the PDA.  Aneurysmal change was also seen in the proximal and mid RCA.  The antegrade flow is normal.     FFR of the main RCA was 0.86     Recommendations; Maximize medical therapy and risk factor modification\"    Assessment:     1. Coronary artery disease involving native coronary artery of native heart without angina pectoris     2. Dyslipidemia     3. Essential hypertension     4. Ischemic Cardiomyopathy EF 50%     5. PAF (paroxysmal atrial fibrillation) (CMS-[facility])         Medical Decision Making:  " Today's Assessment / Status / Plan:     He is doing well today, and appears much better put together on physical examination just based on general appearance. He is taking his medications, and his left ventricular ejection fraction is nearly normalized measured at 50% by his echocardiogram in January. He has not had any recent angina, and did present to the emergency room once in January where he was ruled out for acute coronary syndrome with biomarkers and EKG. He neither has had a syncopal episode since implantation of his monitor. He continues on his blood thinner for stroke prevention with paroxysmal atrial fibrillation.    He does have some episodes of what sounds to be hypoglycemia after skipped meals, and tells me that he has run out of glucose test strips. I will defer refilling those to the primary care setting.    Joseph León MD  Cardiologist, [facility] [place]     Return in about 6 months (around 10/17/2018) for NP in 3 months, me in 6 months.    Physical Exam   Constitutional: He is oriented to person, place, and time. He appears well-developed and well-nourished. No distress.   Pleasant, fairly soft spoken, mildly disheveled, middle-aged man accompanied by a caregiver in no distress   HENT:   Head: Normocephalic and atraumatic.   Eyes: Conjunctivae and EOM are normal. Pupils are equal, round, and reactive to light. No scleral icterus.   Neck: Neck supple. No JVD present. No tracheal deviation present.   Cardiovascular: Normal rate, regular rhythm, normal heart sounds and intact distal pulses.  Exam reveals no gallop and no friction rub.    No murmur heard.  Pulses:       Dorsalis pedis pulses are 2+ on the right side, and 2+ on the left side.   No carotid bruits   Pulmonary/Chest: Effort normal and breath sounds normal. No stridor. No respiratory distress. He has no wheezes. He has no rales.   Abdominal: Soft. Bowel sounds are normal. He exhibits no distension.   Musculoskeletal: He  exhibits no edema (no lower extremity edema bilaterally).   Neurological: He is alert and oriented to person, place, and time.   Skin: Skin is warm and dry. No rash noted. He is not diaphoretic. No erythema. No pallor.   Psychiatric: He has a normal mood and affect. Judgment and thought content normal.   Vitals reviewed.

## 2018-05-28 ENCOUNTER — HOSPITAL ENCOUNTER (EMERGENCY)
Facility: MEDICAL CENTER | Age: 53
End: 2018-05-28
Attending: EMERGENCY MEDICINE
Payer: MEDICARE

## 2018-05-28 ENCOUNTER — APPOINTMENT (OUTPATIENT)
Dept: RADIOLOGY | Facility: MEDICAL CENTER | Age: 53
End: 2018-05-28
Attending: EMERGENCY MEDICINE
Payer: MEDICARE

## 2018-05-28 VITALS
OXYGEN SATURATION: 93 % | TEMPERATURE: 96.7 F | WEIGHT: 180.78 LBS | HEIGHT: 71 IN | BODY MASS INDEX: 25.31 KG/M2 | HEART RATE: 65 BPM | SYSTOLIC BLOOD PRESSURE: 127 MMHG | DIASTOLIC BLOOD PRESSURE: 80 MMHG | RESPIRATION RATE: 14 BRPM

## 2018-05-28 DIAGNOSIS — S63.502A SPRAIN OF LEFT WRIST, INITIAL ENCOUNTER: ICD-10-CM

## 2018-05-28 DIAGNOSIS — S09.90XA CLOSED HEAD INJURY, INITIAL ENCOUNTER: ICD-10-CM

## 2018-05-28 DIAGNOSIS — S09.93XA FACIAL INJURY, INITIAL ENCOUNTER: ICD-10-CM

## 2018-05-28 PROCEDURE — 70486 CT MAXILLOFACIAL W/O DYE: CPT

## 2018-05-28 PROCEDURE — 70450 CT HEAD/BRAIN W/O DYE: CPT

## 2018-05-28 PROCEDURE — 99284 EMERGENCY DEPT VISIT MOD MDM: CPT | Mod: 25

## 2018-05-28 ASSESSMENT — PAIN SCALES - GENERAL: PAINLEVEL_OUTOF10: 10
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
## 2018-05-28 NOTE — DISCHARGE INSTRUCTIONS
Concussion, Adult  A concussion is a brain injury. It is caused by:  · A hit to the head.  · A quick and sudden movement (jolt) of the head or neck.  A concussion is usually not life threatening. Even so, it can cause serious problems. If you had a concussion before, you may have concussion-like problems after a hit to your head.  Follow these instructions at home:  General instructions  · Follow your doctor's directions carefully.  · Take medicines only as told by your doctor.  · Only take medicines your doctor says are safe.  · Do not drink alcohol until your doctor says it is okay. Alcohol and some drugs can slow down healing. They can also put you at risk for further injury.  · If you are having trouble remembering things, write them down.  · Try to do one thing at a time if you get distracted easily. For example, do not watch TV while making dinner.  · Talk to your family members or close friends when making important decisions.  · Follow up with your doctor as told.  · Watch your symptoms. Tell others to do the same. Serious problems can sometimes happen after a concussion. Older adults are more likely to have these problems.  · Tell your teachers, school nurse, school counselor, , , or  about your concussion. Tell them about what you can or cannot do. They should watch to see if:  ¨ It gets even harder for you to pay attention or concentrate.  ¨ It gets even harder for you to remember things or learn new things.  ¨ You need more time than normal to finish things.  ¨ You become annoyed (irritable) more than before.  ¨ You are not able to deal with stress as well.  ¨ You have more problems than before.  · Rest. Make sure you:  ¨ Get plenty of sleep at night.  ¨ Go to sleep early.  ¨ Go to bed at the same time every day. Try to wake up at the same time.  ¨ Rest during the day.  ¨ Take naps when you feel tired.  · Limit activities where you have to think a lot or concentrate.  These include:  ¨ Doing homework.  ¨ Doing work related to a job.  ¨ Watching TV.  ¨ Using the computer.  Returning To Your Regular Activities   Return to your normal activities slowly, not all at once. You must give your body and brain enough time to heal.  · Do not play sports or do other athletic activities until your doctor says it is okay.  · Ask your doctor when you can drive, ride a bicycle, or work other vehicles or machines. Never do these things if you feel dizzy.  · Ask your doctor about when you can return to work or school.  Preventing Another Concussion   It is very important to avoid another brain injury, especially before you have healed. In rare cases, another injury can lead to permanent brain damage, brain swelling, or death. The risk of this is greatest during the first 7-10 days after your injury. Avoid injuries by:  · Wearing a seat belt when riding in a car.  · Not drinking too much alcohol.  · Avoiding activities that could lead to a second concussion (such as contact sports).  · Wearing a helmet when doing activities like:  ¨ Biking.  ¨ Skiing.  ¨ Skateboarding.  ¨ Skating.  · Making your home safer by:  ¨ Removing things from the floor or stairways that could make you trip.  ¨ Using grab bars in bathrooms and handrails by stairs.  ¨ Placing non-slip mats on floors and in bathtubs.  ¨ Improve lighting in dark areas.  Contact a doctor if:  · It gets even harder for you to pay attention or concentrate.  · It gets even harder for you to remember things or learn new things.  · You need more time than normal to finish things.  · You become annoyed (irritable) more than before.  · You are not able to deal with stress as well.  · You have more problems than before.  · You have problems keeping your balance.  · You are not able to react quickly when you should.  Get help if you have any of these problems for more than 2 weeks:  · Lasting (chronic) headaches.  · Dizziness or trouble  balancing.  · Feeling sick to your stomach (nausea).  · Seeing (vision) problems.  · Being affected by noises or light more than normal.  · Feeling sad, low, down in the dumps, blue, gloomy, or empty (depressed).  · Mood changes (mood swings).  · Feeling of fear or nervousness about what may happen (anxiety).  · Feeling annoyed.  · Memory problems.  · Problems concentrating or paying attention.  · Sleep problems.  · Feeling tired all the time.  Get help right away if:  · You have bad headaches or your headaches get worse.  · You have weakness (even if it is in one hand, leg, or part of the face).  · You have loss of feeling (numbness).  · You feel off balance.  · You keep throwing up (vomiting).  · You feel tired.  · One black center of your eye (pupil) is larger than the other.  · You twitch or shake violently (convulse).  · Your speech is not clear (slurred).  · You are more confused, easily angered (agitated), or annoyed than before.  · You have more trouble resting than before.  · You are unable to recognize people or places.  · You have neck pain.  · It is difficult to wake you up.  · You have unusual behavior changes.  · You pass out (lose consciousness).  This information is not intended to replace advice given to you by your health care provider. Make sure you discuss any questions you have with your health care provider.  Document Released: 12/06/2010 Document Revised: 05/25/2017 Document Reviewed: 07/10/2014  [employer] Interactive Patient Education © 2017 [employer]
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
## 2018-05-29 ENCOUNTER — PATIENT OUTREACH (OUTPATIENT)
Dept: HEALTH INFORMATION MANAGEMENT | Facility: OTHER | Age: 53
End: 2018-05-29

## 2018-06-01 ENCOUNTER — TELEPHONE (OUTPATIENT)
Dept: INTERNAL MEDICINE | Facility: MEDICAL CENTER | Age: 53
End: 2018-06-01

## 2018-06-01 NOTE — TELEPHONE ENCOUNTER
Patient Seen: 10/18/17 With Dr. Jenkins  Next Appointment: None  Was the patient seen in the last year in this department? Yes     Does patient have an active prescription for medications requested? No     Received Request Via: Patient     Patient is requesting for Fenofibrate

## 2018-06-06 NOTE — TELEPHONE ENCOUNTER
Called [employer] and Fitz answered and I let him know to let Humza who is pt's  that pt is is on Atorvastatin not Fenofibrate and that pt will need to make an appt. for further refills.

## 2018-06-06 NOTE — TELEPHONE ENCOUNTER
Patient is not on fenofibrate anymore. He is currently on Atorvastatin. Also he needs to be see in the office for any other medication refill as he has not been seen since October 2017.

## 2018-07-18 ENCOUNTER — OFFICE VISIT (OUTPATIENT)
Dept: CARDIOLOGY | Facility: MEDICAL CENTER | Age: 53
End: 2018-07-18
Payer: MEDICARE

## 2018-07-18 VITALS
DIASTOLIC BLOOD PRESSURE: 68 MMHG | WEIGHT: 177 LBS | HEART RATE: 82 BPM | OXYGEN SATURATION: 96 % | SYSTOLIC BLOOD PRESSURE: 122 MMHG | BODY MASS INDEX: 24.78 KG/M2 | HEIGHT: 71 IN

## 2018-07-18 DIAGNOSIS — I25.10 CORONARY ARTERY DISEASE INVOLVING NATIVE CORONARY ARTERY OF NATIVE HEART WITHOUT ANGINA PECTORIS: ICD-10-CM

## 2018-07-18 DIAGNOSIS — I25.5 CARDIOMYOPATHY, ISCHEMIC: ICD-10-CM

## 2018-07-18 DIAGNOSIS — I48.0 PAF (PAROXYSMAL ATRIAL FIBRILLATION) (HCC): Chronic | ICD-10-CM

## 2018-07-18 PROCEDURE — 99214 OFFICE O/P EST MOD 30 MIN: CPT | Performed by: NURSE PRACTITIONER

## 2018-07-18 RX ORDER — RANOLAZINE 500 MG/1
1000 TABLET, EXTENDED RELEASE ORAL 2 TIMES DAILY
Qty: 180 TAB | Refills: 3 | Status: SHIPPED | OUTPATIENT
Start: 2018-07-18 | End: 2018-10-29 | Stop reason: SDUPTHER

## 2018-07-18 RX ORDER — LISINOPRIL 5 MG/1
5 TABLET ORAL DAILY
Qty: 90 TAB | Refills: 3 | Status: ON HOLD | OUTPATIENT
Start: 2018-07-18 | End: 2019-03-28

## 2018-07-18 RX ORDER — METOPROLOL SUCCINATE 25 MG/1
25 TABLET, EXTENDED RELEASE ORAL DAILY
Qty: 90 TAB | Refills: 3 | Status: SHIPPED | OUTPATIENT
Start: 2018-07-18 | End: 2018-12-31 | Stop reason: SDUPTHER

## 2018-07-18 RX ORDER — ATORVASTATIN CALCIUM 80 MG/1
80 TABLET, FILM COATED ORAL DAILY
Qty: 90 TAB | Refills: 3 | Status: SHIPPED | OUTPATIENT
Start: 2018-07-18 | End: 2019-06-24 | Stop reason: SDUPTHER

## 2018-07-18 ASSESSMENT — ENCOUNTER SYMPTOMS
CHILLS: 0
HEMOPTYSIS: 0
PALPITATIONS: 0
DIZZINESS: 0
WHEEZING: 0
FEVER: 0
SHORTNESS OF BREATH: 0
COUGH: 0
ORTHOPNEA: 0
NAUSEA: 0
CLAUDICATION: 0
SPUTUM PRODUCTION: 0
PND: 0
HEADACHES: 0
VOMITING: 0

## 2018-07-18 NOTE — PROGRESS NOTES
Chief Complaint   Patient presents with   • Coronary Artery Disease     Subjective:   Ryan Trevizo is a 53 y.o. male who presents today with ischemic cardiomyopathy, EF 50% (recovered to near normal on medical therapy by echocardiogram 0 1/18).  He has complex multivessel coronary artery disease with no good targets for revascularization.  The patient also has paroxysmal atrial fibrillation.  Previously patient was hospitalized May 2017 for GI bleed with ischemic gastritis however tolerate oral anticoagulation with no bleeding complications.      Past Medical History:   Diagnosis Date   • A-fib ([facility])    • ACS (acute coronary syndrome) ([facility]) 7/25/2012   • Anxiety disorder    • Arthritis    • Blood glucose elevated    • CAD (coronary artery disease) 10/14/2013   • Claudication ([facility])    • Diabetes ([facility])    • Dizziness    • GERD (gastroesophageal reflux disease) 7/15/2012   • Glaucoma    • HTN (hypertension) 7/15/2012   • Hyperlipemia    • Hypertriglyceridemia    • Ischemic cardiomyopathy    • Mental retardation    • MI (myocardial infarction) ([facility])    • Osteoarthritis    • Pericarditis secondary to acute myocardial infarction ([facility]) 7/19/2012   • Right knee pain    • SOB (shortness of breath)    • STEMI (ST elevation myocardial infarction) ([facility])    • Upper GI bleed 7/26/2012   • Viral URI with cough 1/5/2018     Past Surgical History:   Procedure Laterality Date   • GASTROSCOPY-ENDO  5/19/2017    Procedure: GASTROSCOPY-ENDO;  Surgeon: Reinaldo Berry M.D.;  Location: [facility];  Service:    • GASTROSCOPY-ENDO  7/26/2012    Performed by JORDIN VERA at ENDOSCOPY [facility]   • GASTROSCOPY-ENDO  7/25/2012    Performed by JORDIN VERA at [facility]   • OTHER ORTHOPEDIC SURGERY  7-     R knee surgery   • OTHER  knee surgery   • OTHER     • OTHER CARDIAC SURGERY      stent placement     History reviewed. No pertinent family history.  Social History     Social History  "  • Marital status: Single     Spouse name: N/A   • Number of children: N/A   • Years of education: N/A     Occupational History   • Not on file.     Social History Main Topics   • Smoking status: Never Smoker   • Smokeless tobacco: Never Used   • Alcohol use No   • Drug use: No   • Sexual activity: Not on file      Comment: Single, has no children, works as an .     Other Topics Concern   • Not on file     Social History Narrative    ** Merged History Encounter **         ** Merged History Encounter **          Allergies   Allergen Reactions   • Ritalin [Methylphenidate] Unspecified     \"Hyper\"     Outpatient Encounter Prescriptions as of 7/18/2018   Medication Sig Dispense Refill   • atorvastatin (LIPITOR) 80 MG tablet Take 1 Tab by mouth every day. 90 Tab 3   • lisinopril (PRINIVIL) 5 MG Tab Take 1 Tab by mouth every day. 90 Tab 3   • metoprolol SR (TOPROL XL) 25 MG TABLET SR 24 HR Take 1 Tab by mouth every day. 90 Tab 3   • ranolazine (RANEXA) 500 MG TABLET SR 12 HR Take 2 Tabs by mouth 2 Times a Day. 180 Tab 3   • rivaroxaban (XARELTO) 20 MG Tab tablet Take 1 Tab by mouth with dinner. 90 Tab 3   • pantoprazole (PROTONIX) 40 MG Tablet Delayed Response TAKE 1 TABLET BY MOUTH ONCE DAILY. 30 Tab 5   • cyanocobalamin (VITAMIN B12) 1000 MCG Tab Take 1 Tab by mouth every day. 90 Tab 0   • metformin (GLUCOPHAGE) 1000 MG tablet TAKE 1 TABLET BY MOUTH TWICE DAILY WITH MEALS. 180 Tab 3   • Cholecalciferol (VITAMIND D3) 1000 UNIT Tab Take 1 Tab by mouth every day. 60 Tab 3   • ASPIRIN LOW DOSE 81 MG EC tablet TAKE 1 TABLET BY MOUTH ONCE DAILY. 30 Tab 11   • loratadine (CLARITIN) 10 MG Tab Take 1 Tab by mouth every day. 30 Tab 11   • [DISCONTINUED] RANEXA 500 MG TABLET SR 12 HR TAKE (2) TABLETS BY MOUTH TWICE DAILY 120 Tab 0   • [DISCONTINUED] XARELTO 20 MG Tab tablet TAKE 1 TABLET BY MOUTH ONCE DAILY WITH DINNER. 90 Tab 3   • [DISCONTINUED] lisinopril (PRINIVIL) 5 MG Tab TAKE 1 TABLET BY MOUTH ONCE DAILY. 90 Tab 3 " "  • [DISCONTINUED] metoprolol SR (TOPROL XL) 25 MG TABLET SR 24 HR TAKE 1 TABLET BY MOUTH ONCE DAILY. 30 Tab 11   • [DISCONTINUED] atorvastatin (LIPITOR) 40 MG Tab Take 2 Tabs by mouth every day. 30 Tab 11     No facility-administered encounter medications on file as of 7/18/2018.      Review of Systems   Constitutional: Negative for chills and fever.   Respiratory: Negative for cough, hemoptysis, sputum production, shortness of breath and wheezing.    Cardiovascular: Negative for chest pain, palpitations, orthopnea, claudication, leg swelling and PND.   Gastrointestinal: Negative for nausea and vomiting.   Neurological: Negative for dizziness and headaches.      Objective:   /68   Pulse 82   Ht 1.803 m (5' 11\")   Wt 80.3 kg (177 lb)   SpO2 96%   BMI 24.69 kg/m²     Physical Exam   Constitutional: He is oriented to person, place, and time. He appears well-developed and well-nourished.   HENT:   Head: Normocephalic and atraumatic.   Eyes: EOM are normal.   Neck: Neck supple.   Cardiovascular: Normal rate, regular rhythm, normal heart sounds and intact distal pulses.    No murmur heard.  Pulmonary/Chest: Effort normal and breath sounds normal.   Abdominal: Soft. Bowel sounds are normal.   Musculoskeletal: He exhibits no edema.   Neurological: He is alert and oriented to person, place, and time.   Skin: Skin is warm and dry.   Psychiatric: He has a normal mood and affect. His behavior is normal. Judgment and thought content normal.   Nursing note and vitals reviewed.    Cardiac catheterization, 4/9/2016:  \"IMPRESSION:  1.  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  2.  A 40% lesion in the very proximal right coronary artery.  3.  A 90% stenosis at the origin of one of the tandem posterior descending artery vessels.  4.  An 80% lesion in the mid portion of the posterior left ventricular artery.  5.  Elongated area of ectasia of the proximal left anterior descending.  6.  A 90% " "stenosis involving the origin of the small caliber first diagonal artery.  7.  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  8.  Diffuse nonobstructive plaquing in the mid and distal left anterior descending.  9.  Complete occlusion of the proximal circumflex, which is known from previous angiogram.  10.  Ventricular dysfunction with hypokinesis of the mid anterior wall and distal inferior wall\"     Exercise stress echocardiogram, 10/7/2014:  \"CONCLUSIONS  Exercise capacity is average.  EKG response to exercise was indeterminate because of baseline EKG   abnormality  Dense inferior infarct with preserved overall function.  Estimated LVEF   50%\"     Myocardial perfusion imaging, 4/5/2016:  \" IMPRESSIONS   Fixed defect in the basal and mid myocardium.   inferoNormal left ventricular size, ejection fraction, and wall motion\"     Echocardiogram, 4/16/2017:  \"CONCLUSIONS  Mildly reduced left ventricular systolic function.  Left ventricular ejection fraction is visually estimated to be 45%.  Inferolateral and inferior hypokinesis.  Grade I diastolic dysfunction.  Moderate asymmetric septal hypertrophy.  Mild aortic insufficiency.  Compared to the report of the study done on 07/25/2012 there has been no significant change\"     Cardiac catheterization, 7/26/2017:  \"Coronary artery disease and coronary aeruysm  This is right dominant system.  Left main is large and without flow limiting disease. It has mild aneurysmal change.  It bifurcated into left anterior descending and left circumflex artery.  Left anterior descending artery (LAD) is large caliber vessel and extends to the apex.  It gives rises to several small to medium sized diagonal branches.  Ectatic/aneruysmal change was noted in the proximal LAD.  Moderate diffuse disease was seen in the mid to distal LAD up to 70% stenosis in the distal portion.  The antegrade flow is normal.  FFR of the distal LAD was 0.78 but above 0.8 for the proximal and mid " "lesions.  Left circumflex artery is large caliber. It gives rise to large first and medium sized second obtuse marginal (OM) branches and small AV groove branch.   There was chronic total occlusion at the origin of the second OM with bridging collateral and LISA II flow.  There is no clear visible stump.  Right coronary is large caliber. It gives rise to several medium sized acute marginal branch, dual posterior descending artery and posterolateral branch.  There was 95% stenosis at the ostium of one of the PDA.  Aneurysmal change was also seen in the proximal and mid RCA.  The antegrade flow is normal.  FFR of the main RCA was 0.86  Recommendations; Maximize medical therapy and risk factor modification\"    Assessment:     1. Coronary artery disease involving native coronary artery of native heart without angina pectoris     2. Ischemic Cardiomyopathy EF 50%     3. PAF (paroxysmal atrial fibrillation) ([facility])       Medical Decision Making:  Today's Assessment / Status / Plan:   Ultimately the patient is doing quite well.  He has had no syncopal episodes since his event recorder was placed.  He denies chest pain, shortness of breath, and palpitations.    I have refilled all medications for the next year.    He is already scheduled to see Dr. GHANSHYAM León October 2018.  "

## 2018-07-23 ENCOUNTER — OFFICE VISIT (OUTPATIENT)
Dept: INTERNAL MEDICINE | Facility: MEDICAL CENTER | Age: 53
End: 2018-07-23
Payer: MEDICARE

## 2018-07-23 VITALS
HEIGHT: 71 IN | SYSTOLIC BLOOD PRESSURE: 106 MMHG | OXYGEN SATURATION: 97 % | WEIGHT: 175 LBS | BODY MASS INDEX: 24.5 KG/M2 | DIASTOLIC BLOOD PRESSURE: 78 MMHG | TEMPERATURE: 97.8 F | HEART RATE: 79 BPM

## 2018-07-23 DIAGNOSIS — E11.9 TYPE 2 DIABETES MELLITUS WITHOUT COMPLICATION, WITHOUT LONG-TERM CURRENT USE OF INSULIN (HCC): ICD-10-CM

## 2018-07-23 DIAGNOSIS — E55.9 VITAMIN D DEFICIENCY: ICD-10-CM

## 2018-07-23 PROBLEM — J06.9 VIRAL URI WITH COUGH: Status: RESOLVED | Noted: 2018-01-05 | Resolved: 2018-07-23

## 2018-07-23 PROCEDURE — 99214 OFFICE O/P EST MOD 30 MIN: CPT | Mod: GC | Performed by: INTERNAL MEDICINE

## 2018-07-23 NOTE — PROGRESS NOTES
Established Patient    Ryan presents today with the following:    CC: Medication management    HPI: 53 year old male with a Ischemic cardiomyopathy EF 50%, paroxysmal afib, DMII, HTN, GERD, barretts esophagus and vitamin D def presents for a 6 months follow up for medication mgmt.    Chest pain: Patient's ranolazine was increased from 500mg BID to 1000mg BID in 10/2017 and since that time the patient has done well with only 1 visit ti the ED for CP. He was seen by cardiology on 7/2018 with no medication changes and the patient is doing well with no cardiac complaints including CP, SOB, dyspnea on exertion, LOC, palpitations, LE at todays visit.     Type 2 diabetes mellitus: HbA1c was 7.6% on 9/1/2017. He does walk often as he uses public transportation and is overall fairly active. His new jpb that began around 8 months ago keeps him more active as he is moving more. He has lost around 15 lbs in the past 6 months and was encouraged to continue this trend.     Gastroesophageal reflux disease: Well controlled with no issues. States that he is compliant with his protonix.     Health maintenance: Patient has a colonoscopy scheduled for 12/6/2017 with Dr. Berry and is currently up to date.    Patient Active Problem List    Diagnosis Date Noted   • Syncope 01/05/2018     Priority: High   • Coronary artery disease involving native coronary artery of native heart without angina pectoris 10/14/2013     Priority: Medium   • Vitamin B12 deficiency 01/05/2018     Priority: Low   • Vitamin D deficiency 01/05/2018     Priority: Low   • PAF (paroxysmal atrial fibrillation) ([facility]) 05/18/2017     Priority: Low   • Type 2 diabetes mellitus without complication, without long-term current use of insulin ([facility]) 02/16/2017     Priority: Low   • Glaucoma      Priority: Low   • Dyslipidemia 07/16/2012     Priority: Low   • Essential hypertension 07/15/2012     Priority: Low   • GERD with Nieves's esophagus 07/15/2012     Priority: Low  "  • Nieves esophagus 05/19/2017   • Tachycardia 04/17/2017   • Claudication (HCC)    • Ischemic Cardiomyopathy EF 50% 07/17/2012       Current Outpatient Prescriptions   Medication Sig Dispense Refill   • cyanocobalamin (VITAMIN B12) 1000 MCG Tab Take 1 Tab by mouth every day. 90 Tab 3   • metformin (GLUCOPHAGE) 1000 MG tablet Take 1 Tab by mouth 2 times a day, with meals. 180 Tab 3   • atorvastatin (LIPITOR) 80 MG tablet Take 1 Tab by mouth every day. 90 Tab 3   • lisinopril (PRINIVIL) 5 MG Tab Take 1 Tab by mouth every day. 90 Tab 3   • metoprolol SR (TOPROL XL) 25 MG TABLET SR 24 HR Take 1 Tab by mouth every day. 90 Tab 3   • rivaroxaban (XARELTO) 20 MG Tab tablet Take 1 Tab by mouth with dinner. 90 Tab 3   • pantoprazole (PROTONIX) 40 MG Tablet Delayed Response TAKE 1 TABLET BY MOUTH ONCE DAILY. 30 Tab 5   • Cholecalciferol (VITAMIND D3) 1000 UNIT Tab Take 1 Tab by mouth every day. 60 Tab 3   • ASPIRIN LOW DOSE 81 MG EC tablet TAKE 1 TABLET BY MOUTH ONCE DAILY. 30 Tab 11   • loratadine (CLARITIN) 10 MG Tab Take 1 Tab by mouth every day. 30 Tab 11   • ranolazine (RANEXA) 500 MG TABLET SR 12 HR Take 2 Tabs by mouth 2 Times a Day. 180 Tab 3     No current facility-administered medications for this visit.        ROS: As per HPI. Additional pertinent symptoms as noted below.    /78   Pulse 79   Temp 36.6 °C (97.8 °F)   Ht 1.803 m (5' 11\")   Wt 79.4 kg (175 lb)   SpO2 97%   BMI 24.41 kg/m²     Physical Exam   General:  Alert and oriented, No apparent distress. Appears, tan, appears to be losing weight.  Eyes: Pupils equal and reactive. No scleral icterus. EOMI, no injected conjunctiva.  Throat: Clear no erythema or exudates noted.  Neck: Supple. No lymphadenopathy noted. Thyroid not enlarged.  Lungs: Clear to auscultation and percussion bilaterally. No wheezing, crackles, rhales.  Cardiovascular: Regular rate and rhythm. No murmurs, rubs or gallops.  Abdomen:  Benign. No rebound or guarding noted. " Non-distended, non-tender.  Extremities: No clubbing, cyanosis, edema. Surgical scar on right knee.  Skin: Clear. No rash or suspicious skin lesions noted.      Assessment and Plan    1. Coronary artery disease  - Continue metoprolol, rivaroxaban, atorvastatin  - Previously on isosorbide mononitrate, now on ranolazine (increased from 500mg BID to 1000mg BID on 10/17) per cardio rec's  - Cardiac cath on 7/26/2017 showed inferior wall hypokinesis with moderately reduced left ventricular systolic function, ejection fraction 35%, coronary artery disease (aneurysmal change proximal left anterior descending artery (LAD) with diffuse moderate disease up to 70% distal LAD. 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 Recently saw, no changes. Scheduled with Dr. León in 10/2018.                2. Ischemic Cardiomyopathy EF 50%  - 2/2 CAD  - Continue metoprolol, lisinopril, atorvastatin, ranolazine  - Management per cardiology  - Will see Dr. León on 10/2018     3. Paroxysmal atrial fibrillation with RVR  - Admitted to [facility] in 5/2017 with afib  - Converted back into NSR on IV cardizem drip  - Currently in NSR. Denies CP, SOB, weakness, lightheadedness at today's visit  - Continue metoprolol ER 25mg daily  - CHADS vasc 2. Continue rivaroxaban. Followed by anticoagulation clinic.  - Appt with Dr. León on 10/2018.     4. Type 2 diabetes mellitus  - HbA1c 7.6% on 1/2018  - Continue metformin 1000mg BID  - Urine microalbumin 69.1. Already on lisinopril for renal protection.  - Continue to lose weight,  diet, exercise  - May need an eye exam, discuss next visit     5. Hyperlipidemia  - Continue atorvastatin 80mg daily  - Lipid panel 4/2018 under good control     6. Essential hypertension  - /78 at today's visit  - Continue metoprolol, lisinopril  - Stable     7. Gastroesophageal reflux disease  - Continue protonix daily  - EGD performed on 5/19/17 with salmon colored distal esophagus mucosa indicative of duarte's and a 10cm focal area of gastric fundus with erythema, edema, friability and slight ulceration was seen. May be indicative of ischemic gastritis associated with hypotension when he was in atrial fibrillation.  - CTA abdomen 5/19 did not indicate any vascular abnormality that would cause ischemic gastritis  - GI following  Reason for use? Barretts  How long has patient been on it? Since 5/2017  Last time patient was off? Never Did symptoms resolve? NA  Alternative prescriptions attempted? None  H. Pylori checked? S/p biopsy 5/19, no H. Pylori seen Seen by GI? Yes EGD in past? Yes, on 5/19/2017     8. Duarte's esophagus  - EGD performed on 5/19/17 with salmon colored distal esophagus mucosa indicative of duarte's and a 10cm focal area of gastric fundus with erythema, edema, friability and slight ulceration was seen.  - Pathology of esophageal biopsies 5/19/17 with intestinal metaplasia  - CT abd 12/11/16 shows changes in the distal esophagus likely related to inflammatory esophagitis  - Continue protonix     9. Vitamin D deficiency  - Continue Vitamin D replacement  - Vit D 3/28/17 was 29.9     10. Health maintenance  - Colonoscopy on 12/2017        PLAN: Follow up in 6 months, check HbA1c, vitamin D      Signed by: Juancho Jenkins M.D.

## 2018-08-05 ENCOUNTER — HOSPITAL ENCOUNTER (EMERGENCY)
Facility: MEDICAL CENTER | Age: 53
End: 2018-08-05
Attending: EMERGENCY MEDICINE
Payer: MEDICARE

## 2018-08-05 ENCOUNTER — APPOINTMENT (OUTPATIENT)
Dept: RADIOLOGY | Facility: MEDICAL CENTER | Age: 53
End: 2018-08-05
Attending: EMERGENCY MEDICINE
Payer: MEDICARE

## 2018-08-05 VITALS
HEART RATE: 77 BPM | OXYGEN SATURATION: 95 % | RESPIRATION RATE: 16 BRPM | WEIGHT: 175 LBS | TEMPERATURE: 98.4 F | HEIGHT: 71 IN | DIASTOLIC BLOOD PRESSURE: 88 MMHG | BODY MASS INDEX: 24.5 KG/M2 | SYSTOLIC BLOOD PRESSURE: 124 MMHG

## 2018-08-05 DIAGNOSIS — S80.02XA CONTUSION OF LEFT KNEE, INITIAL ENCOUNTER: ICD-10-CM

## 2018-08-05 DIAGNOSIS — M25.562 ACUTE PAIN OF LEFT KNEE: ICD-10-CM

## 2018-08-05 PROCEDURE — 99284 EMERGENCY DEPT VISIT MOD MDM: CPT

## 2018-08-05 PROCEDURE — 73564 X-RAY EXAM KNEE 4 OR MORE: CPT | Mod: LT

## 2018-08-05 ASSESSMENT — LIFESTYLE VARIABLES: DO YOU DRINK ALCOHOL: NO
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
## 2018-08-06 ENCOUNTER — PATIENT OUTREACH (OUTPATIENT)
Dept: HEALTH INFORMATION MANAGEMENT | Facility: OTHER | Age: 53
End: 2018-08-06

## 2018-08-06 NOTE — DISCHARGE INSTRUCTIONS
Knee Pain  Knee pain is a very common symptom and can have many causes. Knee pain often goes away when you follow your health care provider's instructions for relieving pain and discomfort at home. However, knee pain can develop into a condition that needs treatment. Some conditions may include:  · Arthritis caused by wear and tear (osteoarthritis).  · Arthritis caused by swelling and irritation (rheumatoid arthritis or gout).  · A cyst or growth in your knee.  · An infection in your knee joint.  · An injury that will not heal.  · Damage, swelling, or irritation of the tissues that support your knee (torn ligaments or tendinitis).  If your knee pain continues, additional tests may be ordered to diagnose your condition. Tests may include X-rays or other imaging studies of your knee. You may also need to have fluid removed from your knee. Treatment for ongoing knee pain depends on the cause, but treatment may include:  · Medicines to relieve pain or swelling.  · Steroid injections in your knee.  · Physical therapy.  · Surgery.  HOME CARE INSTRUCTIONS  · Take medicines only as directed by your health care provider.  · Rest your knee and keep it raised (elevated) while you are resting.  · Do not do things that cause or worsen pain.  · Avoid high-impact activities or exercises, such as running, jumping rope, or doing jumping jacks.  · Apply ice to the knee area:  ¨ Put ice in a plastic bag.  ¨ Place a towel between your skin and the bag.  ¨ Leave the ice on for 20 minutes, 2-3 times a day.  · Ask your health care provider if you should wear an elastic knee support.  · Keep a pillow under your knee when you sleep.  · Lose weight if you are overweight. Extra weight can put pressure on your knee.  · Do not use any tobacco products, including cigarettes, chewing tobacco, or electronic cigarettes. If you need help quitting, ask your health care provider. Smoking may slow the healing of any bone and joint problems that you may  have.  SEEK MEDICAL CARE IF:  · Your knee pain continues, changes, or gets worse.  · You have a fever along with knee pain.  · Your knee linda or locks up.  · Your knee becomes more swollen.  SEEK IMMEDIATE MEDICAL CARE IF:   · Your knee joint feels hot to the touch.  · You have chest pain or trouble breathing.  This information is not intended to replace advice given to you by your health care provider. Make sure you discuss any questions you have with your health care provider.  Document Released: 10/14/2008 Document Revised: 01/08/2016 Document Reviewed: 08/03/2015  Elsevier Interactive Patient Education © 2017 [employer]
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
## 2018-08-06 NOTE — PROGRESS NOTES
08/06/2018 1119 - Discharge Outreach attempt - phone number is to [employer]. No message left. Letter sent.

## 2018-08-09 DIAGNOSIS — I48.91 ATRIAL FIBRILLATION, UNSPECIFIED TYPE (HCC): ICD-10-CM

## 2018-08-13 ENCOUNTER — PATIENT OUTREACH (OUTPATIENT)
Dept: HEALTH INFORMATION MANAGEMENT | Facility: OTHER | Age: 53
End: 2018-08-13

## 2018-08-18 ENCOUNTER — HOSPITAL ENCOUNTER (EMERGENCY)
Facility: MEDICAL CENTER | Age: 53
End: 2018-08-18
Attending: EMERGENCY MEDICINE
Payer: MEDICARE

## 2018-08-18 VITALS
OXYGEN SATURATION: 95 % | SYSTOLIC BLOOD PRESSURE: 128 MMHG | RESPIRATION RATE: 18 BRPM | HEIGHT: 71 IN | DIASTOLIC BLOOD PRESSURE: 87 MMHG | HEART RATE: 64 BPM | WEIGHT: 178.79 LBS | BODY MASS INDEX: 25.03 KG/M2 | TEMPERATURE: 98 F

## 2018-08-18 DIAGNOSIS — M25.562 ACUTE PAIN OF LEFT KNEE: ICD-10-CM

## 2018-08-18 PROCEDURE — 99284 EMERGENCY DEPT VISIT MOD MDM: CPT

## 2018-08-18 ASSESSMENT — PAIN SCALES - GENERAL: PAINLEVEL_OUTOF10: 10
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
## 2018-08-19 ENCOUNTER — PATIENT OUTREACH (OUTPATIENT)
Dept: HEALTH INFORMATION MANAGEMENT | Facility: OTHER | Age: 53
End: 2018-08-19

## 2018-08-19 NOTE — DISCHARGE INSTRUCTIONS
Joint Pain  Joint pain, which is also called arthralgia, can be caused by many things. Joint pain often goes away when you follow your health care provider's instructions for relieving pain at home. However, joint pain can also be caused by conditions that require further treatment. Common causes of joint pain include:  · Bruising in the area of the joint.  · Overuse of the joint.  · Wear and tear on the joints that occur with aging (osteoarthritis).  · Various other forms of arthritis.  · A buildup of a crystal form of uric acid in the joint (gout).  · Infections of the joint (septic arthritis) or of the bone (osteomyelitis).  Your health care provider may recommend medicine to help with the pain. If your joint pain continues, additional tests may be needed to diagnose your condition.  Follow these instructions at home:  Watch your condition for any changes. Follow these instructions as directed to lessen the pain that you are feeling.  · Take medicines only as directed by your health care provider.  · Rest the affected area for as long as your health care provider says that you should. If directed to do so, raise the painful joint above the level of your heart while you are sitting or lying down.  · Do not do things that cause or worsen pain.  · If directed, apply ice to the painful area:  ¨ Put ice in a plastic bag.  ¨ Place a towel between your skin and the bag.  ¨ Leave the ice on for 20 minutes, 2-3 times per day.  · Wear an elastic bandage, splint, or sling as directed by your health care provider. Loosen the elastic bandage or splint if your fingers or toes become numb and tingle, or if they turn cold and blue.  · Begin exercising or stretching the affected area as directed by your health care provider. Ask your health care provider what types of exercise are safe for you.  · Keep all follow-up visits as directed by your health care provider. This is important.  Contact a health care provider if:  · Your  pain increases, and medicine does not help.  · Your joint pain does not improve within 3 days.  · You have increased bruising or swelling.  · You have a fever.  · You lose 10 lb (4.5 kg) or more without trying.  Get help right away if:  · You are not able to move the joint.  · Your fingers or toes become numb or they turn cold and blue.    Your blood pressure is high today.  This requires followup by a primary care doctor.  Please keep a log of your blood pressures if possible to take to your doctor appointment.  Try to increase the amount of exercise you do in your day to day living, and eliminate sodas and other sweetened beverages from your diet.    This information is not intended to replace advice given to you by your health care provider. Make sure you discuss any questions you have with your health care provider.  Document Released: 12/18/2006 Document Revised: 05/19/2017 Document Reviewed: 09/29/2015  [employer] Interactive Patient Education © 2017 [employer]
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
## 2018-08-19 NOTE — ED NOTES
Pt verbalizes understanding of discharge and follow-up instructions. Given Rx X0.  VSS.  All questions answered.  Ambulates to discharge with steady gait.

## 2018-08-19 NOTE — PROGRESS NOTES
Placed discharge outreach phone call to patient s/p ER discharge 8/18/18.  Phone number is to [employer], no message left.  Discharge outreach letter mailed to pt.

## 2018-08-19 NOTE — LETTER
Ryan Trevizo  [address]  [place] [address]    August 19, 2018      Dear Ryan Trevizo,    [facility] wants to ensure your discharge home is safe and you or your loved ones have had all of your questions answered regarding your care after you leave the hospital.    Our discharge team was unsuccessful in our attempts to contact you telephonically and we wanted to be sure that you had a list of resources and contact information should you have any questions regarding your hospital stay, follow-up instructions, or active medical symptoms.    Questions or Concerns Regarding… Contact   Medical Questions Related to Your Discharge  (7 days a week, 8am-5pm) Contact a Nurse Care Coordinator   [phone]   Medical Questions Not Related to Your Discharge  (24 hours a day / 7 days a week)  Contact the Nurse Health Line   [phone]    Medications or Discharge Instructions Refer to your discharge packet   or contact your -[phone]   Follow-up Appointment(s) Schedule your appointment via Ranku   or contact Scheduling [phone]   Billing Review your statement via Ranku  or contact Billing [phone]   Medical Records Review your records via Ranku   or contact Medical Records [phone]     You can also easily access your medical information, test results and upcoming appointments via the Ranku free online health management tool. You can learn more and sign up at [email or web address]. For assistance setting up your Ranku account, please call [phone].    Once again, we want to ensure your discharge home is safe and that you have a clear understanding of any next steps in your care. If you have any questions or concerns, please do not hesitate to contact us, we are here for you. Thank you for choosing [facility] for your healthcare needs.    Sincerely,      Your [facility] Healthcare Team

## 2018-09-05 NOTE — PROGRESS NOTES
SPOKE TO CASE MNENMA HE SCHEDULED HIS APPOINTMENT. I did address his care gaps and let him know pt is due for retinal screening and AWV. I did not go over the patients chart because the person I spoke with was having a hard time knowing pt's informations. I did tell him on his next appointment visit for the patient to update his new address because he is not allowed to change the pt's information without his consent.

## 2018-09-05 NOTE — PROGRESS NOTES
Outcome: Left Message with Disability Resources     Please transfer to Patient Outreach Team at [phone] when patient returns call.    WebIZ Checked & Epic Updated:  yes    HealthConnect Verified: no    Attempt # 1

## 2018-10-09 ENCOUNTER — HOSPITAL ENCOUNTER (OUTPATIENT)
Dept: LAB | Facility: MEDICAL CENTER | Age: 53
End: 2018-10-09
Attending: INTERNAL MEDICINE
Payer: MEDICARE

## 2018-10-09 LAB
EST. AVERAGE GLUCOSE BLD GHB EST-MCNC: 137 MG/DL
HBA1C MFR BLD: 6.4 % (ref 0–5.6)

## 2018-10-09 PROCEDURE — 83036 HEMOGLOBIN GLYCOSYLATED A1C: CPT

## 2018-10-09 PROCEDURE — 82306 VITAMIN D 25 HYDROXY: CPT

## 2018-10-09 PROCEDURE — 36415 COLL VENOUS BLD VENIPUNCTURE: CPT

## 2018-10-10 LAB — 25(OH)D3 SERPL-MCNC: 35 NG/ML (ref 30–100)

## 2018-10-23 ENCOUNTER — OFFICE VISIT (OUTPATIENT)
Dept: CARDIOLOGY | Facility: MEDICAL CENTER | Age: 53
End: 2018-10-23
Payer: MEDICARE

## 2018-10-23 VITALS
HEIGHT: 71 IN | OXYGEN SATURATION: 97 % | SYSTOLIC BLOOD PRESSURE: 128 MMHG | DIASTOLIC BLOOD PRESSURE: 82 MMHG | HEART RATE: 80 BPM | WEIGHT: 169.75 LBS | BODY MASS INDEX: 23.77 KG/M2

## 2018-10-23 DIAGNOSIS — I25.5 CARDIOMYOPATHY, ISCHEMIC: ICD-10-CM

## 2018-10-23 DIAGNOSIS — I25.10 CORONARY ARTERY DISEASE INVOLVING NATIVE CORONARY ARTERY OF NATIVE HEART WITHOUT ANGINA PECTORIS: Primary | ICD-10-CM

## 2018-10-23 DIAGNOSIS — I25.10 3-VESSEL CORONARY ARTERY DISEASE: ICD-10-CM

## 2018-10-23 DIAGNOSIS — I48.0 PAF (PAROXYSMAL ATRIAL FIBRILLATION) (HCC): Chronic | ICD-10-CM

## 2018-10-23 DIAGNOSIS — Z79.01 CHRONIC ANTICOAGULATION: ICD-10-CM

## 2018-10-23 DIAGNOSIS — E78.5 DYSLIPIDEMIA: ICD-10-CM

## 2018-10-23 DIAGNOSIS — I10 ESSENTIAL HYPERTENSION: ICD-10-CM

## 2018-10-23 PROCEDURE — 99214 OFFICE O/P EST MOD 30 MIN: CPT | Performed by: INTERNAL MEDICINE

## 2018-10-23 ASSESSMENT — ENCOUNTER SYMPTOMS: CARDIOVASCULAR NEGATIVE: 1

## 2018-10-23 NOTE — PROGRESS NOTES
Subjective:   Ryan Trevizo is a 52 -year-old man with a history of ischemic congestive heart failure with an EF of 50% (recovered to near normal on medical therapy by echocardiogram 1/2018). He has complex multivessel coronary artery disease with no good targets for revascularization, paroxysmal atrial fibrillation, and was hospitalized with ischemic gastritis and a GI bleed in May, 2017 though able to tolerate an oral anticoagulant with no bleeding complications presently.    He continues to do very well from a cardiovascular perspective despite his severe multivessel coronary artery disease not amenable to revascularization and is atrial fibrillation on oral anticoagulation despite previous GI bleeds.    He has no cardiovascular complaints and denies any angina since last time that we saw him of significance.    He denies any bleeding complications of his Xarelto (rivaroxaban), or other side effects of his medications.    He comes in with a close friend with whom he sounds to be planning to spend Thanksgiving with.    He does continue to walk and ride his bike to work and also works a fairly physical job.  He has not had a specific exercise routine on top of that.    Past Medical History:   Diagnosis Date   • A-fib ([facility])    • ACS (acute coronary syndrome) ([facility]) 7/25/2012   • Anxiety disorder    • Arthritis    • Blood glucose elevated    • CAD (coronary artery disease) 10/14/2013   • Claudication ([facility])    • Diabetes ([facility])    • Dizziness    • GERD (gastroesophageal reflux disease) 7/15/2012   • Glaucoma    • HTN (hypertension) 7/15/2012   • Hyperlipemia    • Hypertriglyceridemia    • Ischemic cardiomyopathy    • Mental retardation    • MI (myocardial infarction) ([facility])    • Osteoarthritis    • Pericarditis secondary to acute myocardial infarction ([facility]) 7/19/2012   • Right knee pain    • SOB (shortness of breath)    • STEMI (ST elevation myocardial infarction) ([facility])    • Upper GI bleed 7/26/2012   • Viral URI  "with cough 1/5/2018     Past Surgical History:   Procedure Laterality Date   • GASTROSCOPY-ENDO  5/19/2017    Procedure: GASTROSCOPY-ENDO;  Surgeon: Reinaldo Berry M.D.;  Location: ENDOSCOPY [facility];  Service:    • GASTROSCOPY-ENDO  7/26/2012    Performed by JORDIN VERA at ENDOSCOPY [facility]   • GASTROSCOPY-ENDO  7/25/2012    Performed by JORDIN VERA at ENDOSCOPY [facility]   • OTHER ORTHOPEDIC SURGERY  7-     R knee surgery   • OTHER  knee surgery   • OTHER     • OTHER CARDIAC SURGERY      stent placement     History reviewed. No pertinent family history.  History   Smoking Status   • Never Smoker   Smokeless Tobacco   • Never Used     Allergies   Allergen Reactions   • Ritalin [Methylphenidate] Unspecified     \"Hyper\"     Outpatient Encounter Prescriptions as of 10/23/2018   Medication Sig Dispense Refill   • loratadine (CLARITIN) 10 MG Tab TAKE 1 TABLET BY MOUTH ONCE DAILY. 30 Tab 0   • vitamin D3, cholecalciferol, 1000 UNIT Tab TAKE 1 TABLET BY MOUTH ONCE DAILY. 90 Tab 0   • cyanocobalamin (VITAMIN B12) 1000 MCG Tab Take 1 Tab by mouth every day. 90 Tab 3   • metformin (GLUCOPHAGE) 1000 MG tablet Take 1 Tab by mouth 2 times a day, with meals. 180 Tab 3   • atorvastatin (LIPITOR) 80 MG tablet Take 1 Tab by mouth every day. 90 Tab 3   • lisinopril (PRINIVIL) 5 MG Tab Take 1 Tab by mouth every day. 90 Tab 3   • metoprolol SR (TOPROL XL) 25 MG TABLET SR 24 HR Take 1 Tab by mouth every day. 90 Tab 3   • ranolazine (RANEXA) 500 MG TABLET SR 12 HR Take 2 Tabs by mouth 2 Times a Day. 180 Tab 3   • rivaroxaban (XARELTO) 20 MG Tab tablet Take 1 Tab by mouth with dinner. 90 Tab 3   • pantoprazole (PROTONIX) 40 MG Tablet Delayed Response TAKE 1 TABLET BY MOUTH ONCE DAILY. 30 Tab 5   • ASPIRIN LOW DOSE 81 MG EC tablet TAKE 1 TABLET BY MOUTH ONCE DAILY. 30 Tab 11     No facility-administered encounter medications on file as of 10/23/2018.      Review of Systems   Cardiovascular: " "Negative.    All other systems reviewed and are negative.       Objective:   /82 (BP Location: Right arm, Patient Position: Sitting, BP Cuff Size: Adult)   Pulse 80   Ht 1.803 m (5' 11\")   Wt 77 kg (169 lb 12.1 oz)   SpO2 97%   BMI 23.68 kg/m²     Physical Exam   Constitutional: He is oriented to person, place, and time. He appears well-developed and well-nourished. No distress.   Pleasant, fairly soft spoken, mildly disheveled, middle-aged man accompanied by a friend in no distress.  Physical examination is unchanged except as specified from a previous on 4/17/2018.   HENT:   Head: Normocephalic and atraumatic.   Eyes: Pupils are equal, round, and reactive to light. Conjunctivae and EOM are normal. No scleral icterus.   Neck: Neck supple. No JVD present. No tracheal deviation present.   Cardiovascular: Normal rate, regular rhythm, normal heart sounds and intact distal pulses.  Exam reveals no gallop and no friction rub.    No murmur heard.  Pulses:       Dorsalis pedis pulses are 2+ on the right side, and 2+ on the left side.   No carotid bruits   Pulmonary/Chest: Effort normal and breath sounds normal. No stridor. No respiratory distress. He has no wheezes. He has no rales.   Abdominal: Soft. Bowel sounds are normal. He exhibits no distension.   Musculoskeletal: He exhibits no edema (no lower extremity edema bilaterally).   Neurological: He is alert and oriented to person, place, and time.   Skin: Skin is warm and dry. No rash noted. He is not diaphoretic. No erythema. No pallor.   Psychiatric: He has a normal mood and affect. Judgment and thought content normal.   Vitals reviewed.    Lab Results   Component Value Date/Time    WBC 7.1 01/18/2018 02:04 PM    RBC 4.50 (L) 01/18/2018 02:04 PM    HEMOGLOBIN 13.5 (L) 01/18/2018 02:04 PM    HEMATOCRIT 39.4 (L) 01/18/2018 02:04 PM    MCV 87.6 01/18/2018 02:04 PM    MCH 30.0 01/18/2018 02:04 PM    MCHC 34.3 01/18/2018 02:04 PM    MPV 9.8 01/18/2018 02:04 PM    " "    Lab Results   Component Value Date/Time    SODIUM 135 04/10/2018 09:29 AM    POTASSIUM 4.8 04/10/2018 09:29 AM    CHLORIDE 105 04/10/2018 09:29 AM    CO2 25 04/10/2018 09:29 AM    GLUCOSE 174 (H) 04/10/2018 09:29 AM    BUN 21 04/10/2018 09:29 AM    CREATININE 0.85 04/10/2018 09:29 AM    CREATININE 1.0 07/12/2007 05:40 PM    BUNCREATRAT 18 06/13/2014 08:40 AM        Lab Results   Component Value Date/Time    ASTSGOT 17 04/10/2018 09:29 AM    ALTSGPT 29 04/10/2018 09:29 AM        Lab Results   Component Value Date/Time    CHOLSTRLTOT 123 04/10/2018 09:29 AM    LDL 54 04/10/2018 09:29 AM    HDL 44 04/10/2018 09:29 AM    TRIGLYCERIDE 127 04/10/2018 09:29 AM       Cardiac catheterization, 4/9/2016:  \"IMPRESSION:  1.  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  2.  A 40% lesion in the very proximal right coronary artery.  3.  A 90% stenosis at the origin of one of the tandem posterior descending artery vessels.  4.  An 80% lesion in the mid portion of the posterior left ventricular artery.  5.  Elongated area of ectasia of the proximal left anterior descending.  6.  A 90% stenosis involving the origin of the small caliber first diagonal artery.  7.  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  8.  Diffuse nonobstructive plaquing in the mid and distal left anterior descending.  9.  Complete occlusion of the proximal circumflex, which is known from previous angiogram.  10.  Ventricular dysfunction with hypokinesis of the mid anterior wall and distal inferior wall\"    Exercise stress echocardiogram, 10/7/2014:  \"CONCLUSIONS  Exercise capacity is average.  EKG response to exercise was indeterminate because of baseline EKG   abnormality  Dense inferior infarct with preserved overall function.  Estimated LVEF   50%\"    Myocardial perfusion imaging, 4/5/2016:  \" IMPRESSIONS   Fixed defect in the basal and mid myocardium.   inferoNormal left ventricular size, ejection fraction, " "and wall motion\"    Echocardiogram, 4/16/2017:  \"CONCLUSIONS  Mildly reduced left ventricular systolic function.  Left ventricular ejection fraction is visually estimated to be 45%.  Inferolateral and inferior hypokinesis.  Grade I diastolic dysfunction.  Moderate asymmetric septal hypertrophy.  Mild aortic insufficiency.  Compared to the report of the study done on 07/25/2012 there has been   no significant change\"    Cardiac catheterization, 7/26/2017:  \"Coronary artery disease and coronary aeruysm     This is right dominant system.     Left main is large and without flow limiting disease. It has mild aneurysmal change.  It bifurcated into left anterior descending and left circumflex artery.     Left anterior descending artery (LAD) is large caliber vessel and extends to the apex.  It gives rises to several small to medium sized diagonal branches.  Ectatic/aneruysmal change was noted in the proximal LAD.  Moderate diffuse disease was seen in the mid to distal LAD up to 70% stenosis in the distal portion.  The antegrade flow is normal.     FFR of the distal LAD was 0.78 but above 0.8 for the proximal and mid lesions.     Left circumflex artery is large caliber. It gives rise to large first and medium sized second obtuse marginal (OM) branches and small AV groove branch.   There was chronic total occlusion at the origin of the second OM with bridging collateral and LISA II flow.  There is no clear visible stump.     Right coronary is large caliber. It gives rise to several medium sized acute marginal branch, dual posterior descending artery and posterolateral branch.  There was 95% stenosis at the ostium of one of the PDA.  Aneurysmal change was also seen in the proximal and mid RCA.  The antegrade flow is normal.     FFR of the main RCA was 0.86     Recommendations; Maximize medical therapy and risk factor modification\"    Assessment:     1. Coronary artery disease involving native coronary artery of native heart " without angina pectoris  CBC WITH DIFFERENTIAL    BASIC METABOLIC PANEL    LIPID PANEL   2. Ischemic Cardiomyopathy EF 50%     3. Dyslipidemia     4. PAF (paroxysmal atrial fibrillation) (HCC)     5. Essential hypertension     6. Chronic anticoagulation     7. 3-vessel coronary artery disease         Medical Decision Making:  Today's Assessment / Status / Plan:     He is doing well today from a cardiovascular perspective with no complaints of angina and euvolemic on physical examination.  His blood pressure is well controlled today in our office, and his cluster previously has been well controlled.  I have not seen recent labs and I did ask him to get a CBC, chemistry panel, and lipids in January.    I discussed with him the critical nature of strict diet and high level of physical activity in conjunction with his severe multivessel coronary artery disease.  He voiced understanding, and I gave him some reading resources.  Otherwise, I have not changed his cardiovascular regimen at this time.    Joseph León MD  Cardiologist, [facility] [place]     Return in about 6 months (around 4/23/2019).    Physical Exam   Constitutional: He is oriented to person, place, and time. He appears well-developed and well-nourished. No distress.   Pleasant, fairly soft spoken, mildly disheveled, middle-aged man accompanied by a friend in no distress.  Physical examination is unchanged except as specified from a previous on 4/17/2018.   HENT:   Head: Normocephalic and atraumatic.   Eyes: Pupils are equal, round, and reactive to light. Conjunctivae and EOM are normal. No scleral icterus.   Neck: Neck supple. No JVD present. No tracheal deviation present.   Cardiovascular: Normal rate, regular rhythm, normal heart sounds and intact distal pulses.  Exam reveals no gallop and no friction rub.    No murmur heard.  Pulses:       Dorsalis pedis pulses are 2+ on the right side, and 2+ on the left side.   No carotid bruits    Pulmonary/Chest: Effort normal and breath sounds normal. No stridor. No respiratory distress. He has no wheezes. He has no rales.   Abdominal: Soft. Bowel sounds are normal. He exhibits no distension.   Musculoskeletal: He exhibits no edema (no lower extremity edema bilaterally).   Neurological: He is alert and oriented to person, place, and time.   Skin: Skin is warm and dry. No rash noted. He is not diaphoretic. No erythema. No pallor.   Psychiatric: He has a normal mood and affect. Judgment and thought content normal.   Vitals reviewed.

## 2018-10-23 NOTE — PATIENT INSTRUCTIONS
"For more information about diet and heart disease, I would recommend that you read    \"The Spectrum\" by Joey Snow MD          We can discuss your reaction to his advice at our next follow-up.  I will preface to say that I do disagree with his stance on Olive Oil, which is part of a Mediterranean diet.  A Mediterranean diet has been associated with less heart attacks by studies (Primary Prevention of Cardiovascular Disease with a Mediterranean Diet, [place] Journal of Medicine 2013; 368:1648-3338).    For more information on the therapeutic benefits of exercise, consider watching the [employer] video, called \"23 and 1/2 hours: What is the single best thing we can do for our health?\":    https://www.Scroll.inube.com/watch?v=lIeEeO5FYSf          Joseph León MD  Cardiologist, [facility] [place]   "

## 2018-10-24 ENCOUNTER — ANTICOAGULATION MONITORING (OUTPATIENT)
Dept: VASCULAR LAB | Facility: MEDICAL CENTER | Age: 53
End: 2018-10-24

## 2018-10-24 DIAGNOSIS — I48.0 PAF (PAROXYSMAL ATRIAL FIBRILLATION) (HCC): ICD-10-CM

## 2018-10-24 NOTE — PROGRESS NOTES
Updating Anticoagulation Calendar, pt to do repeat labs in Jan per cardiology.     Claudia Thompson, PharmD

## 2018-10-29 DIAGNOSIS — I10 ESSENTIAL HYPERTENSION: Primary | ICD-10-CM

## 2018-10-29 RX ORDER — LORATADINE 10 MG/1
TABLET ORAL
Qty: 90 TAB | Refills: 0 | Status: SHIPPED | OUTPATIENT
Start: 2018-10-29 | End: 2018-12-21 | Stop reason: SDUPTHER

## 2018-10-29 NOTE — TELEPHONE ENCOUNTER
Last seen: 07/23/18 by Dr. Jenkins                     Next appt: None      Was the patient seen in the last year in this department? Yes   Does patient have an active prescription for medications requested? No   Received Request Via: Pharmacy

## 2018-10-30 RX ORDER — RANOLAZINE 1000 MG/1
1000 TABLET, EXTENDED RELEASE ORAL 2 TIMES DAILY
Qty: 180 TAB | Refills: 3 | Status: SHIPPED | OUTPATIENT
Start: 2018-10-30 | End: 2019-03-25 | Stop reason: SDUPTHER

## 2018-11-06 ENCOUNTER — OFFICE VISIT (OUTPATIENT)
Dept: INTERNAL MEDICINE | Facility: MEDICAL CENTER | Age: 53
End: 2018-11-06
Payer: MEDICARE

## 2018-11-06 VITALS
BODY MASS INDEX: 24.64 KG/M2 | OXYGEN SATURATION: 98 % | TEMPERATURE: 97.3 F | WEIGHT: 176 LBS | HEIGHT: 71 IN | HEART RATE: 79 BPM | SYSTOLIC BLOOD PRESSURE: 128 MMHG | DIASTOLIC BLOOD PRESSURE: 78 MMHG

## 2018-11-06 DIAGNOSIS — I25.10 CORONARY ARTERY DISEASE INVOLVING NATIVE CORONARY ARTERY OF NATIVE HEART WITHOUT ANGINA PECTORIS: ICD-10-CM

## 2018-11-06 DIAGNOSIS — E78.5 DYSLIPIDEMIA: ICD-10-CM

## 2018-11-06 DIAGNOSIS — E11.9 TYPE 2 DIABETES MELLITUS WITHOUT COMPLICATION, WITHOUT LONG-TERM CURRENT USE OF INSULIN (HCC): Chronic | ICD-10-CM

## 2018-11-06 DIAGNOSIS — Z23 NEED FOR INFLUENZA VACCINATION: ICD-10-CM

## 2018-11-06 DIAGNOSIS — K22.719 BARRETT'S ESOPHAGUS WITH DYSPLASIA: ICD-10-CM

## 2018-11-06 DIAGNOSIS — K21.9 GASTROESOPHAGEAL REFLUX DISEASE, ESOPHAGITIS PRESENCE NOT SPECIFIED: ICD-10-CM

## 2018-11-06 DIAGNOSIS — I48.0 PAF (PAROXYSMAL ATRIAL FIBRILLATION) (HCC): Chronic | ICD-10-CM

## 2018-11-06 DIAGNOSIS — I10 ESSENTIAL HYPERTENSION: ICD-10-CM

## 2018-11-06 DIAGNOSIS — E55.9 VITAMIN D DEFICIENCY: ICD-10-CM

## 2018-11-06 DIAGNOSIS — I25.5 CARDIOMYOPATHY, ISCHEMIC: ICD-10-CM

## 2018-11-06 PROCEDURE — G0008 ADMIN INFLUENZA VIRUS VAC: HCPCS | Performed by: INTERNAL MEDICINE

## 2018-11-06 PROCEDURE — 90686 IIV4 VACC NO PRSV 0.5 ML IM: CPT | Performed by: INTERNAL MEDICINE

## 2018-11-06 PROCEDURE — 99214 OFFICE O/P EST MOD 30 MIN: CPT | Mod: GC,25 | Performed by: INTERNAL MEDICINE

## 2018-11-06 NOTE — PROGRESS NOTES
Established Patient    Ryan presents today with the following:    CC: Labwork review, mgmt of DMII    HPI: 53 year old male with a ischemic cardiomyopathy EF 50%, CAD, paroxysmal afib w/ RVR, DMII, HTN, HLD, GERD, barretts esophagus and vitamin D def presents for a 6 months follow up for medication mgmt.     Chest pain: No recent Ed visits with this complaint. He was seen by cardiology, Dr. León, on 10/2018 with no medication changes and the patient is doing well with no cardiac complaints including CP, SOB, dyspnea on exertion, LOC, palpitations, LE at todays visit.     Type 2 diabetes mellitus: HbA1c was 6.4% on 10/9/2018 which is much improved from 1 year prior. He continues to be farly active with walking, biking and a job that keeps him more active.      Gastroesophageal reflux disease: Well controlled with no issues. States that he is compliant with his protonix.      Health maintenance: Underwent colonoscopy 12/6/2017 with Dr. Berry and received influenza vaccine today.    Patient Active Problem List    Diagnosis Date Noted   • Syncope 01/05/2018     Priority: High   • Coronary artery disease involving native coronary artery of native heart without angina pectoris 10/14/2013     Priority: Medium   • Vitamin B12 deficiency 01/05/2018     Priority: Low   • Vitamin D deficiency 01/05/2018     Priority: Low   • PAF (paroxysmal atrial fibrillation) ([facility]) 05/18/2017     Priority: Low   • Type 2 diabetes mellitus without complication, without long-term current use of insulin ([facility]) 02/16/2017     Priority: Low   • Glaucoma      Priority: Low   • Dyslipidemia 07/16/2012     Priority: Low   • Essential hypertension 07/15/2012     Priority: Low   • GERD with Nieves's esophagus 07/15/2012     Priority: Low   • Chronic anticoagulation 10/23/2018   • 3-vessel coronary artery disease 10/23/2018   • Nieves esophagus 05/19/2017   • Tachycardia 04/17/2017   • Claudication ([facility])    • Ischemic Cardiomyopathy EF 50%  "07/17/2012       Current Outpatient Prescriptions   Medication Sig Dispense Refill   • Ranolazine 1000 MG TABLET SR 12 HR Take 1 Tab by mouth 2 Times a Day. 180 Tab 3   • loratadine (CLARITIN) 10 MG Tab TAKE 1 TABLET BY MOUTH ONCE DAILY. 90 Tab 0   • vitamin D3, cholecalciferol, 1000 UNIT Tab TAKE 1 TABLET BY MOUTH ONCE DAILY. 90 Tab 0   • cyanocobalamin (VITAMIN B12) 1000 MCG Tab Take 1 Tab by mouth every day. 90 Tab 3   • metformin (GLUCOPHAGE) 1000 MG tablet Take 1 Tab by mouth 2 times a day, with meals. 180 Tab 3   • atorvastatin (LIPITOR) 80 MG tablet Take 1 Tab by mouth every day. 90 Tab 3   • lisinopril (PRINIVIL) 5 MG Tab Take 1 Tab by mouth every day. 90 Tab 3   • metoprolol SR (TOPROL XL) 25 MG TABLET SR 24 HR Take 1 Tab by mouth every day. 90 Tab 3   • rivaroxaban (XARELTO) 20 MG Tab tablet Take 1 Tab by mouth with dinner. 90 Tab 3   • pantoprazole (PROTONIX) 40 MG Tablet Delayed Response TAKE 1 TABLET BY MOUTH ONCE DAILY. 30 Tab 5   • ASPIRIN LOW DOSE 81 MG EC tablet TAKE 1 TABLET BY MOUTH ONCE DAILY. 30 Tab 11     No current facility-administered medications for this visit.        ROS: As per HPI. Additional pertinent symptoms as noted below.      /78 (BP Location: Left arm, Patient Position: Sitting, BP Cuff Size: Adult)   Pulse 79   Temp 36.3 °C (97.3 °F) (Temporal)   Ht 1.803 m (5' 11\")   Wt 79.8 kg (176 lb)   SpO2 98%   BMI 24.55 kg/m²     Physical Exam     General:  Alert and oriented, No apparent distress. Appears, tan, appears to be losing weight.  Eyes: Pupils equal and reactive. No scleral icterus. EOMI, no injected conjunctiva.  Throat: Clear no erythema or exudates noted.  Neck: Supple. No lymphadenopathy noted. Thyroid not enlarged.  Lungs: Clear to auscultation and percussion bilaterally. No wheezing, crackles, rhales.  Cardiovascular: Regular rate and rhythm. No murmurs, rubs or gallops.  Abdomen:  Benign. No rebound or guarding noted. Non-distended, non-tender.  Extremities: " No clubbing, cyanosis, edema. Surgical scar on right knee.  Skin: Clear. No rash or suspicious skin lesions noted.    Assessment and Plan    1. Coronary artery disease  - Continue metoprolol, rivaroxaban, atorvastatin  - Failed isosorbide mononitrate, now on ranolazine  - Cardiac cath on 7/26/2017 showed inferior wall hypokinesis with moderately reduced left ventricular systolic function, ejection fraction 35%, coronary artery disease (aneurysmal change proximal left anterior descending artery (LAD) with diffuse moderate disease up to 70% distal LAD. FFR of proximal and mid LAD above 0.8, chronic total occlusion of second obtuse marginal branch of the left circumflex artery, 95% stenosis at the ostium of one of the dual posterior descending artery (2 mm diameter), aneurysmal proximal and mid right coronary artery with 50-60% mid posterolateral branch but FFR > 0.8)  - Patient's previous heart echo on 1/2018 showed EF 50%, improved from previous  - Stable  - Followed by cardiology, next appt with Dr. León on 4/23/2018                2. Ischemic Cardiomyopathy EF 50%  - 2/2 CAD  - Continue metoprolol, lisinopril, atorvastatin, ranolazine  - Followed by cardiology  - Will see Dr. León on 4/2019     3. Paroxysmal atrial fibrillation with RVR  - Admitted to [facility] in 5/2017 with afib w/ RVR  - Converted back into NSR on IV cardizem drip  - Currently in NSR. Denies CP, SOB, weakness, lightheadedness at today's visit  - Continue metoprolol ER 25mg daily  - CHADS vasc 2. Continue rivaroxaban. Followed by anticoagulation clinic.  - Appt with Dr. León on 4/2019.     4. Type 2 diabetes mellitus  - HbA1c 6.4% on 11/2018  - Continue metformin 1000mg BID  - Urine microalbumin 69.1. Already on lisinopril for renal protection.  - Continue to lose weight, diet, exercise     5. Hyperlipidemia  - Continue atorvastatin 80mg daily  - Lipid panel 4/2018 under good control     6. Essential hypertension  - /78 at today's  visit  - Continue metoprolol, lisinopril  - Stable     7. Gastroesophageal reflux disease  - Continue protonix daily  - EGD performed on 5/19/17 with salmon colored distal esophagus mucosa indicative of duarte's and a 10cm focal area of gastric fundus with erythema, edema, friability and slight ulceration was seen. May be indicative of ischemic gastritis associated with hypotension when he was in atrial fibrillation.  - CTA abdomen 5/19 did not indicate any vascular abnormality that would cause ischemic gastritis  - GI following  Reason for use? Barretts  How long has patient been on it? Since 5/2017  Last time patient was off? Never Did symptoms resolve? NA  Alternative prescriptions attempted? None  H. Pylori checked? S/p biopsy 5/19, no H. Pylori seen Seen by GI? Yes EGD in past? Yes, on 5/19/2017     8. Duarte's esophagus  - EGD performed on 5/19/17 with salmon colored distal esophagus mucosa indicative of duarte's and a 10cm focal area of gastric fundus with erythema, edema, friability and slight ulceration was seen.  - Pathology of esophageal biopsies 5/19/17 with intestinal metaplasia  - CT abd 12/11/16 shows changes in the distal esophagus likely related to inflammatory esophagitis  - Continue protonix     9. Vitamin D deficiency  - Continue Vitamin D replacement  - Vit D 3/28/17 was 29.9     10. Health maintenance  Flu - 11/6/2018  TD- 12/2016  TDaP - 9/2014, 6/2016  Pneumococcal - 7/2012  Prevnar - NA  Colonoscopy - 11/2018  Zostavax-NA         PLAN: Follow up in 3 months        Signed by: Juancho Jenkins M.D.

## 2018-11-23 ENCOUNTER — APPOINTMENT (OUTPATIENT)
Dept: RADIOLOGY | Facility: MEDICAL CENTER | Age: 53
End: 2018-11-23
Attending: EMERGENCY MEDICINE
Payer: MEDICARE

## 2018-11-23 ENCOUNTER — HOSPITAL ENCOUNTER (EMERGENCY)
Facility: MEDICAL CENTER | Age: 53
End: 2018-11-23
Attending: EMERGENCY MEDICINE
Payer: MEDICARE

## 2018-11-23 VITALS
RESPIRATION RATE: 14 BRPM | DIASTOLIC BLOOD PRESSURE: 96 MMHG | HEART RATE: 76 BPM | OXYGEN SATURATION: 92 % | SYSTOLIC BLOOD PRESSURE: 152 MMHG | BODY MASS INDEX: 27.89 KG/M2 | WEIGHT: 200 LBS | TEMPERATURE: 97.2 F

## 2018-11-23 DIAGNOSIS — J40 BRONCHITIS: ICD-10-CM

## 2018-11-23 DIAGNOSIS — R07.89 CHEST WALL PAIN: ICD-10-CM

## 2018-11-23 LAB
ALBUMIN SERPL BCP-MCNC: 4.4 G/DL (ref 3.2–4.9)
ALBUMIN/GLOB SERPL: 1.3 G/DL
ALP SERPL-CCNC: 80 U/L (ref 30–99)
ALT SERPL-CCNC: 21 U/L (ref 2–50)
ANION GAP SERPL CALC-SCNC: 11 MMOL/L (ref 0–11.9)
APPEARANCE UR: CLEAR
AST SERPL-CCNC: 14 U/L (ref 12–45)
BASOPHILS # BLD AUTO: 0.4 % (ref 0–1.8)
BASOPHILS # BLD: 0.04 K/UL (ref 0–0.12)
BILIRUB SERPL-MCNC: 1.4 MG/DL (ref 0.1–1.5)
BILIRUB UR QL STRIP.AUTO: NEGATIVE
BUN SERPL-MCNC: 16 MG/DL (ref 8–22)
CALCIUM SERPL-MCNC: 9.4 MG/DL (ref 8.5–10.5)
CHLORIDE SERPL-SCNC: 104 MMOL/L (ref 96–112)
CO2 SERPL-SCNC: 21 MMOL/L (ref 20–33)
COLOR UR: YELLOW
CREAT SERPL-MCNC: 0.85 MG/DL (ref 0.5–1.4)
EKG IMPRESSION: NORMAL
EOSINOPHIL # BLD AUTO: 0.12 K/UL (ref 0–0.51)
EOSINOPHIL NFR BLD: 1.2 % (ref 0–6.9)
ERYTHROCYTE [DISTWIDTH] IN BLOOD BY AUTOMATED COUNT: 45.6 FL (ref 35.9–50)
GLOBULIN SER CALC-MCNC: 3.4 G/DL (ref 1.9–3.5)
GLUCOSE SERPL-MCNC: 136 MG/DL (ref 65–99)
GLUCOSE UR STRIP.AUTO-MCNC: 500 MG/DL
HCT VFR BLD AUTO: 40.3 % (ref 42–52)
HGB BLD-MCNC: 14 G/DL (ref 14–18)
IMM GRANULOCYTES # BLD AUTO: 0.06 K/UL (ref 0–0.11)
IMM GRANULOCYTES NFR BLD AUTO: 0.6 % (ref 0–0.9)
KETONES UR STRIP.AUTO-MCNC: NEGATIVE MG/DL
LEUKOCYTE ESTERASE UR QL STRIP.AUTO: NEGATIVE
LIPASE SERPL-CCNC: 38 U/L (ref 11–82)
LYMPHOCYTES # BLD AUTO: 0.87 K/UL (ref 1–4.8)
LYMPHOCYTES NFR BLD: 8.6 % (ref 22–41)
MCH RBC QN AUTO: 32.8 PG (ref 27–33)
MCHC RBC AUTO-ENTMCNC: 34.7 G/DL (ref 33.7–35.3)
MCV RBC AUTO: 94.4 FL (ref 81.4–97.8)
MICRO URNS: ABNORMAL
MONOCYTES # BLD AUTO: 1.06 K/UL (ref 0–0.85)
MONOCYTES NFR BLD AUTO: 10.5 % (ref 0–13.4)
NEUTROPHILS # BLD AUTO: 7.92 K/UL (ref 1.82–7.42)
NEUTROPHILS NFR BLD: 78.7 % (ref 44–72)
NITRITE UR QL STRIP.AUTO: NEGATIVE
NRBC # BLD AUTO: 0 K/UL
NRBC BLD-RTO: 0 /100 WBC
PH UR STRIP.AUTO: 5.5 [PH]
PLATELET # BLD AUTO: 257 K/UL (ref 164–446)
PMV BLD AUTO: 9.9 FL (ref 9–12.9)
POTASSIUM SERPL-SCNC: 3.9 MMOL/L (ref 3.6–5.5)
PROT SERPL-MCNC: 7.8 G/DL (ref 6–8.2)
PROT UR QL STRIP: NEGATIVE MG/DL
RBC # BLD AUTO: 4.27 M/UL (ref 4.7–6.1)
RBC UR QL AUTO: NEGATIVE
SODIUM SERPL-SCNC: 136 MMOL/L (ref 135–145)
SP GR UR STRIP.AUTO: 1.02
TROPONIN I SERPL-MCNC: <0.01 NG/ML (ref 0–0.04)
UROBILINOGEN UR STRIP.AUTO-MCNC: 1 MG/DL
WBC # BLD AUTO: 10.1 K/UL (ref 4.8–10.8)

## 2018-11-23 PROCEDURE — 36415 COLL VENOUS BLD VENIPUNCTURE: CPT

## 2018-11-23 PROCEDURE — 71045 X-RAY EXAM CHEST 1 VIEW: CPT

## 2018-11-23 PROCEDURE — A9270 NON-COVERED ITEM OR SERVICE: HCPCS | Performed by: EMERGENCY MEDICINE

## 2018-11-23 PROCEDURE — 80053 COMPREHEN METABOLIC PANEL: CPT

## 2018-11-23 PROCEDURE — 85025 COMPLETE CBC W/AUTO DIFF WBC: CPT

## 2018-11-23 PROCEDURE — 93005 ELECTROCARDIOGRAM TRACING: CPT | Performed by: EMERGENCY MEDICINE

## 2018-11-23 PROCEDURE — 99284 EMERGENCY DEPT VISIT MOD MDM: CPT

## 2018-11-23 PROCEDURE — 84484 ASSAY OF TROPONIN QUANT: CPT

## 2018-11-23 PROCEDURE — 81003 URINALYSIS AUTO W/O SCOPE: CPT

## 2018-11-23 PROCEDURE — 83690 ASSAY OF LIPASE: CPT

## 2018-11-23 PROCEDURE — 700102 HCHG RX REV CODE 250 W/ 637 OVERRIDE(OP): Performed by: EMERGENCY MEDICINE

## 2018-11-23 RX ORDER — BENZONATATE 100 MG/1
100 CAPSULE ORAL 3 TIMES DAILY PRN
Qty: 20 CAP | Refills: 1 | Status: SHIPPED | OUTPATIENT
Start: 2018-11-23 | End: 2019-01-02

## 2018-11-23 RX ORDER — NAPROXEN SODIUM 275 MG/1
275 TABLET ORAL 2 TIMES DAILY PRN
Qty: 20 TAB | Refills: 1 | Status: SHIPPED | OUTPATIENT
Start: 2018-11-23 | End: 2018-11-23

## 2018-11-23 RX ORDER — ACETAMINOPHEN 325 MG/1
650 TABLET ORAL ONCE
Status: COMPLETED | OUTPATIENT
Start: 2018-11-23 | End: 2018-11-23

## 2018-11-23 RX ADMIN — ACETAMINOPHEN 650 MG: 325 TABLET, FILM COATED ORAL at 12:47

## 2018-11-23 ASSESSMENT — LIFESTYLE VARIABLES: DO YOU DRINK ALCOHOL: NO

## 2018-11-23 ASSESSMENT — PAIN SCALES - GENERAL: PAINLEVEL_OUTOF10: 6

## 2018-11-23 NOTE — ED PROVIDER NOTES
ED Provider Note    CHIEF COMPLAINT  Chief Complaint   Patient presents with   • Cough     pt bib remsa c/o non prodictive cough x 2 days.   • Abdominal Pain     today having pain in abdomen eveytime pt coughs.       HPI  Ryan Trevizo is a 53 y.o. male who presents to emergency room today with complaints of cough for the past 2 days.  Patient states he does non-productive cough for 2 days causing him pain across his chest wall and upper abdomen area from coughing.  He said he thinks fever at home sore throat with coughing no nausea no vomiting no changes to bowel or bladder presents to the emergency room today for these evaluation.  He has watching TV and appears in no distress on examination.    REVIEW OF SYSTEMS  See HPI for further details. All other systems are negative.     PAST MEDICAL HISTORY  Past Medical History:   Diagnosis Date   • Viral URI with cough 1/5/2018   • CAD (coronary artery disease) 10/14/2013   • Upper GI bleed 7/26/2012   • ACS (acute coronary syndrome) ([facility]) 7/25/2012   • Pericarditis secondary to acute myocardial infarction ([facility]) 7/19/2012   • HTN (hypertension) 7/15/2012   • GERD (gastroesophageal reflux disease) 7/15/2012   • A-fib ([facility])    • Anxiety disorder    • Arthritis    • Blood glucose elevated    • Claudication ([facility])    • Diabetes ([facility])    • Dizziness    • Glaucoma    • Hyperlipemia    • Hypertriglyceridemia    • Ischemic cardiomyopathy    • Mental retardation    • MI (myocardial infarction) ([facility])    • Osteoarthritis    • Right knee pain    • SOB (shortness of breath)    • STEMI (ST elevation myocardial infarction) ([facility])        FAMILY HISTORY  [unfilled]    SOCIAL HISTORY  Social History     Social History   • Marital status: Single     Spouse name: N/A   • Number of children: N/A   • Years of education: N/A     Social History Main Topics   • Smoking status: Never Smoker   • Smokeless tobacco: Never Used   • Alcohol use No   • Drug use: No   • Sexual activity: Not on file       "Comment: Single, has no children, works as an .     Other Topics Concern   • Not on file     Social History Narrative    ** Merged History Encounter **         ** Merged History Encounter **            SURGICAL HISTORY  Past Surgical History:   Procedure Laterality Date   • GASTROSCOPY-ENDO  5/19/2017    Procedure: GASTROSCOPY-ENDO;  Surgeon: Reinaldo Berry M.D.;  Location: [facility];  Service:    • GASTROSCOPY-ENDO  7/26/2012    Performed by JORDIN VERA at ENDOSCOPY [facility]   • GASTROSCOPY-ENDO  7/25/2012    Performed by JORDIN VERA at ENDOSCOPY [facility]   • OTHER ORTHOPEDIC SURGERY  7-     R knee surgery   • OTHER  knee surgery   • OTHER     • OTHER CARDIAC SURGERY      stent placement       CURRENT MEDICATIONS  Home Medications    **Home medications have not yet been reviewed for this encounter**         ALLERGIES  Allergies   Allergen Reactions   • Ritalin [Methylphenidate] Unspecified     \"Hyper\"       PHYSICAL EXAM  VITAL SIGNS: /96   Pulse 76   Temp 36.2 °C (97.2 °F) (Temporal)   Resp 14   Wt 90.7 kg (200 lb)   SpO2 92%   BMI 27.89 kg/m²  Room air O2: 94    Constitutional: Well developed, Well nourished, No acute distress, Non-toxic appearance.   HENT: Normocephalic, Atraumatic, Bilateral external ears normal, Oropharynx moist, No oral exudates, Nose normal.   Eyes: PERRLA, EOMI, Conjunctiva normal, No discharge.   Neck: Normal range of motion, No tenderness, Supple, No stridor.   Lymphatic: No lymphadenopathy noted.   Cardiovascular: Normal heart rate, Normal rhythm, No murmurs, No rubs, No gallops.   Thorax & Lungs: Normal breath sounds, No respiratory distress, No wheezing, anterior fully reproducible chest wall tenderness.   Abdomen: Bowel sounds normal, Soft, No tenderness, No masses, No pulsatile masses.   Skin: Warm, Dry, No erythema, No rash.   Back: No tenderness, No CVA tenderness.  Extremities: Intact distal pulses, No edema, " No tenderness, No cyanosis, No clubbing.   Musculoskeletal: Good range of motion in all major joints. No tenderness to palpation or major deformities noted.   Neurologic: Alert & oriented x 3, Normal motor function, Normal sensory function, No focal deficits noted.   Psychiatric: Affect normal, Judgment normal, Mood normal.     EKG  Normal sinus rhythm 80 bpm, no ST elevation or depression, no widening of the QRS complex, good R wave progression, no diagnostic Q waves noted    RADIOLOGY/PROCEDURE  DX-CHEST-PORTABLE (1 VIEW)   Final Result      No acute cardiopulmonary process is seen.              COURSE & MEDICAL DECISION MAKING  Pertinent Labs & Imaging studies reviewed. (See chart for details)  Patient most likely has viral bronchitis and I discussed nonuse of antibiotics to placed on Tessalon Perles for coughing may use Tylenol for his pain.  Initially as prescribed and Anaprox however he is on Xarelto for history of atrial fibrillation and do not feel that these medications would interact well so this was discontinued.  Patient will contact his primary care physician return if further problems discharged in stable condition as above the home.    FINAL IMPRESSION  1.  Acute chest wall pain  2.  Viral bronchitis  3.         Electronically signed by: Silvino Vasquez, 11/23/2018 3:21 PM

## 2018-11-23 NOTE — ED NOTES
Discharge instructions reviewed, no questions at this time.    Discussed home medications, prescription reviewed and given to patient.    Peripheral IV removed, dressing applied.    Belongings returned to patient.    Patient ambulatory off unit.

## 2018-11-23 NOTE — ED TRIAGE NOTES
Chief Complaint   Patient presents with   • Cough     pt bib remsa c/o non prodictive cough x 2 days.   • Abdominal Pain     today having pain in abdomen eveytime pt coughs.     Triage process explained. Instructed to notify staff for any worsening symptoms. NAD.

## 2018-12-17 ENCOUNTER — TELEPHONE (OUTPATIENT)
Dept: CARDIOLOGY | Facility: MEDICAL CENTER | Age: 53
End: 2018-12-17

## 2018-12-17 NOTE — TELEPHONE ENCOUNTER
Left message for pt to call me back for a manual transmission preferably when he is close to his home monitor, I am in office until 5 pm. thanks

## 2018-12-18 ENCOUNTER — HOSPITAL ENCOUNTER (OUTPATIENT)
Dept: LAB | Facility: MEDICAL CENTER | Age: 53
End: 2018-12-18
Attending: INTERNAL MEDICINE
Payer: MEDICARE

## 2018-12-18 DIAGNOSIS — I25.10 CORONARY ARTERY DISEASE INVOLVING NATIVE CORONARY ARTERY OF NATIVE HEART WITHOUT ANGINA PECTORIS: ICD-10-CM

## 2018-12-18 LAB
ANION GAP SERPL CALC-SCNC: 10 MMOL/L (ref 0–11.9)
BASOPHILS # BLD AUTO: 0.5 % (ref 0–1.8)
BASOPHILS # BLD: 0.03 K/UL (ref 0–0.12)
BUN SERPL-MCNC: 13 MG/DL (ref 8–22)
CALCIUM SERPL-MCNC: 9.3 MG/DL (ref 8.5–10.5)
CHLORIDE SERPL-SCNC: 105 MMOL/L (ref 96–112)
CHOLEST SERPL-MCNC: 133 MG/DL (ref 100–199)
CO2 SERPL-SCNC: 26 MMOL/L (ref 20–33)
CREAT SERPL-MCNC: 0.97 MG/DL (ref 0.5–1.4)
EOSINOPHIL # BLD AUTO: 0.09 K/UL (ref 0–0.51)
EOSINOPHIL NFR BLD: 1.5 % (ref 0–6.9)
ERYTHROCYTE [DISTWIDTH] IN BLOOD BY AUTOMATED COUNT: 47.7 FL (ref 35.9–50)
GLUCOSE SERPL-MCNC: 104 MG/DL (ref 65–99)
HCT VFR BLD AUTO: 39.8 % (ref 42–52)
HDLC SERPL-MCNC: 48 MG/DL
HGB BLD-MCNC: 13.3 G/DL (ref 14–18)
IMM GRANULOCYTES # BLD AUTO: 0.03 K/UL (ref 0–0.11)
IMM GRANULOCYTES NFR BLD AUTO: 0.5 % (ref 0–0.9)
LDLC SERPL CALC-MCNC: 37 MG/DL
LYMPHOCYTES # BLD AUTO: 1.1 K/UL (ref 1–4.8)
LYMPHOCYTES NFR BLD: 18.6 % (ref 22–41)
MCH RBC QN AUTO: 32.7 PG (ref 27–33)
MCHC RBC AUTO-ENTMCNC: 33.4 G/DL (ref 33.7–35.3)
MCV RBC AUTO: 97.8 FL (ref 81.4–97.8)
MONOCYTES # BLD AUTO: 0.47 K/UL (ref 0–0.85)
MONOCYTES NFR BLD AUTO: 7.9 % (ref 0–13.4)
NEUTROPHILS # BLD AUTO: 4.2 K/UL (ref 1.82–7.42)
NEUTROPHILS NFR BLD: 71 % (ref 44–72)
NRBC # BLD AUTO: 0 K/UL
NRBC BLD-RTO: 0 /100 WBC
PLATELET # BLD AUTO: 275 K/UL (ref 164–446)
PMV BLD AUTO: 9.9 FL (ref 9–12.9)
POTASSIUM SERPL-SCNC: 4.6 MMOL/L (ref 3.6–5.5)
RBC # BLD AUTO: 4.07 M/UL (ref 4.7–6.1)
SODIUM SERPL-SCNC: 141 MMOL/L (ref 135–145)
TRIGL SERPL-MCNC: 240 MG/DL (ref 0–149)
WBC # BLD AUTO: 5.9 K/UL (ref 4.8–10.8)

## 2018-12-18 PROCEDURE — 80048 BASIC METABOLIC PNL TOTAL CA: CPT

## 2018-12-18 PROCEDURE — 85025 COMPLETE CBC W/AUTO DIFF WBC: CPT

## 2018-12-18 PROCEDURE — 80061 LIPID PANEL: CPT

## 2018-12-18 PROCEDURE — 36415 COLL VENOUS BLD VENIPUNCTURE: CPT

## 2018-12-21 RX ORDER — PANTOPRAZOLE SODIUM 40 MG/1
TABLET, DELAYED RELEASE ORAL
Qty: 30 TAB | Refills: 0 | Status: SHIPPED | OUTPATIENT
Start: 2018-12-21 | End: 2019-02-22 | Stop reason: SDUPTHER

## 2018-12-21 RX ORDER — LORATADINE 10 MG/1
TABLET ORAL
Qty: 30 TAB | Refills: 0 | Status: SHIPPED | OUTPATIENT
Start: 2018-12-21 | End: 2019-02-22 | Stop reason: SDUPTHER

## 2018-12-21 NOTE — TELEPHONE ENCOUNTER
PT SEEN: 11/6/18 WITH Dr. Jenkins NEXT APPT 2/19/19 WITH Dr. Jenkins  Was the patient seen in the last year in this department? Yes     Does patient have an active prescription for medications requested? No     Received Request Via: Pharmacy

## 2018-12-26 ENCOUNTER — HOSPITAL ENCOUNTER (EMERGENCY)
Facility: MEDICAL CENTER | Age: 53
End: 2018-12-26
Attending: EMERGENCY MEDICINE
Payer: MEDICARE

## 2018-12-26 ENCOUNTER — APPOINTMENT (OUTPATIENT)
Dept: RADIOLOGY | Facility: MEDICAL CENTER | Age: 53
End: 2018-12-26
Attending: EMERGENCY MEDICINE
Payer: MEDICARE

## 2018-12-26 VITALS
HEIGHT: 71 IN | SYSTOLIC BLOOD PRESSURE: 129 MMHG | WEIGHT: 200 LBS | HEART RATE: 59 BPM | TEMPERATURE: 97.4 F | OXYGEN SATURATION: 95 % | RESPIRATION RATE: 14 BRPM | DIASTOLIC BLOOD PRESSURE: 84 MMHG | BODY MASS INDEX: 28 KG/M2

## 2018-12-26 DIAGNOSIS — M79.675 PAIN OF TOE OF LEFT FOOT: ICD-10-CM

## 2018-12-26 PROCEDURE — A9270 NON-COVERED ITEM OR SERVICE: HCPCS

## 2018-12-26 PROCEDURE — 99284 EMERGENCY DEPT VISIT MOD MDM: CPT

## 2018-12-26 PROCEDURE — 73620 X-RAY EXAM OF FOOT: CPT | Mod: LT

## 2018-12-26 PROCEDURE — 700102 HCHG RX REV CODE 250 W/ 637 OVERRIDE(OP)

## 2018-12-26 RX ORDER — IBUPROFEN 600 MG/1
600 TABLET ORAL ONCE
Status: COMPLETED | OUTPATIENT
Start: 2018-12-26 | End: 2018-12-26

## 2018-12-26 RX ADMIN — IBUPROFEN 600 MG: 600 TABLET, FILM COATED ORAL at 21:14

## 2018-12-26 ASSESSMENT — PAIN SCALES - GENERAL: PAINLEVEL_OUTOF10: 10

## 2018-12-26 ASSESSMENT — LIFESTYLE VARIABLES: DO YOU DRINK ALCOHOL: NO

## 2018-12-27 ENCOUNTER — PATIENT OUTREACH (OUTPATIENT)
Dept: HEALTH INFORMATION MANAGEMENT | Facility: OTHER | Age: 53
End: 2018-12-27

## 2018-12-27 DIAGNOSIS — J40 BRONCHITIS: ICD-10-CM

## 2018-12-27 NOTE — ED NOTES
D/C instructions provided to patient at bedside, verbalizes understanding and states plans for follow-up with PCP as recommended.   Toes taped.   All belongings accounted for, all questions answered at this time.   Pt provided MTM information and facesheet.

## 2018-12-27 NOTE — DISCHARGE INSTRUCTIONS
You were seen in the ER for toe pain.  X-ray did not reveal a fracture.  You are safe to go home.  We have bella taped your toes together for symptom management and you were given 1 dose of ibuprofen in the ER.  I suggest Tylenol and/or ibuprofen as directed on the bottle for pain control.  You can also use ice for additional pain control.  Follow-up with your primary care physician within the next 24-48 hours for recheck.  Return to the ER with new or worsening symptoms.

## 2018-12-27 NOTE — ED NOTES
Pt ambulated to room with assistance from tech. Pain 10/10 in LEFT 5th toe.  Assessment complete. ERP notified of patient's arrival.

## 2018-12-27 NOTE — ED TRIAGE NOTES
"Pt to triage per w/c d/t c/o pain to left 5th toe x 1 day, states \"i hit it on a hope chest the other day\", aox4, resp even/unlabored  "

## 2018-12-31 ENCOUNTER — TELEPHONE (OUTPATIENT)
Dept: CARDIOLOGY | Facility: MEDICAL CENTER | Age: 53
End: 2018-12-31

## 2018-12-31 RX ORDER — METOPROLOL SUCCINATE 25 MG/1
12.5 TABLET, EXTENDED RELEASE ORAL DAILY
Qty: 90 TAB | Refills: 3 | COMMUNITY
Start: 2018-12-31 | End: 2019-06-24 | Stop reason: SDUPTHER

## 2018-12-31 NOTE — TELEPHONE ENCOUNTER
FYI: remote transmission today:   Ky episode on 12/25 at 10:42 am 34 - 38 bpm.   Most recent report to be scanned for review.

## 2018-12-31 NOTE — TELEPHONE ENCOUNTER
"Called pt, pt reports he actually had syncopal episode on [place] when he was using the bathroom, I've inquired what time it happened and pt actually reports it around 9-10am which actually his [place] episode is around 1042am, pt reports he felt a little dizzy upon waking up. Discussed ED precautions to pt if it happens again, pt verbalizes understanding     Called Disability resources which listed as pt's , s/w [facility] pt's  as pt have mental disability if pt reported any episodes to them, per [facility], he'd seen pt between [place] day and today and pt did not mentioned anything, pt spends brice with his family and he is \"notorious\" on not reporting any symptoms.     To CR, please advice, Dr [place]. Thank You!      "

## 2019-01-01 NOTE — TELEPHONE ENCOUNTER
Discussed w/ CR, per CR have pt cut Toprol in 1/2, have pt monitor BP if it becomes elevated, let us know we might need to increased Lisinopril. Have pt see APRN in 2wks.     Attempted to call pt, no answer, detailed vm left regarding CR recommendations.     Attempted to call pt's , their office is closed now.     MAR updated.     Task sent to  to call pt and schedule appt

## 2019-01-02 ENCOUNTER — HOSPITAL ENCOUNTER (EMERGENCY)
Facility: MEDICAL CENTER | Age: 54
End: 2019-01-02
Attending: EMERGENCY MEDICINE
Payer: MEDICARE

## 2019-01-02 ENCOUNTER — APPOINTMENT (OUTPATIENT)
Dept: RADIOLOGY | Facility: MEDICAL CENTER | Age: 54
End: 2019-01-02
Attending: EMERGENCY MEDICINE
Payer: MEDICARE

## 2019-01-02 VITALS
WEIGHT: 183.86 LBS | RESPIRATION RATE: 16 BRPM | HEART RATE: 87 BPM | HEIGHT: 71 IN | DIASTOLIC BLOOD PRESSURE: 79 MMHG | BODY MASS INDEX: 25.74 KG/M2 | SYSTOLIC BLOOD PRESSURE: 132 MMHG | TEMPERATURE: 98.5 F | OXYGEN SATURATION: 94 %

## 2019-01-02 DIAGNOSIS — J01.00 ACUTE NON-RECURRENT MAXILLARY SINUSITIS: ICD-10-CM

## 2019-01-02 DIAGNOSIS — R51.9 ACUTE NONINTRACTABLE HEADACHE, UNSPECIFIED HEADACHE TYPE: ICD-10-CM

## 2019-01-02 PROCEDURE — A9270 NON-COVERED ITEM OR SERVICE: HCPCS | Performed by: EMERGENCY MEDICINE

## 2019-01-02 PROCEDURE — 99284 EMERGENCY DEPT VISIT MOD MDM: CPT

## 2019-01-02 PROCEDURE — 70450 CT HEAD/BRAIN W/O DYE: CPT

## 2019-01-02 PROCEDURE — 700102 HCHG RX REV CODE 250 W/ 637 OVERRIDE(OP): Performed by: EMERGENCY MEDICINE

## 2019-01-02 RX ORDER — BENZONATATE 100 MG/1
100 CAPSULE ORAL 3 TIMES DAILY PRN
Qty: 60 CAP | Refills: 0 | Status: SHIPPED | OUTPATIENT
Start: 2019-01-02 | End: 2019-02-11

## 2019-01-02 RX ORDER — AMOXICILLIN 500 MG/1
500 CAPSULE ORAL 3 TIMES DAILY
Qty: 30 CAP | Refills: 0 | Status: SHIPPED | OUTPATIENT
Start: 2019-01-02 | End: 2019-02-11

## 2019-01-02 RX ORDER — AMOXICILLIN 500 MG/1
500 CAPSULE ORAL ONCE
Status: COMPLETED | OUTPATIENT
Start: 2019-01-02 | End: 2019-01-02

## 2019-01-02 RX ORDER — ACETAMINOPHEN 325 MG/1
650 TABLET ORAL ONCE
Status: COMPLETED | OUTPATIENT
Start: 2019-01-02 | End: 2019-01-02

## 2019-01-02 RX ADMIN — ACETAMINOPHEN 650 MG: 325 TABLET, FILM COATED ORAL at 20:42

## 2019-01-02 RX ADMIN — AMOXICILLIN 500 MG: 500 CAPSULE ORAL at 20:42

## 2019-01-02 ASSESSMENT — PAIN SCALES - GENERAL: PAINLEVEL_OUTOF10: 10

## 2019-01-02 NOTE — TELEPHONE ENCOUNTER
Discussed w/ Dr León above event, he agreed w/ above plan and recommended for pt's device to be checked as well the same time as his appt w/ APRN.     Called pt's  [facility], discussed above recommendations by Dr León, he verbalizes understanding and requested to be transferred to scheduling [phone].

## 2019-01-03 ENCOUNTER — PATIENT OUTREACH (OUTPATIENT)
Dept: HEALTH INFORMATION MANAGEMENT | Facility: OTHER | Age: 54
End: 2019-01-03

## 2019-01-03 NOTE — ED NOTES
Re-evaluation done. Patient discharged with prescriptions and instruction. Verbalized understanding.

## 2019-01-03 NOTE — ED PROVIDER NOTES
ED Provider Note    Scribed for Malia Velázquez M.D. by Dinah Barajas. 1/2/2019, 8:09 PM.    Primary care provider: Juancho Jenkins M.D.  Means of arrival: walk in   History obtained from: patient   History limited by: none     CHIEF COMPLAINT  Chief Complaint   Patient presents with   • Headache       HPI  Ryan Trevizo is a 53 y.o. male with a history of ACS, atrial fibrillation, CAD, diabetes, GERD, hypertension, hyperlipidemia, hypertriglyceridemia, MR, and STEMI who presents to the Emergency Department with a sudden in onset headache which began last night. Patient reports associated cough, congestion, subjective fevers, chills,  lightheadedness, and near syncope. Patient states his headache began last night, improved without interventions, and then returned this evening. He describes his headache as a pressure sensation and states it is located across his forehead. He does have a history of similar headaches. No vision changes.     REVIEW OF SYSTEMS  Pertinent positives include headache,  cough, congestion, subjective fevers, chills,  lightheadedness, and near syncope. Pertinent negatives include no vision changes. All other systems are negative.     PAST MEDICAL HISTORY   has a past medical history of A-fib (formerly [facility]); ACS (acute coronary syndrome) (formerly [facility]) (7/25/2012); Anxiety disorder; Arthritis; Blood glucose elevated; CAD (coronary artery disease) (10/14/2013); Claudication (formerly [facility]); Diabetes (formerly [facility]); Dizziness; GERD (gastroesophageal reflux disease) (7/15/2012); Glaucoma; HTN (hypertension) (7/15/2012); Hyperlipemia; Hypertriglyceridemia; Ischemic cardiomyopathy; Mental retardation; MI (myocardial infarction) (formerly [facility]); Osteoarthritis; Pericarditis secondary to acute myocardial infarction (formerly [facility]) (7/19/2012); Right knee pain; SOB (shortness of breath); STEMI (ST elevation myocardial infarction) (formerly [facility]); Upper GI bleed (7/26/2012); and Viral URI with cough (1/5/2018).    SURGICAL HISTORY   has a past surgical history  "that includes other orthopedic surgery (7- ); other (knee surgery); other cardiac surgery; other; gastroscopy-endo (7/25/2012); gastroscopy-endo (7/26/2012); and gastroscopy-endo (5/19/2017).    SOCIAL HISTORY  Social History   Substance Use Topics   • Smoking status: Never Smoker   • Smokeless tobacco: Never Used   • Alcohol use No      History   Drug Use No       FAMILY HISTORY  History reviewed. No pertinent family history.    CURRENT MEDICATIONS  Home Medications     Reviewed by Patricio Cam R.N. (Registered Nurse) on 01/02/19 at 1940  Med List Status: Not Addressed   Medication Last Dose Status   ASPIR-LOW 81 MG EC tablet  Active   atorvastatin (LIPITOR) 80 MG tablet  Active   benzonatate (TESSALON) 100 MG Cap  Active   cyanocobalamin (VITAMIN B12) 1000 MCG Tab  Active   lisinopril (PRINIVIL) 5 MG Tab  Active   loratadine (CLARITIN) 10 MG Tab  Active   metformin (GLUCOPHAGE) 1000 MG tablet  Active   metoprolol SR (TOPROL XL) 25 MG TABLET SR 24 HR  Active   pantoprazole (PROTONIX) 40 MG Tablet Delayed Response  Active   Ranolazine 1000 MG TABLET SR 12 HR  Active   rivaroxaban (XARELTO) 20 MG Tab tablet  Active   vitamin D3, cholecalciferol, 1000 UNIT Tab  Active                ALLERGIES  Allergies   Allergen Reactions   • Ritalin [Methylphenidate] Unspecified     \"Hyper\"       PHYSICAL EXAM  VITAL SIGNS: /85   Pulse 89   Temp 36.7 °C (98 °F) (Temporal)   Resp 20   Ht 1.803 m (5' 11\")   Wt 83.4 kg (183 lb 13.8 oz)   SpO2 94%   BMI 25.64 kg/m²   Constitutional: Alert in no apparent distress.  HENT: No signs of trauma, Bilateral external ears normal, Nose normal. Pressure over the maxillary sinus.   Eyes: Pupils are equal and reactive, Conjunctiva normal, Non-icteric.   Neck: Normal range of motion, No tenderness, Supple, No stridor.   Cardiovascular: Regular rate and rhythm, no murmurs.   Thorax & Lungs: Normal breath sounds, No respiratory distress, No wheezing, No chest tenderness. "   Abdomen: Bowel sounds normal, Soft, No tenderness, No masses, No peritoneal signs.  Skin: Warm, Dry, No erythema, No rash.   Musculoskeletal:  No major deformities noted.   Neurologic: Alert, moving all extremities without difficulty, no focal deficits.    COURSE & MEDICAL DECISION MAKING  Pertinent Labs & Imaging studies reviewed. (See chart for details)    8:09 PM - Patient seen and examined at bedside. Patient will be treated with Amoxicillin 500 mg and Tylenol 650 mg. Ordered CT head to evaluate his symptoms.     9:45- Reviewed the patient's imaging results.  I do not see any obvious bleed however the imaging is not read officially.  I updated the patient on these results and will discharge him with Tessalon Perles I do suspect that he is having either an early sinusitis or an upper respiratory infection and his headache is secondary to this.  He was given amoxicillin in case his sinusitis gets worse then he is to fill the prescription.  He was agreeable with this plan will be discharged as long as the head CT is negative.    The patient will return for new or worsening symptoms and is stable at the time of discharge. Patient was given return precautions. Patient and/or family member verbalizes understanding and will comply.    DISPOSITION:  Patient will be discharged home in stable condition.    FOLLOW UP:  Juancho Jenkins M.D.  [address] [id number] [phone]    Schedule an appointment as soon as possible for a visit in 1 week  As needed    [facility], Emergency Dept  [address] [facility] [id number] [phone]    Return for worsening pain, vomiting, fever or other concerns      OUTPATIENT MEDICATIONS:  Discharge Medication List as of 1/2/2019  9:38 PM      START taking these medications    Details   amoxicillin (AMOXIL) 500 MG Cap Take 1 Cap by mouth 3 times a day., Disp-30 Cap, R-0, Normal      benzonatate (TESSALON) 100 MG Cap Take 1 Cap by mouth  3 times a day as needed for Cough., Disp-60 Cap, R-0, Print Rx Paper             FINAL IMPRESSION  1. Acute nonintractable headache, unspecified headache type    2. Acute non-recurrent maxillary sinusitis        This dictation has been created using voice recognition software and/or scribes. The accuracy of the dictation is limited by the abilities of the software and the expertise of the scribes. I expect there may be some errors of grammar and possibly content. I made every attempt to manually correct the errors within my dictation. However, errors related to voice recognition software and/or scribes may still exist and should be interpreted within the appropriate context.     Dinah MORRISSEY (Scribe), am scribing for, and in the presence of, Malia Velázquez M.D..    Electronically signed by: Dinah Barajas (Scribe), 1/2/2019    Malia MORRISSEY M.D. personally performed the services described in this documentation, as scribed by Dinah Barajas in my presence, and it is both accurate and complete. C.    The note accurately reflects work and decisions made by me.  Malia Velázquez  1/2/2019  10:29 PM

## 2019-01-03 NOTE — ED TRIAGE NOTES
"Chief Complaint   Patient presents with   • Headache       Pt ambulatory to triage with above complaint.  Pt BIB [facility] for headache.  Pt states he had HA last night, it improved, then returned tonight.  Pt states 10/10 pain across his forehead.  Pt states history of similar headaches.  Pt denies any photophobia or vision changes.     Pt returned to lobby, educated on triage process, and to inform staff of any changes or concerns.    Blood pressure 132/85, pulse 89, temperature 36.7 °C (98 °F), temperature source Temporal, resp. rate 20, height 1.803 m (5' 11\"), weight 83.4 kg (183 lb 13.8 oz), SpO2 94 %.      "

## 2019-01-22 ENCOUNTER — OFFICE VISIT (OUTPATIENT)
Dept: CARDIOLOGY | Facility: MEDICAL CENTER | Age: 54
End: 2019-01-22
Payer: MEDICARE

## 2019-01-22 ENCOUNTER — NON-PROVIDER VISIT (OUTPATIENT)
Dept: CARDIOLOGY | Facility: MEDICAL CENTER | Age: 54
End: 2019-01-22
Payer: MEDICARE

## 2019-01-22 VITALS
WEIGHT: 180 LBS | SYSTOLIC BLOOD PRESSURE: 120 MMHG | HEART RATE: 74 BPM | HEIGHT: 67 IN | BODY MASS INDEX: 28.25 KG/M2 | DIASTOLIC BLOOD PRESSURE: 70 MMHG

## 2019-01-22 VITALS
BODY MASS INDEX: 28.25 KG/M2 | DIASTOLIC BLOOD PRESSURE: 70 MMHG | HEART RATE: 74 BPM | HEIGHT: 67 IN | WEIGHT: 180 LBS | SYSTOLIC BLOOD PRESSURE: 120 MMHG

## 2019-01-22 DIAGNOSIS — Z95.818 STATUS POST PLACEMENT OF IMPLANTABLE LOOP RECORDER: ICD-10-CM

## 2019-01-22 DIAGNOSIS — Z79.01 CHRONIC ANTICOAGULATION: ICD-10-CM

## 2019-01-22 DIAGNOSIS — E11.9 TYPE 2 DIABETES MELLITUS WITHOUT COMPLICATION, WITHOUT LONG-TERM CURRENT USE OF INSULIN (HCC): Chronic | ICD-10-CM

## 2019-01-22 DIAGNOSIS — I25.10 CORONARY ARTERY DISEASE INVOLVING NATIVE CORONARY ARTERY OF NATIVE HEART WITHOUT ANGINA PECTORIS: ICD-10-CM

## 2019-01-22 DIAGNOSIS — E78.5 DYSLIPIDEMIA: ICD-10-CM

## 2019-01-22 DIAGNOSIS — I48.0 PAF (PAROXYSMAL ATRIAL FIBRILLATION) (HCC): Chronic | ICD-10-CM

## 2019-01-22 DIAGNOSIS — R55 SYNCOPE, UNSPECIFIED SYNCOPE TYPE: ICD-10-CM

## 2019-01-22 DIAGNOSIS — I25.5 CARDIOMYOPATHY, ISCHEMIC: ICD-10-CM

## 2019-01-22 DIAGNOSIS — I10 ESSENTIAL HYPERTENSION: ICD-10-CM

## 2019-01-22 PROCEDURE — 99214 OFFICE O/P EST MOD 30 MIN: CPT | Performed by: NURSE PRACTITIONER

## 2019-01-22 PROCEDURE — 93298 REM INTERROG DEV EVAL SCRMS: CPT | Performed by: NURSE PRACTITIONER

## 2019-01-22 ASSESSMENT — ENCOUNTER SYMPTOMS
INSOMNIA: 0
PALPITATIONS: 0
NAUSEA: 0
SHORTNESS OF BREATH: 0
DIZZINESS: 1
HEADACHES: 0
LOSS OF CONSCIOUSNESS: 1
FEVER: 0
ORTHOPNEA: 0
COUGH: 0
ABDOMINAL PAIN: 0
PND: 0
CHILLS: 0
MYALGIAS: 0
BRUISES/BLEEDS EASILY: 0

## 2019-01-22 NOTE — LETTER
[facility]   [address]   [address]  [place] [address]  Phone: [phone]  Fax: [phone]              Ryan Trevizo  1965    Encounter Date: 1/22/2019    YONAS Couch          PROGRESS NOTE:  Chief Complaint   Patient presents with   • Follow-Up   • Device Check   • Syncope   • Coronary Artery Disease   • Cardiomyopathy (Ischemic)   • Atrial Fibrillation   • Anticoagulation   • HTN (Controlled)   • Hyperlipidemia       Subjective:   Ryan Trevizo is a 53 y.o. male who presents today for follow-up of syncopal episode.    Ryan is a 53 year old male with history of three vessel CAD/ischemic cardiomyopathy (with LVEF normalized on medical therapy on echo in January 2018), paroxysmal atrial fibrillation, anticoagulation, hypertension and hyperlipidemia, normally followed by Dr. JUDITH León. In January 2018, he has an ILR implanted for syncope, but has not had any follow-up on it since then.    On 12/25/2018, he states he had an episode where he collapsed; he felt very dizzy and collapsed at home.  No further episodes since then. He denies any chest pain, pressure or discomfort; no palpitations; no shortness of breath, orthopnea or PND; no LE edema.    Past Medical History:   Diagnosis Date   • A-fib ([facility])    • ACS (acute coronary syndrome) ([facility])    • Anxiety disorder    • Arthritis    • Blood glucose elevated    • CAD (coronary artery disease)    • Claudication ([facility])    • Diabetes ([facility])    • Dizziness    • GERD (gastroesophageal reflux disease)    • Glaucoma    • HTN (hypertension)    • Hyperlipemia    • Hypertriglyceridemia    • Ischemic cardiomyopathy    • Mental retardation    • Osteoarthritis    • Pericarditis secondary to acute myocardial infarction ([facility])    • Right knee pain    • SOB (shortness of breath)    • STEMI (ST elevation myocardial infarction) ([facility])    • Syncope    • Upper GI bleed      Past Surgical History:    "  Procedure Laterality Date   • OTHER CARDIAC SURGERY Left 01/12/2018    Medtronic Reveal LINQ [id number] implanted by Dr. Blake   • GASTROSCOPY-ENDO  5/19/2017    Procedure: GASTROSCOPY-ENDO;  Surgeon: Reinaldo Berry M.D.;  Location: [facility];  Service:    • GASTROSCOPY-ENDO  7/26/2012    Performed by JORDIN VERA at ENDOSCOPY [facility]   • GASTROSCOPY-ENDO  7/25/2012    Performed by JORDIN VERA at [facility]   • OTHER ORTHOPEDIC SURGERY  7-     R knee surgery   • OTHER CARDIAC SURGERY      stent placement     No family history on file.  Social History     Social History   • Marital status: Single     Spouse name: N/A   • Number of children: N/A   • Years of education: N/A     Occupational History   • Not on file.     Social History Main Topics   • Smoking status: Never Smoker   • Smokeless tobacco: Never Used   • Alcohol use No   • Drug use: No   • Sexual activity: Not on file      Comment: Single, has no children, works as an .     Other Topics Concern   • Not on file     Social History Narrative    ** Merged History Encounter **         ** Merged History Encounter **          Allergies   Allergen Reactions   • Ritalin [Methylphenidate] Unspecified     \"Hyper\"     Outpatient Encounter Prescriptions as of 1/22/2019   Medication Sig Dispense Refill   • metoprolol SR (TOPROL XL) 25 MG TABLET SR 24 HR Take 0.5 Tabs by mouth every day. 90 Tab 3   • pantoprazole (PROTONIX) 40 MG Tablet Delayed Response TAKE 1 TABLET BY MOUTH ONCE DAILY. 30 Tab 0   • ASPIR-LOW 81 MG EC tablet TAKE 1 TABLET BY MOUTH ONCE DAILY. 30 Tab 11   • vitamin D3, cholecalciferol, 1000 UNIT Tab TAKE 1 TABLET BY MOUTH ONCE DAILY. 90 Tab 0   • cyanocobalamin (VITAMIN B12) 1000 MCG Tab Take 1 Tab by mouth every day. 90 Tab 3   • metformin (GLUCOPHAGE) 1000 MG tablet Take 1 Tab by mouth 2 times a day, with meals. 180 Tab 3   • atorvastatin (LIPITOR) 80 MG tablet Take 1 Tab by mouth every day. " "90 Tab 3   • lisinopril (PRINIVIL) 5 MG Tab Take 1 Tab by mouth every day. 90 Tab 3   • rivaroxaban (XARELTO) 20 MG Tab tablet Take 1 Tab by mouth with dinner. 90 Tab 3   • amoxicillin (AMOXIL) 500 MG Cap Take 1 Cap by mouth 3 times a day. (Patient not taking: Reported on 1/22/2019) 30 Cap 0   • benzonatate (TESSALON) 100 MG Cap Take 1 Cap by mouth 3 times a day as needed for Cough. (Patient not taking: Reported on 1/22/2019) 60 Cap 0   • loratadine (CLARITIN) 10 MG Tab TAKE 1 TABLET BY MOUTH ONCE DAILY. 30 Tab 0   • Ranolazine 1000 MG TABLET SR 12 HR Take 1 Tab by mouth 2 Times a Day. 180 Tab 3     No facility-administered encounter medications on file as of 1/22/2019.      Review of Systems   Constitutional: Negative for chills and fever.   HENT: Negative for congestion.    Respiratory: Negative for cough and shortness of breath.    Cardiovascular: Negative for chest pain, palpitations, orthopnea, leg swelling and PND.   Gastrointestinal: Negative for abdominal pain and nausea.   Musculoskeletal: Negative for myalgias.   Skin: Negative for rash.   Neurological: Positive for dizziness and loss of consciousness. Negative for headaches.   Endo/Heme/Allergies: Does not bruise/bleed easily.   Psychiatric/Behavioral: The patient does not have insomnia.         Objective:   /70 (BP Location: Left arm, Patient Position: Sitting, BP Cuff Size: Adult)   Pulse 74   Ht 1.702 m (5' 7\")   Wt 81.6 kg (180 lb)   BMI 28.19 kg/m²      Physical Exam   Constitutional: He is oriented to person, place, and time. He appears well-developed and well-nourished.   HENT:   Head: Normocephalic.   Eyes: EOM are normal.   Neck: Normal range of motion. Neck supple. No JVD present.   Cardiovascular: Normal rate, regular rhythm and normal heart sounds.    Pulmonary/Chest: Effort normal and breath sounds normal. No respiratory distress. He has no wheezes. He has no rales.   ILR in left chest wall.   Abdominal: Soft. Bowel sounds are " normal. He exhibits no distension. There is no tenderness.   Musculoskeletal: Normal range of motion. He exhibits no edema.   Neurological: He is alert and oriented to person, place, and time.   Skin: Skin is warm and dry. No rash noted.   Psychiatric: He has a normal mood and affect.     ILR interrogation shows 16 meliza episodes, including 1 that lasted 25 seconds on 12/25/2018 and corresponds to his symptoms. No AT/AF episodes.    Lab Results   Component Value Date/Time    SODIUM 141 12/18/2018 12:27 PM    POTASSIUM 4.6 12/18/2018 12:27 PM    CHLORIDE 105 12/18/2018 12:27 PM    CO2 26 12/18/2018 12:27 PM    GLUCOSE 104 (H) 12/18/2018 12:27 PM    BUN 13 12/18/2018 12:27 PM    CREATININE 0.97 12/18/2018 12:27 PM    CREATININE 1.0 07/12/2007 05:40 PM    BUNCREATRAT 18 06/13/2014 08:40 AM       Assessment:     1. Status post placement of implantable loop recorder     2. Syncope, unspecified syncope type     3. Coronary artery disease involving native coronary artery of native heart without angina pectoris     4. Ischemic Cardiomyopathy EF 50%     5. PAF (paroxysmal atrial fibrillation) ([facility])     6. Chronic anticoagulation     7. Essential hypertension     8. Dyslipidemia     9. Type 2 diabetes mellitus without complication, without long-term current use of insulin ([facility])         Medical Decision Making:  Today's Assessment / Status / Plan:     1. Dizziness and syncope, with meliza episode on ILR. Per discussion with Dr. JUDITH León, to refer to EP to discuss possible pacemaker.    2. CAD/ischemic cardiomyopathy, with normalized LVEF to 50% on echo in January 2018 on medical therapy.    3. Paroxysmal atrial fibrillation, on low dose Toprol, with no AT/AF episodes on ILR interrogation.    4. Chronic anticoagulation with Xarelto. He did have a GI bleed in May 2017, with no recurrence of bleeding.    5. Hypertension, treated and stable.    6. Hyperlipidemia, treated with Lipitor 80mg.    Plan as above: to refer to EP for  possible PM.  Same medications for now.    Collaborating MD: JUDITH León      No Recipients

## 2019-01-23 ENCOUNTER — TELEPHONE (OUTPATIENT)
Dept: CARDIOLOGY | Facility: MEDICAL CENTER | Age: 54
End: 2019-01-23

## 2019-01-23 NOTE — PROGRESS NOTES
Chief Complaint   Patient presents with   • Follow-Up   • Device Check   • Syncope   • Coronary Artery Disease   • Cardiomyopathy (Ischemic)   • Atrial Fibrillation   • Anticoagulation   • HTN (Controlled)   • Hyperlipidemia       Subjective:   Ryan Trevizo is a 53 y.o. male who presents today for follow-up of syncopal episode.    Ryan is a 53 year old male with history of three vessel CAD/ischemic cardiomyopathy (with LVEF normalized on medical therapy on echo in January 2018), paroxysmal atrial fibrillation, anticoagulation, hypertension and hyperlipidemia, normally followed by Dr. JUDITH León. In January 2018, he has an ILR implanted for syncope, but has not had any follow-up on it since then.    On 12/25/2018, he states he had an episode where he collapsed; he felt very dizzy and collapsed at home.  No further episodes since then. He denies any chest pain, pressure or discomfort; no palpitations; no shortness of breath, orthopnea or PND; no LE edema.    Past Medical History:   Diagnosis Date   • A-fib ([facility])    • ACS (acute coronary syndrome) ([facility])    • Anxiety disorder    • Arthritis    • Blood glucose elevated    • CAD (coronary artery disease)    • Claudication ([facility])    • Diabetes ([facility])    • Dizziness    • GERD (gastroesophageal reflux disease)    • Glaucoma    • HTN (hypertension)    • Hyperlipemia    • Hypertriglyceridemia    • Ischemic cardiomyopathy    • Mental retardation    • Osteoarthritis    • Pericarditis secondary to acute myocardial infarction ([facility])    • Right knee pain    • SOB (shortness of breath)    • STEMI (ST elevation myocardial infarction) ([facility])    • Syncope    • Upper GI bleed      Past Surgical History:   Procedure Laterality Date   • OTHER CARDIAC SURGERY Left 01/12/2018    Medtronic Reveal LINQ LNQ11 implanted by Dr. Blake   • GASTROSCOPY-ENDO  5/19/2017    Procedure: GASTROSCOPY-ENDO;  Surgeon: Reinaldo Berry M.D.;  Location: ENDOSCOPY [facility];  Service:    •  "GASTROSCOPY-ENDO  7/26/2012    Performed by JORDIN VERA at ENDOSCOPY [facility]   • GASTROSCOPY-ENDO  7/25/2012    Performed by JORDIN VERA at [facility]OSCOPY [facility]   • OTHER ORTHOPEDIC SURGERY  7-     R knee surgery   • OTHER CARDIAC SURGERY      stent placement     No family history on file.  Social History     Social History   • Marital status: Single     Spouse name: N/A   • Number of children: N/A   • Years of education: N/A     Occupational History   • Not on file.     Social History Main Topics   • Smoking status: Never Smoker   • Smokeless tobacco: Never Used   • Alcohol use No   • Drug use: No   • Sexual activity: Not on file      Comment: Single, has no children, works as an .     Other Topics Concern   • Not on file     Social History Narrative    ** Merged History Encounter **         ** Merged History Encounter **          Allergies   Allergen Reactions   • Ritalin [Methylphenidate] Unspecified     \"Hyper\"     Outpatient Encounter Prescriptions as of 1/22/2019   Medication Sig Dispense Refill   • metoprolol SR (TOPROL XL) 25 MG TABLET SR 24 HR Take 0.5 Tabs by mouth every day. 90 Tab 3   • pantoprazole (PROTONIX) 40 MG Tablet Delayed Response TAKE 1 TABLET BY MOUTH ONCE DAILY. 30 Tab 0   • ASPIR-LOW 81 MG EC tablet TAKE 1 TABLET BY MOUTH ONCE DAILY. 30 Tab 11   • vitamin D3, cholecalciferol, 1000 UNIT Tab TAKE 1 TABLET BY MOUTH ONCE DAILY. 90 Tab 0   • cyanocobalamin (VITAMIN B12) 1000 MCG Tab Take 1 Tab by mouth every day. 90 Tab 3   • metformin (GLUCOPHAGE) 1000 MG tablet Take 1 Tab by mouth 2 times a day, with meals. 180 Tab 3   • atorvastatin (LIPITOR) 80 MG tablet Take 1 Tab by mouth every day. 90 Tab 3   • lisinopril (PRINIVIL) 5 MG Tab Take 1 Tab by mouth every day. 90 Tab 3   • rivaroxaban (XARELTO) 20 MG Tab tablet Take 1 Tab by mouth with dinner. 90 Tab 3   • amoxicillin (AMOXIL) 500 MG Cap Take 1 Cap by mouth 3 times a day. (Patient not taking: Reported on " "1/22/2019) 30 Cap 0   • benzonatate (TESSALON) 100 MG Cap Take 1 Cap by mouth 3 times a day as needed for Cough. (Patient not taking: Reported on 1/22/2019) 60 Cap 0   • loratadine (CLARITIN) 10 MG Tab TAKE 1 TABLET BY MOUTH ONCE DAILY. 30 Tab 0   • Ranolazine 1000 MG TABLET SR 12 HR Take 1 Tab by mouth 2 Times a Day. 180 Tab 3     No facility-administered encounter medications on file as of 1/22/2019.      Review of Systems   Constitutional: Negative for chills and fever.   HENT: Negative for congestion.    Respiratory: Negative for cough and shortness of breath.    Cardiovascular: Negative for chest pain, palpitations, orthopnea, leg swelling and PND.   Gastrointestinal: Negative for abdominal pain and nausea.   Musculoskeletal: Negative for myalgias.   Skin: Negative for rash.   Neurological: Positive for dizziness and loss of consciousness. Negative for headaches.   Endo/Heme/Allergies: Does not bruise/bleed easily.   Psychiatric/Behavioral: The patient does not have insomnia.         Objective:   /70 (BP Location: Left arm, Patient Position: Sitting, BP Cuff Size: Adult)   Pulse 74   Ht 1.702 m (5' 7\")   Wt 81.6 kg (180 lb)   BMI 28.19 kg/m²     Physical Exam   Constitutional: He is oriented to person, place, and time. He appears well-developed and well-nourished.   HENT:   Head: Normocephalic.   Eyes: EOM are normal.   Neck: Normal range of motion. Neck supple. No JVD present.   Cardiovascular: Normal rate, regular rhythm and normal heart sounds.    Pulmonary/Chest: Effort normal and breath sounds normal. No respiratory distress. He has no wheezes. He has no rales.   ILR in left chest wall.   Abdominal: Soft. Bowel sounds are normal. He exhibits no distension. There is no tenderness.   Musculoskeletal: Normal range of motion. He exhibits no edema.   Neurological: He is alert and oriented to person, place, and time.   Skin: Skin is warm and dry. No rash noted.   Psychiatric: He has a normal mood and " affect.     ILR interrogation shows 16 meliza episodes, including 1 that lasted 25 seconds on 12/25/2018 and corresponds to his symptoms. No AT/AF episodes.    Lab Results   Component Value Date/Time    SODIUM 141 12/18/2018 12:27 PM    POTASSIUM 4.6 12/18/2018 12:27 PM    CHLORIDE 105 12/18/2018 12:27 PM    CO2 26 12/18/2018 12:27 PM    GLUCOSE 104 (H) 12/18/2018 12:27 PM    BUN 13 12/18/2018 12:27 PM    CREATININE 0.97 12/18/2018 12:27 PM    CREATININE 1.0 07/12/2007 05:40 PM    BUNCREATRAT 18 06/13/2014 08:40 AM       Assessment:     1. Status post placement of implantable loop recorder     2. Syncope, unspecified syncope type     3. Coronary artery disease involving native coronary artery of native heart without angina pectoris     4. Ischemic Cardiomyopathy EF 50%     5. PAF (paroxysmal atrial fibrillation) ([facility])     6. Chronic anticoagulation     7. Essential hypertension     8. Dyslipidemia     9. Type 2 diabetes mellitus without complication, without long-term current use of insulin ([facility])         Medical Decision Making:  Today's Assessment / Status / Plan:     1. Dizziness and syncope, with meliza episode on ILR. Per discussion with Dr. JUDITH León, to refer to EP to discuss possible pacemaker.    2. CAD/ischemic cardiomyopathy, with normalized LVEF to 50% on echo in January 2018 on medical therapy.    3. Paroxysmal atrial fibrillation, on low dose Toprol, with no AT/AF episodes on ILR interrogation.    4. Chronic anticoagulation with Xarelto. He did have a GI bleed in May 2017, with no recurrence of bleeding.    5. Hypertension, treated and stable.    6. Hyperlipidemia, treated with Lipitor 80mg.    Plan as above: to refer to EP for possible PM.  Same medications for now.    Collaborating MD: JUDITH León

## 2019-01-23 NOTE — TELEPHONE ENCOUNTER
----- Message from BRUNO CouchPDAISY sent at 1/23/2019 10:56 AM PST -----  Regarding: RE: Referral to EP for possible PM  Yes please!  Thanks, AB  ----- Message -----  From: Radha [facility]'t  Sent: 1/23/2019   7:54 AM  To: YONAS Couch  Subject: RE: Referral to EP for possible PM               Jackie,    Per Dr. Blake, EP doesn't consult for Pacemaker inserts anymore. Does Dr. León just want to order the pacemaker?    Thank You,  Radha  ----- Message -----  From: YONAS Couch  Sent: 1/22/2019   7:51 PM  To: Leoncio Rodriguez Ass't  Subject: Referral to EP for possible PM                   Radha    I saw this [place] patient at  today - he has an ILR, and it showed a meliza episode of 25 seconds on 12/25/2018, which corresponds with his dizziness and syncope.  Per discussion with Dr. León, would like to refer to EP to discuss possible PM implantation.  Can you please call his  (Humza Zelaya) at [phone] ex. [phone], who manages his workers comp to schedule this? He knows to expect your call.  Thank you!  AB

## 2019-01-23 NOTE — TELEPHONE ENCOUNTER
Dr. Blake,    I'm going to schedule this PM insert with you. He is taking Xarelto. Would you like to hold the Xarelto for this procedure?    Thank You,  Radha

## 2019-01-24 NOTE — TELEPHONE ENCOUNTER
Patient scheduled for PM insert on 2-12-19 at [facility] with Dr. Blake. I went over all of details with Humza over the phone.

## 2019-02-05 ENCOUNTER — ANTICOAGULATION MONITORING (OUTPATIENT)
Dept: VASCULAR LAB | Facility: MEDICAL CENTER | Age: 54
End: 2019-02-05

## 2019-02-05 DIAGNOSIS — I48.0 PAF (PAROXYSMAL ATRIAL FIBRILLATION) (HCC): ICD-10-CM

## 2019-02-06 NOTE — PROGRESS NOTES
Latest labs reviewed in regards to anticoagulation therapy with Xarelto.  Lab Results   Component Value Date/Time    SODIUM 141 12/18/2018 12:27 PM    POTASSIUM 4.6 12/18/2018 12:27 PM    CHLORIDE 105 12/18/2018 12:27 PM    CO2 26 12/18/2018 12:27 PM    GLUCOSE 104 (H) 12/18/2018 12:27 PM    BUN 13 12/18/2018 12:27 PM    CREATININE 0.97 12/18/2018 12:27 PM    CREATININE 1.0 07/12/2007 05:40 PM    BUNCREATRAT 18 06/13/2014 08:40 AM      Lab Results   Component Value Date/Time    WBC 5.9 12/18/2018 12:27 PM    RBC 4.07 (L) 12/18/2018 12:27 PM    HEMOGLOBIN 13.3 (L) 12/18/2018 12:27 PM    HEMATOCRIT 39.8 (L) 12/18/2018 12:27 PM    MCV 97.8 12/18/2018 12:27 PM    MCH 32.7 12/18/2018 12:27 PM    MCHC 33.4 (L) 12/18/2018 12:27 PM    MPV 9.9 12/18/2018 12:27 PM    NEUTSPOLYS 71.00 12/18/2018 12:27 PM    LYMPHOCYTES 18.60 (L) 12/18/2018 12:27 PM    MONOCYTES 7.90 12/18/2018 12:27 PM    EOSINOPHILS 1.50 12/18/2018 12:27 PM    BASOPHILS 0.50 12/18/2018 12:27 PM    ANISOCYTOSIS 1+ 07/26/2012 11:00 AM      Current renal indices support dosing of Xarelto 20mg daily.  Patient set for PM implantation 2-12-19.  Cardiology has advised to hold two doses in preparation for procedure, will defer to these instructions.  Will follow up with patient in six months with updated labs.  Reinier Bah, PharmD

## 2019-02-11 DIAGNOSIS — Z01.810 PRE-OPERATIVE CARDIOVASCULAR EXAMINATION: ICD-10-CM

## 2019-02-11 DIAGNOSIS — Z01.812 PRE-OPERATIVE LABORATORY EXAMINATION: ICD-10-CM

## 2019-02-11 LAB
ALBUMIN SERPL BCP-MCNC: 4.5 G/DL (ref 3.2–4.9)
ALBUMIN/GLOB SERPL: 1.7 G/DL
ALP SERPL-CCNC: 72 U/L (ref 30–99)
ALT SERPL-CCNC: 22 U/L (ref 2–50)
ANION GAP SERPL CALC-SCNC: 7 MMOL/L (ref 0–11.9)
AST SERPL-CCNC: 12 U/L (ref 12–45)
BILIRUB SERPL-MCNC: 1.6 MG/DL (ref 0.1–1.5)
BUN SERPL-MCNC: 12 MG/DL (ref 8–22)
CALCIUM SERPL-MCNC: 9.9 MG/DL (ref 8.5–10.5)
CHLORIDE SERPL-SCNC: 105 MMOL/L (ref 96–112)
CO2 SERPL-SCNC: 25 MMOL/L (ref 20–33)
CREAT SERPL-MCNC: 0.87 MG/DL (ref 0.5–1.4)
EKG IMPRESSION: NORMAL
ERYTHROCYTE [DISTWIDTH] IN BLOOD BY AUTOMATED COUNT: 47.1 FL (ref 35.9–50)
GLOBULIN SER CALC-MCNC: 2.6 G/DL (ref 1.9–3.5)
GLUCOSE SERPL-MCNC: 133 MG/DL (ref 65–99)
HCT VFR BLD AUTO: 42.9 % (ref 42–52)
HGB BLD-MCNC: 14.6 G/DL (ref 14–18)
INR PPP: 1.54 (ref 0.87–1.13)
MCH RBC QN AUTO: 33 PG (ref 27–33)
MCHC RBC AUTO-ENTMCNC: 34 G/DL (ref 33.7–35.3)
MCV RBC AUTO: 97.1 FL (ref 81.4–97.8)
PLATELET # BLD AUTO: 265 K/UL (ref 164–446)
PMV BLD AUTO: 9.7 FL (ref 9–12.9)
POTASSIUM SERPL-SCNC: 4.4 MMOL/L (ref 3.6–5.5)
PROT SERPL-MCNC: 7.1 G/DL (ref 6–8.2)
PROTHROMBIN TIME: 18.5 SEC (ref 12–14.6)
RBC # BLD AUTO: 4.42 M/UL (ref 4.7–6.1)
SODIUM SERPL-SCNC: 137 MMOL/L (ref 135–145)
TSH SERPL DL<=0.005 MIU/L-ACNC: 3.21 UIU/ML (ref 0.38–5.33)
WBC # BLD AUTO: 5.7 K/UL (ref 4.8–10.8)

## 2019-02-11 PROCEDURE — 80053 COMPREHEN METABOLIC PANEL: CPT

## 2019-02-11 PROCEDURE — 85027 COMPLETE CBC AUTOMATED: CPT

## 2019-02-11 PROCEDURE — 85610 PROTHROMBIN TIME: CPT

## 2019-02-11 PROCEDURE — 36415 COLL VENOUS BLD VENIPUNCTURE: CPT

## 2019-02-11 PROCEDURE — 93010 ELECTROCARDIOGRAM REPORT: CPT | Performed by: INTERNAL MEDICINE

## 2019-02-11 PROCEDURE — 93005 ELECTROCARDIOGRAM TRACING: CPT

## 2019-02-11 PROCEDURE — 84443 ASSAY THYROID STIM HORMONE: CPT

## 2019-02-12 ENCOUNTER — APPOINTMENT (OUTPATIENT)
Dept: RADIOLOGY | Facility: MEDICAL CENTER | Age: 54
End: 2019-02-12
Attending: INTERNAL MEDICINE
Payer: MEDICARE

## 2019-02-12 ENCOUNTER — HOSPITAL ENCOUNTER (OUTPATIENT)
Facility: MEDICAL CENTER | Age: 54
End: 2019-02-13
Attending: INTERNAL MEDICINE | Admitting: INTERNAL MEDICINE
Payer: MEDICARE

## 2019-02-12 LAB — EKG IMPRESSION: NORMAL

## 2019-02-12 PROCEDURE — 160002 HCHG RECOVERY MINUTES (STAT)

## 2019-02-12 PROCEDURE — 33208 INSRT HEART PM ATRIAL & VENT: CPT | Mod: KX | Performed by: INTERNAL MEDICINE

## 2019-02-12 PROCEDURE — 33208 INSRT HEART PM ATRIAL & VENT: CPT

## 2019-02-12 PROCEDURE — 700101 HCHG RX REV CODE 250

## 2019-02-12 PROCEDURE — C1892 INTRO/SHEATH,FIXED,PEEL-AWAY: HCPCS

## 2019-02-12 PROCEDURE — 93010 ELECTROCARDIOGRAM REPORT: CPT | Performed by: INTERNAL MEDICINE

## 2019-02-12 PROCEDURE — A9270 NON-COVERED ITEM OR SERVICE: HCPCS

## 2019-02-12 PROCEDURE — 33286 RMVL SUBQ CAR RHYTHM MNTR: CPT

## 2019-02-12 PROCEDURE — 71045 X-RAY EXAM CHEST 1 VIEW: CPT

## 2019-02-12 PROCEDURE — C1785 PMKR, DUAL, RATE-RESP: HCPCS

## 2019-02-12 PROCEDURE — G0378 HOSPITAL OBSERVATION PER HR: HCPCS

## 2019-02-12 PROCEDURE — 99153 MOD SED SAME PHYS/QHP EA: CPT

## 2019-02-12 PROCEDURE — C1898 LEAD, PMKR, OTHER THAN TRANS: HCPCS

## 2019-02-12 PROCEDURE — 700102 HCHG RX REV CODE 250 W/ 637 OVERRIDE(OP): Performed by: INTERNAL MEDICINE

## 2019-02-12 PROCEDURE — 304952 HCHG R 2 PADS

## 2019-02-12 PROCEDURE — 700111 HCHG RX REV CODE 636 W/ 250 OVERRIDE (IP): Performed by: INTERNAL MEDICINE

## 2019-02-12 PROCEDURE — 304853 HCHG PPM TEST CABLE

## 2019-02-12 PROCEDURE — 99152 MOD SED SAME PHYS/QHP 5/>YRS: CPT

## 2019-02-12 PROCEDURE — 93005 ELECTROCARDIOGRAM TRACING: CPT | Performed by: INTERNAL MEDICINE

## 2019-02-12 PROCEDURE — 99152 MOD SED SAME PHYS/QHP 5/>YRS: CPT | Performed by: INTERNAL MEDICINE

## 2019-02-12 PROCEDURE — 96365 THER/PROPH/DIAG IV INF INIT: CPT | Mod: XU

## 2019-02-12 PROCEDURE — 700102 HCHG RX REV CODE 250 W/ 637 OVERRIDE(OP)

## 2019-02-12 PROCEDURE — 700111 HCHG RX REV CODE 636 W/ 250 OVERRIDE (IP)

## 2019-02-12 PROCEDURE — A9270 NON-COVERED ITEM OR SERVICE: HCPCS | Performed by: INTERNAL MEDICINE

## 2019-02-12 PROCEDURE — 302736 HCHG ADHESIVE DERMABOND

## 2019-02-12 PROCEDURE — 305387 HCHG SUTURES

## 2019-02-12 RX ORDER — MIDAZOLAM HYDROCHLORIDE 1 MG/ML
INJECTION INTRAMUSCULAR; INTRAVENOUS
Status: COMPLETED
Start: 2019-02-12 | End: 2019-02-12

## 2019-02-12 RX ORDER — CEFAZOLIN SODIUM 1 G/3ML
INJECTION, POWDER, FOR SOLUTION INTRAMUSCULAR; INTRAVENOUS
Status: COMPLETED
Start: 2019-02-12 | End: 2019-02-12

## 2019-02-12 RX ORDER — METOPROLOL SUCCINATE 25 MG/1
12.5 TABLET, EXTENDED RELEASE ORAL DAILY
Status: DISCONTINUED | OUTPATIENT
Start: 2019-02-12 | End: 2019-02-13 | Stop reason: HOSPADM

## 2019-02-12 RX ORDER — PANTOPRAZOLE SODIUM 40 MG/1
40 TABLET, DELAYED RELEASE ORAL DAILY
Status: DISCONTINUED | OUTPATIENT
Start: 2019-02-12 | End: 2019-02-12

## 2019-02-12 RX ORDER — OMEPRAZOLE 20 MG/1
20 CAPSULE, DELAYED RELEASE ORAL DAILY
Status: DISCONTINUED | OUTPATIENT
Start: 2019-02-12 | End: 2019-02-13 | Stop reason: HOSPADM

## 2019-02-12 RX ORDER — ATORVASTATIN CALCIUM 80 MG/1
80 TABLET, FILM COATED ORAL DAILY
Status: DISCONTINUED | OUTPATIENT
Start: 2019-02-12 | End: 2019-02-13 | Stop reason: HOSPADM

## 2019-02-12 RX ORDER — ACETAMINOPHEN 325 MG/1
TABLET ORAL
Status: DISPENSED
Start: 2019-02-12 | End: 2019-02-13

## 2019-02-12 RX ORDER — LORATADINE 10 MG/1
10 TABLET ORAL DAILY
Status: DISCONTINUED | OUTPATIENT
Start: 2019-02-12 | End: 2019-02-13 | Stop reason: HOSPADM

## 2019-02-12 RX ORDER — LISINOPRIL 5 MG/1
5 TABLET ORAL DAILY
Status: DISCONTINUED | OUTPATIENT
Start: 2019-02-12 | End: 2019-02-13 | Stop reason: HOSPADM

## 2019-02-12 RX ORDER — LIDOCAINE HYDROCHLORIDE 20 MG/ML
INJECTION, SOLUTION INFILTRATION; PERINEURAL
Status: COMPLETED
Start: 2019-02-12 | End: 2019-02-12

## 2019-02-12 RX ORDER — CEFAZOLIN SODIUM 2 G/100ML
2 INJECTION, SOLUTION INTRAVENOUS ONCE
Status: COMPLETED | OUTPATIENT
Start: 2019-02-12 | End: 2019-02-12

## 2019-02-12 RX ORDER — ACETAMINOPHEN 325 MG/1
650 TABLET ORAL EVERY 4 HOURS PRN
Status: DISCONTINUED | OUTPATIENT
Start: 2019-02-12 | End: 2019-02-13 | Stop reason: HOSPADM

## 2019-02-12 RX ORDER — RANOLAZINE 500 MG/1
1000 TABLET, EXTENDED RELEASE ORAL 2 TIMES DAILY
Status: DISCONTINUED | OUTPATIENT
Start: 2019-02-12 | End: 2019-02-13 | Stop reason: HOSPADM

## 2019-02-12 RX ADMIN — CEFAZOLIN SODIUM 2 G: 2 INJECTION, SOLUTION INTRAVENOUS at 20:08

## 2019-02-12 RX ADMIN — RANOLAZINE 1000 MG: 500 TABLET, FILM COATED, EXTENDED RELEASE ORAL at 18:04

## 2019-02-12 RX ADMIN — ASPIRIN 81 MG: 81 TABLET, COATED ORAL at 17:16

## 2019-02-12 RX ADMIN — METFORMIN HYDROCHLORIDE 1000 MG: 500 TABLET, FILM COATED ORAL at 17:15

## 2019-02-12 RX ADMIN — LORATADINE 10 MG: 10 TABLET ORAL at 17:15

## 2019-02-12 RX ADMIN — METOPROLOL SUCCINATE 12.5 MG: 25 TABLET, EXTENDED RELEASE ORAL at 17:15

## 2019-02-12 RX ADMIN — ACETAMINOPHEN 650 MG: 325 TABLET, FILM COATED ORAL at 15:53

## 2019-02-12 RX ADMIN — ACETAMINOPHEN 650 MG: 325 TABLET, FILM COATED ORAL at 20:08

## 2019-02-12 RX ADMIN — MIDAZOLAM HYDROCHLORIDE 2 MG: 1 INJECTION, SOLUTION INTRAMUSCULAR; INTRAVENOUS at 11:39

## 2019-02-12 RX ADMIN — LISINOPRIL 5 MG: 5 TABLET ORAL at 17:16

## 2019-02-12 RX ADMIN — MIDAZOLAM HYDROCHLORIDE 1 MG: 1 INJECTION, SOLUTION INTRAMUSCULAR; INTRAVENOUS at 11:44

## 2019-02-12 RX ADMIN — MIDAZOLAM HYDROCHLORIDE 2 MG: 1 INJECTION, SOLUTION INTRAMUSCULAR; INTRAVENOUS at 11:15

## 2019-02-12 RX ADMIN — LIDOCAINE HYDROCHLORIDE: 20 INJECTION, SOLUTION INFILTRATION; PERINEURAL at 11:14

## 2019-02-12 RX ADMIN — CEFAZOLIN 3000 MG: 330 INJECTION, POWDER, FOR SOLUTION INTRAMUSCULAR; INTRAVENOUS at 11:10

## 2019-02-12 RX ADMIN — FENTANYL CITRATE 100 MCG: 50 INJECTION, SOLUTION INTRAMUSCULAR; INTRAVENOUS at 11:15

## 2019-02-12 RX ADMIN — OMEPRAZOLE 20 MG: 20 CAPSULE, DELAYED RELEASE ORAL at 17:15

## 2019-02-12 RX ADMIN — ATORVASTATIN CALCIUM 80 MG: 80 TABLET, FILM COATED ORAL at 18:04

## 2019-02-12 ASSESSMENT — PATIENT HEALTH QUESTIONNAIRE - PHQ9
2. FEELING DOWN, DEPRESSED, IRRITABLE, OR HOPELESS: NOT AT ALL
SUM OF ALL RESPONSES TO PHQ9 QUESTIONS 1 AND 2: 0
1. LITTLE INTEREST OR PLEASURE IN DOING THINGS: NOT AT ALL

## 2019-02-12 ASSESSMENT — ENCOUNTER SYMPTOMS
EYE PAIN: 0
DEPRESSION: 0
NAUSEA: 0
LOSS OF CONSCIOUSNESS: 0
PALPITATIONS: 0
COUGH: 0
BLURRED VISION: 0
CHILLS: 0
VOMITING: 0
FEVER: 0
SPEECH CHANGE: 0
HEMOPTYSIS: 0
NERVOUS/ANXIOUS: 0
BRUISES/BLEEDS EASILY: 0
EYE DISCHARGE: 0
DIZZINESS: 1
MYALGIAS: 0
ABDOMINAL PAIN: 0
WHEEZING: 0

## 2019-02-12 ASSESSMENT — COGNITIVE AND FUNCTIONAL STATUS - GENERAL
CLIMB 3 TO 5 STEPS WITH RAILING: A LITTLE
STANDING UP FROM CHAIR USING ARMS: A LITTLE
SUGGESTED CMS G CODE MODIFIER DAILY ACTIVITY: CK
EATING MEALS: A LITTLE
TURNING FROM BACK TO SIDE WHILE IN FLAT BAD: A LITTLE
MOBILITY SCORE: 18
DRESSING REGULAR LOWER BODY CLOTHING: A LITTLE
HELP NEEDED FOR BATHING: A LITTLE
PERSONAL GROOMING: A LITTLE
MOVING TO AND FROM BED TO CHAIR: A LITTLE
DRESSING REGULAR UPPER BODY CLOTHING: A LITTLE
TOILETING: A LITTLE
DAILY ACTIVITIY SCORE: 18
WALKING IN HOSPITAL ROOM: A LITTLE
MOVING FROM LYING ON BACK TO SITTING ON SIDE OF FLAT BED: A LITTLE
SUGGESTED CMS G CODE MODIFIER MOBILITY: CK

## 2019-02-12 NOTE — OP REPORT
Electrophysiology Procedure Note  [facility]    PROCEDURE PERFORMED: Dual-chamber pacemaker, moderate sedation administered by RN and supervised by physician, removal of implantable loop recorder    : Ibrahima Blake MD    ASSISTANT: none    ANESTHESIA: Moderate sedation,  start time 1116, stop time 1145  the moderate sedation document has been reviewed, signed and scanned into media     EBL: 30 cc    SPECIMENS: None    INDICATION: Symptomatic sinus pauses    PRE PROCEDURE ECG: Sinus rhythm    POST PROCEDURE ECG: Sinus rhythm     COMPLICATIONS: None    DESCRIPTION OF PROCEDURE:  After informed written consent, the patient was brought to the electrophysiology lab in the fasting, unsedated state. The patient was prepped and draped in the usual sterile fashion. The procedure was performed under moderate sedation with local anesthetic.   A left infraclavicular incision was made with a scalpel and the pectoral device pocket was created using a combination of blunt dissection and electrocautery. The modified Seldinger technique was used to gain access to the left axillary vein times two. Two peel-away hemostasis sheaths were placed in the vein. Under fluoroscopic guidance, the pacemaker leads were introduced into the heart. The ventricular lead was advanced to the RVOT and then lowered into position at the RV apex. The atrial lead was positioned in the right atrial appendage. The leads were fixated in normal mechanism. The leads were tested and had satisfactory sensing and pacing parameters. High output ventricular pacing did not produce extracardiac stimulation. The leads were sutured to the underlying pectoral muscle with interrupted non absorbable suture over a silastic suture sleeve. The device pocket was irrigated with antibiotic solution, inspected, and no bleeding was seen. The leads were connected to the dual chamber pacemaker pulse generator and the device was inserted into the pocket The  wound was closed with three layers of absorbable sutures.  Next, the skin above the ILR was anesthetized and 2 cm incision performed. The ILR was then removed.  The incision was closed with 4.0 vicryl and dermabond. Sterile dressing was applied.   I personally supervised the administration of moderate sedation by the RN and observed the level of consciousness and physiologic status throughout the procedure.   Following recovery from sedation, the patient was transferred to a monitored bed in good condition.     IMPLANTED DEVICE INFORMATION:  Pulse generator is a MedZanbato model W3DR01   Serial # [id number]    LEAD INFORMATION:  1)Right atrial lead is a Medtronic model #5076-52, serial # [id number],P wave 5.3 millivolts, threshold 1.25 volts, pacing impedance 1292 ohms.    2)Right ventricular lead is a Medtronic model #5076-58, serial # [id number],R wave 5.8 millivolts, threshold 0.5 volts, pacing impedance 475 ohms.    DEVICE PROGRAMMING:  DDDR with search    FLUOROSCOPY TIME: 1.5 min    IMPRESSIONS:  1. Successful dual-chamber pacemaker implantation.  2.  Removal of ILR    RECOMMENDATIONS:  1. Transfer to monitored bed  2. PA and lateral chest x-ray  3. Device interrogation prior to hospital discharge  4. Followup in device clinic

## 2019-02-12 NOTE — OR NURSING
1201 Pt report received from cath lab RN, pt brought over on a gurney by cath lab RN, pt placed on Tele monitor, VSS, no C/O pain at this. Left upper quadrant PPM site is clean, dry and soft, dressing intact. Sling to left arm, pt given water and a snack.     1215 Pt left upper quadrant site clean, dry and soft, no C/O pain     1230 Pt left upper quadrant site clean, dry and soft, no C/O pain     1245 Pt left upper quadrant site clean, dry and soft, no C/O pain     1645 Pt report given to accepting RN on tele floor, pt and family updated on transfer to room. Pt taken up to tele floor with RN on monitor. PPM site reviewed with accepting RN.

## 2019-02-12 NOTE — H&P
Physician H&P    Patient ID:  Ryan Trevizo  [id number]  53 y.o. male  1965    History:  Primary Diagnosis: Symptomatic sinus bradycardia with narrow QRS for permanent pacemaker    HPI:  Patient of Dr. Joseph Dixon with significant coronary disease and low normal ejection fraction.  Somewhat mentally challenged.  Medical therapy for coronary artery disease.  Previous syncope.  ILR showed symptomatic bradycardia.  Patient here for permanent pacemaker.  No constitutional symptoms.  No chest pain.  Narrow QRS normal RI    Past Medical History:  has a past medical history of A-fib ([facility]); ACS (acute coronary syndrome) ([facility]); Anxiety disorder; Arthritis; Blood glucose elevated; CAD (coronary artery disease); Claudication ([facility]); Diabetes ([facility]); Dizziness; GERD (gastroesophageal reflux disease); Glaucoma; HTN (hypertension); Hyperlipemia; Hypertriglyceridemia; Ischemic cardiomyopathy (05/2018); Mental retardation; Mentally challenged; Osteoarthritis; Pericarditis secondary to acute myocardial infarction ([facility]); SOB (shortness of breath); STEMI (ST elevation myocardial infarction) ([facility]); Syncope; and Upper GI bleed (05/2017). He also has no past medical history of COPD; Liver disease; Other general symptoms(780.99); or Seizure disorder ([facility]).  Past Surgical History:  has a past surgical history that includes other orthopedic surgery (7- ); other cardiac surgery; gastroscopy-endo (7/25/2012); gastroscopy-endo (7/26/2012); gastroscopy-endo (5/19/2017); and other cardiac surgery (Left, 01/12/2018).  Past Social History:  reports that he has never smoked. He has never used smokeless tobacco. He reports that he does not drink alcohol or use drugs.  Past Family History: History reviewed. No pertinent family history.  Allergies: Ritalin [methylphenidate]    Current Medications:  Prior to Admission medications    Medication Sig Start Date End Date Taking? Authorizing Provider   metoprolol SR (TOPROL XL) 25 MG TABLET  SR 24 HR Take 0.5 Tabs by mouth every day. 12/31/18   Joseph León M.D.   pantoprazole (PROTONIX) 40 MG Tablet Delayed Response TAKE 1 TABLET BY MOUTH ONCE DAILY. 12/21/18   Juancho Jenkins M.D.   loratadine (CLARITIN) 10 MG Tab TAKE 1 TABLET BY MOUTH ONCE DAILY. 12/21/18   Juancho Jenkins M.D.   ASPIR-LOW 81 MG EC tablet TAKE 1 TABLET BY MOUTH ONCE DAILY. 11/27/18   Juancho Jenkins M.D.   vitamin D3, cholecalciferol, 1000 UNIT Tab TAKE 1 TABLET BY MOUTH ONCE DAILY. 11/27/18   Juancho Jenkins M.D.   Ranolazine 1000 MG TABLET SR 12 HR Take 1 Tab by mouth 2 Times a Day. 10/30/18   Joseph León M.D.   cyanocobalamin (VITAMIN B12) 1000 MCG Tab Take 1 Tab by mouth every day. 7/23/18   Juancho Jenkins M.D.   metformin (GLUCOPHAGE) 1000 MG tablet Take 1 Tab by mouth 2 times a day, with meals. 7/23/18   Juancho Jenkins M.D.   atorvastatin (LIPITOR) 80 MG tablet Take 1 Tab by mouth every day. 7/18/18   Carly Gill, A.P.N.   lisinopril (PRINIVIL) 5 MG Tab Take 1 Tab by mouth every day. 7/18/18   Carly Gill, A.P.N.   rivaroxaban (XARELTO) 20 MG Tab tablet Take 1 Tab by mouth with dinner. 7/18/18   Carly Gill, A.P.N.       Review of Systems:  Review of Systems   Constitutional: Negative for chills and fever.   HENT: Negative for congestion.    Eyes: Negative for blurred vision, pain and discharge.   Respiratory: Negative for cough, hemoptysis and wheezing.    Cardiovascular: Negative for chest pain and palpitations.   Gastrointestinal: Negative for abdominal pain, nausea and vomiting.   Musculoskeletal: Negative for joint pain and myalgias.   Skin: Negative for itching and rash.   Neurological: Positive for dizziness. Negative for speech change and loss of consciousness.   Endo/Heme/Allergies: Does not bruise/bleed easily.   Psychiatric/Behavioral: Negative for depression. The patient is not nervous/anxious.    All other systems reviewed and are negative.    Blood pressure 139/93,  "pulse 78, temperature 36.1 °C (97 °F), temperature source Temporal, resp. rate 20, height 1.727 m (5' 8\"), weight 82.2 kg (181 lb 3.5 oz), SpO2 97 %.    Physical Examination:  Physical Exam   Constitutional: He is oriented to person, place, and time. He appears well-developed. No distress.   HENT:   Mouth/Throat: Mucous membranes are normal.   Eyes: Conjunctivae and EOM are normal.   Neck: No JVD present. No tracheal deviation present. No thyroid mass and no thyromegaly present.   Cardiovascular: Normal rate, regular rhythm and intact distal pulses.    No murmur heard.  Pulmonary/Chest: Effort normal and breath sounds normal. No respiratory distress. He exhibits no tenderness.   Abdominal: Soft. There is no tenderness.   Musculoskeletal: Normal range of motion. He exhibits no edema.   Neurological: He is alert and oriented to person, place, and time. He has normal strength. He displays no tremor.   Skin: Skin is warm and dry. He is not diaphoretic.   Psychiatric: He has a normal mood and affect. His behavior is normal.   Vitals reviewed.      Impression:  1.  Sick sinus syndrome for placement of permanent pacemaker and removal of ILR.  The risks, benefits, and alternatives to permanent pacemaker placement were discussed in great detail.  Specific risks mentioned to the patient including bleeding, cardiac perforation with possible tamponade possibly requiring pericardiocentesis or open heart surgery.  In addition the possibility of lead dislodgment (2-3%), pneumothorax (3%), hemothorax, infection were discussed.  Also, mentioned were the risk of death, stroke, and myocardial infarction.  The patient verbalized understanding of the potential complications and wishes to proceed with the procedure.        Pre Procedure Assessment:  Pre-Procedure Assessment - Applicable for patients receiving sedation by non-anesthesia practitioner.  Previous drug history, other anesthetic experiences, potential anesthetic problems and " choice of anesthesia assessed, discussed with patient.     No prior complications.  Results of relevant diagnostic studies reviewed.    Plan of Sedation:  IV conscious sedationPatient assessed immediately prior to sedation    ASA 3 - Patient with severe systemic disease

## 2019-02-13 ENCOUNTER — APPOINTMENT (OUTPATIENT)
Dept: RADIOLOGY | Facility: MEDICAL CENTER | Age: 54
End: 2019-02-13
Attending: INTERNAL MEDICINE
Payer: MEDICARE

## 2019-02-13 VITALS
DIASTOLIC BLOOD PRESSURE: 73 MMHG | WEIGHT: 178.13 LBS | HEART RATE: 75 BPM | TEMPERATURE: 97.1 F | SYSTOLIC BLOOD PRESSURE: 103 MMHG | OXYGEN SATURATION: 96 % | HEIGHT: 68 IN | RESPIRATION RATE: 19 BRPM | BODY MASS INDEX: 27 KG/M2

## 2019-02-13 LAB — EKG IMPRESSION: NORMAL

## 2019-02-13 PROCEDURE — 93010 ELECTROCARDIOGRAM REPORT: CPT | Performed by: INTERNAL MEDICINE

## 2019-02-13 PROCEDURE — 700102 HCHG RX REV CODE 250 W/ 637 OVERRIDE(OP): Performed by: INTERNAL MEDICINE

## 2019-02-13 PROCEDURE — 93005 ELECTROCARDIOGRAM TRACING: CPT | Performed by: INTERNAL MEDICINE

## 2019-02-13 PROCEDURE — G0378 HOSPITAL OBSERVATION PER HR: HCPCS

## 2019-02-13 PROCEDURE — A9270 NON-COVERED ITEM OR SERVICE: HCPCS | Performed by: INTERNAL MEDICINE

## 2019-02-13 PROCEDURE — 71045 X-RAY EXAM CHEST 1 VIEW: CPT

## 2019-02-13 RX ADMIN — ASPIRIN 81 MG: 81 TABLET, COATED ORAL at 06:50

## 2019-02-13 RX ADMIN — RANOLAZINE 1000 MG: 500 TABLET, FILM COATED, EXTENDED RELEASE ORAL at 06:49

## 2019-02-13 RX ADMIN — OMEPRAZOLE 20 MG: 20 CAPSULE, DELAYED RELEASE ORAL at 06:50

## 2019-02-13 RX ADMIN — LORATADINE 10 MG: 10 TABLET ORAL at 06:50

## 2019-02-13 RX ADMIN — METFORMIN HYDROCHLORIDE 1000 MG: 500 TABLET, FILM COATED ORAL at 06:50

## 2019-02-13 RX ADMIN — ACETAMINOPHEN 650 MG: 325 TABLET, FILM COATED ORAL at 06:50

## 2019-02-13 RX ADMIN — ATORVASTATIN CALCIUM 80 MG: 80 TABLET, FILM COATED ORAL at 06:51

## 2019-02-13 RX ADMIN — LISINOPRIL 5 MG: 5 TABLET ORAL at 06:51

## 2019-02-13 RX ADMIN — METOPROLOL SUCCINATE 12.5 MG: 25 TABLET, EXTENDED RELEASE ORAL at 06:51

## 2019-02-13 RX ADMIN — ACETAMINOPHEN 650 MG: 325 TABLET, FILM COATED ORAL at 11:00

## 2019-02-13 NOTE — PROGRESS NOTES
Monitor summary: 0.14/0.08/0.34   Sinus Rhythm/Sinus Tachycardia 77 - 102   with rare PVCs. couplets

## 2019-02-13 NOTE — CARE PLAN
Problem: Knowledge Deficit  Goal: Knowledge of disease process/condition, treatment plan, diagnostic tests, and medications will improve  Outcome: PROGRESSING AS EXPECTED  Discussed plan of care for today w/ pt this am, he is A+O, he verbalizes understanding of his plan of care, no questions. Emotional support given. Will monitor for educational needs. To D/C today. Discussed pt's care with Berta cardiology VAISHALI.       Problem: Pain Management  Goal: Pain level will decrease to patient's comfort goal  Outcome: PROGRESSING AS EXPECTED  Assessing every four hrs for pain per protocol; see doc flowsheets, c/o pain @ pacer insert site, discussed pt's pain with Berta TOM.

## 2019-02-13 NOTE — DISCHARGE PLANNING
"Care Transition Team Assessment    Information Source  Orientation : Oriented x 4  Information Given By: Other (Comments) (Chart Review)  Who is responsible for making decisions for patient? : Patient    Readmission Evaluation  Is this a readmission?: No    Elopement Risk  Legal Hold: No  Ambulatory or Self Mobile in Wheelchair: Yes  Disoriented: No  Psychiatric Symptoms: None  History of Wandering: No  Elopement this Admit: No  Vocalizing Wanting to Leave: No  Displays Behaviors, Body Language Wanting to Leave: No-Not at Risk for Elopement  Elopement Risk: Not at Risk for Elopement    Interdisciplinary Discharge Planning  Patient or legal guardian wants to designate a caregiver (see row info): No    Discharge Preparedness  What is your plan after discharge?: Home with help  What are your discharge supports?: Other (comment) (Significant Other, Cristal Hendrickson)  Prior Functional Level: Ambulatory, Independent with Activities of Daily Living (Pt uses bus for transport.)  Difficulity with ADLs: None  Difficulity with IADLs: Driving  Difficulity with IADL Comments: Takes bus.    Functional Assesment  Prior Functional Level: Ambulatory, Independent with Activities of Daily Living (Pt uses bus for transport.)    Finances  Financial Barriers to Discharge: No  Prescription Coverage: Yes    Values / Beliefs / Concerns  Spiritual Requests During Hospitalization: No    Domestic Abuse  Have you ever been the victim of abuse or violence?: No  Physical Abuse or Sexual Abuse: No  Verbal Abuse or Emotional Abuse: No  Possible Abuse Reported to:: Not Applicable    Psychological Assessment  History of Substance Abuse: None  History of Psychiatric Problems: No    Discharge Risks or Barriers  Patient risk factors: Cognitive / sensory / physical deficit (Pt \"mentally challenged\" per H&P.)    Anticipated Discharge Information  Anticipated discharge disposition: Home  Discharge Address: [address] [place] [address], [address], [place] [address]  Discharge " Contact Phone Number: [phone]

## 2019-02-13 NOTE — PROGRESS NOTES
· 2 RN skin check complete with SARAHI Wong.  · Devices in place N/A.  · Skin assessed under devices: gown, socks.  · Confirmed pressure ulcers found on N/A.  · New potential pressure ulcers noted on N/A. Wound consult placed and wound reported.  · The following interventions in place N/A  Bilateral ears are pink and blanching  Bilateral elbows are pink and dry  Chest has midline left dressing with old dry blood, left upper chest dressing CDI, soft non tender  Bilateral heels pink, dry and blanching  Sacrum is red and blanching

## 2019-02-13 NOTE — CARE PLAN
Problem: Communication  Goal: The ability to communicate needs accurately and effectively will improve  Outcome: PROGRESSING AS EXPECTED  Updated pt on post procedure precautions and frequent VS.    Problem: Safety  Goal: Will remain free from injury  Outcome: PROGRESSING AS EXPECTED  Pt educated on calling before getting out of bed, call light and belongings are within reach, non skid socks are on, bed is locked in lowest position.     Problem: Knowledge Deficit  Goal: Knowledge of disease process/condition, treatment plan, diagnostic tests, and medications will improve  Outcome: PROGRESSING AS EXPECTED  Pt educated on treatment plan and medications.

## 2019-02-13 NOTE — DISCHARGE SUMMARY
CHIEF COMPLAINT ON ADMISSION  Elective Admission for pacemaker implantation.     CODE STATUS  Full Code    HPI & HOSPITAL COURSE  This is a 53 y.o. year old male here for elective admission for pacemaker implantation by Dr. Blake.  He is followed longitudionally in our office by Dr. Joseph León and Jackie White.  He had an ILR that was previously placed for syncope and was found to have significnat bradycardia and pauses.  He was scheduled for pacemaker implant accordingly.    He has a history significant for history of three vessel CAD/ischemic cardiomyopathy (with LVEF normalized on medical therapy on echo in January 2018), paroxysmal atrial fibrillation, anticoagulation, hypertension, hyperlipidemia, and a form developmental delay.      Procedural Conclusion per Dr. Blake's Op Note:  IMPLANTED DEVICE INFORMATION:  Pulse generator is a [employer] model W3DR01   Serial # [id number]     LEAD INFORMATION:  1)Right atrial lead is a Medtronic model #5076-52, serial # [id number],P wave 5.3 millivolts, threshold 1.25 volts, pacing impedance 1292 ohms.     2)Right ventricular lead is a Medtronic model #5076-58, serial # [id number],R wave 5.8 millivolts, threshold 0.5 volts, pacing impedance 475 ohms.     DEVICE PROGRAMMING:  DDDR with search     IMPRESSIONS:  1. Successful dual-chamber pacemaker implantation.  2.  Removal of ILR    The patient has been seen and examined in EP rounds this AM.  His monitored rhythm is Sinus presently.  Telemetry history has been reviewed and is reviewed and without acute findings.  His laboratory data, imagining, and vital signs have been reviewed and are stable.  The patient denies any chest pain, dyspnea, dizziness, paraesthesias, or other complaints.  His physical exam is without abnormalities; specifically his left chest site is clean and dry.  There is no evidence of bleeding or heamtoma.  He has been out of bed and ambulated without difficulty.     His chest xray has been reviewed  and is without evidence of late pneumothorax and lead position does appear to be stable.  His device interrogation this AM demonstrates appropriate device function this AM.    Therefore, he is discharged in good and stable condition with close outpatient follow-up.    PROCEDURES  Medtronic dual chamber PPM, Dr. Blake 2/12/19.  See procedure note for full detail.     DISCHARGE PROBLEM LIST  1. Bradycardia and pause  2. S/P successful MDT dual chamber PPM implant.   3. Hx of paroxysmal afib  4. Anticoaguatlion    MEDICATIONS ON DISCHARGE   Ryan Trevizo   Home Medication Instructions RAMIRO:[id number]    Printed on:02/13/19 0908   Medication Information                      ASPIR-LOW 81 MG EC tablet  TAKE 1 TABLET BY MOUTH ONCE DAILY.             atorvastatin (LIPITOR) 80 MG tablet  Take 1 Tab by mouth every day.             cyanocobalamin (VITAMIN B12) 1000 MCG Tab  Take 1 Tab by mouth every day.             lisinopril (PRINIVIL) 5 MG Tab  Take 1 Tab by mouth every day.             loratadine (CLARITIN) 10 MG Tab  TAKE 1 TABLET BY MOUTH ONCE DAILY.             metformin (GLUCOPHAGE) 1000 MG tablet  Take 1 Tab by mouth 2 times a day, with meals.             metoprolol SR (TOPROL XL) 25 MG TABLET SR 24 HR  Take 0.5 Tabs by mouth every day.             pantoprazole (PROTONIX) 40 MG Tablet Delayed Response  TAKE 1 TABLET BY MOUTH ONCE DAILY.             Ranolazine 1000 MG TABLET SR 12 HR  Take 1 Tab by mouth 2 Times a Day.             rivaroxaban (XARELTO) 20 MG Tab tablet  Take 1 Tab by mouth with dinner.             vitamin D3, cholecalciferol, 1000 UNIT Tab  TAKE 1 TABLET BY MOUTH ONCE DAILY.               Medications at discharge were discussed in detail with the patient prior to discharge.     DIET  Cardiac    ACTIVITY/POST PPM Instructions:  Pacer restrictions reviewed with patient. Do not raise affected arm above head or behind back for six weeks. May remove arm sling after 24 hrs, please wear if trouble  remembering arm limitation and prn at night. No heavy lifting/pushing for six weeks. No driving for first week. No showers first week. Keep dressing clean and dry until sees us in follow up. Wound care reviewed. Instructed to report s/s of infection such as warmth/redness/drainage/swelling at site or fever/chills.  Patient verbalizes understanding.      LABORATORY  Lab Results   Component Value Date/Time    SODIUM 137 02/11/2019 10:49 AM    POTASSIUM 4.4 02/11/2019 10:49 AM    CHLORIDE 105 02/11/2019 10:49 AM    CO2 25 02/11/2019 10:49 AM    GLUCOSE 133 (H) 02/11/2019 10:49 AM    BUN 12 02/11/2019 10:49 AM    CREATININE 0.87 02/11/2019 10:49 AM    CREATININE 1.0 07/12/2007 05:40 PM        Lab Results   Component Value Date/Time    WBC 5.7 02/11/2019 10:48 AM    HEMOGLOBIN 14.6 02/11/2019 10:48 AM    HEMATOCRIT 42.9 02/11/2019 10:48 AM    PLATELETCT 265 02/11/2019 10:48 AM        FOLLOW UP  Future Appointments  Date Time Provider Department Center   2/19/2019 1:45 PM Juancho Jenkins M.D. [facility] None   4/23/2019 1:30 PM Joseph León M.D. [facility] None       SPECIFIC OUTPATIENT FOLLOW-UP  Patient will be seen in device clinic in one week for device interrogation and hospital follow up.    The above discharge plan has been discussed in detail with the patient and he verbalizes understanding and is in agreement.  Printed instructions will be provided to the patient.      Berta Arenas   2/13/2019 9:08 AM

## 2019-02-13 NOTE — PROGRESS NOTES
Discharge instructions given to patient at bedside, verbalizes understanding and states plans for follow-up. Telemetry monitor/IV cathlon removed. All belongings accounted for, all questions answered at this time. Pt medicated for pain control. Discussed pt's care with charge RN. Pt to eat lunch in lounge, waiting for his ride.

## 2019-02-13 NOTE — PROGRESS NOTES
Pt transferred from PPU, pt dressing for loop removal has dry blood, left upper chest dressing CDI, soft, non tender. Placed pt on heart monitor, verified pt name, heart rate and rhythm. Pt has no C/O pain at this time.

## 2019-02-13 NOTE — DISCHARGE INSTRUCTIONS
Discharge Instructions    Discharged to home by car with relative. Discharged via wheelchair, hospital escort: Yes.  Special equipment needed: Not Applicable    Be sure to schedule a follow-up appointment with your primary care doctor or any specialists as instructed.     Discharge Plan:   Diet Plan: Discussed - cardiac diet  Activity Level: Discussed - activity as tolerated; activity restrictions per Cardiology recommendations.   Confirmed Follow up Appointment: Appointment Scheduled  Confirmed Symptoms Management: Discussed  Medication Reconciliation Updated: Yes  Influenza Vaccine Indication: Not indicated: Previously immunized this influenza season and > 8 years of age    I understand that a diet low in cholesterol, fat, and sodium is recommended for good health. Unless I have been given specific instructions below for another diet, I accept this instruction as my diet prescription.       Special Instructions:   Wound Care/Instructions:   1. No showers until seen in follow up.  Please keep the dressing over the incision site until your follow up in our clinic   2. No lifting over 5 pounds for 6 weeks.   3. Do not raise left arm above shoulder level for 6 weeks.   4. Avoid excessive pushing, pulling, or twisting for 6 weeks.   5. Call our office ([phone]) if you notice increased swelling, redness, or any drainage at the implant site.   6. Needs to be seen in the emergency dept if develops a fever greater than 101, severe chest pain, shortness of breath.   7. No driving for one week.    · Is patient discharged on Warfarin / Coumadin?   No     Depression / Suicide Risk    As you are discharged from this [facility] facility, it is important to learn how to keep safe from harming yourself.    Recognize the warning signs:  · Abrupt changes in personality, positive or negative- including increase in energy   · Giving away possessions  · Change in eating patterns- significant weight changes-  positive or  negative  · Change in sleeping patterns- unable to sleep or sleeping all the time   · Unwillingness or inability to communicate  · Depression  · Unusual sadness, discouragement and loneliness  · Talk of wanting to die  · Neglect of personal appearance   · Rebelliousness- reckless behavior  · Withdrawal from people/activities they love  · Confusion- inability to concentrate     If you or a loved one observes any of these behaviors or has concerns about self-harm, here's what you can do:  · Talk about it- your feelings and reasons for harming yourself  · Remove any means that you might use to hurt yourself (examples: pills, rope, extension cords, firearm)  · Get professional help from the community (Mental Health, Substance Abuse, psychological counseling)  · Do not be alone:Call your Safe Contact- someone whom you trust who will be there for you.  · Call your local CRISIS HOTLINE 466-9012 or 212-759-5208  · Call your local Children's Mobile Crisis Response Team [place] (682) 991-1072 or www."[employer]  · Call the toll free National Suicide Prevention Hotlines   · National Suicide Prevention Lifeline 370-963-PAQV (7173)  · National Hope Line Network 800-SUICIDE (546-9797)

## 2019-02-13 NOTE — RESPIRATORY CARE
COPD EDUCATION by COPD CLINICAL EDUCATOR  2/13/2019 at 6:55 AM by Janay Waller     Patient reviewed by COPD education team. Patient does not qualify for COPD program.

## 2019-02-13 NOTE — CARE PLAN
Problem: Safety  Goal: Will remain free from falls  Outcome: PROGRESSING AS EXPECTED  Patient will not fall during shift.    Problem: Venous Thromboembolism (VTW)/Deep Vein Thrombosis (DVT) Prevention:  Goal: Patient will participate in Venous Thrombosis (VTE)/Deep Vein Thrombosis (DVT)Prevention Measures  Outcome: PROGRESSING AS EXPECTED  Patient will not develop DVT during shift.    Problem: Pain Management  Goal: Pain level will decrease to patient's comfort goal  Outcome: PROGRESSING AS EXPECTED  Patient will maintain comfort at rest during shift.

## 2019-02-13 NOTE — PROGRESS NOTES
Received care of pt from NOC RN this am. Pt is A+O. Ambulated around unit w/o difficulty. This RN SBA. Pt to D/C today.

## 2019-02-19 ENCOUNTER — OFFICE VISIT (OUTPATIENT)
Dept: INTERNAL MEDICINE | Facility: MEDICAL CENTER | Age: 54
End: 2019-02-19
Payer: MEDICARE

## 2019-02-19 ENCOUNTER — OFFICE VISIT (OUTPATIENT)
Dept: CARDIOLOGY | Facility: MEDICAL CENTER | Age: 54
End: 2019-02-19
Payer: MEDICARE

## 2019-02-19 ENCOUNTER — NON-PROVIDER VISIT (OUTPATIENT)
Dept: CARDIOLOGY | Facility: MEDICAL CENTER | Age: 54
End: 2019-02-19
Payer: MEDICARE

## 2019-02-19 VITALS
HEART RATE: 77 BPM | TEMPERATURE: 96.9 F | OXYGEN SATURATION: 94 % | DIASTOLIC BLOOD PRESSURE: 86 MMHG | WEIGHT: 186 LBS | HEIGHT: 67 IN | BODY MASS INDEX: 29.19 KG/M2 | SYSTOLIC BLOOD PRESSURE: 120 MMHG

## 2019-02-19 VITALS
DIASTOLIC BLOOD PRESSURE: 78 MMHG | SYSTOLIC BLOOD PRESSURE: 110 MMHG | WEIGHT: 186 LBS | BODY MASS INDEX: 29.19 KG/M2 | OXYGEN SATURATION: 90 % | HEART RATE: 94 BPM | HEIGHT: 67 IN

## 2019-02-19 DIAGNOSIS — I25.10 CORONARY ARTERY DISEASE INVOLVING NATIVE CORONARY ARTERY OF NATIVE HEART WITHOUT ANGINA PECTORIS: ICD-10-CM

## 2019-02-19 DIAGNOSIS — Z95.0 CARDIAC PACEMAKER IN SITU: ICD-10-CM

## 2019-02-19 DIAGNOSIS — E11.9 TYPE 2 DIABETES MELLITUS WITHOUT COMPLICATION, WITHOUT LONG-TERM CURRENT USE OF INSULIN (HCC): Chronic | ICD-10-CM

## 2019-02-19 DIAGNOSIS — R55 SYNCOPE, UNSPECIFIED SYNCOPE TYPE: ICD-10-CM

## 2019-02-19 DIAGNOSIS — K22.719 BARRETT'S ESOPHAGUS WITH DYSPLASIA: ICD-10-CM

## 2019-02-19 DIAGNOSIS — Z95.818 STATUS POST PLACEMENT OF IMPLANTABLE LOOP RECORDER: ICD-10-CM

## 2019-02-19 DIAGNOSIS — I10 ESSENTIAL HYPERTENSION: ICD-10-CM

## 2019-02-19 DIAGNOSIS — E55.9 VITAMIN D DEFICIENCY: ICD-10-CM

## 2019-02-19 DIAGNOSIS — I48.0 PAF (PAROXYSMAL ATRIAL FIBRILLATION) (HCC): Chronic | ICD-10-CM

## 2019-02-19 DIAGNOSIS — I25.5 CARDIOMYOPATHY, ISCHEMIC: ICD-10-CM

## 2019-02-19 DIAGNOSIS — K21.9 GASTROESOPHAGEAL REFLUX DISEASE, ESOPHAGITIS PRESENCE NOT SPECIFIED: ICD-10-CM

## 2019-02-19 DIAGNOSIS — E78.5 DYSLIPIDEMIA: ICD-10-CM

## 2019-02-19 DIAGNOSIS — Z79.01 CHRONIC ANTICOAGULATION: ICD-10-CM

## 2019-02-19 LAB
HBA1C MFR BLD: 6.1 % (ref ?–5.8)
INT CON NEG: NEGATIVE
INT CON POS: POSITIVE

## 2019-02-19 PROCEDURE — 83036 HEMOGLOBIN GLYCOSYLATED A1C: CPT | Mod: GC | Performed by: INTERNAL MEDICINE

## 2019-02-19 PROCEDURE — 99214 OFFICE O/P EST MOD 30 MIN: CPT | Mod: GC | Performed by: INTERNAL MEDICINE

## 2019-02-19 PROCEDURE — 93280 PM DEVICE PROGR EVAL DUAL: CPT | Performed by: NURSE PRACTITIONER

## 2019-02-19 PROCEDURE — 99214 OFFICE O/P EST MOD 30 MIN: CPT | Mod: 25 | Performed by: NURSE PRACTITIONER

## 2019-02-19 ASSESSMENT — ENCOUNTER SYMPTOMS
HEADACHES: 0
BRUISES/BLEEDS EASILY: 0
ABDOMINAL PAIN: 0
NAUSEA: 0
LOSS OF CONSCIOUSNESS: 0
ORTHOPNEA: 0
FEVER: 0
INSOMNIA: 0
COUGH: 0
MYALGIAS: 0
DIZZINESS: 0
SHORTNESS OF BREATH: 0
PALPITATIONS: 0
CHILLS: 0
PND: 0

## 2019-02-19 NOTE — LETTER
[facility]   [address]   [address]  [place] [address]  Phone: [phone]  Fax: [phone]              Ryan Trevizo  1965    Encounter Date: 2/19/2019    YONAS Couch          PROGRESS NOTE:  Chief Complaint   Patient presents with   • Hospital Follow-up   • Pacemaker Check/Dysfunction   • Wound Check   • Syncope       Subjective:   Ryan Trevizo is a 53 y.o. male who presents today for hospital follow-up for newly implanted pacemaker.    Ryan is a 53 year old male with history of three vessel CAD/ischemic cardiomyopathy (with LVEF normalized on medical therapy on echo in January 2018), paroxysmal atrial fibrillation, anticoagulation, hypertension and hyperlipidemia, normally followed by Dr. JUDITH León. In January 2018, he has an ILR implanted for syncope. Interrogation on 1/22/2019 showed 16 meliza episodes, and pauses. He was referred for PM, which was implanted on 2/12/2019.    He is here today for follow-up. No further episodes of dizziness or syncope. He denies any chest pain, pressure or discomfort; no palpitations; no shortness of breath, orthopnea or PND; no LE edema. BP has been stable.    Past Medical History:   Diagnosis Date   • A-fib ([facility])    • ACS (acute coronary syndrome) ([facility])    • Anxiety disorder    • Arthritis    • Blood glucose elevated    • CAD (coronary artery disease)    • Claudication ([facility])    • Diabetes ([facility])     oral meds   • Dizziness    • GERD (gastroesophageal reflux disease)    • Glaucoma    • HTN (hypertension)    • Hyperlipemia    • Hypertriglyceridemia    • Ischemic cardiomyopathy 05/2018    Echocardiogram with normal LV size, LVEF 50%. Mildly dilated RV.   • Mental retardation    • Mentally challenged     has    • Osteoarthritis    • Pericarditis secondary to acute myocardial infarction ([facility])    • SOB (shortness of breath)    • STEMI (ST elevation myocardial infarction) ([facility])     "  • Syncope 02/2019    Status post pacemaker placement.   • Upper GI bleed 05/2017     Past Surgical History:   Procedure Laterality Date   • PACEMAKER INSERTION Left 02/12/2019    Medtronic Beba S DR MRI [id number] implanted by Dr. Blake.   • OTHER CARDIAC SURGERY Left 01/12/2018    Medtronic Reveal LINQ [id number] implanted by Dr. Blake   • GASTROSCOPY-ENDO  5/19/2017    Procedure: GASTROSCOPY-ENDO;  Surgeon: Reinaldo Berry M.D.;  Location: [facility];  Service:    • GASTROSCOPY-ENDO  7/26/2012    Performed by JORDIN VERA at ENDOSCOPY [facility]   • GASTROSCOPY-ENDO  7/25/2012    Performed by JORDIN VERA at ENDOSCOPY [facility]   • OTHER ORTHOPEDIC SURGERY  7-     R knee surgery   • OTHER CARDIAC SURGERY      stent placement     History reviewed. No pertinent family history.  Social History     Social History   • Marital status: Single     Spouse name: N/A   • Number of children: N/A   • Years of education: N/A     Occupational History   • Not on file.     Social History Main Topics   • Smoking status: Never Smoker   • Smokeless tobacco: Never Used   • Alcohol use No   • Drug use: No   • Sexual activity: Not on file      Comment: Single, has no children, works as an .     Other Topics Concern   • Not on file     Social History Narrative    ** Merged History Encounter **         ** Merged History Encounter **          Allergies   Allergen Reactions   • Ritalin [Methylphenidate] Unspecified     \"Hyper\"     Outpatient Encounter Prescriptions as of 2/19/2019   Medication Sig Dispense Refill   • metoprolol SR (TOPROL XL) 25 MG TABLET SR 24 HR Take 0.5 Tabs by mouth every day. 90 Tab 3   • pantoprazole (PROTONIX) 40 MG Tablet Delayed Response TAKE 1 TABLET BY MOUTH ONCE DAILY. 30 Tab 0   • loratadine (CLARITIN) 10 MG Tab TAKE 1 TABLET BY MOUTH ONCE DAILY. 30 Tab 0   • ASPIR-LOW 81 MG EC tablet TAKE 1 TABLET BY MOUTH ONCE DAILY. 30 Tab 11   • vitamin D3, cholecalciferol, 1000 " "UNIT Tab TAKE 1 TABLET BY MOUTH ONCE DAILY. 90 Tab 0   • Ranolazine 1000 MG TABLET SR 12 HR Take 1 Tab by mouth 2 Times a Day. 180 Tab 3   • cyanocobalamin (VITAMIN B12) 1000 MCG Tab Take 1 Tab by mouth every day. 90 Tab 3   • metformin (GLUCOPHAGE) 1000 MG tablet Take 1 Tab by mouth 2 times a day, with meals. 180 Tab 3   • atorvastatin (LIPITOR) 80 MG tablet Take 1 Tab by mouth every day. 90 Tab 3   • lisinopril (PRINIVIL) 5 MG Tab Take 1 Tab by mouth every day. 90 Tab 3   • rivaroxaban (XARELTO) 20 MG Tab tablet Take 1 Tab by mouth with dinner. 90 Tab 3     No facility-administered encounter medications on file as of 2/19/2019.      Review of Systems   Constitutional: Negative for chills and fever.   HENT: Negative for congestion.    Respiratory: Negative for cough and shortness of breath.    Cardiovascular: Negative for chest pain, palpitations, orthopnea, leg swelling and PND.   Gastrointestinal: Negative for abdominal pain and nausea.   Musculoskeletal: Negative for myalgias.   Skin: Negative for rash.   Neurological: Negative for dizziness, loss of consciousness and headaches.   Endo/Heme/Allergies: Does not bruise/bleed easily.   Psychiatric/Behavioral: The patient does not have insomnia.         Objective:   /78 (BP Location: Left arm, Patient Position: Sitting)   Pulse 94   Ht 1.702 m (5' 7\")   Wt 84.4 kg (186 lb)   SpO2 90%   BMI 29.13 kg/m²      Physical Exam   Constitutional: He is oriented to person, place, and time. He appears well-developed and well-nourished.   HENT:   Head: Normocephalic.   Eyes: EOM are normal.   Neck: Normal range of motion. Neck supple. No JVD present.   Cardiovascular: Normal rate, regular rhythm and normal heart sounds.    Pulmonary/Chest: Effort normal and breath sounds normal. No respiratory distress. He has no wheezes. He has no rales.   PM in left chest wall.  Incision is well healed; no redness, swelling or drainage noted.   Abdominal: Soft. Bowel sounds are " normal. He exhibits no distension. There is no tenderness.   Musculoskeletal: Normal range of motion. He exhibits no edema.   Neurological: He is alert and oriented to person, place, and time.   Skin: Skin is warm and dry. No rash noted.   Psychiatric: He has a normal mood and affect.     PM is working normally. No mode switching episodes.    Lab Results   Component Value Date/Time    SODIUM 137 02/11/2019 10:49 AM    POTASSIUM 4.4 02/11/2019 10:49 AM    CHLORIDE 105 02/11/2019 10:49 AM    CO2 25 02/11/2019 10:49 AM    GLUCOSE 133 (H) 02/11/2019 10:49 AM    BUN 12 02/11/2019 10:49 AM    CREATININE 0.87 02/11/2019 10:49 AM    CREATININE 1.0 07/12/2007 05:40 PM    BUNCREATRAT 18 06/13/2014 08:40 AM     Lab Results   Component Value Date/Time    WBC 5.7 02/11/2019 10:48 AM    RBC 4.42 (L) 02/11/2019 10:48 AM    HEMOGLOBIN 14.6 02/11/2019 10:48 AM    HEMATOCRIT 42.9 02/11/2019 10:48 AM    MCV 97.1 02/11/2019 10:48 AM    MCH 33.0 02/11/2019 10:48 AM    MCHC 34.0 02/11/2019 10:48 AM    MPV 9.7 02/11/2019 10:48 AM        Assessment:     1. Cardiac pacemaker in situ     2. Syncope, unspecified syncope type     3. PAF (paroxysmal atrial fibrillation) ([facility])     4. Chronic anticoagulation     5. Coronary artery disease involving native coronary artery of native heart without angina pectoris     6. Ischemic Cardiomyopathy EF 50%     7. Essential hypertension     8. Dyslipidemia     9. Type 2 diabetes mellitus without complication, without long-term current use of insulin ([facility])         Medical Decision Making:  Today's Assessment / Status / Plan:     1. History of syncope, with sick sinus syndrome, with newly implanted pacemaker. The device is working normally. To FU in 1 month for final threshold checks. He is reminded about limited ROM of left arm, and given wound care instructions and anticipatory guidance.    2. Paroxysmal atrial fibrillation, with no mode switching episodes.    3. Chronic anticoagulation with Xarelto. No  bleeding problems.    4. CAD/ischemic cardiomyopathy with LVEF 50%, stable. He has not had any angina.    5. Hypertension, treated and stable. BP is good today.    6. Hyperlipidemia, treated with Lipitor.    7. Diabetes mellitus, treated.    Plan as above. Same medications for now. FU in 1 month for final threshold checks.    Collaborating MD: Marlo Fritz

## 2019-02-19 NOTE — LETTER
February 19, 2019        Ryan Trevizo  [address], [address]  [place] [address]        To Whom It May Concern:    Ryan is under our care.    He can return to work (light duty) as of 3/1/2019. He cannot lift his left arm above shoulder level, until his next follow-up appointment in our office (mid March 2019).    If you have any questions or concerns, please don't hesitate to call.        Sincerely,        EMILY Couch.    Electronically Signed

## 2019-02-19 NOTE — PROGRESS NOTES
Established Patient    Ryan presents today with the following:    CC: Post-hospitalization visit, medication management, DMII managment    HPI:   53 year old male with a ischemic cardiomyopathy EF 50%, CAD, paroxysmal afib w/ RVR, DMII, HTN, HLD, GERD, barretts esophagus and vitamin D def presents for a 3 months follow up for medication mgmt and diabetic managment.    Symptomatic bradycardia: Patient has been followed by cardiology for his CAD, ischemic cardiomyopathy, afib and MI's. In 12/2018 he had an episode of lightheadedness and syncope and in response, an implantable loop recorder device was implanted in patient which showed significant bradycardia and pauses. In light of these findings the patient was referred to electrophysiology and underwent elective pacemaker implantation with dual-chamber medtronic pacemaker on 2/12/2019. He tolerated the procedure well and has had no episodes of lightheadedness, CP, SOB or syncope since placement. He has an appointment to be seen by his cardiologist office today, 2/19/2019.     Type 2 diabetes mellitus: HbA1c was 6.4% on 10/9/2018 which is much improved from 1 year prior. At todays visit a HbA1c was drawn at 6.1%. He continues to be fairly active with walking, biking and a job that keeps him more active. At the recommendation of his cardiologist, he bought a stationary bike and has been cycling while he watches TV.     Gastroesophageal reflux disease: Well controlled with no issues. States that he is compliant with his protonix.       Patient Active Problem List    Diagnosis Date Noted   • Status post placement of implantable loop recorder 01/22/2019     Priority: High   • Chronic anticoagulation 10/23/2018     Priority: High   • Syncope 01/05/2018     Priority: High   • PAF (paroxysmal atrial fibrillation) (HCC) 05/18/2017     Priority: High   • Ischemic Cardiomyopathy EF 50% 07/17/2012     Priority: High   • Dyslipidemia 07/16/2012     Priority: High   • Essential  hypertension 07/15/2012     Priority: High   • Type 2 diabetes mellitus without complication, without long-term current use of insulin (HCC) 02/16/2017     Priority: Medium   • Coronary artery disease involving native coronary artery of native heart without angina pectoris 10/14/2013     Priority: Medium   • Vitamin B12 deficiency 01/05/2018     Priority: Low   • Vitamin D deficiency 01/05/2018     Priority: Low   • Glaucoma      Priority: Low   • GERD with Nieves's esophagus 07/15/2012     Priority: Low   • 3-vessel coronary artery disease 10/23/2018   • Nieves esophagus 05/19/2017   • Tachycardia 04/17/2017   • Claudication (HCC)        Current Outpatient Prescriptions   Medication Sig Dispense Refill   • metoprolol SR (TOPROL XL) 25 MG TABLET SR 24 HR Take 0.5 Tabs by mouth every day. 90 Tab 3   • pantoprazole (PROTONIX) 40 MG Tablet Delayed Response TAKE 1 TABLET BY MOUTH ONCE DAILY. 30 Tab 0   • loratadine (CLARITIN) 10 MG Tab TAKE 1 TABLET BY MOUTH ONCE DAILY. 30 Tab 0   • ASPIR-LOW 81 MG EC tablet TAKE 1 TABLET BY MOUTH ONCE DAILY. 30 Tab 11   • vitamin D3, cholecalciferol, 1000 UNIT Tab TAKE 1 TABLET BY MOUTH ONCE DAILY. 90 Tab 0   • Ranolazine 1000 MG TABLET SR 12 HR Take 1 Tab by mouth 2 Times a Day. 180 Tab 3   • cyanocobalamin (VITAMIN B12) 1000 MCG Tab Take 1 Tab by mouth every day. 90 Tab 3   • metformin (GLUCOPHAGE) 1000 MG tablet Take 1 Tab by mouth 2 times a day, with meals. 180 Tab 3   • atorvastatin (LIPITOR) 80 MG tablet Take 1 Tab by mouth every day. 90 Tab 3   • lisinopril (PRINIVIL) 5 MG Tab Take 1 Tab by mouth every day. 90 Tab 3   • rivaroxaban (XARELTO) 20 MG Tab tablet Take 1 Tab by mouth with dinner. 90 Tab 3     No current facility-administered medications for this visit.        ROS: As per HPI. Additional pertinent symptoms as noted below.      There were no vitals taken for this visit.    Physical Exam   General:  Alert and oriented, No apparent distress. Appears, tan, appears to be  losing weight.  Eyes: Pupils equal and reactive. No scleral icterus. EOMI, no injected conjunctiva.  Throat: Clear no erythema or exudates noted.  Neck: Supple. No lymphadenopathy noted. Thyroid not enlarged.  Lungs: Clear to auscultation and percussion bilaterally. No wheezing, crackles, rhales.  Cardiovascular: Regular rate and rhythm. No murmurs, rubs or gallops.  Abdomen:  Benign. No rebound or guarding noted. Non-distended, non-tender.  Extremities: No clubbing, cyanosis, edema. Surgical scar on right knee.  Skin: Clear. No rash or suspicious skin lesions noted.      Assessment and Plan    1. Symptomatic bradycardia  - Lightheadedness and syncope 12/2018  - Seen on implantable loop recorder, ordered by cardio  - S/p Medtronic dual chamber pacemaker 2/12/2019  - Appt with cardiology 2/19/2019    2. Coronary artery disease  - Continue metoprolol, rivaroxaban, atorvastatin  - Failed isosorbide mononitrate, now on ranolazine  - Cardiac cath on 7/26/2017 showed inferior wall hypokinesis with moderately reduced left ventricular systolic function, ejection fraction 35%, coronary artery disease (aneurysmal change proximal left anterior descending artery (LAD) with diffuse moderate disease up to 70% distal LAD. FFR of proximal and mid LAD above 0.8, chronic total occlusion of second obtuse marginal branch of the left circumflex artery, 95% stenosis at the ostium of one of the dual posterior descending artery (2 mm diameter), aneurysmal proximal and mid right coronary artery with 50-60% mid posterolateral branch but FFR > 0.8)  - Heart echo on 1/2018 showed EF 50%, improved from previous  - Stable  - Followed by cardiology, next appt 2/19/2019                3. Ischemic Cardiomyopathy EF 50%  - 2/2 CAD  - Continue metoprolol, lisinopril, atorvastatin, ranolazine  - Followed by cardiology  - Appt with cardiology 2/19/2019     4. Paroxysmal atrial fibrillation with RVR  - Admitted to [facility] in 5/2017 with afib w/ RVR  -  Converted back into NSR on IV cardizem drip  - Currently in NSR. Denies CP, SOB, weakness, lightheadedness at today's visit  - Continue metoprolol ER 25mg daily  - CHADS vasc 2. Continue rivaroxaban. Followed by anticoagulation clinic.  - Followed by cardiology     5. Type 2 diabetes mellitus  - HbA1c 6.1% on 2/2019  - Continue metformin 1000mg BID  - Urine microalbumin 69.1. Already on lisinopril for renal protection.  - No neuropathy on monofilament test performed 2/2019  - Continue to lose weight, diet, exercise     6. Hyperlipidemia  - Continue atorvastatin 80mg daily  - Lipid panel 12/2018 under good control     7. Essential hypertension  - BP controlled  - Continue metoprolol, lisinopril  - Stable     8. Gastroesophageal reflux disease  - Continue protonix daily  - EGD performed on 5/19/17 with salmon colored distal esophagus mucosa indicative of duarte's and a 10cm focal area of gastric fundus with erythema, edema, friability and slight ulceration was seen. May be indicative of ischemic gastritis associated with hypotension when he was in atrial fibrillation.  - CTA abdomen 5/19 did not indicate any vascular abnormality that would cause ischemic gastritis  - GI following  Reason for use? Barretts  How long has patient been on it? Since 5/2017  Last time patient was off? Never Did symptoms resolve? NA  Alternative prescriptions attempted? None  H. Pylori checked? S/p biopsy 5/19, no H. Pylori seen Seen by GI? Yes EGD in past? Yes, on 5/19/2017     9. Duarte's esophagus  - EGD performed on 5/19/17 with salmon colored distal esophagus mucosa indicative of duarte's and a 10cm focal area of gastric fundus with erythema, edema, friability and slight ulceration was seen.  - Pathology of esophageal biopsies 5/19/17 with intestinal metaplasia  - CT abd 12/11/16 shows changes in the distal esophagus likely related to inflammatory esophagitis  - Continue protonix     10. Vitamin D deficiency  - Continue Vitamin D  replacement  - Vit D 3/28/17 was 29.9     11. Health maintenance  Flu - 11/6/2018  TD- 12/2016  TDaP - 9/2014, 6/2016  Pneumococcal - 7/2012   Prevnar - NA  Colonoscopy - 11/2018  Zostavax-NA  Monofilament - 2/19/2019         PLAN: Follow up in 4-6 months      Signed by: Juancho Jenkins M.D.

## 2019-02-20 NOTE — PROGRESS NOTES
Chief Complaint   Patient presents with   • Hospital Follow-up   • Pacemaker Check/Dysfunction   • Wound Check   • Syncope       Subjective:   Ryan Trevizo is a 53 y.o. male who presents today for hospital follow-up for newly implanted pacemaker.    Ryan is a 53 year old male with history of three vessel CAD/ischemic cardiomyopathy (with LVEF normalized on medical therapy on echo in January 2018), paroxysmal atrial fibrillation, anticoagulation, hypertension and hyperlipidemia, normally followed by Dr. JUDITH León. In January 2018, he has an ILR implanted for syncope. Interrogation on 1/22/2019 showed 16 meliza episodes, and pauses. He was referred for PM, which was implanted on 2/12/2019.    He is here today for follow-up. No further episodes of dizziness or syncope. He denies any chest pain, pressure or discomfort; no palpitations; no shortness of breath, orthopnea or PND; no LE edema. BP has been stable.    Past Medical History:   Diagnosis Date   • A-fib ([facility])    • ACS (acute coronary syndrome) ([facility])    • Anxiety disorder    • Arthritis    • Blood glucose elevated    • CAD (coronary artery disease)    • Claudication ([facility])    • Diabetes ([facility])     oral meds   • Dizziness    • GERD (gastroesophageal reflux disease)    • Glaucoma    • HTN (hypertension)    • Hyperlipemia    • Hypertriglyceridemia    • Ischemic cardiomyopathy 05/2018    Echocardiogram with normal LV size, LVEF 50%. Mildly dilated RV.   • Mental retardation    • Mentally challenged     has    • Osteoarthritis    • Pericarditis secondary to acute myocardial infarction ([facility])    • SOB (shortness of breath)    • STEMI (ST elevation myocardial infarction) ([facility])    • Syncope 02/2019    Status post pacemaker placement.   • Upper GI bleed 05/2017     Past Surgical History:   Procedure Laterality Date   • PACEMAKER INSERTION Left 02/12/2019    Medtronic [place] S DR TORRES [id number] implanted by Dr. Blake.   • OTHER CARDIAC SURGERY Left 01/12/2018     "Medtronic Reveal LINQ [id number] implanted by Dr. Blake   • GASTROSCOPY-ENDO  5/19/2017    Procedure: GASTROSCOPY-ENDO;  Surgeon: Reinaldo Berry M.D.;  Location: [facility];  Service:    • GASTROSCOPY-ENDO  7/26/2012    Performed by JORDIN VERA at ENDOSCOPY [facility]   • GASTROSCOPY-ENDO  7/25/2012    Performed by JORDIN VERA at ENDOSCOPY [facility]   • OTHER ORTHOPEDIC SURGERY  7-     R knee surgery   • OTHER CARDIAC SURGERY      stent placement     History reviewed. No pertinent family history.  Social History     Social History   • Marital status: Single     Spouse name: N/A   • Number of children: N/A   • Years of education: N/A     Occupational History   • Not on file.     Social History Main Topics   • Smoking status: Never Smoker   • Smokeless tobacco: Never Used   • Alcohol use No   • Drug use: No   • Sexual activity: Not on file      Comment: Single, has no children, works as an .     Other Topics Concern   • Not on file     Social History Narrative    ** Merged History Encounter **         ** Merged History Encounter **          Allergies   Allergen Reactions   • Ritalin [Methylphenidate] Unspecified     \"Hyper\"     Outpatient Encounter Prescriptions as of 2/19/2019   Medication Sig Dispense Refill   • metoprolol SR (TOPROL XL) 25 MG TABLET SR 24 HR Take 0.5 Tabs by mouth every day. 90 Tab 3   • pantoprazole (PROTONIX) 40 MG Tablet Delayed Response TAKE 1 TABLET BY MOUTH ONCE DAILY. 30 Tab 0   • loratadine (CLARITIN) 10 MG Tab TAKE 1 TABLET BY MOUTH ONCE DAILY. 30 Tab 0   • ASPIR-LOW 81 MG EC tablet TAKE 1 TABLET BY MOUTH ONCE DAILY. 30 Tab 11   • vitamin D3, cholecalciferol, 1000 UNIT Tab TAKE 1 TABLET BY MOUTH ONCE DAILY. 90 Tab 0   • Ranolazine 1000 MG TABLET SR 12 HR Take 1 Tab by mouth 2 Times a Day. 180 Tab 3   • cyanocobalamin (VITAMIN B12) 1000 MCG Tab Take 1 Tab by mouth every day. 90 Tab 3   • metformin (GLUCOPHAGE) 1000 MG tablet Take 1 Tab by " "mouth 2 times a day, with meals. 180 Tab 3   • atorvastatin (LIPITOR) 80 MG tablet Take 1 Tab by mouth every day. 90 Tab 3   • lisinopril (PRINIVIL) 5 MG Tab Take 1 Tab by mouth every day. 90 Tab 3   • rivaroxaban (XARELTO) 20 MG Tab tablet Take 1 Tab by mouth with dinner. 90 Tab 3     No facility-administered encounter medications on file as of 2/19/2019.      Review of Systems   Constitutional: Negative for chills and fever.   HENT: Negative for congestion.    Respiratory: Negative for cough and shortness of breath.    Cardiovascular: Negative for chest pain, palpitations, orthopnea, leg swelling and PND.   Gastrointestinal: Negative for abdominal pain and nausea.   Musculoskeletal: Negative for myalgias.   Skin: Negative for rash.   Neurological: Negative for dizziness, loss of consciousness and headaches.   Endo/Heme/Allergies: Does not bruise/bleed easily.   Psychiatric/Behavioral: The patient does not have insomnia.         Objective:   /78 (BP Location: Left arm, Patient Position: Sitting)   Pulse 94   Ht 1.702 m (5' 7\")   Wt 84.4 kg (186 lb)   SpO2 90%   BMI 29.13 kg/m²     Physical Exam   Constitutional: He is oriented to person, place, and time. He appears well-developed and well-nourished.   HENT:   Head: Normocephalic.   Eyes: EOM are normal.   Neck: Normal range of motion. Neck supple. No JVD present.   Cardiovascular: Normal rate, regular rhythm and normal heart sounds.    Pulmonary/Chest: Effort normal and breath sounds normal. No respiratory distress. He has no wheezes. He has no rales.   PM in left chest wall.  Incision is well healed; no redness, swelling or drainage noted.   Abdominal: Soft. Bowel sounds are normal. He exhibits no distension. There is no tenderness.   Musculoskeletal: Normal range of motion. He exhibits no edema.   Neurological: He is alert and oriented to person, place, and time.   Skin: Skin is warm and dry. No rash noted.   Psychiatric: He has a normal mood and " affect.     PM is working normally. No mode switching episodes.    Lab Results   Component Value Date/Time    SODIUM 137 02/11/2019 10:49 AM    POTASSIUM 4.4 02/11/2019 10:49 AM    CHLORIDE 105 02/11/2019 10:49 AM    CO2 25 02/11/2019 10:49 AM    GLUCOSE 133 (H) 02/11/2019 10:49 AM    BUN 12 02/11/2019 10:49 AM    CREATININE 0.87 02/11/2019 10:49 AM    CREATININE 1.0 07/12/2007 05:40 PM    BUNCREATRAT 18 06/13/2014 08:40 AM     Lab Results   Component Value Date/Time    WBC 5.7 02/11/2019 10:48 AM    RBC 4.42 (L) 02/11/2019 10:48 AM    HEMOGLOBIN 14.6 02/11/2019 10:48 AM    HEMATOCRIT 42.9 02/11/2019 10:48 AM    MCV 97.1 02/11/2019 10:48 AM    MCH 33.0 02/11/2019 10:48 AM    MCHC 34.0 02/11/2019 10:48 AM    MPV 9.7 02/11/2019 10:48 AM        Assessment:     1. Cardiac pacemaker in situ     2. Syncope, unspecified syncope type     3. PAF (paroxysmal atrial fibrillation) ([facility])     4. Chronic anticoagulation     5. Coronary artery disease involving native coronary artery of native heart without angina pectoris     6. Ischemic Cardiomyopathy EF 50%     7. Essential hypertension     8. Dyslipidemia     9. Type 2 diabetes mellitus without complication, without long-term current use of insulin ([facility])         Medical Decision Making:  Today's Assessment / Status / Plan:     1. History of syncope, with sick sinus syndrome, with newly implanted pacemaker. The device is working normally. To FU in 1 month for final threshold checks. He is reminded about limited ROM of left arm, and given wound care instructions and anticipatory guidance.    2. Paroxysmal atrial fibrillation, with no mode switching episodes.    3. Chronic anticoagulation with Xarelto. No bleeding problems.    4. CAD/ischemic cardiomyopathy with LVEF 50%, stable. He has not had any angina.    5. Hypertension, treated and stable. BP is good today.    6. Hyperlipidemia, treated with Lipitor.    7. Diabetes mellitus, treated.    Plan as above. Same medications for  now. FU in 1 month for final threshold checks.    Collaborating MD: Marlo

## 2019-03-19 ENCOUNTER — OFFICE VISIT (OUTPATIENT)
Dept: CARDIOLOGY | Facility: MEDICAL CENTER | Age: 54
End: 2019-03-19
Payer: MEDICARE

## 2019-03-19 ENCOUNTER — NON-PROVIDER VISIT (OUTPATIENT)
Dept: CARDIOLOGY | Facility: MEDICAL CENTER | Age: 54
End: 2019-03-19
Payer: MEDICARE

## 2019-03-19 VITALS
WEIGHT: 185 LBS | HEIGHT: 67 IN | HEART RATE: 70 BPM | SYSTOLIC BLOOD PRESSURE: 104 MMHG | WEIGHT: 185 LBS | HEART RATE: 70 BPM | DIASTOLIC BLOOD PRESSURE: 78 MMHG | SYSTOLIC BLOOD PRESSURE: 104 MMHG | BODY MASS INDEX: 29.03 KG/M2 | OXYGEN SATURATION: 96 % | HEIGHT: 67 IN | BODY MASS INDEX: 29.03 KG/M2 | OXYGEN SATURATION: 96 % | DIASTOLIC BLOOD PRESSURE: 78 MMHG

## 2019-03-19 DIAGNOSIS — Z95.0 CARDIAC PACEMAKER IN SITU: ICD-10-CM

## 2019-03-19 DIAGNOSIS — I48.0 PAF (PAROXYSMAL ATRIAL FIBRILLATION) (HCC): Chronic | ICD-10-CM

## 2019-03-19 DIAGNOSIS — I10 ESSENTIAL HYPERTENSION: ICD-10-CM

## 2019-03-19 DIAGNOSIS — R55 SYNCOPE, UNSPECIFIED SYNCOPE TYPE: ICD-10-CM

## 2019-03-19 DIAGNOSIS — I49.5 SICK SINUS SYNDROME (HCC): ICD-10-CM

## 2019-03-19 DIAGNOSIS — I25.5 CARDIOMYOPATHY, ISCHEMIC: ICD-10-CM

## 2019-03-19 DIAGNOSIS — E78.5 DYSLIPIDEMIA: ICD-10-CM

## 2019-03-19 DIAGNOSIS — Z79.01 CHRONIC ANTICOAGULATION: ICD-10-CM

## 2019-03-19 DIAGNOSIS — E11.9 TYPE 2 DIABETES MELLITUS WITHOUT COMPLICATION, WITHOUT LONG-TERM CURRENT USE OF INSULIN (HCC): Chronic | ICD-10-CM

## 2019-03-19 DIAGNOSIS — I25.10 CORONARY ARTERY DISEASE INVOLVING NATIVE CORONARY ARTERY OF NATIVE HEART WITHOUT ANGINA PECTORIS: ICD-10-CM

## 2019-03-19 PROBLEM — R00.0 TACHYCARDIA: Status: RESOLVED | Noted: 2017-04-17 | Resolved: 2019-03-19

## 2019-03-19 PROCEDURE — 93280 PM DEVICE PROGR EVAL DUAL: CPT | Performed by: NURSE PRACTITIONER

## 2019-03-19 PROCEDURE — 99214 OFFICE O/P EST MOD 30 MIN: CPT | Mod: 25 | Performed by: NURSE PRACTITIONER

## 2019-03-19 ASSESSMENT — ENCOUNTER SYMPTOMS
CHILLS: 0
NAUSEA: 0
COUGH: 0
PALPITATIONS: 0
SHORTNESS OF BREATH: 0
DIZZINESS: 0
ABDOMINAL PAIN: 0
BRUISES/BLEEDS EASILY: 0
FEVER: 0
HEADACHES: 0
INSOMNIA: 0
MYALGIAS: 0
PND: 0
ORTHOPNEA: 0
LOSS OF CONSCIOUSNESS: 0

## 2019-03-19 NOTE — LETTER
[facility]   [address]   [address]  [place] [address]  Phone: [phone]  Fax: [phone]              Ryan Trevizo  1965    Encounter Date: 3/19/2019    YONAS Couch          PROGRESS NOTE:  Chief Complaint   Patient presents with   • Follow-Up   • Pacemaker Check/Dysfunction   • Bradycardia   • Atrial Fibrillation   • Anticoagulation   • Coronary Artery Disease   • Cardiomyopathy (Ischemic)   • HTN (Controlled)   • Hyperlipidemia       Subjective:   Ryan Trevizo is a 53 y.o. male who presents today for one month follow-up for PM check and final threshold checks.    Ryan is a 53 year old male with history of three vessel CAD/ischemic cardiomyopathy (with LVEF normalized on medical therapy on echo in January 2018), paroxysmal atrial fibrillation, anticoagulation, hypertension and hyperlipidemia, normally followed by Dr. JUDITH León.     In January 2018, he has an ILR implanted for syncope. Interrogation on 1/22/2019 showed 16 meliza episodes, and pauses. He was referred for PM, which was implanted on 2/12/2019.     He is here today for one month follow-up for final threshold checks. No further episodes of dizziness or syncope. He denies any chest pain, pressure or discomfort; no palpitations; no shortness of breath, orthopnea or PND; no LE edema. BP has been stable. He is feeling good.    Past Medical History:   Diagnosis Date   • ACS (acute coronary syndrome) (HCC)    • Anxiety disorder    • Arthritis    • CAD (coronary artery disease)    • Chronic anticoagulation    • Claudication (HCC)    • Diabetes (HCC)     Oral medications   • Dizziness    • GERD (gastroesophageal reflux disease)    • Glaucoma    • HTN (hypertension)    • Hyperlipemia    • Ischemic cardiomyopathy 05/2018    Echocardiogram with normal LV size, LVEF 50%. Mildly dilated RV.   • Mental retardation    • Mentally challenged     Has    •  "Osteoarthritis    • Paroxysmal atrial fibrillation (HCC)    • Pericarditis secondary to acute myocardial infarction (HCC)    • Sick sinus syndrome ([facility])    • SOB (shortness of breath)    • STEMI (ST elevation myocardial infarction) ([facility])    • Syncope 02/2019    Status post pacemaker placement.     Past Surgical History:   Procedure Laterality Date   • PACEMAKER INSERTION Left 02/12/2019    Medtronic [place] S DR MRI [id number] implanted by Dr. Blake.   • OTHER CARDIAC SURGERY Left 01/12/2018    Medtronic Reveal LINQ [id number] implanted by Dr. Blake   • GASTROSCOPY-ENDO  5/19/2017    Procedure: GASTROSCOPY-ENDO;  Surgeon: Reinaldo Berry M.D.;  Location: ENDOSCOPY [facility];  Service:    • GASTROSCOPY-ENDO  7/26/2012    Performed by JORDIN VERA at ENDOSCOPY [facility]   • GASTROSCOPY-ENDO  7/25/2012    Performed by JORDIN VERA at ENDOSCOPY [facility]   • OTHER ORTHOPEDIC SURGERY  7-     R knee surgery   • OTHER CARDIAC SURGERY      stent placement     History reviewed. No pertinent family history.  Social History     Social History   • Marital status: Single     Spouse name: N/A   • Number of children: N/A   • Years of education: N/A     Occupational History   • Not on file.     Social History Main Topics   • Smoking status: Never Smoker   • Smokeless tobacco: Never Used   • Alcohol use No   • Drug use: No   • Sexual activity: Not on file      Comment: Single, has no children, works as an .     Other Topics Concern   • Not on file     Social History Narrative    ** Merged History Encounter **         ** Merged History Encounter **          Allergies   Allergen Reactions   • Ritalin [Methylphenidate] Unspecified     \"Hyper\"     Outpatient Encounter Prescriptions as of 3/19/2019   Medication Sig Dispense Refill   • vitamin D3, cholecalciferol, 1000 UNIT Tab TAKE 1 TABLET BY MOUTH ONCE DAILY. 30 Tab 5   • loratadine (CLARITIN) 10 MG Tab TAKE 1 TABLET BY MOUTH ONCE DAILY. 30 Tab 0   • " "pantoprazole (PROTONIX) 40 MG Tablet Delayed Response TAKE 1 TABLET BY MOUTH ONCE DAILY. 30 Tab 5   • metoprolol SR (TOPROL XL) 25 MG TABLET SR 24 HR Take 0.5 Tabs by mouth every day. 90 Tab 3   • ASPIR-LOW 81 MG EC tablet TAKE 1 TABLET BY MOUTH ONCE DAILY. 30 Tab 11   • Ranolazine 1000 MG TABLET SR 12 HR Take 1 Tab by mouth 2 Times a Day. 180 Tab 3   • cyanocobalamin (VITAMIN B12) 1000 MCG Tab Take 1 Tab by mouth every day. 90 Tab 3   • metformin (GLUCOPHAGE) 1000 MG tablet Take 1 Tab by mouth 2 times a day, with meals. 180 Tab 3   • atorvastatin (LIPITOR) 80 MG tablet Take 1 Tab by mouth every day. 90 Tab 3   • lisinopril (PRINIVIL) 5 MG Tab Take 1 Tab by mouth every day. 90 Tab 3   • rivaroxaban (XARELTO) 20 MG Tab tablet Take 1 Tab by mouth with dinner. 90 Tab 3     No facility-administered encounter medications on file as of 3/19/2019.      Review of Systems   Constitutional: Negative for chills and fever.   HENT: Negative for congestion.    Respiratory: Negative for cough and shortness of breath.    Cardiovascular: Negative for chest pain, palpitations, orthopnea, leg swelling and PND.   Gastrointestinal: Negative for abdominal pain and nausea.   Musculoskeletal: Negative for myalgias.   Skin: Negative for rash.   Neurological: Negative for dizziness, loss of consciousness and headaches.   Endo/Heme/Allergies: Does not bruise/bleed easily.   Psychiatric/Behavioral: The patient does not have insomnia.         Objective:   /78 (BP Location: Left arm, Patient Position: Sitting, BP Cuff Size: Adult)   Pulse 70   Ht 1.702 m (5' 7\")   Wt 83.9 kg (185 lb)   SpO2 96%   BMI 28.98 kg/m²      Physical Exam   Constitutional: He is oriented to person, place, and time. He appears well-developed and well-nourished.   HENT:   Head: Normocephalic.   Eyes: EOM are normal.   Neck: Normal range of motion. Neck supple. No JVD present.   Cardiovascular: Normal rate, regular rhythm and normal heart sounds.    "   Pulmonary/Chest: Effort normal and breath sounds normal. No respiratory distress. He has no wheezes. He has no rales.   PM in left chest wall.  Incision is well healed; no redness, swelling or drainage noted.   Abdominal: Soft. Bowel sounds are normal. He exhibits no distension. There is no tenderness.   Musculoskeletal: Normal range of motion. He exhibits no edema.   Neurological: He is alert and oriented to person, place, and time.   Skin: Skin is warm and dry. No rash noted.   Psychiatric: He has a normal mood and affect.     PM is working normally. He is paced only 12% of total time. No mode switching episodes.    Lab Results   Component Value Date/Time    SODIUM 137 02/11/2019 10:49 AM    POTASSIUM 4.4 02/11/2019 10:49 AM    CHLORIDE 105 02/11/2019 10:49 AM    CO2 25 02/11/2019 10:49 AM    GLUCOSE 133 (H) 02/11/2019 10:49 AM    BUN 12 02/11/2019 10:49 AM    CREATININE 0.87 02/11/2019 10:49 AM    CREATININE 1.0 07/12/2007 05:40 PM    BUNCREATRAT 18 06/13/2014 08:40 AM     Lab Results   Component Value Date/Time    WBC 5.7 02/11/2019 10:48 AM    RBC 4.42 (L) 02/11/2019 10:48 AM    HEMOGLOBIN 14.6 02/11/2019 10:48 AM    HEMATOCRIT 42.9 02/11/2019 10:48 AM    MCV 97.1 02/11/2019 10:48 AM    MCH 33.0 02/11/2019 10:48 AM    MCHC 34.0 02/11/2019 10:48 AM    MPV 9.7 02/11/2019 10:48 AM      CONCLUSIONS OF ECHOCARDIOGRAM OF 1/5/2018:  Compared to the images of the prior study done 4/16/17, no significant   change noted. RV not well visualized on prior study.   Left ventricular systolic function is low normal.  Left ventricular ejection fraction is visually estimated to be 50%.  Grade I diastolic dysfunction.  Mildly dilated right ventricle. Reduced right ventricular systolic   function.   Aortic sclerosis without stenosis.  Asymmetric septal hypertrophy.    Assessment:     1. Cardiac pacemaker in situ     2. Syncope, unspecified syncope type     3. Sick sinus syndrome (HCC)     4. PAF (paroxysmal atrial fibrillation)  ([facility])     5. Chronic anticoagulation     6. Coronary artery disease involving native coronary artery of native heart without angina pectoris     7. Ischemic Cardiomyopathy EF 50%     8. Essential hypertension     9. Dyslipidemia     10. Type 2 diabetes mellitus without complication, without long-term current use of insulin ([facility])         Medical Decision Making:  Today's Assessment / Status / Plan:     1. Sick sinus syndrome with history of syncope, now with permanent dual chamber pacemaker. The device is working normally, and RA and RV outputs are lowered to 2.5 volts, and rate response is turned on.    2. Paroxysmal atrial fibrillation, with no mode switching episodes on PM interrogation.    3. Chronic anticoagulation with Xarelto. No bleeding problems.    4. CAD/ischemic cardiomyopathy, with LVEF 50% on echo in January 2018.  He has not had any angina.    5. Hypertension, treated and stable.    6. Hyperlipidemia, treated with Lipitor.    7. Diabetes mellitus, treated with Glucophage.    Same medications for now. FU in 3 months for next PM check, and FU with MD to establ[facility] care (previous patient of Dr. JUDITH León). FU sooner if clinical condition changes.    Collaborating MD: Marvel      No Recipients

## 2019-03-19 NOTE — PROGRESS NOTES
Chief Complaint   Patient presents with   • Follow-Up   • Pacemaker Check/Dysfunction   • Bradycardia   • Atrial Fibrillation   • Anticoagulation   • Coronary Artery Disease   • Cardiomyopathy (Ischemic)   • HTN (Controlled)   • Hyperlipidemia       Subjective:   Ryan Trevizo is a 53 y.o. male who presents today for one month follow-up for PM check and final threshold checks.    Ryan is a 53 year old male with history of three vessel CAD/ischemic cardiomyopathy (with LVEF normalized on medical therapy on echo in January 2018), paroxysmal atrial fibrillation, anticoagulation, hypertension and hyperlipidemia, normally followed by Dr. JUDITH León.     In January 2018, he has an ILR implanted for syncope. Interrogation on 1/22/2019 showed 16 meliza episodes, and pauses. He was referred for PM, which was implanted on 2/12/2019.     He is here today for one month follow-up for final threshold checks. No further episodes of dizziness or syncope. He denies any chest pain, pressure or discomfort; no palpitations; no shortness of breath, orthopnea or PND; no LE edema. BP has been stable. He is feeling good.    Past Medical History:   Diagnosis Date   • ACS (acute coronary syndrome) ([facility])    • Anxiety disorder    • Arthritis    • CAD (coronary artery disease)    • Chronic anticoagulation    • Claudication ([facility])    • Diabetes ([facility])     Oral medications   • Dizziness    • GERD (gastroesophageal reflux disease)    • Glaucoma    • HTN (hypertension)    • Hyperlipemia    • Ischemic cardiomyopathy 05/2018    Echocardiogram with normal LV size, LVEF 50%. Mildly dilated RV.   • Mental retardation    • Mentally challenged     Has    • Osteoarthritis    • Paroxysmal atrial fibrillation ([facility])    • Pericarditis secondary to acute myocardial infarction ([facility])    • Sick sinus syndrome ([facility])    • SOB (shortness of breath)    • STEMI (ST elevation myocardial infarction) ([facility])    • Syncope 02/2019    Status post pacemaker  "placement.     Past Surgical History:   Procedure Laterality Date   • PACEMAKER INSERTION Left 02/12/2019    Medtronic Beba S DR MRI [id number] implanted by Dr. Blake.   • OTHER CARDIAC SURGERY Left 01/12/2018    Medtronic Reveal LINQ [id number] implanted by Dr. Blake   • GASTROSCOPY-ENDO  5/19/2017    Procedure: GASTROSCOPY-ENDO;  Surgeon: Reinaldo Berry M.D.;  Location: [facility];  Service:    • GASTROSCOPY-ENDO  7/26/2012    Performed by JORDIN VERA at [facility]   • GASTROSCOPY-ENDO  7/25/2012    Performed by JORDIN VERA at [facility]   • OTHER ORTHOPEDIC SURGERY  7-     R knee surgery   • OTHER CARDIAC SURGERY      stent placement     History reviewed. No pertinent family history.  Social History     Social History   • Marital status: Single     Spouse name: N/A   • Number of children: N/A   • Years of education: N/A     Occupational History   • Not on file.     Social History Main Topics   • Smoking status: Never Smoker   • Smokeless tobacco: Never Used   • Alcohol use No   • Drug use: No   • Sexual activity: Not on file      Comment: Single, has no children, works as an .     Other Topics Concern   • Not on file     Social History Narrative    ** Merged History Encounter **         ** Merged History Encounter **          Allergies   Allergen Reactions   • Ritalin [Methylphenidate] Unspecified     \"Hyper\"     Outpatient Encounter Prescriptions as of 3/19/2019   Medication Sig Dispense Refill   • vitamin D3, cholecalciferol, 1000 UNIT Tab TAKE 1 TABLET BY MOUTH ONCE DAILY. 30 Tab 5   • loratadine (CLARITIN) 10 MG Tab TAKE 1 TABLET BY MOUTH ONCE DAILY. 30 Tab 0   • pantoprazole (PROTONIX) 40 MG Tablet Delayed Response TAKE 1 TABLET BY MOUTH ONCE DAILY. 30 Tab 5   • metoprolol SR (TOPROL XL) 25 MG TABLET SR 24 HR Take 0.5 Tabs by mouth every day. 90 Tab 3   • ASPIR-LOW 81 MG EC tablet TAKE 1 TABLET BY MOUTH ONCE DAILY. 30 Tab 11   • Ranolazine " "1000 MG TABLET SR 12 HR Take 1 Tab by mouth 2 Times a Day. 180 Tab 3   • cyanocobalamin (VITAMIN B12) 1000 MCG Tab Take 1 Tab by mouth every day. 90 Tab 3   • metformin (GLUCOPHAGE) 1000 MG tablet Take 1 Tab by mouth 2 times a day, with meals. 180 Tab 3   • atorvastatin (LIPITOR) 80 MG tablet Take 1 Tab by mouth every day. 90 Tab 3   • lisinopril (PRINIVIL) 5 MG Tab Take 1 Tab by mouth every day. 90 Tab 3   • rivaroxaban (XARELTO) 20 MG Tab tablet Take 1 Tab by mouth with dinner. 90 Tab 3     No facility-administered encounter medications on file as of 3/19/2019.      Review of Systems   Constitutional: Negative for chills and fever.   HENT: Negative for congestion.    Respiratory: Negative for cough and shortness of breath.    Cardiovascular: Negative for chest pain, palpitations, orthopnea, leg swelling and PND.   Gastrointestinal: Negative for abdominal pain and nausea.   Musculoskeletal: Negative for myalgias.   Skin: Negative for rash.   Neurological: Negative for dizziness, loss of consciousness and headaches.   Endo/Heme/Allergies: Does not bruise/bleed easily.   Psychiatric/Behavioral: The patient does not have insomnia.         Objective:   /78 (BP Location: Left arm, Patient Position: Sitting, BP Cuff Size: Adult)   Pulse 70   Ht 1.702 m (5' 7\")   Wt 83.9 kg (185 lb)   SpO2 96%   BMI 28.98 kg/m²     Physical Exam   Constitutional: He is oriented to person, place, and time. He appears well-developed and well-nourished.   HENT:   Head: Normocephalic.   Eyes: EOM are normal.   Neck: Normal range of motion. Neck supple. No JVD present.   Cardiovascular: Normal rate, regular rhythm and normal heart sounds.    Pulmonary/Chest: Effort normal and breath sounds normal. No respiratory distress. He has no wheezes. He has no rales.   PM in left chest wall.  Incision is well healed; no redness, swelling or drainage noted.   Abdominal: Soft. Bowel sounds are normal. He exhibits no distension. There is no " tenderness.   Musculoskeletal: Normal range of motion. He exhibits no edema.   Neurological: He is alert and oriented to person, place, and time.   Skin: Skin is warm and dry. No rash noted.   Psychiatric: He has a normal mood and affect.     PM is working normally. He is paced only 12% of total time. No mode switching episodes.    Lab Results   Component Value Date/Time    SODIUM 137 02/11/2019 10:49 AM    POTASSIUM 4.4 02/11/2019 10:49 AM    CHLORIDE 105 02/11/2019 10:49 AM    CO2 25 02/11/2019 10:49 AM    GLUCOSE 133 (H) 02/11/2019 10:49 AM    BUN 12 02/11/2019 10:49 AM    CREATININE 0.87 02/11/2019 10:49 AM    CREATININE 1.0 07/12/2007 05:40 PM    BUNCREATRAT 18 06/13/2014 08:40 AM     Lab Results   Component Value Date/Time    WBC 5.7 02/11/2019 10:48 AM    RBC 4.42 (L) 02/11/2019 10:48 AM    HEMOGLOBIN 14.6 02/11/2019 10:48 AM    HEMATOCRIT 42.9 02/11/2019 10:48 AM    MCV 97.1 02/11/2019 10:48 AM    MCH 33.0 02/11/2019 10:48 AM    MCHC 34.0 02/11/2019 10:48 AM    MPV 9.7 02/11/2019 10:48 AM      CONCLUSIONS OF ECHOCARDIOGRAM OF 1/5/2018:  Compared to the images of the prior study done 4/16/17, no significant   change noted. RV not well visualized on prior study.   Left ventricular systolic function is low normal.  Left ventricular ejection fraction is visually estimated to be 50%.  Grade I diastolic dysfunction.  Mildly dilated right ventricle. Reduced right ventricular systolic   function.   Aortic sclerosis without stenosis.  Asymmetric septal hypertrophy.    Assessment:     1. Cardiac pacemaker in situ     2. Syncope, unspecified syncope type     3. Sick sinus syndrome (HCC)     4. PAF (paroxysmal atrial fibrillation) (HCC)     5. Chronic anticoagulation     6. Coronary artery disease involving native coronary artery of native heart without angina pectoris     7. Ischemic Cardiomyopathy EF 50%     8. Essential hypertension     9. Dyslipidemia     10. Type 2 diabetes mellitus without complication, without  long-term current use of insulin (HCC)         Medical Decision Making:  Today's Assessment / Status / Plan:     1. Sick sinus syndrome with history of syncope, now with permanent dual chamber pacemaker. The device is working normally, and RA and RV outputs are lowered to 2.5 volts, and rate response is turned on.    2. Paroxysmal atrial fibrillation, with no mode switching episodes on PM interrogation.    3. Chronic anticoagulation with Xarelto. No bleeding problems.    4. CAD/ischemic cardiomyopathy, with LVEF 50% on echo in January 2018.  He has not had any angina.    5. Hypertension, treated and stable.    6. Hyperlipidemia, treated with Lipitor.    7. Diabetes mellitus, treated with Glucophage.    Same medications for now. FU in 3 months for next PM check, and FU with MD to establishe care (previous patient of Dr. JUDITH León). FU sooner if clinical condition changes.    Collaborating MD: Marvel

## 2019-03-25 DIAGNOSIS — I10 ESSENTIAL HYPERTENSION: ICD-10-CM

## 2019-03-25 RX ORDER — LORATADINE 10 MG/1
TABLET ORAL
Qty: 30 TAB | Refills: 0 | Status: SHIPPED | OUTPATIENT
Start: 2019-03-25 | End: 2019-04-22 | Stop reason: SDUPTHER

## 2019-03-25 NOTE — TELEPHONE ENCOUNTER
Last seen: 02/19/19 by Dr. Jenkins  Next appt: None    Was the patient seen in the last year in this department? Yes   Does patient have an active prescription for medications requested? No   Received Request Via: Pharmacy

## 2019-03-26 RX ORDER — RANOLAZINE 1000 MG/1
1000 TABLET, EXTENDED RELEASE ORAL 2 TIMES DAILY
Qty: 180 TAB | Refills: 3 | Status: SHIPPED | OUTPATIENT
Start: 2019-03-26 | End: 2019-08-20

## 2019-03-27 ENCOUNTER — HOSPITAL ENCOUNTER (OUTPATIENT)
Facility: MEDICAL CENTER | Age: 54
End: 2019-03-28
Attending: EMERGENCY MEDICINE | Admitting: HOSPITALIST
Payer: MEDICARE

## 2019-03-27 ENCOUNTER — APPOINTMENT (OUTPATIENT)
Dept: RADIOLOGY | Facility: MEDICAL CENTER | Age: 54
End: 2019-03-27
Attending: EMERGENCY MEDICINE
Payer: MEDICARE

## 2019-03-27 DIAGNOSIS — E86.0 DEHYDRATION: ICD-10-CM

## 2019-03-27 DIAGNOSIS — R55 NEAR SYNCOPE: ICD-10-CM

## 2019-03-27 DIAGNOSIS — N17.9 AKI (ACUTE KIDNEY INJURY) (HCC): ICD-10-CM

## 2019-03-27 LAB
ALBUMIN SERPL BCP-MCNC: 4.3 G/DL (ref 3.2–4.9)
ALBUMIN/GLOB SERPL: 1.4 G/DL
ALP SERPL-CCNC: 72 U/L (ref 30–99)
ALT SERPL-CCNC: 14 U/L (ref 2–50)
ANION GAP SERPL CALC-SCNC: 12 MMOL/L (ref 0–11.9)
APTT PPP: 39.5 SEC (ref 24.7–36)
AST SERPL-CCNC: 12 U/L (ref 12–45)
BASOPHILS # BLD AUTO: 0.4 % (ref 0–1.8)
BASOPHILS # BLD: 0.03 K/UL (ref 0–0.12)
BILIRUB SERPL-MCNC: 2.4 MG/DL (ref 0.1–1.5)
BUN SERPL-MCNC: 34 MG/DL (ref 8–22)
CALCIUM SERPL-MCNC: 8.9 MG/DL (ref 8.5–10.5)
CHLORIDE SERPL-SCNC: 105 MMOL/L (ref 96–112)
CO2 SERPL-SCNC: 18 MMOL/L (ref 20–33)
CREAT SERPL-MCNC: 1.61 MG/DL (ref 0.5–1.4)
EKG IMPRESSION: NORMAL
EOSINOPHIL # BLD AUTO: 0.15 K/UL (ref 0–0.51)
EOSINOPHIL NFR BLD: 2.1 % (ref 0–6.9)
ERYTHROCYTE [DISTWIDTH] IN BLOOD BY AUTOMATED COUNT: 44 FL (ref 35.9–50)
GLOBULIN SER CALC-MCNC: 3 G/DL (ref 1.9–3.5)
GLUCOSE SERPL-MCNC: 127 MG/DL (ref 65–99)
HCT VFR BLD AUTO: 39.1 % (ref 42–52)
HGB BLD-MCNC: 13.5 G/DL (ref 14–18)
IMM GRANULOCYTES # BLD AUTO: 0.05 K/UL (ref 0–0.11)
IMM GRANULOCYTES NFR BLD AUTO: 0.7 % (ref 0–0.9)
INR PPP: 3 (ref 0.87–1.13)
LYMPHOCYTES # BLD AUTO: 1.14 K/UL (ref 1–4.8)
LYMPHOCYTES NFR BLD: 15.8 % (ref 22–41)
MCH RBC QN AUTO: 33.4 PG (ref 27–33)
MCHC RBC AUTO-ENTMCNC: 34.5 G/DL (ref 33.7–35.3)
MCV RBC AUTO: 96.8 FL (ref 81.4–97.8)
MONOCYTES # BLD AUTO: 0.61 K/UL (ref 0–0.85)
MONOCYTES NFR BLD AUTO: 8.4 % (ref 0–13.4)
NEUTROPHILS # BLD AUTO: 5.25 K/UL (ref 1.82–7.42)
NEUTROPHILS NFR BLD: 72.6 % (ref 44–72)
NRBC # BLD AUTO: 0 K/UL
NRBC BLD-RTO: 0 /100 WBC
PLATELET # BLD AUTO: 247 K/UL (ref 164–446)
PMV BLD AUTO: 9.5 FL (ref 9–12.9)
POTASSIUM SERPL-SCNC: 4.3 MMOL/L (ref 3.6–5.5)
PROT SERPL-MCNC: 7.3 G/DL (ref 6–8.2)
PROTHROMBIN TIME: 31.2 SEC (ref 12–14.6)
RBC # BLD AUTO: 4.04 M/UL (ref 4.7–6.1)
SODIUM SERPL-SCNC: 135 MMOL/L (ref 135–145)
TROPONIN I SERPL-MCNC: <0.01 NG/ML (ref 0–0.04)
WBC # BLD AUTO: 7.2 K/UL (ref 4.8–10.8)

## 2019-03-27 PROCEDURE — 85730 THROMBOPLASTIN TIME PARTIAL: CPT

## 2019-03-27 PROCEDURE — 96360 HYDRATION IV INFUSION INIT: CPT

## 2019-03-27 PROCEDURE — 85610 PROTHROMBIN TIME: CPT

## 2019-03-27 PROCEDURE — 85025 COMPLETE CBC W/AUTO DIFF WBC: CPT

## 2019-03-27 PROCEDURE — 93005 ELECTROCARDIOGRAM TRACING: CPT | Performed by: EMERGENCY MEDICINE

## 2019-03-27 PROCEDURE — 99220 PR INITIAL OBSERVATION CARE,LEVL III: CPT | Performed by: HOSPITALIST

## 2019-03-27 PROCEDURE — 700105 HCHG RX REV CODE 258: Performed by: EMERGENCY MEDICINE

## 2019-03-27 PROCEDURE — 84484 ASSAY OF TROPONIN QUANT: CPT

## 2019-03-27 PROCEDURE — 99285 EMERGENCY DEPT VISIT HI MDM: CPT

## 2019-03-27 PROCEDURE — 80053 COMPREHEN METABOLIC PANEL: CPT

## 2019-03-27 PROCEDURE — G0378 HOSPITAL OBSERVATION PER HR: HCPCS

## 2019-03-27 PROCEDURE — 70450 CT HEAD/BRAIN W/O DYE: CPT

## 2019-03-27 PROCEDURE — 71045 X-RAY EXAM CHEST 1 VIEW: CPT

## 2019-03-27 RX ORDER — PROMETHAZINE HYDROCHLORIDE 25 MG/1
12.5-25 TABLET ORAL EVERY 4 HOURS PRN
Status: DISCONTINUED | OUTPATIENT
Start: 2019-03-27 | End: 2019-03-28 | Stop reason: HOSPADM

## 2019-03-27 RX ORDER — DEXTROSE MONOHYDRATE 25 G/50ML
25 INJECTION, SOLUTION INTRAVENOUS
Status: DISCONTINUED | OUTPATIENT
Start: 2019-03-27 | End: 2019-03-28 | Stop reason: HOSPADM

## 2019-03-27 RX ORDER — SODIUM CHLORIDE 9 MG/ML
INJECTION, SOLUTION INTRAVENOUS CONTINUOUS
Status: DISCONTINUED | OUTPATIENT
Start: 2019-03-27 | End: 2019-03-28 | Stop reason: HOSPADM

## 2019-03-27 RX ORDER — SODIUM CHLORIDE 9 MG/ML
1000 INJECTION, SOLUTION INTRAVENOUS ONCE
Status: COMPLETED | OUTPATIENT
Start: 2019-03-27 | End: 2019-03-28

## 2019-03-27 RX ORDER — BISACODYL 10 MG
10 SUPPOSITORY, RECTAL RECTAL
Status: DISCONTINUED | OUTPATIENT
Start: 2019-03-27 | End: 2019-03-28 | Stop reason: HOSPADM

## 2019-03-27 RX ORDER — METOPROLOL SUCCINATE 25 MG/1
12.5 TABLET, EXTENDED RELEASE ORAL DAILY
Status: DISCONTINUED | OUTPATIENT
Start: 2019-03-28 | End: 2019-03-28 | Stop reason: HOSPADM

## 2019-03-27 RX ORDER — POLYETHYLENE GLYCOL 3350 17 G/17G
1 POWDER, FOR SOLUTION ORAL
Status: DISCONTINUED | OUTPATIENT
Start: 2019-03-27 | End: 2019-03-28 | Stop reason: HOSPADM

## 2019-03-27 RX ORDER — PROMETHAZINE HYDROCHLORIDE 25 MG/1
12.5-25 SUPPOSITORY RECTAL EVERY 4 HOURS PRN
Status: DISCONTINUED | OUTPATIENT
Start: 2019-03-27 | End: 2019-03-28 | Stop reason: HOSPADM

## 2019-03-27 RX ORDER — OMEPRAZOLE 20 MG/1
20 CAPSULE, DELAYED RELEASE ORAL DAILY
Status: DISCONTINUED | OUTPATIENT
Start: 2019-03-28 | End: 2019-03-28 | Stop reason: HOSPADM

## 2019-03-27 RX ORDER — LANOLIN ALCOHOL/MO/W.PET/CERES
1000 CREAM (GRAM) TOPICAL DAILY
COMMUNITY
Start: 2019-02-22 | End: 2019-03-27

## 2019-03-27 RX ORDER — ONDANSETRON 2 MG/ML
4 INJECTION INTRAMUSCULAR; INTRAVENOUS EVERY 4 HOURS PRN
Status: DISCONTINUED | OUTPATIENT
Start: 2019-03-27 | End: 2019-03-28 | Stop reason: HOSPADM

## 2019-03-27 RX ORDER — ONDANSETRON 4 MG/1
4 TABLET, ORALLY DISINTEGRATING ORAL EVERY 4 HOURS PRN
Status: DISCONTINUED | OUTPATIENT
Start: 2019-03-27 | End: 2019-03-28 | Stop reason: HOSPADM

## 2019-03-27 RX ORDER — ATORVASTATIN CALCIUM 80 MG/1
80 TABLET, FILM COATED ORAL DAILY
Status: DISCONTINUED | OUTPATIENT
Start: 2019-03-28 | End: 2019-03-28 | Stop reason: HOSPADM

## 2019-03-27 RX ORDER — AMOXICILLIN 250 MG
2 CAPSULE ORAL 2 TIMES DAILY
Status: DISCONTINUED | OUTPATIENT
Start: 2019-03-28 | End: 2019-03-28 | Stop reason: HOSPADM

## 2019-03-27 RX ORDER — RANOLAZINE 500 MG/1
1000 TABLET, EXTENDED RELEASE ORAL 2 TIMES DAILY
Status: DISCONTINUED | OUTPATIENT
Start: 2019-03-27 | End: 2019-03-28 | Stop reason: HOSPADM

## 2019-03-27 RX ADMIN — SODIUM CHLORIDE 1000 ML: 9 INJECTION, SOLUTION INTRAVENOUS at 23:11

## 2019-03-27 ASSESSMENT — CHA2DS2 SCORE
HYPERTENSION: YES
PRIOR STROKE OR TIA OR THROMBOEMBOLISM: NO
DIABETES: YES
VASCULAR DISEASE: YES
CHA2DS2 VASC SCORE: 4
AGE 65 TO 74: NO
AGE 75 OR GREATER: NO
SEX: MALE
CHF OR LEFT VENTRICULAR DYSFUNCTION: YES

## 2019-03-27 ASSESSMENT — ENCOUNTER SYMPTOMS
FEVER: 0
CHILLS: 0
BLURRED VISION: 0
DIZZINESS: 1
NAUSEA: 0
ABDOMINAL PAIN: 0
FOCAL WEAKNESS: 0
VOMITING: 0
SHORTNESS OF BREATH: 0

## 2019-03-28 ENCOUNTER — APPOINTMENT (OUTPATIENT)
Dept: CARDIOLOGY | Facility: MEDICAL CENTER | Age: 54
End: 2019-03-28
Attending: HOSPITALIST
Payer: MEDICARE

## 2019-03-28 ENCOUNTER — PATIENT OUTREACH (OUTPATIENT)
Dept: HEALTH INFORMATION MANAGEMENT | Facility: OTHER | Age: 54
End: 2019-03-28

## 2019-03-28 VITALS
BODY MASS INDEX: 27.47 KG/M2 | WEIGHT: 181.22 LBS | HEIGHT: 68 IN | HEART RATE: 65 BPM | SYSTOLIC BLOOD PRESSURE: 118 MMHG | TEMPERATURE: 97.4 F | DIASTOLIC BLOOD PRESSURE: 81 MMHG | RESPIRATION RATE: 17 BRPM | OXYGEN SATURATION: 95 %

## 2019-03-28 PROBLEM — N17.9 AKI (ACUTE KIDNEY INJURY) (HCC): Status: ACTIVE | Noted: 2019-03-28

## 2019-03-28 PROBLEM — D64.9 ANEMIA: Status: ACTIVE | Noted: 2019-03-28

## 2019-03-28 PROBLEM — R19.7 DIARRHEA: Status: ACTIVE | Noted: 2019-03-28

## 2019-03-28 LAB
ALBUMIN SERPL BCP-MCNC: 3.7 G/DL (ref 3.2–4.9)
ALBUMIN/GLOB SERPL: 1.7 G/DL
ALP SERPL-CCNC: 56 U/L (ref 30–99)
ALT SERPL-CCNC: 9 U/L (ref 2–50)
ANION GAP SERPL CALC-SCNC: 8 MMOL/L (ref 0–11.9)
AST SERPL-CCNC: 12 U/L (ref 12–45)
BASOPHILS # BLD AUTO: 0.5 % (ref 0–1.8)
BASOPHILS # BLD: 0.03 K/UL (ref 0–0.12)
BILIRUB CONJ SERPL-MCNC: 0.3 MG/DL (ref 0.1–0.5)
BILIRUB INDIRECT SERPL-MCNC: 1.4 MG/DL (ref 0–1)
BILIRUB SERPL-MCNC: 1.7 MG/DL (ref 0.1–1.5)
BUN SERPL-MCNC: 29 MG/DL (ref 8–22)
CALCIUM SERPL-MCNC: 7.7 MG/DL (ref 8.5–10.5)
CHLORIDE SERPL-SCNC: 110 MMOL/L (ref 96–112)
CO2 SERPL-SCNC: 17 MMOL/L (ref 20–33)
CREAT SERPL-MCNC: 1.2 MG/DL (ref 0.5–1.4)
EOSINOPHIL # BLD AUTO: 0.09 K/UL (ref 0–0.51)
EOSINOPHIL NFR BLD: 1.6 % (ref 0–6.9)
ERYTHROCYTE [DISTWIDTH] IN BLOOD BY AUTOMATED COUNT: 45.9 FL (ref 35.9–50)
GLOBULIN SER CALC-MCNC: 2.2 G/DL (ref 1.9–3.5)
GLUCOSE BLD-MCNC: 110 MG/DL (ref 65–99)
GLUCOSE BLD-MCNC: 136 MG/DL (ref 65–99)
GLUCOSE BLD-MCNC: 139 MG/DL (ref 65–99)
GLUCOSE SERPL-MCNC: 157 MG/DL (ref 65–99)
HCT VFR BLD AUTO: 34.2 % (ref 42–52)
HGB BLD-MCNC: 11.6 G/DL (ref 14–18)
IMM GRANULOCYTES # BLD AUTO: 0.03 K/UL (ref 0–0.11)
IMM GRANULOCYTES NFR BLD AUTO: 0.5 % (ref 0–0.9)
LV EJECT FRACT  99904: 50
LV EJECT FRACT MOD 2C 99903: 52.02
LV EJECT FRACT MOD 4C 99902: 49.3
LV EJECT FRACT MOD BP 99901: 50.02
LYMPHOCYTES # BLD AUTO: 0.93 K/UL (ref 1–4.8)
LYMPHOCYTES NFR BLD: 16.3 % (ref 22–41)
MCH RBC QN AUTO: 34.1 PG (ref 27–33)
MCHC RBC AUTO-ENTMCNC: 33.9 G/DL (ref 33.7–35.3)
MCV RBC AUTO: 100.6 FL (ref 81.4–97.8)
MONOCYTES # BLD AUTO: 0.46 K/UL (ref 0–0.85)
MONOCYTES NFR BLD AUTO: 8.1 % (ref 0–13.4)
NEUTROPHILS # BLD AUTO: 4.15 K/UL (ref 1.82–7.42)
NEUTROPHILS NFR BLD: 73 % (ref 44–72)
NRBC # BLD AUTO: 0 K/UL
NRBC BLD-RTO: 0 /100 WBC
PLATELET # BLD AUTO: 206 K/UL (ref 164–446)
PMV BLD AUTO: 9.6 FL (ref 9–12.9)
POTASSIUM SERPL-SCNC: 4.3 MMOL/L (ref 3.6–5.5)
PROT SERPL-MCNC: 5.9 G/DL (ref 6–8.2)
RBC # BLD AUTO: 3.4 M/UL (ref 4.7–6.1)
SODIUM SERPL-SCNC: 135 MMOL/L (ref 135–145)
TROPONIN I SERPL-MCNC: <0.01 NG/ML (ref 0–0.04)
WBC # BLD AUTO: 5.7 K/UL (ref 4.8–10.8)

## 2019-03-28 PROCEDURE — 84484 ASSAY OF TROPONIN QUANT: CPT

## 2019-03-28 PROCEDURE — 93306 TTE W/DOPPLER COMPLETE: CPT

## 2019-03-28 PROCEDURE — 700105 HCHG RX REV CODE 258: Performed by: HOSPITALIST

## 2019-03-28 PROCEDURE — 99217 PR OBSERVATION CARE DISCHARGE: CPT | Performed by: HOSPITALIST

## 2019-03-28 PROCEDURE — 700105 HCHG RX REV CODE 258: Performed by: EMERGENCY MEDICINE

## 2019-03-28 PROCEDURE — 80053 COMPREHEN METABOLIC PANEL: CPT

## 2019-03-28 PROCEDURE — 85025 COMPLETE CBC W/AUTO DIFF WBC: CPT

## 2019-03-28 PROCEDURE — 700102 HCHG RX REV CODE 250 W/ 637 OVERRIDE(OP): Performed by: HOSPITALIST

## 2019-03-28 PROCEDURE — 93306 TTE W/DOPPLER COMPLETE: CPT | Mod: 26 | Performed by: INTERNAL MEDICINE

## 2019-03-28 PROCEDURE — 82962 GLUCOSE BLOOD TEST: CPT

## 2019-03-28 PROCEDURE — 700102 HCHG RX REV CODE 250 W/ 637 OVERRIDE(OP): Performed by: NURSE PRACTITIONER

## 2019-03-28 PROCEDURE — G0378 HOSPITAL OBSERVATION PER HR: HCPCS

## 2019-03-28 PROCEDURE — A9270 NON-COVERED ITEM OR SERVICE: HCPCS | Performed by: NURSE PRACTITIONER

## 2019-03-28 PROCEDURE — 36415 COLL VENOUS BLD VENIPUNCTURE: CPT

## 2019-03-28 PROCEDURE — A9270 NON-COVERED ITEM OR SERVICE: HCPCS | Performed by: HOSPITALIST

## 2019-03-28 PROCEDURE — 82248 BILIRUBIN DIRECT: CPT

## 2019-03-28 RX ORDER — FUROSEMIDE 20 MG/1
20 TABLET ORAL DAILY
Qty: 30 TAB | Refills: 0 | Status: SHIPPED | OUTPATIENT
Start: 2019-03-28 | End: 2019-03-28

## 2019-03-28 RX ORDER — LISINOPRIL 2.5 MG/1
2.5 TABLET ORAL DAILY
Qty: 30 TAB | Refills: 0 | Status: SHIPPED | OUTPATIENT
Start: 2019-03-28 | End: 2019-04-22 | Stop reason: SDUPTHER

## 2019-03-28 RX ORDER — POTASSIUM CHLORIDE 750 MG/1
10 TABLET, EXTENDED RELEASE ORAL DAILY
Qty: 30 TAB | Refills: 0 | Status: SHIPPED | OUTPATIENT
Start: 2019-03-28 | End: 2019-03-28

## 2019-03-28 RX ORDER — ACETAMINOPHEN 325 MG/1
650 TABLET ORAL EVERY 6 HOURS PRN
Status: DISCONTINUED | OUTPATIENT
Start: 2019-03-28 | End: 2019-03-28 | Stop reason: HOSPADM

## 2019-03-28 RX ORDER — LISINOPRIL 2.5 MG/1
2.5 TABLET ORAL
Status: DISCONTINUED | OUTPATIENT
Start: 2019-03-28 | End: 2019-03-28 | Stop reason: HOSPADM

## 2019-03-28 RX ADMIN — ACETAMINOPHEN 650 MG: 325 TABLET, FILM COATED ORAL at 09:42

## 2019-03-28 RX ADMIN — RANOLAZINE 1000 MG: 500 TABLET, FILM COATED, EXTENDED RELEASE ORAL at 05:40

## 2019-03-28 RX ADMIN — SODIUM CHLORIDE 1000 ML: 9 INJECTION, SOLUTION INTRAVENOUS at 00:36

## 2019-03-28 RX ADMIN — LISINOPRIL 2.5 MG: 2.5 TABLET ORAL at 12:55

## 2019-03-28 RX ADMIN — ATORVASTATIN CALCIUM 80 MG: 80 TABLET, FILM COATED ORAL at 05:40

## 2019-03-28 RX ADMIN — ASPIRIN 81 MG: 81 TABLET, DELAYED RELEASE ORAL at 05:40

## 2019-03-28 RX ADMIN — METOPROLOL SUCCINATE 12.5 MG: 25 TABLET, EXTENDED RELEASE ORAL at 09:42

## 2019-03-28 RX ADMIN — SODIUM CHLORIDE: 9 INJECTION, SOLUTION INTRAVENOUS at 01:48

## 2019-03-28 RX ADMIN — OMEPRAZOLE 20 MG: 20 CAPSULE, DELAYED RELEASE ORAL at 05:40

## 2019-03-28 ASSESSMENT — ENCOUNTER SYMPTOMS
DIZZINESS: 1
BLURRED VISION: 0
ABDOMINAL PAIN: 0
HALLUCINATIONS: 0
WEAKNESS: 1
CHILLS: 0
DEPRESSION: 0
COUGH: 0
HEARTBURN: 0
SENSORY CHANGE: 0
EYE DISCHARGE: 0
NAUSEA: 0
MYALGIAS: 0
FEVER: 0
VOMITING: 0
PALPITATIONS: 0
HEMOPTYSIS: 0
FLANK PAIN: 0
SHORTNESS OF BREATH: 0
FOCAL WEAKNESS: 0
SPEECH CHANGE: 0
DOUBLE VISION: 0
BRUISES/BLEEDS EASILY: 0

## 2019-03-28 ASSESSMENT — PATIENT HEALTH QUESTIONNAIRE - PHQ9
2. FEELING DOWN, DEPRESSED, IRRITABLE, OR HOPELESS: NOT AT ALL
1. LITTLE INTEREST OR PLEASURE IN DOING THINGS: NOT AT ALL
SUM OF ALL RESPONSES TO PHQ9 QUESTIONS 1 AND 2: 0
SUM OF ALL RESPONSES TO PHQ9 QUESTIONS 1 AND 2: 0
1. LITTLE INTEREST OR PLEASURE IN DOING THINGS: NOT AT ALL
2. FEELING DOWN, DEPRESSED, IRRITABLE, OR HOPELESS: NOT AT ALL

## 2019-03-28 ASSESSMENT — LIFESTYLE VARIABLES
SUBSTANCE_ABUSE: 0
EVER_SMOKED: NEVER

## 2019-03-28 NOTE — PROGRESS NOTES
Transported pt from ER to T206, all belongings and charts with patient, hooked in the monitor-SR. Pt got pacemaker. Pt tolerating well

## 2019-03-28 NOTE — PROGRESS NOTES
A/o, respirations are even and unlabored on room ,assessment completed, pt still complaining dizziness, vital signs stable, SR on the monitor, IV fluids running per orders, updated communication board,  poc discussed and understood, verbalized understanding, box meal given, all questions answered at this time , fall precautions in place, call button within reach, will continue to monitor

## 2019-03-28 NOTE — ASSESSMENT & PLAN NOTE
Highly suspect orthostatic and due to dehydration   Did have recent pacer placement will need cardiac monitoring   Pacemaker interrogation discussed with nursing  Echocardiogram, serial troponin   Orthostatic BP   Rehydration with IVF  Consider cardiology evaluation if persistent

## 2019-03-28 NOTE — PROGRESS NOTES
IV removed. Discharge instructions provided and patient verbalizes understanding. Patient states that all question have been answered. Copy of discharge provided to patient and signed. Prescription: sent to pharmacy. Patient states that all personal items are in possess. Patient escorted off unit by Tech without incident via wheelchair.   patient informed that registration did not take his ID per note.

## 2019-03-28 NOTE — RESPIRATORY CARE
COPD EDUCATION by COPD CLINICAL EDUCATOR  3/28/2019 at 8:35 AM by Janay Waller     Patient reviewed by COPD education team. Patient does not qualify for COPD program.

## 2019-03-28 NOTE — DISCHARGE PLANNING
Care Transition Team Assessment    Pt is fairly independent with his ADL's. Pt lives alone, is employed and has a friend who helps him out. Pt takes the bus for transportation and can take a bus on [place]. Pt given information on [facility] in case he needs medical supplies.    Information Source  Orientation : Oriented x 4  Information Given By: Patient  Who is responsible for making decisions for patient? : Patient         Elopement Risk  Legal Hold: No  Ambulatory or Self Mobile in Wheelchair: No-Not an Elopement Risk    Interdisciplinary Discharge Planning  Does Admitting Nurse Feel This Could be a Complex Discharge?: No  Primary Care Physician: Juancho Jenkins MD  Lives with - Patient's Self Care Capacity: Alone and Able to Care For Self  Support Systems: Friends / Neighbors  Housing / Facility: 3 Story Apartment / Condo  Do You Take your Prescribed Medications Regularly: Yes ([facility])  Able to Return to Previous ADL's: Yes  Mobility Issues: No  Prior Services: None  Patient Expects to be Discharged to:: Home  Assistance Needed: No  Durable Medical Equipment: Not Applicable    Discharge Preparedness  What are your discharge supports?:  (Friend)  Prior Functional Level: Independent with Activities of Daily Living  Difficulity with IADLs:  (transportation)    Functional Assesment  Prior Functional Level: Independent with Activities of Daily Living    Finances  Financial Barriers to Discharge: No  Prescription Coverage: Yes    Vision / Hearing Impairment  Vision Impairment : No  Hearing Impairment : No         Advance Directive  Advance Directive?: None  Advance Directive offered?: AD Booklet refused    Domestic Abuse  Have you ever been the victim of abuse or violence?: Not Sure    Psychological Assessment  History of Substance Abuse: None  History of Psychiatric Problems: No    Discharge Risks or Barriers  Discharge risks or barriers?: No    Anticipated Discharge Information  Anticipated  discharge disposition: Assist with transportation, Home  Discharge Address: [address]  Discharge Contact Phone Number: pt can take a bus home

## 2019-03-28 NOTE — ED PROVIDER NOTES
ED Provider Note    Scribed for Ashley Larios M.D. by Catherine Vang. 3/27/2019, 9:36 PM.    Primary care provider: Juancho Jenkins M.D.  Means of arrival: ambulance  History obtained from: patient   History limited by: none     CHIEF COMPLAINT  Chief Complaint   Patient presents with   • Dizziness     x's 1 hour   • Diarrhea     since yesterday       HPI  Ryan Trevizo is a 53 y.o. male who presents to the Emergency Department with complaints of acute, mild dizziness onset 1 hour ago. The patient reports that he felt light headed, dizzy and near syncopal while passing a bowel movement. He notes that he has had diarrhea for the last 24 hours, but has still been able to tolerate oral intake.  He is not having any associated abdominal pain. standing up and positional changes are exacerbating factors to his dizziness which he notes worsened as he stood up off of the toilet tonight. The patient recently had a pacemaker placed 3 weeks ago and also takes Xarelto daily.  Patient currently denies chest pain, shortness of breath fevers, chills, and recent illness.    Elective pacemaker placed last month by Dr. Blake. His cardiologist is Dr. Joseph León. Pacemaker was placed due to significant bradycardia and pauses which causes the patient to have syncopal episodes     REVIEW OF SYSTEMS  Review of Systems   Constitutional: Negative for chills and fever.   Eyes: Negative for blurred vision.   Respiratory: Negative for shortness of breath.    Cardiovascular: Negative for chest pain.   Gastrointestinal: Negative for abdominal pain, nausea and vomiting.   Neurological: Positive for dizziness. Negative for focal weakness.   All other systems reviewed and are negative.        PAST MEDICAL HISTORY   has a past medical history of ACS (acute coronary syndrome) ([facility]); Anxiety disorder; Arthritis; CAD (coronary artery disease); Chronic anticoagulation; Claudication ([facility]); Diabetes ([facility]); Dizziness; GERD (gastroesophageal  "reflux disease); Glaucoma; HTN (hypertension); Hyperlipemia; Ischemic cardiomyopathy (05/2018); Mental retardation; Mentally challenged; Osteoarthritis; Paroxysmal atrial fibrillation (HCC); Pericarditis secondary to acute myocardial infarction (HCC); Sick sinus syndrome (HCC); SOB (shortness of breath); STEMI (ST elevation myocardial infarction) ([facility]); and Syncope (02/2019).    SURGICAL HISTORY   has a past surgical history that includes other orthopedic surgery (7- ); other cardiac surgery; gastroscopy-endo (7/25/2012); gastroscopy-endo (7/26/2012); gastroscopy-endo (5/19/2017); other cardiac surgery (Left, 01/12/2018); and pacemaker insertion (Left, 02/12/2019).    SOCIAL HISTORY  Social History   Substance Use Topics   • Smoking status: Never Smoker   • Smokeless tobacco: Never Used   • Alcohol use No      History   Drug Use No       FAMILY HISTORY  None pertinent    CURRENT MEDICATIONS  Home Medications     Reviewed by Brian Walter (Pharmacy Tech) on 03/27/19 at 2339  Med List Status: Complete   Medication Last Dose Status   ASPIR-LOW 81 MG EC tablet 3/27/2019 Active   atorvastatin (LIPITOR) 80 MG tablet 3/27/2019 Active   cyanocobalamin (VITAMIN B12) 1000 MCG Tab 3/27/2019 Active   lisinopril (PRINIVIL) 5 MG Tab 3/27/2019 Active   loratadine (CLARITIN) 10 MG Tab 3/27/2019 Active   metformin (GLUCOPHAGE) 1000 MG tablet 3/27/2019 Active   metoprolol SR (TOPROL XL) 25 MG TABLET SR 24 HR 3/27/2019 Active   pantoprazole (PROTONIX) 40 MG Tablet Delayed Response 3/27/2019 Active   Ranolazine 1000 MG TABLET SR 12 HR 3/27/2019 Active   rivaroxaban (XARELTO) 20 MG Tab tablet 3/27/2019 Active   vitamin D3, cholecalciferol, 1000 UNIT Tab 3/27/2019 Active                ALLERGIES  Allergies   Allergen Reactions   • Ritalin [Methylphenidate] Unspecified     \"Hyper\"       PHYSICAL EXAM  VITAL SIGNS: Pulse 87   Temp 36.9 °C (98.5 °F) (Temporal)   Resp 18   Ht 1.702 m (5' 7\")   Wt 90 kg (198 lb 6.6 oz)   BMI " 31.08 kg/m²   Vitals reviewed by myself.  Physical Exam  Nursing note and vitals reviewed.  Constitutional: Well-developed and well-nourished. No acute distress.   HENT: Head is normocephalic and atraumatic.  Eyes: extra-ocular movements intact  Cardiovascular: regular rate and regular rhythm. No murmur heard.  Pulmonary/Chest: Breath sounds normal. No wheezes or rales.   Abdominal: Soft and non-tender. No distention.    Musculoskeletal: Extremities exhibit normal range of motion without edema or tenderness.   Neurological: Awake and alert. No focal neuro deficits   Skin: Skin is warm and dry. No rash.         DIAGNOSTIC STUDIES /  LABS  Labs Reviewed   CBC WITH DIFFERENTIAL - Abnormal; Notable for the following:        Result Value    RBC 4.04 (*)     Hemoglobin 13.5 (*)     Hematocrit 39.1 (*)     MCH 33.4 (*)     Neutrophils-Polys 72.60 (*)     Lymphocytes 15.80 (*)     All other components within normal limits    Narrative:     Indicate which anticoagulants the patient is on:->UNKNOWN   COMP METABOLIC PANEL - Abnormal; Notable for the following:     Co2 18 (*)     Anion Gap 12.0 (*)     Glucose 127 (*)     Bun 34 (*)     Creatinine 1.61 (*)     Total Bilirubin 2.4 (*)     All other components within normal limits    Narrative:     Indicate which anticoagulants the patient is on:->UNKNOWN   PROTHROMBIN TIME - Abnormal; Notable for the following:     PT 31.2 (*)     INR 3.00 (*)     All other components within normal limits    Narrative:     Indicate which anticoagulants the patient is on:->UNKNOWN   APTT - Abnormal; Notable for the following:     APTT 39.5 (*)     All other components within normal limits    Narrative:     Indicate which anticoagulants the patient is on:->UNKNOWN   ESTIMATED GFR - Abnormal; Notable for the following:     GFR If  54 (*)     GFR If Non  45 (*)     All other components within normal limits    Narrative:     Indicate which anticoagulants the patient is  on:->UNKNOWN   TROPONIN    Narrative:     Indicate which anticoagulants the patient is on:->UNKNOWN   CBC WITH DIFFERENTIAL   COMP METABOLIC PANEL   TROPONIN   STOOL WBC'S   CULTURE STOOL       All labs reviewed by me.      EKG: Per my interpretation  EKG at 2144: Normal sinus rhythm, heart rate 85, normal axis, normal intervals, , , QTc 462, no acute ST-T segment changes, no evidence of acute arrhythmia or ischemia    RADIOLOGY  DX-CHEST-PORTABLE (1 VIEW)   Final Result      No radiographic evidence of acute cardiopulmonary process.      CT-HEAD W/O    No acute abnormalities     The radiologist's interpretation of all radiological studies have been reviewed by me.      REASSESSMENT  9:36 PM - I evaluated patient at bedside.  I informed them of my plan of care today given their presentation.     10:48 PM - I reevaluated patient at bedside and discussed diagnostic study results thus far     11:10 PM I discussed the patient's case and the above findings with Dr. Hodge (hospitalist) who will accept the patient for admission     HYDRATION: Based on the patient's presentation of Acute Diarrhea and Acute Kindney Injury the patient was given IV fluids. IV Hydration was used because oral hydration was not adequate alone. Upon recheck following hydration, the patient was feeling improved.        COURSE & MEDICAL DECISION MAKING  Nursing notes, VS, PMSFHx reviewed in chart.    Patient is a 53-year-old male who comes in for dizziness, near syncope and diarrhea.  Differential diagnosis includes dehydration, electrolyte abnormality, arrhythmia, acute coronary syndrome, acute intracranial abnormality.  Diagnostic workup includes EKG, labs, CT head, and chest x-ray.    Patient's initial vitals are within normal limits.  EKG returns demonstrates no acute arrhythmia or ischemia.  Labs returned and troponin is within normal limits.  Patient has no leukocytosis making infection unlikely.  Creatinine is elevated at 1.61,  patient's last creatinine during his last admission was 0.9, therefore patient does have acute kidney injury likely related to dehydration from his diarrhea.  Patient is treated with IV fluids and will be admitted for near syncope and acute kidney injury.  I discussed the case with Dr. Hodge has accepted the admission.  At time of admission patient is in guarded condition.      FINAL IMPRESSION  1. Near syncope    2. Dehydration    3. VALENTINE (acute kidney injury) ([facility])          ICatherine (Scribe), am scribing for, and in the presence of, Ashley Larios M.D..    Electronically signed by: Catherine Vang (Scribe), 3/27/2019    IAshley M.D. personally performed the services described in this documentation, as scribed by Catherine Vang in my presence, and it is both accurate and complete.    C    The note accurately reflects work and decisions made by me.  Ashley Larios  3/28/2019  12:10 AM

## 2019-03-28 NOTE — H&P
Hospital Medicine History & Physical Note    Date of Service  3/27/2019    Primary Care Physician  Juancho Jenkins M.D.    Consultants  none    Code Status  full    Chief Complaint  Near syncope    History of Presenting Illness  53 y.o. male who presented 3/27/2019 with past medical history of sick sinus syndrome, atrial fibrillation coronary artery disease mentally challenged who recently had pacemaker placement discharged 2/13/2019 represents with near syncope.  This patient developed acute dizziness, with associated lightheadedness and feeling pale like he was almost going to pass out.  States he has been having diarrhea for the last 24 hours.  States he has had 3 episodes with poor oral intake.  Symptoms worsen when he stands up.  He does take Xarelto daily and has a recent pacemaker placement about 3 weeks ago follows with Dr. Joseph León.  He otherwise has no known alleviating or exacerbating factors to his symptoms    Review of Systems  Review of Systems   Constitutional: Positive for malaise/fatigue. Negative for chills and fever.   HENT: Negative for congestion, hearing loss and tinnitus.    Eyes: Negative for blurred vision, double vision and discharge.   Respiratory: Negative for cough, hemoptysis and shortness of breath.    Cardiovascular: Negative for chest pain, palpitations and leg swelling.   Gastrointestinal: Negative for abdominal pain, heartburn, nausea and vomiting.   Genitourinary: Negative for dysuria and flank pain.   Musculoskeletal: Negative for joint pain and myalgias.   Skin: Negative for rash.   Neurological: Positive for dizziness and weakness. Negative for sensory change, speech change and focal weakness.   Endo/Heme/Allergies: Negative for environmental allergies. Does not bruise/bleed easily.   Psychiatric/Behavioral: Negative for depression, hallucinations and substance abuse.       Past Medical History   has a past medical history of ACS (acute coronary syndrome) (HCC); Anxiety  "disorder; Arthritis; CAD (coronary artery disease); Chronic anticoagulation; Claudication (HCC); Diabetes (HCC); Dizziness; GERD (gastroesophageal reflux disease); Glaucoma; HTN (hypertension); Hyperlipemia; Ischemic cardiomyopathy (05/2018); Mental retardation; Mentally challenged; Osteoarthritis; Paroxysmal atrial fibrillation (HCC); Pericarditis secondary to acute myocardial infarction ([facility]); Sick sinus syndrome ([facility]); SOB (shortness of breath); STEMI (ST elevation myocardial infarction) ([facility]); and Syncope (02/2019). He also has no past medical history of COPD; Liver disease; Other general symptoms(780.99); or Seizure disorder ([facility]).    Surgical History   has a past surgical history that includes other orthopedic surgery (7- ); other cardiac surgery; gastroscopy-endo (7/25/2012); gastroscopy-endo (7/26/2012); gastroscopy-endo (5/19/2017); other cardiac surgery (Left, 01/12/2018); and pacemaker insertion (Left, 02/12/2019).     Family History  Reviewed and not not pertinent     Social History   reports that he has never smoked. He has never used smokeless tobacco. He reports that he does not drink alcohol or use drugs.    Allergies  Allergies   Allergen Reactions   • Ritalin [Methylphenidate] Unspecified     \"Hyper\"       Medications  Prior to Admission Medications   Prescriptions Last Dose Informant Patient Reported? Taking?   ASPIR-LOW 81 MG EC tablet   No No   Sig: TAKE 1 TABLET BY MOUTH ONCE DAILY.   Ranolazine 1000 MG TABLET SR 12 HR   No No   Sig: Take 1 Tab by mouth 2 Times a Day.   atorvastatin (LIPITOR) 80 MG tablet   No No   Sig: Take 1 Tab by mouth every day.   cyanocobalamin (VITAMIN B12) 1000 MCG Tab   No No   Sig: Take 1 Tab by mouth every day.   lisinopril (PRINIVIL) 5 MG Tab   No No   Sig: Take 1 Tab by mouth every day.   loratadine (CLARITIN) 10 MG Tab   No No   Sig: TAKE 1 TABLET BY MOUTH ONCE DAILY.   metformin (GLUCOPHAGE) 1000 MG tablet   No No   Sig: Take 1 Tab by mouth 2 times a day, " with meals.   metoprolol SR (TOPROL XL) 25 MG TABLET SR 24 HR  Caregiver Yes No   Sig: Take 0.5 Tabs by mouth every day.   pantoprazole (PROTONIX) 40 MG Tablet Delayed Response   No No   Sig: TAKE 1 TABLET BY MOUTH ONCE DAILY.   rivaroxaban (XARELTO) 20 MG Tab tablet   No No   Sig: Take 1 Tab by mouth with dinner.   vitamin D3, cholecalciferol, 1000 UNIT Tab   No No   Sig: TAKE 1 TABLET BY MOUTH ONCE DAILY.      Facility-Administered Medications: None       Physical Exam  Temp:  [36.9 °C (98.5 °F)] 36.9 °C (98.5 °F)  Pulse:  [72-95] 72  Resp:  [18] 18  SpO2:  [91 %] 91 %    Physical Exam   Constitutional: He appears well-developed and well-nourished. He appears distressed.   HENT:   Head: Normocephalic and atraumatic.   Dry oral mucosa   Eyes: Pupils are equal, round, and reactive to light. Conjunctivae and EOM are normal.   Neck: Normal range of motion. Neck supple. No JVD present.   Cardiovascular: Normal rate, regular rhythm, normal heart sounds and intact distal pulses.    No murmur heard.  Pulmonary/Chest: Effort normal and breath sounds normal. No respiratory distress. He exhibits no tenderness.   Abdominal: Soft. Bowel sounds are normal. He exhibits no distension. There is no tenderness.   Musculoskeletal: Normal range of motion. He exhibits no edema.   Neurological: He is alert. No cranial nerve deficit. He exhibits normal muscle tone.   Delayed mental status, slowed speech    Skin: Skin is warm and dry. No erythema.   Psychiatric: He has a normal mood and affect. His behavior is normal. Judgment and thought content normal.   Nursing note and vitals reviewed.      Laboratory:  Recent Labs      03/27/19 2130   WBC  7.2   RBC  4.04*   HEMOGLOBIN  13.5*   HEMATOCRIT  39.1*   MCV  96.8   MCH  33.4*   MCHC  34.5   RDW  44.0   PLATELETCT  247   MPV  9.5     Recent Labs      03/27/19 2130   SODIUM  135   POTASSIUM  4.3   CHLORIDE  105   CO2  18*   GLUCOSE  127*   BUN  34*   CREATININE  1.61*   CALCIUM  8.9      Recent Labs      03/27/19 2130   ALTSGPT  14   ASTSGOT  12   ALKPHOSPHAT  72   TBILIRUBIN  2.4*   GLUCOSE  127*     Recent Labs      03/27/19 2130   APTT  39.5*   INR  3.00*             Recent Labs      03/27/19 2130   TROPONINI  <0.01       Urinalysis:    No results found     Imaging:  DX-CHEST-PORTABLE (1 VIEW)   Final Result      No radiographic evidence of acute cardiopulmonary process.      CT-HEAD W/O    (Results Pending)   EC-ECHOCARDIOGRAM COMPLETE W/O CONT    (Results Pending)         Assessment/Plan:  I anticipate this patient is appropriate for observation status at this time.    * Near syncope   Assessment & Plan    Highly suspect orthostatic and due to dehydration   Did have recent pacer placement will need cardiac monitoring   Pacemaker interrogation discussed with nursing  Echocardiogram, serial troponin   Orthostatic BP   Rehydration with IVF  Consider cardiology evaluation if persistent      Cardiac pacemaker in situ- (present on admission)   Assessment & Plan    Recent medtronic placement   discussed with nursing for interrogation      Chronic anticoagulation- (present on admission)   Assessment & Plan    Resume home xarelto      PAF (paroxysmal atrial fibrillation) ([facility])- (present on admission)   Assessment & Plan    S/p pacemaker  Cont BB, xarelto      Dyslipidemia- (present on admission)   Assessment & Plan    Resume statin      Essential hypertension- (present on admission)   Assessment & Plan    Resume home BB      Type 2 diabetes mellitus without complication, without long-term current use of insulin ([facility])- (present on admission)   Assessment & Plan    Hold home metformin   ssi ordered     Coronary artery disease involving native coronary artery of native heart without angina pectoris- (present on admission)   Assessment & Plan    Hx of, cont statin, ranolazine and BB      Anemia   Assessment & Plan    Mild, cont to monitor      Diarrhea   Assessment & Plan    Check stool culture  and wbc to r/o infectious etiology   Will hold on C diff testing patient stats symptoms are subsiding     VALENTINE (acute kidney injury) (HCC)   Assessment & Plan    Prerenal from diarrhea   IVF, avoid nephrotoxic medications   Repeat renal function in morning        GERD with Nieves's esophagus- (present on admission)   Assessment & Plan    On omeprazole          VTE prophylaxis: xarelto

## 2019-03-28 NOTE — DISCHARGE SUMMARY
"Discharge Summary    CHIEF COMPLAINT ON ADMISSION  Chief Complaint   Patient presents with   • Dizziness     x's 1 hour   • Diarrhea     since yesterday     Reason for Admission  Near syncope     Admission Date  3/27/2019    CODE STATUS  Full Code    HPI & HOSPITAL COURSE  Mr. Trevizo is a 53-year-old male who presented to the emergency department on 3/27/2019 with complaints of near syncope and diarrhea.  He has a past medical history significant for sick sinus syndrome s/p permanent pacemaker placement, atrial fibrillation maintained on xarelto, coronary artery disease, and some form of being \"mentally challenged\".  Initial lab results revealed no leukocytosis, mild normocytic anemia, normal electrolyte levels, anion gap metabolic acidosis, and acute kidney injury with a creatinine of 1.61.  His troponins were negative, chest x-ray unremarkable, and a CT scan of his head showed only chronic changes.  An echocardiogram was done and was grossly unchanged from his prior echocardiogram done in January 2018.  His ejection fraction was borderline low at 50%, his inferolateral and basal inferior walls were hypokinetic, and he had mild to moderate aortic insufficiency.  On arrival he was afebrile, heart rate 80s-90s, BP 98/62, and his oxygen saturation was normal on room air.  He was subsequently placed on IV fluids for rehydration purposes.  Orthostatics after the fact were noted to be negative.  His home lisinopril was not continued during his inpatient admission due to his initial blood pressure in the 90s, though they have now returned to a normal range, with the last blood pressure just prior to discharge being 125/69.  We therefore decreased his lisinopril dose to 2.5 mg daily and have advised he follow-up with his cardiologist at first available for potential further medication changes.  I have also advised he keep a log of his blood pressure readings to discuss with them in follow-up.     Therefore, he is discharged " in good and stable condition to home with close outpatient follow-up.    Discharge Date  3/28/2019    FOLLOW UP ITEMS POST DISCHARGE  PCP within 1-2 weeks.  Cardiology at first available.  Repeat BMP in 1 week (lab slip provided to the patient)-results to be sent to the patient's PCP & cardiologist.     DISCHARGE DIAGNOSES  Principal Problem:    Near syncope POA: Yes  Active Problems:    PAF (paroxysmal atrial fibrillation) ([facility]) (Chronic) POA: Yes    Essential hypertension POA: Yes    Dyslipidemia POA: Yes    Coronary artery disease involving native coronary artery of native heart without angina pectoris POA: Yes    Type 2 diabetes mellitus without complication, without long-term current use of insulin ([facility]) (Chronic) POA: Yes    Chronic anticoagulation POA: Yes    Cardiac pacemaker in situ POA: Yes      Overview: February 2019: [employer]  MRI [id number] implanted by Dr. Blake.    GERD with Nieves's esophagus POA: Yes    Diarrhea POA: Yes    Anemia POA: Yes    VALENTINE (acute kidney injury) ([facility]) POA: Yes  Resolved Problems:    * No resolved hospital problems. *    FOLLOW UP  Future Appointments  Date Time Provider Department Center   4/23/2019 1:30 PM Joseph León M.D. [facility] None   6/25/2019 12:40 PM YONAS Couch [facility] None   6/25/2019 1:00 PM Arvind Sellers M.D. [facility] None     MEDICATIONS ON DISCHARGE     Medication List      CHANGE how you take these medications      Instructions   lisinopril 2.5 MG Tabs  What changed:  · medication strength  · how much to take  Commonly known as:  PRINIVIL   Take 1 Tab by mouth every day.  Dose:  2.5 mg        CONTINUE taking these medications      Instructions   ASPIR-LOW 81 MG EC tablet  Generic drug:  aspirin   TAKE 1 TABLET BY MOUTH ONCE DAILY.     atorvastatin 80 MG tablet  Commonly known as:  LIPITOR   Take 1 Tab by mouth every day.  Dose:  80 mg     cyanocobalamin 1000 MCG Tabs  Commonly known as:  VITAMIN B12   Take 1 Tab by mouth every day.  Dose:  1000  "mcg     loratadine 10 MG Tabs  Commonly known as:  CLARITIN   TAKE 1 TABLET BY MOUTH ONCE DAILY.     metformin 1000 MG tablet  Commonly known as:  GLUCOPHAGE   Take 1 Tab by mouth 2 times a day, with meals.  Dose:  1000 mg     metoprolol SR 25 MG Tb24  Commonly known as:  TOPROL XL   Take 0.5 Tabs by mouth every day.  Dose:  12.5 mg     pantoprazole 40 MG Tbec  Commonly known as:  PROTONIX   TAKE 1 TABLET BY MOUTH ONCE DAILY.     Ranolazine 1000 MG Tb12   Take 1 Tab by mouth 2 Times a Day.  Dose:  1000 mg     rivaroxaban 20 MG Tabs tablet  Commonly known as:  XARELTO   Take 1 Tab by mouth with dinner.  Dose:  20 mg     vitamin D3 (cholecalciferol) 1000 UNIT Tabs   TAKE 1 TABLET BY MOUTH ONCE DAILY.          Allergies  Allergies   Allergen Reactions   • Ritalin [Methylphenidate] Unspecified     \"Hyper\"     DIET  Orders Placed This Encounter   Procedures   • Diet Order Regular     Standing Status:   Standing     Number of Occurrences:   1     Order Specific Question:   Diet:     Answer:   Regular [1]     ACTIVITY  As tolerated.  Exercise encouraged.  Weight bearing as tolerated    CONSULTATIONS  None    PROCEDURES  None    LABORATORY  Lab Results   Component Value Date    SODIUM 135 03/28/2019    POTASSIUM 4.3 03/28/2019    CHLORIDE 110 03/28/2019    CO2 17 (L) 03/28/2019    GLUCOSE 157 (H) 03/28/2019    BUN 29 (H) 03/28/2019    CREATININE 1.20 03/28/2019    CREATININE 1.0 07/12/2007      Lab Results   Component Value Date    WBC 5.7 03/28/2019    HEMOGLOBIN 11.6 (L) 03/28/2019    HEMATOCRIT 34.2 (L) 03/28/2019    PLATELETCT 206 03/28/2019      Total time of the discharge process exceeds 32 minutes.  "

## 2019-03-28 NOTE — ED TRIAGE NOTES
Chief Complaint   Patient presents with   • Dizziness     x's 1 hour   • Diarrhea     since yesterday       BIB by ems for above complaint. VSS. Patient had a pacemaker placed 3 weeks ago per report.

## 2019-03-28 NOTE — DISCHARGE INSTRUCTIONS
Discharge Instructions    Discharged to home by car with relative. Discharged via wheelchair, hospital escort: Yes.  Special equipment needed: Not Applicable    Be sure to schedule a follow-up appointment with your primary care doctor or any specialists as instructed.     Discharge Plan:   Diet Plan: Discussed  Activity Level: Discussed  Confirmed Follow up Appointment: Appointment Scheduled  Confirmed Symptoms Management: Discussed  Medication Reconciliation Updated: No (Comments)  Influenza Vaccine Indication: Indicated: 9 to 64 years of age    I understand that a diet low in cholesterol, fat, and sodium is recommended for good health. Unless I have been given specific instructions below for another diet, I accept this instruction as my diet prescription.   Other diet: heart healthy diet    Special Instructions: None    · Is patient discharged on Warfarin / Coumadin?   No     Near-Syncope  Introduction  Near-syncope is when you suddenly get weak or dizzy, or you feel like you might pass out (faint). During an episode of near-syncope, you may:  · Feel dizzy or light-headed.  · Feel sick to your stomach (nauseous).  · See all white or all black.  · Have cold, clammy skin.  If you passed out, get help right away.Call your local emergency services (911 in the [place] Do not drive yourself to the hospital.  Follow these instructions at home:  Pay attention to any changes in your symptoms. Take these actions to help with your condition:  · Have someone stay with you until you feel stable.  · Do not drive, use machinery, or play sports until your doctor says it is okay.  · Keep all follow-up visits as told by your doctor. This is important.  · If you start to feel like you might pass out, lie down right away and raise (elevate) your feet above the level of your heart. Breathe deeply and steadily. Wait until all of the symptoms are gone.  · Drink enough fluid to keep your pee (urine) clear or pale yellow.  · If you are  taking blood pressure or heart medicine, get up slowly and spend many minutes getting ready to sit and then stand. This can help with dizziness.  · Take over-the-counter and prescription medicines only as told by your doctor.  Get help right away if:  · You have a very bad headache.  · You have unusual pain in your chest, tummy, or back.  · You are bleeding from your mouth or rectum.  · You have black or tarry poop (stool).  · You have a very fast or uneven heartbeat (palpitations).  · You pass out one time or more than once.  · You have jerky movements that you cannot control (seizure).  · You are confused.  · You have trouble walking.  · You are very weak.  · You have vision problems.  These symptoms may be an emergency. Do not wait to see if the symptoms will go away. Get medical help right away. Call your local emergency services (911 in the [place] Do not drive yourself to the hospital.   This information is not intended to replace advice given to you by your health care provider. Make sure you discuss any questions you have with your health care provider.  Document Released: 06/05/2009 Document Revised: 05/25/2017 Document Reviewed: 08/31/2016  © 2017 [employer]vier    Please call [phone] to schedule PCP appointment for patient.   Required specialty appointments include: Cardiology Ok to discharge once those are arranged.  Depression / Suicide Risk    As you are discharged from this [facility] facility, it is important to learn how to keep safe from harming yourself.    Recognize the warning signs:  · Abrupt changes in personality, positive or negative- including increase in energy   · Giving away possessions  · Change in eating patterns- significant weight changes-  positive or negative  · Change in sleeping patterns- unable to sleep or sleeping all the time   · Unwillingness or inability to communicate  · Depression  · Unusual sadness, discouragement and loneliness  · Talk of wanting to die  · Neglect of  personal appearance   · Rebelliousness- reckless behavior  · Withdrawal from people/activities they love  · Confusion- inability to concentrate     If you or a loved one observes any of these behaviors or has concerns about self-harm, here's what you can do:  · Talk about it- your feelings and reasons for harming yourself  · Remove any means that you might use to hurt yourself (examples: pills, rope, extension cords, firearm)  · Get professional help from the community (Mental Health, Substance Abuse, psychological counseling)  · Do not be alone:Call your Safe Contact- someone whom you trust who will be there for you.  · Call your local CRISIS HOTLINE 606-3930 or 728-825-0554  · Call your local Children's Mobile Crisis Response Team [place] (915) 654-7233 or www.[employer]Songs  · Call the toll free National Suicide Prevention Hotlines   · National Suicide Prevention Lifeline 946-910-HUXW (9760)  · National Hope Line Network 800-SUICIDE (589-6032)

## 2019-03-28 NOTE — ED NOTES
Med rec updated and complete per interview  With pt.   allergies reviewed.  Pt denies  Oral antibiotic use in last 30 days at home.  All medications taken including evening doses.

## 2019-03-28 NOTE — ASSESSMENT & PLAN NOTE
Check stool culture and wbc to r/o infectious etiology   Will hold on C diff testing patient stats symptoms are subsiding

## 2019-04-15 NOTE — ASSESSMENT & PLAN NOTE
- Continuing home medication, rivaroxaban.    
- HEART score 3.  Day team can consider stress test.   
- Last HgbA1c: 10.2% (04/17).    - Rechecking HgbA1c.   - Patient on correctional insulin.  - Ordered diet and nutritional consult.   
- On home medication, omeprazole.  
- Reproducible sharp chest pain starting on right side 2 inches under the right nipple and radiating to left side.  Occurred when patient was lifting 40+ lb. boxes.   - Likely musculoskeletal pain given history and physical exam.    - Rule out ACS: initial EKG negative.  Initial troponin < 0.01.  Trending troponin x3 total with EKG.    - PE unlikely.  Wells score 0.  PERC 1.  D-dimer negative.    - Aortic dissection unlikely.  CXR showed no acute cardiopulmonary processes/no mediastinal widening.  D-dimer negative.  No discrepancy between right and left radial pulses.    - Admit to Telemetry for observation.   - Continuing home heart medications.   
none

## 2019-05-01 ENCOUNTER — HOSPITAL ENCOUNTER (EMERGENCY)
Facility: MEDICAL CENTER | Age: 54
End: 2019-05-01
Attending: EMERGENCY MEDICINE
Payer: MEDICARE

## 2019-05-01 VITALS
OXYGEN SATURATION: 95 % | BODY MASS INDEX: 28.4 KG/M2 | RESPIRATION RATE: 23 BRPM | HEART RATE: 62 BPM | HEIGHT: 68 IN | TEMPERATURE: 98.8 F | WEIGHT: 187.39 LBS | DIASTOLIC BLOOD PRESSURE: 89 MMHG | SYSTOLIC BLOOD PRESSURE: 151 MMHG

## 2019-05-01 DIAGNOSIS — R42 DIZZINESS: ICD-10-CM

## 2019-05-01 LAB
ALBUMIN SERPL BCP-MCNC: 4 G/DL (ref 3.2–4.9)
ALBUMIN/GLOB SERPL: 1.7 G/DL
ALP SERPL-CCNC: 71 U/L (ref 30–99)
ALT SERPL-CCNC: 23 U/L (ref 2–50)
ANION GAP SERPL CALC-SCNC: 9 MMOL/L (ref 0–11.9)
AST SERPL-CCNC: 12 U/L (ref 12–45)
BASOPHILS # BLD AUTO: 0.4 % (ref 0–1.8)
BASOPHILS # BLD: 0.03 K/UL (ref 0–0.12)
BILIRUB SERPL-MCNC: 1.5 MG/DL (ref 0.1–1.5)
BUN SERPL-MCNC: 9 MG/DL (ref 8–22)
CALCIUM SERPL-MCNC: 8.7 MG/DL (ref 8.5–10.5)
CHLORIDE SERPL-SCNC: 105 MMOL/L (ref 96–112)
CO2 SERPL-SCNC: 23 MMOL/L (ref 20–33)
CREAT SERPL-MCNC: 1.02 MG/DL (ref 0.5–1.4)
EKG IMPRESSION: NORMAL
EOSINOPHIL # BLD AUTO: 0.09 K/UL (ref 0–0.51)
EOSINOPHIL NFR BLD: 1.1 % (ref 0–6.9)
ERYTHROCYTE [DISTWIDTH] IN BLOOD BY AUTOMATED COUNT: 44.7 FL (ref 35.9–50)
GLOBULIN SER CALC-MCNC: 2.3 G/DL (ref 1.9–3.5)
GLUCOSE SERPL-MCNC: 178 MG/DL (ref 65–99)
HCT VFR BLD AUTO: 35.2 % (ref 42–52)
HGB BLD-MCNC: 12.1 G/DL (ref 14–18)
IMM GRANULOCYTES # BLD AUTO: 0.08 K/UL (ref 0–0.11)
IMM GRANULOCYTES NFR BLD AUTO: 1 % (ref 0–0.9)
LYMPHOCYTES # BLD AUTO: 0.94 K/UL (ref 1–4.8)
LYMPHOCYTES NFR BLD: 11.4 % (ref 22–41)
MCH RBC QN AUTO: 33.3 PG (ref 27–33)
MCHC RBC AUTO-ENTMCNC: 34.4 G/DL (ref 33.7–35.3)
MCV RBC AUTO: 97 FL (ref 81.4–97.8)
MONOCYTES # BLD AUTO: 0.5 K/UL (ref 0–0.85)
MONOCYTES NFR BLD AUTO: 6.1 % (ref 0–13.4)
NEUTROPHILS # BLD AUTO: 6.62 K/UL (ref 1.82–7.42)
NEUTROPHILS NFR BLD: 80 % (ref 44–72)
NRBC # BLD AUTO: 0 K/UL
NRBC BLD-RTO: 0 /100 WBC
PLATELET # BLD AUTO: 254 K/UL (ref 164–446)
PMV BLD AUTO: 9.4 FL (ref 9–12.9)
POTASSIUM SERPL-SCNC: 4.2 MMOL/L (ref 3.6–5.5)
PROT SERPL-MCNC: 6.3 G/DL (ref 6–8.2)
RBC # BLD AUTO: 3.63 M/UL (ref 4.7–6.1)
SODIUM SERPL-SCNC: 137 MMOL/L (ref 135–145)
WBC # BLD AUTO: 8.3 K/UL (ref 4.8–10.8)

## 2019-05-01 PROCEDURE — 93005 ELECTROCARDIOGRAM TRACING: CPT | Performed by: EMERGENCY MEDICINE

## 2019-05-01 PROCEDURE — 99284 EMERGENCY DEPT VISIT MOD MDM: CPT

## 2019-05-01 PROCEDURE — 85025 COMPLETE CBC W/AUTO DIFF WBC: CPT

## 2019-05-01 PROCEDURE — 700105 HCHG RX REV CODE 258: Performed by: EMERGENCY MEDICINE

## 2019-05-01 PROCEDURE — 93005 ELECTROCARDIOGRAM TRACING: CPT

## 2019-05-01 PROCEDURE — 80053 COMPREHEN METABOLIC PANEL: CPT

## 2019-05-01 PROCEDURE — 36415 COLL VENOUS BLD VENIPUNCTURE: CPT

## 2019-05-01 RX ORDER — SODIUM CHLORIDE 9 MG/ML
1000 INJECTION, SOLUTION INTRAVENOUS ONCE
Status: COMPLETED | OUTPATIENT
Start: 2019-05-01 | End: 2019-05-01

## 2019-05-01 RX ADMIN — SODIUM CHLORIDE 1000 ML: 9 INJECTION, SOLUTION INTRAVENOUS at 15:13

## 2019-05-01 ASSESSMENT — LIFESTYLE VARIABLES: DO YOU DRINK ALCOHOL: NO

## 2019-05-01 NOTE — ED TRIAGE NOTES
Chief Complaint   Patient presents with   • Dizziness     onset this morning     Patient bib EMS from home.  PTA PIV place, FSBS 200.    Patient A&O, reports recent ED visit for similar symptoms; patient reports symptoms intermittent x1 week.      Patient changed into gown, chart up for ERP.

## 2019-05-01 NOTE — ED PROVIDER NOTES
ED Provider Note    CHIEF COMPLAINT  Chief Complaint   Patient presents with   • Dizziness     onset this morning       [facility]  Ryan Trevizo is a 53 y.o. male who presents with dizziness.  The patient states he is at work today when he started to feel dizzy.  He did not have any associated focal weakness nor headache.  He did not have any chest pain, palpitations, nor difficulty with breathing.  The patient was recently admitted and observed for similar symptoms with no clear source.  He has not had time yet to follow-up with his cardiologist.  He has not had any recent vomiting or diarrhea.  He presents the emerge department in an asymptomatic state.  He does not describe vertigo.  He has not had any recent rhinorrhea nor cough.    REVIEW OF SYSTEMS  See [facility] for further details. All other systems are negative.     PAST MEDICAL HISTORY  Past Medical History:   Diagnosis Date   • Syncope 02/2019    Status post pacemaker placement.   • Ischemic cardiomyopathy 05/2018    Echocardiogram with normal LV size, LVEF 50%. Mildly dilated RV.   • ACS (acute coronary syndrome) ([facility])    • Anxiety disorder    • Arthritis    • CAD (coronary artery disease)    • Chronic anticoagulation    • Claudication ([facility])    • Diabetes ([facility])     Oral medications   • Dizziness    • GERD (gastroesophageal reflux disease)    • Glaucoma    • HTN (hypertension)    • Hyperlipemia    • Mental retardation    • Mentally challenged     Has    • Osteoarthritis    • Paroxysmal atrial fibrillation ([facility])    • Pericarditis secondary to acute myocardial infarction ([facility])    • Sick sinus syndrome ([facility])    • SOB (shortness of breath)    • STEMI (ST elevation myocardial infarction) ([facility])        FAMILY HISTORY  [unfilled]    SOCIAL HISTORY  Social History     Social History   • Marital status: Single     Spouse name: N/A   • Number of children: N/A   • Years of education: N/A     Social History Main Topics   • Smoking status: Never Smoker   • Smokeless  "tobacco: Never Used   • Alcohol use No   • Drug use: No   • Sexual activity: Not on file      Comment: Single, has no children, works as an .     Other Topics Concern   • Not on file     Social History Narrative    ** Merged History Encounter **         ** Merged History Encounter **            SURGICAL HISTORY  Past Surgical History:   Procedure Laterality Date   • PACEMAKER INSERTION Left 02/12/2019    Medtronic [place] S DR MRI [id number] implanted by Dr. Blake.   • OTHER CARDIAC SURGERY Left 01/12/2018    Medtronic Reveal LINQ [id number] implanted by Dr. Blake   • GASTROSCOPY-ENDO  5/19/2017    Procedure: GASTROSCOPY-ENDO;  Surgeon: Reinaldo Berry M.D.;  Location: [facility];  Service:    • GASTROSCOPY-ENDO  7/26/2012    Performed by JORDIN VERA at ENDOSCOPY [facility]   • GASTROSCOPY-ENDO  7/25/2012    Performed by JORDIN VERA at ENDOSCOPY [facility]   • OTHER ORTHOPEDIC SURGERY  7-     R knee surgery   • OTHER CARDIAC SURGERY      stent placement       CURRENT MEDICATIONS  Home Medications     Reviewed by Caitlin Mccormick R.N. (Registered Nurse) on 05/01/19 at 1443  Med List Status: Complete   Medication Last Dose Status   ASPIR-LOW 81 MG EC tablet 5/1/2019 Active   atorvastatin (LIPITOR) 80 MG tablet 5/1/2019 Active   cyanocobalamin (VITAMIN B12) 1000 MCG Tab  Active   lisinopril (PRINIVIL) 2.5 MG Tab 5/1/2019 Active   loratadine (CLARITIN) 10 MG Tab 5/1/2019 Active   metformin (GLUCOPHAGE) 1000 MG tablet 5/1/2019 Active   metoprolol SR (TOPROL XL) 25 MG TABLET SR 24 HR 5/1/2019 Active   pantoprazole (PROTONIX) 40 MG Tablet Delayed Response 5/1/2019 Active   Ranolazine 1000 MG TABLET SR 12 HR 5/1/2019 Active   rivaroxaban (XARELTO) 20 MG Tab tablet 5/1/2019 Active   vitamin D3, cholecalciferol, 1000 UNIT Tab 5/1/2019 Active                ALLERGIES  Allergies   Allergen Reactions   • Ritalin [Methylphenidate] Unspecified     \"Hyper\"       PHYSICAL EXAM  VITAL " "SIGNS: /89   Pulse 71   Temp 37.1 °C (98.8 °F)   Resp 17   Ht 1.727 m (5' 8\")   Wt 85 kg (187 lb 6.3 oz)   SpO2 95%   BMI 28.49 kg/m²  Room air O2: 95    Constitutional: Well developed, Well nourished, No acute distress, Non-toxic appearance.   HENT: Normocephalic, Atraumatic, Bilateral external ears normal, Oropharynx moist, No oral exudates, Nose normal.   Eyes: PERRLA, EOMI, Conjunctiva normal, No discharge.   Neck: Normal range of motion, No tenderness, Supple, No stridor.   Lymphatic: No lymphadenopathy noted.   Cardiovascular: Normal heart rate, Normal rhythm, No murmurs, No rubs, No gallops.   Thorax & Lungs: Normal breath sounds, No respiratory distress, No wheezing, No chest tenderness.   Abdomen: Bowel sounds normal, Soft, No tenderness, No masses, No pulsatile masses.   Skin: Warm, Dry, No erythema, No rash.   Back: No tenderness, No CVA tenderness.   Genitalia: External genitalia appear normal, No masses or lesions. No discharge.   Rectal: Normal appearance, Normal sphincter tone. No external or internal lesions noted. Stool is normal color and heme negative.   Extremities: Intact distal pulses, No edema, No tenderness, No cyanosis, No clubbing.   Musculoskeletal: Good range of motion in all major joints. No tenderness to palpation or major deformities noted.   Neurologic: Alert & oriented x 3, Normal motor function, Normal sensory function, No focal deficits noted.   Psychiatric: Affect normal, Judgment normal, Mood normal.     Results for orders placed or performed during the hospital encounter of 19   EKG (NOW)   Result Value Ref Range    Report       [facility] Emergency Dept.    Test Date:  2019  Pt Name:    NICHELLE MARQUEZ                   Department: ER  MRN:        [id number]                      Room:        63  Gender:     Male                         Technician: [id number]  :        1965                   Requested By:ER TRIAGE PROTOCOL  Order #:    " [id number]                    Reading MD: BOB MUHAMMAD MD    Measurements  Intervals                                Axis  Rate:       70                           P:          31  CO:         164                          QRS:        3  QRSD:       106                          T:          45  QT:         400  QTc:        432    Interpretive Statements  Twelve-lead EKG shows a normal sinus rhythm with a ventricular rate of 70,  normal  QRS, normal intervals, no ST segment elevation or depression, normal T waves.  Electronically Signed On 5-1-2019 15:07:05 PDT by BOB MUHAMMAD MD         COURSE & MEDICAL DECISION MAKING  Pertinent Labs & Imaging studies reviewed. (See chart for details)  This a 53-year-old male who presents the emergency department with dizziness.  I do not have a clear source.  I did review his recent hospitalization for work-up of the dizziness and there is no clear etiology.  The patient did not have any cardiac arrhythmias any did had an echocardiogram that did not show any change from prior.  The patient's laboratory analysis does not show any evidence of anemia.  Furthermore there is no metabolic disturbance that could cause his presenting symptoms.  The patient received a liter of fluid intravenously and he feels slightly better.  He is neurologically intact and he did have a CT scan of his head during his last visit.  At this time we do not have a clear source.  He does have follow-up appointments arranged from his last discharge.  He is instructed to keep these appointments.  The patient will focus on oral hydration and he will return to the emergency department if he is acutely worse.    FINAL IMPRESSION  1.  Dizziness           Electronically signed by: Bob Muhammad, 5/1/2019 3:07 PM

## 2019-05-01 NOTE — ED NOTES
PIV removed and bandaged.  Ryan Trevizo discharged via ambulation with self to bus home.  Discharge instructions given and reviewed, patient educated to follow up with PCP, verbalized understanding.  All personal belongings in possession.  No questions at this time.

## 2019-05-09 ENCOUNTER — HOSPITAL ENCOUNTER (EMERGENCY)
Facility: MEDICAL CENTER | Age: 54
End: 2019-05-09
Attending: EMERGENCY MEDICINE
Payer: MEDICARE

## 2019-05-09 VITALS
WEIGHT: 182.1 LBS | RESPIRATION RATE: 14 BRPM | OXYGEN SATURATION: 93 % | BODY MASS INDEX: 25.49 KG/M2 | TEMPERATURE: 96.8 F | SYSTOLIC BLOOD PRESSURE: 126 MMHG | DIASTOLIC BLOOD PRESSURE: 82 MMHG | HEART RATE: 68 BPM | HEIGHT: 71 IN

## 2019-05-09 DIAGNOSIS — R10.10 PAIN OF UPPER ABDOMEN: ICD-10-CM

## 2019-05-09 LAB
ALBUMIN SERPL BCP-MCNC: 4.7 G/DL (ref 3.2–4.9)
ALBUMIN/GLOB SERPL: 1.8 G/DL
ALP SERPL-CCNC: 75 U/L (ref 30–99)
ALT SERPL-CCNC: 24 U/L (ref 2–50)
ANION GAP SERPL CALC-SCNC: 9 MMOL/L (ref 0–11.9)
APPEARANCE UR: CLEAR
AST SERPL-CCNC: 14 U/L (ref 12–45)
BACTERIA #/AREA URNS HPF: ABNORMAL /HPF
BASOPHILS # BLD AUTO: 0.4 % (ref 0–1.8)
BASOPHILS # BLD: 0.05 K/UL (ref 0–0.12)
BILIRUB SERPL-MCNC: 2.2 MG/DL (ref 0.1–1.5)
BILIRUB UR QL STRIP.AUTO: ABNORMAL
BUN SERPL-MCNC: 11 MG/DL (ref 8–22)
CALCIUM SERPL-MCNC: 9 MG/DL (ref 8.5–10.5)
CHLORIDE SERPL-SCNC: 107 MMOL/L (ref 96–112)
CO2 SERPL-SCNC: 24 MMOL/L (ref 20–33)
COLOR UR: ABNORMAL
CREAT SERPL-MCNC: 1.09 MG/DL (ref 0.5–1.4)
EKG IMPRESSION: NORMAL
EOSINOPHIL # BLD AUTO: 0.06 K/UL (ref 0–0.51)
EOSINOPHIL NFR BLD: 0.5 % (ref 0–6.9)
EPI CELLS #/AREA URNS HPF: ABNORMAL /HPF
ERYTHROCYTE [DISTWIDTH] IN BLOOD BY AUTOMATED COUNT: 44.5 FL (ref 35.9–50)
GLOBULIN SER CALC-MCNC: 2.6 G/DL (ref 1.9–3.5)
GLUCOSE SERPL-MCNC: 142 MG/DL (ref 65–99)
GLUCOSE UR STRIP.AUTO-MCNC: >=1000 MG/DL
HCT VFR BLD AUTO: 40.4 % (ref 42–52)
HGB BLD-MCNC: 13.8 G/DL (ref 14–18)
HYALINE CASTS #/AREA URNS LPF: ABNORMAL /LPF
IMM GRANULOCYTES # BLD AUTO: 0.07 K/UL (ref 0–0.11)
IMM GRANULOCYTES NFR BLD AUTO: 0.5 % (ref 0–0.9)
KETONES UR STRIP.AUTO-MCNC: ABNORMAL MG/DL
LEUKOCYTE ESTERASE UR QL STRIP.AUTO: ABNORMAL
LIPASE SERPL-CCNC: 25 U/L (ref 11–82)
LYMPHOCYTES # BLD AUTO: 1.15 K/UL (ref 1–4.8)
LYMPHOCYTES NFR BLD: 8.8 % (ref 22–41)
MCH RBC QN AUTO: 33 PG (ref 27–33)
MCHC RBC AUTO-ENTMCNC: 34.2 G/DL (ref 33.7–35.3)
MCV RBC AUTO: 96.7 FL (ref 81.4–97.8)
MICRO URNS: ABNORMAL
MONOCYTES # BLD AUTO: 0.76 K/UL (ref 0–0.85)
MONOCYTES NFR BLD AUTO: 5.8 % (ref 0–13.4)
MUCOUS THREADS #/AREA URNS HPF: ABNORMAL /HPF
NEUTROPHILS # BLD AUTO: 11.03 K/UL (ref 1.82–7.42)
NEUTROPHILS NFR BLD: 84 % (ref 44–72)
NITRITE UR QL STRIP.AUTO: NEGATIVE
NRBC # BLD AUTO: 0 K/UL
NRBC BLD-RTO: 0 /100 WBC
PH UR STRIP.AUTO: 5 [PH]
PLATELET # BLD AUTO: 291 K/UL (ref 164–446)
PMV BLD AUTO: 9.3 FL (ref 9–12.9)
POTASSIUM SERPL-SCNC: 4.2 MMOL/L (ref 3.6–5.5)
PROT SERPL-MCNC: 7.3 G/DL (ref 6–8.2)
PROT UR QL STRIP: 30 MG/DL
RBC # BLD AUTO: 4.18 M/UL (ref 4.7–6.1)
RBC # URNS HPF: ABNORMAL /HPF
RBC UR QL AUTO: NEGATIVE
SODIUM SERPL-SCNC: 140 MMOL/L (ref 135–145)
SP GR UR STRIP.AUTO: 1.03
TROPONIN I SERPL-MCNC: <0.01 NG/ML (ref 0–0.04)
UROBILINOGEN UR STRIP.AUTO-MCNC: 1 MG/DL
WBC # BLD AUTO: 13.1 K/UL (ref 4.8–10.8)
WBC #/AREA URNS HPF: ABNORMAL /HPF

## 2019-05-09 PROCEDURE — 93005 ELECTROCARDIOGRAM TRACING: CPT

## 2019-05-09 PROCEDURE — 80053 COMPREHEN METABOLIC PANEL: CPT

## 2019-05-09 PROCEDURE — 84484 ASSAY OF TROPONIN QUANT: CPT

## 2019-05-09 PROCEDURE — 83690 ASSAY OF LIPASE: CPT

## 2019-05-09 PROCEDURE — 99284 EMERGENCY DEPT VISIT MOD MDM: CPT

## 2019-05-09 PROCEDURE — 700102 HCHG RX REV CODE 250 W/ 637 OVERRIDE(OP): Performed by: EMERGENCY MEDICINE

## 2019-05-09 PROCEDURE — 93005 ELECTROCARDIOGRAM TRACING: CPT | Performed by: EMERGENCY MEDICINE

## 2019-05-09 PROCEDURE — 85025 COMPLETE CBC W/AUTO DIFF WBC: CPT

## 2019-05-09 PROCEDURE — A9270 NON-COVERED ITEM OR SERVICE: HCPCS | Performed by: EMERGENCY MEDICINE

## 2019-05-09 PROCEDURE — 81001 URINALYSIS AUTO W/SCOPE: CPT

## 2019-05-09 RX ORDER — HYDROCODONE BITARTRATE AND ACETAMINOPHEN 5; 325 MG/1; MG/1
1 TABLET ORAL ONCE
Status: COMPLETED | OUTPATIENT
Start: 2019-05-09 | End: 2019-05-09

## 2019-05-09 RX ADMIN — HYDROCODONE BITARTRATE AND ACETAMINOPHEN 1 TABLET: 5; 325 TABLET ORAL at 16:11

## 2019-05-09 ASSESSMENT — LIFESTYLE VARIABLES: DO YOU DRINK ALCOHOL: NO

## 2019-05-09 ASSESSMENT — PAIN DESCRIPTION - DESCRIPTORS: DESCRIPTORS: ACHING

## 2019-05-09 NOTE — ED PROVIDER NOTES
ED Provider Note    CHIEF COMPLAINT  Chief Complaint   Patient presents with   • Abdominal Pain     Mid abd pain since this am.  Has a cough and it hurts more when he coughs.  No other associated symptoms       HPI  Ryan Trevizo is a 53 y.o. male who presents for evaluation of abdominal pain that began this morning, states is located across the top of the abdomen as well as in the center of the abdomen, has been coughing and states that seems to make it worse.  No nausea, vomiting, diarrhea, fever, he denies chest pain, no shortness of breath, states he has no other complaints.    REVIEW OF SYSTEMS  Negative for fever, rash, chest pain, dyspnea, nausea, vomiting, diarrhea, headache, focal weakness, focal numbness, focal tingling, back pain. All other systems are negative.     PAST MEDICAL HISTORY  Past Medical History:   Diagnosis Date   • ACS (acute coronary syndrome) ([facility])    • Anxiety disorder    • Arthritis    • CAD (coronary artery disease)    • Chronic anticoagulation    • Claudication ([facility])    • Diabetes ([facility])     Oral medications   • Dizziness    • GERD (gastroesophageal reflux disease)    • Glaucoma    • HTN (hypertension)    • Hyperlipemia    • Ischemic cardiomyopathy 05/2018    Echocardiogram with normal LV size, LVEF 50%. Mildly dilated RV.   • Mental retardation    • Mentally challenged     Has    • Osteoarthritis    • Paroxysmal atrial fibrillation ([facility])    • Pericarditis secondary to acute myocardial infarction ([facility])    • Sick sinus syndrome ([facility])    • SOB (shortness of breath)    • STEMI (ST elevation myocardial infarction) ([facility])    • Syncope 02/2019    Status post pacemaker placement.       FAMILY HISTORY  No family history on file.    SOCIAL HISTORY  Social History   Substance Use Topics   • Smoking status: Never Smoker   • Smokeless tobacco: Never Used   • Alcohol use No       SURGICAL HISTORY  Past Surgical History:   Procedure Laterality Date   • PACEMAKER INSERTION Left  "02/12/2019    Medtronic [place] S DR MRI [id number] implanted by Dr. Blake.   • OTHER CARDIAC SURGERY Left 01/12/2018    Medtronic Reveal LINQ [id number] implanted by Dr. Blake   • GASTROSCOPY-ENDO  5/19/2017    Procedure: GASTROSCOPY-ENDO;  Surgeon: Reinaldo Berry M.D.;  Location: [facility];  Service:    • GASTROSCOPY-ENDO  7/26/2012    Performed by JORDIN VERA at ENDOSCOPY [facility]   • GASTROSCOPY-ENDO  7/25/2012    Performed by JORDIN VERA at ENDOSCOPY [facility]   • OTHER ORTHOPEDIC SURGERY  7-     R knee surgery   • OTHER CARDIAC SURGERY      stent placement       CURRENT MEDICATIONS  I personally reviewed the medication list in the charting documentation.     ALLERGIES  Allergies   Allergen Reactions   • Ritalin [Methylphenidate] Unspecified     \"Hyper\"       MEDICAL RECORD  I have reviewed patient's medical record and pertinent results are listed above.      PHYSICAL EXAM  VITAL SIGNS: /64   Pulse 98   Temp 36.4 °C (97.6 °F) (Temporal)   Resp 20   Ht 1.803 m (5' 11\")   Wt 82.6 kg (182 lb 1.6 oz)   SpO2 95%   BMI 25.40 kg/m²    Constitutional: Well appearing patient in no acute distress.  Not toxic, nor ill in appearance.  HENT: Mucus membranes moist.    Eyes: No scleral icterus. Normal conjunctiva   Neck: Supple, comfortable, nonpainful range of motion.   Cardiovascular: Regular heart rate and rhythm.   Thorax & Lungs: Chest is nontender.  Lungs are clear to auscultation with good air movement bilaterally.  No wheeze, rhonchi, nor rales.   Abdomen: Soft, nondistended, minimal generalized tenderness but no rebound, no guarding  Skin: Warm, dry. No rash appreciated  Extremities/Musculoskeletal: No sign of trauma. No asymmetric calf tenderness, erythema or edema. Normal range of motion   Neurologic: Alert & oriented. No focal deficits observed.   Psychiatric: Normal affect appropriate for the clinical situation.    DIAGNOSTIC STUDIES / " PROCEDURES    LABS/EKGs  Results for orders placed or performed during the hospital encounter of 05/09/19   CBC WITH DIFFERENTIAL   Result Value Ref Range    WBC 13.1 (H) 4.8 - 10.8 K/uL    RBC 4.18 (L) 4.70 - 6.10 M/uL    Hemoglobin 13.8 (L) 14.0 - 18.0 g/dL    Hematocrit 40.4 (L) 42.0 - 52.0 %    MCV 96.7 81.4 - 97.8 fL    MCH 33.0 27.0 - 33.0 pg    MCHC 34.2 33.7 - 35.3 g/dL    RDW 44.5 35.9 - 50.0 fL    Platelet Count 291 164 - 446 K/uL    MPV 9.3 9.0 - 12.9 fL    Neutrophils-Polys 84.00 (H) 44.00 - 72.00 %    Lymphocytes 8.80 (L) 22.00 - 41.00 %    Monocytes 5.80 0.00 - 13.40 %    Eosinophils 0.50 0.00 - 6.90 %    Basophils 0.40 0.00 - 1.80 %    Immature Granulocytes 0.50 0.00 - 0.90 %    Nucleated RBC 0.00 /100 WBC    Neutrophils (Absolute) 11.03 (H) 1.82 - 7.42 K/uL    Lymphs (Absolute) 1.15 1.00 - 4.80 K/uL    Monos (Absolute) 0.76 0.00 - 0.85 K/uL    Eos (Absolute) 0.06 0.00 - 0.51 K/uL    Baso (Absolute) 0.05 0.00 - 0.12 K/uL    Immature Granulocytes (abs) 0.07 0.00 - 0.11 K/uL    NRBC (Absolute) 0.00 K/uL   COMP METABOLIC PANEL   Result Value Ref Range    Sodium 140 135 - 145 mmol/L    Potassium 4.2 3.6 - 5.5 mmol/L    Chloride 107 96 - 112 mmol/L    Co2 24 20 - 33 mmol/L    Anion Gap 9.0 0.0 - 11.9    Glucose 142 (H) 65 - 99 mg/dL    Bun 11 8 - 22 mg/dL    Creatinine 1.09 0.50 - 1.40 mg/dL    Calcium 9.0 8.5 - 10.5 mg/dL    AST(SGOT) 14 12 - 45 U/L    ALT(SGPT) 24 2 - 50 U/L    Alkaline Phosphatase 75 30 - 99 U/L    Total Bilirubin 2.2 (H) 0.1 - 1.5 mg/dL    Albumin 4.7 3.2 - 4.9 g/dL    Total Protein 7.3 6.0 - 8.2 g/dL    Globulin 2.6 1.9 - 3.5 g/dL    A-G Ratio 1.8 g/dL   LIPASE   Result Value Ref Range    Lipase 25 11 - 82 U/L   URINALYSIS,CULTURE IF INDICATED   Result Value Ref Range    Color DK Yellow     Character Clear     Specific Gravity 1.033 <1.035    Ph 5.0 5.0 - 8.0    Glucose >=1000 (A) Negative mg/dL    Ketones Trace (A) Negative mg/dL    Protein 30 (A) Negative mg/dL    Bilirubin Small  (A) Negative    Urobilinogen, Urine 1.0 Negative    Nitrite Negative Negative    Leukocyte Esterase Trace (A) Negative    Occult Blood Negative Negative    Micro Urine Req Microscopic    ESTIMATED GFR   Result Value Ref Range    GFR If African American >60 >60 mL/min/1.73 m 2    GFR If Non African American >60 >60 mL/min/1.73 m 2   TROPONIN   Result Value Ref Range    Troponin I <0.01 0.00 - 0.04 ng/mL   URINE MICROSCOPIC (W/UA)   Result Value Ref Range    WBC 2-5 (A) /hpf    RBC 0-2 (A) /hpf    Bacteria Rare (A) None /hpf    Epithelial Cells Few /hpf    Mucous Threads Moderate /hpf    Hyaline Cast 3-5 (A) /lpf   EKG   Result Value Ref Range    Report       [facility] Emergency Dept.    Test Date:  2019  Pt Name:    NICHELLE MARQUEZ                   Department: ER  MRN:        [id number]                      Room:       [facility]  Gender:     Male                         Technician: [id number]  :        1965                   Requested By:ER TRIAGE PROTOCOL  Order #:    [id number]                    Reading MD: DUSTIN VEGA MD    Measurements  Intervals                                Axis  Rate:       79                           P:          43  CA:         156                          QRS:        3  QRSD:       102                          T:          38  QT:         384  QTc:        441    Interpretive Statements  12 Lead EKG interpreted by me to show: -- Rate 79 -- Rhythm: Normal sinus  rhythm  -- Axis: Normal -- CA and QRS Intervals: Normal -- T waves: No acute changes  --  ST segments: No acute changes -- Ectopy: None. My impression of this EKG:  Does  not indicate acute ischemia at this time.  Electronically Si gned On 2019 15:02:06 PDT by DUSTIN VEGA MD          COURSE & MEDICAL DECISION MAKING  I have reviewed any medical record information, laboratory studies and radiographic results as noted above.  Differential diagnoses includes: ACS, dehydration, electrolyte  "abnormalities, pancreatitis, hepatitis, gastritis    Encounter Summary: This is a 53 y.o. male with isolated abdominal pain since this morning, minimal tenderness on exam with no evidence of peritonitis, normal vital signs.  Soon as I introduced myself to the patient he asked me \"am I staying overnight?\"  No other complaints or other associated symptoms, appears well overall, blood work will be obtained including a troponin, his EKG is normal, and the absence of any specific abnormalities in the blood work I think he looks great and is stable to be discharged, I will go over strict return instructions and ensure he follows up with a primary care provider.      DISPOSITION: Discharged home in stable condition      FINAL IMPRESSION  1. Pain of upper abdomen           This dictation was created using voice recognition software. The accuracy of the dictation is limited to the abilities of the software. I expect there may be some errors of grammar and possibly content. The nursing notes were reviewed and certain aspects of this information were incorporated into this note.    Electronically signed by: Kris Rahman, 5/9/2019 2:58 PM      "

## 2019-05-09 NOTE — ED NOTES
Report received from Juani MCCLAIN, assumed care of patient.  Call light and belongings within reach.  Bed in lowest position.

## 2019-05-09 NOTE — ED TRIAGE NOTES
Chief Complaint   Patient presents with   • Abdominal Pain     Mid abd pain since this am.  Has a cough and it hurts more when he coughs.  No other associated symptoms   .Denies N/V/D or dysuria with the pain.

## 2019-05-10 NOTE — ED NOTES
All lines and monitors disconnected.  Discharge instructions reviewed, questions answered.  Pt to [facility], escorted by RN.  Pt states all belongings in possession.

## 2019-05-10 NOTE — DISCHARGE INSTRUCTIONS
Return immediately to the Emergency Department if you experience continuing or worsening discomfort in your abdomen, fever, chest pain, difficulty breathing, back pain, or any other new or worsening symptoms.

## 2019-05-21 ENCOUNTER — APPOINTMENT (OUTPATIENT)
Dept: RADIOLOGY | Facility: MEDICAL CENTER | Age: 54
End: 2019-05-21
Attending: EMERGENCY MEDICINE
Payer: MEDICARE

## 2019-05-21 ENCOUNTER — HOSPITAL ENCOUNTER (EMERGENCY)
Facility: MEDICAL CENTER | Age: 54
End: 2019-05-21
Attending: EMERGENCY MEDICINE
Payer: MEDICARE

## 2019-05-21 VITALS
DIASTOLIC BLOOD PRESSURE: 93 MMHG | WEIGHT: 184.08 LBS | HEIGHT: 71 IN | SYSTOLIC BLOOD PRESSURE: 139 MMHG | RESPIRATION RATE: 13 BRPM | BODY MASS INDEX: 25.77 KG/M2 | TEMPERATURE: 97.5 F | OXYGEN SATURATION: 96 % | HEART RATE: 65 BPM

## 2019-05-21 DIAGNOSIS — R07.9 CHEST PAIN, UNSPECIFIED TYPE: ICD-10-CM

## 2019-05-21 DIAGNOSIS — R55 NEAR SYNCOPE: ICD-10-CM

## 2019-05-21 LAB
ALBUMIN SERPL BCP-MCNC: 4.4 G/DL (ref 3.2–4.9)
ALBUMIN/GLOB SERPL: 1.8 G/DL
ALP SERPL-CCNC: 63 U/L (ref 30–99)
ALT SERPL-CCNC: 22 U/L (ref 2–50)
ANION GAP SERPL CALC-SCNC: 10 MMOL/L (ref 0–11.9)
AST SERPL-CCNC: 15 U/L (ref 12–45)
BASOPHILS # BLD AUTO: 0.3 % (ref 0–1.8)
BASOPHILS # BLD: 0.02 K/UL (ref 0–0.12)
BILIRUB SERPL-MCNC: 1.3 MG/DL (ref 0.1–1.5)
BUN SERPL-MCNC: 21 MG/DL (ref 8–22)
CALCIUM SERPL-MCNC: 8.9 MG/DL (ref 8.5–10.5)
CHLORIDE SERPL-SCNC: 108 MMOL/L (ref 96–112)
CO2 SERPL-SCNC: 20 MMOL/L (ref 20–33)
CREAT SERPL-MCNC: 1.21 MG/DL (ref 0.5–1.4)
EKG IMPRESSION: NORMAL
EOSINOPHIL # BLD AUTO: 0.13 K/UL (ref 0–0.51)
EOSINOPHIL NFR BLD: 2.1 % (ref 0–6.9)
ERYTHROCYTE [DISTWIDTH] IN BLOOD BY AUTOMATED COUNT: 44.8 FL (ref 35.9–50)
GLOBULIN SER CALC-MCNC: 2.5 G/DL (ref 1.9–3.5)
GLUCOSE SERPL-MCNC: 100 MG/DL (ref 65–99)
HCT VFR BLD AUTO: 34.2 % (ref 42–52)
HGB BLD-MCNC: 11.9 G/DL (ref 14–18)
IMM GRANULOCYTES # BLD AUTO: 0.04 K/UL (ref 0–0.11)
IMM GRANULOCYTES NFR BLD AUTO: 0.6 % (ref 0–0.9)
LYMPHOCYTES # BLD AUTO: 1.5 K/UL (ref 1–4.8)
LYMPHOCYTES NFR BLD: 23.9 % (ref 22–41)
MCH RBC QN AUTO: 33.6 PG (ref 27–33)
MCHC RBC AUTO-ENTMCNC: 34.8 G/DL (ref 33.7–35.3)
MCV RBC AUTO: 96.6 FL (ref 81.4–97.8)
MONOCYTES # BLD AUTO: 0.47 K/UL (ref 0–0.85)
MONOCYTES NFR BLD AUTO: 7.5 % (ref 0–13.4)
NEUTROPHILS # BLD AUTO: 4.11 K/UL (ref 1.82–7.42)
NEUTROPHILS NFR BLD: 65.6 % (ref 44–72)
NRBC # BLD AUTO: 0 K/UL
NRBC BLD-RTO: 0 /100 WBC
PLATELET # BLD AUTO: 249 K/UL (ref 164–446)
PMV BLD AUTO: 9.4 FL (ref 9–12.9)
POTASSIUM SERPL-SCNC: 4 MMOL/L (ref 3.6–5.5)
PROT SERPL-MCNC: 6.9 G/DL (ref 6–8.2)
RBC # BLD AUTO: 3.54 M/UL (ref 4.7–6.1)
SODIUM SERPL-SCNC: 138 MMOL/L (ref 135–145)
TROPONIN I SERPL-MCNC: <0.01 NG/ML (ref 0–0.04)
TROPONIN I SERPL-MCNC: <0.01 NG/ML (ref 0–0.04)
WBC # BLD AUTO: 6.3 K/UL (ref 4.8–10.8)

## 2019-05-21 PROCEDURE — 84484 ASSAY OF TROPONIN QUANT: CPT

## 2019-05-21 PROCEDURE — 93005 ELECTROCARDIOGRAM TRACING: CPT | Performed by: EMERGENCY MEDICINE

## 2019-05-21 PROCEDURE — 80053 COMPREHEN METABOLIC PANEL: CPT

## 2019-05-21 PROCEDURE — 99285 EMERGENCY DEPT VISIT HI MDM: CPT

## 2019-05-21 PROCEDURE — 700105 HCHG RX REV CODE 258: Performed by: EMERGENCY MEDICINE

## 2019-05-21 PROCEDURE — 71045 X-RAY EXAM CHEST 1 VIEW: CPT

## 2019-05-21 PROCEDURE — 85025 COMPLETE CBC W/AUTO DIFF WBC: CPT

## 2019-05-21 RX ORDER — SODIUM CHLORIDE 9 MG/ML
1000 INJECTION, SOLUTION INTRAVENOUS ONCE
Status: COMPLETED | OUTPATIENT
Start: 2019-05-21 | End: 2019-05-21

## 2019-05-21 RX ADMIN — SODIUM CHLORIDE 1000 ML: 9 INJECTION, SOLUTION INTRAVENOUS at 19:38

## 2019-05-22 NOTE — ED PROVIDER NOTES
ED Provider Note    Scribed for Kim Bianchi M.D. by Blaise Sibley. 5/21/2019  7:25 PM    Means of arrival: Ambulance  History obtained from: Patient  History limited by: None      CHIEF COMPLAINT  Chief Complaint   Patient presents with   • Dizziness   • Chest Pain       HPI  Ryan Trevizo is a 53 y.o. male with past medical history of CAD, paroxysmal atrial fibrillation on Xarelto, hypertension who presents to the Emergency Department for evaluation of persistent lightheadedness suddenly onset prior to arrival. The patient was standing when he had a near syncopal episode. He states that he has had lightheadedness in the past, but never a near syncopal episode. He endorses cough onset weeks ago with pleuritic chest pain.  He only endorses chest pain which is associated with cough.  He also endorses pain with swallowing, subjective fever, and diarrhea, but denies any shortness of breath. No alleviating factors noted. The patient has been taking Xarelto as prescribed for Atrial Fibrillation and has a pace maker for sick sinus syndrome.  He endorses compliance with his medications.      REVIEW OF SYSTEMS  Pertinent positive include lightheadedness, near syncopal episode, cough, pleuritic chest pain, pian with swallowing, fever and diarrhea. Pertinent negative include shortness of breath. All other systems reviewed and are negative.      PAST MEDICAL HISTORY   has a past medical history of ACS (acute coronary syndrome) (HCC); Anxiety disorder; Arthritis; CAD (coronary artery disease); Chronic anticoagulation; Claudication (HCC); Diabetes (HCC); Dizziness; GERD (gastroesophageal reflux disease); Glaucoma; HTN (hypertension); Hyperlipemia; Ischemic cardiomyopathy (05/2018); Mental retardation; Mentally challenged; Osteoarthritis; Paroxysmal atrial fibrillation (HCC); Pericarditis secondary to acute myocardial infarction (HCC); Sick sinus syndrome (HCC); SOB (shortness of breath); STEMI (ST elevation  "myocardial infarction) (HCC); and Syncope (02/2019).    SOCIAL HISTORY  Social History     Social History Main Topics   • Smoking status: Never Smoker   • Smokeless tobacco: Never Used   • Alcohol use No   • Drug use: No      Comment: Single, has no children, works as an .       SURGICAL HISTORY   has a past surgical history that includes other orthopedic surgery (7- ); other cardiac surgery; gastroscopy-endo (7/25/2012); gastroscopy-endo (7/26/2012); gastroscopy-endo (5/19/2017); other cardiac surgery (Left, 01/12/2018); and pacemaker insertion (Left, 02/12/2019).    CURRENT MEDICATIONS  No current facility-administered medications for this encounter.     Current Outpatient Prescriptions:   •  loratadine (CLARITIN) 10 MG Tab, TAKE 1 TABLET BY MOUTH ONCE DAILY., Disp: 30 Tab, Rfl: 2  •  lisinopril (PRINIVIL) 2.5 MG Tab, TAKE 1 TABLET BY MOUTH ONCE DAILY., Disp: 90 Tab, Rfl: 5  •  Ranolazine 1000 MG TABLET SR 12 HR, Take 1 Tab by mouth 2 Times a Day., Disp: 180 Tab, Rfl: 3  •  vitamin D3, cholecalciferol, 1000 UNIT Tab, TAKE 1 TABLET BY MOUTH ONCE DAILY., Disp: 30 Tab, Rfl: 5  •  pantoprazole (PROTONIX) 40 MG Tablet Delayed Response, TAKE 1 TABLET BY MOUTH ONCE DAILY., Disp: 30 Tab, Rfl: 5  •  metoprolol SR (TOPROL XL) 25 MG TABLET SR 24 HR, Take 0.5 Tabs by mouth every day., Disp: 90 Tab, Rfl: 3  •  ASPIR-LOW 81 MG EC tablet, TAKE 1 TABLET BY MOUTH ONCE DAILY., Disp: 30 Tab, Rfl: 11  •  cyanocobalamin (VITAMIN B12) 1000 MCG Tab, Take 1 Tab by mouth every day., Disp: 90 Tab, Rfl: 3  •  metformin (GLUCOPHAGE) 1000 MG tablet, Take 1 Tab by mouth 2 times a day, with meals., Disp: 180 Tab, Rfl: 3  •  atorvastatin (LIPITOR) 80 MG tablet, Take 1 Tab by mouth every day., Disp: 90 Tab, Rfl: 3  •  rivaroxaban (XARELTO) 20 MG Tab tablet, Take 1 Tab by mouth with dinner., Disp: 90 Tab, Rfl: 3    ALLERGIES  Allergies   Allergen Reactions   • Ritalin [Methylphenidate] Unspecified     \"Hyper\"       PHYSICAL EXAM " "  VITAL SIGNS: /93   Pulse 63   Temp 36.4 °C (97.5 °F) (Temporal)   Resp 16   Ht 1.803 m (5' 11\")   Wt 83.5 kg (184 lb 1.4 oz)   SpO2 98%   BMI 25.67 kg/m²    Constitutional: Nontoxic appearing middle-aged male male in bed, Alert in no apparent distress.  Appears older than stated age.  HENT: Normocephalic, Atraumatic. Bilateral external ears normal. Nose normal.  Moist mucous membranes.  Oropharynx clear.  Eyes: Pupils are equal and reactive. Conjunctiva normal.   Neck: Supple, full range of motion  Heart: Regular rate and rhythm.  No murmurs.    Lungs: No respiratory distress, normal work of breathing. Lungs clear to auscultation bilaterally.  Abdomen Soft, no distention.  No tenderness to palpation.  Musculoskeletal: Atraumatic. No obvious deformities noted.  No lower extremity edema.  Skin: Warm, Dry.  No erythema, No rash.   Neurologic: Alert and oriented x3.  Cranial nerves II-XII intact.  Strength 5/5 and sensation inact throughout all 4 extremities.  No pronator drift.  No dysmetria.  Normal speech. Normal gait.  Psychiatric: Affect normal, Mood normal, Appears appropriate and not intoxicated.      DIAGNOSTIC STUDIES    EKG  Results for orders placed or performed during the hospital encounter of 19   EKG   Result Value Ref Range    Report       [facility] Emergency Dept.    Test Date:  2019  Pt Name:    NICHELLE MARQUEZ                   Department: ER  MRN:        [id number]                      Room:       [facility]  Gender:     Male                         Technician: [id number]  :        1965                   Requested By:ER TRIAGE PROTOCOL  Order #:    [id number]                    Reading MD: Kim Bianchi MD    Measurements  Intervals                                Axis  Rate:       64                           P:          42  FL:         164                          QRS:        24  QRSD:       106                          T:          42  QT:         432  QTc:      "   446    Interpretive Statements  SINUS RHYTHM  BORDERLINE INTRAVENTRICULAR CONDUCTION DELAY  Normal axis and intervals  No ectopy or hypertrophy  No ST or T wave change  Compared to ECG 05/09/2019 14:31:29  No significant change    Electronically Signed On 5- 23:00:06 PDT by Kim Bianchi MD           LABS  Personally reviewed by me  Labs Reviewed   CBC WITH DIFFERENTIAL - Abnormal; Notable for the following:        Result Value    RBC 3.54 (*)     Hemoglobin 11.9 (*)     Hematocrit 34.2 (*)     MCH 33.6 (*)     All other components within normal limits   COMP METABOLIC PANEL - Abnormal; Notable for the following:     Glucose 100 (*)     All other components within normal limits   TROPONIN   ESTIMATED GFR   TROPONIN         RADIOLOGY  Personally reviewed by me  DX-CHEST-PORTABLE (1 VIEW)   Final Result      No acute cardiac or pulmonary abnormalities are identified.          ED COURSE  Vitals:    05/21/19 2115 05/21/19 2200 05/21/19 2245 05/21/19 2300   BP:       Pulse: 69 68 66 65   Resp: 16 19 13    Temp:       TempSrc:       SpO2: 95% 93% 97% 96%   Weight:       Height:             Medications administered:  Medications   NS infusion 1,000 mL (0 mL Intravenous Stopped 5/21/19 2027)     Patient was given IV fluids for near syncope and possible dehydration.  IV hydration was used because oral hydration was not adequate alone.  Following fluid administration patient's vital signs and hydration status were improved.      Old records personally reviewed:  The patient was admitted at the end of March for dizziness and diarrhea. Echo at that time with an EF of 50%. Blood pressure medications were changed at that time. He was back 5/1/19 for dizziness and 5/9/19 for abdominal pain. He had a pace maker for sick sinus syndrome and is on Xarelto for Atrial fibrillation.     7:25 PM Patient seen and examined at bedside. The patient presents with lightheadedness. Ordered for DX chest, CBC, CMP, Troponin and EKG to  evaluate.  I informed the patient that I will need to obtain labs and imaging for evaluation.      MEDICAL DECISION MAKING  Patient with history of CAD in addition to pacemaker for sick sinus syndrome and atrial fibrillation on Xarelto who presents with lightheadedness.  Patient has been admitted here at the end of March in addition to being seen at the beginning of the month for similar symptoms.  He is afebrile with reassuring vitals on arrival and nontoxic-appearing.  He has no focal deficits on exam concerning for CVA.  CT head was recently performed without evidence of acute intracranial abnormality.  EKG is unchanged from prior without evidence of ischemia or arrhythmia.  Labs are overall reassuring without evidence of anemia or electrolyte abnormality.  Troponins are negative x2.  The patient really only complaining of chest pain associated with cough that is been ongoing for the last 3 weeks.  I do not feel that this is consistent with ACS, pulmonary embolism, aortic dissection.  Patient recently had admission for similar complaints with unremarkable cardiac work-up other than a mildly depressed EF on echocardiogram.    11:00 AM - Upon reassessment, patient is resting comfortably with normal vital signs.  No new complaints at this time.  He is ambulatory in the department without further lightheadedness or near syncopal event.  Discussed results with patient and/or family as well as importance of primary care follow up.  Patient understands plan of care and strict return precautions for new or changing symptoms.     The patient will return for new or worsening symptoms and is stable at the time of discharge.    DISPOSITION:  Patient will be discharged home in stable condition.    FOLLOW UP:  Juancho Jenkins M.D.  [address] [id number] [phone]    Schedule an appointment as soon as possible for a visit       [facility], Emergency Dept  [address] [place] [address]  [place] [address]  [phone]    If symptoms worsen      OUTPATIENT MEDICATIONS:  Discharge Medication List as of 5/21/2019 11:04 PM          IMPRESSION  (R55) Near syncope  (R07.9) Chest pain, unspecified type    Results, diagnoses, and treatment options were discussed with the patient and/or family. Patient verbalized understanding of plan of care.         Blaise MORRISSEY (Scribe), am scribing for, and in the presence of, Kim Bianchi M.D..    Electronically signed by: Blaise Sibley (Scribe), 5/21/2019    Kim MORRISSEY M.D. personally performed the services described in this documentation, as scribed by Blaise Sibley in my presence, and it is both accurate and complete. C    The note accurately reflects work and decisions made by me.  Kim Bianchi  5/22/2019  1:01 AM

## 2019-05-22 NOTE — ED NOTES
Med Rec completed per Pt at bedside  Allergies reviewed  No ABX in last 30 days    Pt takes XARELTO and ASPRIN

## 2019-05-22 NOTE — ED TRIAGE NOTES
Chief Complaint   Patient presents with   • Dizziness   • Chest Pain     Chest pain and dizziness began while pt was walking this evening.  Denies sob, n/v, diaphoresis.  Pt here for above complaint.  Connected to cardiac monitor, BP cuff, and continuous pulse ox upon arrival.  Pt in gown, call light in reach, bed in lowest position.  Chart up for ERP.

## 2019-06-04 ENCOUNTER — HOSPITAL ENCOUNTER (EMERGENCY)
Facility: MEDICAL CENTER | Age: 54
End: 2019-06-04
Attending: EMERGENCY MEDICINE
Payer: MEDICARE

## 2019-06-04 ENCOUNTER — APPOINTMENT (OUTPATIENT)
Dept: RADIOLOGY | Facility: MEDICAL CENTER | Age: 54
End: 2019-06-04
Attending: EMERGENCY MEDICINE
Payer: MEDICARE

## 2019-06-04 VITALS
TEMPERATURE: 98.1 F | HEIGHT: 67 IN | BODY MASS INDEX: 29.24 KG/M2 | WEIGHT: 186.29 LBS | SYSTOLIC BLOOD PRESSURE: 105 MMHG | OXYGEN SATURATION: 95 % | DIASTOLIC BLOOD PRESSURE: 55 MMHG | HEART RATE: 80 BPM | RESPIRATION RATE: 16 BRPM

## 2019-06-04 DIAGNOSIS — R11.2 NON-INTRACTABLE VOMITING WITH NAUSEA, UNSPECIFIED VOMITING TYPE: ICD-10-CM

## 2019-06-04 LAB
ALBUMIN SERPL BCP-MCNC: 4.3 G/DL (ref 3.2–4.9)
ALBUMIN/GLOB SERPL: 1.8 G/DL
ALP SERPL-CCNC: 66 U/L (ref 30–99)
ALT SERPL-CCNC: 22 U/L (ref 2–50)
ANION GAP SERPL CALC-SCNC: 9 MMOL/L (ref 0–11.9)
AST SERPL-CCNC: 19 U/L (ref 12–45)
BASOPHILS # BLD AUTO: 0.5 % (ref 0–1.8)
BASOPHILS # BLD: 0.04 K/UL (ref 0–0.12)
BILIRUB SERPL-MCNC: 1.1 MG/DL (ref 0.1–1.5)
BUN SERPL-MCNC: 12 MG/DL (ref 8–22)
CALCIUM SERPL-MCNC: 8.5 MG/DL (ref 8.5–10.5)
CHLORIDE SERPL-SCNC: 103 MMOL/L (ref 96–112)
CO2 SERPL-SCNC: 24 MMOL/L (ref 20–33)
CREAT SERPL-MCNC: 1.03 MG/DL (ref 0.5–1.4)
EKG IMPRESSION: NORMAL
EOSINOPHIL # BLD AUTO: 0.13 K/UL (ref 0–0.51)
EOSINOPHIL NFR BLD: 1.7 % (ref 0–6.9)
ERYTHROCYTE [DISTWIDTH] IN BLOOD BY AUTOMATED COUNT: 48 FL (ref 35.9–50)
GLOBULIN SER CALC-MCNC: 2.4 G/DL (ref 1.9–3.5)
GLUCOSE SERPL-MCNC: 173 MG/DL (ref 65–99)
HCT VFR BLD AUTO: 35 % (ref 42–52)
HGB BLD-MCNC: 11.8 G/DL (ref 14–18)
IMM GRANULOCYTES # BLD AUTO: 0.06 K/UL (ref 0–0.11)
IMM GRANULOCYTES NFR BLD AUTO: 0.8 % (ref 0–0.9)
LIPASE SERPL-CCNC: 48 U/L (ref 11–82)
LYMPHOCYTES # BLD AUTO: 1.42 K/UL (ref 1–4.8)
LYMPHOCYTES NFR BLD: 18.3 % (ref 22–41)
MCH RBC QN AUTO: 32.9 PG (ref 27–33)
MCHC RBC AUTO-ENTMCNC: 33.7 G/DL (ref 33.7–35.3)
MCV RBC AUTO: 97.5 FL (ref 81.4–97.8)
MONOCYTES # BLD AUTO: 0.56 K/UL (ref 0–0.85)
MONOCYTES NFR BLD AUTO: 7.2 % (ref 0–13.4)
NEUTROPHILS # BLD AUTO: 5.54 K/UL (ref 1.82–7.42)
NEUTROPHILS NFR BLD: 71.5 % (ref 44–72)
NRBC # BLD AUTO: 0 K/UL
NRBC BLD-RTO: 0 /100 WBC
PLATELET # BLD AUTO: 299 K/UL (ref 164–446)
PMV BLD AUTO: 9.1 FL (ref 9–12.9)
POTASSIUM SERPL-SCNC: 4.1 MMOL/L (ref 3.6–5.5)
PROT SERPL-MCNC: 6.7 G/DL (ref 6–8.2)
RBC # BLD AUTO: 3.59 M/UL (ref 4.7–6.1)
SODIUM SERPL-SCNC: 136 MMOL/L (ref 135–145)
TROPONIN I SERPL-MCNC: <0.01 NG/ML (ref 0–0.04)
WBC # BLD AUTO: 7.8 K/UL (ref 4.8–10.8)

## 2019-06-04 PROCEDURE — 80053 COMPREHEN METABOLIC PANEL: CPT

## 2019-06-04 PROCEDURE — 36415 COLL VENOUS BLD VENIPUNCTURE: CPT

## 2019-06-04 PROCEDURE — 99284 EMERGENCY DEPT VISIT MOD MDM: CPT

## 2019-06-04 PROCEDURE — 84484 ASSAY OF TROPONIN QUANT: CPT

## 2019-06-04 PROCEDURE — 71046 X-RAY EXAM CHEST 2 VIEWS: CPT

## 2019-06-04 PROCEDURE — 85025 COMPLETE CBC W/AUTO DIFF WBC: CPT

## 2019-06-04 PROCEDURE — 93005 ELECTROCARDIOGRAM TRACING: CPT | Performed by: EMERGENCY MEDICINE

## 2019-06-04 PROCEDURE — 83690 ASSAY OF LIPASE: CPT

## 2019-06-04 PROCEDURE — 700111 HCHG RX REV CODE 636 W/ 250 OVERRIDE (IP): Performed by: EMERGENCY MEDICINE

## 2019-06-04 RX ORDER — ONDANSETRON 4 MG/1
4 TABLET, ORALLY DISINTEGRATING ORAL EVERY 6 HOURS PRN
Qty: 8 TAB | Refills: 0 | Status: SHIPPED | OUTPATIENT
Start: 2019-06-04 | End: 2019-08-20

## 2019-06-04 RX ORDER — ONDANSETRON 2 MG/ML
4 INJECTION INTRAMUSCULAR; INTRAVENOUS ONCE
Status: COMPLETED | OUTPATIENT
Start: 2019-06-04 | End: 2019-06-04

## 2019-06-04 RX ADMIN — ONDANSETRON 4 MG: 2 INJECTION INTRAMUSCULAR; INTRAVENOUS at 04:02

## 2019-06-04 NOTE — ED PROVIDER NOTES
ED Provider Note    CHIEF COMPLAINT  Chief Complaint   Patient presents with   • Nausea     x 1 day, x 2 emesis. Started yesterday after bowling. Pt denies loose stool. VSS Pt given 4mg subling Zofran by EMS pta.        HPI  Ryan Trevizo is a 54 y.o. male who presents to the emergency department chief complaint of nausea that began this evening with some vomiting.  He was seen by EMS and they was given Zofran which she states did mildly help with symptoms.  He endorses little bit of shortness of breath with the nausea but no chest pain no weakness no numbness no tingling.  He denies any diarrhea or fevers or chills.  As soon as I walked in the room he asked me if he was going to be admitted to the hospital.  He denies any recent alcohol abuse nor drug abuse and has been consistent with his medications.    REVIEW OF SYSTEMS  Positives as above. Pertinent negatives include fevers chills blood in his stool or emesis chest pain leg swelling hemoptysis back pain abdominal pain  All other review of systems are negative    PAST MEDICAL HISTORY   has a past medical history of ACS (acute coronary syndrome) ([facility]); Anxiety disorder; Arthritis; CAD (coronary artery disease); Chronic anticoagulation; Claudication (HCC); Diabetes (HCC); Dizziness; GERD (gastroesophageal reflux disease); Glaucoma; HTN (hypertension); Hyperlipemia; Ischemic cardiomyopathy (05/2018); Mental retardation; Mentally challenged; Osteoarthritis; Paroxysmal atrial fibrillation (HCC); Pericarditis secondary to acute myocardial infarction (HCC); Sick sinus syndrome (HCC); SOB (shortness of breath); STEMI (ST elevation myocardial infarction) ([facility]); and Syncope (02/2019).    SOCIAL HISTORY  Social History     Social History Main Topics   • Smoking status: Never Smoker   • Smokeless tobacco: Never Used   • Alcohol use No   • Drug use: No   • Sexual activity: Not on file      Comment: Single, has no children, works as an .       SURGICAL HISTORY    "has a past surgical history that includes other orthopedic surgery (7- ); other cardiac surgery; gastroscopy-endo (7/25/2012); gastroscopy-endo (7/26/2012); gastroscopy-endo (5/19/2017); other cardiac surgery (Left, 01/12/2018); and pacemaker insertion (Left, 02/12/2019).    CURRENT MEDICATIONS  Home Medications     Reviewed by Edwin Bhat R.N. (Registered Nurse) on 06/04/19 at 0143  Med List Status: Complete   Medication Last Dose Status   ASPIR-LOW 81 MG EC tablet 6/4/2019 Active   atorvastatin (LIPITOR) 80 MG tablet 6/4/2019 Active   cyanocobalamin (VITAMIN B12) 1000 MCG Tab 6/4/2019 Active   lisinopril (PRINIVIL) 2.5 MG Tab 6/4/2019 Active   loratadine (CLARITIN) 10 MG Tab 6/4/2019 Active   metformin (GLUCOPHAGE) 1000 MG tablet 6/4/2019 Active   metoprolol SR (TOPROL XL) 25 MG TABLET SR 24 HR 6/4/2019 Active   pantoprazole (PROTONIX) 40 MG Tablet Delayed Response 6/4/2019 Active   Ranolazine 1000 MG TABLET SR 12 HR 6/4/2019 Active   rivaroxaban (XARELTO) 20 MG Tab tablet 6/4/2019 Active   vitamin D3, cholecalciferol, 1000 UNIT Tab 6/4/2019 Active                ALLERGIES  Allergies   Allergen Reactions   • Ritalin [Methylphenidate] Unspecified     \"Hyper\"       PHYSICAL EXAM  VITAL SIGNS: /60   Pulse 88   Temp 36.7 °C (98.1 °F)   Resp 18   Ht 1.702 m (5' 7\")   Wt 84.5 kg (186 lb 4.6 oz)   SpO2 94%   BMI 29.18 kg/m²    Pulse ox interpretation: I interpret this pulse ox as normal.  Constitutional: Alert in no apparent distress.  HENT: Normocephalic atraumatic, MMM  Eyes: PER, Conjunctiva normal, Non-icteric.   Neck: Normal range of motion, No tenderness, Supple, No stridor.   Cardiovascular: Regular rate and rhythm, no murmurs.   Thorax & Lungs: Normal breath sounds, No respiratory distress, No wheezing, No chest tenderness.   Abdomen: Bowel sounds normal, Soft, No tenderness, No pulsatile masses. No peritoneal signs.  Skin: Warm, Dry, No erythema, No rash.   Back: No bony tenderness, No " CVA tenderness.   Extremities: Intact distal pulses, No edema, No tenderness, No cyanosis  Neurologic: Alert and oriented x3, No focal deficits noted.       DIFFERENTIAL DIAGNOSIS AND WORK UP PLAN    This is a 54 y.o. male who presents with nausea and some associated shortness of breath he is very comfortable appearing he is here quite often for similar complaints although he does have multiple medical problems no chest pain no leg swelling he is well-appearing on my examination, he was given Zofran and states he is feeling a little better could be an early viral gastroenteritis could be an atypical ACS with a very low concern for that at this time.  Will observe the patient here in the emergency department perform an EKG and laboratory analysis and repeat Zofran as needed.    DIAGNOSTIC STUDIES / PROCEDURES    EKG  Results for orders placed or performed during the hospital encounter of 19   EKG   Result Value Ref Range    Report       [facility] Emergency Dept.    Test Date:  2019  Pt Name:    NICHELLE MARQUEZ                   Department: ER  MRN:        [id number]                      Room:       [facility]  Gender:     Male                         Technician: [id number]  :        1965                   Requested By:TALITA BONE  Order #:    [id number]                    Reading MD: Talita Bone MD    Measurements  Intervals                                Axis  Rate:       82                           P:          36  GA:         164                          QRS:        -20  QRSD:       98                           T:          53  QT:         376  QTc:        439    Interpretive Statements  sinus rhythm with Hr of 82 no st elevations no st depressions, no abn t wave  inversions, no pathognomonic q waves, no q waves, normal intervals normal  axis  Compared to ECG 2019 19:38:01  No significant changes    Electronically Signed On 2019 3:39:43 PDT by Talita Bone MD    "      LABS  Pertinent Lab Findings  CBC with a chronic anemia unchanged from prior CMP within normal limits normal lipase normal troponin      RADIOLOGY  DX-CHEST-2 VIEWS   Final Result      No acute cardiopulmonary findings.        The radiologist's interpretation of all radiological studies have been reviewed by me.    IV Placement Procedure Note: (with ultrasound guidance)   The patient was consented all risks benefits and alternatives were discussed.   LOCATION: L AC  POSITION: trendelenburg.   PROCEDURE NOTE: Indication, poor access.   Sterile prep, gown, and drape protocols were used. Using ultrasound guidance, IV was placed with ultrasound guidance successfully by cannulating the AC vein with ultrasound guidance. We used a 20-gauge IV and had good flash and was able to cannulate fully and normal saline flowed easily. There is no localized infiltration. The line was secured by myself      COURSE & MEDICAL DECISION MAKING  Pertinent Labs & Imaging studies reviewed. (See chart for details)    3:38 AM  They are having a hard time getting IV access and laboratory analysis thus I performed an ultrasound IV he states he is feeling little nauseated again with no associated symptoms will retreat with Zofran    4:50 AM  Reassessed patient the bedside is resting comfortably has been tolerating ice chips without difficulty.  Vital signs are within normal limits I feel comfortable letting the patient go home and will return to the ED for any new or worsening issues.  Low concern for ACS low concern for bacterial infection or obstruction his abdomen is soft nontender    /55   Pulse 80   Temp 36.7 °C (98.1 °F)   Resp 16   Ht 1.702 m (5' 7\")   Wt 84.5 kg (186 lb 4.6 oz)   SpO2 95%   BMI 29.18 kg/m²     The patient will return for new or worsening symptoms and is stable at the time of discharge.    The patient is referred to a primary physician for blood pressure management, diabetic screening, and for all other " preventative health concerns.    DISPOSITION:  Patient will be discharged home in stable condition.    FOLLOW UP:  Juancho Jenkins M.D.  [address]  [address]  [facility] [address]  [phone]    Schedule an appointment as soon as possible for a visit       [facility], Emergency Dept  [address] [facility] [id number] [phone]    If symptoms worsen      OUTPATIENT MEDICATIONS:  Discharge Medication List as of 6/4/2019  4:56 AM      START taking these medications    Details   ondansetron (ZOFRAN ODT) 4 MG TABLET DISPERSIBLE Take 1 Tab by mouth every 6 hours as needed., Disp-8 Tab, R-0, Normal                 FINAL IMPRESSION  1. Non-intractable vomiting with nausea, unspecified vomiting type        Electronically signed by: Talita Hayes, 6/4/2019 2:27 AM    This dictation has been created using voice recognition software and/or scribes. The accuracy of the dictation is limited by the abilities of the software and the expertise of the scribes. I expect there may be some errors of grammar and possibly content. I made every attempt to manually correct the errors within my dictation. However, errors related to voice recognition software and/or scribes may still exist and should be interpreted within the appropriate context.

## 2019-06-04 NOTE — ED TRIAGE NOTES
"Ryan Trevizo  54 y.o.  /60   Pulse 88   Temp 36.7 °C (98.1 °F)   Resp 18   Ht 1.702 m (5' 7\")   Wt 84.5 kg (186 lb 4.6 oz)   SpO2 94%   BMI 29.18 kg/m²   Chief Complaint   Patient presents with   • Nausea     x 1 day, x 2 emesis. Started yesterday after bowling. Pt denies loose stool. VSS Pt given 4mg subling Zofran by EMS pta.      Pt amb to triage with steady gait for above complaint. VSS  Pt is alert and oriented, speaking in full sentences, follows commands and responds appropriately to questions. NAD. Resp are even and unlabored.  Pt placed in lobby. Pt educated on triage process and encouraged to alert staff for any changes.     "

## 2019-06-21 DIAGNOSIS — E11.9 TYPE 2 DIABETES MELLITUS WITHOUT COMPLICATION, WITHOUT LONG-TERM CURRENT USE OF INSULIN (HCC): ICD-10-CM

## 2019-06-24 DIAGNOSIS — E78.5 DYSLIPIDEMIA: ICD-10-CM

## 2019-06-24 DIAGNOSIS — I10 ESSENTIAL HYPERTENSION: ICD-10-CM

## 2019-06-24 DIAGNOSIS — I48.0 PAF (PAROXYSMAL ATRIAL FIBRILLATION) (HCC): ICD-10-CM

## 2019-06-24 DIAGNOSIS — E55.9 VITAMIN D DEFICIENCY: ICD-10-CM

## 2019-06-24 RX ORDER — ATORVASTATIN CALCIUM 80 MG/1
80 TABLET, FILM COATED ORAL DAILY
Qty: 90 TAB | Refills: 3 | Status: SHIPPED | OUTPATIENT
Start: 2019-06-24 | End: 2019-08-20

## 2019-06-24 RX ORDER — METOPROLOL SUCCINATE 25 MG/1
12.5 TABLET, EXTENDED RELEASE ORAL DAILY
Qty: 45 TAB | Refills: 3 | Status: SHIPPED | OUTPATIENT
Start: 2019-06-24 | End: 2020-09-29

## 2019-06-25 RX ORDER — LANOLIN ALCOHOL/MO/W.PET/CERES
CREAM (GRAM) TOPICAL
Qty: 30 TAB | Refills: 0 | Status: SHIPPED | OUTPATIENT
Start: 2019-06-25 | End: 2019-08-20

## 2019-06-25 RX ORDER — LORATADINE 10 MG/1
TABLET ORAL
Qty: 30 TAB | Refills: 0 | Status: SHIPPED | OUTPATIENT
Start: 2019-06-25 | End: 2019-08-20

## 2019-07-02 ENCOUNTER — NON-PROVIDER VISIT (OUTPATIENT)
Dept: CARDIOLOGY | Facility: MEDICAL CENTER | Age: 54
End: 2019-07-02
Payer: MEDICARE

## 2019-07-02 VITALS
HEIGHT: 67 IN | BODY MASS INDEX: 29.35 KG/M2 | SYSTOLIC BLOOD PRESSURE: 122 MMHG | WEIGHT: 187 LBS | OXYGEN SATURATION: 90 % | HEART RATE: 72 BPM | DIASTOLIC BLOOD PRESSURE: 88 MMHG

## 2019-07-02 DIAGNOSIS — Z95.0 CARDIAC PACEMAKER IN SITU: ICD-10-CM

## 2019-07-02 DIAGNOSIS — R55 SYNCOPE, UNSPECIFIED SYNCOPE TYPE: ICD-10-CM

## 2019-07-02 DIAGNOSIS — I48.0 PAF (PAROXYSMAL ATRIAL FIBRILLATION) (HCC): Chronic | ICD-10-CM

## 2019-07-02 DIAGNOSIS — I49.5 SICK SINUS SYNDROME (HCC): ICD-10-CM

## 2019-07-02 PROBLEM — Z95.818 STATUS POST PLACEMENT OF IMPLANTABLE LOOP RECORDER: Status: RESOLVED | Noted: 2019-01-22 | Resolved: 2019-07-02

## 2019-07-02 PROCEDURE — 93280 PM DEVICE PROGR EVAL DUAL: CPT | Performed by: NURSE PRACTITIONER

## 2019-07-02 NOTE — PROGRESS NOTES
Device is working normally. No mode switching episodes.  Normal sensing and capture of RA and RV leads; stable impedances. Battery longevity is 14.6 years.  No changes are made today.    FU in 6 months for next PM check with me. He is also due to see MD, to establish care.    Collaborating MD: To

## 2019-07-09 ENCOUNTER — APPOINTMENT (OUTPATIENT)
Dept: RADIOLOGY | Facility: MEDICAL CENTER | Age: 54
End: 2019-07-09
Attending: EMERGENCY MEDICINE
Payer: MEDICARE

## 2019-07-09 ENCOUNTER — HOSPITAL ENCOUNTER (EMERGENCY)
Facility: MEDICAL CENTER | Age: 54
End: 2019-07-09
Attending: EMERGENCY MEDICINE
Payer: MEDICARE

## 2019-07-09 VITALS
WEIGHT: 192.9 LBS | HEART RATE: 78 BPM | BODY MASS INDEX: 30.28 KG/M2 | OXYGEN SATURATION: 94 % | HEIGHT: 67 IN | DIASTOLIC BLOOD PRESSURE: 80 MMHG | RESPIRATION RATE: 18 BRPM | SYSTOLIC BLOOD PRESSURE: 120 MMHG | TEMPERATURE: 96.9 F
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
## 2019-07-09 DIAGNOSIS — S39.012A STRAIN OF LUMBAR REGION, INITIAL ENCOUNTER: ICD-10-CM

## 2019-07-09 DIAGNOSIS — S16.1XXA STRAIN OF NECK MUSCLE, INITIAL ENCOUNTER: ICD-10-CM

## 2019-07-09 LAB
ALBUMIN SERPL BCP-MCNC: 4.3 G/DL (ref 3.2–4.9)
ALBUMIN/GLOB SERPL: 1.7 G/DL
ALP SERPL-CCNC: 66 U/L (ref 30–99)
ALT SERPL-CCNC: 21 U/L (ref 2–50)
ANION GAP SERPL CALC-SCNC: 11 MMOL/L (ref 0–11.9)
AST SERPL-CCNC: 14 U/L (ref 12–45)
BILIRUB SERPL-MCNC: 1.1 MG/DL (ref 0.1–1.5)
BUN SERPL-MCNC: 21 MG/DL (ref 8–22)
CALCIUM SERPL-MCNC: 9.4 MG/DL (ref 8.5–10.5)
CHLORIDE SERPL-SCNC: 105 MMOL/L (ref 96–112)
CO2 SERPL-SCNC: 22 MMOL/L (ref 20–33)
CREAT SERPL-MCNC: 1.32 MG/DL (ref 0.5–1.4)
ERYTHROCYTE [DISTWIDTH] IN BLOOD BY AUTOMATED COUNT: 46.5 FL (ref 35.9–50)
ETHANOL BLD-MCNC: 0 G/DL
GLOBULIN SER CALC-MCNC: 2.5 G/DL (ref 1.9–3.5)
GLUCOSE SERPL-MCNC: 111 MG/DL (ref 65–99)
HCT VFR BLD AUTO: 35 % (ref 42–52)
HGB BLD-MCNC: 12 G/DL (ref 14–18)
MCH RBC QN AUTO: 33.5 PG (ref 27–33)
MCHC RBC AUTO-ENTMCNC: 34.3 G/DL (ref 33.7–35.3)
MCV RBC AUTO: 97.8 FL (ref 81.4–97.8)
PLATELET # BLD AUTO: 286 K/UL (ref 164–446)
PMV BLD AUTO: 9.7 FL (ref 9–12.9)
POTASSIUM SERPL-SCNC: 3.9 MMOL/L (ref 3.6–5.5)
PROT SERPL-MCNC: 6.8 G/DL (ref 6–8.2)
RBC # BLD AUTO: 3.58 M/UL (ref 4.7–6.1)
SODIUM SERPL-SCNC: 138 MMOL/L (ref 135–145)
WBC # BLD AUTO: 6.1 K/UL (ref 4.8–10.8)

## 2019-07-09 PROCEDURE — A9270 NON-COVERED ITEM OR SERVICE: HCPCS | Performed by: EMERGENCY MEDICINE

## 2019-07-09 PROCEDURE — 80307 DRUG TEST PRSMV CHEM ANLYZR: CPT

## 2019-07-09 PROCEDURE — 99285 EMERGENCY DEPT VISIT HI MDM: CPT

## 2019-07-09 PROCEDURE — 36415 COLL VENOUS BLD VENIPUNCTURE: CPT

## 2019-07-09 PROCEDURE — 700102 HCHG RX REV CODE 250 W/ 637 OVERRIDE(OP): Performed by: EMERGENCY MEDICINE

## 2019-07-09 PROCEDURE — 72128 CT CHEST SPINE W/O DYE: CPT

## 2019-07-09 PROCEDURE — 96374 THER/PROPH/DIAG INJ IV PUSH: CPT | Mod: XU

## 2019-07-09 PROCEDURE — 85027 COMPLETE CBC AUTOMATED: CPT

## 2019-07-09 PROCEDURE — 700117 HCHG RX CONTRAST REV CODE 255: Performed by: EMERGENCY MEDICINE

## 2019-07-09 PROCEDURE — 72125 CT NECK SPINE W/O DYE: CPT

## 2019-07-09 PROCEDURE — 700111 HCHG RX REV CODE 636 W/ 250 OVERRIDE (IP): Performed by: EMERGENCY MEDICINE

## 2019-07-09 PROCEDURE — 74177 CT ABD & PELVIS W/CONTRAST: CPT

## 2019-07-09 PROCEDURE — 80053 COMPREHEN METABOLIC PANEL: CPT

## 2019-07-09 PROCEDURE — 72131 CT LUMBAR SPINE W/O DYE: CPT

## 2019-07-09 RX ORDER — CYCLOBENZAPRINE HCL 10 MG
10 TABLET ORAL 3 TIMES DAILY PRN
Qty: 30 TAB | Refills: 0 | Status: SHIPPED | OUTPATIENT
Start: 2019-07-09 | End: 2019-08-20

## 2019-07-09 RX ORDER — ACETAMINOPHEN 325 MG/1
650 TABLET ORAL ONCE
Status: COMPLETED | OUTPATIENT
Start: 2019-07-09 | End: 2019-07-09

## 2019-07-09 RX ORDER — DIAZEPAM 5 MG/1
5 TABLET ORAL ONCE
Status: COMPLETED | OUTPATIENT
Start: 2019-07-09 | End: 2019-07-09

## 2019-07-09 RX ORDER — ONDANSETRON 2 MG/ML
4 INJECTION INTRAMUSCULAR; INTRAVENOUS ONCE
Status: COMPLETED | OUTPATIENT
Start: 2019-07-09 | End: 2019-07-09

## 2019-07-09 RX ADMIN — IOHEXOL 100 ML: 350 INJECTION, SOLUTION INTRAVENOUS at 21:06

## 2019-07-09 RX ADMIN — DIAZEPAM 5 MG: 5 TABLET ORAL at 19:50

## 2019-07-09 RX ADMIN — ACETAMINOPHEN 650 MG: 325 TABLET, FILM COATED ORAL at 19:50

## 2019-07-09 RX ADMIN — ONDANSETRON 4 MG: 2 INJECTION INTRAMUSCULAR; INTRAVENOUS at 19:50

## 2019-07-09 ASSESSMENT — ENCOUNTER SYMPTOMS
NUMBNESS: 0
NECK PAIN: 1
SHORTNESS OF BREATH: 1
NAUSEA: 1
ABDOMINAL PAIN: 1
BACK PAIN: 1

## 2019-07-10 NOTE — ED NOTES
Discharging patient home. Verbalized understanding of discharge instructions, follow up appointments, and home care. All questions answered and all personal belongings with patient at time of discharge. Vital signs WNL. Patient given discharge information papers and discharge assessment completed.     Pt ambulatory with steady gait to lobby to take bus home.
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
## 2019-07-10 NOTE — DISCHARGE INSTRUCTIONS
"CT of your C spine showed:   \"1.  Multilevel degenerative changes of the cervical spine limit diagnostic sensitivity of this examination, otherwise no acute traumatic bony injury of the cervical spine is apparent.  2.  

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
## 2019-07-26 ENCOUNTER — HOSPITAL ENCOUNTER (EMERGENCY)
Facility: MEDICAL CENTER | Age: 54
End: 2019-07-26
Attending: EMERGENCY MEDICINE
Payer: OTHER MISCELLANEOUS

## 2019-07-26 VITALS
DIASTOLIC BLOOD PRESSURE: 87 MMHG | WEIGHT: 192 LBS | HEIGHT: 67 IN | RESPIRATION RATE: 20 BRPM | BODY MASS INDEX: 30.13 KG/M2 | OXYGEN SATURATION: 95 % | SYSTOLIC BLOOD PRESSURE: 123 MMHG | HEART RATE: 65 BPM | TEMPERATURE: 97.1 F

## 2019-07-26 DIAGNOSIS — F07.81 POST CONCUSSION SYNDROME: ICD-10-CM

## 2019-07-26 DIAGNOSIS — M54.2 NECK PAIN: ICD-10-CM

## 2019-07-26 DIAGNOSIS — M54.50 LUMBAR BACK PAIN: ICD-10-CM

## 2019-07-26 PROCEDURE — 36415 COLL VENOUS BLD VENIPUNCTURE: CPT

## 2019-07-26 PROCEDURE — 99284 EMERGENCY DEPT VISIT MOD MDM: CPT

## 2019-07-26 RX ORDER — METHOCARBAMOL 750 MG/1
1500 TABLET, FILM COATED ORAL 4 TIMES DAILY PRN
Qty: 20 TAB | Refills: 0 | Status: SHIPPED | OUTPATIENT
Start: 2019-07-26 | End: 2019-08-20

## 2019-07-26 RX ORDER — TRAMADOL HYDROCHLORIDE 50 MG/1
50-100 TABLET ORAL EVERY 6 HOURS PRN
Qty: 20 TAB | Refills: 0 | Status: SHIPPED | OUTPATIENT
Start: 2019-07-26 | End: 2019-07-29

## 2019-07-26 NOTE — DISCHARGE INSTRUCTIONS
Back Pain & Injury    Take Ultram and Tylenol for pain.  Take Robaxin if helpful for back spasm..  Followup if not better 7-10 days.  Return for weakness or fever.  Avoid lifting more than 10 pounds and avoid bending.  Avoid strict bedrest.  See your doctor if not better in 1 week.    You had an elevated or high normal blood pressure reading today.  This does not necessarily mean you have hypertension.  Please followup with your/a primary physician for comprehensive blood pressure evaluation.  BP Readings from Last 3 Encounters:   07/26/19 123/87   07/09/19 120/80   07/02/19 122/88   .    Your back pain is most likely caused by a strain of the muscles or ligaments that support the spine.  This is a  common injury. Back strains cause pain and trouble moving because of muscle spasms. They may take several weeks to heal, although they are usually much better after 2-3 days.     Your spinal column (backbone) is made up of 24 main vertebral bodies in addition to the sacrum and coccyx. These are held together by tough fibrous tissue called ligaments, and also by the support of your muscles. Nerve roots pass through the openings between the vertebrae. A sudden wrenching move or injury to the back may cause injury to, or pressure upon these nerves. This may result in localized back pain or radiation (movement) of pain into the buttocks and down the leg into the foot. The condition known as sciatica is frequently associated with a ruptured (herniated) disc. Pain is also created by muscle spasm alone.    Treatment for back pain includes:  Rest - Get bed rest as needed over the next day or two.  Use a firm mattress and lie on your side with your knees slightly bent. If you lie on your back, put a pillow under your knees.  Use bed rest for only the most extreme, acute (sudden) episode. Prolonged bed rest over 48 hours will aggravate your condition.  Early movement - Back pain improves most rapidly if you remain active. It is  much more stressful on the back to sit or  one place than it is to move around.  Do not sit, drive or  one place for more than 30 minutes at a time.  Take short walks on level surfaces as soon as pain will allow.  Limit bending and lifting - Don't bend over or lift anything over 20 pounds until you are completely better. Learn to lift by bending your knees and using your leg muscles to help. Keep the load close to your body and avoid twisting, reaching and overhead work.     Medicines - Medicine to reduce pain and inflammation are helpful and muscle-relaxing drugs may also be prescribed.  Therapy - Ice used for acute conditions is very effective. Use a large plastic bag filled with ice and wrapped in a towel. This also provides excellent pain relief. This may be continuous or for thirty minutes every two hours during acute phase, then as needed. Heat for thirty minutes prior to activities is helpful.  Back exercises and gentle massage may be of some benefit.You should be examined again if your back pain is not better in one week.     TO PREVENT:  Avoid an underactive life style. Active exercise, as directed by your caregiver, is your greatest weapon against back pain. Hard physical activities such as tennis, racquetball, water skiing etc., without proper physical conditioning may aggravate and/or create problems, especially if you are not in condition for that activity. If you have a back problem it is especially important to avoid sports requiring sudden body movements. Swimming and walking are generally safer activities. Maintain good posture. Avoid obesity.    See your caregiver for continued problems. Your caregiver can help or refer you for appropriate exercises and work hardening. Work hardening means that the back is put through the proper exercises and rehabilitation to treat the present problems and prevent future problems. With conditioning, most back problems can be avoided. Sometimes a  more serious issue may be the cause of back pain and you should be seen immediately again if new problems seem to be developing.    SEEK IMMEDIATE MEDICAL ATTENTION IF:  Numbness, tingling, weakness, or problem with the use of your arms or legs.  Severe back pain not relieved with medications.  Change in bowel or bladder control.  Increasing pain in any areas of the body.  Shortness of breath, dizziness or fainting.  Nausea (feeling sick to your stomach), vomiting or sweats.  Fever above 101º    [employer]® Patient Information ©2007 [employer].
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
## 2019-08-02 ENCOUNTER — APPOINTMENT (OUTPATIENT)
Dept: RADIOLOGY | Facility: MEDICAL CENTER | Age: 54
End: 2019-08-02
Attending: EMERGENCY MEDICINE
Payer: MEDICARE

## 2019-08-02 ENCOUNTER — HOSPITAL ENCOUNTER (OUTPATIENT)
Facility: MEDICAL CENTER | Age: 54
End: 2019-08-03
Attending: EMERGENCY MEDICINE | Admitting: HOSPITALIST
Payer: MEDICARE

## 2019-08-02 DIAGNOSIS — M79.602 LEFT ARM PAIN: ICD-10-CM

## 2019-08-02 DIAGNOSIS — R07.9 CHEST PAIN, UNSPECIFIED TYPE: ICD-10-CM

## 2019-08-02 PROBLEM — M25.512 LEFT SHOULDER PAIN: Status: ACTIVE | Noted: 2019-08-02

## 2019-08-02 PROBLEM — E87.6 HYPOKALEMIA: Status: ACTIVE | Noted: 2019-08-02

## 2019-08-02 LAB
ALBUMIN SERPL BCP-MCNC: 4.3 G/DL (ref 3.2–4.9)
ALBUMIN/GLOB SERPL: 1.7 G/DL
ALP SERPL-CCNC: 65 U/L (ref 30–99)
ALT SERPL-CCNC: 17 U/L (ref 2–50)
ANION GAP SERPL CALC-SCNC: 11 MMOL/L (ref 0–11.9)
APTT PPP: 35.4 SEC (ref 24.7–36)
AST SERPL-CCNC: 16 U/L (ref 12–45)
BASOPHILS # BLD AUTO: 0.4 % (ref 0–1.8)
BASOPHILS # BLD: 0.03 K/UL (ref 0–0.12)
BILIRUB SERPL-MCNC: 1 MG/DL (ref 0.1–1.5)
BUN SERPL-MCNC: 16 MG/DL (ref 8–22)
CALCIUM SERPL-MCNC: 8.6 MG/DL (ref 8.5–10.5)
CHLORIDE SERPL-SCNC: 105 MMOL/L (ref 96–112)
CO2 SERPL-SCNC: 21 MMOL/L (ref 20–33)
CREAT SERPL-MCNC: 1.15 MG/DL (ref 0.5–1.4)
EKG IMPRESSION: NORMAL
EOSINOPHIL # BLD AUTO: 0.1 K/UL (ref 0–0.51)
EOSINOPHIL NFR BLD: 1.5 % (ref 0–6.9)
ERYTHROCYTE [DISTWIDTH] IN BLOOD BY AUTOMATED COUNT: 45 FL (ref 35.9–50)
GLOBULIN SER CALC-MCNC: 2.6 G/DL (ref 1.9–3.5)
GLUCOSE SERPL-MCNC: 267 MG/DL (ref 65–99)
HCT VFR BLD AUTO: 35 % (ref 42–52)
HGB BLD-MCNC: 11.6 G/DL (ref 14–18)
HGB RETIC QN AUTO: 35.7 PG/CELL (ref 29–35)
IMM GRANULOCYTES # BLD AUTO: 0.05 K/UL (ref 0–0.11)
IMM GRANULOCYTES NFR BLD AUTO: 0.7 % (ref 0–0.9)
IMM RETICS NFR: 24.4 % (ref 9.3–17.4)
INR PPP: 1.73 (ref 0.87–1.13)
IRON SATN MFR SERPL: 23 % (ref 15–55)
IRON SERPL-MCNC: 86 UG/DL (ref 50–180)
LYMPHOCYTES # BLD AUTO: 1.28 K/UL (ref 1–4.8)
LYMPHOCYTES NFR BLD: 19 % (ref 22–41)
MCH RBC QN AUTO: 32 PG (ref 27–33)
MCHC RBC AUTO-ENTMCNC: 33.1 G/DL (ref 33.7–35.3)
MCV RBC AUTO: 96.4 FL (ref 81.4–97.8)
MONOCYTES # BLD AUTO: 0.58 K/UL (ref 0–0.85)
MONOCYTES NFR BLD AUTO: 8.6 % (ref 0–13.4)
NEUTROPHILS # BLD AUTO: 4.7 K/UL (ref 1.82–7.42)
NEUTROPHILS NFR BLD: 69.8 % (ref 44–72)
NRBC # BLD AUTO: 0 K/UL
NRBC BLD-RTO: 0 /100 WBC
NT-PROBNP SERPL IA-MCNC: 52 PG/ML (ref 0–125)
PLATELET # BLD AUTO: 287 K/UL (ref 164–446)
PMV BLD AUTO: 9.5 FL (ref 9–12.9)
POTASSIUM SERPL-SCNC: 3.5 MMOL/L (ref 3.6–5.5)
PROT SERPL-MCNC: 6.9 G/DL (ref 6–8.2)
PROTHROMBIN TIME: 20.8 SEC (ref 12–14.6)
RBC # BLD AUTO: 3.63 M/UL (ref 4.7–6.1)
RETICS # AUTO: 0.12 M/UL (ref 0.04–0.06)
RETICS/RBC NFR: 3.4 % (ref 0.8–2.1)
SODIUM SERPL-SCNC: 137 MMOL/L (ref 135–145)
TIBC SERPL-MCNC: 370 UG/DL (ref 250–450)
TROPONIN T SERPL-MCNC: 7 NG/L (ref 6–19)
WBC # BLD AUTO: 6.7 K/UL (ref 4.8–10.8)

## 2019-08-02 PROCEDURE — 85025 COMPLETE CBC W/AUTO DIFF WBC: CPT

## 2019-08-02 PROCEDURE — G0378 HOSPITAL OBSERVATION PER HR: HCPCS

## 2019-08-02 PROCEDURE — 83880 ASSAY OF NATRIURETIC PEPTIDE: CPT

## 2019-08-02 PROCEDURE — 700111 HCHG RX REV CODE 636 W/ 250 OVERRIDE (IP): Performed by: EMERGENCY MEDICINE

## 2019-08-02 PROCEDURE — 71045 X-RAY EXAM CHEST 1 VIEW: CPT

## 2019-08-02 PROCEDURE — 700102 HCHG RX REV CODE 250 W/ 637 OVERRIDE(OP): Performed by: EMERGENCY MEDICINE

## 2019-08-02 PROCEDURE — 85610 PROTHROMBIN TIME: CPT

## 2019-08-02 PROCEDURE — 99285 EMERGENCY DEPT VISIT HI MDM: CPT

## 2019-08-02 PROCEDURE — 96374 THER/PROPH/DIAG INJ IV PUSH: CPT

## 2019-08-02 PROCEDURE — 82728 ASSAY OF FERRITIN: CPT

## 2019-08-02 PROCEDURE — 83550 IRON BINDING TEST: CPT

## 2019-08-02 PROCEDURE — 84484 ASSAY OF TROPONIN QUANT: CPT

## 2019-08-02 PROCEDURE — 85730 THROMBOPLASTIN TIME PARTIAL: CPT

## 2019-08-02 PROCEDURE — 83540 ASSAY OF IRON: CPT

## 2019-08-02 PROCEDURE — 93005 ELECTROCARDIOGRAM TRACING: CPT | Performed by: EMERGENCY MEDICINE

## 2019-08-02 PROCEDURE — 80053 COMPREHEN METABOLIC PANEL: CPT

## 2019-08-02 PROCEDURE — 99219 PR INITIAL OBSERVATION CARE,LEVL II: CPT | Performed by: HOSPITALIST

## 2019-08-02 PROCEDURE — 36415 COLL VENOUS BLD VENIPUNCTURE: CPT

## 2019-08-02 PROCEDURE — 85046 RETICYTE/HGB CONCENTRATE: CPT

## 2019-08-02 PROCEDURE — A9270 NON-COVERED ITEM OR SERVICE: HCPCS | Performed by: EMERGENCY MEDICINE

## 2019-08-02 PROCEDURE — 82607 VITAMIN B-12: CPT

## 2019-08-02 PROCEDURE — 82746 ASSAY OF FOLIC ACID SERUM: CPT

## 2019-08-02 PROCEDURE — 73030 X-RAY EXAM OF SHOULDER: CPT | Mod: LT

## 2019-08-02 RX ORDER — ONDANSETRON 2 MG/ML
4 INJECTION INTRAMUSCULAR; INTRAVENOUS EVERY 4 HOURS PRN
Status: DISCONTINUED | OUTPATIENT
Start: 2019-08-02 | End: 2019-08-03 | Stop reason: HOSPADM

## 2019-08-02 RX ORDER — PROMETHAZINE HYDROCHLORIDE 25 MG/1
12.5-25 TABLET ORAL EVERY 4 HOURS PRN
Status: DISCONTINUED | OUTPATIENT
Start: 2019-08-02 | End: 2019-08-03 | Stop reason: HOSPADM

## 2019-08-02 RX ORDER — ASPIRIN 300 MG/1
300 SUPPOSITORY RECTAL ONCE
Status: COMPLETED | OUTPATIENT
Start: 2019-08-02 | End: 2019-08-02

## 2019-08-02 RX ORDER — LISINOPRIL 2.5 MG/1
2.5 TABLET ORAL
Status: DISCONTINUED | OUTPATIENT
Start: 2019-08-03 | End: 2019-08-03 | Stop reason: HOSPADM

## 2019-08-02 RX ORDER — ASPIRIN 81 MG/1
324 TABLET, CHEWABLE ORAL ONCE
Status: COMPLETED | OUTPATIENT
Start: 2019-08-02 | End: 2019-08-02

## 2019-08-02 RX ORDER — ONDANSETRON 4 MG/1
4 TABLET, ORALLY DISINTEGRATING ORAL EVERY 4 HOURS PRN
Status: DISCONTINUED | OUTPATIENT
Start: 2019-08-02 | End: 2019-08-03 | Stop reason: HOSPADM

## 2019-08-02 RX ORDER — PROMETHAZINE HYDROCHLORIDE 25 MG/1
12.5-25 SUPPOSITORY RECTAL EVERY 4 HOURS PRN
Status: DISCONTINUED | OUTPATIENT
Start: 2019-08-02 | End: 2019-08-03 | Stop reason: HOSPADM

## 2019-08-02 RX ORDER — METOPROLOL SUCCINATE 25 MG/1
12.5 TABLET, EXTENDED RELEASE ORAL DAILY
Status: DISCONTINUED | OUTPATIENT
Start: 2019-08-03 | End: 2019-08-03 | Stop reason: HOSPADM

## 2019-08-02 RX ORDER — METHOCARBAMOL 750 MG/1
1500 TABLET, FILM COATED ORAL 4 TIMES DAILY PRN
Status: DISCONTINUED | OUTPATIENT
Start: 2019-08-02 | End: 2019-08-03 | Stop reason: HOSPADM

## 2019-08-02 RX ORDER — POTASSIUM CHLORIDE 20 MEQ/1
40 TABLET, EXTENDED RELEASE ORAL ONCE
Status: COMPLETED | OUTPATIENT
Start: 2019-08-02 | End: 2019-08-03

## 2019-08-02 RX ORDER — AMOXICILLIN 250 MG
2 CAPSULE ORAL 2 TIMES DAILY
Status: DISCONTINUED | OUTPATIENT
Start: 2019-08-02 | End: 2019-08-03 | Stop reason: HOSPADM

## 2019-08-02 RX ORDER — OXYCODONE HYDROCHLORIDE 10 MG/1
10 TABLET ORAL
Status: DISCONTINUED | OUTPATIENT
Start: 2019-08-02 | End: 2019-08-03 | Stop reason: HOSPADM

## 2019-08-02 RX ORDER — ASPIRIN 81 MG/1
81 TABLET, CHEWABLE ORAL DAILY
Status: DISCONTINUED | OUTPATIENT
Start: 2019-08-03 | End: 2019-08-03 | Stop reason: HOSPADM

## 2019-08-02 RX ORDER — ATORVASTATIN CALCIUM 80 MG/1
80 TABLET, FILM COATED ORAL EVERY EVENING
Status: DISCONTINUED | OUTPATIENT
Start: 2019-08-03 | End: 2019-08-03 | Stop reason: HOSPADM

## 2019-08-02 RX ORDER — BISACODYL 10 MG
10 SUPPOSITORY, RECTAL RECTAL
Status: DISCONTINUED | OUTPATIENT
Start: 2019-08-02 | End: 2019-08-03 | Stop reason: HOSPADM

## 2019-08-02 RX ORDER — OMEPRAZOLE 20 MG/1
20 CAPSULE, DELAYED RELEASE ORAL DAILY
Status: DISCONTINUED | OUTPATIENT
Start: 2019-08-03 | End: 2019-08-03 | Stop reason: HOSPADM

## 2019-08-02 RX ORDER — MORPHINE SULFATE 4 MG/ML
4 INJECTION, SOLUTION INTRAMUSCULAR; INTRAVENOUS
Status: DISCONTINUED | OUTPATIENT
Start: 2019-08-02 | End: 2019-08-03 | Stop reason: HOSPADM

## 2019-08-02 RX ORDER — POLYETHYLENE GLYCOL 3350 17 G/17G
1 POWDER, FOR SOLUTION ORAL
Status: DISCONTINUED | OUTPATIENT
Start: 2019-08-02 | End: 2019-08-03 | Stop reason: HOSPADM

## 2019-08-02 RX ORDER — OXYCODONE HYDROCHLORIDE 5 MG/1
5 TABLET ORAL
Status: DISCONTINUED | OUTPATIENT
Start: 2019-08-02 | End: 2019-08-03 | Stop reason: HOSPADM

## 2019-08-02 RX ORDER — RANOLAZINE 500 MG/1
1000 TABLET, EXTENDED RELEASE ORAL 2 TIMES DAILY
Status: DISCONTINUED | OUTPATIENT
Start: 2019-08-03 | End: 2019-08-03 | Stop reason: HOSPADM

## 2019-08-02 RX ADMIN — FENTANYL CITRATE 50 MCG: 0.05 INJECTION, SOLUTION INTRAMUSCULAR; INTRAVENOUS at 21:16

## 2019-08-02 RX ADMIN — ASPIRIN 81 MG 324 MG: 81 TABLET ORAL at 21:16

## 2019-08-02 ASSESSMENT — ENCOUNTER SYMPTOMS
SENSORY CHANGE: 0
HEARTBURN: 0
FEVER: 0
SHORTNESS OF BREATH: 0
FOCAL WEAKNESS: 0
DIZZINESS: 0
DEPRESSION: 0
VOMITING: 0
NAUSEA: 0
HALLUCINATIONS: 0
MYALGIAS: 0
BLURRED VISION: 0
DOUBLE VISION: 0
ABDOMINAL PAIN: 0
CHILLS: 0
WEAKNESS: 0
COUGH: 0
EYE DISCHARGE: 0
PALPITATIONS: 0
FLANK PAIN: 0
SPEECH CHANGE: 0
HEMOPTYSIS: 0
BRUISES/BLEEDS EASILY: 0

## 2019-08-02 ASSESSMENT — LIFESTYLE VARIABLES: SUBSTANCE_ABUSE: 0

## 2019-08-03 ENCOUNTER — PATIENT OUTREACH (OUTPATIENT)
Dept: HEALTH INFORMATION MANAGEMENT | Facility: OTHER | Age: 54
End: 2019-08-03

## 2019-08-03 ENCOUNTER — APPOINTMENT (OUTPATIENT)
Dept: RADIOLOGY | Facility: MEDICAL CENTER | Age: 54
End: 2019-08-03
Attending: HOSPITALIST
Payer: MEDICARE

## 2019-08-03 VITALS
HEART RATE: 63 BPM | BODY MASS INDEX: 28.3 KG/M2 | DIASTOLIC BLOOD PRESSURE: 84 MMHG | WEIGHT: 186.73 LBS | TEMPERATURE: 96.8 F | SYSTOLIC BLOOD PRESSURE: 128 MMHG | HEIGHT: 68 IN | RESPIRATION RATE: 16 BRPM | OXYGEN SATURATION: 94 %

## 2019-08-03 LAB
ALBUMIN SERPL BCP-MCNC: 4.1 G/DL (ref 3.2–4.9)
ALBUMIN/GLOB SERPL: 1.6 G/DL
ALP SERPL-CCNC: 62 U/L (ref 30–99)
ALT SERPL-CCNC: 16 U/L (ref 2–50)
ANION GAP SERPL CALC-SCNC: 11 MMOL/L (ref 0–11.9)
AST SERPL-CCNC: 15 U/L (ref 12–45)
BASOPHILS # BLD AUTO: 0.5 % (ref 0–1.8)
BASOPHILS # BLD: 0.03 K/UL (ref 0–0.12)
BILIRUB SERPL-MCNC: 0.9 MG/DL (ref 0.1–1.5)
BUN SERPL-MCNC: 14 MG/DL (ref 8–22)
CALCIUM SERPL-MCNC: 8.5 MG/DL (ref 8.5–10.5)
CHLORIDE SERPL-SCNC: 106 MMOL/L (ref 96–112)
CO2 SERPL-SCNC: 21 MMOL/L (ref 20–33)
CREAT SERPL-MCNC: 0.99 MG/DL (ref 0.5–1.4)
EKG IMPRESSION: NORMAL
EOSINOPHIL # BLD AUTO: 0.13 K/UL (ref 0–0.51)
EOSINOPHIL NFR BLD: 2.1 % (ref 0–6.9)
ERYTHROCYTE [DISTWIDTH] IN BLOOD BY AUTOMATED COUNT: 44.4 FL (ref 35.9–50)
FERRITIN SERPL-MCNC: 19.2 NG/ML (ref 22–322)
FOLATE SERPL-MCNC: 13.7 NG/ML
GLOBULIN SER CALC-MCNC: 2.5 G/DL (ref 1.9–3.5)
GLUCOSE BLD-MCNC: 116 MG/DL (ref 65–99)
GLUCOSE BLD-MCNC: 151 MG/DL (ref 65–99)
GLUCOSE SERPL-MCNC: 184 MG/DL (ref 65–99)
HCT VFR BLD AUTO: 33.2 % (ref 42–52)
HGB BLD-MCNC: 11.3 G/DL (ref 14–18)
IMM GRANULOCYTES # BLD AUTO: 0.05 K/UL (ref 0–0.11)
IMM GRANULOCYTES NFR BLD AUTO: 0.8 % (ref 0–0.9)
LYMPHOCYTES # BLD AUTO: 1.43 K/UL (ref 1–4.8)
LYMPHOCYTES NFR BLD: 22.7 % (ref 22–41)
MCH RBC QN AUTO: 32.6 PG (ref 27–33)
MCHC RBC AUTO-ENTMCNC: 34 G/DL (ref 33.7–35.3)
MCV RBC AUTO: 95.7 FL (ref 81.4–97.8)
MONOCYTES # BLD AUTO: 0.52 K/UL (ref 0–0.85)
MONOCYTES NFR BLD AUTO: 8.2 % (ref 0–13.4)
NEUTROPHILS # BLD AUTO: 4.15 K/UL (ref 1.82–7.42)
NEUTROPHILS NFR BLD: 65.7 % (ref 44–72)
NRBC # BLD AUTO: 0 K/UL
NRBC BLD-RTO: 0 /100 WBC
PLATELET # BLD AUTO: 251 K/UL (ref 164–446)
PMV BLD AUTO: 9.4 FL (ref 9–12.9)
POTASSIUM SERPL-SCNC: 3.3 MMOL/L (ref 3.6–5.5)
PROT SERPL-MCNC: 6.6 G/DL (ref 6–8.2)
RBC # BLD AUTO: 3.47 M/UL (ref 4.7–6.1)
SODIUM SERPL-SCNC: 138 MMOL/L (ref 135–145)
TROPONIN T SERPL-MCNC: 8 NG/L (ref 6–19)
TROPONIN T SERPL-MCNC: 9 NG/L (ref 6–19)
VIT B12 SERPL-MCNC: 703 PG/ML (ref 211–911)
WBC # BLD AUTO: 6.3 K/UL (ref 4.8–10.8)

## 2019-08-03 PROCEDURE — G0378 HOSPITAL OBSERVATION PER HR: HCPCS

## 2019-08-03 PROCEDURE — 700102 HCHG RX REV CODE 250 W/ 637 OVERRIDE(OP): Performed by: HOSPITALIST

## 2019-08-03 PROCEDURE — 93005 ELECTROCARDIOGRAM TRACING: CPT | Mod: XU | Performed by: HOSPITALIST

## 2019-08-03 PROCEDURE — 700111 HCHG RX REV CODE 636 W/ 250 OVERRIDE (IP)

## 2019-08-03 PROCEDURE — A9502 TC99M TETROFOSMIN: HCPCS

## 2019-08-03 PROCEDURE — 80053 COMPREHEN METABOLIC PANEL: CPT

## 2019-08-03 PROCEDURE — 93010 ELECTROCARDIOGRAM REPORT: CPT | Performed by: INTERNAL MEDICINE

## 2019-08-03 PROCEDURE — A9270 NON-COVERED ITEM OR SERVICE: HCPCS | Performed by: HOSPITALIST

## 2019-08-03 PROCEDURE — 96375 TX/PRO/DX INJ NEW DRUG ADDON: CPT

## 2019-08-03 PROCEDURE — 82962 GLUCOSE BLOOD TEST: CPT

## 2019-08-03 PROCEDURE — 36415 COLL VENOUS BLD VENIPUNCTURE: CPT

## 2019-08-03 PROCEDURE — 85025 COMPLETE CBC W/AUTO DIFF WBC: CPT

## 2019-08-03 PROCEDURE — 84484 ASSAY OF TROPONIN QUANT: CPT

## 2019-08-03 PROCEDURE — 700111 HCHG RX REV CODE 636 W/ 250 OVERRIDE (IP): Performed by: HOSPITALIST

## 2019-08-03 PROCEDURE — 99217 PR OBSERVATION CARE DISCHARGE: CPT | Performed by: HOSPITALIST

## 2019-08-03 RX ORDER — REGADENOSON 0.08 MG/ML
0.4 INJECTION, SOLUTION INTRAVENOUS ONCE
Status: COMPLETED | OUTPATIENT
Start: 2019-08-03 | End: 2019-08-03

## 2019-08-03 RX ORDER — REGADENOSON 0.08 MG/ML
INJECTION, SOLUTION INTRAVENOUS
Status: COMPLETED
Start: 2019-08-03 | End: 2019-08-03

## 2019-08-03 RX ADMIN — LIDOCAINE HYDROCHLORIDE 15 ML: 20 SOLUTION OROPHARYNGEAL at 11:20

## 2019-08-03 RX ADMIN — REGADENOSON 0.4 MG: 0.08 INJECTION, SOLUTION INTRAVENOUS at 07:44

## 2019-08-03 RX ADMIN — ASPIRIN 81 MG 81 MG: 81 TABLET ORAL at 05:05

## 2019-08-03 RX ADMIN — OXYCODONE HYDROCHLORIDE 10 MG: 10 TABLET ORAL at 00:36

## 2019-08-03 RX ADMIN — OXYCODONE HYDROCHLORIDE 10 MG: 10 TABLET ORAL at 04:55

## 2019-08-03 RX ADMIN — OMEPRAZOLE 20 MG: 20 CAPSULE, DELAYED RELEASE ORAL at 05:05

## 2019-08-03 RX ADMIN — OXYCODONE HYDROCHLORIDE 5 MG: 5 TABLET ORAL at 10:23

## 2019-08-03 RX ADMIN — ONDANSETRON 4 MG: 2 INJECTION INTRAMUSCULAR; INTRAVENOUS at 10:01

## 2019-08-03 RX ADMIN — LISINOPRIL 2.5 MG: 2.5 TABLET ORAL at 05:06

## 2019-08-03 RX ADMIN — RANOLAZINE 1000 MG: 500 TABLET, FILM COATED, EXTENDED RELEASE ORAL at 05:07

## 2019-08-03 RX ADMIN — METOPROLOL SUCCINATE 12.5 MG: 25 TABLET, EXTENDED RELEASE ORAL at 05:11

## 2019-08-03 RX ADMIN — POTASSIUM CHLORIDE 40 MEQ: 1500 TABLET, EXTENDED RELEASE ORAL at 00:37

## 2019-08-03 ASSESSMENT — ENCOUNTER SYMPTOMS
NAUSEA: 0
CHILLS: 0
TREMORS: 0
TINGLING: 0
VOMITING: 0
HEADACHES: 0
FEVER: 0
SHORTNESS OF BREATH: 0

## 2019-08-03 ASSESSMENT — LIFESTYLE VARIABLES
EVER_SMOKED: NEVER
ALCOHOL_USE: NO
HOW MANY TIMES IN THE PAST YEAR HAVE YOU HAD 5 OR MORE DRINKS IN A DAY: 0
TOTAL SCORE: 0
TOTAL SCORE: 0
EVER FELT BAD OR GUILTY ABOUT YOUR DRINKING: NO
HAVE YOU EVER FELT YOU SHOULD CUT DOWN ON YOUR DRINKING: NO
EVER HAD A DRINK FIRST THING IN THE MORNING TO STEADY YOUR NERVES TO GET RID OF A HANGOVER: NO
ALCOHOL_USE: NO
HAVE PEOPLE ANNOYED YOU BY CRITICIZING YOUR DRINKING: NO
ON A TYPICAL DAY WHEN YOU DRINK ALCOHOL HOW MANY DRINKS DO YOU HAVE: 0
AVERAGE NUMBER OF DAYS PER WEEK YOU HAVE A DRINK CONTAINING ALCOHOL: 0
TOTAL SCORE: 0
CONSUMPTION TOTAL: NEGATIVE

## 2019-08-03 ASSESSMENT — PATIENT HEALTH QUESTIONNAIRE - PHQ9
SUM OF ALL RESPONSES TO PHQ9 QUESTIONS 1 AND 2: 0
1. LITTLE INTEREST OR PLEASURE IN DOING THINGS: NOT AT ALL
2. FEELING DOWN, DEPRESSED, IRRITABLE, OR HOPELESS: NOT AT ALL

## 2019-08-03 ASSESSMENT — CHA2DS2 SCORE
PRIOR STROKE OR TIA OR THROMBOEMBOLISM: NO
SEX: FEMALE
HYPERTENSION: YES
AGE 75 OR GREATER: NO
DIABETES: NO
VASCULAR DISEASE: YES
CHA2DS2 VASC SCORE: 3
CHF OR LEFT VENTRICULAR DYSFUNCTION: NO
AGE 65 TO 74: NO

## 2019-08-03 NOTE — ED NOTES
The Medication Reconciliation process has been completed by interviewing the patient who knew his meds but not when he took his PRN's last.     Allergies have been reviewed  Antibiotic use in 30 days - none    Home Pharmacy:  [facility]

## 2019-08-03 NOTE — H&P
Hospital Medicine History & Physical Note    Date of Service  8/2/2019    Primary Care Physician  Juancho Jenkins D.O.    Consultants  none    Code Status  full    Chief Complaint  Left shoulder pain     History of Presenting Illness  54 y.o. male who presented 8/2/2019 with past medical history of anxiety, coronary artery disease, hypertension, acid reflux, hyperlipidemia and ischemic cardiomyopathy with normalized ejection fraction who presents with left-sided shoulder pain.  Patient developed left-sided shoulder pain and left arm pain around 730.  Woke up with the pain.  Radiates from his left upper shoulder down to his arm.  No numbness no tingling.  He does have previous history of MI in 2012 that required stent placement.  He does follow with cardiology Dr. León.  He is already anticoagulated on Xarelto due to his atrial fibrillation.  He has multiple risk factors for ACS and will be admitted to the hospital for ACS rule out.  Otherwise he has no known alleviating or exacerbating factors to his symptoms.    Review of Systems  Review of Systems   Constitutional: Negative for chills and fever.   HENT: Negative for congestion, hearing loss and tinnitus.    Eyes: Negative for blurred vision, double vision and discharge.   Respiratory: Negative for cough, hemoptysis and shortness of breath.    Cardiovascular: Negative for chest pain, palpitations and leg swelling.   Gastrointestinal: Negative for abdominal pain, heartburn, nausea and vomiting.   Genitourinary: Negative for dysuria and flank pain.   Musculoskeletal: Positive for joint pain. Negative for myalgias.   Skin: Negative for rash.   Neurological: Negative for dizziness, sensory change, speech change, focal weakness and weakness.   Endo/Heme/Allergies: Negative for environmental allergies. Does not bruise/bleed easily.   Psychiatric/Behavioral: Negative for depression, hallucinations and substance abuse.       Past Medical History   has a past medical  "history of ACS (acute coronary syndrome) ([facility]), Anxiety disorder, Arthritis, CAD (coronary artery disease), Chronic anticoagulation, Claudication (HCC), Diabetes (HCC), Dizziness, GERD (gastroesophageal reflux disease), Glaucoma, HTN (hypertension), Hyperlipemia, Ischemic cardiomyopathy (05/2018), Mental retardation, Mentally challenged, Osteoarthritis, Paroxysmal atrial fibrillation (HCC), Pericarditis secondary to acute myocardial infarction (HCC), Sick sinus syndrome (HCC), SOB (shortness of breath), STEMI (ST elevation myocardial infarction) ([facility]), and Syncope (02/2019). He also has no past medical history of COPD, Liver disease, Other general symptoms(780.99), or Seizure disorder ([facility]).    Surgical History   has a past surgical history that includes other orthopedic surgery (7- ); other cardiac surgery; gastroscopy-endo (7/25/2012); gastroscopy-endo (7/26/2012); gastroscopy-endo (5/19/2017); other cardiac surgery (Left, 01/12/2018); and pacemaker insertion (Left, 02/12/2019).     Family History  Reviewed and not pertinent     Social History   reports that he has never smoked. He has never used smokeless tobacco. He reports that he does not drink alcohol or use drugs.    Allergies  Allergies   Allergen Reactions   • Ritalin [Methylphenidate] Unspecified     \"Hyper\"       Medications  Prior to Admission Medications   Prescriptions Last Dose Informant Patient Reported? Taking?   ASPIR-LOW 81 MG EC tablet  Patient No No   Sig: TAKE 1 TABLET BY MOUTH ONCE DAILY.   Cyanocobalamin (VITAMIN B-12) 1000 MCG Tab   No No   Sig: TAKE 1 TABLET BY MOUTH ONCE DAILY. (CYANOCOBALAMIN)   Ranolazine 1000 MG TABLET SR 12 HR  Patient No No   Sig: Take 1 Tab by mouth 2 Times a Day.   atorvastatin (LIPITOR) 80 MG tablet   No No   Sig: Take 1 Tab by mouth every day.   cyclobenzaprine (FLEXERIL) 10 MG Tab   No No   Sig: Take 1 Tab by mouth 3 times a day as needed.   lisinopril (PRINIVIL) 2.5 MG Tab  Patient No No   Sig: TAKE 1 " TABLET BY MOUTH ONCE DAILY.   loratadine (CLARITIN) 10 MG Tab   No No   Sig: TAKE 1 TABLET BY MOUTH ONCE DAILY.   metformin (GLUCOPHAGE) 1000 MG tablet   No No   Sig: TAKE 1 TABLET BY MOUTH TWICE DAILY WITH MEALS.   methocarbamol (ROBAXIN) 750 MG Tab   No No   Sig: Take 2 Tabs by mouth 4 times a day as needed (muscle spasm).   metoprolol SR (TOPROL XL) 25 MG TABLET SR 24 HR   No No   Sig: Take 0.5 Tabs by mouth every day.   ondansetron (ZOFRAN ODT) 4 MG TABLET DISPERSIBLE   No No   Sig: Take 1 Tab by mouth every 6 hours as needed.   pantoprazole (PROTONIX) 40 MG Tablet Delayed Response  Patient No No   Sig: TAKE 1 TABLET BY MOUTH ONCE DAILY.   rivaroxaban (XARELTO) 20 MG Tab tablet   No No   Sig: Take 1 Tab by mouth with dinner.   vitamin D3, cholecalciferol, 1000 UNIT Tab  Patient No No   Sig: TAKE 1 TABLET BY MOUTH ONCE DAILY.      Facility-Administered Medications: None       Physical Exam  Temp:  [36.7 °C (98.1 °F)] 36.7 °C (98.1 °F)  Pulse:  [74-78] 77  Resp:  [18] 18  BP: (106-119)/(62-84) 119/75  SpO2:  [93 %] 93 %    Physical Exam   Constitutional: He is oriented to person, place, and time. He appears well-developed and well-nourished.   HENT:   Head: Normocephalic and atraumatic.   Eyes: Pupils are equal, round, and reactive to light. Conjunctivae and EOM are normal.   Neck: Normal range of motion. Neck supple. No JVD present.   Cardiovascular: Normal rate, regular rhythm, normal heart sounds and intact distal pulses.   No murmur heard.  Pulmonary/Chest: Effort normal and breath sounds normal. No respiratory distress. He exhibits no tenderness.   Abdominal: Soft. Bowel sounds are normal. He exhibits no distension. There is no tenderness.   Musculoskeletal: Normal range of motion. He exhibits edema.   Left shoulder pain with ROM   Neurological: He is alert and oriented to person, place, and time. No cranial nerve deficit. He exhibits normal muscle tone.   Skin: Skin is warm and dry. No erythema.    Psychiatric: He has a normal mood and affect. His behavior is normal. Judgment and thought content normal.   Nursing note and vitals reviewed.      Laboratory:  Recent Labs     08/02/19 2050   WBC 6.7   RBC 3.63*   HEMOGLOBIN 11.6*   HEMATOCRIT 35.0*   MCV 96.4   MCH 32.0   MCHC 33.1*   RDW 45.0   PLATELETCT 287   MPV 9.5     Recent Labs     08/02/19 2050   SODIUM 137   POTASSIUM 3.5*   CHLORIDE 105   CO2 21   GLUCOSE 267*   BUN 16   CREATININE 1.15   CALCIUM 8.6     Recent Labs     08/02/19 2050   ALTSGPT 17   ASTSGOT 16   ALKPHOSPHAT 65   TBILIRUBIN 1.0   GLUCOSE 267*     Recent Labs     08/02/19 2050   APTT 35.4   INR 1.73*     Recent Labs     08/02/19 2050   NTPROBNP 52         Recent Labs     08/02/19 2050   TROPONINT 7       Urinalysis:    No results found     Imaging:  DX-SHOULDER 2+ LEFT   Final Result      No radiographic evidence of acute traumatic injury.      DX-CHEST-PORTABLE (1 VIEW)   Final Result      No acute cardiopulmonary abnormality. No interval change.      NM-CARDIAC STRESS TEST    (Results Pending)         Assessment/Plan:  I anticipate this patient is appropriate for observation status at this time.    * Left shoulder pain  Assessment & Plan  Suspect this is probably MSK in etiology   Xray with out acute findings   If perisitent pain may need MRI   Cont pain control, consider PT/OT eval as appropriate  Multiple risk factors for acs including ischemic cardiomyopathy last Echo 2019 EF 50% recovered on medical therapy   Serial troponin   Cardiac monitoring   Cardiac stress ordered, last one done was in 2016      Hypokalemia  Assessment & Plan  Replace and recheck labs    Anemia- (present on admission)  Assessment & Plan  No evidence of acute bleeding   Lab workup ordered    PAF (paroxysmal atrial fibrillation) (HCC)- (present on admission)  Assessment & Plan  Resume home monica HARDIN     Type 2 diabetes mellitus without complication, without long-term current use of insulin (HCC)-  (present on admission)  Assessment & Plan  Hold home metformin   SSI ordered    Coronary artery disease involving native coronary artery of native heart without angina pectoris- (present on admission)  Assessment & Plan  On BB, asa and statin therapy     Ischemic Cardiomyopathy EF 50%- (present on admission)  Assessment & Plan  Recovered EF with medication management   Resume home BB, asa, statin, xarelto     Dyslipidemia- (present on admission)  Assessment & Plan  Resume home statin therapy     GERD with Nieves's esophagus- (present on admission)  Assessment & Plan  On PPI resume     Essential hypertension- (present on admission)  Assessment & Plan  Resume home anti hyprtensive medications       VTE prophylaxis: xarelto

## 2019-08-03 NOTE — DISCHARGE SUMMARY
Discharge Summary    CHIEF COMPLAINT ON ADMISSION  Chief Complaint   Patient presents with   • Headache     Pt BIBA for c/o headache and left shoulder pain starting around 1930 today. Denies SOB, N/V/D.   • Shoulder Pain       Reason for Admission  EMS     Admission Date  8/2/2019    CODE STATUS  Full Code    HPI & HOSPITAL COURSE  This is a 54 y.o. male here with chest pain.  Please see the dictated history of present illness 8/2/2019 for complete details.  In short, patient has history of HTN, CAD, ACS, ischemic cardiomyopathy, cognitive delay, previous GI bleed, A. fib with chronic anticoagulation and who follows with both cardiology due to severe CAD and EP due to dual-chamber pacemaker (placed in February of this year), who presented with chest pain.  Initial EKG was interpreted as nonacute.  Initial troponin was negative.  Patient was admitted to the observation unit for further stratification and monitoring.    He had no evidence of dysrhythmia on telemetry overnight.  Troponins remained normal.  BNP was normal.  Patient underwent nuclear medicine stress testing which was negative for reversible ischemia but did show chronic fixed defect.  Patient does follow with cardiology and his next appointment is in 2020.  This is been modified so he can be seen in a more expedited basis for medical management.     Therefore, he is discharged in good and stable condition to home with close outpatient follow-up.    Discharge Date  8/3/2019    FOLLOW UP ITEMS POST DISCHARGE  Cardiology follow-up    DISCHARGE DIAGNOSES  Principal Problem:    Left shoulder pain POA: Yes  Active Problems:    Essential hypertension POA: Yes    GERD with Nieves's esophagus POA: Yes    Dyslipidemia POA: Yes    Ischemic Cardiomyopathy EF 50% POA: Yes      Overview: May 2018: Echocardiogram with normal LV size, LVEF 50%. Mildly dilated RV.    Coronary artery disease involving native coronary artery of native heart without angina pectoris POA:  Yes    Type 2 diabetes mellitus without complication, without long-term current use of insulin (HCC) (Chronic) POA: Yes    PAF (paroxysmal atrial fibrillation) (HCC) (Chronic) POA: Yes    Anemia POA: Yes    Hypokalemia POA: Yes  Resolved Problems:    * No resolved hospital problems. *      FOLLOW UP  Future Appointments   Date Time Provider Department Center   8/19/2019 12:40 PM Arvind Sellers M.D. [facility] None   1/7/2020  1:00 PM YONAS Couch [facility] None     Juancho Jenkins D.O.  [address] [facility] [address]  [place] [address]  [phone]      Office closed for weekend. Please contact office to arrange your follow up appointment. Thank you.      MEDICATIONS ON DISCHARGE     Medication List      CONTINUE taking these medications      Instructions   ASPIR-LOW 81 MG EC tablet  Generic drug:  aspirin   TAKE 1 TABLET BY MOUTH ONCE DAILY.     atorvastatin 80 MG tablet  Commonly known as:  LIPITOR   Take 1 Tab by mouth every day.  Dose:  80 mg     cyclobenzaprine 10 MG Tabs  Commonly known as:  FLEXERIL   Take 1 Tab by mouth 3 times a day as needed.  Dose:  10 mg     lisinopril 2.5 MG Tabs  Commonly known as:  PRINIVIL   TAKE 1 TABLET BY MOUTH ONCE DAILY.     loratadine 10 MG Tabs  Commonly known as:  CLARITIN   TAKE 1 TABLET BY MOUTH ONCE DAILY.     metformin 1000 MG tablet  Commonly known as:  GLUCOPHAGE   TAKE 1 TABLET BY MOUTH TWICE DAILY WITH MEALS.     methocarbamol 750 MG Tabs  Commonly known as:  ROBAXIN   Take 2 Tabs by mouth 4 times a day as needed (muscle spasm).  Dose:  1,500 mg     metoprolol SR 25 MG Tb24  Commonly known as:  TOPROL XL   Take 0.5 Tabs by mouth every day.  Dose:  12.5 mg     ondansetron 4 MG Tbdp  Commonly known as:  ZOFRAN ODT   Take 1 Tab by mouth every 6 hours as needed.  Dose:  4 mg     pantoprazole 40 MG Tbec  Commonly known as:  PROTONIX   TAKE 1 TABLET BY MOUTH ONCE DAILY.     Ranolazine 1000 MG Tb12   Take 1 Tab by mouth 2 Times a Day.  Dose:  1,000 mg     rivaroxaban 20 MG Tabs  "tablet  Commonly known as:  XARELTO   Take 1 Tab by mouth with dinner.  Dose:  20 mg     vitamin B-12 1000 MCG Tabs   TAKE 1 TABLET BY MOUTH ONCE DAILY. (CYANOCOBALAMIN)     vitamin D3 (cholecalciferol) 1000 UNIT Tabs   TAKE 1 TABLET BY MOUTH ONCE DAILY.            Allergies  Allergies   Allergen Reactions   • Ritalin [Methylphenidate] Unspecified     \"Hyper\"       DIET  Orders Placed This Encounter   Procedures   • Diet Order Cardiac     Standing Status:   Standing     Number of Occurrences:   1     Order Specific Question:   Diet:     Answer:   Cardiac [6]       ACTIVITY  As tolerated.  Weight bearing as tolerated      LABORATORY  Lab Results   Component Value Date    SODIUM 138 08/03/2019    POTASSIUM 3.3 (L) 08/03/2019    CHLORIDE 106 08/03/2019    CO2 21 08/03/2019    GLUCOSE 184 (H) 08/03/2019    BUN 14 08/03/2019    CREATININE 0.99 08/03/2019    CREATININE 1.0 07/12/2007        Lab Results   Component Value Date    WBC 6.3 08/03/2019    HEMOGLOBIN 11.3 (L) 08/03/2019    HEMATOCRIT 33.2 (L) 08/03/2019    PLATELETCT 251 08/03/2019        Total time of the discharge process exceeds 36 minutes.  "

## 2019-08-03 NOTE — ED PROVIDER NOTES
ED Provider Note    Scribed for Ashley Larios M.D. by Mal Donnelly. 8/2/2019, 8:48 PM.    Means of arrival: EMS  History obtained from: Patient  History limited by: None    CHIEF COMPLAINT  Chief Complaint   Patient presents with   • Headache     Pt BIBA for c/o headache and left shoulder pain starting around 1930 today. Denies SOB, N/V/D.   • Shoulder Pain       HPI  Ryan Trevizo is a 54 y.o. male who presents to the Emergency Department for left upper chest pain radiating to his arm and shoulder pain, onset 7:30 AM. He states that he just woke up with the pain and denies any trama. He describes that pain as sharp and is exacerbated with moment.  The pain is moderate and throbbing.  He denies any numbness or tingling to the arm. He had a heart attack in 2012 that required a stent placement. He is followed by Dr. León, but does not remember the last time he has seen his cardiologist. He also has a history of non-insulin dependant diabetes. The patient is anticoagulated on Xarelto.     He had an echo done in March 2019 that showed an ejection fracture of 50% and that he had mild to moderate aortic insufficiency. His history is notable for for sick sinus syndrome status-post pacemaker, atrial fibrillation, and coronary artery disease.       REVIEW OF SYSTEMS  Review of Systems   Constitutional: Negative for chills and fever.   Respiratory: Negative for shortness of breath.    Cardiovascular: Positive for chest pain.   Gastrointestinal: Negative for nausea and vomiting.   Musculoskeletal: Positive for joint pain.   Neurological: Negative for tingling, tremors and headaches.   All other systems reviewed and are negative.        PAST MEDICAL HISTORY   has a past medical history of ACS (acute coronary syndrome) (HCC), Anxiety disorder, Arthritis, CAD (coronary artery disease), Chronic anticoagulation, Claudication (HCC), Diabetes (HCC), Dizziness, GERD (gastroesophageal reflux disease), Glaucoma, HTN  (hypertension), Hyperlipemia, Ischemic cardiomyopathy (05/2018), Mental retardation, Mentally challenged, Osteoarthritis, Paroxysmal atrial fibrillation (HCC), Pericarditis secondary to acute myocardial infarction (HCC), Sick sinus syndrome (HCC), SOB (shortness of breath), STEMI (ST elevation myocardial infarction) ([facility]), and Syncope (02/2019).    SURGICAL HISTORY   has a past surgical history that includes other orthopedic surgery (7- ); other cardiac surgery; gastroscopy-endo (7/25/2012); gastroscopy-endo (7/26/2012); gastroscopy-endo (5/19/2017); other cardiac surgery (Left, 01/12/2018); and pacemaker insertion (Left, 02/12/2019).    SOCIAL HISTORY  Social History     Tobacco Use   • Smoking status: Never Smoker   • Smokeless tobacco: Never Used   Substance Use Topics   • Alcohol use: No   • Drug use: No      Social History     Substance and Sexual Activity   Drug Use No       FAMILY HISTORY  No family history on file.    CURRENT MEDICATIONS  Home Medications     Reviewed by Jigna Daniel R.N. (Registered Nurse) on 08/03/19 at 0105  Med List Status: Complete   Medication Last Dose Status   ASPIR-LOW 81 MG EC tablet 8/2/2019 Active   atorvastatin (LIPITOR) 80 MG tablet 8/2/2019 Active   Cyanocobalamin (VITAMIN B-12) 1000 MCG Tab 8/2/2019 Active   cyclobenzaprine (FLEXERIL) 10 MG Tab unk Active   lisinopril (PRINIVIL) 2.5 MG Tab 8/2/2019 Active   loratadine (CLARITIN) 10 MG Tab 8/2/2019 Active   metformin (GLUCOPHAGE) 1000 MG tablet 8/2/2019 Active   methocarbamol (ROBAXIN) 750 MG Tab unk Active   metoprolol SR (TOPROL XL) 25 MG TABLET SR 24 HR 8/2/2019 Active   ondansetron (ZOFRAN ODT) 4 MG TABLET DISPERSIBLE unk Active   pantoprazole (PROTONIX) 40 MG Tablet Delayed Response 8/2/2019 Active   Ranolazine 1000 MG TABLET SR 12 HR 8/2/2019 Active   rivaroxaban (XARELTO) 20 MG Tab tablet 8/2/2019 Active   vitamin D3, cholecalciferol, 1000 UNIT Tab 8/2/2019 Active                ALLERGIES  Allergies  "  Allergen Reactions   • Ritalin [Methylphenidate] Unspecified     \"Hyper\"       PHYSICAL EXAM  VITAL SIGNS: /84   Pulse 74   Temp 36.7 °C (98.1 °F) (Tympanic)   Resp 18   Ht 1.803 m (5' 11\")   Wt 82.1 kg (181 lb)   BMI 25.24 kg/m²   Vitals reviewed by myself.  Physical Exam   Nursing note and vitals reviewed.  Constitutional: Well-developed and well-nourished. No acute distress.   HENT: Head is normocephalic and atraumatic.  Eyes: extra-ocular movements intact  Cardiovascular: regular rate and regular rhythm. No murmur heard.  Pulmonary/Chest: Breath sounds normal. No wheezes or rales.   Abdominal: Soft and non-tender. No distention.    Musculoskeletal: Left shoulder has no tenderness to palpation.  Patient has decreased range of motion past 90 degrees of abduction and left shoulder secondary to pain.   strength is 5 out of 5 in bilateral upper extremities  Neurological: Awake and alert, sensation intact in bilateral upper extremities  Skin: Skin is warm and dry. No rash.     DIAGNOSTIC STUDIES /  LABS  Labs Reviewed   CBC WITH DIFFERENTIAL - Abnormal; Notable for the following components:       Result Value    RBC 3.63 (*)     Hemoglobin 11.6 (*)     Hematocrit 35.0 (*)     MCHC 33.1 (*)     Lymphocytes 19.00 (*)     All other components within normal limits    Narrative:     Indicate which anticoagulants the patient is on:->UNKNOWN   COMP METABOLIC PANEL - Abnormal; Notable for the following components:    Potassium 3.5 (*)     Glucose 267 (*)     All other components within normal limits    Narrative:     Indicate which anticoagulants the patient is on:->UNKNOWN   PROTHROMBIN TIME - Abnormal; Notable for the following components:    PT 20.8 (*)     INR 1.73 (*)     All other components within normal limits    Narrative:     Indicate which anticoagulants the patient is on:->UNKNOWN   CBC WITH DIFFERENTIAL - Abnormal; Notable for the following components:    RBC 3.47 (*)     Hemoglobin 11.3 (*)     " Hematocrit 33.2 (*)     All other components within normal limits   COMP METABOLIC PANEL - Abnormal; Notable for the following components:    Potassium 3.3 (*)     Glucose 184 (*)     All other components within normal limits   FERRITIN - Abnormal; Notable for the following components:    Ferritin 19.2 (*)     All other components within normal limits   RETICULOCYTES COUNT - Abnormal; Notable for the following components:    Reticulocyte Count 3.4 (*)     Retic, Absolute 0.12 (*)     Imm. Reticulocyte Fraction 24.4 (*)     Retic Hgb Equivalent 35.7 (*)     All other components within normal limits   ACCU-CHEK GLUCOSE - Abnormal; Notable for the following components:    Glucose - Accu-Ck 151 (*)     All other components within normal limits   TROPONIN    Narrative:     Indicate which anticoagulants the patient is on:->UNKNOWN   APTT    Narrative:     Indicate which anticoagulants the patient is on:->UNKNOWN   PROBRAIN NATRIURETIC PEPTIDE, NT    Narrative:     Indicate which anticoagulants the patient is on:->UNKNOWN   ESTIMATED GFR    Narrative:     Indicate which anticoagulants the patient is on:->UNKNOWN   TROPONIN   VITAMIN B12   FOLATE   IRON/TOTAL IRON BIND    Narrative:     Indicate which anticoagulants the patient is on:->UNKNOWN   ESTIMATED GFR       All labs reviewed by me.    EKG Interpretation:  Interpreted by myself    12 Lead EKG interpreted by me to show:  EKG at 2049: Normal sinus rhythm, heart rate 75, normal axis, normal intervals, , , QTc 444, no acute ST-T segment changes, Q waves are present in inferior leads which are present from prior EKG in June 2019, no dynamic changes when compared to prior EKG, no evidence of acute arrhythmia or ischemia  My impression of this EKG: Does not indicate ischemia or arrhythmia at this time.    RADIOLOGY  DX-SHOULDER 2+ LEFT   Final Result      No radiographic evidence of acute traumatic injury.      DX-CHEST-PORTABLE (1 VIEW)   Final Result      No  acute cardiopulmonary abnormality. No interval change.        The radiologist's interpretation of all radiological studies have been reviewed by me.    REASSESSMENT  8:54 PM Patient was evaluated at bedside.    11:04 PM I discussed the patient's case and the above findings with Dr. Hodge (hospitalist) who agrees to admit the patient.      COURSE & MEDICAL DECISION MAKING  Nursing notes, VS, PMSFHx reviewed in chart.    Patient is a 54-year-old male who comes in for chest pain and left arm pain.  Differential diagnosis includes acute coronary syndrome, electrolyte abnormality, muscular skeletal strain.  Diagnostic work-up includes labs, EKG, chest x-ray and left shoulder x-ray.    Patient's initial vitals are within normal limits.  His pain is managed with aspirin and fentanyl after which he feels improved.  EKG returns and is notable for inferior Q waves which are unchanged from prior EKG.  Labs returned and are unremarkable.  Troponin is negative.  Left shoulder x-ray demonstrates no acute traumatic injuries or bony abnormalities.  Chest x-ray demonstrates no acute cardia pulmonary processes.  Of note patient was admitted in March 2019 for a syncopal work-up during which time he had an echocardiogram, however he has not had a recent stress test.  He does have a HEART score of 4 making him moderate risk for acute coronary syndrome, therefore I will admit him for further cardiac work-up and stress test.  I discussed the case with Dr. Hodge who has accepted the admission.  At time of admission patient is in guarded condition.      FINAL IMPRESSION  1. Chest pain, unspecified type    2. Left arm pain          Mal MORRISSEY (Kleberibsteph), am scribing for, and in the presence of, Ashley Larios M.D..    Electronically signed by: Mal Donnelly (Kleberibsteph), 8/2/2019    Ashley MORRISSEY M.D. personally performed the services described in this documentation, as scribed by Mal Donnelly in my presence, and it is both accurate  and complete.  C  The note accurately reflects work and decisions made by me.  Ashley Larios  8/3/2019  3:37 AM

## 2019-08-03 NOTE — ED TRIAGE NOTES
Headache (Pt BIBA for c/o headache and left shoulder pain starting around 1930 today. Denies SOB, N/V/D.) and Shoulder Pain

## 2019-08-03 NOTE — CARE PLAN
Problem: Safety  Goal: Will remain free from falls  8/3/2019 0342 by Jigna Daniel R.N.  Outcome: PROGRESSING AS EXPECTED  Note:   Pt educated on safety precautions, utilization of the call light, and bed alarm.  Pt verbalized understanding.     Problem: Pain Management  Goal: Pain level will decrease to patient's comfort goal  8/3/2019 0342 by Jigna Daniel RGAVIN.  Outcome: PROGRESSING AS EXPECTED

## 2019-08-03 NOTE — DISCHARGE INSTRUCTIONS
Discharge Instructions    Discharged to home by car with self. Discharged via walking, hospital escort: Yes.  Special equipment needed: Not Applicable    Be sure to schedule a follow-up appointment with your primary care doctor or any specialists as instructed.     Discharge Plan:   Diet Plan: Discussed  Activity Level: Discussed  Confirmed Follow up Appointment: Patient to Call and Schedule Appointment  Confirmed Symptoms Management: Discussed  Medication Reconciliation Updated: Yes  Influenza Vaccine Indication: Not indicated: Previously immunized this influenza season and > 8 years of age    I understand that a diet low in cholesterol, fat, and sodium is recommended for good health. Unless I have been given specific instructions below for another diet, I accept this instruction as my diet prescription.   Other diet:     Special Instructions: None    · Is patient discharged on Warfarin / Coumadin?   No     Depression / Suicide Risk    [facility], it is important to learn how to keep safe from harming yourself.    Recognize the warning signs:  · Abrupt changes in personality, positive or negative- including increase in energy   · Giving away possessions  · Change in eating patterns- significant weight changes-  positive or negative  · Change in sleeping patterns- unable to sleep or sleeping all the time   · Unwillingness or inability to communicate  · Depression  · Unusual sadness, discouragement and loneliness  · Talk of wanting to die  · Neglect of personal appearance   · Rebelliousness- reckless behavior  · Withdrawal from people/activities they love  · Confusion- inability to concentrate     If you or a loved one observes any of these behaviors or has concerns about self-harm, here's what you can do:  · Talk about it- your feelings and reasons for harming yourself  · Remove any means that you might use to hurt yourself (examples: pills, rope, extension cords,  firearm)  · Get professional help from the community (Mental Health, Substance Abuse, psychological counseling)  · Do not be alone:Call your Safe Contact- someone whom you trust who will be there for you.  · Call your local CRISIS HOTLINE 969-0690 or 793-841-6436  · Call your local Children's Mobile Crisis Response Team [place] (642) 344-5112 or www.GetNinjas  · Call the toll free National Suicide Prevention Hotlines   · National Suicide Prevention Lifeline 601-560-BAXQ (0743)  · [employer] Network 800-SUICIDE (222-0536)    Heart Failure  Heart failure means your heart has trouble pumping blood. This makes it hard for your body to work well. Heart failure is usually a long-term (chronic) condition. You must take good care of yourself and follow your doctor's treatment plan.  HOME CARE  · Take your heart medicine as told by your doctor.  ¨ Do not stop taking medicine unless your doctor tells you to.  ¨ Do not skip any dose of medicine.  ¨ Refill your medicines before they run out.  ¨ Take other medicines only as told by your doctor or pharmacist.  · Stay active if told by your doctor. The elderly and people with severe heart failure should talk with a doctor about physical activity.  · Eat heart-healthy foods. Choose foods that are without trans fat and are low in saturated fat, cholesterol, and salt (sodium). This includes fresh or frozen fruits and vegetables, fish, lean meats, fat-free or low-fat dairy foods, whole grains, and high-fiber foods. Lentils and dried peas and beans (legumes) are also good choices.  · Limit salt if told by your doctor.  · Cook in a healthy way. Roast, grill, broil, bake, poach, steam, or stir-barahona foods.  · Limit fluids as told by your doctor.  · Weigh yourself every morning. Do this after you pee (urinate) and before you eat breakfast. Write down your weight to give to your doctor.  · Take your blood pressure and write it down if your doctor tells you to.  · Ask  your doctor how to check your pulse. Check your pulse as told.  · Lose weight if told by your doctor.  · Stop smoking or chewing tobacco. Do not use gum or patches that help you quit without your doctor's approval.  · Schedule and go to doctor visits as told.  · Nonpregnant women should have no more than 1 drink a day. Men should have no more than 2 drinks a day. Talk to your doctor about drinking alcohol.  · Stop illegal drug use.  · Stay current with shots (immunizations).  · Manage your health conditions as told by your doctor.  · Learn to manage your stress.  · Rest when you are tired.  · If it is really hot outside:  ¨ Avoid intense activities.  ¨ Use air conditioning or fans, or get in a cooler place.  ¨ Avoid caffeine and alcohol.  ¨ Wear loose-fitting, lightweight, and light-colored clothing.  · If it is really cold outside:  ¨ Avoid intense activities.  ¨ Layer your clothing.  ¨ Wear mittens or gloves, a hat, and a scarf when going outside.  ¨ Avoid alcohol.  · Learn about heart failure and get support as needed.  · Get help to maintain or improve your quality of life and your ability to care for yourself as needed.  GET HELP IF:   · You gain weight quickly.  · You are more short of breath than usual.  · You cannot do your normal activities.  · You tire easily.  · You cough more than normal, especially with activity.  · You have any or more puffiness (swelling) in areas such as your hands, feet, ankles, or belly (abdomen).  · You cannot sleep because it is hard to breathe.  · You feel like your heart is beating fast (palpitations).  · You get dizzy or light-headed when you stand up.  GET HELP RIGHT AWAY IF:   · You have trouble breathing.  · There is a change in mental status, such as becoming less alert or not being able to focus.  · You have chest pain or discomfort.  · You faint.  MAKE SURE YOU:   · Understand these instructions.  · Will watch your condition.  · Will get help right away if you are not  doing well or get worse.  This information is not intended to replace advice given to you by your health care provider. Make sure you discuss any questions you have with your health care provider.  Document Released: 09/26/2009 Document Revised: 01/08/2016 Document Reviewed: 02/03/2014  [employer] Patient Education © 2017 [employer]    Discharge Instructions per YONAS Jenkins    1.  Review your discharge Medication Reconciliation form as you may be provided with new prescriptions or changes to previously prescribed medications.  Be sure to take all of your prescribed medications exactly as directed.  Further questions can be directed to your local pharmacist or primary care provider (PCP).    2. Follow-up with your PCP.  An appointment may have already been scheduled for you.  Please review your discharge paperwork and locate this information.  Please be sure to call your PCP to cancel if you are unable to make this appointment or require schedule changes.  It is critical to to follow-up with your PCP to review hospital records and ensure any further diagnostic work-up, prescription refills, or referrals.     3. ACTIVITIES: After discharge from the hospital, you may resume routine activities including walking and going u/down stairs. However, no strenuous activity. For further clarification, call your PCP     4. DRIVING: You may drive whenever you are off pain medications and are able to perform the activities needed to drive, i.e. turning, bending, twisting, etc.     5. BOWEL FUNCTION: A few patients, after hospitalizations, will develop bowel irregularity. You could also experience constipation due to pain medication and decreased activity level. If you feel this is occurring, please take an over-the-counter laxative (Milk of Magnesia, Ex-Lax, Senokot, etc.) until the problem has resolved.    Return to ER if you develop recurrent chest pain, shortness of breath, bloody sputum, fever of 101.5 or  greater, intractable nausea or vomiting, blood in the vomit or urine, intractable pain, new onset inability to ambulate, focal motor weakness, acute vision disturbance, acute speech disturbance, intractable abdominal pain, or any other worrisome symptoms.

## 2019-08-03 NOTE — RESPIRATORY CARE
COPD EDUCATION by COPD CLINICAL EDUCATOR  8/3/2019 at 6:41 AM by Eugenia Rodriguez     Patient reviewed by COPD education team. Patient does not have a history or diagnosis of COPD and is a non-smoker, therefore does not qualify for the COPD program.

## 2019-08-03 NOTE — ASSESSMENT & PLAN NOTE
Suspect this is probably MSK in etiology   Xray with out acute findings   If perisitent pain may need MRI   Cont pain control, consider PT/OT eval as appropriate  Multiple risk factors for acs including ischemic cardiomyopathy last Echo 2019 EF 50% recovered on medical therapy   Serial troponin   Cardiac monitoring   Cardiac stress ordered, last one done was in 2016

## 2019-08-09 ENCOUNTER — HOSPITAL ENCOUNTER (EMERGENCY)
Facility: MEDICAL CENTER | Age: 54
End: 2019-08-09
Attending: EMERGENCY MEDICINE
Payer: MEDICARE

## 2019-08-09 ENCOUNTER — APPOINTMENT (OUTPATIENT)
Dept: RADIOLOGY | Facility: MEDICAL CENTER | Age: 54
End: 2019-08-09
Attending: EMERGENCY MEDICINE
Payer: MEDICARE

## 2019-08-09 VITALS
DIASTOLIC BLOOD PRESSURE: 67 MMHG | TEMPERATURE: 98.6 F | HEART RATE: 69 BPM | RESPIRATION RATE: 18 BRPM | WEIGHT: 186 LBS | SYSTOLIC BLOOD PRESSURE: 104 MMHG | OXYGEN SATURATION: 95 % | BODY MASS INDEX: 28.28 KG/M2

## 2019-08-09 DIAGNOSIS — R06.00 DYSPNEA, UNSPECIFIED TYPE: ICD-10-CM

## 2019-08-09 LAB
ANION GAP SERPL CALC-SCNC: 11 MMOL/L (ref 0–11.9)
BASOPHILS # BLD AUTO: 0.2 % (ref 0–1.8)
BASOPHILS # BLD: 0.02 K/UL (ref 0–0.12)
BUN SERPL-MCNC: 23 MG/DL (ref 8–22)
CALCIUM SERPL-MCNC: 9.2 MG/DL (ref 8.5–10.5)
CHLORIDE SERPL-SCNC: 102 MMOL/L (ref 96–112)
CO2 SERPL-SCNC: 20 MMOL/L (ref 20–33)
CREAT SERPL-MCNC: 1.68 MG/DL (ref 0.5–1.4)
EKG IMPRESSION: NORMAL
EOSINOPHIL # BLD AUTO: 0.04 K/UL (ref 0–0.51)
EOSINOPHIL NFR BLD: 0.5 % (ref 0–6.9)
ERYTHROCYTE [DISTWIDTH] IN BLOOD BY AUTOMATED COUNT: 45.5 FL (ref 35.9–50)
GLUCOSE SERPL-MCNC: 104 MG/DL (ref 65–99)
HCT VFR BLD AUTO: 36.9 % (ref 42–52)
HGB BLD-MCNC: 12.1 G/DL (ref 14–18)
IMM GRANULOCYTES # BLD AUTO: 0.03 K/UL (ref 0–0.11)
IMM GRANULOCYTES NFR BLD AUTO: 0.4 % (ref 0–0.9)
LYMPHOCYTES # BLD AUTO: 0.81 K/UL (ref 1–4.8)
LYMPHOCYTES NFR BLD: 9.8 % (ref 22–41)
MCH RBC QN AUTO: 31.6 PG (ref 27–33)
MCHC RBC AUTO-ENTMCNC: 32.8 G/DL (ref 33.7–35.3)
MCV RBC AUTO: 96.3 FL (ref 81.4–97.8)
MONOCYTES # BLD AUTO: 0.36 K/UL (ref 0–0.85)
MONOCYTES NFR BLD AUTO: 4.4 % (ref 0–13.4)
NEUTROPHILS # BLD AUTO: 7.01 K/UL (ref 1.82–7.42)
NEUTROPHILS NFR BLD: 84.7 % (ref 44–72)
NRBC # BLD AUTO: 0 K/UL
NRBC BLD-RTO: 0 /100 WBC
NT-PROBNP SERPL IA-MCNC: 92 PG/ML (ref 0–125)
PLATELET # BLD AUTO: 280 K/UL (ref 164–446)
PMV BLD AUTO: 9.3 FL (ref 9–12.9)
POTASSIUM SERPL-SCNC: 5.3 MMOL/L (ref 3.6–5.5)
RBC # BLD AUTO: 3.83 M/UL (ref 4.7–6.1)
SODIUM SERPL-SCNC: 133 MMOL/L (ref 135–145)
TROPONIN T SERPL-MCNC: 9 NG/L (ref 6–19)
WBC # BLD AUTO: 8.3 K/UL (ref 4.8–10.8)

## 2019-08-09 PROCEDURE — A9270 NON-COVERED ITEM OR SERVICE: HCPCS | Performed by: EMERGENCY MEDICINE

## 2019-08-09 PROCEDURE — 83880 ASSAY OF NATRIURETIC PEPTIDE: CPT

## 2019-08-09 PROCEDURE — 700102 HCHG RX REV CODE 250 W/ 637 OVERRIDE(OP): Performed by: EMERGENCY MEDICINE

## 2019-08-09 PROCEDURE — 84484 ASSAY OF TROPONIN QUANT: CPT

## 2019-08-09 PROCEDURE — 93005 ELECTROCARDIOGRAM TRACING: CPT | Performed by: EMERGENCY MEDICINE

## 2019-08-09 PROCEDURE — 80048 BASIC METABOLIC PNL TOTAL CA: CPT

## 2019-08-09 PROCEDURE — 85025 COMPLETE CBC W/AUTO DIFF WBC: CPT

## 2019-08-09 PROCEDURE — 93005 ELECTROCARDIOGRAM TRACING: CPT

## 2019-08-09 PROCEDURE — 99284 EMERGENCY DEPT VISIT MOD MDM: CPT

## 2019-08-09 PROCEDURE — 71046 X-RAY EXAM CHEST 2 VIEWS: CPT

## 2019-08-09 RX ORDER — ACETAMINOPHEN 325 MG/1
650 TABLET ORAL ONCE
Status: COMPLETED | OUTPATIENT
Start: 2019-08-09 | End: 2019-08-09

## 2019-08-09 RX ADMIN — ACETAMINOPHEN 650 MG: 325 TABLET, FILM COATED ORAL at 16:39

## 2019-08-09 NOTE — ED NOTES
Pt arrived via [facility] from home. Pt c/o dizziness and SOB that started approx. 2 hours ago. Pt initially denied CP per EMS but in room pt c/o mid sternal CP and cough (x 2 days).   Pt a/o x4, speaking in full sentences.

## 2019-08-11 ENCOUNTER — HOSPITAL ENCOUNTER (EMERGENCY)
Facility: MEDICAL CENTER | Age: 54
End: 2019-08-11
Attending: EMERGENCY MEDICINE
Payer: MEDICARE

## 2019-08-11 VITALS
HEART RATE: 82 BPM | HEIGHT: 71 IN | DIASTOLIC BLOOD PRESSURE: 75 MMHG | RESPIRATION RATE: 16 BRPM | TEMPERATURE: 97.9 F | SYSTOLIC BLOOD PRESSURE: 115 MMHG | WEIGHT: 180.78 LBS | OXYGEN SATURATION: 100 % | BODY MASS INDEX: 25.31 KG/M2

## 2019-08-11 DIAGNOSIS — R19.7 DIARRHEA, UNSPECIFIED TYPE: ICD-10-CM

## 2019-08-11 DIAGNOSIS — R10.9 ABDOMINAL PAIN, UNSPECIFIED ABDOMINAL LOCATION: ICD-10-CM

## 2019-08-11 LAB
ALBUMIN SERPL BCP-MCNC: 4.5 G/DL (ref 3.2–4.9)
ALBUMIN/GLOB SERPL: 1.7 G/DL
ALP SERPL-CCNC: 64 U/L (ref 30–99)
ALT SERPL-CCNC: 19 U/L (ref 2–50)
ANION GAP SERPL CALC-SCNC: 12 MMOL/L (ref 0–11.9)
AST SERPL-CCNC: 15 U/L (ref 12–45)
BASOPHILS # BLD AUTO: 0.2 % (ref 0–1.8)
BASOPHILS # BLD: 0.01 K/UL (ref 0–0.12)
BILIRUB SERPL-MCNC: 1.8 MG/DL (ref 0.1–1.5)
BUN SERPL-MCNC: 27 MG/DL (ref 8–22)
CALCIUM SERPL-MCNC: 9.1 MG/DL (ref 8.5–10.5)
CHLORIDE SERPL-SCNC: 104 MMOL/L (ref 96–112)
CO2 SERPL-SCNC: 20 MMOL/L (ref 20–33)
CREAT SERPL-MCNC: 1.46 MG/DL (ref 0.5–1.4)
EOSINOPHIL # BLD AUTO: 0.06 K/UL (ref 0–0.51)
EOSINOPHIL NFR BLD: 1.4 % (ref 0–6.9)
ERYTHROCYTE [DISTWIDTH] IN BLOOD BY AUTOMATED COUNT: 44.2 FL (ref 35.9–50)
GLOBULIN SER CALC-MCNC: 2.6 G/DL (ref 1.9–3.5)
GLUCOSE SERPL-MCNC: 146 MG/DL (ref 65–99)
HCT VFR BLD AUTO: 36 % (ref 42–52)
HGB BLD-MCNC: 12.1 G/DL (ref 14–18)
IMM GRANULOCYTES # BLD AUTO: 0.01 K/UL (ref 0–0.11)
IMM GRANULOCYTES NFR BLD AUTO: 0.2 % (ref 0–0.9)
LIPASE SERPL-CCNC: 37 U/L (ref 11–82)
LYMPHOCYTES # BLD AUTO: 0.98 K/UL (ref 1–4.8)
LYMPHOCYTES NFR BLD: 23.5 % (ref 22–41)
MCH RBC QN AUTO: 32.5 PG (ref 27–33)
MCHC RBC AUTO-ENTMCNC: 33.6 G/DL (ref 33.7–35.3)
MCV RBC AUTO: 96.8 FL (ref 81.4–97.8)
MONOCYTES # BLD AUTO: 0.34 K/UL (ref 0–0.85)
MONOCYTES NFR BLD AUTO: 8.2 % (ref 0–13.4)
NEUTROPHILS # BLD AUTO: 2.77 K/UL (ref 1.82–7.42)
NEUTROPHILS NFR BLD: 66.5 % (ref 44–72)
NRBC # BLD AUTO: 0 K/UL
NRBC BLD-RTO: 0 /100 WBC
PLATELET # BLD AUTO: 245 K/UL (ref 164–446)
PMV BLD AUTO: 9.5 FL (ref 9–12.9)
POTASSIUM SERPL-SCNC: 4.1 MMOL/L (ref 3.6–5.5)
PROT SERPL-MCNC: 7.1 G/DL (ref 6–8.2)
RBC # BLD AUTO: 3.72 M/UL (ref 4.7–6.1)
SODIUM SERPL-SCNC: 136 MMOL/L (ref 135–145)
WBC # BLD AUTO: 4.2 K/UL (ref 4.8–10.8)

## 2019-08-11 PROCEDURE — 80053 COMPREHEN METABOLIC PANEL: CPT

## 2019-08-11 PROCEDURE — 96376 TX/PRO/DX INJ SAME DRUG ADON: CPT

## 2019-08-11 PROCEDURE — 700111 HCHG RX REV CODE 636 W/ 250 OVERRIDE (IP): Performed by: EMERGENCY MEDICINE

## 2019-08-11 PROCEDURE — 99285 EMERGENCY DEPT VISIT HI MDM: CPT

## 2019-08-11 PROCEDURE — A9270 NON-COVERED ITEM OR SERVICE: HCPCS | Performed by: EMERGENCY MEDICINE

## 2019-08-11 PROCEDURE — 83690 ASSAY OF LIPASE: CPT

## 2019-08-11 PROCEDURE — 85025 COMPLETE CBC W/AUTO DIFF WBC: CPT

## 2019-08-11 PROCEDURE — 96374 THER/PROPH/DIAG INJ IV PUSH: CPT

## 2019-08-11 PROCEDURE — 96375 TX/PRO/DX INJ NEW DRUG ADDON: CPT

## 2019-08-11 PROCEDURE — 36415 COLL VENOUS BLD VENIPUNCTURE: CPT

## 2019-08-11 PROCEDURE — 700102 HCHG RX REV CODE 250 W/ 637 OVERRIDE(OP): Performed by: EMERGENCY MEDICINE

## 2019-08-11 RX ORDER — ONDANSETRON 2 MG/ML
4 INJECTION INTRAMUSCULAR; INTRAVENOUS ONCE
Status: COMPLETED | OUTPATIENT
Start: 2019-08-11 | End: 2019-08-11

## 2019-08-11 RX ORDER — DICYCLOMINE HCL 20 MG
20 TABLET ORAL EVERY 6 HOURS PRN
Qty: 30 TAB | Refills: 0 | Status: SHIPPED | OUTPATIENT
Start: 2019-08-11 | End: 2019-08-20

## 2019-08-11 RX ORDER — DIPHENHYDRAMINE HYDROCHLORIDE 50 MG/ML
25 INJECTION INTRAMUSCULAR; INTRAVENOUS ONCE
Status: COMPLETED | OUTPATIENT
Start: 2019-08-11 | End: 2019-08-11

## 2019-08-11 RX ADMIN — FENTANYL CITRATE 100 MCG: 50 INJECTION, SOLUTION INTRAMUSCULAR; INTRAVENOUS at 17:55

## 2019-08-11 RX ADMIN — ONDANSETRON 4 MG: 2 INJECTION INTRAMUSCULAR; INTRAVENOUS at 17:54

## 2019-08-11 RX ADMIN — FENTANYL CITRATE 50 MCG: 50 INJECTION, SOLUTION INTRAMUSCULAR; INTRAVENOUS at 19:43

## 2019-08-11 RX ADMIN — DIPHENHYDRAMINE HYDROCHLORIDE 25 MG: 50 INJECTION INTRAMUSCULAR; INTRAVENOUS at 19:44

## 2019-08-11 RX ADMIN — LIDOCAINE HYDROCHLORIDE 30 ML: 20 SOLUTION OROPHARYNGEAL at 17:55

## 2019-08-11 ASSESSMENT — ENCOUNTER SYMPTOMS
DIARRHEA: 1
VOMITING: 0
NAUSEA: 0
ROS GI COMMENTS: POSITIVE FOR ABDOMINAL CRAMPING

## 2019-08-11 ASSESSMENT — LIFESTYLE VARIABLES: DO YOU DRINK ALCOHOL: NO

## 2019-08-11 ASSESSMENT — PAIN SCALES - WONG BAKER: WONGBAKER_NUMERICALRESPONSE: HURTS AS MUCH AS POSSIBLE

## 2019-08-12 ASSESSMENT — ENCOUNTER SYMPTOMS
FEVER: 0
DIZZINESS: 0
SHORTNESS OF BREATH: 0
CHILLS: 0

## 2019-08-12 NOTE — ED TRIAGE NOTES
Chief Complaint   Patient presents with   • Diarrhea     all day today. states had multiple episodes today. denies any n/v.    • Abdominal Cramping     Educated on triage process. Instructed to notify staff for any changes.

## 2019-08-12 NOTE — ED NOTES
Pt states s/s resolved. Pt tolerating PO intake well. Patient verbalized understanding of discharge instructions, provided with discharge paperwork, gait steady, ambulated independently to ER [facility].

## 2019-08-12 NOTE — ED NOTES
Pt resting on shift assessment, denies any diarrhea episodes but states still having abdominal pain. MD ordered medications, to administer per [facility].

## 2019-08-12 NOTE — ED PROVIDER NOTES
ED Provider Note    Scribed for Ashley Larios M.D. by Sebas Deluca. 8/11/2019, 5:33 PM.    Primary care provider: Juancho Jenkins D.O.  Means of arrival: Walk In  History obtained from: Patient  History limited by: None    CHIEF COMPLAINT  Chief Complaint   Patient presents with   • Diarrhea     all day today. states had multiple episodes today. denies any n/v.    • Abdominal Cramping       HPI  Ryan Trevizo is a 54 y.o. male who presents to the Emergency Department complaining of abdominal cramping and diarrhea onset earlier today. Patient reports that he has not eaten anything different or unusual the past couple of days.  He reports his abdominal cramping is generalized and mild.  He has had a couple of episodes of watery diarrhea.  At presentation, the patient denies any nausea or vomiting. He states no alleviating or modifying factors to his abdominal cramping or diarrhea. The patient notes taking no antibiotics recently or had recent travel. He has not had any abdominal surgeries.    REVIEW OF SYSTEMS  Review of Systems   Constitutional: Negative for chills and fever.   Respiratory: Negative for shortness of breath.    Cardiovascular: Negative for chest pain.   Gastrointestinal: Positive for diarrhea. Negative for nausea and vomiting.        Positive for abdominal cramping   Neurological: Negative for dizziness.   All other systems reviewed and are negative.    PAST MEDICAL HISTORY   has a past medical history of ACS (acute coronary syndrome) ([facility]), Anxiety disorder, Arthritis, CAD (coronary artery disease), Chronic anticoagulation, Claudication (HCC), Diabetes (HCC), Dizziness, GERD (gastroesophageal reflux disease), Glaucoma, HTN (hypertension), Hyperlipemia, Ischemic cardiomyopathy (05/2018), Mental retardation, Mentally challenged, Osteoarthritis, Paroxysmal atrial fibrillation (HCC), Pericarditis secondary to acute myocardial infarction (HCC), Sick sinus syndrome (HCC), SOB (shortness of breath),  "STEMI (ST elevation myocardial infarction) (HCC), and Syncope (02/2019).    SURGICAL HISTORY   has a past surgical history that includes other orthopedic surgery (7- ); other cardiac surgery; gastroscopy-endo (7/25/2012); gastroscopy-endo (7/26/2012); gastroscopy-endo (5/19/2017); other cardiac surgery (Left, 01/12/2018); and pacemaker insertion (Left, 02/12/2019).    SOCIAL HISTORY  Social History     Tobacco Use   • Smoking status: Never Smoker   • Smokeless tobacco: Never Used   Substance Use Topics   • Alcohol use: No   • Drug use: No      Social History     Substance and Sexual Activity   Drug Use No       FAMILY HISTORY  No family history none pertinent    CURRENT MEDICATIONS  Home Medications     Reviewed by Kirsten Bourne R.N. (Registered Nurse) on 08/11/19 at 1716  Med List Status: <None>   Medication Last Dose Status   ASPIR-LOW 81 MG EC tablet  Active   atorvastatin (LIPITOR) 80 MG tablet  Active   Cyanocobalamin (VITAMIN B-12) 1000 MCG Tab  Active   cyclobenzaprine (FLEXERIL) 10 MG Tab  Active   lisinopril (PRINIVIL) 2.5 MG Tab  Active   loratadine (CLARITIN) 10 MG Tab  Active   metformin (GLUCOPHAGE) 1000 MG tablet  Active   methocarbamol (ROBAXIN) 750 MG Tab  Active   metoprolol SR (TOPROL XL) 25 MG TABLET SR 24 HR  Active   ondansetron (ZOFRAN ODT) 4 MG TABLET DISPERSIBLE  Active   pantoprazole (PROTONIX) 40 MG Tablet Delayed Response  Active   Ranolazine 1000 MG TABLET SR 12 HR  Active   rivaroxaban (XARELTO) 20 MG Tab tablet  Active   vitamin D3, cholecalciferol, 1000 UNIT Tab  Active                ALLERGIES  Allergies   Allergen Reactions   • Ritalin [Methylphenidate] Unspecified     \"Hyper\"       PHYSICAL EXAM  VITAL SIGNS: /75   Pulse 95   Temp 36 °C (96.8 °F) (Temporal)   Resp 16   Ht 1.803 m (5' 11\")   Wt 82 kg (180 lb 12.4 oz)   SpO2 100%   BMI 25.21 kg/m²   Vitals reviewed by myself.  Physical Exam  Nursing note and vitals reviewed.  Constitutional: Well-developed and " well-nourished. No acute distress.   HENT: Head is normocephalic and atraumatic.  Eyes: extra-ocular movements intact  Cardiovascular: Regular rate and regular rhythm. No murmur heard.  Pulmonary/Chest: Breath sounds normal. No wheezes or rales.   Abdominal: No point tenderness. Negative Larios's Sign. No rebound or guarding. Soft and non-tender. No distention.    Musculoskeletal: Extremities exhibit normal range of motion without edema or tenderness.   Neurological: Awake and alert  Skin: Skin is warm and dry. No rash.     DIAGNOSTIC STUDIES /  LABS  Labs Reviewed   CBC WITH DIFFERENTIAL - Abnormal; Notable for the following components:       Result Value    WBC 4.2 (*)     RBC 3.72 (*)     Hemoglobin 12.1 (*)     Hematocrit 36.0 (*)     MCHC 33.6 (*)     Lymphs (Absolute) 0.98 (*)     All other components within normal limits   COMP METABOLIC PANEL - Abnormal; Notable for the following components:    Anion Gap 12.0 (*)     Glucose 146 (*)     Bun 27 (*)     Creatinine 1.46 (*)     Total Bilirubin 1.8 (*)     All other components within normal limits   ESTIMATED GFR - Abnormal; Notable for the following components:    GFR If Non  50 (*)     All other components within normal limits   LIPASE     All labs reviewed by me.    REASSESSMENT  5:35 PM Patient seen and evaluated at bedside. Patient will be treated with GI Cocktail 30 ml, Zofran 4 mg, and Sublimaze 100 mcg. Ordered for blood work to evaluate her symptoms. Patient is agreeable to plan of care.    7:03 PM - Patient was reevaluated at bedside. He is still currently in pain and is requesting more pain medications at this time. Discussed lab results with the patient and updated him regarding any findings. I have conveyed to the patient that the main goal at this time point is to control his pain. Patient treated with Sublimaze 50 mcg and Benadryl 25 mg.    8:06 PM - Patient was reevaluated at bedside. He is resting in bed feeling improved at this  time. He has been given referrals for follow up with his PCP. Patient is safe for discharge. He will be prescribed Bentyl as outpatient medication. He is agreeable and understanding to discharge.       COURSE & MEDICAL DECISION MAKING  Nursing notes, VS, PMSFHx reviewed in chart.    Patient is a 54-year-old male who comes in for abdominal cramping and diarrhea.  Differential diagnosis includes dehydration, electrolyte abnormality, diarrheal illness, foodborne illness.  Diagnostic work-up includes labs.  Next    Patient's initial vitals are within normal limits and he is well-appearing on exam.  Abdominal exam is benign.  Patient is treated with GI cocktail, Zofran, and fentanyl with some improvement in his symptoms.  He is further treated with repeat fentanyl dose and Benadryl after which he feels greatly improved.  Labs returned and are unremarkable.  Patient's creatinine is mildly elevated secondary to his chronic kidney disease it is at his baseline.  Patient has no episodes of diarrhea or vomiting in the emergency department.  Therefore he is reassured and advised on symptomatic management of diarrheal illness.  He is then given strict return precautions and discharged home in stable condition.      The patient will return for new or worsening symptoms and is stable at the time of discharge.      DISPOSITION:  Patient will be discharged home in stable condition.    FOLLOW UP:  Juancho Jenkins D.O.  [address] [address]  [phone]            OUTPATIENT MEDICATIONS:  Discharge Medication List as of 8/11/2019  8:29 PM      START taking these medications    Details   dicyclomine (BENTYL) 20 MG Tab Take 1 Tab by mouth every 6 hours as needed (abdominal pain)., Disp-30 Tab, R-0, Print Rx Paper             FINAL IMPRESSION  1. Diarrhea, unspecified type    2. Abdominal pain, unspecified abdominal location          ISebas (Scribe), otilio scribing for, and in the presence of, Ashley QUILES  DEON Larios.    Electronically signed by: Sebas Deluca (Scribe), 8/11/2019    IAshley M.D. personally performed the services described in this documentation, as scribed by Sebas Deluca in my presence, and it is both accurate and complete.    C    The note accurately reflects work and decisions made by me.  Ashley Larios  8/12/2019  1:36 AM

## 2019-08-14 ENCOUNTER — APPOINTMENT (OUTPATIENT)
Dept: RADIOLOGY | Facility: MEDICAL CENTER | Age: 54
End: 2019-08-14
Attending: EMERGENCY MEDICINE
Payer: MEDICARE

## 2019-08-14 ENCOUNTER — HOSPITAL ENCOUNTER (EMERGENCY)
Facility: MEDICAL CENTER | Age: 54
End: 2019-08-14
Attending: EMERGENCY MEDICINE
Payer: MEDICARE

## 2019-08-14 VITALS
BODY MASS INDEX: 25.15 KG/M2 | OXYGEN SATURATION: 94 % | SYSTOLIC BLOOD PRESSURE: 123 MMHG | HEART RATE: 76 BPM | TEMPERATURE: 98.2 F | DIASTOLIC BLOOD PRESSURE: 81 MMHG | WEIGHT: 180.34 LBS | RESPIRATION RATE: 16 BRPM

## 2019-08-14 DIAGNOSIS — R19.7 DIARRHEA, UNSPECIFIED TYPE: ICD-10-CM

## 2019-08-14 DIAGNOSIS — R10.84 GENERALIZED ABDOMINAL PAIN: ICD-10-CM

## 2019-08-14 LAB
ALBUMIN SERPL BCP-MCNC: 4.4 G/DL (ref 3.2–4.9)
ALBUMIN/GLOB SERPL: 1.5 G/DL
ALP SERPL-CCNC: 68 U/L (ref 30–99)
ALT SERPL-CCNC: 20 U/L (ref 2–50)
ANION GAP SERPL CALC-SCNC: 12 MMOL/L (ref 0–11.9)
AST SERPL-CCNC: 15 U/L (ref 12–45)
BASOPHILS # BLD AUTO: 0.5 % (ref 0–1.8)
BASOPHILS # BLD: 0.03 K/UL (ref 0–0.12)
BILIRUB SERPL-MCNC: 1.4 MG/DL (ref 0.1–1.5)
BUN SERPL-MCNC: 21 MG/DL (ref 8–22)
CALCIUM SERPL-MCNC: 9.4 MG/DL (ref 8.5–10.5)
CHLORIDE SERPL-SCNC: 102 MMOL/L (ref 96–112)
CO2 SERPL-SCNC: 20 MMOL/L (ref 20–33)
CREAT SERPL-MCNC: 1.05 MG/DL (ref 0.5–1.4)
EOSINOPHIL # BLD AUTO: 0.17 K/UL (ref 0–0.51)
EOSINOPHIL NFR BLD: 2.6 % (ref 0–6.9)
ERYTHROCYTE [DISTWIDTH] IN BLOOD BY AUTOMATED COUNT: 44.2 FL (ref 35.9–50)
GLOBULIN SER CALC-MCNC: 2.9 G/DL (ref 1.9–3.5)
GLUCOSE SERPL-MCNC: 117 MG/DL (ref 65–99)
HCT VFR BLD AUTO: 36.1 % (ref 42–52)
HGB BLD-MCNC: 12 G/DL (ref 14–18)
IMM GRANULOCYTES # BLD AUTO: 0.02 K/UL (ref 0–0.11)
IMM GRANULOCYTES NFR BLD AUTO: 0.3 % (ref 0–0.9)
LIPASE SERPL-CCNC: 46 U/L (ref 11–82)
LYMPHOCYTES # BLD AUTO: 1.53 K/UL (ref 1–4.8)
LYMPHOCYTES NFR BLD: 23.2 % (ref 22–41)
MCH RBC QN AUTO: 31.8 PG (ref 27–33)
MCHC RBC AUTO-ENTMCNC: 33.2 G/DL (ref 33.7–35.3)
MCV RBC AUTO: 95.8 FL (ref 81.4–97.8)
MONOCYTES # BLD AUTO: 0.58 K/UL (ref 0–0.85)
MONOCYTES NFR BLD AUTO: 8.8 % (ref 0–13.4)
NEUTROPHILS # BLD AUTO: 4.26 K/UL (ref 1.82–7.42)
NEUTROPHILS NFR BLD: 64.6 % (ref 44–72)
NRBC # BLD AUTO: 0 K/UL
NRBC BLD-RTO: 0 /100 WBC
PLATELET # BLD AUTO: 259 K/UL (ref 164–446)
PMV BLD AUTO: 9.8 FL (ref 9–12.9)
POTASSIUM SERPL-SCNC: 4 MMOL/L (ref 3.6–5.5)
PROT SERPL-MCNC: 7.3 G/DL (ref 6–8.2)
RBC # BLD AUTO: 3.77 M/UL (ref 4.7–6.1)
SODIUM SERPL-SCNC: 134 MMOL/L (ref 135–145)
WBC # BLD AUTO: 6.6 K/UL (ref 4.8–10.8)
WBC STL QL MICRO: NORMAL

## 2019-08-14 PROCEDURE — 89055 LEUKOCYTE ASSESSMENT FECAL: CPT

## 2019-08-14 PROCEDURE — 85025 COMPLETE CBC W/AUTO DIFF WBC: CPT

## 2019-08-14 PROCEDURE — 700111 HCHG RX REV CODE 636 W/ 250 OVERRIDE (IP): Performed by: EMERGENCY MEDICINE

## 2019-08-14 PROCEDURE — 87899 AGENT NOS ASSAY W/OPTIC: CPT

## 2019-08-14 PROCEDURE — 96374 THER/PROPH/DIAG INJ IV PUSH: CPT

## 2019-08-14 PROCEDURE — 700105 HCHG RX REV CODE 258: Performed by: EMERGENCY MEDICINE

## 2019-08-14 PROCEDURE — 87046 STOOL CULTR AEROBIC BACT EA: CPT

## 2019-08-14 PROCEDURE — 83690 ASSAY OF LIPASE: CPT

## 2019-08-14 PROCEDURE — 87045 FECES CULTURE AEROBIC BACT: CPT

## 2019-08-14 PROCEDURE — 74176 CT ABD & PELVIS W/O CONTRAST: CPT

## 2019-08-14 PROCEDURE — 80053 COMPREHEN METABOLIC PANEL: CPT

## 2019-08-14 PROCEDURE — 99285 EMERGENCY DEPT VISIT HI MDM: CPT

## 2019-08-14 RX ORDER — ONDANSETRON 2 MG/ML
4 INJECTION INTRAMUSCULAR; INTRAVENOUS ONCE
Status: COMPLETED | OUTPATIENT
Start: 2019-08-14 | End: 2019-08-14

## 2019-08-14 RX ORDER — SODIUM CHLORIDE 9 MG/ML
1000 INJECTION, SOLUTION INTRAVENOUS ONCE
Status: COMPLETED | OUTPATIENT
Start: 2019-08-14 | End: 2019-08-14

## 2019-08-14 RX ADMIN — ONDANSETRON 4 MG: 2 INJECTION INTRAMUSCULAR; INTRAVENOUS at 18:27

## 2019-08-14 RX ADMIN — SODIUM CHLORIDE 1000 ML: 9 INJECTION, SOLUTION INTRAVENOUS at 18:28

## 2019-08-14 NOTE — ED PROVIDER NOTES
ED Provider Note    CHIEF COMPLAINT  Chief Complaint   Patient presents with   • Diarrhea     onset this morning. patient reports no relief from pepto-bismol.    • Abdominal Pain     Right lower abd pain onset at 1300 today.        HPI  Ryan Candelario Trevizo is a 54 y.o. male who presents for evaluation of abdominal pain and diarrhea.  The patient was seen 3 days ago for diarrhea.  He states he has had persistent diarrhea with 2 bouts a day.  Patient has developed some right lower quadrant abdominal pain today.  Patient states that he vomited once.  There is been no associated: Fever, chills, URI symptoms, cardiorespiratory symptoms, hematemesis, melena hematochezia, hematuria, dysuria.  No recent antibiotic use.  No recent travel.  No other acute symptomatology or complaints.    REVIEW OF SYSTEMS  See HPI for further details. All other systems negative.    PAST MEDICAL HISTORY  Past Medical History:   Diagnosis Date   • ACS (acute coronary syndrome) ([facility])    • Anxiety disorder    • Arthritis    • CAD (coronary artery disease)    • Chronic anticoagulation    • Claudication ([facility])    • Diabetes ([facility])     Oral medications   • Dizziness    • GERD (gastroesophageal reflux disease)    • Glaucoma    • HTN (hypertension)    • Hyperlipemia    • Ischemic cardiomyopathy 05/2018    Echocardiogram with normal LV size, LVEF 50%. Mildly dilated RV.   • Mental retardation    • Mentally challenged     Has    • Osteoarthritis    • Paroxysmal atrial fibrillation ([facility])    • Pericarditis secondary to acute myocardial infarction ([facility])    • Sick sinus syndrome ([facility])    • SOB (shortness of breath)    • STEMI (ST elevation myocardial infarction) ([facility])    • Syncope 02/2019    Status post pacemaker placement.       FAMILY HISTORY  History reviewed. No pertinent family history.    SOCIAL HISTORY  No history of: tobacco, alcohol, drug use;    SURGICAL HISTORY  Past Surgical History:   Procedure Laterality Date   • PACEMAKER INSERTION  "Left 02/12/2019    Medtronic Beba S DR MRI [id number] implanted by Dr. Blake.   • OTHER CARDIAC SURGERY Left 01/12/2018    Medtronic Reveal LINQ [id number] implanted by Dr. Blake   • GASTROSCOPY-ENDO  5/19/2017    Procedure: GASTROSCOPY-ENDO;  Surgeon: Reinaldo Berry M.D.;  Location: [facility];  Service:    • GASTROSCOPY-ENDO  7/26/2012    Performed by JORDIN VERA at ENDOSCOPY [facility] ORS   • GASTROSCOPY-ENDO  7/25/2012    Performed by JORDIN VERA at ENDOSCOPY [facility] ORS   • OTHER ORTHOPEDIC SURGERY  7-     R knee surgery   • OTHER CARDIAC SURGERY      stent placement       CURRENT MEDICATIONS  Home Medications     Reviewed by Jerome Guillermo R.N. (Registered Nurse) on 08/14/19 at 1514  Med List Status: Not Addressed   Medication Last Dose Status   ASPIR-LOW 81 MG EC tablet  Active   atorvastatin (LIPITOR) 80 MG tablet  Active   Cyanocobalamin (VITAMIN B-12) 1000 MCG Tab  Active   cyclobenzaprine (FLEXERIL) 10 MG Tab  Active   dicyclomine (BENTYL) 20 MG Tab  Active   lisinopril (PRINIVIL) 2.5 MG Tab  Active   loratadine (CLARITIN) 10 MG Tab  Active   metformin (GLUCOPHAGE) 1000 MG tablet  Active   methocarbamol (ROBAXIN) 750 MG Tab  Active   metoprolol SR (TOPROL XL) 25 MG TABLET SR 24 HR  Active   ondansetron (ZOFRAN ODT) 4 MG TABLET DISPERSIBLE  Active   pantoprazole (PROTONIX) 40 MG Tablet Delayed Response  Active   Ranolazine 1000 MG TABLET SR 12 HR  Active   rivaroxaban (XARELTO) 20 MG Tab tablet  Active   vitamin D3, cholecalciferol, 1000 UNIT Tab  Active                ALLERGIES  Allergies   Allergen Reactions   • Ritalin [Methylphenidate] Unspecified     \"Hyper\"       PHYSICAL EXAM  VITAL SIGNS: /96   Pulse 91   Temp 36.8 °C (98.2 °F) (Temporal)   Resp 16   Wt 81.8 kg (180 lb 5.4 oz)   SpO2 94%   BMI 25.15 kg/m²    Constitutional: 54-year-old male, anxious, awake, oriented x3  HENT: ,Atraumatic, Bilateral external ears normal, tympanic membranes clear, " Oropharynx mildly dry, No oral exudates, Nose normal.   Eyes: PERRL, EOMI, Conjunctiva normal, No discharge.   Neck: Normal range of motion, No tenderness, Supple, No stridor.   Lymphatic: No lymphadenopathy noted.   Cardiovascular: Normal heart rate, Normal rhythm, No murmurs, No rubs, No gallops.   Thorax & Lungs: Normal Equal breath sounds, No respiratory distress, No wheezing, no stridor, no rales. No chest tenderness.   Abdomen: Soft, nontender, nondistended, no organomegaly, positive bowel sounds normal in quality. No guarding or rebound.  Skin: Good skin turgor, pink, warm, dry. No rashes, petechiae, purpura. Normal capillary refill.   Back: No tenderness, No CVA tenderness.   Extremities: Intact distal pulses, No edema, No tenderness, No cyanosis, No clubbing. Vascular: Pulses are 2+, symmetric in the upper and lower extremities.  Musculoskeletal: Good range of motion in all major joints. No tenderness to palpation or major deformities noted.   Neurologic: Alert & oriented x 3, Normal motor function, Normal sensory function, No gross focal deficits noted.   Psychiatric: Affect normal, Judgment normal, Mood normal.     RADIOLOGY/PROCEDURES  CT-RENAL COLIC EVALUATION(A/P W/O)   Final Result      No acute abnormality in the abdomen or pelvis. No renal stones or hydronephrosis.            COURSE & MEDICAL DECISION MAKING  Pertinent Labs & Imaging studies reviewed. (See chart for details)  1.  IV normal saline; IV fluids administered for clinical dehydration; unable to attempt oral challenge as the patient requires n.p.o. Status; reevaluation revealed stable status;  2.  Zofran, titrated    Laboratory studies: CBC shows white count of 6.6, 64% neutrophils, 23% lymphocytes, 8% monocytes, heme globin 12.0, hematocrit 36.1; CMP shows a sodium 134, random glucose 117, otherwise within normal; stool WBCs none seen; lipase 46;    Discussion: At this time, patient presents for evaluation of some generalized abdominal  pain and some diarrhea.  Patient has not benign abdominal exam with no peritonitis.  Laboratory studies and imaging studies are unremarkable.  At this time, the patient looks well clinically.  I see no indication for further imaging studies or emergent hospitalization.  I discussed the findings treatment plan with patient.  Patient was unable to provide a stool specimen while in the ED as he had no further diarrhea.  He indicates he is comfortable with this explanation disposition.    FINAL IMPRESSION  1. Generalized abdominal pain    2. Diarrhea, unspecified type        PLAN  1.  Appropriate discharge instructions given  2.  Clear liquid diet x24 hours  3.  Imodium AD for diarrhea  4.  Follow-up with primary care    Electronically signed by: Guy G Gansert, 8/14/2019 4:43 PM

## 2019-08-14 NOTE — ED TRIAGE NOTES
.Ryan Trevizo  .  Chief Complaint   Patient presents with   • Diarrhea     onset this morning. patient reports no relief from pepto-bismol.    • Abdominal Pain     Right lower abd pain onset at 1300 today.      Patient BIB [facility] with above complaint. .Blood Pressure: 142/96, Pulse: 91, Respiration: 16, Temperature: 36.8 °C (98.2 °F), Weight: 81.8 kg (180 lb 5.4 oz), Pulse Oximetry: 94 %    Patient to lobby and instructed to inform staff of any needs.

## 2019-08-15 LAB
E COLI SXT1+2 STL IA: NORMAL
SIGNIFICANT IND 70042: NORMAL
SITE SITE: NORMAL
SOURCE SOURCE: NORMAL

## 2019-08-15 NOTE — ED NOTES
PIV established, pt medicated per MAR.     >1600 pt had gotten up to the restroom, stool sample collected.

## 2019-08-17 LAB
BACTERIA STL CULT: NORMAL
E COLI SXT1+2 STL IA: NORMAL
SIGNIFICANT IND 70042: NORMAL
SITE SITE: NORMAL
SOURCE SOURCE: NORMAL

## 2019-08-20 ENCOUNTER — ANTICOAGULATION MONITORING (OUTPATIENT)
Dept: VASCULAR LAB | Facility: MEDICAL CENTER | Age: 54
End: 2019-08-20

## 2019-08-20 ENCOUNTER — HOSPITAL ENCOUNTER (INPATIENT)
Facility: MEDICAL CENTER | Age: 54
LOS: 2 days | DRG: 439 | End: 2019-08-22
Attending: EMERGENCY MEDICINE | Admitting: INTERNAL MEDICINE
Payer: MEDICARE

## 2019-08-20 ENCOUNTER — APPOINTMENT (OUTPATIENT)
Dept: RADIOLOGY | Facility: MEDICAL CENTER | Age: 54
DRG: 439 | End: 2019-08-20
Attending: EMERGENCY MEDICINE
Payer: MEDICARE

## 2019-08-20 DIAGNOSIS — N17.9 AKI (ACUTE KIDNEY INJURY) (HCC): ICD-10-CM

## 2019-08-20 DIAGNOSIS — I48.0 PAF (PAROXYSMAL ATRIAL FIBRILLATION) (HCC): ICD-10-CM

## 2019-08-20 DIAGNOSIS — R11.2 INTRACTABLE VOMITING WITH NAUSEA, UNSPECIFIED VOMITING TYPE: ICD-10-CM

## 2019-08-20 LAB
ALBUMIN SERPL BCP-MCNC: 4.2 G/DL (ref 3.2–4.9)
ALBUMIN/GLOB SERPL: 1.8 G/DL
ALP SERPL-CCNC: 60 U/L (ref 30–99)
ALT SERPL-CCNC: 16 U/L (ref 2–50)
ANION GAP SERPL CALC-SCNC: 8 MMOL/L (ref 0–11.9)
APPEARANCE UR: CLEAR
AST SERPL-CCNC: 15 U/L (ref 12–45)
BASOPHILS # BLD AUTO: 0.5 % (ref 0–1.8)
BASOPHILS # BLD: 0.03 K/UL (ref 0–0.12)
BILIRUB SERPL-MCNC: 1.3 MG/DL (ref 0.1–1.5)
BILIRUB UR QL STRIP.AUTO: NEGATIVE
BUN SERPL-MCNC: 33 MG/DL (ref 8–22)
CALCIUM SERPL-MCNC: 8.7 MG/DL (ref 8.5–10.5)
CHLORIDE SERPL-SCNC: 107 MMOL/L (ref 96–112)
CO2 SERPL-SCNC: 20 MMOL/L (ref 20–33)
COLOR UR: ABNORMAL
CREAT SERPL-MCNC: 2.11 MG/DL (ref 0.5–1.4)
EOSINOPHIL # BLD AUTO: 0.16 K/UL (ref 0–0.51)
EOSINOPHIL NFR BLD: 2.9 % (ref 0–6.9)
ERYTHROCYTE [DISTWIDTH] IN BLOOD BY AUTOMATED COUNT: 45.1 FL (ref 35.9–50)
GLOBULIN SER CALC-MCNC: 2.3 G/DL (ref 1.9–3.5)
GLUCOSE SERPL-MCNC: 85 MG/DL (ref 65–99)
GLUCOSE UR STRIP.AUTO-MCNC: NEGATIVE MG/DL
HCT VFR BLD AUTO: 31.5 % (ref 42–52)
HGB BLD-MCNC: 10.5 G/DL (ref 14–18)
IMM GRANULOCYTES # BLD AUTO: 0.02 K/UL (ref 0–0.11)
IMM GRANULOCYTES NFR BLD AUTO: 0.4 % (ref 0–0.9)
KETONES UR STRIP.AUTO-MCNC: ABNORMAL MG/DL
LEUKOCYTE ESTERASE UR QL STRIP.AUTO: NEGATIVE
LIPASE SERPL-CCNC: 233 U/L (ref 11–82)
LYMPHOCYTES # BLD AUTO: 1.39 K/UL (ref 1–4.8)
LYMPHOCYTES NFR BLD: 24.9 % (ref 22–41)
MCH RBC QN AUTO: 32.2 PG (ref 27–33)
MCHC RBC AUTO-ENTMCNC: 33.3 G/DL (ref 33.7–35.3)
MCV RBC AUTO: 96.6 FL (ref 81.4–97.8)
MICRO URNS: ABNORMAL
MONOCYTES # BLD AUTO: 0.37 K/UL (ref 0–0.85)
MONOCYTES NFR BLD AUTO: 6.6 % (ref 0–13.4)
NEUTROPHILS # BLD AUTO: 3.61 K/UL (ref 1.82–7.42)
NEUTROPHILS NFR BLD: 64.7 % (ref 44–72)
NITRITE UR QL STRIP.AUTO: NEGATIVE
NRBC # BLD AUTO: 0 K/UL
NRBC BLD-RTO: 0 /100 WBC
PH UR STRIP.AUTO: 5 [PH] (ref 5–8)
PLATELET # BLD AUTO: 224 K/UL (ref 164–446)
PMV BLD AUTO: 9.3 FL (ref 9–12.9)
POTASSIUM SERPL-SCNC: 4.2 MMOL/L (ref 3.6–5.5)
PROT SERPL-MCNC: 6.5 G/DL (ref 6–8.2)
PROT UR QL STRIP: NEGATIVE MG/DL
RBC # BLD AUTO: 3.26 M/UL (ref 4.7–6.1)
RBC UR QL AUTO: NEGATIVE
SODIUM SERPL-SCNC: 135 MMOL/L (ref 135–145)
SP GR UR STRIP.AUTO: 1.02
TROPONIN T SERPL-MCNC: 9 NG/L (ref 6–19)
UROBILINOGEN UR STRIP.AUTO-MCNC: 1 MG/DL
WBC # BLD AUTO: 5.6 K/UL (ref 4.8–10.8)

## 2019-08-20 PROCEDURE — 84484 ASSAY OF TROPONIN QUANT: CPT

## 2019-08-20 PROCEDURE — 82570 ASSAY OF URINE CREATININE: CPT

## 2019-08-20 PROCEDURE — 770006 HCHG ROOM/CARE - MED/SURG/GYN SEMI*

## 2019-08-20 PROCEDURE — 81003 URINALYSIS AUTO W/O SCOPE: CPT

## 2019-08-20 PROCEDURE — 700102 HCHG RX REV CODE 250 W/ 637 OVERRIDE(OP): Performed by: STUDENT IN AN ORGANIZED HEALTH CARE EDUCATION/TRAINING PROGRAM

## 2019-08-20 PROCEDURE — 99285 EMERGENCY DEPT VISIT HI MDM: CPT

## 2019-08-20 PROCEDURE — 84300 ASSAY OF URINE SODIUM: CPT

## 2019-08-20 PROCEDURE — 80053 COMPREHEN METABOLIC PANEL: CPT

## 2019-08-20 PROCEDURE — 85025 COMPLETE CBC W/AUTO DIFF WBC: CPT

## 2019-08-20 PROCEDURE — 84156 ASSAY OF PROTEIN URINE: CPT

## 2019-08-20 PROCEDURE — 74018 RADEX ABDOMEN 1 VIEW: CPT

## 2019-08-20 PROCEDURE — 83690 ASSAY OF LIPASE: CPT

## 2019-08-20 PROCEDURE — A9270 NON-COVERED ITEM OR SERVICE: HCPCS | Performed by: STUDENT IN AN ORGANIZED HEALTH CARE EDUCATION/TRAINING PROGRAM

## 2019-08-20 PROCEDURE — 700105 HCHG RX REV CODE 258: Performed by: EMERGENCY MEDICINE

## 2019-08-20 RX ORDER — PANTOPRAZOLE SODIUM 40 MG/1
40 TABLET, DELAYED RELEASE ORAL DAILY
Status: ON HOLD | COMMUNITY
End: 2019-08-22

## 2019-08-20 RX ORDER — ATORVASTATIN CALCIUM 40 MG/1
80 TABLET, FILM COATED ORAL NIGHTLY
Status: DISCONTINUED | OUTPATIENT
Start: 2019-08-20 | End: 2019-08-22 | Stop reason: HOSPADM

## 2019-08-20 RX ORDER — SODIUM CHLORIDE 9 MG/ML
1000 INJECTION, SOLUTION INTRAVENOUS ONCE
Status: COMPLETED | OUTPATIENT
Start: 2019-08-20 | End: 2019-08-21

## 2019-08-20 RX ORDER — AMOXICILLIN 250 MG
2 CAPSULE ORAL 2 TIMES DAILY
Status: DISCONTINUED | OUTPATIENT
Start: 2019-08-20 | End: 2019-08-22 | Stop reason: HOSPADM

## 2019-08-20 RX ORDER — SODIUM CHLORIDE 9 MG/ML
INJECTION, SOLUTION INTRAVENOUS CONTINUOUS
Status: DISCONTINUED | OUTPATIENT
Start: 2019-08-20 | End: 2019-08-22

## 2019-08-20 RX ORDER — DICYCLOMINE HCL 20 MG
20 TABLET ORAL EVERY 6 HOURS PRN
COMMUNITY

## 2019-08-20 RX ORDER — METOPROLOL SUCCINATE 25 MG/1
12.5 TABLET, EXTENDED RELEASE ORAL DAILY
Status: DISCONTINUED | OUTPATIENT
Start: 2019-08-21 | End: 2019-08-22 | Stop reason: HOSPADM

## 2019-08-20 RX ORDER — HEPARIN SODIUM 5000 [USP'U]/ML
5000 INJECTION, SOLUTION INTRAVENOUS; SUBCUTANEOUS EVERY 8 HOURS
Status: DISCONTINUED | OUTPATIENT
Start: 2019-08-20 | End: 2019-08-20

## 2019-08-20 RX ORDER — LORATADINE 10 MG/1
10 TABLET ORAL DAILY
COMMUNITY

## 2019-08-20 RX ORDER — POLYETHYLENE GLYCOL 3350 17 G/17G
1 POWDER, FOR SOLUTION ORAL
Status: DISCONTINUED | OUTPATIENT
Start: 2019-08-20 | End: 2019-08-22 | Stop reason: HOSPADM

## 2019-08-20 RX ORDER — BISACODYL 10 MG
10 SUPPOSITORY, RECTAL RECTAL
Status: DISCONTINUED | OUTPATIENT
Start: 2019-08-20 | End: 2019-08-22 | Stop reason: HOSPADM

## 2019-08-20 RX ORDER — RANOLAZINE 1000 MG/1
1000 TABLET, EXTENDED RELEASE ORAL 2 TIMES DAILY
COMMUNITY
End: 2019-08-30 | Stop reason: SDUPTHER

## 2019-08-20 RX ORDER — ATORVASTATIN CALCIUM 80 MG/1
80 TABLET, FILM COATED ORAL NIGHTLY
COMMUNITY

## 2019-08-20 RX ORDER — LISINOPRIL 2.5 MG/1
2.5 TABLET ORAL DAILY
COMMUNITY

## 2019-08-20 RX ORDER — CYCLOBENZAPRINE HCL 10 MG
10 TABLET ORAL 3 TIMES DAILY PRN
COMMUNITY

## 2019-08-20 RX ADMIN — SENNOSIDES, DOCUSATE SODIUM 2 TABLET: 50; 8.6 TABLET, FILM COATED ORAL at 23:20

## 2019-08-20 RX ADMIN — SODIUM CHLORIDE 1000 ML: 9 INJECTION, SOLUTION INTRAVENOUS at 23:20

## 2019-08-20 ASSESSMENT — PAIN SCALES - WONG BAKER: WONGBAKER_NUMERICALRESPONSE: HURTS JUST A LITTLE BIT

## 2019-08-20 NOTE — ED TRIAGE NOTES
"Chief Complaint   Patient presents with   • Abdominal Pain     diffuse x 1 month    • Nausea/Vomiting/Diarrhea     x1 month     BIB EMS from home for above. Seen here twice in last 2 weeks for same. Negative work-ups. Pt reports it is worse this time.  NAD noted, VSS.    Pt returned to lobby. Educated on triage process and to inform staff of any changes.     /76   Pulse 77   Temp 36.1 °C (97 °F) (Temporal)   Resp 18   Ht 1.803 m (5' 11\")   Wt 83.9 kg (184 lb 15.5 oz)   SpO2 90%   BMI 25.80 kg/m²     "

## 2019-08-21 ENCOUNTER — APPOINTMENT (OUTPATIENT)
Dept: RADIOLOGY | Facility: MEDICAL CENTER | Age: 54
DRG: 439 | End: 2019-08-21
Attending: INTERNAL MEDICINE
Payer: MEDICARE

## 2019-08-21 PROBLEM — R10.9 ABDOMINAL PAIN: Status: ACTIVE | Noted: 2019-08-21

## 2019-08-21 PROBLEM — K85.00 IDIOPATHIC ACUTE PANCREATITIS: Status: ACTIVE | Noted: 2019-08-21

## 2019-08-21 LAB
ALBUMIN SERPL BCP-MCNC: 3.7 G/DL (ref 3.2–4.9)
ALBUMIN/GLOB SERPL: 1.5 G/DL
ALP SERPL-CCNC: 59 U/L (ref 30–99)
ALT SERPL-CCNC: 15 U/L (ref 2–50)
ANION GAP SERPL CALC-SCNC: 6 MMOL/L (ref 0–11.9)
AST SERPL-CCNC: 13 U/L (ref 12–45)
BASOPHILS # BLD AUTO: 0.5 % (ref 0–1.8)
BASOPHILS # BLD: 0.02 K/UL (ref 0–0.12)
BILIRUB SERPL-MCNC: 1.1 MG/DL (ref 0.1–1.5)
BUN SERPL-MCNC: 22 MG/DL (ref 8–22)
CALCIUM SERPL-MCNC: 8.5 MG/DL (ref 8.5–10.5)
CHLORIDE SERPL-SCNC: 111 MMOL/L (ref 96–112)
CHOLEST SERPL-MCNC: 91 MG/DL (ref 100–199)
CO2 SERPL-SCNC: 21 MMOL/L (ref 20–33)
CREAT SERPL-MCNC: 1.28 MG/DL (ref 0.5–1.4)
CREAT UR-MCNC: 230.7 MG/DL
EOSINOPHIL # BLD AUTO: 0.1 K/UL (ref 0–0.51)
EOSINOPHIL NFR BLD: 2.5 % (ref 0–6.9)
ERYTHROCYTE [DISTWIDTH] IN BLOOD BY AUTOMATED COUNT: 46.1 FL (ref 35.9–50)
GLOBULIN SER CALC-MCNC: 2.4 G/DL (ref 1.9–3.5)
GLUCOSE BLD-MCNC: 114 MG/DL (ref 65–99)
GLUCOSE BLD-MCNC: 204 MG/DL (ref 65–99)
GLUCOSE SERPL-MCNC: 116 MG/DL (ref 65–99)
HCT VFR BLD AUTO: 31.9 % (ref 42–52)
HDLC SERPL-MCNC: 25 MG/DL
HGB BLD-MCNC: 10.4 G/DL (ref 14–18)
IMM GRANULOCYTES # BLD AUTO: 0.02 K/UL (ref 0–0.11)
IMM GRANULOCYTES NFR BLD AUTO: 0.5 % (ref 0–0.9)
LDLC SERPL CALC-MCNC: 15 MG/DL
LIPASE SERPL-CCNC: 43 U/L (ref 11–82)
LYMPHOCYTES # BLD AUTO: 0.86 K/UL (ref 1–4.8)
LYMPHOCYTES NFR BLD: 21.6 % (ref 22–41)
MCH RBC QN AUTO: 31.9 PG (ref 27–33)
MCHC RBC AUTO-ENTMCNC: 32.6 G/DL (ref 33.7–35.3)
MCV RBC AUTO: 97.9 FL (ref 81.4–97.8)
MONOCYTES # BLD AUTO: 0.3 K/UL (ref 0–0.85)
MONOCYTES NFR BLD AUTO: 7.5 % (ref 0–13.4)
NEUTROPHILS # BLD AUTO: 2.68 K/UL (ref 1.82–7.42)
NEUTROPHILS NFR BLD: 67.4 % (ref 44–72)
NRBC # BLD AUTO: 0 K/UL
NRBC BLD-RTO: 0 /100 WBC
PLATELET # BLD AUTO: 182 K/UL (ref 164–446)
PMV BLD AUTO: 9 FL (ref 9–12.9)
POTASSIUM SERPL-SCNC: 4.5 MMOL/L (ref 3.6–5.5)
PROT SERPL-MCNC: 6.1 G/DL (ref 6–8.2)
PROT UR-MCNC: 19.7 MG/DL (ref 0–15)
RBC # BLD AUTO: 3.26 M/UL (ref 4.7–6.1)
SODIUM SERPL-SCNC: 138 MMOL/L (ref 135–145)
SODIUM UR-SCNC: 70 MMOL/L
TRIGL SERPL-MCNC: 257 MG/DL (ref 0–149)
WBC # BLD AUTO: 4 K/UL (ref 4.8–10.8)

## 2019-08-21 PROCEDURE — 700111 HCHG RX REV CODE 636 W/ 250 OVERRIDE (IP): Performed by: STUDENT IN AN ORGANIZED HEALTH CARE EDUCATION/TRAINING PROGRAM

## 2019-08-21 PROCEDURE — 700102 HCHG RX REV CODE 250 W/ 637 OVERRIDE(OP): Performed by: STUDENT IN AN ORGANIZED HEALTH CARE EDUCATION/TRAINING PROGRAM

## 2019-08-21 PROCEDURE — 83690 ASSAY OF LIPASE: CPT

## 2019-08-21 PROCEDURE — 80053 COMPREHEN METABOLIC PANEL: CPT

## 2019-08-21 PROCEDURE — 93005 ELECTROCARDIOGRAM TRACING: CPT | Performed by: STUDENT IN AN ORGANIZED HEALTH CARE EDUCATION/TRAINING PROGRAM

## 2019-08-21 PROCEDURE — 85025 COMPLETE CBC W/AUTO DIFF WBC: CPT

## 2019-08-21 PROCEDURE — 36415 COLL VENOUS BLD VENIPUNCTURE: CPT

## 2019-08-21 PROCEDURE — 700105 HCHG RX REV CODE 258: Performed by: STUDENT IN AN ORGANIZED HEALTH CARE EDUCATION/TRAINING PROGRAM

## 2019-08-21 PROCEDURE — 82962 GLUCOSE BLOOD TEST: CPT

## 2019-08-21 PROCEDURE — A9270 NON-COVERED ITEM OR SERVICE: HCPCS | Performed by: STUDENT IN AN ORGANIZED HEALTH CARE EDUCATION/TRAINING PROGRAM

## 2019-08-21 PROCEDURE — 80061 LIPID PANEL: CPT

## 2019-08-21 PROCEDURE — 99223 1ST HOSP IP/OBS HIGH 75: CPT | Mod: AI,GC | Performed by: INTERNAL MEDICINE

## 2019-08-21 PROCEDURE — 700102 HCHG RX REV CODE 250 W/ 637 OVERRIDE(OP): Performed by: INTERNAL MEDICINE

## 2019-08-21 PROCEDURE — A9270 NON-COVERED ITEM OR SERVICE: HCPCS | Performed by: INTERNAL MEDICINE

## 2019-08-21 PROCEDURE — 770006 HCHG ROOM/CARE - MED/SURG/GYN SEMI*

## 2019-08-21 PROCEDURE — 93010 ELECTROCARDIOGRAM REPORT: CPT | Performed by: INTERNAL MEDICINE

## 2019-08-21 RX ORDER — PANTOPRAZOLE SODIUM 40 MG/1
40 TABLET, DELAYED RELEASE ORAL DAILY
Status: DISCONTINUED | OUTPATIENT
Start: 2019-08-21 | End: 2019-08-21

## 2019-08-21 RX ORDER — CHOLECALCIFEROL (VITAMIN D3) 125 MCG
1000 CAPSULE ORAL DAILY
Status: DISCONTINUED | OUTPATIENT
Start: 2019-08-21 | End: 2019-08-22 | Stop reason: HOSPADM

## 2019-08-21 RX ORDER — RANOLAZINE 500 MG/1
1000 TABLET, EXTENDED RELEASE ORAL 2 TIMES DAILY
Status: DISCONTINUED | OUTPATIENT
Start: 2019-08-21 | End: 2019-08-22 | Stop reason: HOSPADM

## 2019-08-21 RX ORDER — ONDANSETRON 2 MG/ML
4 INJECTION INTRAMUSCULAR; INTRAVENOUS EVERY 4 HOURS
Status: DISCONTINUED | OUTPATIENT
Start: 2019-08-21 | End: 2019-08-22 | Stop reason: HOSPADM

## 2019-08-21 RX ORDER — LORATADINE 10 MG/1
10 TABLET ORAL DAILY
Status: DISCONTINUED | OUTPATIENT
Start: 2019-08-21 | End: 2019-08-22 | Stop reason: HOSPADM

## 2019-08-21 RX ORDER — OMEPRAZOLE 20 MG/1
20 CAPSULE, DELAYED RELEASE ORAL DAILY
Status: DISCONTINUED | OUTPATIENT
Start: 2019-08-21 | End: 2019-08-22 | Stop reason: HOSPADM

## 2019-08-21 RX ORDER — CYCLOBENZAPRINE HCL 10 MG
10 TABLET ORAL 3 TIMES DAILY PRN
Status: DISCONTINUED | OUTPATIENT
Start: 2019-08-21 | End: 2019-08-22 | Stop reason: HOSPADM

## 2019-08-21 RX ORDER — MORPHINE SULFATE 4 MG/ML
1 INJECTION, SOLUTION INTRAMUSCULAR; INTRAVENOUS EVERY 4 HOURS PRN
Status: DISCONTINUED | OUTPATIENT
Start: 2019-08-21 | End: 2019-08-22

## 2019-08-21 RX ORDER — ONDANSETRON 2 MG/ML
4 INJECTION INTRAMUSCULAR; INTRAVENOUS EVERY 4 HOURS PRN
Status: DISCONTINUED | OUTPATIENT
Start: 2019-08-21 | End: 2019-08-21

## 2019-08-21 RX ORDER — LISINOPRIL 2.5 MG/1
2.5 TABLET ORAL DAILY
Status: DISCONTINUED | OUTPATIENT
Start: 2019-08-21 | End: 2019-08-22 | Stop reason: HOSPADM

## 2019-08-21 RX ORDER — DICYCLOMINE HCL 20 MG
20 TABLET ORAL EVERY 6 HOURS PRN
Status: DISCONTINUED | OUTPATIENT
Start: 2019-08-21 | End: 2019-08-22 | Stop reason: HOSPADM

## 2019-08-21 RX ADMIN — LORATADINE 10 MG: 10 TABLET ORAL at 18:27

## 2019-08-21 RX ADMIN — OMEPRAZOLE 20 MG: 20 CAPSULE, DELAYED RELEASE ORAL at 18:26

## 2019-08-21 RX ADMIN — SODIUM CHLORIDE: 9 INJECTION, SOLUTION INTRAVENOUS at 01:23

## 2019-08-21 RX ADMIN — SODIUM CHLORIDE: 9 INJECTION, SOLUTION INTRAVENOUS at 08:19

## 2019-08-21 RX ADMIN — POLYETHYLENE GLYCOL 3350 1 PACKET: 17 POWDER, FOR SOLUTION ORAL at 16:24

## 2019-08-21 RX ADMIN — VITAMIN D, TAB 1000IU (100/BT) 1000 UNITS: 25 TAB at 18:25

## 2019-08-21 RX ADMIN — Medication 400 MG: at 21:32

## 2019-08-21 RX ADMIN — RIVAROXABAN 20 MG: 20 TABLET, FILM COATED ORAL at 18:26

## 2019-08-21 RX ADMIN — RANOLAZINE 1000 MG: 500 TABLET, FILM COATED, EXTENDED RELEASE ORAL at 18:25

## 2019-08-21 RX ADMIN — MORPHINE SULFATE 1 MG: 4 INJECTION INTRAVENOUS at 09:23

## 2019-08-21 RX ADMIN — SENNOSIDES, DOCUSATE SODIUM 2 TABLET: 50; 8.6 TABLET, FILM COATED ORAL at 18:26

## 2019-08-21 RX ADMIN — CYANOCOBALAMIN TAB 500 MCG 1000 MCG: 500 TAB at 18:26

## 2019-08-21 RX ADMIN — ONDANSETRON 4 MG: 2 INJECTION INTRAMUSCULAR; INTRAVENOUS at 21:32

## 2019-08-21 RX ADMIN — SODIUM CHLORIDE: 9 INJECTION, SOLUTION INTRAVENOUS at 18:25

## 2019-08-21 RX ADMIN — METFORMIN HYDROCHLORIDE 1000 MG: 500 TABLET, FILM COATED ORAL at 16:24

## 2019-08-21 RX ADMIN — ONDANSETRON 4 MG: 2 INJECTION INTRAMUSCULAR; INTRAVENOUS at 14:44

## 2019-08-21 RX ADMIN — ONDANSETRON 4 MG: 2 INJECTION INTRAMUSCULAR; INTRAVENOUS at 11:36

## 2019-08-21 RX ADMIN — ATORVASTATIN CALCIUM 80 MG: 40 TABLET, FILM COATED ORAL at 21:32

## 2019-08-21 RX ADMIN — MORPHINE SULFATE 1 MG: 4 INJECTION INTRAVENOUS at 21:39

## 2019-08-21 RX ADMIN — ONDANSETRON 4 MG: 2 INJECTION INTRAMUSCULAR; INTRAVENOUS at 18:27

## 2019-08-21 RX ADMIN — LISINOPRIL 2.5 MG: 2.5 TABLET ORAL at 16:25

## 2019-08-21 ASSESSMENT — COGNITIVE AND FUNCTIONAL STATUS - GENERAL
STANDING UP FROM CHAIR USING ARMS: A LITTLE
WALKING IN HOSPITAL ROOM: A LITTLE
TOILETING: A LITTLE
SUGGESTED CMS G CODE MODIFIER DAILY ACTIVITY: CI
CLIMB 3 TO 5 STEPS WITH RAILING: A LITTLE
SUGGESTED CMS G CODE MODIFIER MOBILITY: CJ
MOBILITY SCORE: 21
DAILY ACTIVITIY SCORE: 23

## 2019-08-21 ASSESSMENT — ENCOUNTER SYMPTOMS
SORE THROAT: 0
POLYDIPSIA: 0
MYALGIAS: 0
VOMITING: 1
SPUTUM PRODUCTION: 0
PALPITATIONS: 0
DEPRESSION: 0
BLOOD IN STOOL: 0
FALLS: 0
DOUBLE VISION: 0
SHORTNESS OF BREATH: 0
HEMOPTYSIS: 0
DIAPHORESIS: 0
SEIZURES: 0
COUGH: 1
HEARTBURN: 1
EYES NEGATIVE: 1
TINGLING: 0
ABDOMINAL PAIN: 1
WHEEZING: 0
DIARRHEA: 1
PSYCHIATRIC NEGATIVE: 1
DIZZINESS: 1
SENSORY CHANGE: 0
FEVER: 0
HALLUCINATIONS: 0
SINUS PAIN: 0
TREMORS: 0
BLURRED VISION: 0
HEADACHES: 0
CHILLS: 0
NECK PAIN: 0
FLANK PAIN: 0
LOSS OF CONSCIOUSNESS: 0
FOCAL WEAKNESS: 0
NAUSEA: 1

## 2019-08-21 ASSESSMENT — PATIENT HEALTH QUESTIONNAIRE - PHQ9
2. FEELING DOWN, DEPRESSED, IRRITABLE, OR HOPELESS: NOT AT ALL
1. LITTLE INTEREST OR PLEASURE IN DOING THINGS: NOT AT ALL
SUM OF ALL RESPONSES TO PHQ9 QUESTIONS 1 AND 2: 0

## 2019-08-21 ASSESSMENT — LIFESTYLE VARIABLES
HAVE YOU EVER FELT YOU SHOULD CUT DOWN ON YOUR DRINKING: NO
TOTAL SCORE: 0
EVER FELT BAD OR GUILTY ABOUT YOUR DRINKING: NO
ALCOHOL_USE: NO
HAVE PEOPLE ANNOYED YOU BY CRITICIZING YOUR DRINKING: NO
HOW MANY TIMES IN THE PAST YEAR HAVE YOU HAD 5 OR MORE DRINKS IN A DAY: 0
EVER_SMOKED: NEVER
TOTAL SCORE: 0
ON A TYPICAL DAY WHEN YOU DRINK ALCOHOL HOW MANY DRINKS DO YOU HAVE: 0
CONSUMPTION TOTAL: NEGATIVE
TOTAL SCORE: 0
AVERAGE NUMBER OF DAYS PER WEEK YOU HAVE A DRINK CONTAINING ALCOHOL: 0
SUBSTANCE_ABUSE: 0
EVER HAD A DRINK FIRST THING IN THE MORNING TO STEADY YOUR NERVES TO GET RID OF A HANGOVER: NO

## 2019-08-21 NOTE — PROGRESS NOTES
MD Gordon from [facility] at bedside.     Discussed POC. New orders received for q6h accuchecks while pt NPO, SCDs, and stat EKG after pt report of stabbing epigastric pain.

## 2019-08-21 NOTE — ASSESSMENT & PLAN NOTE
Patient has vomiting and diarrhea for a week   Patient's baseline GFR was >60 and baseline Cr. Was < 1 until March 2019  Since march his Renal function has been showing mild fluctuations   Recent labs has shown significant decline in the kidney function   Labs on admission : Cr = 2.11 (  was 1.05 on 08/14/19)                                    BUN = 33 (  was 21  On 08/14/19)                                    GFR = 33    ( was 50s  On 08/14/19)  Renal function GFR=58 / BUN =22 / Cr.=1.28, likely due to dehydration, which improved to GFR greater than 50 and a BUN of 9 with a creatinine of 0.88, with aggressive rehydration and resolution of nausea and vomiting  -Patient will be discharged home, if  found to be ambulating, with no nausea or vomiting and tolerating advancing diet.

## 2019-08-21 NOTE — SENIOR ADMIT NOTE
Senior Admission Note    In summary: Ryan Trevizo is a 54 y.o. male with past medical history of ischemic cardiomyopathy, atrial fibrillation, GERD, DM II and HTN that presented to the ER with abdominal pain, n/v.     Patient reported that he has been having abdominal pain, nausea and vomiting for the past week. He was in the ED last week with similar complaints and at that time all test were negative. Patient was discharged, but continues to have symptoms. Reported 5 episodes of vomiting per day and intermittent abdominal pain. He denies any fever, chills, shortness of breath, hematemesis, no blood in stools.       Assessment and plan in summary:    #Pancreatitis    - patient presented with abdominal pain, n/v   - On exam Larios sign negative, tenderness in epigastric area and mild abdominal distention    - lipase elevated 233 from 46 last week    - triglycerides 240 in 2018, no history of alcohol abuse  #Constipation    - noted on abdominal x-ray   #VALENTINE    - patient Cr 2.11, up from 1.05 on 8/14  #anemia    - Hbg 10.5    - guaiac negative   #GERD   #Atrial fibrillation   #Ischemic Cardiomyopathy   #HTN   #DM II     Plan   - admitted to medicine   - IV fluids   - NPO   - lipid profile   - consider further imaging for evaluation of gallstones   - hold nephrotoxins   - Urine lytes pending   - hold home medications for now   - hypoglycemia protocol       For full plan, please see Intern note for details   Samantha Das M.D.  PGY 3

## 2019-08-21 NOTE — CARE PLAN
Problem: Safety  Goal: Will remain free from falls  Outcome: PROGRESSING AS EXPECTED  Note:   Low risk for falls.  Non skid socks, fall band on, hourly rounding in place, call light within reach, path free of clutter. Pt calls appropriately. Education provided on need to call staff for assistance with mobility and pt states understanding.      Problem: Venous Thromboembolism (VTW)/Deep Vein Thrombosis (DVT) Prevention:  Goal: Patient will participate in Venous Thrombosis (VTE)/Deep Vein Thrombosis (DVT)Prevention Measures  Outcome: PROGRESSING AS EXPECTED  Note:   Xarelto ordered although pt strict NPO, SCDs, encourage ambulation     Problem: Pain Management  Goal: Pain level will decrease to patient's comfort goal  Outcome: PROGRESSING AS EXPECTED  Note:   Using marie-baker scale. Pt reports and pain relieved with ordered prn morphine. Rest, maintain NPO status.

## 2019-08-21 NOTE — ASSESSMENT & PLAN NOTE
Abdominal pain for a duration of more then a week   Associated with nausea, vomiting and diarrhea   No history of recent infections  No sick contact around   No travel outside   No illicit drug use   -Abdominal pain resolved today, no more nausea or vomiting.  Lipase levels decreasing to 22, with patient tolerating regular diet.  Bowel sounds normal.

## 2019-08-21 NOTE — RESPIRATORY CARE
COPD EDUCATION by COPD CLINICAL EDUCATOR  8/21/2019 at 7:10 AM by Reinaldo Giang     Patient's chart reviewed by COPD education team. Patient does not have an order for Respiratory Care Protocol, therefore the COPD education team cannot treat .No HX or DX. Non-smoker

## 2019-08-21 NOTE — PROGRESS NOTES
Paged [facility], received callback from Dr. Gordon    Notified that pt is tolerating PO with no c/o n/v, and has decreased pain, consistently rating 1/10. Requested PO prn pain medication instead of IV morphine. MD states he will order.    Clarified if continuous IVF at 150 still appropriate with added PO intake and void of 500mL q2h. MD states he will decrease rate.     Notified of most recent fsbs 204, requested if home metformin may be restarted. MD states he will order.

## 2019-08-21 NOTE — ASSESSMENT & PLAN NOTE
May 2018: Echocardiogram with normal LV size, LVEF 50%. Mildly dilated RV.  Plan :  Continue lisinopril , metoprolol

## 2019-08-21 NOTE — ASSESSMENT & PLAN NOTE
History of Atrial fibrillations  Patient is on chronic anticoagulation therapy ( Xarelto 20 mg daily )

## 2019-08-21 NOTE — H&P
Internal Medicine Admitting History and Physical    Note Author: Padmini Ramirez M.D.       Name Ryan Trevizo     1965   Age/Sex 54 y.o. male   MRN [id number]   Code Status Full code     After 5PM or if no immediate response to page, please call for cross-coverage  Attending/Team: Dr Crawford /Wyatt See Patient List for primary contact information  Call [phone] to page    1st Call - Day Intern (R1):   Evan 2nd Call - Day Sr. Resident (R2/R3):   Renu       Chief Complaint:   Nausea and vomiting   Abdominal pain     HPI:  Mr Hammonds is a 54 y.o. male who came to the ED with the complaint of abdominal pain associated with nausea and vomiting, insidious in onset ,intermittent aching type, pain intensity 5/10 to 7/10 with no alleviating or exacerbating factors. . vomiting 5-6/day , bilious , no hemetemesis .also complaints of diarrhea for 1 week  . Patient states that he has been having similar symptoms for a month but it has increased in intensity since last 1 week. He had a ED visit  last week  and had  abdominal work-up done that was negative and inconclusive for the cause.  Patient says  pain is in the upper abdomen , more in the epigastric and RUQ region ( no lower abdomen pain) and it does not radiate to the chest or back.  denies any flank pain , chest pain, palpitations or shortness of breath. Pain has no association with food intake. No history of recent travel outside the country .Denies any recent infections. History of Gastritis present for which he takes PPIs.     Larios's sign negative   Tenderness present  in epigastric region   Mild distension of the abdomen noticed    low Hb levels = 10.2 (  Vs Hb =12 on 19)  Per rectal examination = Guaiac  negative   Labs : Cr = 2.11 (  was 1.05 on 19)             BUN = 33 (  was 21  On 19)             GFR = 33    ( was 50s  On 19)             Lipases = 233 ( was  46 on 19)             LFT = normal              Blood  glucose = stable , 85              UA = normal         Review of Systems   Constitutional: Positive for malaise/fatigue. Negative for chills, diaphoresis and fever.        Patient says he was feeling hot but doesn't know if he had fever at home.   HENT: Negative.  Negative for congestion, sinus pain and sore throat.    Eyes: Negative.  Negative for blurred vision and double vision.   Respiratory: Positive for cough. Negative for hemoptysis, sputum production, shortness of breath and wheezing.         Dry cough   Cardiovascular: Negative for chest pain, palpitations and leg swelling.   Gastrointestinal: Positive for abdominal pain, diarrhea, heartburn, nausea and vomiting. Negative for blood in stool and melena.        Denies radiation of abdominal pain to the back   Genitourinary: Negative for dysuria, flank pain, frequency and urgency.   Musculoskeletal: Positive for joint pain. Negative for falls, myalgias and neck pain.   Skin: Negative.    Neurological: Positive for dizziness. Negative for tingling, tremors, sensory change, focal weakness, seizures, loss of consciousness and headaches.        Feeling lightheaded     Endo/Heme/Allergies: Negative.  Negative for polydipsia.   Psychiatric/Behavioral: Negative.  Negative for depression, hallucinations, substance abuse and suicidal ideas.             Past Medical History (Chronic medical problem, known complications and current treatment)      GERD  Hypertension ,dyslipidemia and Diabetes type 2   Atherosclerosis , claudication    ACS ,CAD  Pericarditis secondary to acute MI ( 2018)- STEMI   Ischemic cardiomyopathy ( 05/2018)  paroxysmal A-fib ,chronic anticoagulation( Rivaroxaban 20 mg)    sick sinus syndrome , syncope ( pacemaker placement  In 02/12/19)  Osteoarthritis   Glaucoma   ? Mental retardation      Past Surgical History:  Past Surgical History:   Procedure Laterality Date   • PACEMAKER INSERTION Left 02/12/2019    Med[employer]ym VALLEJO DR MRI [id number] implanted  "by Dr. Blake.   • OTHER CARDIAC SURGERY Left 01/12/2018    Medtronic Reveal LINQ [id number] implanted by Dr. Blake   • GASTROSCOPY-ENDO  5/19/2017    Procedure: GASTROSCOPY-ENDO;  Surgeon: Reinaldo Berry M.D.;  Location: [facility];  Service:    • GASTROSCOPY-ENDO  7/26/2012    Performed by JORDIN VERA at END[facility]COPY [facility]   • GASTROSCOPY-ENDO  7/25/2012    Performed by JORDIN VERA at ENDOSCOPY [facility]   • OTHER ORTHOPEDIC SURGERY  7-     R knee surgery   • OTHER CARDIAC SURGERY      stent placement       Current Outpatient Medications:  Home Medications     Reviewed by Brian Walter (Pharmacy Tech) on 08/20/19 at 2232  Med List Status: Complete   Medication Last Dose Status   aspirin EC (ECOTRIN) 81 MG Tablet Delayed Response 8/20/2019 Active   atorvastatin (LIPITOR) 80 MG tablet 8/20/2019 Active   cyanocobalamin (VITAMIN B12) 1000 MCG Tab 8/20/2019 Active   cyclobenzaprine (FLEXERIL) 10 MG Tab 8/19/2019 Active   dicyclomine (BENTYL) 20 MG Tab 8/19/2019 Active   lisinopril (PRINIVIL) 2.5 MG Tab 8/20/2019 Active   loratadine (CLARITIN) 10 MG Tab 8/20/2019 Active   metformin (GLUCOPHAGE) 1000 MG tablet 8/20/2019 Active   metoprolol SR (TOPROL XL) 25 MG TABLET SR 24 HR 8/20/2019 Active   pantoprazole (PROTONIX) 40 MG Tablet Delayed Response 8/20/2019 Active   Ranolazine 1000 MG TABLET SR 12 HR 8/20/2019 Active   rivaroxaban (XARELTO) 20 MG Tab tablet 8/19/2019 Active   vitamin D (CHOLECALCIFEROL) 1000 UNIT Tab 8/20/2019 Active                Medication Allergy/Sensitivities:  Allergies   Allergen Reactions   • Ritalin [Methylphenidate] Unspecified     \"Hyper\"         Family History (mandatory)   No family history on file.    Social History (mandatory)   Social History     Socioeconomic History   • Marital status: Single     Spouse name: Not on file   • Number of children: Not on file   • Years of education: Not on file   • Highest education level: Not on file   "   Occupational History   • Not on file   Social Needs   • Financial resource strain: Not on file   • Food insecurity:     Worry: Not on file     Inability: Not on file   • Transportation needs:     Medical: Not on file     Non-medical: Not on file   Tobacco Use   • Smoking status: Never Smoker   • Smokeless tobacco: Never Used   Substance and Sexual Activity   • Alcohol use: No   • Drug use: No   • Sexual activity: Not on file     Comment: Single, has no children, works as an .   Lifestyle   • Physical activity:     Days per week: Not on file     Minutes per session: Not on file   • Stress: Not on file   Relationships   • Social connections:     Talks on phone: Not on file     Gets together: Not on file     Attends Voodoo service: Not on file     Active member of club or organization: Not on file     Attends meetings of clubs or organizations: Not on file     Relationship status: Not on file   • Intimate partner violence:     Fear of current or ex partner: Not on file     Emotionally abused: Not on file     Physically abused: Not on file     Forced sexual activity: Not on file   Other Topics Concern   • Not on file   Social History Narrative    ** Merged History Encounter **         ** Merged History Encounter **          Living situation: lives alone  PCP : Juancho Jenkins D.O.    Physical Exam     Vitals:    08/20/19 1738 08/20/19 2130 08/21/19 0023 08/21/19 0107   BP: 110/80 115/72 103/62 127/75   Pulse: 60 62 71 68   Resp: 18 18 18 18   Temp:    36.3 °C (97.4 °F)   TempSrc:    Temporal   SpO2: 97% 98% 95% 94%   Weight:       Height:         Body mass index is 25.8 kg/m².  O2 therapy: Pulse Oximetry: 94 %, O2 (LPM): 0, O2 Delivery: None (Room Air)    Physical Exam   Constitutional: He is oriented to person, place, and time.   Well developed and well nourished .  Patient is in mild distress   HENT:   Head: Normocephalic and atraumatic.   Mouth/Throat: No oropharyngeal exudate.   Dry oral mucosa   MP  score 3    Eyes: Pupils are equal, round, and reactive to light. EOM are normal. No scleral icterus.   Neck: Normal range of motion. Neck supple. No thyromegaly present.   Cardiovascular: Normal rate, regular rhythm, normal heart sounds and intact distal pulses.   No murmur heard.  Pulmonary/Chest: Effort normal and breath sounds normal. No respiratory distress. He has no wheezes. He exhibits no tenderness.   Abdominal: Soft. Bowel sounds are normal. He exhibits distension. He exhibits no mass. There is tenderness. There is no rebound and no guarding.   No CVA tenderness     Genitourinary: Rectum normal. Rectal exam shows guaiac negative stool.   Genitourinary Comments: Per rectal examination = stool present in the rectum - normal consistency    Musculoskeletal: Normal range of motion. He exhibits no edema or tenderness.   Lymphadenopathy:     He has no cervical adenopathy.   Neurological: He is alert and oriented to person, place, and time. No cranial nerve deficit. Gait normal.   Skin: Skin is warm.   Psychiatric: Mood, memory and affect normal.         Data Review       Old Records Request:   Completed  Current Records review/summary: Completed    Lab Data Review:  Recent Results (from the past 24 hour(s))   URINALYSIS,CULTURE IF INDICATED    Collection Time: 08/20/19  6:55 PM   Result Value Ref Range    Color DK Yellow     Character Clear     Specific Gravity 1.019 <1.035    Ph 5.0 5.0 - 8.0    Glucose Negative Negative mg/dL    Ketones Trace (A) Negative mg/dL    Protein Negative Negative mg/dL    Bilirubin Negative Negative    Urobilinogen, Urine 1.0 Negative    Nitrite Negative Negative    Leukocyte Esterase Negative Negative    Occult Blood Negative Negative    Micro Urine Req see below    COMP METABOLIC PANEL    Collection Time: 08/20/19  8:57 PM   Result Value Ref Range    Sodium 135 135 - 145 mmol/L    Potassium 4.2 3.6 - 5.5 mmol/L    Chloride 107 96 - 112 mmol/L    Co2 20 20 - 33 mmol/L    Anion Gap 8.0  0.0 - 11.9    Glucose 85 65 - 99 mg/dL    Bun 33 (H) 8 - 22 mg/dL    Creatinine 2.11 (H) 0.50 - 1.40 mg/dL    Calcium 8.7 8.5 - 10.5 mg/dL    AST(SGOT) 15 12 - 45 U/L    ALT(SGPT) 16 2 - 50 U/L    Alkaline Phosphatase 60 30 - 99 U/L    Total Bilirubin 1.3 0.1 - 1.5 mg/dL    Albumin 4.2 3.2 - 4.9 g/dL    Total Protein 6.5 6.0 - 8.2 g/dL    Globulin 2.3 1.9 - 3.5 g/dL    A-G Ratio 1.8 g/dL   LIPASE    Collection Time: 08/20/19  8:57 PM   Result Value Ref Range    Lipase 233 (H) 11 - 82 U/L   TROPONIN    Collection Time: 08/20/19  8:57 PM   Result Value Ref Range    Troponin T 9 6 - 19 ng/L   ESTIMATED GFR    Collection Time: 08/20/19  8:57 PM   Result Value Ref Range    GFR If  39 (A) >60 mL/min/1.73 m 2    GFR If Non  33 (A) >60 mL/min/1.73 m 2   CBC WITH DIFFERENTIAL    Collection Time: 08/20/19 11:10 PM   Result Value Ref Range    WBC 5.6 4.8 - 10.8 K/uL    RBC 3.26 (L) 4.70 - 6.10 M/uL    Hemoglobin 10.5 (L) 14.0 - 18.0 g/dL    Hematocrit 31.5 (L) 42.0 - 52.0 %    MCV 96.6 81.4 - 97.8 fL    MCH 32.2 27.0 - 33.0 pg    MCHC 33.3 (L) 33.7 - 35.3 g/dL    RDW 45.1 35.9 - 50.0 fL    Platelet Count 224 164 - 446 K/uL    MPV 9.3 9.0 - 12.9 fL    Neutrophils-Polys 64.70 44.00 - 72.00 %    Lymphocytes 24.90 22.00 - 41.00 %    Monocytes 6.60 0.00 - 13.40 %    Eosinophils 2.90 0.00 - 6.90 %    Basophils 0.50 0.00 - 1.80 %    Immature Granulocytes 0.40 0.00 - 0.90 %    Nucleated RBC 0.00 /100 WBC    Neutrophils (Absolute) 3.61 1.82 - 7.42 K/uL    Lymphs (Absolute) 1.39 1.00 - 4.80 K/uL    Monos (Absolute) 0.37 0.00 - 0.85 K/uL    Eos (Absolute) 0.16 0.00 - 0.51 K/uL    Baso (Absolute) 0.03 0.00 - 0.12 K/uL    Immature Granulocytes (abs) 0.02 0.00 - 0.11 K/uL    NRBC (Absolute) 0.00 K/uL       Imaging/Procedures Review:    Independant Imaging Review: Completed     [id number] VIEW   Final Result         Moderate amount of stool throughout the colon.               Records reviewed and summarized  in current documentation :  Yes  [employer] teaching service handout given to patient:  Yes         Assessment/Plan     Idiopathic acute pancreatitis- (present on admission)  Assessment & Plan  Abdominal pain - epigastric pain for a week  Nausea and vomiting for 1 week   Abnormal lipase levels = 233 ( was normal a week earlier = 46)  No history of alcohol abuse  No past history of Gallstones   last Triglyceride levels was 240 in December 2018    Plan:  Patient is kept NPO   Iv fluids   Lipid profile pending  Repeat labs tomorrow   Ultrasound abdomen pending    VALENTINE (acute kidney injury) (HCC)- (present on admission)  Assessment & Plan  Patient has vomiting and diarrhea for a week   Patient's baseline GFR was >60 and baseline Cr. Was < 1 until March 2019  Since march his Renal function has been showing mild fluctuations   Recent labs has shown significant decline in the kidney function   Present Labs : Cr = 2.11 (  was 1.05 on 08/14/19)                           BUN = 33 (  was 21  On 08/14/19)                           GFR = 33    ( was 50s  On 08/14/19)  DDx : dehydration vs toxins or  medications ( metformin?)     Plan :    Iv fluids   Urine electrolytes pending   Hold nephrotoxic medications ( metformin)  May consider nephrology consult     GERD with Nieves's esophagus- (present on admission)  Assessment & Plan  Patient has history of gastritis and GERD   Previous Endoscopy was done in 2017 - Nieves's esophagus   Patient is taking omeprazole 40 mg ER    Plan :   Hold for now ( patient is NPO  And because of VALENTINE )  Restart at discharge  X ray abdomen - normal with moderate stool through out the colon  , no gas under the diaphragm       Abdominal pain- (present on admission)  Assessment & Plan  Abdominal pain for a duration of more then a week   Associated with nausea, vomiting and diarrhea   No history of recent infections  No sick contact around   No travel outside   No illicit drug use   ? Gastroenteritis (  questionable since  duration more then a week)  hisotry of Gastritis with GERD  Lipase  high 233 , normal LFTs    Plan:  Iv fluids   PRN Zofran iv  PRN morphine iv  Abdominal X ray = Normal ( moderate amount of stool in colon)  Ultrasound abdomen pending         Essential hypertension- (present on admission)  Assessment & Plan  Blood pressure is normal 127/75 mm of Hg       At present NPO so hold oral medications  Consider restarting once patient is allowed to take oral intake      PAF (paroxysmal atrial fibrillation) ([facility])- (present on admission)  Assessment & Plan  History of Atrial fibrillations  Patient is on chronic anticoagulation therapy ( Xarelto 20 mg daily )    Plan:   Patient had last dose yesterday( adjusted dose for VALENTINE = 15 mg daily )   At present on hold since patient is NPO   Consider Heparin infusion if patient needs to be NPO for >1 day   Restart the oral medication once he starts oral intake       Type 2 diabetes mellitus without complication, without long-term current use of insulin ([facility])- (present on admission)  Assessment & Plan  Patient has history of diabetes .  He is on Metformin 1000 mg BID  At present random  blood glucose was normal = 85   Patient has acute kidney injury with Cr of 2.11     Plan :  Hold metformin   Consider Insulin if blood glucose levels go high   Hypoglycemia protocol active    Ischemic Cardiomyopathy EF 50%- (present on admission)  Assessment & Plan  May 2018: Echocardiogram with normal LV size, LVEF 50%. Mildly dilated RV.    Plan :  Hold the cardia medications for now since patient is NPO   Restart once oral intake starts.    Dictation #1  MRN:[id number]  CSN:[id number]      Anticipated Hospital stay:  >2 midnights        Quality Measures  Quality-Core Measures   Reviewed items::  Labs reviewed, Medications reviewed and Radiology images reviewed  Luque catheter::  No Luque  DVT: Rivaroxaban 15 mg     PCP: Juancho Jenkins D.O.      -----------------------    [facility]  INTERNAL MEDICINE ATTENDING NOTE:   Zac Crawford MD      Visit Time:   Attending/resident bedside rounds 9-11:30 AM     PATIENT ID  Name:             Ryan Trevizo   YOB: 1965  Age:                 54 y.o.  male   MRN:               [id number]  Admit:  8/20/2019     I saw and examined the patient and discussed the management with the resident staff.  I reviewed the resident's note and agree with the resident's findings and plan as documented in the resident's note except as documented in the attending note. Please reference resident daily note for complete information.    The chart was reviewed and summarized.  Available labs, imaging, O2 sats ,  EKGs were reviewed. Available nursing, consultant, and resident notes were reviewed. I am actively involved in the patient's care.                                                                            ______________________________________________________________________            54(   )  INTERVAL:  Chart reviewed/summarized,       PM: AF, H R60, , 94% RA, MC 21+23  talking PO pain improved, CMP : K 4.5, CO2 21, Lipase repeat 43   WBC 4, HB 10,    Plan: restart PO, advance diet      Aug 21: AF, HR65, , 92% RA, MC 21+23  - alert, subjective NV, but hungry, abd distended, no peritonitis, CHELI :neg  wbc 5.6, bh 10.5, ptl 224 , Na 135, K 4.2, CO2 20, Bs 85, BUN 33, CR 2.11, CA 8.7, alb 4.2,      Impression:   Acute/chronic abd pain -- acute/chronic pancreatitis (no calcifications, no malabsorption, wht OK ) , no gallstones on recent imaging  DM2 _ metformin if GFR stable   HFrEF - stable restart meds   pAFIB, xarelto   FE def anemia , stool heme, celiac panel - PCP f/u , GI referral     meds:  CV (  asa, STATIN, TOPROXL 12.5 , ACE, MG xarelto 15mg, ranolazine/NA channel blocker  )  IV NS , metformin, PPI/zofran, VIT D     CORE:  Code Status (   FULL  --------------------------------------------------------------------------------------------------  Hospital Summary/ Patient System Review      NP:   *admit(  Developmental delay , ACUTE: AO4 , nonfocal      Jan 2018:  carotid US:  ASVD, nonstenotic      EENT:   *admit(  Glaucoma, mP 3      MSK/PAIN:   *admit(  ambulatroy , OA      CVS:   *admit(  hx DVT/IVC filter, DLD, HTN/3V CAD/HFrEF/  AICD/pacer , pAFIB/IVC filter/Xarelto, ASA, statin  ACUTE:   RRR, no rales, no JVD  Impression: HFrEF/IHD, remote DVT/2012/provoked   Plan: BBL XL, ASA, statin, ACE, ASA, statin, xarelto, ranolazine 1000 BID     March 2019: EF 50%, 1+DD, inferolaterla wall, basal inf wall hypokinesis, RV wnl, AICD, RA wnl, LA 28/normal, aortic sclerosis, mod AI/eccentric, no effusion, root 3.6cm   MPI AU g2019:  mod fixed defect inferilater wall, no ischemia , EF 46%      PULM:   *admit(  never smoker      July 2019: ASVD/CAD, no infiltrates chest, LUQ ileus ? , no fluid/no free air     GI:   *admit(  GERD,  hx GIB  , chronic abd pain, neg eval,  PPI ACUTE: NVD x 2 weeks, frequent ER visits,   Ast, ALT, ALP/BR wnl, ALB 4.2, LIPASE 233, axr: ++stool, NO FREE AIR , BMI 26, CHELI: neg heme  Lipase 200+ --> 43   CT 8/14: no contrast: normal liver, no biliary/GB pathology, no stones   Impression: chemical pancreatitis (resolved on  repeat 8h , likely  false POS vs gallstone pancreatitis  , no gallstones on recent scan CT scan however  , US 2016 no stones  ) , motility disorder more likely (anticholinergics/Flexaril)   Plan: fluid resuscitation, on time NV meds, avoid narcotics and other GI toxic meds  if possible , bowel program , MG, miralax, PO fluids , ambulation , consider US (more sensitive for gallstones)      EGD: 2017: no HPYLORI, ischemic gastritis, intestinal metaplasia , no dysplasia  2016: US: fatty liver, HSM no GB pathology     :   *admit(  ua NEG     Aug 14 2019: CT: AICD, no mass, no biilary abn, CBD wnl, no fluid, normal  kidnesy, bowel wnl      RENAL:   *admit(  upr 20/230, Mirian 70/230 , Na 135, K 4.2, CO2 20, BS 85, BUN 33, CR 2.11 (1.09) , CA 8.7/ALB 4.2,      HEME:   *admit(  WBC 5.6, HB 10-11,  , RETIC 3.4 , ferrint 19, Foalte 14, B12 703  --> WBC 4, ANC 2680,  Low   Impression: NN anemia, CYNTHIA  Plan: retic, celiac , stool heme     ENDO:   *admit(  DM2/glucophage, ACUTE: ldl 15, hdl 25, TSH 3.210  HA1C in 6.1 range (6-10 in past)    Impression: DM2, stable on meds   Plan: metformin     DERM/BREAST:   *admit(       ID:   *admit(  no SIRS or QSOFA

## 2019-08-21 NOTE — ASSESSMENT & PLAN NOTE
Abdominal pain - epigastric pain for a week  Nausea and vomiting for 1 week   Abnormal lipase levels = 233 ( was normal a week earlier = 46)  No history of alcohol abuse  No past history of Gallstones   last Triglyceride levels was 240 in December 2018    Plan:  Advancing diet from clear liquids to full regular diet.  Zofran for nausea, as needed  Plenty of oral fluids, and monitoring

## 2019-08-21 NOTE — PROGRESS NOTES
2 RN skin check completed with SARAHI Zendejas.   Devices in place None.  Skin assessed under devices N/A.  Confirmed pressure ulcers found on None.

## 2019-08-21 NOTE — DISCHARGE PLANNING
Care Transition Team Assessment    IGNACIO Navarro Listed as contace [phone]    Information Source  Orientation : Oriented x 4  Information Given By: Other (Comments)(medical record)  Who is responsible for making decisions for patient? : Patient    Readmission Evaluation  Is this a readmission?: No    Elopement Risk  Legal Hold: No  Ambulatory or Self Mobile in Wheelchair: Yes  Disoriented: No  Psychiatric Symptoms: None  History of Wandering: No  Elopement this Admit: No  Vocalizing Wanting to Leave: No  Displays Behaviors, Body Language Wanting to Leave: No-Not at Risk for Elopement  Elopement Risk: Not at Risk for Elopement    Interdisciplinary Discharge Planning  Patient or legal guardian wants to designate a caregiver (see row info): No              Finances  Financial Barriers to Discharge: No  Prescription Coverage: Yes                   Domestic Abuse  Have you ever been the victim of abuse or violence?: No  Physical Abuse or Sexual Abuse: No  Verbal Abuse or Emotional Abuse: No  Possible Abuse Reported to:: Not Applicable    Psychological Assessment  History of Substance Abuse: None  History of Psychiatric Problems: No  Non-compliant with Treatment: No  Newly Diagnosed Illness: No         Anticipated Discharge Information  Anticipated discharge disposition: Home  Discharge Address: [address] [place] [address]  Discharge Contact Phone Number: [phone]

## 2019-08-21 NOTE — ASSESSMENT & PLAN NOTE
Patient has history of gastritis and GERD   Previous Endoscopy was done in 2017 - Nieves's esophagus   Patient is taking omeprazole 40 mg ER

## 2019-08-21 NOTE — PROGRESS NOTES
Seen by ER for nausea, vomiting and diarrhea. No blood in stool or urine. Labs unchanged. She have follow up labs in 3 months. Nov 2019 EMILY Almaraz.

## 2019-08-21 NOTE — ED NOTES
Med rec updated and complete. Allergies reviewed. Pt denies   antibiotic use in last 14 days.  All morning doses taken.  Home pharmacy [facility].

## 2019-08-22 ENCOUNTER — PATIENT OUTREACH (OUTPATIENT)
Dept: HEALTH INFORMATION MANAGEMENT | Facility: OTHER | Age: 54
End: 2019-08-22

## 2019-08-22 VITALS
BODY MASS INDEX: 25.9 KG/M2 | HEART RATE: 76 BPM | RESPIRATION RATE: 18 BRPM | HEIGHT: 71 IN | SYSTOLIC BLOOD PRESSURE: 106 MMHG | WEIGHT: 184.97 LBS | TEMPERATURE: 97.4 F | DIASTOLIC BLOOD PRESSURE: 71 MMHG | OXYGEN SATURATION: 94 %

## 2019-08-22 LAB
ALBUMIN SERPL BCP-MCNC: 3.6 G/DL (ref 3.2–4.9)
ALBUMIN/GLOB SERPL: 1.7 G/DL
ALP SERPL-CCNC: 59 U/L (ref 30–99)
ALT SERPL-CCNC: 15 U/L (ref 2–50)
ANION GAP SERPL CALC-SCNC: 9 MMOL/L (ref 0–11.9)
AST SERPL-CCNC: 13 U/L (ref 12–45)
BASOPHILS # BLD AUTO: 0.2 % (ref 0–1.8)
BASOPHILS # BLD: 0.01 K/UL (ref 0–0.12)
BILIRUB SERPL-MCNC: 1.2 MG/DL (ref 0.1–1.5)
BUN SERPL-MCNC: 9 MG/DL (ref 8–22)
CALCIUM SERPL-MCNC: 8.7 MG/DL (ref 8.5–10.5)
CHLORIDE SERPL-SCNC: 108 MMOL/L (ref 96–112)
CO2 SERPL-SCNC: 21 MMOL/L (ref 20–33)
CREAT SERPL-MCNC: 0.88 MG/DL (ref 0.5–1.4)
EKG IMPRESSION: NORMAL
EOSINOPHIL # BLD AUTO: 0.14 K/UL (ref 0–0.51)
EOSINOPHIL NFR BLD: 3.2 % (ref 0–6.9)
ERYTHROCYTE [DISTWIDTH] IN BLOOD BY AUTOMATED COUNT: 45.7 FL (ref 35.9–50)
GLOBULIN SER CALC-MCNC: 2.1 G/DL (ref 1.9–3.5)
GLUCOSE BLD-MCNC: 103 MG/DL (ref 65–99)
GLUCOSE BLD-MCNC: 105 MG/DL (ref 65–99)
GLUCOSE SERPL-MCNC: 114 MG/DL (ref 65–99)
HCT VFR BLD AUTO: 31.8 % (ref 42–52)
HGB BLD-MCNC: 10.5 G/DL (ref 14–18)
IMM GRANULOCYTES # BLD AUTO: 0.02 K/UL (ref 0–0.11)
IMM GRANULOCYTES NFR BLD AUTO: 0.5 % (ref 0–0.9)
LIPASE SERPL-CCNC: 22 U/L (ref 11–82)
LYMPHOCYTES # BLD AUTO: 1.13 K/UL (ref 1–4.8)
LYMPHOCYTES NFR BLD: 26.2 % (ref 22–41)
MCH RBC QN AUTO: 32.2 PG (ref 27–33)
MCHC RBC AUTO-ENTMCNC: 33 G/DL (ref 33.7–35.3)
MCV RBC AUTO: 97.5 FL (ref 81.4–97.8)
MONOCYTES # BLD AUTO: 0.28 K/UL (ref 0–0.85)
MONOCYTES NFR BLD AUTO: 6.5 % (ref 0–13.4)
NEUTROPHILS # BLD AUTO: 2.74 K/UL (ref 1.82–7.42)
NEUTROPHILS NFR BLD: 63.4 % (ref 44–72)
NRBC # BLD AUTO: 0 K/UL
NRBC BLD-RTO: 0 /100 WBC
PLATELET # BLD AUTO: 210 K/UL (ref 164–446)
PMV BLD AUTO: 9.4 FL (ref 9–12.9)
POTASSIUM SERPL-SCNC: 4.1 MMOL/L (ref 3.6–5.5)
PROT SERPL-MCNC: 5.7 G/DL (ref 6–8.2)
RBC # BLD AUTO: 3.26 M/UL (ref 4.7–6.1)
SODIUM SERPL-SCNC: 138 MMOL/L (ref 135–145)
WBC # BLD AUTO: 4.3 K/UL (ref 4.8–10.8)

## 2019-08-22 PROCEDURE — 83690 ASSAY OF LIPASE: CPT

## 2019-08-22 PROCEDURE — 82962 GLUCOSE BLOOD TEST: CPT | Mod: 91

## 2019-08-22 PROCEDURE — 80053 COMPREHEN METABOLIC PANEL: CPT

## 2019-08-22 PROCEDURE — 700105 HCHG RX REV CODE 258: Performed by: STUDENT IN AN ORGANIZED HEALTH CARE EDUCATION/TRAINING PROGRAM

## 2019-08-22 PROCEDURE — A9270 NON-COVERED ITEM OR SERVICE: HCPCS | Performed by: STUDENT IN AN ORGANIZED HEALTH CARE EDUCATION/TRAINING PROGRAM

## 2019-08-22 PROCEDURE — 700102 HCHG RX REV CODE 250 W/ 637 OVERRIDE(OP): Performed by: STUDENT IN AN ORGANIZED HEALTH CARE EDUCATION/TRAINING PROGRAM

## 2019-08-22 PROCEDURE — 700102 HCHG RX REV CODE 250 W/ 637 OVERRIDE(OP): Performed by: INTERNAL MEDICINE

## 2019-08-22 PROCEDURE — 99239 HOSP IP/OBS DSCHRG MGMT >30: CPT | Mod: GC | Performed by: INTERNAL MEDICINE

## 2019-08-22 PROCEDURE — 36415 COLL VENOUS BLD VENIPUNCTURE: CPT

## 2019-08-22 PROCEDURE — 82784 ASSAY IGA/IGD/IGG/IGM EACH: CPT

## 2019-08-22 PROCEDURE — A9270 NON-COVERED ITEM OR SERVICE: HCPCS | Performed by: INTERNAL MEDICINE

## 2019-08-22 PROCEDURE — 83516 IMMUNOASSAY NONANTIBODY: CPT

## 2019-08-22 PROCEDURE — 700111 HCHG RX REV CODE 636 W/ 250 OVERRIDE (IP): Performed by: STUDENT IN AN ORGANIZED HEALTH CARE EDUCATION/TRAINING PROGRAM

## 2019-08-22 PROCEDURE — 85025 COMPLETE CBC W/AUTO DIFF WBC: CPT

## 2019-08-22 RX ORDER — ACETAMINOPHEN 325 MG/1
650 TABLET ORAL EVERY 4 HOURS PRN
Status: DISCONTINUED | OUTPATIENT
Start: 2019-08-22 | End: 2019-08-22 | Stop reason: HOSPADM

## 2019-08-22 RX ORDER — OMEPRAZOLE 20 MG/1
20 CAPSULE, DELAYED RELEASE ORAL DAILY
Qty: 30 CAP | Refills: 0 | Status: SHIPPED | OUTPATIENT
Start: 2019-08-23 | End: 2019-09-20 | Stop reason: SDUPTHER

## 2019-08-22 RX ADMIN — METOPROLOL SUCCINATE 12.5 MG: 25 TABLET, EXTENDED RELEASE ORAL at 05:42

## 2019-08-22 RX ADMIN — CYANOCOBALAMIN TAB 500 MCG 1000 MCG: 500 TAB at 05:46

## 2019-08-22 RX ADMIN — DICYCLOMINE HYDROCHLORIDE 20 MG: 20 TABLET ORAL at 05:50

## 2019-08-22 RX ADMIN — SENNOSIDES, DOCUSATE SODIUM 2 TABLET: 50; 8.6 TABLET, FILM COATED ORAL at 05:46

## 2019-08-22 RX ADMIN — ASPIRIN 81 MG: 81 TABLET, COATED ORAL at 06:00

## 2019-08-22 RX ADMIN — ONDANSETRON 4 MG: 2 INJECTION INTRAMUSCULAR; INTRAVENOUS at 10:27

## 2019-08-22 RX ADMIN — SODIUM CHLORIDE: 9 INJECTION, SOLUTION INTRAVENOUS at 07:22

## 2019-08-22 RX ADMIN — OMEPRAZOLE 20 MG: 20 CAPSULE, DELAYED RELEASE ORAL at 05:42

## 2019-08-22 RX ADMIN — VITAMIN D, TAB 1000IU (100/BT) 1000 UNITS: 25 TAB at 05:46

## 2019-08-22 RX ADMIN — LISINOPRIL 2.5 MG: 2.5 TABLET ORAL at 05:42

## 2019-08-22 RX ADMIN — ONDANSETRON 4 MG: 2 INJECTION INTRAMUSCULAR; INTRAVENOUS at 03:49

## 2019-08-22 RX ADMIN — RANOLAZINE 1000 MG: 500 TABLET, FILM COATED, EXTENDED RELEASE ORAL at 05:46

## 2019-08-22 RX ADMIN — METFORMIN HYDROCHLORIDE 1000 MG: 500 TABLET, FILM COATED ORAL at 08:12

## 2019-08-22 RX ADMIN — LORATADINE 10 MG: 10 TABLET ORAL at 05:42

## 2019-08-22 NOTE — DISCHARGE INSTRUCTIONS
Discharge Instructions    Discharged to home by taxi with self. Discharged via wheelchair, hospital escort: Yes.  Special equipment needed: Not Applicable    Be sure to schedule a follow-up appointment with your primary care doctor or any specialists as instructed.     Discharge Plan:   Diet Plan: Discussed  Activity Level: Discussed  Confirmed Follow up Appointment: Patient to Call and Schedule Appointment  Confirmed Symptoms Management: Discussed  Medication Reconciliation Updated: Yes  Influenza Vaccine Indication: Indicated: Not available from distributor/    I understand that a diet low in cholesterol, fat, and sodium is recommended for good health. Unless I have been given specific instructions below for another diet, I accept this instruction as my diet prescription.     Special Instructions: Discharge instructions for the Orthopedic Patient    Follow up with Primary Care Physician within 2 weeks of discharge to home, regarding:  Review of medications and diagnostic testing.  Surveillance for medical complications.  Workup and treatment of osteoporosis, if appropriate.       Acute Pancreatitis  Introduction  Acute pancreatitis is a condition in which the pancreas suddenly gets irritated and swollen (has inflammation). The pancreas is a large gland behind the stomach. It makes enzymes that help to digest food. The pancreas also makes hormones that help to control your blood sugar. Acute pancreatitis happens when the enzymes attack the pancreas and damage it. Most attacks last a couple of days and can cause serious problems.  Follow these instructions at home:  Eating and drinking  · Follow instructions from your doctor about diet. You may need to:  ¨ Avoid alcohol.  ¨ Limit how much fat is in your diet.  · Eat small meals often. Avoid eating big meals.  · Drink enough fluid to keep your pee (urine) clear or pale yellow.  · Do not drink alcohol if it caused your condition.  General  instructions  · Take over-the-counter and prescription medicines only as told by your doctor.  · Do not use any tobacco products. These include cigarettes, chewing tobacco, and e-cigarettes. If you need help quitting, ask your doctor.  · Get plenty of rest.  · If directed, check your blood sugar at home as told by your doctor.  · Keep all follow-up visits as told by your doctor. This is important.  Contact a doctor if:  · You do not get better as quickly as expected.  · You have new symptoms.  · Your symptoms get worse.  · You have lasting pain or weakness.  · You continue to feel sick to your stomach (nauseous).  · You get better and then you have another pain attack.  · You have a fever.  Get help right away if:  · You cannot eat or keep fluids down.  · Your pain becomes very bad.  · Your skin or the white part of your eyes turns yellow (jaundice).  · You throw up (vomit).  · You feel dizzy or you pass out (faint).  · Your blood sugar is high (over 300 mg/dL).  This information is not intended to replace advice given to you by your health care provider. Make sure you discuss any questions you have with your health care provider.  Document Released: 06/05/2009 Document Revised: 05/25/2017 Document Reviewed: 09/20/2016  © 2017 Elsevier        Constipation, Adult  Constipation is when a person:  · Poops (has a bowel movement) fewer times in a week than normal.  · Has a hard time pooping.  · Has poop that is dry, hard, or bigger than normal.  Follow these instructions at home:  Eating and drinking  · Eat foods that have a lot of fiber, such as:  ¨ Fresh fruits and vegetables.  ¨ Whole grains.  ¨ Beans.  · Eat less of foods that are high in fat, low in fiber, or overly processed, such as:  ¨ French fries.  ¨ Hamburgers.  ¨ Cookies.  ¨ Candy.  ¨ Soda.  · Drink enough fluid to keep your pee (urine) clear or pale yellow.  General instructions  · Exercise regularly or as told by your doctor.  · Go to the restroom when you  feel like you need to poop. Do not hold it in.  · Take over-the-counter and prescription medicines only as told by your doctor. These include any fiber supplements.  · Do pelvic floor retraining exercises, such as:  ¨ Doing deep breathing while relaxing your lower belly (abdomen).  ¨ Relaxing your pelvic floor while pooping.  · Watch your condition for any changes.  · Keep all follow-up visits as told by your doctor. This is important.  Contact a doctor if:  · You have pain that gets worse.  · You have a fever.  · You have not pooped for 4 days.  · You throw up (vomit).  · You are not hungry.  · You lose weight.  · You are bleeding from the anus.  · You have thin, pencil-like poop (stool).  Get help right away if:  · You have a fever, and your symptoms suddenly get worse.  · You leak poop or have blood in your poop.  · Your belly feels hard or bigger than normal (is bloated).  · You have very bad belly pain.  · You feel dizzy or you faint.  This information is not intended to replace advice given to you by your health care provider. Make sure you discuss any questions you have with your health care provider.  Document Released: 06/05/2009 Document Revised: 07/07/2017 Document Reviewed: 06/07/2017  [employer] Interactive Patient Education © 2017 [employer]      Depression / Suicide Risk    As you are discharged from this [facility] facility, it is important to learn how to keep safe from harming yourself.    Recognize the warning signs:  · Abrupt changes in personality, positive or negative- including increase in energy   · Giving away possessions  · Change in eating patterns- significant weight changes-  positive or negative  · Change in sleeping patterns- unable to sleep or sleeping all the time   · Unwillingness or inability to communicate  · Depression  · Unusual sadness, discouragement and loneliness  · Talk of wanting to die  · Neglect of personal appearance   · Rebelliousness- reckless  behavior  · Withdrawal from people/activities they love  · Confusion- inability to concentrate     If you or a loved one observes any of these behaviors or has concerns about self-harm, here's what you can do:  · Talk about it- your feelings and reasons for harming yourself  · Remove any means that you might use to hurt yourself (examples: pills, rope, extension cords, firearm)  · Get professional help from the community (Mental Health, Substance Abuse, psychological counseling)  · Do not be alone:Call your Safe Contact- someone whom you trust who will be there for you.  · Call your local CRISIS HOTLINE 275-0536 or 304-278-7495  · Call your local Children's Mobile Crisis Response Team [place] (752) 189-3363 or www.GoTable  · Call the toll free National Suicide Prevention Hotlines   · National Suicide Prevention Lifeline 345-930-GBYX (7320)  · National Hope Line Network 800-SUICIDE (865-2137)

## 2019-08-22 NOTE — CARE PLAN
Problem: Safety  Goal: Will remain free from falls  Outcome: PROGRESSING AS EXPECTED  Intervention: Implement fall precautions  Flowsheets (Taken 8/21/2019 0750 by Duong Vo, Student)  Environmental Precautions: Treaded Slipper Socks on Patient;Communication Sign for Patients & Families;Mobility Assessed & Appropriate Sign Placed;Bed in Low Position;Report Given to Other Health Care Providers Regarding Fall Risk;Transferred to Stronger Side;Personal Belongings, Wastebasket, Call Bell etc. in Easy Reach     Problem: Pain Management  Goal: Pain level will decrease to patient's comfort goal  Outcome: PROGRESSING AS EXPECTED  Intervention: Educate and implement non-pharmacologic comfort measures. Examples: relaxation, distration, play therapy, activity therapy, massage, etc.  Flowsheets (Taken 8/22/2019 0057)  Intervention: Medication (see MAR); Rest

## 2019-08-22 NOTE — DISCHARGE SUMMARY
Internal Medicine Discharge Summary  Note Author: Sharon Sears M.D.       Name Ryan Trevizo     1965   Age/Sex 54 y.o. male   MRN [id number]         Admit Date:  2019       Discharge Date: 2019    Service:   [facility] Internal Medicine blue team  Attending Physician(s):  Dr.Brad Crawford      Senior Resident(s):   Luis Felipe Resident(s):    PCP: Juancho Jenkins D.O.  Primary Diagnosis:       Secondary Diagnoses:                Active Problems:    GERD with Nieves's esophagus POA: Yes    VALENTINE (acute kidney injury) (HCC) POA: Yes    Idiopathic acute pancreatitis POA: Yes    Essential hypertension POA: Yes    Abdominal pain POA: Yes    Ischemic Cardiomyopathy EF 50% POA: Yes      Overview: May 2018: Echocardiogram with normal LV size, LVEF 50%. Mildly dilated RV.    Type 2 diabetes mellitus without complication, without long-term current use of insulin (HCC) (Chronic) POA: Yes    PAF (paroxysmal atrial fibrillation) (HCC) (Chronic) POA: Yes    Cardiac pacemaker in situ POA: Yes      Overview: 2019: Medtronic Beba S DR MRI [id number] implanted by Dr. Blake.    Diarrhea POA: Yes    Anemia POA: Yes  Resolved Problems:    * No resolved hospital problems. *      Hospital Summary (Brief Narrative):   -Patient is a 54-year-old male with a past medical history of, ischemic cardiomyopathy, atrial fibrillation, GERD, diabetes mellitus type 2, hypertension, who presented to the emergency department at [facility] on 2019 with abdominal pain associated with nausea and vomiting intermittently for 1 week.  At the time of presentation he had 3-5 episodes of prior vomiting, not associated with diarrhea .  Patient denied any fever, chills, shortness of breath, hematemesis or blood in stools  In the emergency department patient had mildly elevated lipase levels at 233, with no history of malabsorption, or imaging showing calcifications or  gallstones.  Patient was admitted to the medical floor for intravenous fluids resuscitation, control of nausea and vomiting and bowel protocol.  Patient was monitored for signs of deterioration and was found to be stable with resolution office nausea and vomiting, and abdominal discomfort.  Diet was advanced from clear liquids to solids with tolerance.  Patient was discharged on 8/22/2019, with lipase levels on discharge 22 from 43.    Patient /Hospital Summary (Details -- Problem Oriented) :          Idiopathic acute pancreatitis  Assessment & Plan  Abdominal pain - epigastric pain for a week  Nausea and vomiting for 1 week   Abnormal lipase levels = 233 ( was normal a week earlier = 46)  No history of alcohol abuse  No past history of Gallstones   last Triglyceride levels was 240 in December 2018    Plan:  Advancing diet from clear liquids to full regular diet.  Zofran for nausea, as needed  Plenty of oral fluids, and monitoring      VALENTINE (acute kidney injury) (HCC)  Assessment & Plan  Patient has vomiting and diarrhea for a week   Patient's baseline GFR was >60 and baseline Cr. Was < 1 until March 2019  Since march his Renal function has been showing mild fluctuations   Recent labs has shown significant decline in the kidney function   Labs on admission : Cr = 2.11 (  was 1.05 on 08/14/19)                                    BUN = 33 (  was 21  On 08/14/19)                                    GFR = 33    ( was 50s  On 08/14/19)  Renal function GFR=58 / BUN =22 / Cr.=1.28, likely due to dehydration, which improved to GFR greater than 50 and a BUN of 9 with a creatinine of 0.88, with aggressive rehydration and resolution of nausea and vomiting  -Patient will be discharged home, if  found to be ambulating, with no nausea or vomiting and tolerating advancing diet.    GERD with Nieves's esophagus  Assessment & Plan  Patient has history of gastritis and GERD   Previous Endoscopy was done in 2017 - Nieves's esophagus    Patient is taking omeprazole 40 mg ER        Abdominal pain  Assessment & Plan  Abdominal pain for a duration of more then a week   Associated with nausea, vomiting and diarrhea   No history of recent infections  No sick contact around   No travel outside   No illicit drug use   -Abdominal pain resolved today, no more nausea or vomiting.  Lipase levels decreasing to 22, with patient tolerating regular diet.  Bowel sounds normal.       Essential hypertension  Assessment & Plan  Blood pressure is normal 127/75 mm of Hg   On home medications.      PAF (paroxysmal atrial fibrillation) ([facility])  Assessment & Plan  History of Atrial fibrillations  Patient is on chronic anticoagulation therapy ( Xarelto 20 mg daily )        Type 2 diabetes mellitus without complication, without long-term current use of insulin ([facility])  Assessment & Plan  Patient has history of diabetes   He is on Metformin 1000 mg BID  CT.    Ischemic Cardiomyopathy EF 50%  Assessment & Plan  May 2018: Echocardiogram with normal LV size, LVEF 50%. Mildly dilated RV.  Plan :  Continue lisinopril , metoprolol       Consultants:     None  Procedures:        None  Imaging/ Testing:          Discharge Medications:         Medication Reconciliation: Completed       Medication List      START taking these medications      Instructions   omeprazole 20 MG delayed-release capsule  Start taking on:  8/23/2019  Commonly known as:  PRILOSEC   Take 1 Cap by mouth every day.  Dose:  20 mg        CONTINUE taking these medications      Instructions   aspirin EC 81 MG Tbec  Commonly known as:  ECOTRIN   Take 81 mg by mouth every day.  Dose:  81 mg     atorvastatin 80 MG tablet  Commonly known as:  LIPITOR   Take 80 mg by mouth every evening.  Dose:  80 mg     cyanocobalamin 1000 MCG Tabs  Commonly known as:  VITAMIN B12   Take 1,000 mcg by mouth every day.  Dose:  1,000 mcg     cyclobenzaprine 10 MG Tabs  Commonly known as:  FLEXERIL   Take 10 mg by mouth 3 times a day as  needed for Muscle Spasms.  Dose:  10 mg     dicyclomine 20 MG Tabs  Commonly known as:  BENTYL   Take 20 mg by mouth every 6 hours as needed (abdominal pain).  Dose:  20 mg     lisinopril 2.5 MG Tabs  Commonly known as:  PRINIVIL   Take 2.5 mg by mouth every day.  Dose:  2.5 mg     loratadine 10 MG Tabs  Commonly known as:  CLARITIN   Take 10 mg by mouth every day.  Dose:  10 mg     metformin 1000 MG tablet  Commonly known as:  GLUCOPHAGE   Take 1,000 mg by mouth 2 times a day, with meals.  Dose:  1,000 mg     metoprolol SR 25 MG Tb24  Commonly known as:  TOPROL XL   Take 0.5 Tabs by mouth every day.  Dose:  12.5 mg     Ranolazine 1000 MG Tb12   Take 1,000 mg by mouth 2 Times a Day.  Dose:  1,000 mg     rivaroxaban 20 MG Tabs tablet  Commonly known as:  XARELTO   Take 20 mg by mouth with dinner.  Dose:  20 mg     vitamin D 1000 UNIT Tabs  Commonly known as:  cholecalciferol   Take 1,000 Units by mouth every day.  Dose:  1,000 Units        STOP taking these medications    pantoprazole 40 MG Tbec  Commonly known as:  PROTONIX          Can use .DISCHARGEMEDSLIST if going to another facility       Disposition: For home   Diet: As tolerated  Activity: As tolerated  Instructions: Patient has been advised to follow-up with primary care provider on 6 September 2019, and gastroenterology as per appointment.  Patient to take omeprazole 40 mg once daily as directed .  The patient was instructed to return to the ER in the event of worsening symptoms. I have counseled the patient on the importance of compliance and the patient has agreed to proceed with all medical recommendations and follow up plan indicated above.   The patient understands that all medications come with benefits and risks. Risks may include permanent injury or death and these risks can be minimized with close reassessment and monitoring.        Primary Care Provider:  Juancho Jenkins D.O.    Discharge summary faxed to primary care provider:  Deferred  Copy  of discharge summary given to the patient: Deferred      Follow up appointment details :      Future Appointments   Date Time Provider Department Center   1/7/2020  1:00 PM [facility] None     [facility]  [address] [place] [address] [id number] [phone]    Please call to set up an appointment within 1-2 weeks of discharge. Thank you     Tali Lopes M.D.  [address] [place] [phone]    On 9/5/2019  Please check in by 02:10PM for your appointment. Thank you     Juancho Jenkins D.O.  [address] [place] [address] [phone]    Go in 1 week  To Check for low HB level    Pending Studies:        None.    Time spent on discharge day patient visit, preparing discharge paperwork and arranging for patient follow up.    Summary of follow up issues:   Follow-up with primary care on 6 September 2019.  -Follow-up with GI as per schedule.  Discharge Time (Minutes) :  45 min  Hospital Course Type:  Inpatient Stay >2 midnights      Condition on Discharge stable  ______________________________________________________________________    Interval history/exam for day of discharge:    Patient comfortable, denies any nausea or vomiting or abdominal distention or pain.  Bowel movements were seen prior to discharge.  Denies any fever, shortness of breath, weakness or dizziness.  Advance to full liquid diet, and then to diet as tolerated with patient tolerating diet, and was discharged in stable condition for follow-up with primary care and gastroenterology.      Most Recent Labs:    Lab Results   Component Value Date/Time    WBC 4.3 (L) 08/22/2019 04:07 AM    RBC 3.26 (L) 08/22/2019 04:07 AM    HEMOGLOBIN 10.5 (L) 08/22/2019 04:07 AM    HEMATOCRIT 31.8 (L) 08/22/2019 04:07 AM    MCV 97.5 08/22/2019 04:07 AM    MCH 32.2 08/22/2019 04:07 AM    MCHC 33.0 (L) 08/22/2019 04:07 AM    MPV 9.4 08/22/2019 04:07 AM    NEUTSPOLYS 63.40 08/22/2019 04:07 AM     LYMPHOCYTES 26.20 08/22/2019 04:07 AM    MONOCYTES 6.50 08/22/2019 04:07 AM    EOSINOPHILS 3.20 08/22/2019 04:07 AM    BASOPHILS 0.20 08/22/2019 04:07 AM    ANISOCYTOSIS 1+ 07/26/2012 11:00 AM      Lab Results   Component Value Date/Time    SODIUM 138 08/22/2019 04:07 AM    POTASSIUM 4.1 08/22/2019 04:07 AM    CHLORIDE 108 08/22/2019 04:07 AM    CO2 21 08/22/2019 04:07 AM    GLUCOSE 114 (H) 08/22/2019 04:07 AM    BUN 9 08/22/2019 04:07 AM    CREATININE 0.88 08/22/2019 04:07 AM    CREATININE 1.0 07/12/2007 05:40 PM    BUNCREATRAT 18 06/13/2014 08:40 AM      Lab Results   Component Value Date/Time    ALTSGPT 15 08/22/2019 04:07 AM    ASTSGOT 13 08/22/2019 04:07 AM    ALKPHOSPHAT 59 08/22/2019 04:07 AM    TBILIRUBIN 1.2 08/22/2019 04:07 AM    DBILIRUBIN 0.3 03/28/2019 01:45 AM    LIPASE 22 08/22/2019 04:07 AM    ALBUMIN 3.6 08/22/2019 04:07 AM    GLOBULIN 2.1 08/22/2019 04:07 AM    INR 1.73 (H) 08/02/2019 08:50 PM     Lab Results   Component Value Date/Time    PROTHROMBTM 20.8 (H) 08/02/2019 08:50 PM    INR 1.73 (H) 08/02/2019 08:50 PM

## 2019-08-22 NOTE — PROGRESS NOTES
Internal Medicine Interval Note  Note Author: Michael Gordon M.D.     Name Ryan Trevioz     1965   Age/Sex 54 y.o. male   MRN [id number]   Code Status Full     After 5PM or if no immediate response to page, please call for cross-coverage  Attending/Team: /[place] See Patient List for primary contact information  Call [phone] to page    1st Call - Day Intern (R1):    2nd Call - Day Sr. Resident (R2/R3):            Reason for interval visit  (Principal Problem)   Abdominal pain, nausea, vomiting      Interval Problem Daily Status Update  (24 hours, problem oriented, brief subjective history, new lab/imaging data pertinent to that problem)   No acute events overnight , patient still feels nauseated and vomited once this morning. Vital signs are stable.  Repeated Lipase was unremarkable, VALENTINE improved with rehydration.  Patient started on Zofran Q4hr for Nausea and Diet advanced to clear liquid.patient restarted o nhis home medications.  Will continue to monitor for signs of deterioration ( Fever,Tachycardia , hypotension, Grey ayers sign, charlette sign)   ROS    Disposition/Barriers to discharge:   Clinical improvement    Consultants/Specialty  None  PCP: Juancho Jenkins D.O.      Quality Measures  Quality-Core Measures   Reviewed items::  Labs reviewed, EKG reviewed, Medications reviewed and Radiology images reviewed  Luque catheter::  No Luque  DVT: Rivaroxaban.          Physical Exam       Vitals:    19 0107 19 0500 19 0800 19 1555   BP: 127/75 111/70 117/74 119/72   Pulse: 68 65 64 60   Resp: 18 18 16 16   Temp: 36.3 °C (97.4 °F) 36.7 °C (98 °F) 36.4 °C (97.5 °F) 36.4 °C (97.6 °F)   TempSrc: Temporal Temporal Temporal Temporal   SpO2: 94% 92% 96% 94%   Weight:       Height:         Body mass index is 25.8 kg/m².    Oxygen Therapy:  Pulse Oximetry: 94 %, O2 (LPM): 0, O2 Delivery: None (Room Air)    Physical Exam          Assessment/Plan      Idiopathic acute pancreatitis- (present on admission)  Assessment & Plan  Abdominal pain - epigastric pain for a week  Nausea and vomiting for 1 week   Abnormal lipase levels = 233 ( was normal a week earlier = 46)  No history of alcohol abuse  No past history of Gallstones   last Triglyceride levels was 240 in December 2018    Plan:  Advance diet to clear liquids  Zofran for nausea.  Iv fluids   CTM  Ultrasound abdomen pending    VALENTINE (acute kidney injury) (HCC)- (present on admission)  Assessment & Plan  Patient has vomiting and diarrhea for a week   Patient's baseline GFR was >60 and baseline Cr. Was < 1 until March 2019  Since march his Renal function has been showing mild fluctuations   Recent labs has shown significant decline in the kidney function   Labs on admission : Cr = 2.11 (  was 1.05 on 08/14/19)                                    BUN = 33 (  was 21  On 08/14/19)                                    GFR = 33    ( was 50s  On 08/14/19)  Repeated lab this morning showed improvement in Renal function GFR=58 / BUN =22 / Cr.=1.28.  Most likely from Dehydration.  Plan :  Continue IV rehydration.  Encourage oral intake.  CTM     GERD with Nieves's esophagus- (present on admission)  Assessment & Plan  Patient has history of gastritis and GERD   Previous Endoscopy was done in 2017 - Nieves's esophagus   Patient is taking omeprazole 40 mg ER        Abdominal pain- (present on admission)  Assessment & Plan  Abdominal pain for a duration of more then a week   Associated with nausea, vomiting and diarrhea   No history of recent infections  No sick contact around   No travel outside   No illicit drug use   ? Gastroenteritis ( questionable since  duration more then a week)  hisotry of Gastritis with GERD  Lipase  high 233 , normal LFTs    Plan:  Iv fluids   PRN Zofran iv  PRN morphine iv  Abdominal X ray = Normal ( moderate amount of stool in colon)  Ultrasound abdomen pending         Essential hypertension-  (present on admission)  Assessment & Plan  Blood pressure is normal 127/75 mm of Hg   On home medications.      PAF (paroxysmal atrial fibrillation) ([facility])- (present on admission)  Assessment & Plan  History of Atrial fibrillations  Patient is on chronic anticoagulation therapy ( Xarelto 20 mg daily )        Type 2 diabetes mellitus without complication, without long-term current use of insulin ([facility])- (present on admission)  Assessment & Plan  Patient has history of diabetes .  He is on Metformin 1000 mg BID    Plan :-  continue metformin.  CTM.    Ischemic Cardiomyopathy EF 50%- (present on admission)  Assessment & Plan  May 2018: Echocardiogram with normal LV size, LVEF 50%. Mildly dilated RV.    Plan :  Continue lisinopril , metoprolol

## 2019-08-22 NOTE — CARE PLAN
Problem: Safety  Goal: Will remain free from falls  Outcome: PROGRESSING AS EXPECTED  Pt educated on fall risks, pt verbalized understanding. Bed low, locked, non-skid socks on, room free of clutter, call light and water within reach.    Problem: Discharge Barriers/Planning  Goal: Patient's continuum of care needs will be met  Outcome: PROGRESSING AS EXPECTED   Pt updated on POC with MD at bedside, pt tolerating liquid diet well, will advance as tolerated, D/C IV fluids and encouraged oral intake. Pt verbalized understanding. Needs met at this time.

## 2019-08-23 ENCOUNTER — HOSPITAL ENCOUNTER (EMERGENCY)
Facility: MEDICAL CENTER | Age: 54
End: 2019-08-23
Attending: EMERGENCY MEDICINE
Payer: MEDICARE

## 2019-08-23 VITALS
BODY MASS INDEX: 25.43 KG/M2 | OXYGEN SATURATION: 95 % | TEMPERATURE: 97.5 F | DIASTOLIC BLOOD PRESSURE: 67 MMHG | HEART RATE: 70 BPM | HEIGHT: 71 IN | RESPIRATION RATE: 14 BRPM | WEIGHT: 181.66 LBS | SYSTOLIC BLOOD PRESSURE: 138 MMHG

## 2019-08-23 DIAGNOSIS — R11.2 NAUSEA AND VOMITING, INTRACTABILITY OF VOMITING NOT SPECIFIED, UNSPECIFIED VOMITING TYPE: ICD-10-CM

## 2019-08-23 DIAGNOSIS — R10.9 ABDOMINAL PAIN, UNSPECIFIED ABDOMINAL LOCATION: ICD-10-CM

## 2019-08-23 DIAGNOSIS — R19.7 DIARRHEA, UNSPECIFIED TYPE: ICD-10-CM

## 2019-08-23 LAB
ALBUMIN SERPL BCP-MCNC: 4.2 G/DL (ref 3.2–4.9)
ALBUMIN/GLOB SERPL: 1.7 G/DL
ALP SERPL-CCNC: 61 U/L (ref 30–99)
ALT SERPL-CCNC: 16 U/L (ref 2–50)
ANION GAP SERPL CALC-SCNC: 8 MMOL/L (ref 0–11.9)
AST SERPL-CCNC: 14 U/L (ref 12–45)
BASOPHILS # BLD AUTO: 0.5 % (ref 0–1.8)
BASOPHILS # BLD: 0.03 K/UL (ref 0–0.12)
BILIRUB SERPL-MCNC: 1 MG/DL (ref 0.1–1.5)
BUN SERPL-MCNC: 13 MG/DL (ref 8–22)
CALCIUM SERPL-MCNC: 8.9 MG/DL (ref 8.5–10.5)
CHLORIDE SERPL-SCNC: 107 MMOL/L (ref 96–112)
CO2 SERPL-SCNC: 23 MMOL/L (ref 20–33)
CREAT SERPL-MCNC: 0.96 MG/DL (ref 0.5–1.4)
EOSINOPHIL # BLD AUTO: 0.12 K/UL (ref 0–0.51)
EOSINOPHIL NFR BLD: 2.2 % (ref 0–6.9)
ERYTHROCYTE [DISTWIDTH] IN BLOOD BY AUTOMATED COUNT: 45.9 FL (ref 35.9–50)
GLOBULIN SER CALC-MCNC: 2.5 G/DL (ref 1.9–3.5)
GLUCOSE SERPL-MCNC: 87 MG/DL (ref 65–99)
HCT VFR BLD AUTO: 35.5 % (ref 42–52)
HGB BLD-MCNC: 11.8 G/DL (ref 14–18)
IMM GRANULOCYTES # BLD AUTO: 0.04 K/UL (ref 0–0.11)
IMM GRANULOCYTES NFR BLD AUTO: 0.7 % (ref 0–0.9)
LIPASE SERPL-CCNC: 104 U/L (ref 11–82)
LYMPHOCYTES # BLD AUTO: 1.33 K/UL (ref 1–4.8)
LYMPHOCYTES NFR BLD: 24.3 % (ref 22–41)
MCH RBC QN AUTO: 32.2 PG (ref 27–33)
MCHC RBC AUTO-ENTMCNC: 33.2 G/DL (ref 33.7–35.3)
MCV RBC AUTO: 96.7 FL (ref 81.4–97.8)
MONOCYTES # BLD AUTO: 0.35 K/UL (ref 0–0.85)
MONOCYTES NFR BLD AUTO: 6.4 % (ref 0–13.4)
NEUTROPHILS # BLD AUTO: 3.61 K/UL (ref 1.82–7.42)
NEUTROPHILS NFR BLD: 65.9 % (ref 44–72)
NRBC # BLD AUTO: 0 K/UL
NRBC BLD-RTO: 0 /100 WBC
PLATELET # BLD AUTO: 213 K/UL (ref 164–446)
PMV BLD AUTO: 9.4 FL (ref 9–12.9)
POTASSIUM SERPL-SCNC: 4.5 MMOL/L (ref 3.6–5.5)
PROT SERPL-MCNC: 6.7 G/DL (ref 6–8.2)
RBC # BLD AUTO: 3.67 M/UL (ref 4.7–6.1)
SODIUM SERPL-SCNC: 138 MMOL/L (ref 135–145)
WBC # BLD AUTO: 5.5 K/UL (ref 4.8–10.8)

## 2019-08-23 PROCEDURE — A9270 NON-COVERED ITEM OR SERVICE: HCPCS | Performed by: EMERGENCY MEDICINE

## 2019-08-23 PROCEDURE — 96372 THER/PROPH/DIAG INJ SC/IM: CPT

## 2019-08-23 PROCEDURE — 85025 COMPLETE CBC W/AUTO DIFF WBC: CPT

## 2019-08-23 PROCEDURE — 700111 HCHG RX REV CODE 636 W/ 250 OVERRIDE (IP): Performed by: EMERGENCY MEDICINE

## 2019-08-23 PROCEDURE — 700102 HCHG RX REV CODE 250 W/ 637 OVERRIDE(OP): Performed by: EMERGENCY MEDICINE

## 2019-08-23 PROCEDURE — 80053 COMPREHEN METABOLIC PANEL: CPT

## 2019-08-23 PROCEDURE — 99284 EMERGENCY DEPT VISIT MOD MDM: CPT

## 2019-08-23 PROCEDURE — 83690 ASSAY OF LIPASE: CPT

## 2019-08-23 RX ORDER — METOCLOPRAMIDE 10 MG/1
10 TABLET ORAL 4 TIMES DAILY PRN
Qty: 60 TAB | Refills: 0 | Status: SHIPPED | OUTPATIENT
Start: 2019-08-23

## 2019-08-23 RX ORDER — DIPHENHYDRAMINE HYDROCHLORIDE 50 MG/ML
50 INJECTION INTRAMUSCULAR; INTRAVENOUS ONCE
Status: COMPLETED | OUTPATIENT
Start: 2019-08-23 | End: 2019-08-23

## 2019-08-23 RX ORDER — HALOPERIDOL 5 MG/ML
2 INJECTION INTRAMUSCULAR ONCE
Status: COMPLETED | OUTPATIENT
Start: 2019-08-23 | End: 2019-08-23

## 2019-08-23 RX ORDER — ONDANSETRON 4 MG/1
4 TABLET, ORALLY DISINTEGRATING ORAL ONCE
Status: COMPLETED | OUTPATIENT
Start: 2019-08-23 | End: 2019-08-23

## 2019-08-23 RX ADMIN — LIDOCAINE HYDROCHLORIDE 30 ML: 20 SOLUTION OROPHARYNGEAL at 18:19

## 2019-08-23 RX ADMIN — HALOPERIDOL LACTATE 2 MG: 5 INJECTION, SOLUTION INTRAMUSCULAR at 20:01

## 2019-08-23 RX ADMIN — ONDANSETRON 4 MG: 4 TABLET, ORALLY DISINTEGRATING ORAL at 18:19

## 2019-08-23 RX ADMIN — DIPHENHYDRAMINE HYDROCHLORIDE 50 MG: 50 INJECTION INTRAMUSCULAR; INTRAVENOUS at 18:19

## 2019-08-23 ASSESSMENT — ENCOUNTER SYMPTOMS
NAUSEA: 0
HEADACHES: 0
DIARRHEA: 1
ABDOMINAL PAIN: 1
FEVER: 0
BACK PAIN: 0
SHORTNESS OF BREATH: 0
VOMITING: 0
DIZZINESS: 0

## 2019-08-23 NOTE — ED TRIAGE NOTES
Chief Complaint   Patient presents with   • Abdominal Pain   Pt to triage in [facility].  Pt repots generalized abdominal pain x several weeks.  Pt was recently admitted for the same.  Pt educated on triage process and instructed to notify triage RN of any change in status.

## 2019-08-24 LAB
IGA SERPL-MCNC: 115 MG/DL (ref 68–408)
TTG IGA SER IA-ACNC: 0 U/ML (ref 0–3)

## 2019-08-24 NOTE — ED PROVIDER NOTES
ED Provider Note    Scribed for Ashley Larios M.D. by Makeda Alford. 8/23/2019, 5:54 PM.    Primary care provider: Juancho Jenkins D.O.  Means of arrival: Walk-in  History obtained from: Patient  History limited by: None    CHIEF COMPLAINT  Chief Complaint   Patient presents with   • Abdominal Pain       HPI  Ryan Trevizo is a 54 y.o. male who presents to the Emergency Department for evaluation of intermittent, generalized abdominal pain onset many weeks ago with a recurrent episode today prompting him to visit the ED for further evaluation of his condition.  Of note patient has been seen in the emergency department multiple times for this and in fact was admitted during his last visit for similar symptoms.  He had a CT on 8/14/2019 which demonstrated no acute abdominal abnormalities.  Lipase during his admission was noted to be chronically mildly elevated with no other acute findings.  It was not believed to be pancreatitis as he did not have any other symptoms of pancreatitis and lipase was not 3 times the upper limits of normal.    He describes his abdominal pain as cramping in nature with associated a few episodes of watery diarrhea today. No exacerbated or alleviating factors were reported. The patient was seen at this facility three times for similar symptoms and was discharged with medications that he has been taking as prescribed with minimal relief.  Negative nausea or vomiting. No further medical or surgical history was reported.       REVIEW OF SYSTEMS  Review of Systems   Constitutional: Negative for fever.   Respiratory: Negative for shortness of breath.    Cardiovascular: Negative for chest pain.   Gastrointestinal: Positive for abdominal pain (epigastric pain) and diarrhea. Negative for nausea and vomiting.   Genitourinary: Negative for dysuria.   Musculoskeletal: Negative for back pain.   Neurological: Negative for dizziness and headaches.   All other systems reviewed and are  negative.      PAST MEDICAL HISTORY   has a past medical history of ACS (acute coronary syndrome) ([facility]), Anxiety disorder, Arthritis, CAD (coronary artery disease), Chronic anticoagulation, Claudication ([facility]), Diabetes (HCC), Dizziness, GERD (gastroesophageal reflux disease), Glaucoma, HTN (hypertension), Hyperlipemia, Ischemic cardiomyopathy (05/2018), Mental retardation, Mentally challenged, Osteoarthritis, Paroxysmal atrial fibrillation ([facility]), Pericarditis secondary to acute myocardial infarction ([facility]), Sick sinus syndrome ([facility]), SOB (shortness of breath), STEMI (ST elevation myocardial infarction) ([facility]), and Syncope (02/2019).    SURGICAL HISTORY   has a past surgical history that includes other orthopedic surgery (7- ); other cardiac surgery; gastroscopy-endo (7/25/2012); gastroscopy-endo (7/26/2012); gastroscopy-endo (5/19/2017); other cardiac surgery (Left, 01/12/2018); and pacemaker insertion (Left, 02/12/2019).    SOCIAL HISTORY  Social History     Tobacco Use   • Smoking status: Never Smoker   • Smokeless tobacco: Never Used   Substance Use Topics   • Alcohol use: No   • Drug use: No      Social History     Substance and Sexual Activity   Drug Use No       FAMILY HISTORY  No family history noted.    CURRENT MEDICATIONS  Home Medications     Reviewed by Anuja Beltre R.N. (Registered Nurse) on 08/23/19 at 1350  Med List Status: <None>   Medication Last Dose Status   aspirin EC (ECOTRIN) 81 MG Tablet Delayed Response  Active   atorvastatin (LIPITOR) 80 MG tablet  Active   cyanocobalamin (VITAMIN B12) 1000 MCG Tab  Active   cyclobenzaprine (FLEXERIL) 10 MG Tab  Active   dicyclomine (BENTYL) 20 MG Tab  Active   lisinopril (PRINIVIL) 2.5 MG Tab  Active   loratadine (CLARITIN) 10 MG Tab  Active   metformin (GLUCOPHAGE) 1000 MG tablet  Active   metoprolol SR (TOPROL XL) 25 MG TABLET SR 24 HR  Active   omeprazole (PRILOSEC) 20 MG delayed-release capsule  Active   Ranolazine 1000 MG TABLET SR 12 HR   "Active   rivaroxaban (XARELTO) 20 MG Tab tablet  Active   vitamin D (CHOLECALCIFEROL) 1000 UNIT Tab  Active                ALLERGIES  Allergies   Allergen Reactions   • Ritalin [Methylphenidate] Unspecified     \"Hyper\"       PHYSICAL EXAM  VITAL SIGNS: /79   Pulse 68   Temp 36.4 °C (97.5 °F) (Temporal)   Resp 16   Ht 1.803 m (5' 11\")   Wt 82.4 kg (181 lb 10.5 oz)   SpO2 95%   BMI 25.34 kg/m²   Vitals reviewed by myself.  Physical Exam  Nursing note and vitals reviewed.  Constitutional: Well-developed and well-nourished. No acute distress.   HENT: Head is normocephalic and atraumatic.  Eyes: extra-ocular movements intact  Cardiovascular: Normal rate and regular rhythm. No murmur heard.  Pulmonary/Chest: Breath sounds normal. No wheezes or rales.   Abdominal: Soft and non-tender.  No rebound, no guarding, negative Larios sign, negative McBurney's point tenderness.  No distention.    Musculoskeletal: Extremities exhibit normal range of motion without edema or tenderness.   Neurological: Awake and alert  Skin: Skin is warm and dry. No rash.     DIAGNOSTIC STUDIES /  LABS  Labs Reviewed   CBC WITH DIFFERENTIAL - Abnormal; Notable for the following components:       Result Value    RBC 3.67 (*)     Hemoglobin 11.8 (*)     Hematocrit 35.5 (*)     MCHC 33.2 (*)     All other components within normal limits   LIPASE - Abnormal; Notable for the following components:    Lipase 104 (*)     All other components within normal limits   COMP METABOLIC PANEL   ESTIMATED GFR       All labs reviewed by me.    REASSESSMENT    1:55 PM Ordered labs in triage per protocol    5:54 PM Patient seen and evaluated at bedside. Discussed plan of care which includes medicating the patient for his symptoms and obtaining lab work to further evaluate his condition. Patient verbalizes his understanding and agreement to the plan of care.    6:27 PM Ordered estimated GFR to further evaluate his condition     7:28 PM Recheck. The patient " reports that his pain has not improved since the administration of medication. He will be treated with Haldol 2 mg for his current symptoms. The patient will have a PO challenge.     8:27 PM Recheck. The patient states that his abdominal pain has been moderately improved. He is able to tolerate oral intake. The patient is stable for discharge at this time. The patient was given a referral to Dr. Jenkins. He was given discharge instructions and a prescription for Reglan 10 mg which is to be taken up to four times daily as needed for abdominal pain. The patient was instructed to immediately return to the ED if his symptoms worsen. The patient will return for new or worsening symptoms and is stable at the time of discharge. Patient understands and agrees to discharge.       COURSE & MEDICAL DECISION MAKING  Nursing notes, VS, PMSFHx reviewed in chart.    Patient is a 54-year-old male who comes in for abdominal pain, nausea and diarrhea.  Differential diagnosis includes gastroenteritis, dehydration, electrolyte abnormality, diarrheal illness.  Diagnostic work-up includes labs.    Patient's initial vitals are within normal limits, he is well-appearing on exam with a benign abdominal exam.  Patient is treated with GI cocktail, Benadryl and Zofran mostly improved, he is further treated with Haldol after which he feels greatly improved.  Labs returned and white count is within normal limits making acute infectious pathology unlikely.  Lipase is again mildly elevated at 104, during his prior admission it had been 233, however LFTs are within normal limits making biliary process unlikely.  During his emergency department course patient has no episodes of diarrhea or vomiting.  He is tolerating oral intake without difficulty.  As his labs are reassuring and abdominal exam is benign I have reassured him and advised him on symptomatic management of diarrheal illness.  I will prescribe him Reglan at home for management of his  nausea as he was having minimal improvement with Zofran.  Patient is agreeable to this plan.  He is then given strict return precautions and discharged home in stable condition.    DISPOSITION:  Patient will be discharged home in stable condition.    FOLLOW UP:  Juancho Jenkins D.O.  [address]  [address]  [facility] [id number]  [phone]            OUTPATIENT MEDICATIONS:  New Prescriptions    METOCLOPRAMIDE (REGLAN) 10 MG TAB    Take 1 Tab by mouth 4 times a day as needed.         FINAL IMPRESSION  1. Abdominal pain, unspecified abdominal location    2. Nausea and vomiting, intractability of vomiting not specified, unspecified vomiting type    3. Diarrhea, unspecified type          I, Makeda Alford (Scribe), am scribing for, and in the presence of, Ashley Larios M.D..    Electronically signed by: Makeda Alford (Scribe), 8/23/2019    IAshley M.D. personally performed the services described in this documentation, as scribed by Makeda Alford in my presence, and it is both accurate and complete.    C    The note accurately reflects work and decisions made by me.  Ashley Larios  8/23/2019  8:52 PM

## 2019-08-24 NOTE — ED NOTES
All lines and monitors discontinued. Discharge instructions given, questions answered.    Ambulated out of ER, escorted by RN.  Instructed not to drive after taking pain medication and pt verbalizes understanding.  Rx x 1 given.

## 2019-08-25 ENCOUNTER — HOSPITAL ENCOUNTER (EMERGENCY)
Facility: MEDICAL CENTER | Age: 54
End: 2019-08-25
Attending: EMERGENCY MEDICINE
Payer: MEDICARE

## 2019-08-25 ENCOUNTER — APPOINTMENT (OUTPATIENT)
Dept: RADIOLOGY | Facility: MEDICAL CENTER | Age: 54
End: 2019-08-25
Attending: EMERGENCY MEDICINE
Payer: MEDICARE

## 2019-08-25 VITALS
WEIGHT: 181.22 LBS | BODY MASS INDEX: 25.37 KG/M2 | TEMPERATURE: 98.2 F | OXYGEN SATURATION: 94 % | HEIGHT: 71 IN | HEART RATE: 69 BPM | SYSTOLIC BLOOD PRESSURE: 130 MMHG | DIASTOLIC BLOOD PRESSURE: 78 MMHG | RESPIRATION RATE: 16 BRPM

## 2019-08-25 DIAGNOSIS — R42 VERTIGO: ICD-10-CM

## 2019-08-25 PROCEDURE — 99284 EMERGENCY DEPT VISIT MOD MDM: CPT

## 2019-08-25 PROCEDURE — 700102 HCHG RX REV CODE 250 W/ 637 OVERRIDE(OP): Performed by: EMERGENCY MEDICINE

## 2019-08-25 PROCEDURE — 93005 ELECTROCARDIOGRAM TRACING: CPT | Performed by: EMERGENCY MEDICINE

## 2019-08-25 PROCEDURE — 70450 CT HEAD/BRAIN W/O DYE: CPT

## 2019-08-25 PROCEDURE — A9270 NON-COVERED ITEM OR SERVICE: HCPCS | Performed by: EMERGENCY MEDICINE

## 2019-08-25 RX ORDER — MECLIZINE HYDROCHLORIDE 25 MG/1
25 TABLET ORAL 3 TIMES DAILY PRN
Qty: 10 TAB | Refills: 0 | Status: SHIPPED | OUTPATIENT
Start: 2019-08-25 | End: 2019-08-28

## 2019-08-25 RX ORDER — MECLIZINE HYDROCHLORIDE 25 MG/1
25 TABLET ORAL ONCE
Status: COMPLETED | OUTPATIENT
Start: 2019-08-25 | End: 2019-08-25

## 2019-08-25 RX ADMIN — MECLIZINE HYDROCHLORIDE 25 MG: 25 TABLET ORAL at 17:24

## 2019-08-25 NOTE — ED NOTES
Patient walked to er [facility] care area room 62 ready to be seen  Placed him in a gown, gave call light, and blanket

## 2019-08-25 NOTE — ED TRIAGE NOTES
"Ryan Trevizo  Chief Complaint   Patient presents with   • Head Ache   • Dizziness     Pt bib EMS, w/c to triage with above complaint. Pt reports frontal HA and dizziness that started today while sitting on the couch. Per pt and EMS, pt has hx of vertigo and dizziness. Pt was seen and discharged from ED on 8/23 d/t abd pain. Pt also reports being admitted on 8/20 and discharge on 8/22 for GI s/s. Pt denies N/V or abd pain at this time. NAD noted in triage. Pt denies SOB or CP. Pt able to stand up from w/c unassisted onto scale. No weakness or unsteady gait noted.    /82   Pulse 80   Temp 36.8 °C (98.2 °F) (Oral)   Resp 16   Ht 1.803 m (5' 11\")   Wt 82.2 kg (181 lb 3.5 oz)   SpO2 95%   BMI 25.27 kg/m²     Pt informed of triage process and encouraged to notify staff of any changes or concerns. Pt verbalized understanding of instructions. Apologized for long wait time. Pt placed back in lobby.     "

## 2019-08-26 NOTE — ED PROVIDER NOTES
ED Provider Note    CHIEF COMPLAINT  Chief Complaint   Patient presents with   • Head Ache   • Dizziness       HPI  Ryan Trevizo is a 54 y.o. male who presents to the urgency department complaining that he is dizzy and lightheaded.  The patient says that these spells started about 30 minutes ago he feels like the room is slightly moving.  He has had multiple similar episodes in the past.  He does not recognize any specific precipitating events or exacerbating or alleviating, he also complains of a mild headache.    REVIEW OF SYSTEMS no trauma to the head no fever or he is able to tolerate oral intake.  No chest pain or palpitations.  No neck pain no numbness tingling weakness in extremities.  All other systems negative    PAST MEDICAL HISTORY  Past Medical History:   Diagnosis Date   • ACS (acute coronary syndrome) ([facility])    • Anxiety disorder    • Arthritis    • CAD (coronary artery disease)    • Chronic anticoagulation    • Claudication ([facility])    • Diabetes ([facility])     Oral medications   • Dizziness    • GERD (gastroesophageal reflux disease)    • Glaucoma    • HTN (hypertension)    • Hyperlipemia    • Ischemic cardiomyopathy 05/2018    Echocardiogram with normal LV size, LVEF 50%. Mildly dilated RV.   • Mental retardation    • Mentally challenged     Has    • Osteoarthritis    • Paroxysmal atrial fibrillation ([facility])    • Pericarditis secondary to acute myocardial infarction ([facility])    • Sick sinus syndrome ([facility])    • SOB (shortness of breath)    • STEMI (ST elevation myocardial infarction) ([facility])    • Syncope 02/2019    Status post pacemaker placement.       FAMILY HISTORY  History reviewed. No pertinent family history.    SOCIAL HISTORY  Social History     Socioeconomic History   • Marital status: Single     Spouse name: Not on file   • Number of children: Not on file   • Years of education: Not on file   • Highest education level: Not on file   Occupational History   • Not on file   Social Needs   •  Financial resource strain: Not on file   • Food insecurity:     Worry: Not on file     Inability: Not on file   • Transportation needs:     Medical: Not on file     Non-medical: Not on file   Tobacco Use   • Smoking status: Never Smoker   • Smokeless tobacco: Never Used   Substance and Sexual Activity   • Alcohol use: No   • Drug use: No   • Sexual activity: Not on file     Comment: Single, has no children, works as an .   Lifestyle   • Physical activity:     Days per week: Not on file     Minutes per session: Not on file   • Stress: Not on file   Relationships   • Social connections:     Talks on phone: Not on file     Gets together: Not on file     Attends Denominational service: Not on file     Active member of club or organization: Not on file     Attends meetings of clubs or organizations: Not on file     Relationship status: Not on file   • Intimate partner violence:     Fear of current or ex partner: Not on file     Emotionally abused: Not on file     Physically abused: Not on file     Forced sexual activity: Not on file   Other Topics Concern   • Not on file   Social History Narrative    ** Merged History Encounter **         ** Merged History Encounter **            SURGICAL HISTORY  Past Surgical History:   Procedure Laterality Date   • PACEMAKER INSERTION Left 02/12/2019    Medtronic Ginger Blue S DR MRI [id number] implanted by Dr. Blake.   • OTHER CARDIAC SURGERY Left 01/12/2018    Medtronic Reveal LINQ [id number] implanted by Dr. Blake   • GASTROSCOPY-ENDO  5/19/2017    Procedure: GASTROSCOPY-ENDO;  Surgeon: Reinaldo Berry M.D.;  Location: [facility];  Service:    • GASTROSCOPY-ENDO  7/26/2012    Performed by JORDIN VERA at [facility]   • GASTROSCOPY-ENDO  7/25/2012    Performed by JORDIN VERA at [facility]   • OTHER ORTHOPEDIC SURGERY  7-     R knee surgery   • OTHER CARDIAC SURGERY      stent placement       CURRENT MEDICATIONS  Home Medications   "   Reviewed by Bria Aranda R.N. (Registered Nurse) on 08/25/19 at 1505  Med List Status: Partial   Medication Last Dose Status   aspirin EC (ECOTRIN) 81 MG Tablet Delayed Response  Active   atorvastatin (LIPITOR) 80 MG tablet  Active   cyanocobalamin (VITAMIN B12) 1000 MCG Tab  Active   cyclobenzaprine (FLEXERIL) 10 MG Tab  Active   dicyclomine (BENTYL) 20 MG Tab  Active   lisinopril (PRINIVIL) 2.5 MG Tab  Active   loratadine (CLARITIN) 10 MG Tab  Active   metformin (GLUCOPHAGE) 1000 MG tablet  Active   metoclopramide (REGLAN) 10 MG Tab  Active   metoprolol SR (TOPROL XL) 25 MG TABLET SR 24 HR  Active   omeprazole (PRILOSEC) 20 MG delayed-release capsule  Active   Ranolazine 1000 MG TABLET SR 12 HR  Active   rivaroxaban (XARELTO) 20 MG Tab tablet  Active   vitamin D (CHOLECALCIFEROL) 1000 UNIT Tab  Active                ALLERGIES  Allergies   Allergen Reactions   • Ritalin [Methylphenidate] Unspecified     \"Hyper\"       PHYSICAL EXAM  VITAL SIGNS: /85   Pulse 63   Temp 36.8 °C (98.2 °F) (Oral)   Resp 16   Ht 1.803 m (5' 11\")   Wt 82.2 kg (181 lb 3.5 oz)   SpO2 91%   BMI 25.27 kg/m²    Oxygen saturation is interpreted as adequate  Constitutional: Awake verbal appearing much older than his stated age.  HENT: No sign of acute trauma to the head  Eyes: Pupils round extra ocular motion present no erythema discharge or jaundice  Neck: Trachea midline no JVD  Cardiovascular: Regular rate and rhythm  Lungs: Clear and equal bilaterally with no apparent difficulty breathing  Abdomen/Back: Obese soft nontender no rebound guarding or peritoneal findings  Skin: Warm and dry  Musculoskeletal: No acute bony deformity.  There are bruises in both antecubital areas the patient states this is from IV starts a few days ago.  Neurologic: The patient is awake verbal and lucid he is able to carry on a normal conversation with a clear voice and no difficulty.  His facial movements are unremarkable and he appears to " have normal coordination and strength of the  extremities    CHART REVIEW  Chart review shows the patient is a very frequent emergency department visitor he was just here couple days ago for evaluation of abdominal pain.  Also the patient was most recently here for the same symptoms on March 27, 2019    Radiology  CT-HEAD W/O   Final Result      No acute intracranial abnormality is identified.      Atrophy      There are periventricular and subcortical white matter changes present.  This finding is nonspecific and could be from previous small vessel ischemia, demyelination, or gliosis.           MEDICAL DECISION MAKING and DISPOSITION  In the emergency department the patient was given 25 mg of oral Antivert and he feels that this was helpful.  Clinically the patient appears nontoxic and I think it is safe for him to go home I written a prescription for a 3-day course of Antivert and he is instructed to call his doctor and arrange office recheck during the week    IMPRESSION  1.  Recurrent vertigo         Electronically signed by: Kwadwo Layton, 8/25/2019 6:34 PM

## 2019-08-26 NOTE — ED NOTES
Pt provided with discharge instructions, prescriptions x 1, instructions for follow up appointment with PCP, s/s of when to seek emergency care.  Pt verbalizes understanding.  Pt discharged in good condition.

## 2019-08-29 DIAGNOSIS — I10 ESSENTIAL HYPERTENSION: Primary | ICD-10-CM

## 2019-08-29 RX ORDER — RANOLAZINE 1000 MG/1
1000 TABLET, EXTENDED RELEASE ORAL 2 TIMES DAILY
Qty: 60 TAB | Refills: 6 | Status: CANCELLED | OUTPATIENT
Start: 2019-08-29

## 2019-08-30 RX ORDER — RANOLAZINE 1000 MG/1
1000 TABLET, EXTENDED RELEASE ORAL 2 TIMES DAILY
Qty: 180 TAB | Refills: 2 | Status: SHIPPED | OUTPATIENT
Start: 2019-08-30 | End: 2020-09-29

## 2019-09-14 ENCOUNTER — HOSPITAL ENCOUNTER (EMERGENCY)
Facility: MEDICAL CENTER | Age: 54
End: 2019-10-08
Payer: MEDICARE

## 2019-09-20 ENCOUNTER — TELEPHONE (OUTPATIENT)
Dept: HEALTH INFORMATION MANAGEMENT | Facility: OTHER | Age: 54
End: 2019-09-20

## 2019-09-20 DIAGNOSIS — K21.00 GASTROESOPHAGEAL REFLUX DISEASE WITH ESOPHAGITIS: ICD-10-CM

## 2019-09-20 LAB — EKG IMPRESSION: NORMAL

## 2019-10-08 ENCOUNTER — TELEPHONE (OUTPATIENT)
Dept: VASCULAR LAB | Facility: MEDICAL CENTER | Age: 54
End: 2019-10-08

## 2019-10-08 DIAGNOSIS — I48.0 PAF (PAROXYSMAL ATRIAL FIBRILLATION) (HCC): Chronic | ICD-10-CM

## 2019-10-22 ENCOUNTER — OFFICE VISIT (OUTPATIENT)
Dept: CARDIOLOGY | Facility: MEDICAL CENTER | Age: 54
End: 2019-10-22
Payer: MEDICARE

## 2019-10-22 ENCOUNTER — NON-PROVIDER VISIT (OUTPATIENT)
Dept: CARDIOLOGY | Facility: MEDICAL CENTER | Age: 54
End: 2019-10-22
Payer: MEDICARE

## 2019-10-22 VITALS
HEART RATE: 70 BPM | HEIGHT: 67 IN | SYSTOLIC BLOOD PRESSURE: 120 MMHG | OXYGEN SATURATION: 97 % | WEIGHT: 186 LBS | DIASTOLIC BLOOD PRESSURE: 84 MMHG | BODY MASS INDEX: 29.19 KG/M2

## 2019-10-22 DIAGNOSIS — I25.5 CARDIOMYOPATHY, ISCHEMIC: ICD-10-CM

## 2019-10-22 DIAGNOSIS — R55 SYNCOPE, UNSPECIFIED SYNCOPE TYPE: ICD-10-CM

## 2019-10-22 DIAGNOSIS — I48.0 PAF (PAROXYSMAL ATRIAL FIBRILLATION) (HCC): Chronic | ICD-10-CM

## 2019-10-22 DIAGNOSIS — I49.5 SICK SINUS SYNDROME (HCC): ICD-10-CM

## 2019-10-22 DIAGNOSIS — Z79.01 CHRONIC ANTICOAGULATION: ICD-10-CM

## 2019-10-22 DIAGNOSIS — Z95.0 CARDIAC PACEMAKER IN SITU: ICD-10-CM

## 2019-10-22 DIAGNOSIS — I25.10 3-VESSEL CORONARY ARTERY DISEASE: ICD-10-CM

## 2019-10-22 DIAGNOSIS — I25.10 CORONARY ARTERY DISEASE INVOLVING NATIVE CORONARY ARTERY OF NATIVE HEART WITHOUT ANGINA PECTORIS: ICD-10-CM

## 2019-10-22 DIAGNOSIS — I10 ESSENTIAL HYPERTENSION: ICD-10-CM

## 2019-10-22 DIAGNOSIS — E78.5 DYSLIPIDEMIA: ICD-10-CM

## 2019-10-22 PROBLEM — E87.6 HYPOKALEMIA: Status: RESOLVED | Noted: 2019-08-02 | Resolved: 2019-10-22

## 2019-10-22 PROBLEM — R10.9 ABDOMINAL PAIN: Status: RESOLVED | Noted: 2019-08-21 | Resolved: 2019-10-22

## 2019-10-22 PROBLEM — R19.7 DIARRHEA: Status: RESOLVED | Noted: 2019-03-28 | Resolved: 2019-10-22

## 2019-10-22 PROCEDURE — 93280 PM DEVICE PROGR EVAL DUAL: CPT | Performed by: NURSE PRACTITIONER

## 2019-10-22 PROCEDURE — 99214 OFFICE O/P EST MOD 30 MIN: CPT | Mod: 25 | Performed by: NURSE PRACTITIONER

## 2019-10-22 RX ORDER — MECLIZINE HYDROCHLORIDE 25 MG/1
25 TABLET ORAL 3 TIMES DAILY PRN
COMMUNITY

## 2019-10-22 ASSESSMENT — ENCOUNTER SYMPTOMS
ORTHOPNEA: 0
ABDOMINAL PAIN: 0
BRUISES/BLEEDS EASILY: 0
DIZZINESS: 0
NAUSEA: 0
FEVER: 0
INSOMNIA: 0
LOSS OF CONSCIOUSNESS: 0
COUGH: 0
HEADACHES: 0
SHORTNESS OF BREATH: 0
MYALGIAS: 0
PALPITATIONS: 0
CHILLS: 0
PND: 0

## 2019-10-22 NOTE — PROGRESS NOTES
Chief Complaint   Patient presents with   • Hospital Follow-up   • Pacemaker Check/Dysfunction   • Chest Pain   • Coronary Artery Disease   • Cardiomyopathy (Ischemic)   • Atrial Fibrillation   • Anticoagulation   • HTN (Controlled)       Subjective:   Ryan Trevizo is a 54 y.o. male who presents today for hospital follow-up, PM check, CAD/ischemic cardiomyopathy, hypertension and hyperlipidemia.    Ryan is a 54 year old male with history of diffused moderate three vessel CAD/ischemic cardiomyopathy (with LVEF normalized on medical therapy on echo in January 2018), paroxysmal atrial fibrillation, anticoagulation, hypertension and hyperlipidemia, previously followed by Dr. JUDITH León. In January 2018, he has an ILR implanted for syncope. Interrogation on 1/22/2019 showed 16 meliza episodes, and pauses. He was referred for PM, which was implanted on 2/12/2019.    He is here today for three month follow-up. Since the last visit in July 2019, he has been in the ER several times for dizziness, headache and abdominal pain. He is not able to give a good history of these account. He states he is feeling well today, and doesn't have any pain anywhere, including in his chest and abdomen. No dizziness or lightheadedness or syncope; no palpitations. No shortness of breath, orthopnea or PND; no LE edema. BP has been stable. He is now living in a group home, and getting regular care. His caretakers are here with him today.    Past Medical History:   Diagnosis Date   • ACS (acute coronary syndrome) (HCC)    • Anxiety disorder    • Arthritis    • CAD (coronary artery disease) 07/2017    Coronary angiogram with moderate diffuse disease (70% LAD, chronic total occlusion of 2nd OM of LCx, 95% ostium dual PDA). August 2019: MPI with moderate fixed defect in inferolateral wall, LVEF 46%.   • Chronic anticoagulation    • Claudication (HCC)    • Diabetes (HCC)     Oral medications   • Dizziness    • GERD (gastroesophageal reflux  disease)    • Glaucoma    • HTN (hypertension)    • Hyperlipemia    • Ischemic cardiomyopathy 03/2019    Echocardiogram with normal LV size, mild asymmetric septal hypertrophy, LVEF 50%. Trace MR, mild-moderate AI, trace TR.   • Mental retardation    • Mentally challenged     Has    • Osteoarthritis    • Paroxysmal atrial fibrillation (HCC)    • Pericarditis secondary to acute myocardial infarction ([facility])    • Sick sinus syndrome ([facility])    • SOB (shortness of breath)    • STEMI (ST elevation myocardial infarction) ([facility])    • Syncope 02/2019    Status post pacemaker placement.     Past Surgical History:   Procedure Laterality Date   • PACEMAKER INSERTION Left 02/12/2019    Medtronic Beba S DR MRI [id number] implanted by Dr. Blake.   • OTHER CARDIAC SURGERY Left 01/12/2018    Medtronic Reveal LINQ [id number] implanted by Dr. Blake   • GASTROSCOPY-ENDO  5/19/2017    Procedure: GASTROSCOPY-ENDO;  Surgeon: Reinaldo Berry M.D.;  Location: [facility];  Service:    • GASTROSCOPY-ENDO  7/26/2012    Performed by JORDIN VERA at ENDOSCOPY [facility] ORS   • GASTROSCOPY-ENDO  7/25/2012    Performed by JORDIN VERA at [facility]   • OTHER ORTHOPEDIC SURGERY  7-     R knee surgery   • OTHER CARDIAC SURGERY      stent placement     History reviewed. No pertinent family history.  Social History     Socioeconomic History   • Marital status: Single     Spouse name: Not on file   • Number of children: Not on file   • Years of education: Not on file   • Highest education level: Not on file   Occupational History   • Not on file   Social Needs   • Financial resource strain: Not on file   • Food insecurity:     Worry: Not on file     Inability: Not on file   • Transportation needs:     Medical: Not on file     Non-medical: Not on file   Tobacco Use   • Smoking status: Never Smoker   • Smokeless tobacco: Never Used   Substance and Sexual Activity   • Alcohol use: No   • Drug use: No   •  "Sexual activity: Not on file     Comment: Single, has no children, works as an .   Lifestyle   • Physical activity:     Days per week: Not on file     Minutes per session: Not on file   • Stress: Not on file   Relationships   • Social connections:     Talks on phone: Not on file     Gets together: Not on file     Attends [employer] service: Not on file     Active member of club or organization: Not on file     Attends meetings of clubs or organizations: Not on file     Relationship status: Not on file   • Intimate partner violence:     Fear of current or ex partner: Not on file     Emotionally abused: Not on file     Physically abused: Not on file     Forced sexual activity: Not on file   Other Topics Concern   • Not on file   Social History Narrative    ** Merged History Encounter **         ** Merged History Encounter **          Allergies   Allergen Reactions   • Ritalin [Methylphenidate] Unspecified     \"Hyper\"     Outpatient Encounter Medications as of 10/22/2019   Medication Sig Dispense Refill   • meclizine (ANTIVERT) 25 MG Tab Take 25 mg by mouth 3 times a day as needed.     • Ranolazine 1000 MG TABLET SR 12 HR Take 1 Tab by mouth 2 Times a Day. (Patient taking differently: Take 500 mg by mouth 2 Times a Day.) 180 Tab 2   • omeprazole (PRILOSEC) 20 MG delayed-release capsule Take 1 Cap by mouth every day. 30 Cap 0   • aspirin EC (ECOTRIN) 81 MG Tablet Delayed Response Take 81 mg by mouth every day.     • atorvastatin (LIPITOR) 80 MG tablet Take 80 mg by mouth every evening.     • cyanocobalamin (VITAMIN B12) 1000 MCG Tab Take 1,000 mcg by mouth every day.     • lisinopril (PRINIVIL) 2.5 MG Tab Take 2.5 mg by mouth every day.     • loratadine (CLARITIN) 10 MG Tab Take 10 mg by mouth every day.     • metformin (GLUCOPHAGE) 1000 MG tablet Take 1,000 mg by mouth 2 times a day, with meals.     • rivaroxaban (XARELTO) 20 MG Tab tablet Take 20 mg by mouth with dinner.     • vitamin D (CHOLECALCIFEROL) 1000 " "UNIT Tab Take 1,000 Units by mouth every day.     • metoprolol SR (TOPROL XL) 25 MG TABLET SR 24 HR Take 0.5 Tabs by mouth every day. 45 Tab 3   • metoclopramide (REGLAN) 10 MG Tab Take 1 Tab by mouth 4 times a day as needed. (Patient not taking: Reported on 10/22/2019) 60 Tab 0   • cyclobenzaprine (FLEXERIL) 10 MG Tab Take 10 mg by mouth 3 times a day as needed for Muscle Spasms.     • dicyclomine (BENTYL) 20 MG Tab Take 20 mg by mouth every 6 hours as needed (abdominal pain).       No facility-administered encounter medications on file as of 10/22/2019.      Review of Systems   Constitutional: Negative for chills and fever.   HENT: Negative for congestion.    Respiratory: Negative for cough and shortness of breath.    Cardiovascular: Negative for chest pain, palpitations, orthopnea, leg swelling and PND.   Gastrointestinal: Negative for abdominal pain and nausea.   Musculoskeletal: Negative for myalgias.   Skin: Negative for rash.   Neurological: Negative for dizziness, loss of consciousness and headaches.   Endo/Heme/Allergies: Does not bruise/bleed easily.   Psychiatric/Behavioral: The patient does not have insomnia.         Objective:   /84 (BP Location: Left arm, Patient Position: Sitting, BP Cuff Size: Adult)   Pulse 70   Ht 1.702 m (5' 7\")   Wt 84.4 kg (186 lb)   SpO2 97%   BMI 29.13 kg/m²     Physical Exam   Constitutional: He is oriented to person, place, and time. He appears well-developed and well-nourished.   HENT:   Head: Normocephalic.   Eyes: EOM are normal.   Neck: Normal range of motion. Neck supple. No JVD present.   Cardiovascular: Normal rate, regular rhythm and normal heart sounds.   Pulmonary/Chest: Effort normal and breath sounds normal. No respiratory distress. He has no wheezes. He has no rales.   PM in left chest wall.   Abdominal: Soft. Bowel sounds are normal. He exhibits no distension. There is no tenderness.   Musculoskeletal: Normal range of motion. He exhibits no edema. "   Neurological: He is alert and oriented to person, place, and time.   Skin: Skin is warm and dry. No rash noted.   Psychiatric: He has a normal mood and affect.     PM is working normally. No changes are made today. No mode switching episodes. 4 brief SVT episodes.    NUCLEAR IMAGING INTERPRETATION OF MPI OF 8/3/2019:   Moderate fixed defect in the inferolateral wall, likely prior infarct. No    reversible defect. Slightly decreased LV ejection fraction = 46%.   ECG INTERPRETATION   Negative stress ECG for ischemia.    CONCLUSIONS OF ECHOCARDIOGRAM OF 3/28/2019:  Prior echo on 1/5/18. Compared to the images of the prior study done -    there has been no significant change.   Mildly reduced left ventricular systolic function.   Inferolateral wall and basal inferior wall hypokinesis. Left   ventricular ejection fraction is visually estimated to be 50%.   Mild to moderate eccentric aortic insufficiency.  Unable to estimate pulmonary artery pressure due to an inadequate   tricuspid regurgitant jet.  Ascending aorta diameter is 3.6 cm.    Component      Latest Ref Rng & Units 8/14/2019 8/20/2019 8/21/2019 8/23/2019           5:25 PM  8:57 PM  8:06 AM  6:27 PM   Sodium      135 - 145 mmol/L 134 (L) 135 138 138   Potassium      3.6 - 5.5 mmol/L 4.0 4.2 4.5 4.5   Chloride      96 - 112 mmol/L 102 107 111 107   Co2      20 - 33 mmol/L 20 20 21 23   Anion Gap      0.0 - 11.9 12.0 (H) 8.0 6.0 8.0   Glucose      65 - 99 mg/dL 117 (H) 85 116 (H) 87   Bun      8 - 22 mg/dL 21 33 (H) 22 13   Creatinine      0.50 - 1.40 mg/dL 1.05 2.11 (H) 1.28 0.96   Calcium      8.5 - 10.5 mg/dL 9.4 8.7 8.5 8.9   AST(SGOT)      12 - 45 U/L 15 15 13 14   ALT(SGPT)      2 - 50 U/L 20 16 15 16   Alkaline Phosphatase      30 - 99 U/L 68 60 59 61   Total Bilirubin      0.1 - 1.5 mg/dL 1.4 1.3 1.1 1.0   Albumin      3.2 - 4.9 g/dL 4.4 4.2 3.7 4.2   Total Protein      6.0 - 8.2 g/dL 7.3 6.5 6.1 6.7   Globulin      1.9 - 3.5 g/dL 2.9 2.3 2.4 2.5   A-G  Ratio      g/dL 1.5 1.8 1.5 1.7     Component      Latest Ref Rng & Units 12/18/2018 8/21/2019          12:27 PM  5:14 AM   Cholesterol,Tot      100 - 199 mg/dL 133 91 (L)   Triglycerides      0 - 149 mg/dL 240 (H) 257 (H)   HDL      >=40 mg/dL 48 25 (A)   LDL      <100 mg/dL 37 15         Assessment:     1. Cardiac pacemaker in situ     2. Syncope, unspecified syncope type     3. Sick sinus syndrome (HCC)     4. PAF (paroxysmal atrial fibrillation) (HCC)     5. Chronic anticoagulation     6. Coronary artery disease involving native coronary artery of native heart without angina pectoris     7. Ischemic Cardiomyopathy EF 50%     8. Essential hypertension     9. Dyslipidemia     10. 3-vessel coronary artery disease         Medical Decision Making:  Today's Assessment / Status / Plan:     1. History of syncope with sick sinus syndrome with paroxysmal atrial fibrillation, with PPM, which is working normally. No changes are made today. No mode switching episodes. 4 brief SVT episodes.    2. Chronic anticoagulation with Xarelto. No bleeding problems.    3. CAD/ischemic cardiomyopathy, with diffuse moderate disease on cath in 2017. MPI in August 2019 showed no ischemia.    4. Hypertension, treated and stable. BP is good today.    5. Hyperlipidemia, treated with Lipitor.    Same medications for now. Device is working normally. FU in 6 month for next PM check; will also need to get established with new cardiologist. Keep FU with other providers too.    Collaborating MD: Mahsa

## 2019-10-28 ENCOUNTER — HOSPITAL ENCOUNTER (OUTPATIENT)
Dept: LAB | Facility: MEDICAL CENTER | Age: 54
End: 2019-10-28
Attending: FAMILY MEDICINE
Payer: MEDICARE

## 2019-10-28 LAB
ALBUMIN SERPL BCP-MCNC: 4.2 G/DL (ref 3.2–4.9)
ALBUMIN/GLOB SERPL: 1.4 G/DL
ALP SERPL-CCNC: 104 U/L (ref 30–99)
ALT SERPL-CCNC: 33 U/L (ref 2–50)
ANION GAP SERPL CALC-SCNC: 9 MMOL/L (ref 0–11.9)
AST SERPL-CCNC: 20 U/L (ref 12–45)
BILIRUB SERPL-MCNC: 1.4 MG/DL (ref 0.1–1.5)
BUN SERPL-MCNC: 15 MG/DL (ref 8–22)
CALCIUM SERPL-MCNC: 9.1 MG/DL (ref 8.5–10.5)
CHLORIDE SERPL-SCNC: 104 MMOL/L (ref 96–112)
CHOLEST SERPL-MCNC: 145 MG/DL (ref 100–199)
CO2 SERPL-SCNC: 24 MMOL/L (ref 20–33)
CREAT SERPL-MCNC: 0.93 MG/DL (ref 0.5–1.4)
ERYTHROCYTE [DISTWIDTH] IN BLOOD BY AUTOMATED COUNT: 45 FL (ref 35.9–50)
FASTING STATUS PATIENT QL REPORTED: NORMAL
GLOBULIN SER CALC-MCNC: 2.9 G/DL (ref 1.9–3.5)
GLUCOSE SERPL-MCNC: 182 MG/DL (ref 65–99)
HCT VFR BLD AUTO: 39.9 % (ref 42–52)
HDLC SERPL-MCNC: 36 MG/DL
HGB BLD-MCNC: 13.4 G/DL (ref 14–18)
LDLC SERPL CALC-MCNC: 34 MG/DL
MCH RBC QN AUTO: 31.6 PG (ref 27–33)
MCHC RBC AUTO-ENTMCNC: 33.6 G/DL (ref 33.7–35.3)
MCV RBC AUTO: 94.1 FL (ref 81.4–97.8)
PLATELET # BLD AUTO: 275 K/UL (ref 164–446)
PMV BLD AUTO: 10 FL (ref 9–12.9)
POTASSIUM SERPL-SCNC: 4.1 MMOL/L (ref 3.6–5.5)
PROT SERPL-MCNC: 7.1 G/DL (ref 6–8.2)
PSA SERPL-MCNC: 0.41 NG/ML (ref 0–4)
RBC # BLD AUTO: 4.24 M/UL (ref 4.7–6.1)
SODIUM SERPL-SCNC: 137 MMOL/L (ref 135–145)
TRIGL SERPL-MCNC: 375 MG/DL (ref 0–149)
WBC # BLD AUTO: 6.1 K/UL (ref 4.8–10.8)

## 2019-10-28 PROCEDURE — 80053 COMPREHEN METABOLIC PANEL: CPT

## 2019-10-28 PROCEDURE — 36415 COLL VENOUS BLD VENIPUNCTURE: CPT

## 2019-10-28 PROCEDURE — 84153 ASSAY OF PSA TOTAL: CPT | Mod: GA

## 2019-10-28 PROCEDURE — 80061 LIPID PANEL: CPT

## 2019-10-28 PROCEDURE — 83036 HEMOGLOBIN GLYCOSYLATED A1C: CPT | Mod: GA

## 2019-10-28 PROCEDURE — 85027 COMPLETE CBC AUTOMATED: CPT

## 2019-10-29 LAB
EST. AVERAGE GLUCOSE BLD GHB EST-MCNC: 180 MG/DL
HBA1C MFR BLD: 7.9 % (ref 0–5.6)

## 2019-12-30 ENCOUNTER — HOSPITAL ENCOUNTER (OUTPATIENT)
Dept: LAB | Facility: MEDICAL CENTER | Age: 54
End: 2019-12-30
Attending: FAMILY MEDICINE
Payer: MEDICARE

## 2019-12-30 LAB
ALBUMIN SERPL BCP-MCNC: 4.5 G/DL (ref 3.2–4.9)
ALBUMIN/GLOB SERPL: 1.5 G/DL
ALP SERPL-CCNC: 104 U/L (ref 30–99)
ALT SERPL-CCNC: 37 U/L (ref 2–50)
ANION GAP SERPL CALC-SCNC: 10 MMOL/L (ref 0–11.9)
AST SERPL-CCNC: 22 U/L (ref 12–45)
BILIRUB SERPL-MCNC: 1.3 MG/DL (ref 0.1–1.5)
BUN SERPL-MCNC: 14 MG/DL (ref 8–22)
CALCIUM SERPL-MCNC: 9.3 MG/DL (ref 8.5–10.5)
CHLORIDE SERPL-SCNC: 100 MMOL/L (ref 96–112)
CHOLEST SERPL-MCNC: 181 MG/DL (ref 100–199)
CO2 SERPL-SCNC: 25 MMOL/L (ref 20–33)
CREAT SERPL-MCNC: 1.04 MG/DL (ref 0.5–1.4)
ERYTHROCYTE [DISTWIDTH] IN BLOOD BY AUTOMATED COUNT: 43.2 FL (ref 35.9–50)
EST. AVERAGE GLUCOSE BLD GHB EST-MCNC: 266 MG/DL
FASTING STATUS PATIENT QL REPORTED: NORMAL
GLOBULIN SER CALC-MCNC: 3 G/DL (ref 1.9–3.5)
GLUCOSE SERPL-MCNC: 248 MG/DL (ref 65–99)
HBA1C MFR BLD: 10.9 % (ref 0–5.6)
HCT VFR BLD AUTO: 41.8 % (ref 42–52)
HDLC SERPL-MCNC: 33 MG/DL
HGB BLD-MCNC: 14.1 G/DL (ref 14–18)
LDLC SERPL CALC-MCNC: ABNORMAL MG/DL
MCH RBC QN AUTO: 30.3 PG (ref 27–33)
MCHC RBC AUTO-ENTMCNC: 33.7 G/DL (ref 33.7–35.3)
MCV RBC AUTO: 89.9 FL (ref 81.4–97.8)
PLATELET # BLD AUTO: 293 K/UL (ref 164–446)
PMV BLD AUTO: 9.8 FL (ref 9–12.9)
POTASSIUM SERPL-SCNC: 4.2 MMOL/L (ref 3.6–5.5)
PROT SERPL-MCNC: 7.5 G/DL (ref 6–8.2)
RBC # BLD AUTO: 4.65 M/UL (ref 4.7–6.1)
SODIUM SERPL-SCNC: 135 MMOL/L (ref 135–145)
TRIGL SERPL-MCNC: 492 MG/DL (ref 0–149)
WBC # BLD AUTO: 5.7 K/UL (ref 4.8–10.8)

## 2019-12-30 PROCEDURE — 36415 COLL VENOUS BLD VENIPUNCTURE: CPT

## 2019-12-30 PROCEDURE — 85027 COMPLETE CBC AUTOMATED: CPT

## 2019-12-30 PROCEDURE — 83036 HEMOGLOBIN GLYCOSYLATED A1C: CPT | Mod: GA

## 2019-12-30 PROCEDURE — 80053 COMPREHEN METABOLIC PANEL: CPT

## 2019-12-30 PROCEDURE — 80061 LIPID PANEL: CPT

## 2020-04-20 ENCOUNTER — HOSPITAL ENCOUNTER (OUTPATIENT)
Dept: LAB | Facility: MEDICAL CENTER | Age: 55
End: 2020-04-20
Attending: FAMILY MEDICINE
Payer: MEDICARE

## 2020-04-20 LAB
ALBUMIN SERPL BCP-MCNC: 4.4 G/DL (ref 3.2–4.9)
ALBUMIN/GLOB SERPL: 1.4 G/DL
ALP SERPL-CCNC: 101 U/L (ref 30–99)
ALT SERPL-CCNC: 33 U/L (ref 2–50)
ANION GAP SERPL CALC-SCNC: 13 MMOL/L (ref 7–16)
AST SERPL-CCNC: 14 U/L (ref 12–45)
BILIRUB SERPL-MCNC: 1.1 MG/DL (ref 0.1–1.5)
BUN SERPL-MCNC: 13 MG/DL (ref 8–22)
CALCIUM SERPL-MCNC: 9.4 MG/DL (ref 8.5–10.5)
CHLORIDE SERPL-SCNC: 102 MMOL/L (ref 96–112)
CHOLEST SERPL-MCNC: 156 MG/DL (ref 100–199)
CO2 SERPL-SCNC: 24 MMOL/L (ref 20–33)
CREAT SERPL-MCNC: 1.07 MG/DL (ref 0.5–1.4)
EST. AVERAGE GLUCOSE BLD GHB EST-MCNC: 166 MG/DL
FASTING STATUS PATIENT QL REPORTED: NORMAL
GLOBULIN SER CALC-MCNC: 3.2 G/DL (ref 1.9–3.5)
GLUCOSE SERPL-MCNC: 122 MG/DL (ref 65–99)
HBA1C MFR BLD: 7.4 % (ref 0–5.6)
HDLC SERPL-MCNC: 35 MG/DL
LDLC SERPL CALC-MCNC: 77 MG/DL
POTASSIUM SERPL-SCNC: 4.7 MMOL/L (ref 3.6–5.5)
PROT SERPL-MCNC: 7.6 G/DL (ref 6–8.2)
PSA SERPL-MCNC: 0.35 NG/ML (ref 0–4)
SODIUM SERPL-SCNC: 139 MMOL/L (ref 135–145)
TRIGL SERPL-MCNC: 222 MG/DL (ref 0–149)

## 2020-04-20 PROCEDURE — 84153 ASSAY OF PSA TOTAL: CPT | Mod: GA

## 2020-04-20 PROCEDURE — 80061 LIPID PANEL: CPT

## 2020-04-20 PROCEDURE — 83036 HEMOGLOBIN GLYCOSYLATED A1C: CPT | Mod: GA

## 2020-04-20 PROCEDURE — 36415 COLL VENOUS BLD VENIPUNCTURE: CPT

## 2020-04-20 PROCEDURE — 80053 COMPREHEN METABOLIC PANEL: CPT

## 2020-08-28 ENCOUNTER — HOSPITAL ENCOUNTER (EMERGENCY)
Facility: MEDICAL CENTER | Age: 55
End: 2020-08-29
Attending: EMERGENCY MEDICINE
Payer: MEDICARE

## 2020-08-28 ENCOUNTER — APPOINTMENT (OUTPATIENT)
Dept: RADIOLOGY | Facility: MEDICAL CENTER | Age: 55
End: 2020-08-28
Attending: EMERGENCY MEDICINE
Payer: MEDICARE

## 2020-08-28 DIAGNOSIS — R07.9 ACUTE CHEST PAIN: ICD-10-CM

## 2020-08-28 DIAGNOSIS — I10 ESSENTIAL HYPERTENSION: ICD-10-CM

## 2020-08-28 DIAGNOSIS — F79 INTELLECTUAL DISABILITY: ICD-10-CM

## 2020-08-28 DIAGNOSIS — I25.10 3-VESSEL CORONARY ARTERY DISEASE: ICD-10-CM

## 2020-08-28 DIAGNOSIS — E11.9 TYPE 2 DIABETES MELLITUS WITHOUT COMPLICATION, WITHOUT LONG-TERM CURRENT USE OF INSULIN (HCC): Chronic | ICD-10-CM

## 2020-08-28 DIAGNOSIS — Z95.0 CARDIAC PACEMAKER IN SITU: ICD-10-CM

## 2020-08-28 DIAGNOSIS — R09.89 CHOKING EPISODE: ICD-10-CM

## 2020-08-28 LAB
ALBUMIN SERPL BCP-MCNC: 4.2 G/DL (ref 3.2–4.9)
ALBUMIN/GLOB SERPL: 1.7 G/DL
ALP SERPL-CCNC: 94 U/L (ref 30–99)
ALT SERPL-CCNC: 36 U/L (ref 2–50)
ANION GAP SERPL CALC-SCNC: 16 MMOL/L (ref 7–16)
AST SERPL-CCNC: 20 U/L (ref 12–45)
BASOPHILS # BLD AUTO: 0.9 % (ref 0–1.8)
BASOPHILS # BLD: 0.05 K/UL (ref 0–0.12)
BILIRUB SERPL-MCNC: 0.5 MG/DL (ref 0.1–1.5)
BUN SERPL-MCNC: 9 MG/DL (ref 8–22)
CALCIUM SERPL-MCNC: 9.1 MG/DL (ref 8.5–10.5)
CHLORIDE SERPL-SCNC: 103 MMOL/L (ref 96–112)
CO2 SERPL-SCNC: 18 MMOL/L (ref 20–33)
CREAT SERPL-MCNC: 0.96 MG/DL (ref 0.5–1.4)
EOSINOPHIL # BLD AUTO: 0.21 K/UL (ref 0–0.51)
EOSINOPHIL NFR BLD: 3.6 % (ref 0–6.9)
ERYTHROCYTE [DISTWIDTH] IN BLOOD BY AUTOMATED COUNT: 45.2 FL (ref 35.9–50)
GLOBULIN SER CALC-MCNC: 2.5 G/DL (ref 1.9–3.5)
GLUCOSE SERPL-MCNC: 132 MG/DL (ref 65–99)
HCT VFR BLD AUTO: 36.7 % (ref 42–52)
HGB BLD-MCNC: 12.5 G/DL (ref 14–18)
IMM GRANULOCYTES # BLD AUTO: 0.05 K/UL (ref 0–0.11)
IMM GRANULOCYTES NFR BLD AUTO: 0.9 % (ref 0–0.9)
LIPASE SERPL-CCNC: 59 U/L (ref 11–82)
LYMPHOCYTES # BLD AUTO: 1.48 K/UL (ref 1–4.8)
LYMPHOCYTES NFR BLD: 25.5 % (ref 22–41)
MAGNESIUM SERPL-MCNC: 1.6 MG/DL (ref 1.5–2.5)
MCH RBC QN AUTO: 32 PG (ref 27–33)
MCHC RBC AUTO-ENTMCNC: 34.1 G/DL (ref 33.7–35.3)
MCV RBC AUTO: 93.9 FL (ref 81.4–97.8)
MONOCYTES # BLD AUTO: 0.5 K/UL (ref 0–0.85)
MONOCYTES NFR BLD AUTO: 8.6 % (ref 0–13.4)
NEUTROPHILS # BLD AUTO: 3.52 K/UL (ref 1.82–7.42)
NEUTROPHILS NFR BLD: 60.5 % (ref 44–72)
NRBC # BLD AUTO: 0 K/UL
NRBC BLD-RTO: 0 /100 WBC
PLATELET # BLD AUTO: 276 K/UL (ref 164–446)
PMV BLD AUTO: 9.8 FL (ref 9–12.9)
POTASSIUM SERPL-SCNC: 4.2 MMOL/L (ref 3.6–5.5)
PROT SERPL-MCNC: 6.7 G/DL (ref 6–8.2)
RBC # BLD AUTO: 3.91 M/UL (ref 4.7–6.1)
SODIUM SERPL-SCNC: 137 MMOL/L (ref 135–145)
TROPONIN T SERPL-MCNC: 7 NG/L (ref 6–19)
TROPONIN T SERPL-MCNC: 9 NG/L (ref 6–19)
WBC # BLD AUTO: 5.8 K/UL (ref 4.8–10.8)

## 2020-08-28 PROCEDURE — 80053 COMPREHEN METABOLIC PANEL: CPT

## 2020-08-28 PROCEDURE — 93005 ELECTROCARDIOGRAM TRACING: CPT | Performed by: EMERGENCY MEDICINE

## 2020-08-28 PROCEDURE — 700102 HCHG RX REV CODE 250 W/ 637 OVERRIDE(OP): Performed by: EMERGENCY MEDICINE

## 2020-08-28 PROCEDURE — A9270 NON-COVERED ITEM OR SERVICE: HCPCS | Performed by: EMERGENCY MEDICINE

## 2020-08-28 PROCEDURE — 83735 ASSAY OF MAGNESIUM: CPT

## 2020-08-28 PROCEDURE — 84484 ASSAY OF TROPONIN QUANT: CPT | Mod: 91

## 2020-08-28 PROCEDURE — 71045 X-RAY EXAM CHEST 1 VIEW: CPT

## 2020-08-28 PROCEDURE — 99284 EMERGENCY DEPT VISIT MOD MDM: CPT

## 2020-08-28 PROCEDURE — 85025 COMPLETE CBC W/AUTO DIFF WBC: CPT

## 2020-08-28 PROCEDURE — 83690 ASSAY OF LIPASE: CPT

## 2020-08-28 RX ORDER — GEMFIBROZIL 600 MG/1
600 TABLET, FILM COATED ORAL DAILY
COMMUNITY

## 2020-08-28 RX ORDER — GLYBURIDE 5 MG/1
5 TABLET ORAL 2 TIMES DAILY WITH MEALS
COMMUNITY

## 2020-08-28 RX ORDER — MELATONIN
2 DAILY
COMMUNITY
End: 2020-08-28

## 2020-08-28 RX ADMIN — LIDOCAINE HYDROCHLORIDE 30 ML: 20 SOLUTION OROPHARYNGEAL at 21:00

## 2020-08-28 ASSESSMENT — FIBROSIS 4 INDEX: FIB4 SCORE: 0.47

## 2020-08-29 VITALS
HEIGHT: 71 IN | HEART RATE: 61 BPM | DIASTOLIC BLOOD PRESSURE: 72 MMHG | OXYGEN SATURATION: 94 % | BODY MASS INDEX: 28 KG/M2 | TEMPERATURE: 98 F | SYSTOLIC BLOOD PRESSURE: 113 MMHG | WEIGHT: 200 LBS | RESPIRATION RATE: 18 BRPM

## 2020-08-29 LAB — EKG IMPRESSION: NORMAL

## 2020-08-29 NOTE — ED NOTES
Pt resting in bed with caregiver at bedside. Breaths are even and unlabored and no distress is noted.

## 2020-08-29 NOTE — ED NOTES
Blood drawn from pt's IV for repeat Troponin and sent to lab. Pt sitting up in bed talking to caregiver at bedside and watching TV. No distress is noted at this time and pt's breaths are even and unlabored. Will continue to monitor pt while awaiting test results.

## 2020-08-29 NOTE — ED NOTES
Pt resting in bed watching TV with even and unlabored breaths. No distress is noted and pt's caregiver is still at bedside. Will continue to monitor.

## 2020-08-29 NOTE — ED NOTES
Pt given GI cocktail and tolerated well. Pt's caregiver at bedside. Pt and caregiver updated regarding plan of care and both demonstrated understanding. Will continue to monitor.

## 2020-08-29 NOTE — DISCHARGE INSTRUCTIONS
You were seen and evaluated in the Emergency Department at [facility] for:     Chest pain after choking episode    You had the following tests and studies:    Thankfully your work-up today is reassuring your x-ray is normal, your cardiac enzymes are negative, this is likely just a scratch of your esophagus    You received the following medications:    Maalox    You received the following prescriptions:    Continue home medications  ----------------------------    Please make sure to follow up with:    Your primary care provider by Monday for recheck and routine health care, if you get any worsening chest pain or trouble swallowing or breathing or vomiting or any other concerns return to the ER right away.    Good luck, we hope you get better soon!  ----------------------------    We always encourage patients to return IMMEDIATELY if they have:  Increased or changing pain, passing out, fevers over 100.4 (taken in your mouth or rectally) for more than 2 days, redness or swelling of skin or tissues, feeling like your heart is beating fast, chest pain that is new or worsening, trouble breathing, feeling like your throat is closing up and can not breath, inability to walk, weakness of any part of your body, new dizziness, severe bleeding that won't stop from any part of your body, if you can't eat or drink, or if you have any other concerns.   If you feel worse, please know that you can always return with any questions, concerns, worse symptoms, or you are feeling unsafe. We certainly cannot say for sure that we have ruled out every illness or dangerous disease, but we feel that at this specific time, your exam, tests, and vital signs like heart rate and blood pressure are safe for discharge.

## 2020-08-29 NOTE — ED PROVIDER NOTES
ED Provider Note    CHIEF COMPLAINT  Chief Complaint   Patient presents with   • Chest Pain     Chest pain after choking on food 2 hrs ago       HPI    Primary care provider: Not on file, lives in a group home  Means of arrival: EMS  History obtained from: Patient  History limited by: Ander Davis Candelario Trevizo is a 55 y.o. male who presents with chest pain.  Began 2 hours ago the patient was eating a chicken sandwich and he had a choking episode.  He has had mild choking episodes in the past but never esophageal obstruction or required endoscopy.  For several seconds the patient felt like he was choking on his food but then he feels like he was able to swallow.  Since that time he has had some achy retrosternal sharp nonradiating mild chest pain.  He does have a history of coronary artery disease.  No alleviating factors attempted, chest pain worsened over 2 hours fairly constant since this choking episodes of staff at his group home called 911.  No aggravating factors.  He has not vomited he has no trouble breathing he has no cough.  No fevers.  No known COVID contact.    REVIEW OF SYSTEMS  Constitutional: Negative for fever or chills.   Respiratory: Negative for cough or shortness of breath.  Positive for choking episode on food.  Cardiovascular: Positive for chest pain no syncope.  Gastrointestinal: Negative for nausea, vomiting, or abdominal pain.   Genitourinary: Negative for dysuria or flank pain.   Musculoskeletal: Negative for back pain or joint pain.   Skin: Negative for itching or rash.   Neurological: Negative for sensory or motor changes.   See HPI for further details. All other systems are negative.     PAST MEDICAL HISTORY   has a past medical history of ACS (acute coronary syndrome) ([facility]), Anxiety disorder, Arthritis, CAD (coronary artery disease) (07/2017), Chronic anticoagulation, Claudication ([facility]), Diabetes ([facility]), Dizziness, GERD (gastroesophageal reflux disease), Glaucoma, HTN (hypertension),  "Hyperlipemia, Ischemic cardiomyopathy (03/2019), Mental retardation, Mentally challenged, Osteoarthritis, Paroxysmal atrial fibrillation (HCC), Pericarditis secondary to acute myocardial infarction (HCC), Sick sinus syndrome (HCC), SOB (shortness of breath), STEMI (ST elevation myocardial infarction) ([facility]), and Syncope (02/2019).    PAST FAMILY HISTORY  No family history on file.    SOCIAL HISTORY  Social History     Tobacco Use   • Smoking status: Never Smoker   • Smokeless tobacco: Never Used   Substance and Sexual Activity   • Alcohol use: No   • Drug use: No   • Sexual activity: Not on file     Comment: Single, has no children, works as an .       SURGICAL HISTORY   has a past surgical history that includes other orthopedic surgery (7- ); other cardiac surgery; gastroscopy-endo (7/25/2012); gastroscopy-endo (7/26/2012); gastroscopy-endo (5/19/2017); other cardiac surgery (Left, 01/12/2018); and pacemaker insertion (Left, 02/12/2019).    CURRENT MEDICATIONS  See MAR.    ALLERGIES  Allergies   Allergen Reactions   • Ritalin [Methylphenidate] Unspecified     \"Hyper\"       PHYSICAL EXAM  VITAL SIGNS: /72   Pulse 61   Temp 36.7 °C (98 °F)   Resp 18   Ht 1.803 m (5' 11\")   Wt 90.7 kg (200 lb)   SpO2 94%   BMI 27.89 kg/m²    Pulse ox interpretation: On room air, I interpret this pulse ox as normal.  Constitutional: Chronically ill-appearing, sitting up.  HEENT: Normocephalic, atraumatic. Posterior pharynx clear, mucous membranes slightly dry, edentulous, tolerating secretions, normal voice.  Eyes:  EOMI. Normal sclerae.  Neck: Supple, nontender.  Chest/Pulmonary: Clear to ausculation bilaterally, no wheezes or rhonchi.  Cardiovascular: Regular rate and rhythm, no obvious murmur.   Abdomen: Soft, nontender; no rebound, guarding, or masses.  Back: No CVA or midline tenderness.   Musculoskeletal: No deformity or edema.  Neuro: Clear speech, normal coordination, cranial nerves II-XII grossly " intact, no focal asymmetry or sensory deficits.   Psych: Pleasant and cooperative.  Skin: No rashes, warm and dry.      DIAGNOSTIC STUDIES / PROCEDURES    LABS & EKG  Results for orders placed or performed during the hospital encounter of 08/28/20   CBC w/ Differential   Result Value Ref Range    WBC 5.8 4.8 - 10.8 K/uL    RBC 3.91 (L) 4.70 - 6.10 M/uL    Hemoglobin 12.5 (L) 14.0 - 18.0 g/dL    Hematocrit 36.7 (L) 42.0 - 52.0 %    MCV 93.9 81.4 - 97.8 fL    MCH 32.0 27.0 - 33.0 pg    MCHC 34.1 33.7 - 35.3 g/dL    RDW 45.2 35.9 - 50.0 fL    Platelet Count 276 164 - 446 K/uL    MPV 9.8 9.0 - 12.9 fL    Neutrophils-Polys 60.50 44.00 - 72.00 %    Lymphocytes 25.50 22.00 - 41.00 %    Monocytes 8.60 0.00 - 13.40 %    Eosinophils 3.60 0.00 - 6.90 %    Basophils 0.90 0.00 - 1.80 %    Immature Granulocytes 0.90 0.00 - 0.90 %    Nucleated RBC 0.00 /100 WBC    Neutrophils (Absolute) 3.52 1.82 - 7.42 K/uL    Lymphs (Absolute) 1.48 1.00 - 4.80 K/uL    Monos (Absolute) 0.50 0.00 - 0.85 K/uL    Eos (Absolute) 0.21 0.00 - 0.51 K/uL    Baso (Absolute) 0.05 0.00 - 0.12 K/uL    Immature Granulocytes (abs) 0.05 0.00 - 0.11 K/uL    NRBC (Absolute) 0.00 K/uL   Complete Metabolic Panel (CMP)   Result Value Ref Range    Sodium 137 135 - 145 mmol/L    Potassium 4.2 3.6 - 5.5 mmol/L    Chloride 103 96 - 112 mmol/L    Co2 18 (L) 20 - 33 mmol/L    Anion Gap 16.0 7.0 - 16.0    Glucose 132 (H) 65 - 99 mg/dL    Bun 9 8 - 22 mg/dL    Creatinine 0.96 0.50 - 1.40 mg/dL    Calcium 9.1 8.5 - 10.5 mg/dL    AST(SGOT) 20 12 - 45 U/L    ALT(SGPT) 36 2 - 50 U/L    Alkaline Phosphatase 94 30 - 99 U/L    Total Bilirubin 0.5 0.1 - 1.5 mg/dL    Albumin 4.2 3.2 - 4.9 g/dL    Total Protein 6.7 6.0 - 8.2 g/dL    Globulin 2.5 1.9 - 3.5 g/dL    A-G Ratio 1.7 g/dL   Troponin STAT   Result Value Ref Range    Troponin T 9 6 - 19 ng/L   Troponin in two (2) hours   Result Value Ref Range    Troponin T 7 6 - 19 ng/L   Lipase   Result Value Ref Range    Lipase 59 11 - 82  U/L   Magnesium   Result Value Ref Range    Magnesium 1.6 1.5 - 2.5 mg/dL   ESTIMATED GFR   Result Value Ref Range    GFR If African American >60 >60 mL/min/1.73 m 2    GFR If Non African American >60 >60 mL/min/1.73 m 2   EKG   Result Value Ref Range    Report       [facility] Emergency Dept.    Test Date:  2020  Pt Name:    NICHELLE MARQUEZ                   Department: ER  MRN:        [id number]                      Room:        09  Gender:     Male                         Technician: [id number]  :        1965                   Requested By:ER TRIAGE PROTOCOL  Order #:    [id number]                    Reading MD: Daryl Sumner MD    Measurements  Intervals                                Axis  Rate:       77                           P:          36  NJ:         164                          QRS:        -15  QRSD:       106                          T:          63  QT:         381  QTc:        432    Interpretive Statements  Sinus rhythm  Compared to ECG 2019 17:34:03  No significant changes stable EKG no STEMI  Electronically Signed On 2020 13:30:21 PDT by Daryl Sumner MD           RADIOLOGY  DX-CHEST-PORTABLE (1 VIEW)   Final Result         1.  No acute cardiopulmonary disease.            COURSE & MEDICAL DECISION MAKING    This is a 55 y.o. male who presents with chest pain after choking episode.    Differential Diagnosis includes but is not limited to:  Esophageal obstruction, ACS, Boerhaave's, esophageal tear, food bolus impaction, aspiration    ED Course:  This is a pleasant healthy including coronary disease and intellectual disability.  He never lost consciousness no vomiting no dyspnea.  Given his medical history plan ACS spelt, history and examination just of possible esophageal irritation after choking.  Patient has clear lungs, he has normal voice and no drooling, has had no vomiting doubt is a complete esophageal obstruction or food bolus impaction, plan GI  cocktail for symptom relief while awaiting work-up results.    Thankfully medical work-up is very reassuring, EKG stable chest x-ray clear.  No hypoxia no fevers tolerated p.o. here well without difficulty.  Feeling better after GI cocktail.  Serial troponins are negative, heart score is at most 3.  I think the patient is safe for discharge she has staff from his group home here, they are comfortable with discharge and follow-up with primary care, if he has any worsening chest pain or dyspnea or trouble swallowing or vomiting or any other concerns they will bring him back to the ER immediately.    Medications   hyoscyamine-maalox plus-lidocaine viscous (GI COCKTAIL) oral susp 30 mL (30 mL Oral Given 8/28/20 2100)       FINAL IMPRESSION  1. Acute chest pain    2. Choking episode    3. Cardiac pacemaker in situ    4. Intellectual disability    5. Essential hypertension    6. Type 2 diabetes mellitus without complication, without long-term current use of insulin ([facility])    7. 3-vessel coronary artery disease        PRESCRIPTIONS  Discharge Medication List as of 8/29/2020  1:00 AM          FOLLOW UP  [facility], Emergency Dept  [facility] [facility] [id number] [phone]  Today  If you have ANY new or worse symptoms!    Your primary care provider    Schedule an appointment as soon as possible for a visit in 3 days  for recheck and routine health care        -DISCHARGE-       Results, exam findings, clinical impression, presumed diagnosis, treatment options, and strict return precautions were discussed with the patient, and they verbalized understanding, agreed with, and appreciated the plan of care.    Pertinent Labs & Imaging studies reviewed and verified by myself, as well as nursing notes and the patient's past medical, family, and social histories (See chart for details).    Portions of this record were made with voice recognition software.  Despite my review, spelling/grammar/context  errors may still remain.  Interpretation of this chart should be taken in this context.    Electronically signed by Daryl Sumner M.D. on 8/29/2020 at 1:35 PM.

## 2020-08-29 NOTE — ED NOTES
Pt and caregiver educated regarding discharge instructions and demonstrated understanding. Pt ambulatory upon discharge and does not appear to be in any distress at this time. Pt's discharge paperwork and belongings sent home with pt and caregiver.

## 2020-09-08 ENCOUNTER — HOSPITAL ENCOUNTER (OUTPATIENT)
Dept: LAB | Facility: MEDICAL CENTER | Age: 55
End: 2020-09-08
Attending: FAMILY MEDICINE
Payer: MEDICARE

## 2020-09-08 LAB
ALBUMIN SERPL BCP-MCNC: 4.7 G/DL (ref 3.2–4.9)
ALBUMIN/GLOB SERPL: 1.9 G/DL
ALP SERPL-CCNC: 93 U/L (ref 30–99)
ALT SERPL-CCNC: 37 U/L (ref 2–50)
ANION GAP SERPL CALC-SCNC: 14 MMOL/L (ref 7–16)
AST SERPL-CCNC: 23 U/L (ref 12–45)
BASOPHILS # BLD AUTO: 0.9 % (ref 0–1.8)
BASOPHILS # BLD: 0.05 K/UL (ref 0–0.12)
BILIRUB SERPL-MCNC: 0.9 MG/DL (ref 0.1–1.5)
BUN SERPL-MCNC: 13 MG/DL (ref 8–22)
CALCIUM SERPL-MCNC: 9.4 MG/DL (ref 8.5–10.5)
CHLORIDE SERPL-SCNC: 104 MMOL/L (ref 96–112)
CHOLEST SERPL-MCNC: 149 MG/DL (ref 100–199)
CO2 SERPL-SCNC: 21 MMOL/L (ref 20–33)
CREAT SERPL-MCNC: 1.09 MG/DL (ref 0.5–1.4)
EOSINOPHIL # BLD AUTO: 0.19 K/UL (ref 0–0.51)
EOSINOPHIL NFR BLD: 3.4 % (ref 0–6.9)
ERYTHROCYTE [DISTWIDTH] IN BLOOD BY AUTOMATED COUNT: 45.6 FL (ref 35.9–50)
EST. AVERAGE GLUCOSE BLD GHB EST-MCNC: 137 MG/DL
GLOBULIN SER CALC-MCNC: 2.5 G/DL (ref 1.9–3.5)
GLUCOSE SERPL-MCNC: 115 MG/DL (ref 65–99)
HBA1C MFR BLD: 6.4 % (ref 0–5.6)
HCT VFR BLD AUTO: 39.9 % (ref 42–52)
HDLC SERPL-MCNC: 38 MG/DL
HGB BLD-MCNC: 13.4 G/DL (ref 14–18)
IMM GRANULOCYTES # BLD AUTO: 0.04 K/UL (ref 0–0.11)
IMM GRANULOCYTES NFR BLD AUTO: 0.7 % (ref 0–0.9)
LDLC SERPL CALC-MCNC: 75 MG/DL
LYMPHOCYTES # BLD AUTO: 1.2 K/UL (ref 1–4.8)
LYMPHOCYTES NFR BLD: 21.5 % (ref 22–41)
MCH RBC QN AUTO: 31.7 PG (ref 27–33)
MCHC RBC AUTO-ENTMCNC: 33.6 G/DL (ref 33.7–35.3)
MCV RBC AUTO: 94.3 FL (ref 81.4–97.8)
MONOCYTES # BLD AUTO: 0.38 K/UL (ref 0–0.85)
MONOCYTES NFR BLD AUTO: 6.8 % (ref 0–13.4)
NEUTROPHILS # BLD AUTO: 3.73 K/UL (ref 1.82–7.42)
NEUTROPHILS NFR BLD: 66.7 % (ref 44–72)
NRBC # BLD AUTO: 0 K/UL
NRBC BLD-RTO: 0 /100 WBC
PLATELET # BLD AUTO: 295 K/UL (ref 164–446)
PMV BLD AUTO: 9.7 FL (ref 9–12.9)
POTASSIUM SERPL-SCNC: 4.3 MMOL/L (ref 3.6–5.5)
PROT SERPL-MCNC: 7.2 G/DL (ref 6–8.2)
RBC # BLD AUTO: 4.23 M/UL (ref 4.7–6.1)
SODIUM SERPL-SCNC: 139 MMOL/L (ref 135–145)
TRIGL SERPL-MCNC: 181 MG/DL (ref 0–149)
WBC # BLD AUTO: 5.6 K/UL (ref 4.8–10.8)

## 2020-09-08 PROCEDURE — 80053 COMPREHEN METABOLIC PANEL: CPT

## 2020-09-08 PROCEDURE — 80061 LIPID PANEL: CPT

## 2020-09-08 PROCEDURE — 85025 COMPLETE CBC W/AUTO DIFF WBC: CPT

## 2020-09-08 PROCEDURE — 36415 COLL VENOUS BLD VENIPUNCTURE: CPT

## 2020-09-08 PROCEDURE — 83036 HEMOGLOBIN GLYCOSYLATED A1C: CPT | Mod: GA

## 2020-09-29 ENCOUNTER — NON-PROVIDER VISIT (OUTPATIENT)
Dept: CARDIOLOGY | Facility: MEDICAL CENTER | Age: 55
End: 2020-09-29
Payer: MEDICARE

## 2020-09-29 ENCOUNTER — OFFICE VISIT (OUTPATIENT)
Dept: CARDIOLOGY | Facility: MEDICAL CENTER | Age: 55
End: 2020-09-29
Payer: MEDICARE

## 2020-09-29 VITALS
SYSTOLIC BLOOD PRESSURE: 118 MMHG | DIASTOLIC BLOOD PRESSURE: 80 MMHG | OXYGEN SATURATION: 96 % | HEIGHT: 67 IN | WEIGHT: 187 LBS | BODY MASS INDEX: 29.35 KG/M2 | HEART RATE: 96 BPM

## 2020-09-29 VITALS
DIASTOLIC BLOOD PRESSURE: 80 MMHG | HEIGHT: 67 IN | WEIGHT: 187 LBS | BODY MASS INDEX: 29.35 KG/M2 | HEART RATE: 96 BPM | SYSTOLIC BLOOD PRESSURE: 118 MMHG | OXYGEN SATURATION: 96 %

## 2020-09-29 DIAGNOSIS — I48.0 PAF (PAROXYSMAL ATRIAL FIBRILLATION) (HCC): Chronic | ICD-10-CM

## 2020-09-29 DIAGNOSIS — Z79.01 CHRONIC ANTICOAGULATION: ICD-10-CM

## 2020-09-29 DIAGNOSIS — E78.5 DYSLIPIDEMIA: ICD-10-CM

## 2020-09-29 DIAGNOSIS — Z95.0 CARDIAC PACEMAKER IN SITU: ICD-10-CM

## 2020-09-29 DIAGNOSIS — I10 ESSENTIAL HYPERTENSION: ICD-10-CM

## 2020-09-29 DIAGNOSIS — I25.5 CARDIOMYOPATHY, ISCHEMIC: ICD-10-CM

## 2020-09-29 DIAGNOSIS — I25.10 CORONARY ARTERY DISEASE INVOLVING NATIVE CORONARY ARTERY OF NATIVE HEART WITHOUT ANGINA PECTORIS: ICD-10-CM

## 2020-09-29 DIAGNOSIS — I25.10 3-VESSEL CORONARY ARTERY DISEASE: ICD-10-CM

## 2020-09-29 DIAGNOSIS — E11.9 TYPE 2 DIABETES MELLITUS WITHOUT COMPLICATION, WITHOUT LONG-TERM CURRENT USE OF INSULIN (HCC): Chronic | ICD-10-CM

## 2020-09-29 DIAGNOSIS — I49.5 SICK SINUS SYNDROME (HCC): ICD-10-CM

## 2020-09-29 PROCEDURE — 93280 PM DEVICE PROGR EVAL DUAL: CPT | Performed by: NURSE PRACTITIONER

## 2020-09-29 PROCEDURE — 99214 OFFICE O/P EST MOD 30 MIN: CPT | Mod: 25 | Performed by: NURSE PRACTITIONER

## 2020-09-29 RX ORDER — OMEPRAZOLE 20 MG/1
40 CAPSULE, DELAYED RELEASE ORAL DAILY
Qty: 90 CAP | Refills: 3
Start: 2020-09-29 | End: 2021-04-15

## 2020-09-29 RX ORDER — METOPROLOL SUCCINATE 25 MG/1
25 TABLET, EXTENDED RELEASE ORAL DAILY
Qty: 90 TAB | Refills: 3
Start: 2020-09-29

## 2020-09-29 RX ORDER — RANOLAZINE 1000 MG/1
500 TABLET, EXTENDED RELEASE ORAL DAILY
Qty: 45 TAB | Refills: 3
Start: 2020-09-29

## 2020-09-29 ASSESSMENT — ENCOUNTER SYMPTOMS
NAUSEA: 0
MYALGIAS: 0
PND: 0
SHORTNESS OF BREATH: 0
COUGH: 0
BRUISES/BLEEDS EASILY: 0
CHILLS: 0
DIZZINESS: 0
HEADACHES: 0
PALPITATIONS: 0
LOSS OF CONSCIOUSNESS: 0
ORTHOPNEA: 0
INSOMNIA: 0
FEVER: 0
ABDOMINAL PAIN: 0

## 2020-09-29 ASSESSMENT — FIBROSIS 4 INDEX
FIB4 SCORE: 0.73
FIB4 SCORE: 0.73

## 2020-09-29 NOTE — PROGRESS NOTES
Chief Complaint   Patient presents with   • Follow-Up   • Pacemaker Check/Dysfunction   • Coronary Artery Disease   • Cardiomyopathy (Ischemic)   • HTN (Controlled)   • Hyperlipidemia   • Diabetes (Controlled)       Subjective:   Ryan Trevizo is a 55 y.o. male who presents today for overdue follow-up for PM check, CAD/ischemic cardiomyopathy, hypertension, hyperlipidemia, and diabetes.    Ryan is a 55 year old mildly disabled male with history of diffused moderate three vessel CAD/ischemic cardiomyopathy (with LVEF normalized on medical therapy on echo in July 2019), paroxysmal atrial fibrillation, anticoagulation, hypertension and hyperlipidemia. In January 2018, he has an ILR implanted for syncope and bradycardia; PM was then implanted on February 2019.    He is here today for follow-up. In August 2020, he presented to the ER with chest discomfort after a choking episode; EKG and troponins were normal; he was treated symptomatically and discharged home. He states he is feeling well today, and doesn't have any more chest pain. No dizziness or lightheadedness or syncope; no palpitations or fluttering of his heart. No shortness of breath, orthopnea or PND; no LE edema. BP has been stable. He continues to live in a group home, and getting regular care. His caretaker is here with him today.    Past Medical History:   Diagnosis Date   • ACS (acute coronary syndrome) (HCC)    • Anxiety disorder    • Arthritis    • CAD (coronary artery disease) 07/2017    Coronary angiogram with moderate diffuse disease (70% LAD, chronic total occlusion of 2nd OM of LCx, 95% ostium dual PDA). August 2019: MPI with moderate fixed defect in inferolateral wall, LVEF 46%.   • Chronic anticoagulation    • Claudication (HCC)    • Diabetes (HCC)     Oral medications   • Dizziness    • GERD (gastroesophageal reflux disease)    • Glaucoma    • HTN (hypertension)    • Hyperlipemia    • Ischemic cardiomyopathy 03/2019    Echocardiogram with  normal LV size, mild asymmetric septal hypertrophy, LVEF 50%. Trace MR, mild-moderate AI, trace TR.   • Mental retardation    • Mentally challenged     Has    • Osteoarthritis    • Paroxysmal atrial fibrillation (HCC)    • Pericarditis secondary to acute myocardial infarction ([facility])    • Sick sinus syndrome ([facility])    • SOB (shortness of breath)    • STEMI (ST elevation myocardial infarction) ([facility])    • Syncope 02/2019    Status post pacemaker placement.     Past Surgical History:   Procedure Laterality Date   • PACEMAKER INSERTION Left 02/12/2019    Medtronic [place] S DR MRI [id number] implanted by Dr. Blake.   • OTHER CARDIAC SURGERY Left 01/12/2018    Medtronic [place] LINQ [id number] implanted by Dr. Blake   • GASTROSCOPY-ENDO  5/19/2017    Procedure: GASTROSCOPY-ENDO;  Surgeon: Reinaldo Berry M.D.;  Location: [facility];  Service:    • GASTROSCOPY-ENDO  7/26/2012    Performed by JORDIN VERA at [facility]   • GASTROSCOPY-ENDO  7/25/2012    Performed by JORDIN VERA at [facility]   • OTHER ORTHOPEDIC SURGERY  7-     R knee surgery   • OTHER CARDIAC SURGERY      stent placement     History reviewed. No pertinent family history.  Social History     Socioeconomic History   • Marital status: Single     Spouse name: Not on file   • Number of children: Not on file   • Years of education: Not on file   • Highest education level: Not on file   Occupational History   • Not on file   Social Needs   • Financial resource strain: Not on file   • Food insecurity     Worry: Not on file     Inability: Not on file   • Transportation needs     Medical: Not on file     Non-medical: Not on file   Tobacco Use   • Smoking status: Never Smoker   • Smokeless tobacco: Never Used   Substance and Sexual Activity   • Alcohol use: No   • Drug use: No   • Sexual activity: Not on file     Comment: Single, has no children, works as an .   Lifestyle   • Physical activity      "Days per week: Not on file     Minutes per session: Not on file   • Stress: Not on file   Relationships   • Social connections     Talks on phone: Not on file     Gets together: Not on file     Attends Manda[employer] service: Not on file     Active member of club or organization: Not on file     Attends meetings of clubs or organizations: Not on file     Relationship status: Not on file   • Intimate partner violence     Fear of current or ex partner: Not on file     Emotionally abused: Not on file     Physically abused: Not on file     Forced sexual activity: Not on file   Other Topics Concern   • Not on file   Social History Narrative    ** Merged History Encounter **         ** Merged History Encounter **          Allergies   Allergen Reactions   • Ritalin [Methylphenidate] Unspecified     \"Hyper\"     Outpatient Encounter Medications as of 9/29/2020   Medication Sig Dispense Refill   • gemfibrozil (LOPID) 600 MG Tab Take 600 mg by mouth every day. Every other day     • glyBURIDE (DIABETA) 5 MG Tab Take 5 mg by mouth 2 times a day, with meals.     • meclizine (ANTIVERT) 25 MG Tab Take 25 mg by mouth 3 times a day as needed.     • Ranolazine 1000 MG TABLET SR 12 HR Take 1 Tab by mouth 2 Times a Day. (Patient taking differently: Take 500 mg by mouth every day. With dinner) 180 Tab 2   • metoclopramide (REGLAN) 10 MG Tab Take 1 Tab by mouth 4 times a day as needed. 60 Tab 0   • omeprazole (PRILOSEC) 20 MG delayed-release capsule Take 1 Cap by mouth every day. (Patient taking differently: Take 40 mg by mouth every day. 30 min before first meal) 30 Cap 0   • aspirin EC (ECOTRIN) 81 MG Tablet Delayed Response Take 81 mg by mouth every day.     • atorvastatin (LIPITOR) 80 MG tablet Take 80 mg by mouth every evening. Every other day     • cyanocobalamin (VITAMIN B12) 1000 MCG Tab Take 1,000 mcg by mouth every day.     • cyclobenzaprine (FLEXERIL) 10 MG Tab Take 10 mg by mouth 3 times a day as needed for Muscle Spasms.     • " "dicyclomine (BENTYL) 20 MG Tab Take 20 mg by mouth every 6 hours as needed (abdominal pain).     • lisinopril (PRINIVIL) 2.5 MG Tab Take 2.5 mg by mouth every day.     • loratadine (CLARITIN) 10 MG Tab Take 10 mg by mouth every day.     • metformin (GLUCOPHAGE) 1000 MG tablet Take 850 mg by mouth 2 times a day.     • rivaroxaban (XARELTO) 20 MG Tab tablet Take 20 mg by mouth with dinner.     • vitamin D (CHOLECALCIFEROL) 1000 UNIT Tab Take 2,000 Units by mouth every day.     • metoprolol SR (TOPROL XL) 25 MG TABLET SR 24 HR Take 0.5 Tabs by mouth every day. (Patient taking differently: Take 25 mg by mouth every day.) 45 Tab 3     No facility-administered encounter medications on file as of 9/29/2020.      Review of Systems   Constitutional: Negative for chills and fever.   HENT: Negative for congestion.    Respiratory: Negative for cough and shortness of breath.    Cardiovascular: Negative for chest pain, palpitations, orthopnea, leg swelling and PND.   Gastrointestinal: Negative for abdominal pain and nausea.   Musculoskeletal: Negative for myalgias.   Skin: Negative for rash.   Neurological: Negative for dizziness, loss of consciousness and headaches.   Endo/Heme/Allergies: Does not bruise/bleed easily.   Psychiatric/Behavioral: The patient does not have insomnia.         Objective:   /80 (BP Location: Left arm, Patient Position: Sitting)   Pulse 96   Ht 1.702 m (5' 7\")   Wt 84.8 kg (187 lb)   SpO2 96%   BMI 29.29 kg/m²     Physical Exam   Constitutional: He is oriented to person, place, and time. He appears well-developed and well-nourished.   HENT:   Head: Normocephalic.   Eyes: EOM are normal.   Neck: Normal range of motion. Neck supple. No JVD present.   Cardiovascular: Normal rate, regular rhythm and normal heart sounds.   Pulmonary/Chest: Effort normal and breath sounds normal. No respiratory distress. He has no wheezes. He has no rales.   PM in left chest wall.   Abdominal: Soft. Bowel sounds are " normal. He exhibits no distension. There is no abdominal tenderness.   Musculoskeletal: Normal range of motion.         General: No edema.   Neurological: He is alert and oriented to person, place, and time.   Skin: Skin is warm and dry. No rash noted.   Psychiatric: He has a normal mood and affect.     PM interrogation today reveals normal function. No mode switching episodes; 15 brief SVT episodes.    Component      Latest Ref Rng & Units 8/28/2020 8/28/2020           8:20 PM 10:42 PM   Troponin T      6 - 19 ng/L 9 7     Lab Results   Component Value Date/Time    SODIUM 139 09/08/2020 09:24 AM    POTASSIUM 4.3 09/08/2020 09:24 AM    CHLORIDE 104 09/08/2020 09:24 AM    CO2 21 09/08/2020 09:24 AM    GLUCOSE 115 (H) 09/08/2020 09:24 AM    BUN 13 09/08/2020 09:24 AM    CREATININE 1.09 09/08/2020 09:24 AM    CREATININE 1.0 07/12/2007 05:40 PM    BUNCREATRAT 18 06/13/2014 08:40 AM     Lab Results   Component Value Date/Time    WBC 5.6 09/08/2020 09:24 AM    RBC 4.23 (L) 09/08/2020 09:24 AM    HEMOGLOBIN 13.4 (L) 09/08/2020 09:24 AM    HEMATOCRIT 39.9 (L) 09/08/2020 09:24 AM    MCV 94.3 09/08/2020 09:24 AM    MCH 31.7 09/08/2020 09:24 AM    MCHC 33.6 (L) 09/08/2020 09:24 AM    MPV 9.7 09/08/2020 09:24 AM      Lab Results   Component Value Date/Time    CHOLSTRLTOT 149 09/08/2020 09:24 AM    LDL 75 09/08/2020 09:24 AM    HDL 38 (A) 09/08/2020 09:24 AM    TRIGLYCERIDE 181 (H) 09/08/2020 09:24 AM         NUCLEAR IMAGING INTERPRETATION OF MPI OF 8/3/2019:   Moderate fixed defect in the inferolateral wall, likely prior infarct. No    reversible defect. Slightly decreased LV ejection fraction = 46%.   ECG INTERPRETATION   Negative stress ECG for ischemia.     CONCLUSIONS OF ECHOCARDIOGRAM OF 3/28/2019:  Prior echo on 1/5/18. Compared to the images of the prior study done -    there has been no significant change.   Mildly reduced left ventricular systolic function.   Inferolateral wall and basal inferior wall hypokinesis. Left    ventricular ejection fraction is visually estimated to be 50%.   Mild to moderate eccentric aortic insufficiency.  Unable to estimate pulmonary artery pressure due to an inadequate   tricuspid regurgitant jet.  Ascending aorta diameter is 3.6 cm.    Assessment:     1. Cardiac pacemaker in situ     2. Sick sinus syndrome (HCC)     3. PAF (paroxysmal atrial fibrillation) ([facility])     4. Chronic anticoagulation     5. Coronary artery disease involving native coronary artery of native heart without angina pectoris     6. 3-vessel coronary artery disease     7. Ischemic Cardiomyopathy EF 50%     8. Essential hypertension     9. Dyslipidemia     10. Type 2 diabetes mellitus without complication, without long-term current use of insulin ([facility])         Medical Decision Making:  Today's Assessment / Status / Plan:     1. Sick sinus syndrome with paroxysmal atrial fibrillation, with PPM, which is working normally. No mode switching episodes. 15 brief SVT episodes.    2. Chronic anticoagulation with Xarelto, no bleeding problems.    3. CAD/ischemic cardiomyopathy with LVEF 50% on echo in 2019. No ischemia on MPI in August 2019. No current chest pain. He remains on ASA, BB, ACEI and statin.    4. Hypertension, treated with ACEI and BB, stable. BP is good today.    5. Hyperlipidemia, treated with Lipitor and Lopid.    6. Diabetes mellitus, treated, stable.    He remains stable at this time. Same medications for now. FU in 6 months for next PM check; FU sooner if clinical condition changes.

## 2020-11-06 NOTE — ED PROVIDER NOTES
ED PROVIDER NOTE    Scribed for Baudilio Kohler M.D. by Blaise Sibley. 12/26/2018, 9:12 PM.    Primary care provider: Juancho Jenkins M.D.  Means of arrival: Walk in  History obtained from: Patient  History limited by: None    CHIEF COMPLAINT  Chief Complaint   Patient presents with   • Toe Pain     left 5th toe       HPI  Ryan Trevizo is a 53 y.o. male who presents complaining of left 5th  toe pain. The pain began 6 days ago in the toe after hitting it on a hope chest. He has tried anything for pain, but has been walking on it. The patient endorses toe pain, but denies any fever. No other symptoms.    REVIEW OF SYSTEMS  Pertinent positives include toe pain. Pertinent negatives include fever.  See HPI for further details.    PAST MEDICAL HISTORY   has a past medical history of A-fib ([facility]); ACS (acute coronary syndrome) ([facility]) (7/25/2012); Anxiety disorder; Arthritis; Blood glucose elevated; CAD (coronary artery disease) (10/14/2013); Claudication ([facility]); Diabetes ([facility]); Dizziness; GERD (gastroesophageal reflux disease) (7/15/2012); Glaucoma; HTN (hypertension) (7/15/2012); Hyperlipemia; Hypertriglyceridemia; Ischemic cardiomyopathy; Mental retardation; MI (myocardial infarction) ([facility]); Osteoarthritis; Pericarditis secondary to acute myocardial infarction ([facility]) (7/19/2012); Right knee pain; SOB (shortness of breath); STEMI (ST elevation myocardial infarction) ([facility]); Upper GI bleed (7/26/2012); and Viral URI with cough (1/5/2018).    SURGICAL HISTORY   has a past surgical history that includes other orthopedic surgery (7- ); other (knee surgery); other cardiac surgery; other; gastroscopy-endo (7/25/2012); gastroscopy-endo (7/26/2012); and gastroscopy-endo (5/19/2017).    FAMILY HISTORY  History reviewed. No pertinent family history.     SOCIAL HISTORY  Social History     Social History Main Topics   • Smoking status: Never Smoker   • Smokeless tobacco: Never Used   • Alcohol use No   • Drug use: No     Left message to call back.    "  Comment: Single, has no children, works as an .       CURRENT MEDICATIONS  No current facility-administered medications for this encounter.      Current Outpatient Prescriptions   Medication Sig Dispense Refill   • pantoprazole (PROTONIX) 40 MG Tablet Delayed Response TAKE 1 TABLET BY MOUTH ONCE DAILY. 30 Tab 0   • loratadine (CLARITIN) 10 MG Tab TAKE 1 TABLET BY MOUTH ONCE DAILY. 30 Tab 0   • ASPIR-LOW 81 MG EC tablet TAKE 1 TABLET BY MOUTH ONCE DAILY. 30 Tab 11   • vitamin D3, cholecalciferol, 1000 UNIT Tab TAKE 1 TABLET BY MOUTH ONCE DAILY. 90 Tab 0   • benzonatate (TESSALON) 100 MG Cap Take 1 Cap by mouth 3 times a day as needed for Cough. 20 Cap 1   • Ranolazine 1000 MG TABLET SR 12 HR Take 1 Tab by mouth 2 Times a Day. 180 Tab 3   • cyanocobalamin (VITAMIN B12) 1000 MCG Tab Take 1 Tab by mouth every day. 90 Tab 3   • metformin (GLUCOPHAGE) 1000 MG tablet Take 1 Tab by mouth 2 times a day, with meals. 180 Tab 3   • atorvastatin (LIPITOR) 80 MG tablet Take 1 Tab by mouth every day. 90 Tab 3   • lisinopril (PRINIVIL) 5 MG Tab Take 1 Tab by mouth every day. 90 Tab 3   • metoprolol SR (TOPROL XL) 25 MG TABLET SR 24 HR Take 1 Tab by mouth every day. 90 Tab 3   • rivaroxaban (XARELTO) 20 MG Tab tablet Take 1 Tab by mouth with dinner. 90 Tab 3       ALLERGIES  Allergies   Allergen Reactions   • Ritalin [Methylphenidate] Unspecified     \"Hyper\"       PHYSICAL EXAM  VITAL SIGNS: /96   Pulse 73   Temp 36.3 °C (97.4 °F) (Temporal)   Resp 12   Ht 1.803 m (5' 11\")   Wt 90.7 kg (200 lb)   SpO2 99%   BMI 27.89 kg/m²   Constitutional:  Alert, able to answer questions  HENT: Moist mucous membranes, Nose is normal in appearance without rhinorrhea, external ears are normal  Eyes: Anicteric, pupils are equal round and reactive, there is no conjunctival drainage or pallor   Neck: The trachea is midline, there is no obvious mass or meningeal signs  Cardiovascular: Warm and well perfused  Thorax & Lungs: " Respiratory rate and effort are normal. There is normal chest excursion with respiration.   Abdomen: Abdomen is normal in appearance.  Musculoskeletal: No deformities noted in all 4 extremities. Actively moves all 4 extremities. Tenderness of 5th digit on left foot with associated small superficial abrasion. No surrounding erythema.  Skin: Visualized skin is warm, no erythema, no rash.  Neurologic:  Cranial nerves II through XII are intact there is no focal abnormality noted.  Psychiatric: Normal mood and mentation    DIAGNOSTIC STUDIES/PROCEDURES    RADIOLOGY  DX-FOOT-2- LEFT   Final Result      No acute fracture is identified.        COURSE & MEDICAL DECISION MAKING  Nursing notes, VS, PMSFHx reviewed in chart.    Differential diagnoses include but not limited to: Fracture, contusion, sprain, dislocation    9:12 PM Patient seen and examined at bedside. Patient arrives afebrile with normal vital signs. Patient appears well-hydrated and nontoxic.Tenderness noted over 5th digit on left foot. No deformity but small abrasion noted over dorsum of affected digit. No surrounding erythema or edema to suggest cellulitis. Ordered for DX left foot to evaluate. Patient will be treated with Motrin 600 mg for his symptoms.      Xray without fracture.    10:14 PM Toes bella taped. I have advised to take Motrin/Tylenol for treatment. He is to follow up with his PCP for evaluation. Should he develop any new or worsening symptoms, he is to return for evaluation. The patient understands and agrees to discharge home.    The patient will return for new or worsening symptoms and is stable at the time of discharge.    DISPOSITION:  Patient will be discharged home in stable condition.    FOLLOW UP:  Juancho Jenkins M.D.  [address] [place] [id number] [phone]    Schedule an appointment as soon as possible for a visit in 1 day  For recheck      OUTPATIENT MEDICATIONS:  New Prescriptions    No medications on file        FINAL IMPRESSION  1. Pain of toe of left foot         Blaise MORRISSEY (Scribe), am scribing for, and in the presence of, Baudilio Kohler M.D.    Electronically signed by: Blaise Sibley (Scribe), 12/26//2018    IBaudilio M.D. personally performed the services described in this documentation, as scribed by Blaise Sibley in my presence, and it is both accurate and complete. E    The note accurately reflects work and decisions made by me.  Baudilio Kohler  12/27/2018  9:57 PM

## 2020-12-08 ENCOUNTER — OFFICE VISIT (OUTPATIENT)
Dept: URGENT CARE | Facility: CLINIC | Age: 55
End: 2020-12-08
Payer: MEDICARE

## 2020-12-08 VITALS
DIASTOLIC BLOOD PRESSURE: 90 MMHG | RESPIRATION RATE: 18 BRPM | HEART RATE: 96 BPM | SYSTOLIC BLOOD PRESSURE: 130 MMHG | TEMPERATURE: 97.6 F | OXYGEN SATURATION: 94 %

## 2020-12-08 DIAGNOSIS — L03.012 CELLULITIS OF LEFT INDEX FINGER: Primary | ICD-10-CM

## 2020-12-08 PROCEDURE — 99203 OFFICE O/P NEW LOW 30 MIN: CPT | Performed by: PHYSICIAN ASSISTANT

## 2020-12-08 RX ORDER — SULFAMETHOXAZOLE AND TRIMETHOPRIM 800; 160 MG/1; MG/1
1 TABLET ORAL 2 TIMES DAILY
Qty: 14 TAB | Refills: 0 | Status: SHIPPED | OUTPATIENT
Start: 2020-12-08 | End: 2020-12-15

## 2020-12-09 NOTE — PATIENT INSTRUCTIONS
Cellulitis, Adult    Cellulitis is a skin infection. The infected area is often warm, red, swollen, and sore. It occurs most often in the arms and lower legs. It is very important to get treated for this condition.  What are the causes?  This condition is caused by bacteria. The bacteria enter through a break in the skin, such as a cut, burn, insect bite, open sore, or crack.  What increases the risk?  This condition is more likely to occur in people who:  · Have a weak body defense system (immune system).  · Have open cuts, burns, bites, or scrapes on the skin.  · Are older than 60 years of age.  · Have a blood sugar problem (diabetes).  · Have a long-lasting (chronic) liver disease (cirrhosis) or kidney disease.  · Are very overweight (obese).  · Have a skin problem, such as:  ? Itchy rash (eczema).  ? Slow movement of blood in the veins (venous stasis).  ? Fluid buildup below the skin (edema).  · Have been treated with high-energy rays (radiation).  · Use IV drugs.  What are the signs or symptoms?  Symptoms of this condition include:  · Skin that is:  ? Red.  ? Streaking.  ? Spotting.  ? Swollen.  ? Sore or painful when you touch it.  ? Warm.  · A fever.  · Chills.  · Blisters.  How is this diagnosed?  This condition is diagnosed based on:  · Medical history.  · Physical exam.  · Blood tests.  · Imaging tests.  How is this treated?  Treatment for this condition may include:  · Medicines to treat infections or allergies.  · Home care, such as:  ? Rest.  ? Placing cold or warm cloths (compresses) on the skin.  · Hospital care, if the condition is very bad.  Follow these instructions at home:  Medicines  · Take over-the-counter and prescription medicines only as told by your doctor.  · If you were prescribed an antibiotic medicine, take it as told by your doctor. Do not stop taking it even if you start to feel better.  General instructions    · Drink enough fluid to keep your pee (urine) pale yellow.  · Do not touch  or rub the infected area.  · Raise (elevate) the infected area above the level of your heart while you are sitting or lying down.  · Place cold or warm cloths on the area as told by your doctor.  · Keep all follow-up visits as told by your doctor. This is important.  Contact a doctor if:  · You have a fever.  · You do not start to get better after 1-2 days of treatment.  · Your bone or joint under the infected area starts to hurt after the skin has healed.  · Your infection comes back. This can happen in the same area or another area.  · You have a swollen bump in the area.  · You have new symptoms.  · You feel ill and have muscle aches and pains.  Get help right away if:  · Your symptoms get worse.  · You feel very sleepy.  · You throw up (vomit) or have watery poop (diarrhea) for a long time.  · You see red streaks coming from the area.  · Your red area gets larger.  · Your red area turns dark in color.  These symptoms may represent a serious problem that is an emergency. Do not wait to see if the symptoms will go away. Get medical help right away. Call your local emergency services (911 in the [place] Do not drive yourself to the hospital.  Summary  · Cellulitis is a skin infection. The area is often warm, red, swollen, and sore.  · This condition is treated with medicines, rest, and cold and warm cloths.  · Take all medicines only as told by your doctor.  · Tell your doctor if symptoms do not start to get better after 1-2 days of treatment.  This information is not intended to replace advice given to you by your health care provider. Make sure you discuss any questions you have with your health care provider.  Document Released: 06/05/2009 Document Revised: 05/09/2019 Document Reviewed: 05/09/2019  [employer] Patient Education © 2020 [employer]      Subjective:      Pt is a 55 y.o. male who presents with Finger Pain (left index finget blister)            HPI  This is a new problem. Pt notes left index finger  swelling, redness and pain x 2-3 days after bringing in [place] tree and possibly pine needles or abrasion near fingernail triggered current issue. Pt has not taken any Rx medications for this condition. Pt states the pain is a 5/10, aching in nature and worse during the day. Pt is ultimately unsure of etiology. Pt denies CP, SOB, NVD, paresthesias, headaches, dizziness, change in vision, hives, or other joint pain. The pt's medication list, problem list, and allergies have been evaluated and reviewed during today's visit.    PMH:  Past Medical History:   Diagnosis Date   • ACS (acute coronary syndrome) (Formerly [facility])    • Anxiety disorder    • Arthritis    • CAD (coronary artery disease) 07/2017    Coronary angiogram with moderate diffuse disease (70% LAD, chronic total occlusion of 2nd OM of LCx, 95% ostium dual PDA). August 2019: MPI with moderate fixed defect in inferolateral wall, LVEF 46%.   • Chronic anticoagulation    • Claudication (Formerly [facility])    • Diabetes (Formerly [facility])     Oral medications   • Dizziness    • GERD (gastroesophageal reflux disease)    • Glaucoma    • HTN (hypertension)    • Hyperlipemia    • Ischemic cardiomyopathy 03/2019    Echocardiogram with normal LV size, mild asymmetric septal hypertrophy, LVEF 50%. Trace MR, mild-moderate AI, trace TR.   • Mental retardation    • Mentally challenged     Has    • Osteoarthritis    • Paroxysmal atrial fibrillation (Formerly [facility])    • Pericarditis secondary to acute myocardial infarction (Formerly [facility])    • Sick sinus syndrome (Formerly [facility])    • SOB (shortness of breath)    • STEMI (ST elevation myocardial infarction) (Formerly [facility])    • Syncope 02/2019    Status post pacemaker placement.       PSH:  Past Surgical History:   Procedure Laterality Date   • PACEMAKER INSERTION Left 02/12/2019    Medtronic Beba S  MRI [id number] implanted by Dr. Blake.   • OTHER CARDIAC SURGERY Left 01/12/2018    Medtronic Reveal LINQ [id number] implanted by Dr. Blake   • GASTROSCOPY-ENDO  5/19/2017    Procedure:  GASTROSCOPY-ENDO;  Surgeon: Reinaldo Berry M.D.;  Location: ENDOSCOPY [facility];  Service:    • GASTROSCOPY-ENDO  7/26/2012    Performed by JORDIN VERA at ENDOSCOPY [facility]   • GASTROSCOPY-ENDO  7/25/2012    Performed by JORDIN VERA at ENDOSCOPY [facility]   • OTHER ORTHOPEDIC SURGERY  7-     R knee surgery   • OTHER CARDIAC SURGERY      stent placement       Fam Hx:  the patient's family history is not pertinent to their current complaint      Soc HX:  Social History     Socioeconomic History   • Marital status: Single     Spouse name: Not on file   • Number of children: Not on file   • Years of education: Not on file   • Highest education level: Not on file   Occupational History   • Not on file   Social Needs   • Financial resource strain: Not on file   • Food insecurity     Worry: Not on file     Inability: Not on file   • Transportation needs     Medical: Not on file     Non-medical: Not on file   Tobacco Use   • Smoking status: Never Smoker   • Smokeless tobacco: Never Used   Substance and Sexual Activity   • Alcohol use: No   • Drug use: No   • Sexual activity: Not on file     Comment: Single, has no children, works as an .   Lifestyle   • Physical activity     Days per week: Not on file     Minutes per session: Not on file   • Stress: Not on file   Relationships   • Social connections     Talks on phone: Not on file     Gets together: Not on file     Attends Jew service: Not on file     Active member of club or organization: Not on file     Attends meetings of clubs or organizations: Not on file     Relationship status: Not on file   • Intimate partner violence     Fear of current or ex partner: Not on file     Emotionally abused: Not on file     Physically abused: Not on file     Forced sexual activity: Not on file   Other Topics Concern   • Not on file   Social History Narrative    ** Merged History Encounter **         ** Merged History Encounter **               Medications:    Current Outpatient Medications:   •  sulfamethoxazole-trimethoprim (BACTRIM DS) 800-160 MG tablet, Take 1 Tab by mouth 2 times a day for 7 days., Disp: 14 Tab, Rfl: 0  •  omeprazole (PRILOSEC) 20 MG delayed-release capsule, Take 2 Caps by mouth every day. 30 min before first meal, Disp: 90 Cap, Rfl: 3  •  Ranolazine 1000 MG TABLET SR 12 HR, Take 0.5 Tabs by mouth every day. With dinner, Disp: 45 Tab, Rfl: 3  •  metoprolol SR (TOPROL XL) 25 MG TABLET SR 24 HR, Take 1 Tab by mouth every day., Disp: 90 Tab, Rfl: 3  •  gemfibrozil (LOPID) 600 MG Tab, Take 600 mg by mouth every day. Every other day, Disp: , Rfl:   •  glyBURIDE (DIABETA) 5 MG Tab, Take 5 mg by mouth 2 times a day, with meals., Disp: , Rfl:   •  metoclopramide (REGLAN) 10 MG Tab, Take 1 Tab by mouth 4 times a day as needed., Disp: 60 Tab, Rfl: 0  •  aspirin EC (ECOTRIN) 81 MG Tablet Delayed Response, Take 81 mg by mouth every day., Disp: , Rfl:   •  atorvastatin (LIPITOR) 80 MG tablet, Take 80 mg by mouth every evening. Every other day, Disp: , Rfl:   •  cyanocobalamin (VITAMIN B12) 1000 MCG Tab, Take 1,000 mcg by mouth every day., Disp: , Rfl:   •  lisinopril (PRINIVIL) 2.5 MG Tab, Take 2.5 mg by mouth every day., Disp: , Rfl:   •  loratadine (CLARITIN) 10 MG Tab, Take 10 mg by mouth every day., Disp: , Rfl:   •  metformin (GLUCOPHAGE) 1000 MG tablet, Take 850 mg by mouth 2 times a day., Disp: , Rfl:   •  rivaroxaban (XARELTO) 20 MG Tab tablet, Take 20 mg by mouth with dinner., Disp: , Rfl:   •  vitamin D (CHOLECALCIFEROL) 1000 UNIT Tab, Take 2,000 Units by mouth every day., Disp: , Rfl:   •  meclizine (ANTIVERT) 25 MG Tab, Take 25 mg by mouth 3 times a day as needed., Disp: , Rfl:   •  cyclobenzaprine (FLEXERIL) 10 MG Tab, Take 10 mg by mouth 3 times a day as needed for Muscle Spasms., Disp: , Rfl:   •  dicyclomine (BENTYL) 20 MG Tab, Take 20 mg by mouth every 6 hours as needed (abdominal pain)., Disp: , Rfl:     Current  Facility-Administered Medications:   •  cefTRIAXone (ROCEPHIN) 1 g, lidocaine (XYLOCAINE) 1 % 3.6 mL for IM use, 1 g, Intramuscular, Once, Antoni Hernandez P.A.-C.      Allergies:  Methylphenidate    ROS    Constitutional: Negative for fever, chills and malaise/fatigue.   HENT: Negative for congestion and sore throat.    Eyes: Negative for blurred vision, double vision and photophobia.   Respiratory: Negative for cough and shortness of breath.  Cardiovascular: Negative for chest pain and palpitations.   Gastrointestinal: Negative for heartburn, nausea, vomiting, abdominal pain, diarrhea and constipation.   Genitourinary: Negative for dysuria and flank pain.   Musculoskeletal: Negative for joint pain and myalgias.   Skin: +red rash with infection to left index finger  Neurological: Negative for dizziness, tingling and headaches.   Endo/Heme/Allergies: Does not bruise/bleed easily.   Psychiatric/Behavioral: Negative for depression. The patient is not nervous/anxious.         Objective:     /90 (BP Location: Left arm, Patient Position: Sitting, BP Cuff Size: Adult)   Pulse 96   Temp 36.4 °C (97.6 °F) (Temporal)   Resp 18   SpO2 94%      Physical Exam  Constitutional: PT is oriented to person, place, and time. PT appears well-developed and well-nourished. No distress.   HENT:   Head: Normocephalic and atraumatic.   Mouth/Throat: Oropharynx is clear and moist. No oropharyngeal exudate.   Eyes: Conjunctivae normal and EOM are normal. Pupils are equal, round, and reactive to light.   Neck: Normal range of motion. Neck supple. No thyromegaly present.   Cardiovascular: Normal rate, regular rhythm, normal heart sounds and intact distal pulses.  Exam reveals no gallop and no friction rub.    No murmur heard.  Pulmonary/Chest: Effort normal and breath sounds normal. No respiratory distress. PT has no wheezes. PT has no rales. Pt exhibits no tenderness.   Abdominal: Soft. Bowel sounds are normal. PT exhibits no  distension and no mass. There is no tenderness. There is no rebound and no guarding.   Musculoskeletal: Normal range of motion. PT exhibits no edema and no tenderness.   Neurological: PT is alert and oriented to person, place, and time. PT has normal reflexes. No cranial nerve deficit.   Skin: Skin is warm and dry. No rash noted. PT is not diaphoretic. +left index finger with erythema, edema and TTP in nature of cellulitis      Psychiatric: PT has a normal mood and affect. PT behavior is normal. Judgment and thought content normal.             Assessment/Plan:        1. Cellulitis of left index finger    - cefTRIAXone (ROCEPHIN) 1 g, lidocaine (XYLOCAINE) 1 % 3.6 mL for IM use  - sulfamethoxazole-trimethoprim (BACTRIM DS) 800-160 MG tablet; Take 1 Tab by mouth 2 times a day for 7 days.  Dispense: 14 Tab; Refill: 0      Left index finger cleansed and dressed in clinic and dressing supplies given to pt  Rest, fluids encouraged.  AVS with medical info given.  Pt's caregiver was in full understanding and agreement with the plan.  Differential diagnosis, natural history, supportive care, and indications for immediate follow-up discussed. All questions answered. Patient agrees with the plan of care.  Follow-up as needed if symptoms worsen or fail to improve to PCP, Urgent care or Emergency Room.

## 2020-12-09 NOTE — PROGRESS NOTES
Subjective:      Pt is a 55 y.o. male who presents with Finger Pain (left index finget blister)            HPI  This is a new problem. Pt notes left index finger swelling, redness and pain x 2-3 days after bringing in [employer] [place] tree and possibly pine needles or abrasion near fingernail triggered current issue. Pt has not taken any Rx medications for this condition. Pt states the pain is a 5/10, aching in nature and worse during the day. Pt is ultimately unsure of etiology. Pt denies CP, SOB, NVD, paresthesias, headaches, dizziness, change in vision, hives, or other joint pain. The pt's medication list, problem list, and allergies have been evaluated and reviewed during today's visit.    PMH:  Past Medical History:   Diagnosis Date   • ACS (acute coronary syndrome) (Formerly [facility])    • Anxiety disorder    • Arthritis    • CAD (coronary artery disease) 07/2017    Coronary angiogram with moderate diffuse disease (70% LAD, chronic total occlusion of 2nd OM of LCx, 95% ostium dual PDA). August 2019: MPI with moderate fixed defect in inferolateral wall, LVEF 46%.   • Chronic anticoagulation    • Claudication (Formerly [facility])    • Diabetes (Formerly [facility])     Oral medications   • Dizziness    • GERD (gastroesophageal reflux disease)    • Glaucoma    • HTN (hypertension)    • Hyperlipemia    • Ischemic cardiomyopathy 03/2019    Echocardiogram with normal LV size, mild asymmetric septal hypertrophy, LVEF 50%. Trace MR, mild-moderate AI, trace TR.   • Mental retardation    • Mentally challenged     Has    • Osteoarthritis    • Paroxysmal atrial fibrillation (Formerly [facility])    • Pericarditis secondary to acute myocardial infarction (Formerly [facility])    • Sick sinus syndrome (Formerly [facility])    • SOB (shortness of breath)    • STEMI (ST elevation myocardial infarction) (Formerly [facility])    • Syncope 02/2019    Status post pacemaker placement.       PSH:  Past Surgical History:   Procedure Laterality Date   • PACEMAKER INSERTION Left 02/12/2019    Medkym TORRES [id number] implanted by  Dr. Blake.   • OTHER CARDIAC SURGERY Left 01/12/2018    Medtronic Reveal LINQ [id number] implanted by Dr. Blake   • GASTROSCOPY-ENDO  5/19/2017    Procedure: GASTROSCOPY-ENDO;  Surgeon: Reinaldo Berry M.D.;  Location: [facility];  Service:    • GASTROSCOPY-ENDO  7/26/2012    Performed by JORDIN VERA at [facility]   • GASTROSCOPY-ENDO  7/25/2012    Performed by JORDIN VERA at [facility]   • OTHER ORTHOPEDIC SURGERY  7-     R knee surgery   • OTHER CARDIAC SURGERY      stent placement       Fam Hx:  the patient's family history is not pertinent to their current complaint      Soc HX:  Social History     Socioeconomic History   • Marital status: Single     Spouse name: Not on file   • Number of children: Not on file   • Years of education: Not on file   • Highest education level: Not on file   Occupational History   • Not on file   Social Needs   • Financial resource strain: Not on file   • Food insecurity     Worry: Not on file     Inability: Not on file   • Transportation needs     Medical: Not on file     Non-medical: Not on file   Tobacco Use   • Smoking status: Never Smoker   • Smokeless tobacco: Never Used   Substance and Sexual Activity   • Alcohol use: No   • Drug use: No   • Sexual activity: Not on file     Comment: Single, has no children, works as an .   Lifestyle   • Physical activity     Days per week: Not on file     Minutes per session: Not on file   • Stress: Not on file   Relationships   • Social connections     Talks on phone: Not on file     Gets together: Not on file     Attends [employer] service: Not on file     Active member of club or organization: Not on file     Attends meetings of clubs or organizations: Not on file     Relationship status: Not on file   • Intimate partner violence     Fear of current or ex partner: Not on file     Emotionally abused: Not on file     Physically abused: Not on file     Forced sexual activity:  Not on file   Other Topics Concern   • Not on file   Social History Narrative    ** Merged History Encounter **         ** Merged History Encounter **              Medications:    Current Outpatient Medications:   •  sulfamethoxazole-trimethoprim (BACTRIM DS) 800-160 MG tablet, Take 1 Tab by mouth 2 times a day for 7 days., Disp: 14 Tab, Rfl: 0  •  omeprazole (PRILOSEC) 20 MG delayed-release capsule, Take 2 Caps by mouth every day. 30 min before first meal, Disp: 90 Cap, Rfl: 3  •  Ranolazine 1000 MG TABLET SR 12 HR, Take 0.5 Tabs by mouth every day. With dinner, Disp: 45 Tab, Rfl: 3  •  metoprolol SR (TOPROL XL) 25 MG TABLET SR 24 HR, Take 1 Tab by mouth every day., Disp: 90 Tab, Rfl: 3  •  gemfibrozil (LOPID) 600 MG Tab, Take 600 mg by mouth every day. Every other day, Disp: , Rfl:   •  glyBURIDE (DIABETA) 5 MG Tab, Take 5 mg by mouth 2 times a day, with meals., Disp: , Rfl:   •  metoclopramide (REGLAN) 10 MG Tab, Take 1 Tab by mouth 4 times a day as needed., Disp: 60 Tab, Rfl: 0  •  aspirin EC (ECOTRIN) 81 MG Tablet Delayed Response, Take 81 mg by mouth every day., Disp: , Rfl:   •  atorvastatin (LIPITOR) 80 MG tablet, Take 80 mg by mouth every evening. Every other day, Disp: , Rfl:   •  cyanocobalamin (VITAMIN B12) 1000 MCG Tab, Take 1,000 mcg by mouth every day., Disp: , Rfl:   •  lisinopril (PRINIVIL) 2.5 MG Tab, Take 2.5 mg by mouth every day., Disp: , Rfl:   •  loratadine (CLARITIN) 10 MG Tab, Take 10 mg by mouth every day., Disp: , Rfl:   •  metformin (GLUCOPHAGE) 1000 MG tablet, Take 850 mg by mouth 2 times a day., Disp: , Rfl:   •  rivaroxaban (XARELTO) 20 MG Tab tablet, Take 20 mg by mouth with dinner., Disp: , Rfl:   •  vitamin D (CHOLECALCIFEROL) 1000 UNIT Tab, Take 2,000 Units by mouth every day., Disp: , Rfl:   •  meclizine (ANTIVERT) 25 MG Tab, Take 25 mg by mouth 3 times a day as needed., Disp: , Rfl:   •  cyclobenzaprine (FLEXERIL) 10 MG Tab, Take 10 mg by mouth 3 times a day as needed for Muscle  Spasms., Disp: , Rfl:   •  dicyclomine (BENTYL) 20 MG Tab, Take 20 mg by mouth every 6 hours as needed (abdominal pain)., Disp: , Rfl:     Current Facility-Administered Medications:   •  cefTRIAXone (ROCEPHIN) 1 g, lidocaine (XYLOCAINE) 1 % 3.6 mL for IM use, 1 g, Intramuscular, Once, Antoni Hernandez P.A.-C.      Allergies:  Methylphenidate    ROS    Constitutional: Negative for fever, chills and malaise/fatigue.   HENT: Negative for congestion and sore throat.    Eyes: Negative for blurred vision, double vision and photophobia.   Respiratory: Negative for cough and shortness of breath.  Cardiovascular: Negative for chest pain and palpitations.   Gastrointestinal: Negative for heartburn, nausea, vomiting, abdominal pain, diarrhea and constipation.   Genitourinary: Negative for dysuria and flank pain.   Musculoskeletal: Negative for joint pain and myalgias.   Skin: +red rash with infection to left index finger  Neurological: Negative for dizziness, tingling and headaches.   Endo/Heme/Allergies: Does not bruise/bleed easily.   Psychiatric/Behavioral: Negative for depression. The patient is not nervous/anxious.         Objective:     /90 (BP Location: Left arm, Patient Position: Sitting, BP Cuff Size: Adult)   Pulse 96   Temp 36.4 °C (97.6 °F) (Temporal)   Resp 18   SpO2 94%      Physical Exam  Constitutional: PT is oriented to person, place, and time. PT appears well-developed and well-nourished. No distress.   HENT:   Head: Normocephalic and atraumatic.   Mouth/Throat: Oropharynx is clear and moist. No oropharyngeal exudate.   Eyes: Conjunctivae normal and EOM are normal. Pupils are equal, round, and reactive to light.   Neck: Normal range of motion. Neck supple. No thyromegaly present.   Cardiovascular: Normal rate, regular rhythm, normal heart sounds and intact distal pulses.  Exam reveals no gallop and no friction rub.    No murmur heard.  Pulmonary/Chest: Effort normal and breath sounds normal. No  respiratory distress. PT has no wheezes. PT has no rales. Pt exhibits no tenderness.   Abdominal: Soft. Bowel sounds are normal. PT exhibits no distension and no mass. There is no tenderness. There is no rebound and no guarding.   Musculoskeletal: Normal range of motion. PT exhibits no edema and no tenderness.   Neurological: PT is alert and oriented to person, place, and time. PT has normal reflexes. No cranial nerve deficit.   Skin: Skin is warm and dry. No rash noted. PT is not diaphoretic. +left index finger with erythema, edema and TTP in nature of cellulitis      Psychiatric: PT has a normal mood and affect. PT behavior is normal. Judgment and thought content normal.             Assessment/Plan:        1. Cellulitis of left index finger    - cefTRIAXone (ROCEPHIN) 1 g, lidocaine (XYLOCAINE) 1 % 3.6 mL for IM use  - sulfamethoxazole-trimethoprim (BACTRIM DS) 800-160 MG tablet; Take 1 Tab by mouth 2 times a day for 7 days.  Dispense: 14 Tab; Refill: 0      Left index finger cleansed and dressed in clinic and dressing supplies given to pt  Rest, fluids encouraged.  AVS with medical info given.  Pt's caregiver was in full understanding and agreement with the plan.  Differential diagnosis, natural history, supportive care, and indications for immediate follow-up discussed. All questions answered. Patient agrees with the plan of care.  Follow-up as needed if symptoms worsen or fail to improve to PCP, Urgent care or Emergency Room.

## 2020-12-23 ENCOUNTER — APPOINTMENT (OUTPATIENT)
Dept: RADIOLOGY | Facility: MEDICAL CENTER | Age: 55
DRG: 177 | End: 2020-12-23
Attending: EMERGENCY MEDICINE
Payer: MEDICARE

## 2020-12-23 ENCOUNTER — HOSPITAL ENCOUNTER (INPATIENT)
Facility: MEDICAL CENTER | Age: 55
LOS: 8 days | DRG: 177 | End: 2020-12-31
Attending: EMERGENCY MEDICINE | Admitting: INTERNAL MEDICINE
Payer: MEDICARE

## 2020-12-23 ENCOUNTER — OFFICE VISIT (OUTPATIENT)
Dept: URGENT CARE | Facility: CLINIC | Age: 55
End: 2020-12-23
Payer: MEDICARE

## 2020-12-23 VITALS
HEART RATE: 110 BPM | TEMPERATURE: 97.2 F | DIASTOLIC BLOOD PRESSURE: 82 MMHG | OXYGEN SATURATION: 86 % | RESPIRATION RATE: 22 BRPM | SYSTOLIC BLOOD PRESSURE: 112 MMHG

## 2020-12-23 DIAGNOSIS — J96.01 ACUTE RESPIRATORY FAILURE WITH HYPOXIA (HCC): ICD-10-CM

## 2020-12-23 DIAGNOSIS — R06.82 TACHYPNEA: ICD-10-CM

## 2020-12-23 DIAGNOSIS — J12.82 PNEUMONIA DUE TO COVID-19 VIRUS: ICD-10-CM

## 2020-12-23 DIAGNOSIS — R10.30 LOWER ABDOMINAL PAIN: ICD-10-CM

## 2020-12-23 DIAGNOSIS — U07.1 PNEUMONIA DUE TO COVID-19 VIRUS: ICD-10-CM

## 2020-12-23 DIAGNOSIS — R09.02 HYPOXIA: ICD-10-CM

## 2020-12-23 DIAGNOSIS — R00.0 TACHYCARDIA: ICD-10-CM

## 2020-12-23 LAB
ALBUMIN SERPL BCP-MCNC: 3.7 G/DL (ref 3.2–4.9)
ALBUMIN/GLOB SERPL: 0.9 G/DL
ALP SERPL-CCNC: 118 U/L (ref 30–99)
ALT SERPL-CCNC: 49 U/L (ref 2–50)
ANION GAP SERPL CALC-SCNC: 18 MMOL/L (ref 7–16)
AST SERPL-CCNC: 44 U/L (ref 12–45)
BASOPHILS # BLD AUTO: 0.3 % (ref 0–1.8)
BASOPHILS # BLD: 0.03 K/UL (ref 0–0.12)
BILIRUB SERPL-MCNC: 1 MG/DL (ref 0.1–1.5)
BUN SERPL-MCNC: 18 MG/DL (ref 8–22)
CALCIUM SERPL-MCNC: 9.4 MG/DL (ref 8.4–10.2)
CHLORIDE SERPL-SCNC: 98 MMOL/L (ref 96–112)
CO2 SERPL-SCNC: 17 MMOL/L (ref 20–33)
COVID ORDER STATUS COVID19: NORMAL
CREAT SERPL-MCNC: 1.36 MG/DL (ref 0.5–1.4)
CRP SERPL HS-MCNC: 12.41 MG/DL (ref 0–0.75)
D DIMER PPP IA.FEU-MCNC: 0.74 UG/ML (FEU) (ref 0–0.5)
EOSINOPHIL # BLD AUTO: 0.01 K/UL (ref 0–0.51)
EOSINOPHIL NFR BLD: 0.1 % (ref 0–6.9)
ERYTHROCYTE [DISTWIDTH] IN BLOOD BY AUTOMATED COUNT: 48.1 FL (ref 35.9–50)
FLUAV RNA SPEC QL NAA+PROBE: NEGATIVE
FLUBV RNA SPEC QL NAA+PROBE: NEGATIVE
GLOBULIN SER CALC-MCNC: 4.2 G/DL (ref 1.9–3.5)
GLUCOSE SERPL-MCNC: 105 MG/DL (ref 65–99)
HCT VFR BLD AUTO: 36.8 % (ref 42–52)
HGB BLD-MCNC: 12 G/DL (ref 14–18)
IMM GRANULOCYTES # BLD AUTO: 0.07 K/UL (ref 0–0.11)
IMM GRANULOCYTES NFR BLD AUTO: 0.6 % (ref 0–0.9)
LACTATE BLD-SCNC: 1.4 MMOL/L (ref 0.5–2)
LIPASE SERPL-CCNC: 53 U/L (ref 7–58)
LYMPHOCYTES # BLD AUTO: 0.73 K/UL (ref 1–4.8)
LYMPHOCYTES NFR BLD: 6.3 % (ref 22–41)
MCH RBC QN AUTO: 30.5 PG (ref 27–33)
MCHC RBC AUTO-ENTMCNC: 32.6 G/DL (ref 33.7–35.3)
MCV RBC AUTO: 93.4 FL (ref 81.4–97.8)
MONOCYTES # BLD AUTO: 0.3 K/UL (ref 0–0.85)
MONOCYTES NFR BLD AUTO: 2.6 % (ref 0–13.4)
NEUTROPHILS # BLD AUTO: 10.44 K/UL (ref 1.82–7.42)
NEUTROPHILS NFR BLD: 90.1 % (ref 44–72)
NRBC # BLD AUTO: 0 K/UL
NRBC BLD-RTO: 0 /100 WBC
NT-PROBNP SERPL IA-MCNC: 186 PG/ML (ref 0–125)
PLATELET # BLD AUTO: 390 K/UL (ref 164–446)
PMV BLD AUTO: 9.7 FL (ref 9–12.9)
POTASSIUM SERPL-SCNC: 4.3 MMOL/L (ref 3.6–5.5)
PROCALCITONIN SERPL-MCNC: 0.19 NG/ML
PROT SERPL-MCNC: 7.9 G/DL (ref 6–8.2)
RBC # BLD AUTO: 3.94 M/UL (ref 4.7–6.1)
RSV RNA SPEC QL NAA+PROBE: NEGATIVE
SARS-COV-2 RNA RESP QL NAA+PROBE: DETECTED
SODIUM SERPL-SCNC: 133 MMOL/L (ref 135–145)
SPECIMEN SOURCE: ABNORMAL
TROPONIN T SERPL-MCNC: 8 NG/L (ref 6–19)
WBC # BLD AUTO: 11.6 K/UL (ref 4.8–10.8)

## 2020-12-23 PROCEDURE — 83690 ASSAY OF LIPASE: CPT

## 2020-12-23 PROCEDURE — 770021 HCHG ROOM/CARE - ISO PRIVATE

## 2020-12-23 PROCEDURE — 83605 ASSAY OF LACTIC ACID: CPT

## 2020-12-23 PROCEDURE — 83880 ASSAY OF NATRIURETIC PEPTIDE: CPT

## 2020-12-23 PROCEDURE — 85379 FIBRIN DEGRADATION QUANT: CPT

## 2020-12-23 PROCEDURE — 93005 ELECTROCARDIOGRAM TRACING: CPT | Performed by: EMERGENCY MEDICINE

## 2020-12-23 PROCEDURE — 71045 X-RAY EXAM CHEST 1 VIEW: CPT

## 2020-12-23 PROCEDURE — 87150 DNA/RNA AMPLIFIED PROBE: CPT

## 2020-12-23 PROCEDURE — 99285 EMERGENCY DEPT VISIT HI MDM: CPT

## 2020-12-23 PROCEDURE — 700117 HCHG RX CONTRAST REV CODE 255: Performed by: EMERGENCY MEDICINE

## 2020-12-23 PROCEDURE — 84484 ASSAY OF TROPONIN QUANT: CPT

## 2020-12-23 PROCEDURE — 83520 IMMUNOASSAY QUANT NOS NONAB: CPT

## 2020-12-23 PROCEDURE — 99214 OFFICE O/P EST MOD 30 MIN: CPT | Performed by: PHYSICIAN ASSISTANT

## 2020-12-23 PROCEDURE — 94760 N-INVAS EAR/PLS OXIMETRY 1: CPT

## 2020-12-23 PROCEDURE — 84145 PROCALCITONIN (PCT): CPT

## 2020-12-23 PROCEDURE — 87077 CULTURE AEROBIC IDENTIFY: CPT

## 2020-12-23 PROCEDURE — 85025 COMPLETE CBC W/AUTO DIFF WBC: CPT

## 2020-12-23 PROCEDURE — 87040 BLOOD CULTURE FOR BACTERIA: CPT

## 2020-12-23 PROCEDURE — C9803 HOPD COVID-19 SPEC COLLECT: HCPCS | Performed by: EMERGENCY MEDICINE

## 2020-12-23 PROCEDURE — 80053 COMPREHEN METABOLIC PANEL: CPT

## 2020-12-23 PROCEDURE — 700102 HCHG RX REV CODE 250 W/ 637 OVERRIDE(OP)

## 2020-12-23 PROCEDURE — 99221 1ST HOSP IP/OBS SF/LOW 40: CPT | Mod: AI | Performed by: INTERNAL MEDICINE

## 2020-12-23 PROCEDURE — 74177 CT ABD & PELVIS W/CONTRAST: CPT

## 2020-12-23 PROCEDURE — 96374 THER/PROPH/DIAG INJ IV PUSH: CPT

## 2020-12-23 PROCEDURE — 700111 HCHG RX REV CODE 636 W/ 250 OVERRIDE (IP): Performed by: INTERNAL MEDICINE

## 2020-12-23 PROCEDURE — 36415 COLL VENOUS BLD VENIPUNCTURE: CPT

## 2020-12-23 PROCEDURE — 86140 C-REACTIVE PROTEIN: CPT

## 2020-12-23 PROCEDURE — A9270 NON-COVERED ITEM OR SERVICE: HCPCS

## 2020-12-23 PROCEDURE — 0241U HCHG SARS-COV-2 COVID-19 NFCT DS RESP RNA 4 TRGT MIC: CPT

## 2020-12-23 RX ORDER — DEXAMETHASONE SODIUM PHOSPHATE 4 MG/ML
6 INJECTION, SOLUTION INTRA-ARTICULAR; INTRALESIONAL; INTRAMUSCULAR; INTRAVENOUS; SOFT TISSUE DAILY
Status: CANCELLED | OUTPATIENT
Start: 2020-12-24 | End: 2021-01-03

## 2020-12-23 RX ORDER — PROMETHAZINE HYDROCHLORIDE 25 MG/1
12.5-25 SUPPOSITORY RECTAL EVERY 4 HOURS PRN
Status: CANCELLED | OUTPATIENT
Start: 2020-12-23

## 2020-12-23 RX ORDER — ONDANSETRON 4 MG/1
4 TABLET, ORALLY DISINTEGRATING ORAL EVERY 4 HOURS PRN
Status: DISCONTINUED | OUTPATIENT
Start: 2020-12-23 | End: 2020-12-31 | Stop reason: HOSPADM

## 2020-12-23 RX ORDER — POLYETHYLENE GLYCOL 3350 17 G/17G
1 POWDER, FOR SOLUTION ORAL
Status: DISCONTINUED | OUTPATIENT
Start: 2020-12-23 | End: 2020-12-31 | Stop reason: HOSPADM

## 2020-12-23 RX ORDER — LISINOPRIL 5 MG/1
2.5 TABLET ORAL DAILY
Status: CANCELLED | OUTPATIENT
Start: 2020-12-24

## 2020-12-23 RX ORDER — DEXTROSE MONOHYDRATE 25 G/50ML
50 INJECTION, SOLUTION INTRAVENOUS
Status: CANCELLED | OUTPATIENT
Start: 2020-12-23

## 2020-12-23 RX ORDER — PROMETHAZINE HYDROCHLORIDE 25 MG/1
12.5-25 TABLET ORAL EVERY 4 HOURS PRN
Status: DISCONTINUED | OUTPATIENT
Start: 2020-12-23 | End: 2020-12-31 | Stop reason: HOSPADM

## 2020-12-23 RX ORDER — METOCLOPRAMIDE 10 MG/1
TABLET ORAL
Status: COMPLETED
Start: 2020-12-23 | End: 2020-12-23

## 2020-12-23 RX ORDER — METOCLOPRAMIDE 10 MG/1
10 TABLET ORAL
Status: DISCONTINUED | OUTPATIENT
Start: 2020-12-23 | End: 2020-12-27

## 2020-12-23 RX ORDER — PROCHLORPERAZINE EDISYLATE 5 MG/ML
5-10 INJECTION INTRAMUSCULAR; INTRAVENOUS EVERY 4 HOURS PRN
Status: DISCONTINUED | OUTPATIENT
Start: 2020-12-23 | End: 2020-12-31 | Stop reason: HOSPADM

## 2020-12-23 RX ORDER — AMOXICILLIN 250 MG
2 CAPSULE ORAL 2 TIMES DAILY
Status: CANCELLED | OUTPATIENT
Start: 2020-12-23

## 2020-12-23 RX ORDER — METOPROLOL SUCCINATE 25 MG/1
25 TABLET, EXTENDED RELEASE ORAL DAILY
Status: CANCELLED | OUTPATIENT
Start: 2020-12-24

## 2020-12-23 RX ORDER — ONDANSETRON 4 MG/1
4 TABLET, ORALLY DISINTEGRATING ORAL EVERY 4 HOURS PRN
Status: CANCELLED | OUTPATIENT
Start: 2020-12-23

## 2020-12-23 RX ORDER — PROMETHAZINE HYDROCHLORIDE 25 MG/1
12.5-25 SUPPOSITORY RECTAL EVERY 4 HOURS PRN
Status: DISCONTINUED | OUTPATIENT
Start: 2020-12-23 | End: 2020-12-31 | Stop reason: HOSPADM

## 2020-12-23 RX ORDER — PROMETHAZINE HYDROCHLORIDE 25 MG/1
12.5-25 TABLET ORAL EVERY 4 HOURS PRN
Status: CANCELLED | OUTPATIENT
Start: 2020-12-23

## 2020-12-23 RX ORDER — DEXTROSE MONOHYDRATE 25 G/50ML
50 INJECTION, SOLUTION INTRAVENOUS
Status: DISCONTINUED | OUTPATIENT
Start: 2020-12-23 | End: 2020-12-31 | Stop reason: HOSPADM

## 2020-12-23 RX ORDER — OMEPRAZOLE 20 MG/1
40 CAPSULE, DELAYED RELEASE ORAL DAILY
Status: CANCELLED | OUTPATIENT
Start: 2020-12-24

## 2020-12-23 RX ORDER — AMOXICILLIN 250 MG
2 CAPSULE ORAL 2 TIMES DAILY
Status: DISCONTINUED | OUTPATIENT
Start: 2020-12-23 | End: 2020-12-31 | Stop reason: HOSPADM

## 2020-12-23 RX ORDER — ONDANSETRON 2 MG/ML
4 INJECTION INTRAMUSCULAR; INTRAVENOUS EVERY 4 HOURS PRN
Status: DISCONTINUED | OUTPATIENT
Start: 2020-12-23 | End: 2020-12-31 | Stop reason: HOSPADM

## 2020-12-23 RX ORDER — MECLIZINE HYDROCHLORIDE 25 MG/1
25 TABLET ORAL 3 TIMES DAILY PRN
Status: DISCONTINUED | OUTPATIENT
Start: 2020-12-23 | End: 2020-12-31 | Stop reason: HOSPADM

## 2020-12-23 RX ORDER — MECLIZINE HYDROCHLORIDE 25 MG/1
25 TABLET ORAL 3 TIMES DAILY PRN
Status: CANCELLED | OUTPATIENT
Start: 2020-12-23

## 2020-12-23 RX ORDER — ONDANSETRON 2 MG/ML
4 INJECTION INTRAMUSCULAR; INTRAVENOUS EVERY 4 HOURS PRN
Status: CANCELLED | OUTPATIENT
Start: 2020-12-23

## 2020-12-23 RX ORDER — PROCHLORPERAZINE EDISYLATE 5 MG/ML
5-10 INJECTION INTRAMUSCULAR; INTRAVENOUS EVERY 4 HOURS PRN
Status: CANCELLED | OUTPATIENT
Start: 2020-12-23

## 2020-12-23 RX ORDER — DEXAMETHASONE SODIUM PHOSPHATE 4 MG/ML
6 INJECTION, SOLUTION INTRA-ARTICULAR; INTRALESIONAL; INTRAMUSCULAR; INTRAVENOUS; SOFT TISSUE DAILY
Status: DISCONTINUED | OUTPATIENT
Start: 2020-12-23 | End: 2020-12-31 | Stop reason: HOSPADM

## 2020-12-23 RX ORDER — RANOLAZINE 1000 MG/1
500 TABLET, EXTENDED RELEASE ORAL DAILY
Status: CANCELLED | OUTPATIENT
Start: 2020-12-24

## 2020-12-23 RX ORDER — RANOLAZINE 500 MG/1
1000 TABLET, EXTENDED RELEASE ORAL DAILY
Status: DISCONTINUED | OUTPATIENT
Start: 2020-12-24 | End: 2020-12-31 | Stop reason: HOSPADM

## 2020-12-23 RX ORDER — METOCLOPRAMIDE 10 MG/1
10 TABLET ORAL
Status: CANCELLED | OUTPATIENT
Start: 2020-12-23 | End: 2020-12-27

## 2020-12-23 RX ORDER — LISINOPRIL 5 MG/1
2.5 TABLET ORAL DAILY
Status: DISCONTINUED | OUTPATIENT
Start: 2020-12-24 | End: 2020-12-31 | Stop reason: HOSPADM

## 2020-12-23 RX ORDER — BISACODYL 10 MG
10 SUPPOSITORY, RECTAL RECTAL
Status: CANCELLED | OUTPATIENT
Start: 2020-12-23

## 2020-12-23 RX ORDER — POLYETHYLENE GLYCOL 3350 17 G/17G
1 POWDER, FOR SOLUTION ORAL
Status: CANCELLED | OUTPATIENT
Start: 2020-12-23

## 2020-12-23 RX ORDER — METOPROLOL SUCCINATE 25 MG/1
25 TABLET, EXTENDED RELEASE ORAL DAILY
Status: DISCONTINUED | OUTPATIENT
Start: 2020-12-24 | End: 2020-12-31 | Stop reason: HOSPADM

## 2020-12-23 RX ORDER — OMEPRAZOLE 20 MG/1
40 CAPSULE, DELAYED RELEASE ORAL DAILY
Status: DISCONTINUED | OUTPATIENT
Start: 2020-12-24 | End: 2020-12-31 | Stop reason: HOSPADM

## 2020-12-23 RX ORDER — BISACODYL 10 MG
10 SUPPOSITORY, RECTAL RECTAL
Status: DISCONTINUED | OUTPATIENT
Start: 2020-12-23 | End: 2020-12-31 | Stop reason: HOSPADM

## 2020-12-23 RX ADMIN — METOCLOPRAMIDE 10 MG: 10 TABLET ORAL at 23:42

## 2020-12-23 RX ADMIN — DEXAMETHASONE SODIUM PHOSPHATE 6 MG: 4 INJECTION, SOLUTION INTRA-ARTICULAR; INTRALESIONAL; INTRAMUSCULAR; INTRAVENOUS; SOFT TISSUE at 23:42

## 2020-12-23 RX ADMIN — IOHEXOL 100 ML: 350 INJECTION, SOLUTION INTRAVENOUS at 20:15

## 2020-12-23 ASSESSMENT — ENCOUNTER SYMPTOMS
FEVER: 1
PALPITATIONS: 0
ANOREXIA: 1
DIARRHEA: 0
FEVER: 0
WEIGHT LOSS: 0
TREMORS: 0
POLYDIPSIA: 0
SPUTUM PRODUCTION: 0
VOMITING: 0
PHOTOPHOBIA: 0
HEADACHES: 0
MYALGIAS: 1
SPEECH CHANGE: 0
COUGH: 1
FOCAL WEAKNESS: 0
ABDOMINAL PAIN: 1
HEMOPTYSIS: 0
NERVOUS/ANXIOUS: 0
DOUBLE VISION: 0
NAUSEA: 0
HEARTBURN: 0
NAUSEA: 1
ORTHOPNEA: 0
NECK PAIN: 0
FLANK PAIN: 0
BLURRED VISION: 0
ABDOMINAL PAIN: 1
HALLUCINATIONS: 0
BACK PAIN: 0
CHILLS: 1
VOMITING: 0
BRUISES/BLEEDS EASILY: 0

## 2020-12-23 ASSESSMENT — LIFESTYLE VARIABLES
SUBSTANCE_ABUSE: 0
DO YOU DRINK ALCOHOL: NO

## 2020-12-23 ASSESSMENT — FIBROSIS 4 INDEX: FIB4 SCORE: 0.73

## 2020-12-24 LAB
ALBUMIN SERPL BCP-MCNC: 3.2 G/DL (ref 3.2–4.9)
ALBUMIN/GLOB SERPL: 0.8 G/DL
ALP SERPL-CCNC: 105 U/L (ref 30–99)
ALT SERPL-CCNC: 37 U/L (ref 2–50)
AMORPH CRY #/AREA URNS HPF: PRESENT /HPF
ANION GAP SERPL CALC-SCNC: 16 MMOL/L (ref 7–16)
APPEARANCE UR: CLEAR
AST SERPL-CCNC: 31 U/L (ref 12–45)
BACTERIA #/AREA URNS HPF: NEGATIVE /HPF
BASOPHILS # BLD AUTO: 0 % (ref 0–1.8)
BASOPHILS # BLD: 0 K/UL (ref 0–0.12)
BILIRUB SERPL-MCNC: 0.7 MG/DL (ref 0.1–1.5)
BILIRUB UR QL STRIP.AUTO: NEGATIVE
BUN SERPL-MCNC: 19 MG/DL (ref 8–22)
CALCIUM SERPL-MCNC: 9 MG/DL (ref 8.4–10.2)
CHLORIDE SERPL-SCNC: 103 MMOL/L (ref 96–112)
CO2 SERPL-SCNC: 18 MMOL/L (ref 20–33)
COLOR UR: YELLOW
CREAT SERPL-MCNC: 1.07 MG/DL (ref 0.5–1.4)
EOSINOPHIL # BLD AUTO: 0 K/UL (ref 0–0.51)
EOSINOPHIL NFR BLD: 0 % (ref 0–6.9)
EPI CELLS #/AREA URNS HPF: NEGATIVE /HPF
ERYTHROCYTE [DISTWIDTH] IN BLOOD BY AUTOMATED COUNT: 47.2 FL (ref 35.9–50)
GLOBULIN SER CALC-MCNC: 4.1 G/DL (ref 1.9–3.5)
GLUCOSE BLD-MCNC: 209 MG/DL (ref 65–99)
GLUCOSE BLD-MCNC: 220 MG/DL (ref 65–99)
GLUCOSE BLD-MCNC: 237 MG/DL (ref 65–99)
GLUCOSE BLD-MCNC: 240 MG/DL (ref 65–99)
GLUCOSE SERPL-MCNC: 227 MG/DL (ref 65–99)
GLUCOSE UR STRIP.AUTO-MCNC: 100 MG/DL
HCT VFR BLD AUTO: 31.4 % (ref 42–52)
HGB BLD-MCNC: 10.3 G/DL (ref 14–18)
HYALINE CASTS #/AREA URNS LPF: ABNORMAL /LPF
IMM GRANULOCYTES # BLD AUTO: 0.04 K/UL (ref 0–0.11)
IMM GRANULOCYTES NFR BLD AUTO: 0.5 % (ref 0–0.9)
KETONES UR STRIP.AUTO-MCNC: NEGATIVE MG/DL
LEUKOCYTE ESTERASE UR QL STRIP.AUTO: NEGATIVE
LYMPHOCYTES # BLD AUTO: 0.43 K/UL (ref 1–4.8)
LYMPHOCYTES NFR BLD: 5.9 % (ref 22–41)
MCH RBC QN AUTO: 29.8 PG (ref 27–33)
MCHC RBC AUTO-ENTMCNC: 32.8 G/DL (ref 33.7–35.3)
MCV RBC AUTO: 90.8 FL (ref 81.4–97.8)
MICRO URNS: ABNORMAL
MONOCYTES # BLD AUTO: 0.1 K/UL (ref 0–0.85)
MONOCYTES NFR BLD AUTO: 1.4 % (ref 0–13.4)
MUCOUS THREADS #/AREA URNS HPF: ABNORMAL /HPF
NEUTROPHILS # BLD AUTO: 6.73 K/UL (ref 1.82–7.42)
NEUTROPHILS NFR BLD: 92.2 % (ref 44–72)
NITRITE UR QL STRIP.AUTO: NEGATIVE
NRBC # BLD AUTO: 0 K/UL
NRBC BLD-RTO: 0 /100 WBC
PH UR STRIP.AUTO: 5.5 [PH] (ref 5–8)
PLATELET # BLD AUTO: 383 K/UL (ref 164–446)
PMV BLD AUTO: 9.7 FL (ref 9–12.9)
POTASSIUM SERPL-SCNC: 4.7 MMOL/L (ref 3.6–5.5)
PROT SERPL-MCNC: 7.3 G/DL (ref 6–8.2)
PROT UR QL STRIP: 100 MG/DL
RBC # BLD AUTO: 3.46 M/UL (ref 4.7–6.1)
RBC # URNS HPF: ABNORMAL /HPF
RBC UR QL AUTO: NEGATIVE
SODIUM SERPL-SCNC: 137 MMOL/L (ref 135–145)
SP GR UR STRIP.AUTO: 1.02
WBC # BLD AUTO: 7.3 K/UL (ref 4.8–10.8)
WBC #/AREA URNS HPF: ABNORMAL /HPF

## 2020-12-24 PROCEDURE — 81001 URINALYSIS AUTO W/SCOPE: CPT

## 2020-12-24 PROCEDURE — 770021 HCHG ROOM/CARE - ISO PRIVATE

## 2020-12-24 PROCEDURE — 700111 HCHG RX REV CODE 636 W/ 250 OVERRIDE (IP): Performed by: INTERNAL MEDICINE

## 2020-12-24 PROCEDURE — 80053 COMPREHEN METABOLIC PANEL: CPT

## 2020-12-24 PROCEDURE — 700102 HCHG RX REV CODE 250 W/ 637 OVERRIDE(OP): Performed by: INTERNAL MEDICINE

## 2020-12-24 PROCEDURE — 87086 URINE CULTURE/COLONY COUNT: CPT

## 2020-12-24 PROCEDURE — 99232 SBSQ HOSP IP/OBS MODERATE 35: CPT | Performed by: HOSPITALIST

## 2020-12-24 PROCEDURE — 82962 GLUCOSE BLOOD TEST: CPT | Mod: 91

## 2020-12-24 PROCEDURE — 85025 COMPLETE CBC W/AUTO DIFF WBC: CPT

## 2020-12-24 PROCEDURE — A9270 NON-COVERED ITEM OR SERVICE: HCPCS | Performed by: INTERNAL MEDICINE

## 2020-12-24 RX ADMIN — INSULIN HUMAN 2 UNITS: 100 INJECTION, SOLUTION PARENTERAL at 20:08

## 2020-12-24 RX ADMIN — METOPROLOL SUCCINATE 25 MG: 25 TABLET, EXTENDED RELEASE ORAL at 06:54

## 2020-12-24 RX ADMIN — ASPIRIN 81 MG: 81 TABLET, COATED ORAL at 06:54

## 2020-12-24 RX ADMIN — RANOLAZINE 1000 MG: 500 TABLET, FILM COATED, EXTENDED RELEASE ORAL at 06:53

## 2020-12-24 RX ADMIN — DEXAMETHASONE SODIUM PHOSPHATE 6 MG: 4 INJECTION, SOLUTION INTRA-ARTICULAR; INTRALESIONAL; INTRAMUSCULAR; INTRAVENOUS; SOFT TISSUE at 16:28

## 2020-12-24 RX ADMIN — OMEPRAZOLE 40 MG: 20 CAPSULE, DELAYED RELEASE ORAL at 06:53

## 2020-12-24 RX ADMIN — RANOLAZINE 1000 MG: 500 TABLET, FILM COATED, EXTENDED RELEASE ORAL at 20:06

## 2020-12-24 RX ADMIN — METOCLOPRAMIDE 10 MG: 10 TABLET ORAL at 06:54

## 2020-12-24 RX ADMIN — STANDARDIZED SENNA CONCENTRATE AND DOCUSATE SODIUM 2 TABLET: 8.6; 5 TABLET, FILM COATED ORAL at 16:28

## 2020-12-24 RX ADMIN — INSULIN HUMAN 2 UNITS: 100 INJECTION, SOLUTION PARENTERAL at 07:04

## 2020-12-24 RX ADMIN — METOCLOPRAMIDE 10 MG: 10 TABLET ORAL at 20:06

## 2020-12-24 RX ADMIN — METOCLOPRAMIDE 10 MG: 10 TABLET ORAL at 16:28

## 2020-12-24 RX ADMIN — INSULIN HUMAN 2 UNITS: 100 INJECTION, SOLUTION PARENTERAL at 16:29

## 2020-12-24 RX ADMIN — METOCLOPRAMIDE 10 MG: 10 TABLET ORAL at 10:20

## 2020-12-24 RX ADMIN — LISINOPRIL 2.5 MG: 5 TABLET ORAL at 06:51

## 2020-12-24 RX ADMIN — INSULIN HUMAN 2 UNITS: 100 INJECTION, SOLUTION PARENTERAL at 11:59

## 2020-12-24 RX ADMIN — RIVAROXABAN 20 MG: 20 TABLET, FILM COATED ORAL at 06:54

## 2020-12-24 ASSESSMENT — LIFESTYLE VARIABLES
TOTAL SCORE: 0
HAVE PEOPLE ANNOYED YOU BY CRITICIZING YOUR DRINKING: NO
EVER HAD A DRINK FIRST THING IN THE MORNING TO STEADY YOUR NERVES TO GET RID OF A HANGOVER: NO
EVER FELT BAD OR GUILTY ABOUT YOUR DRINKING: NO
CONSUMPTION TOTAL: NEGATIVE
TOTAL SCORE: 0
ON A TYPICAL DAY WHEN YOU DRINK ALCOHOL HOW MANY DRINKS DO YOU HAVE: 0
AVERAGE NUMBER OF DAYS PER WEEK YOU HAVE A DRINK CONTAINING ALCOHOL: 0
HOW MANY TIMES IN THE PAST YEAR HAVE YOU HAD 5 OR MORE DRINKS IN A DAY: 0
TOTAL SCORE: 0
HAVE YOU EVER FELT YOU SHOULD CUT DOWN ON YOUR DRINKING: NO
ALCOHOL_USE: NO

## 2020-12-24 ASSESSMENT — CHA2DS2 SCORE
CHA2DS2 VASC SCORE: 3
PRIOR STROKE OR TIA OR THROMBOEMBOLISM: NO
AGE 75 OR GREATER: NO
DIABETES: YES
AGE 65 TO 74: NO
CHF OR LEFT VENTRICULAR DYSFUNCTION: NO
HYPERTENSION: YES
VASCULAR DISEASE: YES
SEX: MALE

## 2020-12-24 ASSESSMENT — COGNITIVE AND FUNCTIONAL STATUS - GENERAL
MOBILITY SCORE: 24
DAILY ACTIVITIY SCORE: 24
SUGGESTED CMS G CODE MODIFIER MOBILITY: CH
SUGGESTED CMS G CODE MODIFIER DAILY ACTIVITY: CH

## 2020-12-24 ASSESSMENT — ENCOUNTER SYMPTOMS
FALLS: 0
CHILLS: 1
FLANK PAIN: 0
HEMOPTYSIS: 0
EYE DISCHARGE: 0
HEADACHES: 0
STRIDOR: 0
EYE PAIN: 0
EYE REDNESS: 0
ORTHOPNEA: 0
SEIZURES: 0
BRUISES/BLEEDS EASILY: 0
DIZZINESS: 0
SPUTUM PRODUCTION: 0
NERVOUS/ANXIOUS: 0
COUGH: 1
NAUSEA: 1
PALPITATIONS: 0

## 2020-12-24 ASSESSMENT — PATIENT HEALTH QUESTIONNAIRE - PHQ9
1. LITTLE INTEREST OR PLEASURE IN DOING THINGS: NOT AT ALL
2. FEELING DOWN, DEPRESSED, IRRITABLE, OR HOPELESS: NOT AT ALL
SUM OF ALL RESPONSES TO PHQ9 QUESTIONS 1 AND 2: 0

## 2020-12-24 NOTE — ED PROVIDER NOTES
ED Provider Note    CHIEF COMPLAINT  Chief Complaint   Patient presents with   • Abdominal Pain         HPI  Ryan Trevizo is a 55 y.o. male who presents to the ED secondary to abdominal pain and swelling, cough.  The patient has a history of ischemic cardiomyopathy, diabetes.  The patient states that 3 days has been having increasing abdominal pain, periumbilical, dull aching pain, constant.  He has been coughing and increasing shortness of breath over that period of time.  He states his smell and taste is slightly off.  The patient does feel short of breath.  Patient denies any exposure to COVID-19.  He has been having normal bowel movements, no nausea vomiting, no hematuria dysuria.  The patient has been taking his medicines, no lower extremity edema.    REVIEW OF SYSTEMS  See HPI for further details. All other systems are negative.     PAST MEDICAL HISTORY  Past Medical History:   Diagnosis Date   • ACS (acute coronary syndrome) (Formerly [facility])    • Anxiety disorder    • Arthritis    • CAD (coronary artery disease) 07/2017    Coronary angiogram with moderate diffuse disease (70% LAD, chronic total occlusion of 2nd OM of LCx, 95% ostium dual PDA). August 2019: MPI with moderate fixed defect in inferolateral wall, LVEF 46%.   • Chronic anticoagulation    • Claudication (Formerly [facility])    • Diabetes (Formerly [facility])     Oral medications   • Dizziness    • GERD (gastroesophageal reflux disease)    • Glaucoma    • HTN (hypertension)    • Hyperlipemia    • Ischemic cardiomyopathy 03/2019    Echocardiogram with normal LV size, mild asymmetric septal hypertrophy, LVEF 50%. Trace MR, mild-moderate AI, trace TR.   • Mental retardation    • Mentally challenged     Has    • Osteoarthritis    • Paroxysmal atrial fibrillation (Formerly [facility])    • Pericarditis secondary to acute myocardial infarction (Formerly [facility])    • Sick sinus syndrome (Formerly [facility])    • SOB (shortness of breath)    • STEMI (ST elevation myocardial infarction) (Formerly [facility])    • Syncope 02/2019    Status  post pacemaker placement.       FAMILY HISTORY  No family history on file.    SOCIAL HISTORY  Social History     Socioeconomic History   • Marital status: Single     Spouse name: Not on file   • Number of children: Not on file   • Years of education: Not on file   • Highest education level: Not on file   Occupational History   • Not on file   Social Needs   • Financial resource strain: Not on file   • Food insecurity     Worry: Not on file     Inability: Not on file   • Transportation needs     Medical: Not on file     Non-medical: Not on file   Tobacco Use   • Smoking status: Never Smoker   • Smokeless tobacco: Never Used   Substance and Sexual Activity   • Alcohol use: No   • Drug use: No   • Sexual activity: Not on file     Comment: Single, has no children, works as an .   Lifestyle   • Physical activity     Days per week: Not on file     Minutes per session: Not on file   • Stress: Not on file   Relationships   • Social connections     Talks on phone: Not on file     Gets together: Not on file     Attends [employer] service: Not on file     Active member of club or organization: Not on file     Attends meetings of clubs or organizations: Not on file     Relationship status: Not on file   • Intimate partner violence     Fear of current or ex partner: Not on file     Emotionally abused: Not on file     Physically abused: Not on file     Forced sexual activity: Not on file   Other Topics Concern   • Not on file   Social History Narrative    ** Merged History Encounter **         ** Merged History Encounter **            SURGICAL HISTORY  Past Surgical History:   Procedure Laterality Date   • PACEMAKER INSERTION Left 02/12/2019    Medtronic Beba S DR MRI [id number] implanted by Dr. Blake.   • OTHER CARDIAC SURGERY Left 01/12/2018    Medtronic Reveal LINQ [id number] implanted by Dr. Blake   • GASTROSCOPY-ENDO  5/19/2017    Procedure: GASTROSCOPY-ENDO;  Surgeon: Reinaldo Berry M.D.;  Location: ENDOSCOPY [facility]  "TOWER ORS;  Service:    • GASTROSCOPY-ENDO  7/26/2012    Performed by JORDIN VERA at ENDOSCOPY [facility] ORS   • GASTROSCOPY-ENDO  7/25/2012    Performed by JORDIN VERA at ENDOSCOPY [facility]   • OTHER ORTHOPEDIC SURGERY  7-     R knee surgery   • OTHER CARDIAC SURGERY      stent placement       CURRENT MEDICATIONS  Home Medications     Reviewed by Sara Cote, PharmD (Pharmacist) on 12/23/20 at 2216  Med List Status: Complete   Medication Last Dose Status   aspirin EC (ECOTRIN) 81 MG Tablet Delayed Response 12/23/2020 Active   atorvastatin (LIPITOR) 80 MG tablet 12/22/2020 Active   cyanocobalamin (VITAMIN B12) 1000 MCG Tab 12/23/2020 Active   cyclobenzaprine (FLEXERIL) 10 MG Tab 12/20/2020 Active   dicyclomine (BENTYL) 20 MG Tab 12/21/2020 Active   gemfibrozil (LOPID) 600 MG Tab 12/22/2020 Active   glyBURIDE (DIABETA) 5 MG Tab 12/23/2020 Active   lisinopril (PRINIVIL) 2.5 MG Tab 12/23/2020 Active   loratadine (CLARITIN) 10 MG Tab 12/23/2020 Active   meclizine (ANTIVERT) 25 MG Tab PRN Active   metformin (GLUCOPHAGE) 1000 MG tablet 12/23/2020 Active   metoclopramide (REGLAN) 10 MG Tab PRN Active   metoprolol SR (TOPROL XL) 25 MG TABLET SR 24 HR 12/23/2020 Active   omeprazole (PRILOSEC) 20 MG delayed-release capsule 12/23/2020 Active   Ranolazine 1000 MG TABLET SR 12 HR 12/22/2020 Active   rivaroxaban (XARELTO) 20 MG Tab tablet 12/23/2020 Active   vitamin D (CHOLECALCIFEROL) 1000 UNIT Tab 12/23/2020 Active                ALLERGIES  Allergies   Allergen Reactions   • Methylphenidate Unspecified     \"Hyper\"       PHYSICAL EXAM  VITAL SIGNS: /84   Pulse 84   Temp (!) 35.8 °C (96.4 °F) (Temporal)   Resp (!) 22   Ht 1.702 m (5' 7\")   Wt 88 kg (194 lb 0.1 oz)   SpO2 95%   BMI 30.39 kg/m²   Constitutional: Well developed, Well nourished, moderate distress, Non-toxic appearance.   HENT: Normocephalic, Atraumatic, Bilateral external ears normal, Oropharynx clear, No oral exudates, Nose " normal.   Eyes: PERRLA, EOMI, Conjunctiva normal, No discharge.   Neck: Normal range of motion, No tenderness, Supple, No stridor.   Lymphatic: No lymphadenopathy noted.   Cardiovascular: Normal heart rate, Normal rhythm.   Thorax & Lungs: Crackles at bilateral bases, moderate respiratory distress, the patient is tachypneic abdomen: Distended, mild diffuse abdominal tenderness, no rebound, no peritoneal signs  Skin: Warm, Dry, No erythema, No rash.   Back: No tenderness, No CVA tenderness.   Extremities: Intact distal pulses, No edema, No tenderness.   Neurologic: Alert & oriented x 3, moves all extremities spontaneously.     RADIOLOGY/PROCEDURES  DX-CHEST-PORTABLE (1 VIEW)   Final Result      1.  Multifocal bilateral COVID pneumonia.   2.  Borderline cardiomegaly.      CT-ABDOMEN-PELVIS WITH   Final Result      1.  No acute abnormality in the abdomen or pelvis.   2.  Hepatic steatosis.   3.  Mild splenomegaly.   4.  Sequela of COVID pneumonia in the lower lungs.   5.  Tiny hiatal hernia.        Results for orders placed or performed during the hospital encounter of 12/23/20   COVID/SARS CoV-2 PCR    Specimen: Nasopharyngeal; Respirate   Result Value Ref Range    COVID Order Status Received    CBC with Differential   Result Value Ref Range    WBC 11.6 (H) 4.8 - 10.8 K/uL    RBC 3.94 (L) 4.70 - 6.10 M/uL    Hemoglobin 12.0 (L) 14.0 - 18.0 g/dL    Hematocrit 36.8 (L) 42.0 - 52.0 %    MCV 93.4 81.4 - 97.8 fL    MCH 30.5 27.0 - 33.0 pg    MCHC 32.6 (L) 33.7 - 35.3 g/dL    RDW 48.1 35.9 - 50.0 fL    Platelet Count 390 164 - 446 K/uL    MPV 9.7 9.0 - 12.9 fL    Neutrophils-Polys 90.10 (H) 44.00 - 72.00 %    Lymphocytes 6.30 (L) 22.00 - 41.00 %    Monocytes 2.60 0.00 - 13.40 %    Eosinophils 0.10 0.00 - 6.90 %    Basophils 0.30 0.00 - 1.80 %    Immature Granulocytes 0.60 0.00 - 0.90 %    Nucleated RBC 0.00 /100 WBC    Neutrophils (Absolute) 10.44 (H) 1.82 - 7.42 K/uL    Lymphs (Absolute) 0.73 (L) 1.00 - 4.80 K/uL    Monos  (Absolute) 0.30 0.00 - 0.85 K/uL    Eos (Absolute) 0.01 0.00 - 0.51 K/uL    Baso (Absolute) 0.03 0.00 - 0.12 K/uL    Immature Granulocytes (abs) 0.07 0.00 - 0.11 K/uL    NRBC (Absolute) 0.00 K/uL   Comp Metabolic Panel   Result Value Ref Range    Sodium 133 (L) 135 - 145 mmol/L    Potassium 4.3 3.6 - 5.5 mmol/L    Chloride 98 96 - 112 mmol/L    Co2 17 (L) 20 - 33 mmol/L    Anion Gap 18.0 (H) 7.0 - 16.0    Glucose 105 (H) 65 - 99 mg/dL    Bun 18 8 - 22 mg/dL    Creatinine 1.36 0.50 - 1.40 mg/dL    Calcium 9.4 8.4 - 10.2 mg/dL    AST(SGOT) 44 12 - 45 U/L    ALT(SGPT) 49 2 - 50 U/L    Alkaline Phosphatase 118 (H) 30 - 99 U/L    Total Bilirubin 1.0 0.1 - 1.5 mg/dL    Albumin 3.7 3.2 - 4.9 g/dL    Total Protein 7.9 6.0 - 8.2 g/dL    Globulin 4.2 (H) 1.9 - 3.5 g/dL    A-G Ratio 0.9 g/dL   D-Dimer   Result Value Ref Range    D-Dimer Screen 0.74 (H) 0.00 - 0.50 ug/mL (FEU)   CRP Quantitative (Non-Cardiac)   Result Value Ref Range    Stat C-Reactive Protein 12.41 (H) 0.00 - 0.75 mg/dL   Procalcitonin   Result Value Ref Range    Procalcitonin 0.19 <0.25 ng/mL   Troponin   Result Value Ref Range    Troponin T 8 6 - 19 ng/L   proBrain Natriuretic Peptide, NT   Result Value Ref Range    NT-proBNP 186 (H) 0 - 125 pg/mL   LIPASE   Result Value Ref Range    Lipase 53 7 - 58 U/L   Lactic acid (lactate)   Result Value Ref Range    Lactic Acid 1.4 0.5 - 2.0 mmol/L   CoV-2, Flu A/B, And RSV by PCR   Result Value Ref Range    Influenza virus A RNA Negative Negative    Influenza virus B, PCR Negative Negative    RSV, PCR Negative Negative    SARS-CoV-2 by PCR DETECTED (AA)     SARS-CoV-2 Source NP Swab    ESTIMATED GFR   Result Value Ref Range    GFR If African American >60 >60 mL/min/1.73 m 2    GFR If Non African American 54 (A) >60 mL/min/1.73 m 2   EKG   Result Value Ref Range    Report       [facility] Emergency Dept.    Test Date:  2020-12-23  Pt Name:    NICHELLE MARQUEZ                   Department: [facility]  MRN:         [id number]                      Room:       -ROOM 3  Gender:     Male                         Technician: HRR  :        [id number]                   Requested By:MIKAEL GAVIN  Order #:    [id number]                    Reading MD:    Measurements  Intervals                                Axis  Rate:       103                          P:          32  IL:         148                          QRS:        67  QRSD:       94                           T:          -15  QT:         328  QTc:        430    Interpretive Statements  SINUS TACHYCARDIA  INFERIOR INFARCT, AGE INDETERMINATE  CONSIDER POSTERIOR WALL INVOLVEMENT  Compared to ECG 2020 20:23:08  Myocardial infarct finding now present  Sinus rhythm no longer present          COURSE & MEDICAL DECISION MAKING  Pertinent Labs & Imaging studies reviewed. (See chart for details)  Patient is coming in secondary to abdominal distention also cough, shortness of breath, the patient is hypoxic.  He was 88% at urgent care.  Differential diagnosis includes Covid, intra-abdominal infection, pulmonary edema, sepsis.  We will get a CT scan of the abdomen pelvis, evaluate for a BNP due to the patient's history of ischemic cardiomyopathy, will get a chest x-ray, Covid    Covid is positive, the patient does have Covid pneumonitis, has respiratory failure with hypoxemia.  CT scan of the abdomen pelvis was unremarkable.  Patient will need to be hospitalized, discussed the case with the hospitalist for hospitalization.    CRITICAL CARE  The very real possibilty of a deterioration of this patient's condition required the highest level of my preparedness for sudden, emergent intervention.  I provided critical care services, which included medication orders, frequent reevaluations of the patient's condition and response to treatment, ordering and reviewing test results, and discussing the case with various consultants.  The critical care time associated with the care of the  patient was 35 minutes. Review chart for interventions. This time is exclusive of any other billable procedures.         FINAL IMPRESSION  1. Acute respiratory failure with hypoxia (HCC)    2. Pneumonia due to COVID-19 virus        Patient referred to primary care provider for blood pressure management    This dictation was created using voice recognition software. The accuracy of the dictation is limited to the abilities of the software. I expect there may be some errors of grammar and possibly content. The nursing notes were reviewed and certain aspects of this information were incorporated into this note.    Electronically signed by: Justin Mesa M.D., 12/23/2020 6:17 PM

## 2020-12-24 NOTE — PROGRESS NOTES
[facility] Medicine Daily Progress Note    Date of Service  12/24/2020    Chief Complaint  55 y.o. male admitted 12/23/2020 with generalized weakness and shortness of breath    Hospital Course    55M, PMHx CAD, GERD, P-AFib on A/C.  Admitted 12/23 with shortness of breath and generalized weakness secondary to COVID-19 pneumonia with hypoxia, started on Decadron.    Interval Problem Update  Heart rate 70s-80s, saturating well on 5 L of oxygen, encourage incentive spirometer, encourage prone positioning.  Discussed remdesivir with factious disease, plan to watch him over the following day and consider if oxygen requirement worsen or does not improve with Decadron.  Blood sugars 200s, insulin ordered, continue to monitor and adjust according to trend.  Discussed with patient, patient's nurse and with multidisciplinary team during rounds including , pharmacist and charge nurse.      Consultants/Specialty  Discussed with infectious disease     Code Status  Full Code    Disposition  To be determined, anticipate medical clearance 2-4 days    Review of Systems  Review of Systems   Constitutional: Positive for chills and malaise/fatigue.   Eyes: Negative for pain, discharge and redness.   Respiratory: Positive for cough. Negative for hemoptysis, sputum production and stridor.    Cardiovascular: Negative for palpitations and orthopnea.   Gastrointestinal: Positive for nausea. Abdominal pain:  improved.   Genitourinary: Negative for flank pain and hematuria.   Musculoskeletal: Negative for falls.   Skin: Negative for itching and rash.   Neurological: Negative for dizziness, seizures and headaches.   Endo/Heme/Allergies: Does not bruise/bleed easily.   Psychiatric/Behavioral: The patient is not nervous/anxious.       Physical Exam  Temp:  [35.8 °C (96.4 °F)-37 °C (98.6 °F)] 36.9 °C (98.4 °F)  Pulse:  [] 72  Resp:  [18-47] 18  BP: ()/(62-84) 120/76  SpO2:  [84 %-97 %] 88 %    Physical Exam  Vitals signs and  nursing note reviewed.   Constitutional:       General: He is not in acute distress.     Appearance: Normal appearance.   HENT:      Head: Normocephalic and atraumatic.      Nose: Nose normal.      Mouth/Throat:      Mouth: Mucous membranes are moist.   Eyes:      Extraocular Movements: Extraocular movements intact.      Pupils: Pupils are equal, round, and reactive to light.   Neck:      Musculoskeletal: Normal range of motion and neck supple.   Cardiovascular:      Rate and Rhythm: Normal rate.   Pulmonary:      Effort: Pulmonary effort is normal.      Breath sounds: Decreased air movement present. Rhonchi present.      Comments: Reduced air entry.  Diffuse rhonchi.  Abdominal:      General: Abdomen is flat. There is no distension.      Tenderness: There is no abdominal tenderness. There is no guarding or rebound.   Musculoskeletal: Normal range of motion.         General: No swelling or deformity.   Skin:     General: Skin is warm and dry.   Neurological:      General: No focal deficit present.      Mental Status: He is alert and oriented to person, place, and time.   Psychiatric:         Mood and Affect: Mood normal.         Behavior: Behavior normal.       Fluids    Intake/Output Summary (Last 24 hours) at 12/24/2020 1624  Last data filed at 12/24/2020 1022  Gross per 24 hour   Intake --   Output 1 ml   Net -1 ml       Laboratory  Recent Labs     12/23/20  1805 12/24/20  0726   WBC 11.6* 7.3   RBC 3.94* 3.46*   HEMOGLOBIN 12.0* 10.3*   HEMATOCRIT 36.8* 31.4*   MCV 93.4 90.8   MCH 30.5 29.8   MCHC 32.6* 32.8*   RDW 48.1 47.2   PLATELETCT 390 383   MPV 9.7 9.7     Recent Labs     12/23/20  1805 12/24/20  0726   SODIUM 133* 137   POTASSIUM 4.3 4.7   CHLORIDE 98 103   CO2 17* 18*   GLUCOSE 105* 227*   BUN 18 19   CREATININE 1.36 1.07   CALCIUM 9.4 9.0                   Imaging  DX-CHEST-PORTABLE (1 VIEW)   Final Result      1.  Multifocal bilateral COVID pneumonia.   2.  Borderline cardiomegaly.       CT-ABDOMEN-PELVIS WITH   Final Result      1.  No acute abnormality in the abdomen or pelvis.   2.  Hepatic steatosis.   3.  Mild splenomegaly.   4.  Sequela of COVID pneumonia in the lower lungs.   5.  Tiny hiatal hernia.           Assessment/Plan  Pneumonia due to COVID-19 virus  Assessment & Plan  Patient will be started on Decadron due to hypoxia, 6 L  Procalcitonin is not significantly elevated, will refrain from starting antibiotics  No erythema or edema of both lower extremities at this point. We will start prophylactic pharmacologic agents with Lovenox.    Encourage prone positioning. Oxygen as needed, Incentive spirometry     Coronary artery disease involving native coronary artery of native heart without angina pectoris- (present on admission)  Assessment & Plan  Patient denies chest pain.  Troponin is not elevated  Continue outpatient medications    GERD with Nieves's esophagus  Assessment & Plan  Continue PPI    AP (abdominal pain)  Assessment & Plan  CT of the abdomen and pelvis with contrast negative for abdominal surgical abnormalities  Pain probably is muscular and related to cough  Tylenol as needed    Chronic anticoagulation- (present on admission)  Assessment & Plan  Continue Xarelto    PAF (paroxysmal atrial fibrillation) (HCC)- (present on admission)  Assessment & Plan  Resume home Xarelto.  Resume home metoprolol with hold parameters.     VTE prophylaxis: Xarelto for atrial fibrillation

## 2020-12-24 NOTE — ED NOTES
Pt bib [facility] from [facility]; pt c/o abd [id number]. Pt hypoxic; RA:83%. Pt on o2 6L via nc: 95%. Pt lives in group home setting at [address], [address]. #[phone]. Pt has developmental delays at baseline. Pt has hx NIDDM; FSBS en-route per [facility]: 82

## 2020-12-24 NOTE — ASSESSMENT & PLAN NOTE
CT of the abdomen and pelvis with contrast negative for abdominal surgical abnormalities  Pain probably is muscular and related to cough  Tylenol as needed  12/25 resolved

## 2020-12-24 NOTE — ASSESSMENT & PLAN NOTE
Patient will be started on Decadron due to hypoxia, 6 L  Procalcitonin is not significantly elevated, will refrain from starting antibiotics  No erythema or edema of both lower extremities at this point. We will start prophylactic pharmacologic agents with Lovenox.    Encourage prone positioning. Oxygen as needed, Incentive spirometry   12/25, remdesivir started for increased oxygen needs  12/26-28, still on 10-15 L Encourage prone positioning & Incentive spirometry      12/29-continue to titrate down patient's oxygen as tolerated

## 2020-12-24 NOTE — PROGRESS NOTES
"Pt oxygen saturation 88%  On 4L NC, increased pt to 6L NC pt still 88%. Changed pt to face mask and increased oxygen to 8L. Encouraged pt to prone pt stated \"I can lay on my side\" assisted pt to laying on side and pts oxygen saturation up to 91%  "

## 2020-12-24 NOTE — PROGRESS NOTES
Subjective:   Ryan Trevizo is a 55 y.o. male who presents for GI Problem (stomach pain started yesterday, no nausea or diarrhea)        Abdominal Pain  This is a new problem. The current episode started yesterday. The onset quality is sudden. The problem occurs constantly. The pain is located in the periumbilical region and suprapubic region. The pain is at a severity of 10/10. The abdominal pain does not radiate. Associated symptoms include anorexia and myalgias. Pertinent negatives include no diarrhea, fever, nausea or vomiting. Associated symptoms comments: Cough  .     No known  covid exposure     Last BM: small, no blood in stool       Review of Systems   Constitutional: Negative for fever.   Gastrointestinal: Positive for abdominal pain and anorexia. Negative for diarrhea, nausea and vomiting.   Musculoskeletal: Positive for myalgias.       PMH:  has a past medical history of ACS (acute coronary syndrome) ([facility]), Anxiety disorder, Arthritis, CAD (coronary artery disease) (07/2017), Chronic anticoagulation, Claudication ([facility]), Diabetes ([facility]), Dizziness, GERD (gastroesophageal reflux disease), Glaucoma, HTN (hypertension), Hyperlipemia, Ischemic cardiomyopathy (03/2019), Mental retardation, Mentally challenged, Osteoarthritis, Paroxysmal atrial fibrillation ([facility]), Pericarditis secondary to acute myocardial infarction ([facility]), Sick sinus syndrome ([facility]), SOB (shortness of breath), STEMI (ST elevation myocardial infarction) ([facility]), and Syncope (02/2019). He also has no past medical history of COPD, Liver disease, Other general symptoms(780.99), or Seizure disorder ([facility]).  MEDS: No current facility-administered medications for this visit.   No current outpatient medications on file.    Facility-Administered Medications Ordered in Other Visits:   •  senna-docusate (PERICOLACE or SENOKOT S) 8.6-50 MG per tablet 2 Tab, 2 Tab, Oral, BID, 2 Tab at 12/24/20 9168 **AND** polyethylene glycol/lytes (MIRALAX) PACKET 1  Packet, 1 Packet, Oral, QDAY PRN **AND** magnesium hydroxide (MILK OF MAGNESIA) suspension 30 mL, 30 mL, Oral, QDAY PRN **AND** bisacodyl (DULCOLAX) suppository 10 mg, 10 mg, Rectal, QDAY PRN, Matt Pichardo M.D.  •  ondansetron (ZOFRAN) syringe/vial injection 4 mg, 4 mg, Intravenous, Q4HRS PRN, Matt Pichardo M.D.  •  ondansetron (ZOFRAN ODT) dispertab 4 mg, 4 mg, Oral, Q4HRS PRN, Matt Pichardo M.D.  •  promethazine (PHENERGAN) tablet 12.5-25 mg, 12.5-25 mg, Oral, Q4HRS PRN, Matt Pichardo M.D.  •  promethazine (PHENERGAN) suppository 12.5-25 mg, 12.5-25 mg, Rectal, Q4HRS PRN, Matt Pichardo M.D.  •  prochlorperazine (COMPAZINE) injection 5-10 mg, 5-10 mg, Intravenous, Q4HRS PRN, Matt Pichardo M.D.  •  dexamethasone (DECADRON) injection 6 mg, 6 mg, Intravenous, DAILY, Matt Pichardo M.D., 6 mg at 12/24/20 1628  •  insulin regular (HumuLIN R,NovoLIN R) injection, 1-6 Units, Subcutaneous, 4X/DAY ACHS, 2 Units at 12/24/20 1629 **AND** POC Blood Glucose, , , Q AC AND BEDTIME(S) **AND** NOTIFY MD and PharmD, , , Once **AND** glucose 4 g chewable tablet 16 g, 16 g, Oral, Q15 MIN PRN **AND** dextrose 50% (D50W) injection 50 mL, 50 mL, Intravenous, Q15 MIN PRN, Matt Pichardo M.D.  •  aspirin EC (ECOTRIN) tablet 81 mg, 81 mg, Oral, DAILY, Matt Pichardo M.D., 81 mg at 12/24/20 0654  •  meclizine (ANTIVERT) tablet 25 mg, 25 mg, Oral, TID PRN, Matt Pichardo M.D.  •  metoprolol SR (TOPROL XL) tablet 25 mg, 25 mg, Oral, DAILY, Matt Pichardo M.D., 25 mg at 12/24/20 0654  •  omeprazole (PRILOSEC) capsule 40 mg, 40 mg, Oral, DAILY, Matt Pichardo M.D., 40 mg at 12/24/20 0653  •  metoclopramide (REGLAN) tablet 10 mg, 10 mg, Oral, TID + HS, Matt Pcihardo M.D., 10 mg at 12/24/20 1628  •  ranolazine (RANEXA) 500 MG SR tablet TB12 1,000 mg, 1,000 mg, Oral, DAILY, Matt Pichardo M.D., 1,000 mg at 12/24/20 0653  •  rivaroxaban (XARELTO) tablet 20 mg, 20 mg, Oral, DAILY, Matt Pichardo  "M.D., 20 mg at 12/24/20 0654  •  lisinopril (PRINIVIL) tablet 2.5 mg, 2.5 mg, Oral, DAILY, Matt Pichardo M.D., 2.5 mg at 12/24/20 0651  ALLERGIES:   Allergies   Allergen Reactions   • Methylphenidate Unspecified     \"Hyper\"     SURGHX:   Past Surgical History:   Procedure Laterality Date   • PACEMAKER INSERTION Left 02/12/2019    [employer] [place] [facility] S DR MRI [id number] implanted by Dr. Blake.   • OTHER CARDIAC SURGERY Left 01/12/2018    [employer] Reveal LINQ [id number] implanted by Dr. Blake   • GASTROSCOPY-ENDO  5/19/2017    Procedure: GASTROSCOPY-ENDO;  Surgeon: Reinaldo Berry M.D.;  Location: [facility];  Service:    • GASTROSCOPY-ENDO  7/26/2012    Performed by JORDIN VERA at [facility]   • GASTROSCOPY-ENDO  7/25/2012    Performed by JORDIN VERA at [facility]   • OTHER ORTHOPEDIC SURGERY  7-     R knee surgery   • OTHER CARDIAC SURGERY      stent placement     SOCHX:  reports that he has never smoked. He has never used smokeless tobacco. He reports that he does not drink alcohol or use drugs.  History reviewed. No pertinent family history.     Objective:   /82   Pulse (!) 110   Temp 36.2 °C (97.2 °F) (Temporal)   Resp (!) 22   SpO2 (!) 86%     Physical Exam  Vitals signs reviewed.   Constitutional:       General: He is not in acute distress.     Appearance: He is well-developed. He is ill-appearing and diaphoretic.   HENT:      Head: Normocephalic and atraumatic.      Right Ear: External ear normal.      Left Ear: External ear normal.      Nose: Nose normal.      Mouth/Throat:      Mouth: Mucous membranes are moist.   Eyes:      Conjunctiva/sclera: Conjunctivae normal.      Pupils: Pupils are equal, round, and reactive to light.   Neck:      Musculoskeletal: Normal range of motion and neck supple.      Trachea: No tracheal deviation.   Cardiovascular:      Rate and Rhythm: Regular rhythm. Tachycardia present.   Pulmonary:      Effort: Pulmonary " effort is normal. Tachypnea present.   Abdominal:      Tenderness: There is abdominal tenderness.   Skin:     General: Skin is warm.      Capillary Refill: Capillary refill takes less than 2 seconds.   Neurological:      General: No focal deficit present.      Mental Status: He is alert and oriented to person, place, and time.   Psychiatric:         Mood and Affect: Mood normal.         Behavior: Behavior normal.           Assessment/Plan:     1. Hypoxia     2. Lower abdominal pain     3. Tachycardia     4. Tachypnea       Pt arrived in clinic SpO2 86%, he was started on 3 L nasal canula, repeat SpO2 92%.       Patient informed he is inappropriate for scope of service for urgent care clinic due to concerns for [possible/suspected/probable] severe abdominal pain, hypoxia the patient is referred to a higher level of care at [facility] now by EMS transportation for evaluation and treatment; patient aware of location of [facility]. We discussed risks of non-compliance or delayed medical care. All questions answered to patient’s apparent satisfaction. Patient verbalizes understanding and agreement with plan of care. [facility] was contacted and patient report given.       Please note that this dictation was created using voice recognition software. I have made every reasonable attempt to correct obvious errors, but I expect that there are errors of grammar and possibly content that I did not discover before finalizing the note.

## 2020-12-24 NOTE — H&P
Hospital Medicine History & Physical Note    Date of Service  12/23/2020    Primary Care Physician  Pcp Pt States None    Consultants  None  Code Status  Full Code    Chief Complaint  Chief Complaint   Patient presents with   • Abdominal Pain       History of Presenting Illness  55 y.o. male past medical history of CAD, diabetes on Metformin, glipizide, GERD, paroxysmal A. fib, ischemic cardiomyopathy, hypertension, chronic anticoagulation with Xarelto, who presented 12/23/2020 with complaints of generalized abdominal pain for 2 days, dull, aggravated by cough.  Patient has been having cough and shortness of breath in the last 2 to 3 days.  He denies sick contact.  Abdominal CT did not reveal acute intra-abdominal abnormalities, but did show basilar pneumonitis.  COVID-19 test came back positive.  Patient found to be slightly tachypneic, desaturating to 84% on room air, he was placed on 5 L nasal cannula, SPO2 95%    Review of Systems  Review of Systems   Constitutional: Positive for chills, fever and malaise/fatigue. Negative for weight loss.   HENT: Negative for ear pain, hearing loss and tinnitus.    Eyes: Negative for blurred vision, double vision and photophobia.   Respiratory: Positive for cough. Negative for hemoptysis and sputum production.    Cardiovascular: Negative for chest pain, palpitations and orthopnea.   Gastrointestinal: Positive for abdominal pain and nausea. Negative for heartburn and vomiting.   Genitourinary: Negative for dysuria, flank pain, frequency and hematuria.   Musculoskeletal: Negative for back pain, joint pain and neck pain.   Skin: Negative for itching and rash.   Neurological: Negative for tremors, speech change, focal weakness and headaches.   Endo/Heme/Allergies: Negative for environmental allergies and polydipsia. Does not bruise/bleed easily.   Psychiatric/Behavioral: Negative for hallucinations and substance abuse. The patient is not nervous/anxious.        Past Medical  "History   has a past medical history of ACS (acute coronary syndrome) ([facility]), Anxiety disorder, Arthritis, CAD (coronary artery disease) (07/2017), Chronic anticoagulation, Claudication ([facility]), Diabetes ([facility]), Dizziness, GERD (gastroesophageal reflux disease), Glaucoma, HTN (hypertension), Hyperlipemia, Ischemic cardiomyopathy (03/2019), Mental retardation, Mentally challenged, Osteoarthritis, Paroxysmal atrial fibrillation ([facility]), Pericarditis secondary to acute myocardial infarction ([facility]), Sick sinus syndrome ([facility]), SOB (shortness of breath), STEMI (ST elevation myocardial infarction) ([facility]), and Syncope (02/2019).    Surgical History   has a past surgical history that includes other orthopedic surgery (7- ); other cardiac surgery; gastroscopy-endo (7/25/2012); gastroscopy-endo (7/26/2012); gastroscopy-endo (5/19/2017); other cardiac surgery (Left, 01/12/2018); and pacemaker insertion (Left, 02/12/2019).     Family History  family history is not on file.     Social History   reports that he has never smoked. He has never used smokeless tobacco. He reports that he does not drink alcohol or use drugs.    Allergies  Allergies   Allergen Reactions   • Methylphenidate Unspecified     \"Hyper\"       Medications  Prior to Admission Medications   Prescriptions Last Dose Informant Patient Reported? Taking?   Ranolazine 1000 MG TABLET SR 12 HR   No No   Sig: Take 0.5 Tabs by mouth every day. With dinner   aspirin EC (ECOTRIN) 81 MG Tablet Delayed Response  Patient Yes No   Sig: Take 81 mg by mouth every day.   atorvastatin (LIPITOR) 80 MG tablet  Patient Yes No   Sig: Take 80 mg by mouth every evening. Every other day   cyanocobalamin (VITAMIN B12) 1000 MCG Tab  Patient Yes No   Sig: Take 1,000 mcg by mouth every day.   cyclobenzaprine (FLEXERIL) 10 MG Tab  Patient Yes No   Sig: Take 10 mg by mouth 3 times a day as needed for Muscle Spasms.   dicyclomine (BENTYL) 20 MG Tab  Patient Yes No   Sig: Take 20 mg by mouth every " 6 hours as needed (abdominal pain).   gemfibrozil (LOPID) 600 MG Tab   Yes No   Sig: Take 600 mg by mouth every day. Every other day   glyBURIDE (DIABETA) 5 MG Tab   Yes No   Sig: Take 5 mg by mouth 2 times a day, with meals.   lisinopril (PRINIVIL) 2.5 MG Tab  Patient Yes No   Sig: Take 2.5 mg by mouth every day.   loratadine (CLARITIN) 10 MG Tab  Patient Yes No   Sig: Take 10 mg by mouth every day.   meclizine (ANTIVERT) 25 MG Tab   Yes No   Sig: Take 25 mg by mouth 3 times a day as needed.   metformin (GLUCOPHAGE) 1000 MG tablet  Patient Yes No   Sig: Take 850 mg by mouth 2 times a day.   metoclopramide (REGLAN) 10 MG Tab   No No   Sig: Take 1 Tab by mouth 4 times a day as needed.   metoprolol SR (TOPROL XL) 25 MG TABLET SR 24 HR   No No   Sig: Take 1 Tab by mouth every day.   omeprazole (PRILOSEC) 20 MG delayed-release capsule   No No   Sig: Take 2 Caps by mouth every day. 30 min before first meal   rivaroxaban (XARELTO) 20 MG Tab tablet  Patient Yes No   Sig: Take 20 mg by mouth with dinner.   vitamin D (CHOLECALCIFEROL) 1000 UNIT Tab  Patient Yes No   Sig: Take 2,000 Units by mouth every day.      Facility-Administered Medications: None       Physical Exam  Temp:  [35.8 °C (96.4 °F)] 35.8 °C (96.4 °F)  Pulse:  [] 84  Resp:  [20-47] 22  BP: (118-132)/(75-84) 132/84  SpO2:  [84 %-97 %] 95 %    Physical Exam  Vitals signs and nursing note reviewed.   Constitutional:       General: He is not in acute distress.     Appearance: Normal appearance.   HENT:      Head: Normocephalic and atraumatic.      Nose: Nose normal.      Mouth/Throat:      Mouth: Mucous membranes are moist.   Eyes:      Extraocular Movements: Extraocular movements intact.      Pupils: Pupils are equal, round, and reactive to light.   Neck:      Musculoskeletal: Normal range of motion and neck supple.   Cardiovascular:      Rate and Rhythm: Normal rate and regular rhythm.   Pulmonary:      Effort: Pulmonary effort is normal.      Breath  sounds: Decreased air movement present. Rhonchi present.   Abdominal:      General: Abdomen is flat. There is distension.      Tenderness: There is generalized abdominal tenderness. There is no guarding or rebound.   Musculoskeletal: Normal range of motion.         General: No swelling or deformity.   Skin:     General: Skin is warm and dry.   Neurological:      General: No focal deficit present.      Mental Status: He is alert and oriented to person, place, and time.   Psychiatric:         Mood and Affect: Mood normal.         Behavior: Behavior normal.         Laboratory:  Recent Labs     12/23/20  1805   WBC 11.6*   RBC 3.94*   HEMOGLOBIN 12.0*   HEMATOCRIT 36.8*   MCV 93.4   MCH 30.5   MCHC 32.6*   RDW 48.1   PLATELETCT 390   MPV 9.7     Recent Labs     12/23/20  1805   SODIUM 133*   POTASSIUM 4.3   CHLORIDE 98   CO2 17*   GLUCOSE 105*   BUN 18   CREATININE 1.36   CALCIUM 9.4     Recent Labs     12/23/20  1805   ALTSGPT 49   ASTSGOT 44   ALKPHOSPHAT 118*   TBILIRUBIN 1.0   LIPASE 53   GLUCOSE 105*         Recent Labs     12/23/20  1805   NTPROBNP 186*         Recent Labs     12/23/20  1805   TROPONINT 8       Imaging:  DX-CHEST-PORTABLE (1 VIEW)   Final Result      1.  Multifocal bilateral COVID pneumonia.   2.  Borderline cardiomegaly.      CT-ABDOMEN-PELVIS WITH   Final Result      1.  No acute abnormality in the abdomen or pelvis.   2.  Hepatic steatosis.   3.  Mild splenomegaly.   4.  Sequela of COVID pneumonia in the lower lungs.   5.  Tiny hiatal hernia.            Assessment/Plan:  I anticipate this patient will require at least two midnights for appropriate medical management, necessitating inpatient admission.    Pneumonia due to COVID-19 virus  Assessment & Plan  Patient will be started on Decadron due to hypoxia, 6 L  Procalcitonin is not significantly elevated, will refrain from starting antibiotics  Tylenol for fever  He appears to be a candidate for ACS  Contact, droplet, eye precautions    Chronic  anticoagulation- (present on admission)  Assessment & Plan  Continue Xarelto    Coronary artery disease involving native coronary artery of native heart without angina pectoris- (present on admission)  Assessment & Plan  Patient denies chest pain.  Troponin is not elevated  Continue outpatient medications    GERD with Nieves's esophagus  Assessment & Plan  Continue PPI    AP (abdominal pain)  Assessment & Plan  CT of the abdomen and pelvis with contrast negative for abdominal surgical abnormalities  Pain probably is muscular and related to cough  Tylenol as needed

## 2020-12-25 LAB
ALBUMIN SERPL BCP-MCNC: 3.1 G/DL (ref 3.2–4.9)
ALBUMIN/GLOB SERPL: 0.8 G/DL
ALP SERPL-CCNC: 94 U/L (ref 30–99)
ALT SERPL-CCNC: 35 U/L (ref 2–50)
ANION GAP SERPL CALC-SCNC: 10 MMOL/L (ref 7–16)
AST SERPL-CCNC: 22 U/L (ref 12–45)
BASOPHILS # BLD AUTO: 0.2 % (ref 0–1.8)
BASOPHILS # BLD: 0.01 K/UL (ref 0–0.12)
BILIRUB SERPL-MCNC: 0.5 MG/DL (ref 0.1–1.5)
BUN SERPL-MCNC: 25 MG/DL (ref 8–22)
CALCIUM SERPL-MCNC: 9 MG/DL (ref 8.4–10.2)
CHLORIDE SERPL-SCNC: 103 MMOL/L (ref 96–112)
CO2 SERPL-SCNC: 21 MMOL/L (ref 20–33)
CREAT SERPL-MCNC: 1.03 MG/DL (ref 0.5–1.4)
EOSINOPHIL # BLD AUTO: 0 K/UL (ref 0–0.51)
EOSINOPHIL NFR BLD: 0 % (ref 0–6.9)
ERYTHROCYTE [DISTWIDTH] IN BLOOD BY AUTOMATED COUNT: 46.2 FL (ref 35.9–50)
GLOBULIN SER CALC-MCNC: 3.8 G/DL (ref 1.9–3.5)
GLUCOSE BLD-MCNC: 152 MG/DL (ref 65–99)
GLUCOSE BLD-MCNC: 260 MG/DL (ref 65–99)
GLUCOSE SERPL-MCNC: 330 MG/DL (ref 65–99)
HCT VFR BLD AUTO: 29.2 % (ref 42–52)
HGB BLD-MCNC: 9.6 G/DL (ref 14–18)
IMM GRANULOCYTES # BLD AUTO: 0.05 K/UL (ref 0–0.11)
IMM GRANULOCYTES NFR BLD AUTO: 0.8 % (ref 0–0.9)
LYMPHOCYTES # BLD AUTO: 0.41 K/UL (ref 1–4.8)
LYMPHOCYTES NFR BLD: 6.2 % (ref 22–41)
MAGNESIUM SERPL-MCNC: 2.2 MG/DL (ref 1.5–2.5)
MCH RBC QN AUTO: 30 PG (ref 27–33)
MCHC RBC AUTO-ENTMCNC: 32.9 G/DL (ref 33.7–35.3)
MCV RBC AUTO: 91.3 FL (ref 81.4–97.8)
MONOCYTES # BLD AUTO: 0.19 K/UL (ref 0–0.85)
MONOCYTES NFR BLD AUTO: 2.9 % (ref 0–13.4)
NEUTROPHILS # BLD AUTO: 5.99 K/UL (ref 1.82–7.42)
NEUTROPHILS NFR BLD: 89.9 % (ref 44–72)
NRBC # BLD AUTO: 0.02 K/UL
NRBC BLD-RTO: 0.3 /100 WBC
PLATELET # BLD AUTO: 404 K/UL (ref 164–446)
PMV BLD AUTO: 9.9 FL (ref 9–12.9)
POTASSIUM SERPL-SCNC: 5 MMOL/L (ref 3.6–5.5)
PROT SERPL-MCNC: 6.9 G/DL (ref 6–8.2)
RBC # BLD AUTO: 3.2 M/UL (ref 4.7–6.1)
SODIUM SERPL-SCNC: 134 MMOL/L (ref 135–145)
WBC # BLD AUTO: 6.7 K/UL (ref 4.8–10.8)

## 2020-12-25 PROCEDURE — XW033E5 INTRODUCTION OF REMDESIVIR ANTI-INFECTIVE INTO PERIPHERAL VEIN, PERCUTANEOUS APPROACH, NEW TECHNOLOGY GROUP 5: ICD-10-PCS | Performed by: INTERNAL MEDICINE

## 2020-12-25 PROCEDURE — 85025 COMPLETE CBC W/AUTO DIFF WBC: CPT

## 2020-12-25 PROCEDURE — 80053 COMPREHEN METABOLIC PANEL: CPT

## 2020-12-25 PROCEDURE — 700101 HCHG RX REV CODE 250: Performed by: INTERNAL MEDICINE

## 2020-12-25 PROCEDURE — 700102 HCHG RX REV CODE 250 W/ 637 OVERRIDE(OP): Performed by: INTERNAL MEDICINE

## 2020-12-25 PROCEDURE — 700105 HCHG RX REV CODE 258: Performed by: INTERNAL MEDICINE

## 2020-12-25 PROCEDURE — A9270 NON-COVERED ITEM OR SERVICE: HCPCS | Performed by: INTERNAL MEDICINE

## 2020-12-25 PROCEDURE — 99232 SBSQ HOSP IP/OBS MODERATE 35: CPT | Performed by: HOSPITALIST

## 2020-12-25 PROCEDURE — 700102 HCHG RX REV CODE 250 W/ 637 OVERRIDE(OP): Performed by: HOSPITALIST

## 2020-12-25 PROCEDURE — 700111 HCHG RX REV CODE 636 W/ 250 OVERRIDE (IP): Performed by: INTERNAL MEDICINE

## 2020-12-25 PROCEDURE — 82962 GLUCOSE BLOOD TEST: CPT | Mod: 91

## 2020-12-25 PROCEDURE — 83735 ASSAY OF MAGNESIUM: CPT

## 2020-12-25 PROCEDURE — 770021 HCHG ROOM/CARE - ISO PRIVATE

## 2020-12-25 RX ORDER — INSULIN GLARGINE 100 [IU]/ML
5 INJECTION, SOLUTION SUBCUTANEOUS EVERY EVENING
Status: DISCONTINUED | OUTPATIENT
Start: 2020-12-25 | End: 2020-12-29

## 2020-12-25 RX ADMIN — INSULIN HUMAN 3 UNITS: 100 INJECTION, SOLUTION PARENTERAL at 12:46

## 2020-12-25 RX ADMIN — METOCLOPRAMIDE 10 MG: 10 TABLET ORAL at 08:53

## 2020-12-25 RX ADMIN — REMDESIVIR 200 MG: 100 INJECTION, POWDER, LYOPHILIZED, FOR SOLUTION INTRAVENOUS at 16:59

## 2020-12-25 RX ADMIN — METOPROLOL SUCCINATE 25 MG: 25 TABLET, EXTENDED RELEASE ORAL at 05:36

## 2020-12-25 RX ADMIN — RANOLAZINE 1000 MG: 500 TABLET, FILM COATED, EXTENDED RELEASE ORAL at 22:17

## 2020-12-25 RX ADMIN — METOCLOPRAMIDE 10 MG: 10 TABLET ORAL at 02:58

## 2020-12-25 RX ADMIN — DEXAMETHASONE SODIUM PHOSPHATE 6 MG: 4 INJECTION, SOLUTION INTRA-ARTICULAR; INTRALESIONAL; INTRAMUSCULAR; INTRAVENOUS; SOFT TISSUE at 17:04

## 2020-12-25 RX ADMIN — INSULIN HUMAN 2 UNITS: 100 INJECTION, SOLUTION PARENTERAL at 22:26

## 2020-12-25 RX ADMIN — STANDARDIZED SENNA CONCENTRATE AND DOCUSATE SODIUM 2 TABLET: 8.6; 5 TABLET, FILM COATED ORAL at 05:36

## 2020-12-25 RX ADMIN — OMEPRAZOLE 40 MG: 20 CAPSULE, DELAYED RELEASE ORAL at 05:36

## 2020-12-25 RX ADMIN — LISINOPRIL 2.5 MG: 5 TABLET ORAL at 05:37

## 2020-12-25 RX ADMIN — METOCLOPRAMIDE 10 MG: 10 TABLET ORAL at 16:59

## 2020-12-25 RX ADMIN — INSULIN HUMAN 1 UNITS: 100 INJECTION, SOLUTION PARENTERAL at 17:06

## 2020-12-25 RX ADMIN — RIVAROXABAN 20 MG: 20 TABLET, FILM COATED ORAL at 05:37

## 2020-12-25 RX ADMIN — INSULIN HUMAN 3 UNITS: 100 INJECTION, SOLUTION PARENTERAL at 05:37

## 2020-12-25 RX ADMIN — METOCLOPRAMIDE 10 MG: 10 TABLET ORAL at 22:18

## 2020-12-25 RX ADMIN — ASPIRIN 81 MG: 81 TABLET, COATED ORAL at 05:37

## 2020-12-25 RX ADMIN — INSULIN GLARGINE 5 UNITS: 100 INJECTION, SOLUTION SUBCUTANEOUS at 17:07

## 2020-12-25 ASSESSMENT — ENCOUNTER SYMPTOMS
CHILLS: 0
DIZZINESS: 0
ORTHOPNEA: 0
NAUSEA: 1
HEADACHES: 0
EYE DISCHARGE: 0
FLANK PAIN: 0
HEMOPTYSIS: 0
COUGH: 1
FALLS: 0
STRIDOR: 0
NERVOUS/ANXIOUS: 0
EYE PAIN: 0
SHORTNESS OF BREATH: 1
EYE REDNESS: 0
PALPITATIONS: 0
SPUTUM PRODUCTION: 0
BRUISES/BLEEDS EASILY: 0
SEIZURES: 0

## 2020-12-25 NOTE — PROGRESS NOTES
Chart reviewed.  COVID-19 pneumonia on 9 L oxygen.  Remdesivir requested.     -Remdesivir approved.  Will initiate a 5-day course of IV remdesivir.  Monitor LFTs daily.  Discontinue if ALT >500 or if creatinine clearance <30  -Patient is on dexamethasone  -Patient is on rivaroxaban     Continue management per hospitalist teams.  Please call us if formal consult requested.

## 2020-12-25 NOTE — PROGRESS NOTES
Hospital Medicine Daily Progress Note    Date of Service  12/25/2020    Chief Complaint  55 y.o. male admitted 12/23/2020 with generalized weakness and shortness of breath    Hospital Course    55M, PMHx CAD, GERD, P-AFib on A/C.  Admitted 12/23 with shortness of breath and generalized weakness secondary to COVID-19 pneumonia with hypoxia, started on Decadron.    Interval Problem Update  Oxygen requirement 10-15 L despite Decadron.  Discussed again with infectious disease. Dr Petey Sanchez, plan to start remdesivir.  Encourage incentive spirometer, encourage prone positioning.  Blood sugars 240s-260s, will start long-acting insulin, and adjust according to trend.  Discussed with patient, patient's nurse and infectious disease    Consultants/Specialty  Discussed with infectious disease      Code Status  Full Code    Disposition  To be determined, anticipate medical clearance 2-4 days    Review of Systems  Review of Systems   Constitutional: Positive for malaise/fatigue. Negative for chills.   Eyes: Negative for pain, discharge and redness.   Respiratory: Positive for cough and shortness of breath. Negative for hemoptysis, sputum production and stridor.    Cardiovascular: Negative for palpitations and orthopnea.   Gastrointestinal: Positive for nausea. Abdominal pain:  improved.   Genitourinary: Negative for flank pain and hematuria.   Musculoskeletal: Negative for falls.   Skin: Negative for itching and rash.   Neurological: Negative for dizziness, seizures and headaches.   Endo/Heme/Allergies: Does not bruise/bleed easily.   Psychiatric/Behavioral: The patient is not nervous/anxious.       Physical Exam  Temp:  [36.4 °C (97.5 °F)-37.6 °C (99.7 °F)] 36.6 °C (97.8 °F)  Pulse:  [60-73] 60  Resp:  [16-22] 20  BP: (100-126)/(59-76) 113/69  SpO2:  [88 %-94 %] 89 %    Physical Exam  Vitals signs and nursing note reviewed.   Constitutional:       General: He is not in acute distress.     Appearance: Normal appearance.    HENT:      Head: Normocephalic and atraumatic.      Nose: Nose normal.      Mouth/Throat:      Mouth: Mucous membranes are moist.   Eyes:      Extraocular Movements: Extraocular movements intact.      Pupils: Pupils are equal, round, and reactive to light.   Neck:      Musculoskeletal: Normal range of motion and neck supple.   Cardiovascular:      Rate and Rhythm: Normal rate.   Pulmonary:      Effort: Pulmonary effort is normal.      Breath sounds: Decreased air movement present. Rhonchi present.      Comments: Reduced air entry.  Diffuse rhonchi.  Abdominal:      General: Abdomen is flat. There is no distension.      Tenderness: There is no abdominal tenderness. There is no guarding or rebound.   Musculoskeletal: Normal range of motion.         General: No swelling or deformity.   Skin:     General: Skin is warm and dry.   Neurological:      General: No focal deficit present.      Mental Status: He is alert and oriented to person, place, and time.   Psychiatric:         Mood and Affect: Mood normal.         Behavior: Behavior normal.       Fluids    Intake/Output Summary (Last 24 hours) at 12/25/2020 1506  Last data filed at 12/25/2020 0355  Gross per 24 hour   Intake 700 ml   Output 425 ml   Net 275 ml       Laboratory  Recent Labs     12/23/20  1805 12/24/20  0726 12/25/20  0145   WBC 11.6* 7.3 6.7   RBC 3.94* 3.46* 3.20*   HEMOGLOBIN 12.0* 10.3* 9.6*   HEMATOCRIT 36.8* 31.4* 29.2*   MCV 93.4 90.8 91.3   MCH 30.5 29.8 30.0   MCHC 32.6* 32.8* 32.9*   RDW 48.1 47.2 46.2   PLATELETCT 390 383 404   MPV 9.7 9.7 9.9     Recent Labs     12/23/20  1805 12/24/20  0726 12/25/20  0145   SODIUM 133* 137 134*   POTASSIUM 4.3 4.7 5.0   CHLORIDE 98 103 103   CO2 17* 18* 21   GLUCOSE 105* 227* 330*   BUN 18 19 25*   CREATININE 1.36 1.07 1.03   CALCIUM 9.4 9.0 9.0                   Imaging  DX-CHEST-PORTABLE (1 VIEW)   Final Result      1.  Multifocal bilateral COVID pneumonia.   2.  Borderline cardiomegaly.       CT-ABDOMEN-PELVIS WITH   Final Result      1.  No acute abnormality in the abdomen or pelvis.   2.  Hepatic steatosis.   3.  Mild splenomegaly.   4.  Sequela of COVID pneumonia in the lower lungs.   5.  Tiny hiatal hernia.           Assessment/Plan  Pneumonia due to COVID-19 virus  Assessment & Plan  Patient will be started on Decadron due to hypoxia, 6 L  Procalcitonin is not significantly elevated, will refrain from starting antibiotics  No erythema or edema of both lower extremities at this point. We will start prophylactic pharmacologic agents with Lovenox.    Encourage prone positioning. Oxygen as needed, Incentive spirometry   12/25, remdesivir started for increased oxygen needs    Coronary artery disease involving native coronary artery of native heart without angina pectoris- (present on admission)  Assessment & Plan  Patient denies chest pain.  Troponin is not elevated  Continue outpatient medications    GERD with Nieves's esophagus  Assessment & Plan  Continue PPI    AP (abdominal pain)  Assessment & Plan  CT of the abdomen and pelvis with contrast negative for abdominal surgical abnormalities  Pain probably is muscular and related to cough  Tylenol as needed  12/25 resolved    Chronic anticoagulation- (present on admission)  Assessment & Plan  Continue Xarelto    PAF (paroxysmal atrial fibrillation) (HCC)- (present on admission)  Assessment & Plan  Resume home Xarelto.  Resume home metoprolol with hold parameters.    Hyperglycemia  Assessment & Plan  Expect to worsen with Decadron.  I will check glycated hemoglobin   Long & short acting insulin  Accu-Checks, hypoglycemia protocol      VTE prophylaxis: Xarelto for atrial fibrillation

## 2020-12-25 NOTE — PROGRESS NOTES
Care of pt assumed. Pt AOx4, in no acute distress and denying any pain or discomfort at this time. Current lying in prone position with SpO2 90% on 10L Oxymask. No needs voiced at this time. WCTM.

## 2020-12-25 NOTE — ASSESSMENT & PLAN NOTE
Expect to worsen with Decadron.  Glycated hemoglobin 6.4%.  Long & short acting insulin  Accu-Checks, hypoglycemia protocol

## 2020-12-26 LAB
ALBUMIN SERPL BCP-MCNC: 2.9 G/DL (ref 3.2–4.9)
ALBUMIN/GLOB SERPL: 0.7 G/DL
ALP SERPL-CCNC: 101 U/L (ref 30–99)
ALT SERPL-CCNC: 37 U/L (ref 2–50)
ANION GAP SERPL CALC-SCNC: 12 MMOL/L (ref 7–16)
AST SERPL-CCNC: 24 U/L (ref 12–45)
BACTERIA BLD CULT: ABNORMAL
BACTERIA BLD CULT: ABNORMAL
BACTERIA UR CULT: NORMAL
BASOPHILS # BLD AUTO: 0.3 % (ref 0–1.8)
BASOPHILS # BLD: 0.02 K/UL (ref 0–0.12)
BILIRUB SERPL-MCNC: 0.5 MG/DL (ref 0.1–1.5)
BUN SERPL-MCNC: 24 MG/DL (ref 8–22)
CALCIUM SERPL-MCNC: 9 MG/DL (ref 8.4–10.2)
CHLORIDE SERPL-SCNC: 104 MMOL/L (ref 96–112)
CO2 SERPL-SCNC: 20 MMOL/L (ref 20–33)
CREAT SERPL-MCNC: 0.99 MG/DL (ref 0.5–1.4)
EOSINOPHIL # BLD AUTO: 0 K/UL (ref 0–0.51)
EOSINOPHIL NFR BLD: 0 % (ref 0–6.9)
ERYTHROCYTE [DISTWIDTH] IN BLOOD BY AUTOMATED COUNT: 45.6 FL (ref 35.9–50)
EST. AVERAGE GLUCOSE BLD GHB EST-MCNC: 160 MG/DL
GLOBULIN SER CALC-MCNC: 4 G/DL (ref 1.9–3.5)
GLUCOSE BLD-MCNC: 114 MG/DL (ref 65–99)
GLUCOSE BLD-MCNC: 130 MG/DL (ref 65–99)
GLUCOSE BLD-MCNC: 149 MG/DL (ref 65–99)
GLUCOSE BLD-MCNC: 231 MG/DL (ref 65–99)
GLUCOSE SERPL-MCNC: 156 MG/DL (ref 65–99)
HBA1C MFR BLD: 7.2 % (ref 0–5.6)
HCT VFR BLD AUTO: 31 % (ref 42–52)
HGB BLD-MCNC: 10.1 G/DL (ref 14–18)
IL6 SERPL-MCNC: 7.8 PG/ML
IMM GRANULOCYTES # BLD AUTO: 0.12 K/UL (ref 0–0.11)
IMM GRANULOCYTES NFR BLD AUTO: 1.6 % (ref 0–0.9)
LYMPHOCYTES # BLD AUTO: 0.62 K/UL (ref 1–4.8)
LYMPHOCYTES NFR BLD: 8.2 % (ref 22–41)
MAGNESIUM SERPL-MCNC: 1.8 MG/DL (ref 1.5–2.5)
MCH RBC QN AUTO: 30.1 PG (ref 27–33)
MCHC RBC AUTO-ENTMCNC: 32.6 G/DL (ref 33.7–35.3)
MCV RBC AUTO: 92.5 FL (ref 81.4–97.8)
MONOCYTES # BLD AUTO: 0.22 K/UL (ref 0–0.85)
MONOCYTES NFR BLD AUTO: 2.9 % (ref 0–13.4)
NEUTROPHILS # BLD AUTO: 6.58 K/UL (ref 1.82–7.42)
NEUTROPHILS NFR BLD: 87 % (ref 44–72)
NRBC # BLD AUTO: 0 K/UL
NRBC BLD-RTO: 0 /100 WBC
PLATELET # BLD AUTO: 421 K/UL (ref 164–446)
PMV BLD AUTO: 10.1 FL (ref 9–12.9)
POTASSIUM SERPL-SCNC: 4.9 MMOL/L (ref 3.6–5.5)
PROT SERPL-MCNC: 6.9 G/DL (ref 6–8.2)
RBC # BLD AUTO: 3.35 M/UL (ref 4.7–6.1)
SIGNIFICANT IND 70042: ABNORMAL
SIGNIFICANT IND 70042: NORMAL
SITE SITE: ABNORMAL
SITE SITE: NORMAL
SODIUM SERPL-SCNC: 136 MMOL/L (ref 135–145)
SOURCE SOURCE: ABNORMAL
SOURCE SOURCE: NORMAL
WBC # BLD AUTO: 7.6 K/UL (ref 4.8–10.8)

## 2020-12-26 PROCEDURE — 770021 HCHG ROOM/CARE - ISO PRIVATE

## 2020-12-26 PROCEDURE — A9270 NON-COVERED ITEM OR SERVICE: HCPCS | Performed by: HOSPITALIST

## 2020-12-26 PROCEDURE — 83735 ASSAY OF MAGNESIUM: CPT

## 2020-12-26 PROCEDURE — 80053 COMPREHEN METABOLIC PANEL: CPT

## 2020-12-26 PROCEDURE — 700102 HCHG RX REV CODE 250 W/ 637 OVERRIDE(OP): Performed by: HOSPITALIST

## 2020-12-26 PROCEDURE — 700105 HCHG RX REV CODE 258: Performed by: INTERNAL MEDICINE

## 2020-12-26 PROCEDURE — 700102 HCHG RX REV CODE 250 W/ 637 OVERRIDE(OP): Performed by: INTERNAL MEDICINE

## 2020-12-26 PROCEDURE — 85025 COMPLETE CBC W/AUTO DIFF WBC: CPT

## 2020-12-26 PROCEDURE — 83036 HEMOGLOBIN GLYCOSYLATED A1C: CPT

## 2020-12-26 PROCEDURE — 99232 SBSQ HOSP IP/OBS MODERATE 35: CPT | Performed by: HOSPITALIST

## 2020-12-26 PROCEDURE — 82962 GLUCOSE BLOOD TEST: CPT | Mod: 91

## 2020-12-26 PROCEDURE — A9270 NON-COVERED ITEM OR SERVICE: HCPCS | Performed by: INTERNAL MEDICINE

## 2020-12-26 PROCEDURE — 700101 HCHG RX REV CODE 250: Performed by: INTERNAL MEDICINE

## 2020-12-26 PROCEDURE — 700111 HCHG RX REV CODE 636 W/ 250 OVERRIDE (IP): Performed by: INTERNAL MEDICINE

## 2020-12-26 RX ORDER — GUAIFENESIN/DEXTROMETHORPHAN 100-10MG/5
5 SYRUP ORAL EVERY 6 HOURS PRN
Status: DISCONTINUED | OUTPATIENT
Start: 2020-12-26 | End: 2020-12-31 | Stop reason: HOSPADM

## 2020-12-26 RX ORDER — GUAIFENESIN/DEXTROMETHORPHAN 100-10MG/5
5 SYRUP ORAL EVERY 6 HOURS
Status: COMPLETED | OUTPATIENT
Start: 2020-12-26 | End: 2020-12-28

## 2020-12-26 RX ADMIN — RIVAROXABAN 20 MG: 20 TABLET, FILM COATED ORAL at 06:38

## 2020-12-26 RX ADMIN — ASPIRIN 81 MG: 81 TABLET, COATED ORAL at 06:39

## 2020-12-26 RX ADMIN — GUAIFENESIN AND DEXTROMETHORPHAN 5 ML: 100; 10 SYRUP ORAL at 18:05

## 2020-12-26 RX ADMIN — METOCLOPRAMIDE 10 MG: 10 TABLET ORAL at 14:09

## 2020-12-26 RX ADMIN — OMEPRAZOLE 40 MG: 20 CAPSULE, DELAYED RELEASE ORAL at 06:40

## 2020-12-26 RX ADMIN — METOPROLOL SUCCINATE 25 MG: 25 TABLET, EXTENDED RELEASE ORAL at 06:40

## 2020-12-26 RX ADMIN — METOCLOPRAMIDE 10 MG: 10 TABLET ORAL at 15:59

## 2020-12-26 RX ADMIN — GUAIFENESIN AND DEXTROMETHORPHAN 5 ML: 100; 10 SYRUP ORAL at 14:18

## 2020-12-26 RX ADMIN — DEXAMETHASONE SODIUM PHOSPHATE 6 MG: 4 INJECTION, SOLUTION INTRA-ARTICULAR; INTRALESIONAL; INTRAMUSCULAR; INTRAVENOUS; SOFT TISSUE at 18:02

## 2020-12-26 RX ADMIN — INSULIN GLARGINE 5 UNITS: 100 INJECTION, SOLUTION SUBCUTANEOUS at 18:14

## 2020-12-26 RX ADMIN — STANDARDIZED SENNA CONCENTRATE AND DOCUSATE SODIUM 2 TABLET: 8.6; 5 TABLET, FILM COATED ORAL at 06:39

## 2020-12-26 RX ADMIN — RANOLAZINE 1000 MG: 500 TABLET, FILM COATED, EXTENDED RELEASE ORAL at 21:47

## 2020-12-26 RX ADMIN — METOCLOPRAMIDE 10 MG: 10 TABLET ORAL at 03:00

## 2020-12-26 RX ADMIN — STANDARDIZED SENNA CONCENTRATE AND DOCUSATE SODIUM 2 TABLET: 8.6; 5 TABLET, FILM COATED ORAL at 18:27

## 2020-12-26 RX ADMIN — LISINOPRIL 2.5 MG: 5 TABLET ORAL at 06:39

## 2020-12-26 RX ADMIN — INSULIN HUMAN 2 UNITS: 100 INJECTION, SOLUTION PARENTERAL at 21:47

## 2020-12-26 RX ADMIN — METOCLOPRAMIDE 10 MG: 10 TABLET ORAL at 21:47

## 2020-12-26 RX ADMIN — REMDESIVIR 100 MG: 100 INJECTION, POWDER, LYOPHILIZED, FOR SOLUTION INTRAVENOUS at 18:03

## 2020-12-26 ASSESSMENT — ENCOUNTER SYMPTOMS
FLANK PAIN: 0
ORTHOPNEA: 0
SEIZURES: 0
SPUTUM PRODUCTION: 0
NERVOUS/ANXIOUS: 0
PALPITATIONS: 0
FALLS: 0
EYE DISCHARGE: 0
EYE PAIN: 0
COUGH: 1
HEMOPTYSIS: 0
STRIDOR: 0
HEADACHES: 0
SHORTNESS OF BREATH: 1
EYE REDNESS: 0
CHILLS: 0
BRUISES/BLEEDS EASILY: 0
NAUSEA: 0
DIZZINESS: 0

## 2020-12-26 NOTE — PROGRESS NOTES
Hospital Medicine Daily Progress Note    Date of Service  12/26/2020    Chief Complaint  55 y.o. male admitted 12/23/2020 with generalized weakness and shortness of breath    Hospital Course    55M, PMHx CAD, GERD, P-AFib on A/C.  Admitted 12/23 with shortness of breath and generalized weakness secondary to COVID-19 pneumonia with hypoxia, started on Decadron.    Interval Problem Update  Still requiring oxygen 10-15 L despite Decadron and remdesivir.  Creatinine and ALT within normal limits, continue to monitor  Blood sugars better controlled today 110'-150's, continue current regimen.  Discussed with patient, patient's nurse and with multidisciplinary team during rounds including , pharmacist and charge nurse.      Consultants/Specialty  Discussed with infectious disease      Code Status  Full Code    Disposition  To be determined, anticipate medical clearance 2-4 days  Will likely need oxygen on discharge with remote monitoring.    Review of Systems  Review of Systems   Constitutional: Positive for malaise/fatigue. Negative for chills.   Eyes: Negative for pain, discharge and redness.   Respiratory: Positive for cough and shortness of breath. Negative for hemoptysis, sputum production and stridor.    Cardiovascular: Negative for palpitations and orthopnea.   Gastrointestinal: Negative for nausea (Improving). Abdominal pain:  improved.   Genitourinary: Negative for flank pain and hematuria.   Musculoskeletal: Negative for falls.   Skin: Negative for itching and rash.   Neurological: Negative for dizziness, seizures and headaches.   Endo/Heme/Allergies: Does not bruise/bleed easily.   Psychiatric/Behavioral: The patient is not nervous/anxious.       Physical Exam  Temp:  [36.4 °C (97.6 °F)-37.1 °C (98.8 °F)] 36.4 °C (97.6 °F)  Pulse:  [60-67] 67  Resp:  [18-20] 18  BP: (102-128)/(60-77) 128/76  SpO2:  [88 %-96 %] 95 %    Physical Exam  Vitals signs and nursing note reviewed.   Constitutional:        General: He is not in acute distress.     Appearance: Normal appearance.   HENT:      Head: Normocephalic and atraumatic.      Nose: Nose normal.      Mouth/Throat:      Mouth: Mucous membranes are moist.   Eyes:      Extraocular Movements: Extraocular movements intact.      Pupils: Pupils are equal, round, and reactive to light.   Neck:      Musculoskeletal: Normal range of motion and neck supple.   Cardiovascular:      Rate and Rhythm: Normal rate.   Pulmonary:      Effort: Pulmonary effort is normal.      Breath sounds: Decreased air movement present. Rhonchi present.      Comments: Reduced air entry.  Diffuse rhonchi.  Abdominal:      General: Abdomen is flat. There is no distension.      Tenderness: There is no abdominal tenderness. There is no guarding or rebound.   Musculoskeletal: Normal range of motion.         General: No swelling or deformity.   Skin:     General: Skin is warm and dry.   Neurological:      General: No focal deficit present.      Mental Status: He is alert and oriented to person, place, and time.   Psychiatric:         Mood and Affect: Mood normal.         Behavior: Behavior normal.       Fluids    Intake/Output Summary (Last 24 hours) at 12/26/2020 1549  Last data filed at 12/26/2020 1200  Gross per 24 hour   Intake 600 ml   Output 925 ml   Net -325 ml       Laboratory  Recent Labs     12/24/20  0726 12/25/20  0145 12/26/20  0540   WBC 7.3 6.7 7.6   RBC 3.46* 3.20* 3.35*   HEMOGLOBIN 10.3* 9.6* 10.1*   HEMATOCRIT 31.4* 29.2* 31.0*   MCV 90.8 91.3 92.5   MCH 29.8 30.0 30.1   MCHC 32.8* 32.9* 32.6*   RDW 47.2 46.2 45.6   PLATELETCT 383 404 421   MPV 9.7 9.9 10.1     Recent Labs     12/24/20  0726 12/25/20  0145 12/26/20  0540   SODIUM 137 134* 136   POTASSIUM 4.7 5.0 4.9   CHLORIDE 103 103 104   CO2 18* 21 20   GLUCOSE 227* 330* 156*   BUN 19 25* 24*   CREATININE 1.07 1.03 0.99   CALCIUM 9.0 9.0 9.0                   Imaging  DX-CHEST-PORTABLE (1 VIEW)   Final Result      1.  Multifocal  bilateral COVID pneumonia.   2.  Borderline cardiomegaly.      CT-ABDOMEN-PELVIS WITH   Final Result      1.  No acute abnormality in the abdomen or pelvis.   2.  Hepatic steatosis.   3.  Mild splenomegaly.   4.  Sequela of COVID pneumonia in the lower lungs.   5.  Tiny hiatal hernia.           Assessment/Plan  Pneumonia due to COVID-19 virus  Assessment & Plan  Patient will be started on Decadron due to hypoxia, 6 L  Procalcitonin is not significantly elevated, will refrain from starting antibiotics  No erythema or edema of both lower extremities at this point. We will start prophylactic pharmacologic agents with Lovenox.    Encourage prone positioning. Oxygen as needed, Incentive spirometry   12/25, remdesivir started for increased oxygen needs    Coronary artery disease involving native coronary artery of native heart without angina pectoris- (present on admission)  Assessment & Plan  Patient denies chest pain.  Troponin is not elevated  Continue outpatient medications    GERD with Nieves's esophagus  Assessment & Plan  Continue PPI    AP (abdominal pain)  Assessment & Plan  CT of the abdomen and pelvis with contrast negative for abdominal surgical abnormalities  Pain probably is muscular and related to cough  Tylenol as needed  12/25 resolved    Chronic anticoagulation- (present on admission)  Assessment & Plan  Continue Xarelto    PAF (paroxysmal atrial fibrillation) (HCC)- (present on admission)  Assessment & Plan  Resume home Xarelto.  Resume home metoprolol with hold parameters.     Hyperglycemia  Assessment & Plan  Expect to worsen with Decadron.  Glycated hemoglobin 6.4%.  Long & short acting insulin  Accu-Checks, hypoglycemia protocol      VTE prophylaxis: Xarelto for atrial fibrillation

## 2020-12-26 NOTE — PROGRESS NOTES
RN notified hospitalist Dr Meyers of positive Blood Culture coag neg staph. No new orders at this time.

## 2020-12-26 NOTE — PROGRESS NOTES
Report given to SARAHI Zeng. Dx, medications, O2 needs, and poc reviewed. Pt has no acute needs at this time. Care fully relinquished.

## 2020-12-27 LAB
ALBUMIN SERPL BCP-MCNC: 3.1 G/DL (ref 3.2–4.9)
ALBUMIN/GLOB SERPL: 0.8 G/DL
ALP SERPL-CCNC: 103 U/L (ref 30–99)
ALT SERPL-CCNC: 40 U/L (ref 2–50)
ANION GAP SERPL CALC-SCNC: 11 MMOL/L (ref 7–16)
AST SERPL-CCNC: 24 U/L (ref 12–45)
BASOPHILS # BLD AUTO: 0.2 % (ref 0–1.8)
BASOPHILS # BLD: 0.01 K/UL (ref 0–0.12)
BILIRUB SERPL-MCNC: 0.6 MG/DL (ref 0.1–1.5)
BUN SERPL-MCNC: 20 MG/DL (ref 8–22)
CALCIUM SERPL-MCNC: 8.8 MG/DL (ref 8.4–10.2)
CHLORIDE SERPL-SCNC: 102 MMOL/L (ref 96–112)
CO2 SERPL-SCNC: 22 MMOL/L (ref 20–33)
CREAT SERPL-MCNC: 0.9 MG/DL (ref 0.5–1.4)
EOSINOPHIL # BLD AUTO: 0 K/UL (ref 0–0.51)
EOSINOPHIL NFR BLD: 0 % (ref 0–6.9)
ERYTHROCYTE [DISTWIDTH] IN BLOOD BY AUTOMATED COUNT: 44.6 FL (ref 35.9–50)
GLOBULIN SER CALC-MCNC: 3.8 G/DL (ref 1.9–3.5)
GLUCOSE BLD-MCNC: 124 MG/DL (ref 65–99)
GLUCOSE BLD-MCNC: 180 MG/DL (ref 65–99)
GLUCOSE BLD-MCNC: 201 MG/DL (ref 65–99)
GLUCOSE SERPL-MCNC: 198 MG/DL (ref 65–99)
HCT VFR BLD AUTO: 31.4 % (ref 42–52)
HGB BLD-MCNC: 10.4 G/DL (ref 14–18)
IMM GRANULOCYTES # BLD AUTO: 0.1 K/UL (ref 0–0.11)
IMM GRANULOCYTES NFR BLD AUTO: 1.8 % (ref 0–0.9)
LYMPHOCYTES # BLD AUTO: 0.41 K/UL (ref 1–4.8)
LYMPHOCYTES NFR BLD: 7.6 % (ref 22–41)
MCH RBC QN AUTO: 29.8 PG (ref 27–33)
MCHC RBC AUTO-ENTMCNC: 33.1 G/DL (ref 33.7–35.3)
MCV RBC AUTO: 90 FL (ref 81.4–97.8)
MONOCYTES # BLD AUTO: 0.2 K/UL (ref 0–0.85)
MONOCYTES NFR BLD AUTO: 3.7 % (ref 0–13.4)
NEUTROPHILS # BLD AUTO: 4.7 K/UL (ref 1.82–7.42)
NEUTROPHILS NFR BLD: 86.7 % (ref 44–72)
NRBC # BLD AUTO: 0.03 K/UL
NRBC BLD-RTO: 0.6 /100 WBC
PLATELET # BLD AUTO: 476 K/UL (ref 164–446)
PMV BLD AUTO: 9.8 FL (ref 9–12.9)
POTASSIUM SERPL-SCNC: 4.9 MMOL/L (ref 3.6–5.5)
PROT SERPL-MCNC: 6.9 G/DL (ref 6–8.2)
RBC # BLD AUTO: 3.49 M/UL (ref 4.7–6.1)
SODIUM SERPL-SCNC: 135 MMOL/L (ref 135–145)
WBC # BLD AUTO: 5.4 K/UL (ref 4.8–10.8)

## 2020-12-27 PROCEDURE — 80053 COMPREHEN METABOLIC PANEL: CPT

## 2020-12-27 PROCEDURE — 700101 HCHG RX REV CODE 250: Performed by: INTERNAL MEDICINE

## 2020-12-27 PROCEDURE — 770021 HCHG ROOM/CARE - ISO PRIVATE

## 2020-12-27 PROCEDURE — 99232 SBSQ HOSP IP/OBS MODERATE 35: CPT | Performed by: HOSPITALIST

## 2020-12-27 PROCEDURE — 700102 HCHG RX REV CODE 250 W/ 637 OVERRIDE(OP): Performed by: HOSPITALIST

## 2020-12-27 PROCEDURE — 700105 HCHG RX REV CODE 258: Performed by: INTERNAL MEDICINE

## 2020-12-27 PROCEDURE — 85025 COMPLETE CBC W/AUTO DIFF WBC: CPT

## 2020-12-27 PROCEDURE — A9270 NON-COVERED ITEM OR SERVICE: HCPCS | Performed by: INTERNAL MEDICINE

## 2020-12-27 PROCEDURE — 700111 HCHG RX REV CODE 636 W/ 250 OVERRIDE (IP): Performed by: INTERNAL MEDICINE

## 2020-12-27 PROCEDURE — A9270 NON-COVERED ITEM OR SERVICE: HCPCS | Performed by: HOSPITALIST

## 2020-12-27 PROCEDURE — 82962 GLUCOSE BLOOD TEST: CPT

## 2020-12-27 PROCEDURE — 700102 HCHG RX REV CODE 250 W/ 637 OVERRIDE(OP): Performed by: INTERNAL MEDICINE

## 2020-12-27 RX ADMIN — GUAIFENESIN AND DEXTROMETHORPHAN 5 ML: 100; 10 SYRUP ORAL at 12:11

## 2020-12-27 RX ADMIN — REMDESIVIR 100 MG: 100 INJECTION, POWDER, LYOPHILIZED, FOR SOLUTION INTRAVENOUS at 17:57

## 2020-12-27 RX ADMIN — GUAIFENESIN AND DEXTROMETHORPHAN 5 ML: 100; 10 SYRUP ORAL at 17:57

## 2020-12-27 RX ADMIN — GUAIFENESIN AND DEXTROMETHORPHAN 5 ML: 100; 10 SYRUP ORAL at 23:44

## 2020-12-27 RX ADMIN — LISINOPRIL 2.5 MG: 5 TABLET ORAL at 05:50

## 2020-12-27 RX ADMIN — GUAIFENESIN AND DEXTROMETHORPHAN 5 ML: 100; 10 SYRUP ORAL at 05:52

## 2020-12-27 RX ADMIN — INSULIN GLARGINE 5 UNITS: 100 INJECTION, SOLUTION SUBCUTANEOUS at 17:56

## 2020-12-27 RX ADMIN — INSULIN HUMAN 2 UNITS: 100 INJECTION, SOLUTION PARENTERAL at 21:20

## 2020-12-27 RX ADMIN — RANOLAZINE 1000 MG: 500 TABLET, FILM COATED, EXTENDED RELEASE ORAL at 21:18

## 2020-12-27 RX ADMIN — RIVAROXABAN 20 MG: 20 TABLET, FILM COATED ORAL at 05:50

## 2020-12-27 RX ADMIN — ASPIRIN 81 MG: 81 TABLET, COATED ORAL at 05:50

## 2020-12-27 RX ADMIN — DEXAMETHASONE SODIUM PHOSPHATE 6 MG: 4 INJECTION, SOLUTION INTRA-ARTICULAR; INTRALESIONAL; INTRAMUSCULAR; INTRAVENOUS; SOFT TISSUE at 17:58

## 2020-12-27 RX ADMIN — OMEPRAZOLE 40 MG: 20 CAPSULE, DELAYED RELEASE ORAL at 05:51

## 2020-12-27 RX ADMIN — METOPROLOL SUCCINATE 25 MG: 25 TABLET, EXTENDED RELEASE ORAL at 05:51

## 2020-12-27 RX ADMIN — GUAIFENESIN AND DEXTROMETHORPHAN 5 ML: 100; 10 SYRUP ORAL at 00:51

## 2020-12-27 ASSESSMENT — ENCOUNTER SYMPTOMS
EYE PAIN: 0
HEADACHES: 0
NAUSEA: 0
ORTHOPNEA: 0
BRUISES/BLEEDS EASILY: 0
SHORTNESS OF BREATH: 1
DIZZINESS: 0
FALLS: 0
SPUTUM PRODUCTION: 0
CHILLS: 0
FLANK PAIN: 0
NERVOUS/ANXIOUS: 0
EYE DISCHARGE: 0
SEIZURES: 0
STRIDOR: 0
PALPITATIONS: 0
COUGH: 1
EYE REDNESS: 0
HEMOPTYSIS: 0

## 2020-12-27 NOTE — PROGRESS NOTES
[facility] Medicine Daily Progress Note    Date of Service  12/27/2020    Chief Complaint  55 y.o. male admitted 12/23/2020 with generalized weakness and shortness of breath    Hospital Course    55M, PMHx CAD, GERD, P-AFib on A/C.  Admitted 12/23 with shortness of breath and generalized weakness secondary to COVID-19 pneumonia with hypoxia, started on Decadron.    Interval Problem Update  Hemodynamically stable overnight   Still requiring oxygen 15 L despite Decadron and remdesivir. Encourage prone positioning & Incentive spirometry   Follow up Creatinine and ALT within normal limits, continue to monitor   No fevers or chills.   Discussed with patient, patient's nurse and with multidisciplinary team during rounds including , pharmacist and charge nurse.      Consultants/Specialty  Discussed with infectious disease      Code Status  Full Code    Disposition  To be determined, anticipate medical clearance 2-4 days   Will likely need oxygen on discharge with remote monitoring.     Review of Systems  Review of Systems   Constitutional: Positive for malaise/fatigue. Negative for chills.   Eyes: Negative for pain, discharge and redness.   Respiratory: Positive for cough and shortness of breath (same ). Negative for hemoptysis, sputum production and stridor.    Cardiovascular: Negative for palpitations and orthopnea.   Gastrointestinal: Negative for nausea (Improving). Abdominal pain:  improved.   Genitourinary: Negative for flank pain and hematuria.   Musculoskeletal: Negative for falls.   Skin: Negative for itching and rash.   Neurological: Negative for dizziness, seizures and headaches.   Endo/Heme/Allergies: Does not bruise/bleed easily.   Psychiatric/Behavioral: The patient is not nervous/anxious.       Physical Exam  Temp:  [36.1 °C (97 °F)-37.6 °C (99.7 °F)] 36.1 °C (97 °F)  Pulse:  [62-75] 66  Resp:  [16-18] 16  BP: (100-129)/(65-79) 126/79  SpO2:  [90 %-98 %] 94 %    Physical Exam  Vitals signs and  nursing note reviewed.   Constitutional:       General: He is not in acute distress.     Appearance: Normal appearance.   HENT:      Head: Normocephalic and atraumatic.      Nose: Nose normal.      Mouth/Throat:      Mouth: Mucous membranes are moist.   Eyes:      Extraocular Movements: Extraocular movements intact.      Pupils: Pupils are equal, round, and reactive to light.   Neck:      Musculoskeletal: Normal range of motion and neck supple.   Cardiovascular:      Rate and Rhythm: Normal rate.   Pulmonary:      Effort: Pulmonary effort is normal.      Breath sounds: Decreased air movement present. Rhonchi present.      Comments: Reduced air entry.  Diffuse rhonchi.  Abdominal:      General: Abdomen is flat. There is no distension.      Tenderness: There is no abdominal tenderness. There is no guarding or rebound.   Musculoskeletal: Normal range of motion.         General: No swelling or deformity.   Skin:     General: Skin is warm and dry.   Neurological:      General: No focal deficit present.      Mental Status: He is alert and oriented to person, place, and time.   Psychiatric:         Mood and Affect: Mood normal.         Behavior: Behavior normal.       Fluids    Intake/Output Summary (Last 24 hours) at 12/27/2020 1503  Last data filed at 12/27/2020 1200  Gross per 24 hour   Intake 480 ml   Output 400 ml   Net 80 ml       Laboratory  Recent Labs     12/25/20  0145 12/26/20  0540 12/27/20  0418   WBC 6.7 7.6 5.4   RBC 3.20* 3.35* 3.49*   HEMOGLOBIN 9.6* 10.1* 10.4*   HEMATOCRIT 29.2* 31.0* 31.4*   MCV 91.3 92.5 90.0   MCH 30.0 30.1 29.8   MCHC 32.9* 32.6* 33.1*   RDW 46.2 45.6 44.6   PLATELETCT 404 421 476*   MPV 9.9 10.1 9.8     Recent Labs     12/25/20  0145 12/26/20  0540 12/27/20  0418   SODIUM 134* 136 135   POTASSIUM 5.0 4.9 4.9   CHLORIDE 103 104 102   CO2 21 20 22   GLUCOSE 330* 156* 198*   BUN 25* 24* 20   CREATININE 1.03 0.99 0.90   CALCIUM 9.0 9.0 8.8                   Imaging  DX-CHEST-PORTABLE (1  VIEW)   Final Result      1.  Multifocal bilateral COVID pneumonia.   2.  Borderline cardiomegaly.      CT-ABDOMEN-PELVIS WITH   Final Result      1.  No acute abnormality in the abdomen or pelvis.   2.  Hepatic steatosis.   3.  Mild splenomegaly.   4.  Sequela of COVID pneumonia in the lower lungs.   5.  Tiny hiatal hernia.         Assessment/Plan  Pneumonia due to COVID-19 virus  Assessment & Plan  Patient will be started on Decadron due to hypoxia, 6 L  Procalcitonin is not significantly elevated, will refrain from starting antibiotics  No erythema or edema of both lower extremities at this point. We will start prophylactic pharmacologic agents with Lovenox.    Encourage prone positioning. Oxygen as needed, Incentive spirometry   12/25, remdesivir started for increased oxygen needs  12/26-27, still on 10-15 L Encourage prone positioning & Incentive spirometry      Coronary artery disease involving native coronary artery of native heart without angina pectoris- (present on admission)  Assessment & Plan  Patient denies chest pain.  Troponin is not elevated  Continue outpatient medications    GERD with Nieves's esophagus  Assessment & Plan  Continue PPI    AP (abdominal pain)  Assessment & Plan  CT of the abdomen and pelvis with contrast negative for abdominal surgical abnormalities  Pain probably is muscular and related to cough  Tylenol as needed  12/25 resolved    Chronic anticoagulation- (present on admission)  Assessment & Plan  Continue Xarelto    PAF (paroxysmal atrial fibrillation) (HCC)- (present on admission)  Assessment & Plan  Resume home Xarelto.  Resume home metoprolol with hold parameters.      Hyperglycemia  Assessment & Plan  Expect to worsen with Decadron.  Glycated hemoglobin 6.4%.  Long & short acting insulin  Accu-Checks, hypoglycemia protocol      VTE prophylaxis: Xarelto for atrial fibrillation

## 2020-12-28 ENCOUNTER — PATIENT OUTREACH (OUTPATIENT)
Dept: HEALTH INFORMATION MANAGEMENT | Facility: OTHER | Age: 55
End: 2020-12-28

## 2020-12-28 LAB
ALBUMIN SERPL BCP-MCNC: 3 G/DL (ref 3.2–4.9)
ALBUMIN/GLOB SERPL: 0.8 G/DL
ALP SERPL-CCNC: 92 U/L (ref 30–99)
ALT SERPL-CCNC: 33 U/L (ref 2–50)
ANION GAP SERPL CALC-SCNC: 12 MMOL/L (ref 7–16)
AST SERPL-CCNC: 16 U/L (ref 12–45)
BACTERIA BLD CULT: NORMAL
BASOPHILS # BLD AUTO: 0.1 % (ref 0–1.8)
BASOPHILS # BLD: 0.01 K/UL (ref 0–0.12)
BILIRUB SERPL-MCNC: 0.5 MG/DL (ref 0.1–1.5)
BUN SERPL-MCNC: 21 MG/DL (ref 8–22)
CALCIUM SERPL-MCNC: 8.8 MG/DL (ref 8.4–10.2)
CHLORIDE SERPL-SCNC: 102 MMOL/L (ref 96–112)
CO2 SERPL-SCNC: 20 MMOL/L (ref 20–33)
CREAT SERPL-MCNC: 0.87 MG/DL (ref 0.5–1.4)
EOSINOPHIL # BLD AUTO: 0.01 K/UL (ref 0–0.51)
EOSINOPHIL NFR BLD: 0.1 % (ref 0–6.9)
ERYTHROCYTE [DISTWIDTH] IN BLOOD BY AUTOMATED COUNT: 44.7 FL (ref 35.9–50)
GLOBULIN SER CALC-MCNC: 3.9 G/DL (ref 1.9–3.5)
GLUCOSE BLD-MCNC: 171 MG/DL (ref 65–99)
GLUCOSE BLD-MCNC: 183 MG/DL (ref 65–99)
GLUCOSE BLD-MCNC: 205 MG/DL (ref 65–99)
GLUCOSE BLD-MCNC: 224 MG/DL (ref 65–99)
GLUCOSE BLD-MCNC: 247 MG/DL (ref 65–99)
GLUCOSE SERPL-MCNC: 225 MG/DL (ref 65–99)
HCT VFR BLD AUTO: 33.2 % (ref 42–52)
HGB BLD-MCNC: 10.9 G/DL (ref 14–18)
IMM GRANULOCYTES # BLD AUTO: 0.18 K/UL (ref 0–0.11)
IMM GRANULOCYTES NFR BLD AUTO: 2.5 % (ref 0–0.9)
LYMPHOCYTES # BLD AUTO: 0.49 K/UL (ref 1–4.8)
LYMPHOCYTES NFR BLD: 6.8 % (ref 22–41)
MCH RBC QN AUTO: 30.4 PG (ref 27–33)
MCHC RBC AUTO-ENTMCNC: 32.8 G/DL (ref 33.7–35.3)
MCV RBC AUTO: 92.5 FL (ref 81.4–97.8)
MONOCYTES # BLD AUTO: 0.24 K/UL (ref 0–0.85)
MONOCYTES NFR BLD AUTO: 3.4 % (ref 0–13.4)
NEUTROPHILS # BLD AUTO: 6.23 K/UL (ref 1.82–7.42)
NEUTROPHILS NFR BLD: 87.1 % (ref 44–72)
NRBC # BLD AUTO: 0 K/UL
NRBC BLD-RTO: 0 /100 WBC
PLATELET # BLD AUTO: 502 K/UL (ref 164–446)
PMV BLD AUTO: 9.7 FL (ref 9–12.9)
POTASSIUM SERPL-SCNC: 5 MMOL/L (ref 3.6–5.5)
PROT SERPL-MCNC: 6.9 G/DL (ref 6–8.2)
RBC # BLD AUTO: 3.59 M/UL (ref 4.7–6.1)
SIGNIFICANT IND 70042: NORMAL
SITE SITE: NORMAL
SODIUM SERPL-SCNC: 134 MMOL/L (ref 135–145)
SOURCE SOURCE: NORMAL
WBC # BLD AUTO: 7.2 K/UL (ref 4.8–10.8)

## 2020-12-28 PROCEDURE — 700105 HCHG RX REV CODE 258: Performed by: INTERNAL MEDICINE

## 2020-12-28 PROCEDURE — 700102 HCHG RX REV CODE 250 W/ 637 OVERRIDE(OP): Performed by: INTERNAL MEDICINE

## 2020-12-28 PROCEDURE — 700102 HCHG RX REV CODE 250 W/ 637 OVERRIDE(OP): Performed by: HOSPITALIST

## 2020-12-28 PROCEDURE — 82962 GLUCOSE BLOOD TEST: CPT

## 2020-12-28 PROCEDURE — 770021 HCHG ROOM/CARE - ISO PRIVATE

## 2020-12-28 PROCEDURE — 99232 SBSQ HOSP IP/OBS MODERATE 35: CPT | Performed by: HOSPITALIST

## 2020-12-28 PROCEDURE — A9270 NON-COVERED ITEM OR SERVICE: HCPCS | Performed by: INTERNAL MEDICINE

## 2020-12-28 PROCEDURE — 700101 HCHG RX REV CODE 250: Performed by: INTERNAL MEDICINE

## 2020-12-28 PROCEDURE — 80053 COMPREHEN METABOLIC PANEL: CPT

## 2020-12-28 PROCEDURE — 700111 HCHG RX REV CODE 636 W/ 250 OVERRIDE (IP): Performed by: INTERNAL MEDICINE

## 2020-12-28 PROCEDURE — A9270 NON-COVERED ITEM OR SERVICE: HCPCS | Performed by: HOSPITALIST

## 2020-12-28 PROCEDURE — 85025 COMPLETE CBC W/AUTO DIFF WBC: CPT

## 2020-12-28 RX ADMIN — INSULIN HUMAN 2 UNITS: 100 INJECTION, SOLUTION PARENTERAL at 19:14

## 2020-12-28 RX ADMIN — INSULIN HUMAN 1 UNITS: 100 INJECTION, SOLUTION PARENTERAL at 06:03

## 2020-12-28 RX ADMIN — INSULIN HUMAN 1 UNITS: 100 INJECTION, SOLUTION PARENTERAL at 13:14

## 2020-12-28 RX ADMIN — REMDESIVIR 100 MG: 100 INJECTION, POWDER, LYOPHILIZED, FOR SOLUTION INTRAVENOUS at 17:00

## 2020-12-28 RX ADMIN — RANOLAZINE 1000 MG: 500 TABLET, FILM COATED, EXTENDED RELEASE ORAL at 21:08

## 2020-12-28 RX ADMIN — RIVAROXABAN 20 MG: 20 TABLET, FILM COATED ORAL at 05:57

## 2020-12-28 RX ADMIN — LISINOPRIL 2.5 MG: 5 TABLET ORAL at 05:57

## 2020-12-28 RX ADMIN — DEXAMETHASONE SODIUM PHOSPHATE 6 MG: 4 INJECTION, SOLUTION INTRA-ARTICULAR; INTRALESIONAL; INTRAMUSCULAR; INTRAVENOUS; SOFT TISSUE at 18:00

## 2020-12-28 RX ADMIN — METOPROLOL SUCCINATE 25 MG: 25 TABLET, EXTENDED RELEASE ORAL at 05:57

## 2020-12-28 RX ADMIN — ASPIRIN 81 MG: 81 TABLET, COATED ORAL at 05:57

## 2020-12-28 RX ADMIN — OMEPRAZOLE 40 MG: 20 CAPSULE, DELAYED RELEASE ORAL at 05:57

## 2020-12-28 RX ADMIN — GUAIFENESIN AND DEXTROMETHORPHAN 5 ML: 100; 10 SYRUP ORAL at 05:57

## 2020-12-28 RX ADMIN — INSULIN GLARGINE 5 UNITS: 100 INJECTION, SOLUTION SUBCUTANEOUS at 19:14

## 2020-12-28 RX ADMIN — INSULIN HUMAN 2 UNITS: 100 INJECTION, SOLUTION PARENTERAL at 21:08

## 2020-12-28 RX ADMIN — STANDARDIZED SENNA CONCENTRATE AND DOCUSATE SODIUM 2 TABLET: 8.6; 5 TABLET, FILM COATED ORAL at 18:00

## 2020-12-28 ASSESSMENT — ENCOUNTER SYMPTOMS
EYE DISCHARGE: 0
BRUISES/BLEEDS EASILY: 0
SPUTUM PRODUCTION: 0
ORTHOPNEA: 0
NERVOUS/ANXIOUS: 0
SHORTNESS OF BREATH: 1
EYE PAIN: 0
COUGH: 1
CHILLS: 0
EYE REDNESS: 0
STRIDOR: 0
FLANK PAIN: 0
NAUSEA: 0
HEADACHES: 0
PALPITATIONS: 0
DIZZINESS: 0
FALLS: 0
SEIZURES: 0
HEMOPTYSIS: 0

## 2020-12-28 NOTE — PROGRESS NOTES
[facility] Medicine Daily Progress Note    Date of Service  12/28/2020    Chief Complaint  55 y.o. male admitted 12/23/2020 with generalized weakness and shortness of breath    Hospital Course    55M, PMHx CAD, GERD, P-AFib on A/C.  Admitted 12/23 with shortness of breath and generalized weakness secondary to COVID-19 pneumonia with hypoxia, started on Decadron.    Interval Problem Update  Heart rate 60s, saturating well on 15 L, encourage incentive spirometer, try to wean off as tolerated.  Decadron and remdesivir. Encourage prone positioning & Incentive spirometry   Follow up Creatinine and ALT within normal limits, continue to monitor   Discussed with patient, patient's nurse and with multidisciplinary team during rounds including , pharmacist and charge nurse.       Consultants/Specialty  Discussed with infectious disease      Code Status  Full Code    Disposition  To be determined, anticipate medical clearance 2-3 days   Will likely need oxygen on discharge with remote monitoring.      Review of Systems  Review of Systems   Constitutional: Positive for malaise/fatigue (same ). Negative for chills.   Eyes: Negative for pain, discharge and redness.   Respiratory: Positive for cough and shortness of breath (Unchanged). Negative for hemoptysis, sputum production and stridor.    Cardiovascular: Negative for palpitations and orthopnea.   Gastrointestinal: Negative for nausea (Improving). Abdominal pain:  improved.   Genitourinary: Negative for flank pain and hematuria.   Musculoskeletal: Negative for falls.   Skin: Negative for itching and rash.   Neurological: Negative for dizziness, seizures and headaches.   Endo/Heme/Allergies: Does not bruise/bleed easily.   Psychiatric/Behavioral: The patient is not nervous/anxious.       Physical Exam  Temp:  [36.6 °C (97.9 °F)-36.7 °C (98 °F)] 36.6 °C (97.9 °F)  Pulse:  [62-79] 67  Resp:  [17-18] 17  BP: ()/(58-76) 110/76  SpO2:  [90 %-94 %] 94 %    Physical  Exam  Vitals signs and nursing note reviewed.   Constitutional:       General: He is not in acute distress.     Appearance: Normal appearance.   HENT:      Head: Normocephalic and atraumatic.      Nose: Nose normal.      Mouth/Throat:      Mouth: Mucous membranes are moist.   Eyes:      Extraocular Movements: Extraocular movements intact.      Pupils: Pupils are equal, round, and reactive to light.   Neck:      Musculoskeletal: Normal range of motion and neck supple.   Cardiovascular:      Rate and Rhythm: Normal rate.   Pulmonary:      Effort: Pulmonary effort is normal.      Breath sounds: Decreased air movement present. Rhonchi present.      Comments: Reduced air entry.  Diffuse rhonchi.  Abdominal:      General: Abdomen is flat. There is no distension.      Tenderness: There is no abdominal tenderness. There is no guarding or rebound.   Musculoskeletal: Normal range of motion.         General: No swelling or deformity.   Skin:     General: Skin is warm and dry.   Neurological:      General: No focal deficit present.      Mental Status: He is alert and oriented to person, place, and time.   Psychiatric:         Mood and Affect: Mood normal.         Behavior: Behavior normal.       Fluids  No intake or output data in the 24 hours ending 12/28/20 1602    Laboratory  Recent Labs     12/26/20  0540 12/27/20  0418 12/28/20  0407   WBC 7.6 5.4 7.2   RBC 3.35* 3.49* 3.59*   HEMOGLOBIN 10.1* 10.4* 10.9*   HEMATOCRIT 31.0* 31.4* 33.2*   MCV 92.5 90.0 92.5   MCH 30.1 29.8 30.4   MCHC 32.6* 33.1* 32.8*   RDW 45.6 44.6 44.7   PLATELETCT 421 476* 502*   MPV 10.1 9.8 9.7     Recent Labs     12/26/20  0540 12/27/20  0418 12/28/20  0407   SODIUM 136 135 134*   POTASSIUM 4.9 4.9 5.0   CHLORIDE 104 102 102   CO2 20 22 20   GLUCOSE 156* 198* 225*   BUN 24* 20 21   CREATININE 0.99 0.90 0.87   CALCIUM 9.0 8.8 8.8                   Imaging  DX-CHEST-PORTABLE (1 VIEW)   Final Result      1.  Multifocal bilateral COVID pneumonia.   2.   Borderline cardiomegaly.      CT-ABDOMEN-PELVIS WITH   Final Result      1.  No acute abnormality in the abdomen or pelvis.   2.  Hepatic steatosis.   3.  Mild splenomegaly.   4.  Sequela of COVID pneumonia in the lower lungs.   5.  Tiny hiatal hernia.         Assessment/Plan  Pneumonia due to COVID-19 virus  Assessment & Plan  Patient will be started on Decadron due to hypoxia, 6 L  Procalcitonin is not significantly elevated, will refrain from starting antibiotics  No erythema or edema of both lower extremities at this point. We will start prophylactic pharmacologic agents with Lovenox.    Encourage prone positioning. Oxygen as needed, Incentive spirometry   12/25, remdesivir started for increased oxygen needs  12/26-28, still on 10-15 L Encourage prone positioning & Incentive spirometry        Coronary artery disease involving native coronary artery of native heart without angina pectoris- (present on admission)  Assessment & Plan  Patient denies chest pain.  Troponin is not elevated  Continue outpatient medications    GERD with Nieves's esophagus  Assessment & Plan  Continue PPI    AP (abdominal pain)  Assessment & Plan  CT of the abdomen and pelvis with contrast negative for abdominal surgical abnormalities  Pain probably is muscular and related to cough  Tylenol as needed  12/25 resolved     Chronic anticoagulation- (present on admission)  Assessment & Plan  Continue Xarelto    PAF (paroxysmal atrial fibrillation) (HCC)- (present on admission)  Assessment & Plan  Resume home Xarelto.  Resume home metoprolol with hold parameters.  Rate been controlled     Hyperglycemia  Assessment & Plan  Expect to worsen with Decadron.  Glycated hemoglobin 6.4%.  Long & short acting insulin  Accu-Checks, hypoglycemia protocol      VTE prophylaxis: Xarelto for atrial fibrillation

## 2020-12-28 NOTE — PROGRESS NOTES
Report received from Anuja MCCLAIN. Pt asleep at the time of report. Plan of care assumed. Safety precautions in place and call light within reach.

## 2020-12-28 NOTE — DISCHARGE PLANNING
No CM needs at this time. CM team following for d/c planning needs.     Care Transition Team Assessment    Information Source  Orientation : Oriented x 4  Who is responsible for making decisions for patient? : Patient    Readmission Evaluation  Is this a readmission?: No    Elopement Risk  Legal Hold: No  Ambulatory or Self Mobile in Wheelchair: No-Not an Elopement Risk    Interdisciplinary Discharge Planning  Patient or legal guardian wants to designate a caregiver: No    Discharge Preparedness  What is your plan after discharge?: Uncertain - pending medical team collaboration  What are your discharge supports?: Partner  Prior Functional Level: Ambulatory, Independent with Activities of Daily Living    Functional Assesment  Prior Functional Level: Ambulatory, Independent with Activities of Daily Living    Finances  Financial Barriers to Discharge: No  Prescription Coverage: Yes              Advance Directive  Advance Directive?: None    Domestic Abuse  Have you ever been the victim of abuse or violence?: No  Physical Abuse or Sexual Abuse: No  Verbal Abuse or Emotional Abuse: No  Possible Abuse/Neglect Reported to:: Not Applicable    Psychological Assessment  History of Substance Abuse: None  History of Psychiatric Problems: No  Non-compliant with Treatment: No  Newly Diagnosed Illness: Yes    Discharge Risks or Barriers  Discharge risks or barriers?: No PCP, Complex medical needs  Patient risk factors: Cognitive / sensory / physical deficit, Complex medical needs, No PCP, Vulnerable adult    Anticipated Discharge Information  Discharge Disposition: Admit to IP to this hosp ([phone])

## 2020-12-29 LAB
ALBUMIN SERPL BCP-MCNC: 2.8 G/DL (ref 3.2–4.9)
ALBUMIN/GLOB SERPL: 0.7 G/DL
ALP SERPL-CCNC: 97 U/L (ref 30–99)
ALT SERPL-CCNC: 34 U/L (ref 2–50)
ANION GAP SERPL CALC-SCNC: 15 MMOL/L (ref 7–16)
AST SERPL-CCNC: 26 U/L (ref 12–45)
BASOPHILS # BLD AUTO: 0.4 % (ref 0–1.8)
BASOPHILS # BLD: 0.04 K/UL (ref 0–0.12)
BILIRUB SERPL-MCNC: 0.6 MG/DL (ref 0.1–1.5)
BUN SERPL-MCNC: 20 MG/DL (ref 8–22)
CALCIUM SERPL-MCNC: 8.9 MG/DL (ref 8.4–10.2)
CHLORIDE SERPL-SCNC: 99 MMOL/L (ref 96–112)
CO2 SERPL-SCNC: 18 MMOL/L (ref 20–33)
CREAT SERPL-MCNC: 0.81 MG/DL (ref 0.5–1.4)
EOSINOPHIL # BLD AUTO: 0 K/UL (ref 0–0.51)
EOSINOPHIL NFR BLD: 0 % (ref 0–6.9)
ERYTHROCYTE [DISTWIDTH] IN BLOOD BY AUTOMATED COUNT: 45.4 FL (ref 35.9–50)
GLOBULIN SER CALC-MCNC: 4.2 G/DL (ref 1.9–3.5)
GLUCOSE BLD-MCNC: 116 MG/DL (ref 65–99)
GLUCOSE BLD-MCNC: 130 MG/DL (ref 65–99)
GLUCOSE BLD-MCNC: 183 MG/DL (ref 65–99)
GLUCOSE BLD-MCNC: 212 MG/DL (ref 65–99)
GLUCOSE BLD-MCNC: 250 MG/DL (ref 65–99)
GLUCOSE SERPL-MCNC: 193 MG/DL (ref 65–99)
HCT VFR BLD AUTO: 36.1 % (ref 42–52)
HGB BLD-MCNC: 11.6 G/DL (ref 14–18)
IMM GRANULOCYTES # BLD AUTO: 0.37 K/UL (ref 0–0.11)
IMM GRANULOCYTES NFR BLD AUTO: 3.9 % (ref 0–0.9)
LYMPHOCYTES # BLD AUTO: 0.53 K/UL (ref 1–4.8)
LYMPHOCYTES NFR BLD: 5.5 % (ref 22–41)
MCH RBC QN AUTO: 30.1 PG (ref 27–33)
MCHC RBC AUTO-ENTMCNC: 32.1 G/DL (ref 33.7–35.3)
MCV RBC AUTO: 93.8 FL (ref 81.4–97.8)
MONOCYTES # BLD AUTO: 0.31 K/UL (ref 0–0.85)
MONOCYTES NFR BLD AUTO: 3.2 % (ref 0–13.4)
NEUTROPHILS # BLD AUTO: 8.32 K/UL (ref 1.82–7.42)
NEUTROPHILS NFR BLD: 87 % (ref 44–72)
NRBC # BLD AUTO: 0 K/UL
NRBC BLD-RTO: 0 /100 WBC
PLATELET # BLD AUTO: 569 K/UL (ref 164–446)
PMV BLD AUTO: 10.1 FL (ref 9–12.9)
POTASSIUM SERPL-SCNC: 5.5 MMOL/L (ref 3.6–5.5)
PROT SERPL-MCNC: 7 G/DL (ref 6–8.2)
RBC # BLD AUTO: 3.85 M/UL (ref 4.7–6.1)
SODIUM SERPL-SCNC: 132 MMOL/L (ref 135–145)
WBC # BLD AUTO: 9.6 K/UL (ref 4.8–10.8)

## 2020-12-29 PROCEDURE — A9270 NON-COVERED ITEM OR SERVICE: HCPCS | Performed by: INTERNAL MEDICINE

## 2020-12-29 PROCEDURE — 700105 HCHG RX REV CODE 258: Performed by: STUDENT IN AN ORGANIZED HEALTH CARE EDUCATION/TRAINING PROGRAM

## 2020-12-29 PROCEDURE — 80053 COMPREHEN METABOLIC PANEL: CPT

## 2020-12-29 PROCEDURE — 700102 HCHG RX REV CODE 250 W/ 637 OVERRIDE(OP): Performed by: STUDENT IN AN ORGANIZED HEALTH CARE EDUCATION/TRAINING PROGRAM

## 2020-12-29 PROCEDURE — 99232 SBSQ HOSP IP/OBS MODERATE 35: CPT | Performed by: STUDENT IN AN ORGANIZED HEALTH CARE EDUCATION/TRAINING PROGRAM

## 2020-12-29 PROCEDURE — 700105 HCHG RX REV CODE 258: Performed by: INTERNAL MEDICINE

## 2020-12-29 PROCEDURE — 82962 GLUCOSE BLOOD TEST: CPT | Mod: 91

## 2020-12-29 PROCEDURE — 700101 HCHG RX REV CODE 250: Performed by: INTERNAL MEDICINE

## 2020-12-29 PROCEDURE — 85025 COMPLETE CBC W/AUTO DIFF WBC: CPT

## 2020-12-29 PROCEDURE — 700111 HCHG RX REV CODE 636 W/ 250 OVERRIDE (IP): Performed by: INTERNAL MEDICINE

## 2020-12-29 PROCEDURE — 770021 HCHG ROOM/CARE - ISO PRIVATE

## 2020-12-29 PROCEDURE — 700102 HCHG RX REV CODE 250 W/ 637 OVERRIDE(OP): Performed by: INTERNAL MEDICINE

## 2020-12-29 RX ORDER — SODIUM CHLORIDE 9 MG/ML
INJECTION, SOLUTION INTRAVENOUS
Status: ACTIVE | OUTPATIENT
Start: 2020-12-29 | End: 2020-12-29

## 2020-12-29 RX ORDER — INSULIN GLARGINE 100 [IU]/ML
10 INJECTION, SOLUTION SUBCUTANEOUS EVERY EVENING
Status: DISCONTINUED | OUTPATIENT
Start: 2020-12-29 | End: 2020-12-31 | Stop reason: HOSPADM

## 2020-12-29 RX ADMIN — RIVAROXABAN 20 MG: 20 TABLET, FILM COATED ORAL at 06:11

## 2020-12-29 RX ADMIN — DEXAMETHASONE SODIUM PHOSPHATE 6 MG: 4 INJECTION, SOLUTION INTRA-ARTICULAR; INTRALESIONAL; INTRAMUSCULAR; INTRAVENOUS; SOFT TISSUE at 17:52

## 2020-12-29 RX ADMIN — INSULIN GLARGINE 10 UNITS: 100 INJECTION, SOLUTION SUBCUTANEOUS at 17:50

## 2020-12-29 RX ADMIN — ASPIRIN 81 MG: 81 TABLET, COATED ORAL at 06:11

## 2020-12-29 RX ADMIN — LISINOPRIL 2.5 MG: 5 TABLET ORAL at 06:11

## 2020-12-29 RX ADMIN — SODIUM CHLORIDE: 9 INJECTION, SOLUTION INTRAVENOUS at 15:51

## 2020-12-29 RX ADMIN — OMEPRAZOLE 40 MG: 20 CAPSULE, DELAYED RELEASE ORAL at 06:10

## 2020-12-29 RX ADMIN — STANDARDIZED SENNA CONCENTRATE AND DOCUSATE SODIUM 2 TABLET: 8.6; 5 TABLET, FILM COATED ORAL at 17:52

## 2020-12-29 RX ADMIN — STANDARDIZED SENNA CONCENTRATE AND DOCUSATE SODIUM 2 TABLET: 8.6; 5 TABLET, FILM COATED ORAL at 06:11

## 2020-12-29 RX ADMIN — METOPROLOL SUCCINATE 25 MG: 25 TABLET, EXTENDED RELEASE ORAL at 06:10

## 2020-12-29 RX ADMIN — INSULIN HUMAN 1 UNITS: 100 INJECTION, SOLUTION PARENTERAL at 06:17

## 2020-12-29 RX ADMIN — INSULIN HUMAN 2 UNITS: 100 INJECTION, SOLUTION PARENTERAL at 21:48

## 2020-12-29 RX ADMIN — REMDESIVIR 100 MG: 100 INJECTION, POWDER, LYOPHILIZED, FOR SOLUTION INTRAVENOUS at 15:51

## 2020-12-29 RX ADMIN — RANOLAZINE 1000 MG: 500 TABLET, FILM COATED, EXTENDED RELEASE ORAL at 21:45

## 2020-12-29 ASSESSMENT — ENCOUNTER SYMPTOMS
SHORTNESS OF BREATH: 1
FALLS: 0
ABDOMINAL PAIN: 0
CHILLS: 0
COUGH: 1
VOMITING: 0
FEVER: 0
DIARRHEA: 0
NAUSEA: 0

## 2020-12-29 NOTE — DISCHARGE PLANNING
LSW spoke with  owner Giovana ([phone]). Pts group home name is [facility]. Giovana is unsure of how many covids tests pt needs prior to returning or in what time frame. Giovana stated she would call the stated to find out and call LSW back.

## 2020-12-29 NOTE — PROGRESS NOTES
Hospital Medicine Daily Progress Note    Date of Service  12/29/2020    Chief Complaint  55 y.o. male admitted 12/23/2020 with generalized weakness and shortness of breath    Hospital Course    55M, PMHx CAD, GERD, P-AFib on A/C.  Admitted 12/23 with shortness of breath and generalized weakness secondary to COVID-19 pneumonia with hypoxia, started on Decadron.    Interval Problem Update  Patient's respiratory status is gradually improving.  He is currently on 9 L.  Currently on remdesivir and Decadron.  Potassium continues to be elevated at 5.5, 1 L of IV normal saline given, since he appears hypovolemic.    Consultants/Specialty  Discussed with infectious disease      Code Status  Full Code    Disposition  To be determined, anticipate medical clearance 2-3 days   Will likely need oxygen on discharge with remote monitoring.  Return to group home    Review of Systems  Review of Systems   Constitutional: Positive for malaise/fatigue (same ). Negative for chills and fever.   Respiratory: Positive for cough and shortness of breath (Unchanged).    Cardiovascular: Negative for chest pain and leg swelling.   Gastrointestinal: Negative for abdominal pain, diarrhea, nausea and vomiting.   Musculoskeletal: Negative for falls.   Skin: Negative for itching and rash.   All other systems reviewed and are negative.     Physical Exam  Temp:  [36.3 °C (97.4 °F)-36.7 °C (98 °F)] 36.7 °C (98 °F)  Pulse:  [50-71] 51  Resp:  [16-17] 17  BP: ()/(68-79) 115/76  SpO2:  [90 %-96 %] 96 %    Physical Exam  Vitals signs and nursing note reviewed.   Constitutional:       General: He is not in acute distress.     Appearance: Normal appearance.   HENT:      Head: Normocephalic and atraumatic.      Nose: Nose normal.      Mouth/Throat:      Mouth: Mucous membranes are moist.   Eyes:      General: No scleral icterus.     Conjunctiva/sclera: Conjunctivae normal.   Neck:      Musculoskeletal: Normal range of motion and neck supple.    Cardiovascular:      Rate and Rhythm: Normal rate and regular rhythm.      Heart sounds: No murmur.   Pulmonary:      Effort: Pulmonary effort is normal.      Breath sounds: Decreased air movement present. Rhonchi present. No wheezing.      Comments: Reduced air entry.  Diffuse rhonchi.  Abdominal:      General: Abdomen is flat. There is no distension.      Tenderness: There is no abdominal tenderness. There is no guarding or rebound.   Musculoskeletal:         General: No tenderness.      Right lower leg: No edema.      Left lower leg: No edema.   Skin:     General: Skin is warm and dry.   Neurological:      General: No focal deficit present.      Mental Status: He is alert and oriented to person, place, and time.   Psychiatric:         Mood and Affect: Mood normal.         Behavior: Behavior normal.       Fluids    Intake/Output Summary (Last 24 hours) at 12/29/2020 1347  Last data filed at 12/28/2020 1800  Gross per 24 hour   Intake --   Output 500 ml   Net -500 ml       Laboratory  Recent Labs     12/27/20 0418 12/28/20 0407 12/29/20  0439   WBC 5.4 7.2 9.6   RBC 3.49* 3.59* 3.85*   HEMOGLOBIN 10.4* 10.9* 11.6*   HEMATOCRIT 31.4* 33.2* 36.1*   MCV 90.0 92.5 93.8   MCH 29.8 30.4 30.1   MCHC 33.1* 32.8* 32.1*   RDW 44.6 44.7 45.4   PLATELETCT 476* 502* 569*   MPV 9.8 9.7 10.1     Recent Labs     12/27/20 0418 12/28/20 0407 12/29/20  0439   SODIUM 135 134* 132*   POTASSIUM 4.9 5.0 5.5   CHLORIDE 102 102 99   CO2 22 20 18*   GLUCOSE 198* 225* 193*   BUN 20 21 20   CREATININE 0.90 0.87 0.81   CALCIUM 8.8 8.8 8.9                   Imaging  DX-CHEST-PORTABLE (1 VIEW)   Final Result      1.  Multifocal bilateral COVID pneumonia.   2.  Borderline cardiomegaly.      CT-ABDOMEN-PELVIS WITH   Final Result      1.  No acute abnormality in the abdomen or pelvis.   2.  Hepatic steatosis.   3.  Mild splenomegaly.   4.  Sequela of COVID pneumonia in the lower lungs.   5.  Tiny hiatal hernia.          Assessment/Plan  Pneumonia due to COVID-19 virus  Assessment & Plan  Patient will be started on Decadron due to hypoxia, 6 L  Procalcitonin is not significantly elevated, will refrain from starting antibiotics  No erythema or edema of both lower extremities at this point. We will start prophylactic pharmacologic agents with Lovenox.    Encourage prone positioning. Oxygen as needed, Incentive spirometry   12/25, remdesivir started for increased oxygen needs  12/26-28, still on 10-15 L Encourage prone positioning & Incentive spirometry      12/29-continue to titrate down patient's oxygen as tolerated    Coronary artery disease involving native coronary artery of native heart without angina pectoris- (present on admission)  Assessment & Plan  Patient denies chest pain.  Troponin is not elevated  Continue outpatient medication-aspirin, lisinopril, metoprolol and ranolazine    GERD with Nieves's esophagus  Assessment & Plan  Continue PPI    AP (abdominal pain)  Assessment & Plan  CT of the abdomen and pelvis with contrast negative for abdominal surgical abnormalities  Pain probably is muscular and related to cough  Tylenol as needed  12/25 resolved     Chronic anticoagulation- (present on admission)  Assessment & Plan  Continue Xarelto    PAF (paroxysmal atrial fibrillation) (HCC)- (present on admission)  Assessment & Plan  Resume home Xarelto.  Resume home metoprolol with hold parameters.  Rate been controlled     Type 2 diabetes mellitus without complication, without long-term current use of insulin (HCC)- (present on admission)  Assessment & Plan  Expect to worsen with Decadron.  Glycated hemoglobin 7.2%.  Increase Lantus to 10 units and continue sliding scale  Accu-Checks, hypoglycemia protocol      VTE prophylaxis: Xarelto for atrial fibrillation

## 2020-12-29 NOTE — ASSESSMENT & PLAN NOTE
Expect to worsen with Decadron.  Glycated hemoglobin 7.2%.  Increase Lantus to 10 units and continue sliding scale  Accu-Checks, hypoglycemia protocol

## 2020-12-29 NOTE — DISCHARGE PLANNING
ROSALINO spoke with friend Venice listed on face sheet. Venice stated that the  owner's name is Giovana Harper ([phone]). Venice said that she texted Giovana to call ROSALINO back.

## 2020-12-29 NOTE — PROGRESS NOTES
Assessment complete. Pt is alert and oriented, currently on 10L oxymask satting high 90s. Safety and isolation orders in place.

## 2020-12-29 NOTE — PROGRESS NOTES
Report received from Talita MCCLAIN. Pt sitting upright in bed at the time of report. Plan of care assumed. Safety precautions in place and call light within reach.

## 2020-12-30 LAB
ANION GAP SERPL CALC-SCNC: 10 MMOL/L (ref 7–16)
BASOPHILS # BLD AUTO: 0.1 % (ref 0–1.8)
BASOPHILS # BLD: 0.01 K/UL (ref 0–0.12)
BUN SERPL-MCNC: 20 MG/DL (ref 8–22)
CALCIUM SERPL-MCNC: 8.6 MG/DL (ref 8.4–10.2)
CHLORIDE SERPL-SCNC: 102 MMOL/L (ref 96–112)
CO2 SERPL-SCNC: 20 MMOL/L (ref 20–33)
CREAT SERPL-MCNC: 0.78 MG/DL (ref 0.5–1.4)
EOSINOPHIL # BLD AUTO: 0 K/UL (ref 0–0.51)
EOSINOPHIL NFR BLD: 0 % (ref 0–6.9)
ERYTHROCYTE [DISTWIDTH] IN BLOOD BY AUTOMATED COUNT: 44.2 FL (ref 35.9–50)
GLUCOSE BLD-MCNC: 132 MG/DL (ref 65–99)
GLUCOSE BLD-MCNC: 175 MG/DL (ref 65–99)
GLUCOSE BLD-MCNC: 210 MG/DL (ref 65–99)
GLUCOSE SERPL-MCNC: 196 MG/DL (ref 65–99)
HCT VFR BLD AUTO: 34.1 % (ref 42–52)
HGB BLD-MCNC: 11.3 G/DL (ref 14–18)
IMM GRANULOCYTES # BLD AUTO: 0.32 K/UL (ref 0–0.11)
IMM GRANULOCYTES NFR BLD AUTO: 3.8 % (ref 0–0.9)
LYMPHOCYTES # BLD AUTO: 0.5 K/UL (ref 1–4.8)
LYMPHOCYTES NFR BLD: 6 % (ref 22–41)
MCH RBC QN AUTO: 30.3 PG (ref 27–33)
MCHC RBC AUTO-ENTMCNC: 33.1 G/DL (ref 33.7–35.3)
MCV RBC AUTO: 91.4 FL (ref 81.4–97.8)
MONOCYTES # BLD AUTO: 0.25 K/UL (ref 0–0.85)
MONOCYTES NFR BLD AUTO: 3 % (ref 0–13.4)
NEUTROPHILS # BLD AUTO: 7.25 K/UL (ref 1.82–7.42)
NEUTROPHILS NFR BLD: 87.1 % (ref 44–72)
NRBC # BLD AUTO: 0 K/UL
NRBC BLD-RTO: 0 /100 WBC
PLATELET # BLD AUTO: 606 K/UL (ref 164–446)
PMV BLD AUTO: 9.9 FL (ref 9–12.9)
POTASSIUM SERPL-SCNC: 5.1 MMOL/L (ref 3.6–5.5)
RBC # BLD AUTO: 3.73 M/UL (ref 4.7–6.1)
SODIUM SERPL-SCNC: 132 MMOL/L (ref 135–145)
WBC # BLD AUTO: 8.3 K/UL (ref 4.8–10.8)

## 2020-12-30 PROCEDURE — 80048 BASIC METABOLIC PNL TOTAL CA: CPT

## 2020-12-30 PROCEDURE — 700102 HCHG RX REV CODE 250 W/ 637 OVERRIDE(OP): Performed by: INTERNAL MEDICINE

## 2020-12-30 PROCEDURE — 85025 COMPLETE CBC W/AUTO DIFF WBC: CPT

## 2020-12-30 PROCEDURE — 700111 HCHG RX REV CODE 636 W/ 250 OVERRIDE (IP): Performed by: INTERNAL MEDICINE

## 2020-12-30 PROCEDURE — 82962 GLUCOSE BLOOD TEST: CPT | Mod: 91

## 2020-12-30 PROCEDURE — 99232 SBSQ HOSP IP/OBS MODERATE 35: CPT | Performed by: STUDENT IN AN ORGANIZED HEALTH CARE EDUCATION/TRAINING PROGRAM

## 2020-12-30 PROCEDURE — 770021 HCHG ROOM/CARE - ISO PRIVATE

## 2020-12-30 PROCEDURE — A9270 NON-COVERED ITEM OR SERVICE: HCPCS | Performed by: INTERNAL MEDICINE

## 2020-12-30 RX ADMIN — STANDARDIZED SENNA CONCENTRATE AND DOCUSATE SODIUM 2 TABLET: 8.6; 5 TABLET, FILM COATED ORAL at 17:34

## 2020-12-30 RX ADMIN — ASPIRIN 81 MG: 81 TABLET, COATED ORAL at 06:17

## 2020-12-30 RX ADMIN — DEXAMETHASONE SODIUM PHOSPHATE 6 MG: 4 INJECTION, SOLUTION INTRA-ARTICULAR; INTRALESIONAL; INTRAMUSCULAR; INTRAVENOUS; SOFT TISSUE at 17:34

## 2020-12-30 RX ADMIN — INSULIN HUMAN 1 UNITS: 100 INJECTION, SOLUTION PARENTERAL at 06:21

## 2020-12-30 RX ADMIN — STANDARDIZED SENNA CONCENTRATE AND DOCUSATE SODIUM 2 TABLET: 8.6; 5 TABLET, FILM COATED ORAL at 06:17

## 2020-12-30 RX ADMIN — RIVAROXABAN 20 MG: 20 TABLET, FILM COATED ORAL at 06:17

## 2020-12-30 RX ADMIN — RANOLAZINE 1000 MG: 500 TABLET, FILM COATED, EXTENDED RELEASE ORAL at 20:00

## 2020-12-30 RX ADMIN — INSULIN GLARGINE 10 UNITS: 100 INJECTION, SOLUTION SUBCUTANEOUS at 17:33

## 2020-12-30 RX ADMIN — INSULIN HUMAN 2 UNITS: 100 INJECTION, SOLUTION PARENTERAL at 11:50

## 2020-12-30 RX ADMIN — INSULIN HUMAN 1 UNITS: 100 INJECTION, SOLUTION PARENTERAL at 19:55

## 2020-12-30 RX ADMIN — OMEPRAZOLE 40 MG: 20 CAPSULE, DELAYED RELEASE ORAL at 06:17

## 2020-12-30 ASSESSMENT — ENCOUNTER SYMPTOMS
COUGH: 1
NAUSEA: 0
SHORTNESS OF BREATH: 1
FALLS: 0
VOMITING: 0
DIARRHEA: 0
ABDOMINAL PAIN: 0
FEVER: 0
CHILLS: 0

## 2020-12-30 NOTE — PROGRESS NOTES
Report received from Ama MCCLAIN. Pt asleep at the time of report. Plan of care assumed. Safety precautions in place and call light within reach.

## 2020-12-30 NOTE — PROGRESS NOTES
Hospital Medicine Daily Progress Note    Date of Service  12/30/2020    Chief Complaint  55 y.o. male admitted 12/23/2020 with generalized weakness and shortness of breath    Hospital Course    55M, PMHx CAD, GERD, P-AFib on A/C.  Admitted 12/23 with shortness of breath and generalized weakness secondary to COVID-19 pneumonia with hypoxia, started on Decadron.    Interval Problem Update  Patient's respiratory status is gradually improving.  He is currently on 9 L.  Currently on remdesivir and Decadron.  Potassium continues to be elevated at 5.5, 1 L of IV normal saline given, since he appears hypovolemic.  12/30: Patient's respiratory status is improving he is currently on 5 L O2.  Continuing remdesivir and Decadron.  Patient is able to get up and ambulate and use the restroom.  T-max 98.1, BP 98/62.  He is endorsing a sore throat and lozenges have been ordered.    I have performed a physical exam and reviewed and updated ROS and Plan today (12/30/2020). In review of yesterday's note (12/29/2020), there are no changes except as documented above.       Consultants/Specialty  Discussed with infectious disease      Code Status  Full Code    Disposition  To be determined, anticipate medical clearance 2-3 days   Will likely need oxygen on discharge with remote monitoring.  Return to group home    Review of Systems  Review of Systems   Constitutional: Positive for malaise/fatigue (same ). Negative for chills and fever.   Respiratory: Positive for cough and shortness of breath (Unchanged).    Cardiovascular: Negative for chest pain and leg swelling.   Gastrointestinal: Negative for abdominal pain, diarrhea, nausea and vomiting.   Musculoskeletal: Negative for falls.   Skin: Negative for itching and rash.   All other systems reviewed and are negative.     Physical Exam  Temp:  [36.3 °C (97.4 °F)-36.8 °C (98.3 °F)] 36.3 °C (97.4 °F)  Pulse:  [61-67] 66  Resp:  [17-18] 17  BP: ()/(62-82) 115/82  SpO2:  [92 %-95 %] 94  %    Physical Exam  Vitals signs and nursing note reviewed.   Constitutional:       General: He is not in acute distress.     Appearance: Normal appearance.   HENT:      Head: Normocephalic and atraumatic.      Nose: Nose normal.      Mouth/Throat:      Mouth: Mucous membranes are moist.   Eyes:      General: No scleral icterus.     Conjunctiva/sclera: Conjunctivae normal.   Neck:      Musculoskeletal: Normal range of motion and neck supple.   Cardiovascular:      Rate and Rhythm: Normal rate and regular rhythm.      Heart sounds: No murmur.   Pulmonary:      Effort: Pulmonary effort is normal.      Breath sounds: Decreased air movement present. Rhonchi present. No wheezing.      Comments: Reduced air entry.  Diffuse rhonchi.  Abdominal:      General: Abdomen is flat. There is no distension.      Tenderness: There is no abdominal tenderness. There is no guarding or rebound.   Musculoskeletal:         General: No tenderness.      Right lower leg: No edema.      Left lower leg: No edema.   Skin:     General: Skin is warm and dry.   Neurological:      General: No focal deficit present.      Mental Status: He is alert and oriented to person, place, and time.   Psychiatric:         Mood and Affect: Mood normal.         Behavior: Behavior normal.       Fluids  No intake or output data in the 24 hours ending 12/30/20 1427    Laboratory  Recent Labs     12/28/20  0407 12/29/20  0439 12/30/20  0424   WBC 7.2 9.6 8.3   RBC 3.59* 3.85* 3.73*   HEMOGLOBIN 10.9* 11.6* 11.3*   HEMATOCRIT 33.2* 36.1* 34.1*   MCV 92.5 93.8 91.4   MCH 30.4 30.1 30.3   MCHC 32.8* 32.1* 33.1*   RDW 44.7 45.4 44.2   PLATELETCT 502* 569* 606*   MPV 9.7 10.1 9.9     Recent Labs     12/28/20  0407 12/29/20  0439 12/30/20  0424   SODIUM 134* 132* 132*   POTASSIUM 5.0 5.5 5.1   CHLORIDE 102 99 102   CO2 20 18* 20   GLUCOSE 225* 193* 196*   BUN 21 20 20   CREATININE 0.87 0.81 0.78   CALCIUM 8.8 8.9 8.6                   Imaging  DX-CHEST-PORTABLE (1 VIEW)    Final Result      1.  Multifocal bilateral COVID pneumonia.   2.  Borderline cardiomegaly.      CT-ABDOMEN-PELVIS WITH   Final Result      1.  No acute abnormality in the abdomen or pelvis.   2.  Hepatic steatosis.   3.  Mild splenomegaly.   4.  Sequela of COVID pneumonia in the lower lungs.   5.  Tiny hiatal hernia.         Assessment/Plan  Pneumonia due to COVID-19 virus  Assessment & Plan  Patient will be started on Decadron due to hypoxia, 6 L  Procalcitonin is not significantly elevated, will refrain from starting antibiotics  No erythema or edema of both lower extremities at this point. We will start prophylactic pharmacologic agents with Lovenox.    Encourage prone positioning. Oxygen as needed, Incentive spirometry   12/25, remdesivir started for increased oxygen needs  12/26-28, still on 10-15 L Encourage prone positioning & Incentive spirometry      12/29-continue to titrate down patient's oxygen as tolerated    Coronary artery disease involving native coronary artery of native heart without angina pectoris- (present on admission)  Assessment & Plan  Patient denies chest pain.  Troponin is not elevated  Continue outpatient medication-aspirin, lisinopril, metoprolol and ranolazine    GERD with Nieves's esophagus  Assessment & Plan  Continue PPI    AP (abdominal pain)  Assessment & Plan  CT of the abdomen and pelvis with contrast negative for abdominal surgical abnormalities  Pain probably is muscular and related to cough  Tylenol as needed  12/25 resolved     Chronic anticoagulation- (present on admission)  Assessment & Plan  Continue Xarelto    PAF (paroxysmal atrial fibrillation) (HCC)- (present on admission)  Assessment & Plan  Resume home Xarelto.  Resume home metoprolol with hold parameters.  Rate been controlled     Type 2 diabetes mellitus without complication, without long-term current use of insulin (HCC)- (present on admission)  Assessment & Plan  Expect to worsen with Decadron.  Glycated  hemoglobin 7.2%.  Increase Lantus to 10 units and continue sliding scale  Accu-Checks, hypoglycemia protocol      VTE prophylaxis: Xarelto for atrial fibrillation

## 2020-12-30 NOTE — PROGRESS NOTES
Received report from SARAHI Martines. Pt is on 6L oxymask, satting well. Otherwise an uneventful night. Safety measures in place, care assumed.

## 2020-12-31 ENCOUNTER — HOSPITAL ENCOUNTER (INPATIENT)
Facility: MEDICAL CENTER | Age: 55
LOS: 9 days | DRG: 177 | End: 2021-01-09
Attending: INTERNAL MEDICINE | Admitting: STUDENT IN AN ORGANIZED HEALTH CARE EDUCATION/TRAINING PROGRAM
Payer: MEDICARE

## 2020-12-31 VITALS
WEIGHT: 194 LBS | OXYGEN SATURATION: 93 % | BODY MASS INDEX: 30.45 KG/M2 | SYSTOLIC BLOOD PRESSURE: 113 MMHG | TEMPERATURE: 98.3 F | HEART RATE: 65 BPM | DIASTOLIC BLOOD PRESSURE: 65 MMHG | HEIGHT: 67 IN | RESPIRATION RATE: 18 BRPM

## 2020-12-31 DIAGNOSIS — J12.82 PNEUMONIA DUE TO COVID-19 VIRUS: ICD-10-CM

## 2020-12-31 DIAGNOSIS — U07.1 PNEUMONIA DUE TO COVID-19 VIRUS: ICD-10-CM

## 2020-12-31 LAB
ANION GAP SERPL CALC-SCNC: 9 MMOL/L (ref 7–16)
BASOPHILS # BLD AUTO: 0.2 % (ref 0–1.8)
BASOPHILS # BLD: 0.02 K/UL (ref 0–0.12)
BUN SERPL-MCNC: 18 MG/DL (ref 8–22)
CALCIUM SERPL-MCNC: 8.6 MG/DL (ref 8.4–10.2)
CHLORIDE SERPL-SCNC: 101 MMOL/L (ref 96–112)
CO2 SERPL-SCNC: 23 MMOL/L (ref 20–33)
CREAT SERPL-MCNC: 0.8 MG/DL (ref 0.5–1.4)
EOSINOPHIL # BLD AUTO: 0 K/UL (ref 0–0.51)
EOSINOPHIL NFR BLD: 0 % (ref 0–6.9)
ERYTHROCYTE [DISTWIDTH] IN BLOOD BY AUTOMATED COUNT: 44.1 FL (ref 35.9–50)
GLUCOSE BLD-MCNC: 123 MG/DL (ref 65–99)
GLUCOSE BLD-MCNC: 154 MG/DL (ref 65–99)
GLUCOSE BLD-MCNC: 180 MG/DL (ref 65–99)
GLUCOSE BLD-MCNC: 181 MG/DL (ref 65–99)
GLUCOSE BLD-MCNC: 281 MG/DL (ref 65–99)
GLUCOSE SERPL-MCNC: 175 MG/DL (ref 65–99)
HCT VFR BLD AUTO: 34.4 % (ref 42–52)
HGB BLD-MCNC: 11.2 G/DL (ref 14–18)
IMM GRANULOCYTES # BLD AUTO: 0.29 K/UL (ref 0–0.11)
IMM GRANULOCYTES NFR BLD AUTO: 3.5 % (ref 0–0.9)
LYMPHOCYTES # BLD AUTO: 0.63 K/UL (ref 1–4.8)
LYMPHOCYTES NFR BLD: 7.6 % (ref 22–41)
MCH RBC QN AUTO: 29.8 PG (ref 27–33)
MCHC RBC AUTO-ENTMCNC: 32.6 G/DL (ref 33.7–35.3)
MCV RBC AUTO: 91.5 FL (ref 81.4–97.8)
MONOCYTES # BLD AUTO: 0.43 K/UL (ref 0–0.85)
MONOCYTES NFR BLD AUTO: 5.2 % (ref 0–13.4)
NEUTROPHILS # BLD AUTO: 6.93 K/UL (ref 1.82–7.42)
NEUTROPHILS NFR BLD: 83.5 % (ref 44–72)
NRBC # BLD AUTO: 0 K/UL
NRBC BLD-RTO: 0 /100 WBC
PLATELET # BLD AUTO: 514 K/UL (ref 164–446)
PMV BLD AUTO: 9.9 FL (ref 9–12.9)
POTASSIUM SERPL-SCNC: 4.5 MMOL/L (ref 3.6–5.5)
RBC # BLD AUTO: 3.76 M/UL (ref 4.7–6.1)
SODIUM SERPL-SCNC: 133 MMOL/L (ref 135–145)
WBC # BLD AUTO: 8.3 K/UL (ref 4.8–10.8)

## 2020-12-31 PROCEDURE — 85025 COMPLETE CBC W/AUTO DIFF WBC: CPT

## 2020-12-31 PROCEDURE — 82962 GLUCOSE BLOOD TEST: CPT | Mod: 91

## 2020-12-31 PROCEDURE — A9270 NON-COVERED ITEM OR SERVICE: HCPCS | Performed by: STUDENT IN AN ORGANIZED HEALTH CARE EDUCATION/TRAINING PROGRAM

## 2020-12-31 PROCEDURE — 700102 HCHG RX REV CODE 250 W/ 637 OVERRIDE(OP): Performed by: STUDENT IN AN ORGANIZED HEALTH CARE EDUCATION/TRAINING PROGRAM

## 2020-12-31 PROCEDURE — A9270 NON-COVERED ITEM OR SERVICE: HCPCS | Performed by: INTERNAL MEDICINE

## 2020-12-31 PROCEDURE — 80048 BASIC METABOLIC PNL TOTAL CA: CPT

## 2020-12-31 PROCEDURE — 770014 HCHG ROOM/CARE - WARD (150)

## 2020-12-31 PROCEDURE — 700102 HCHG RX REV CODE 250 W/ 637 OVERRIDE(OP): Performed by: INTERNAL MEDICINE

## 2020-12-31 PROCEDURE — 99239 HOSP IP/OBS DSCHRG MGMT >30: CPT | Performed by: STUDENT IN AN ORGANIZED HEALTH CARE EDUCATION/TRAINING PROGRAM

## 2020-12-31 PROCEDURE — 700111 HCHG RX REV CODE 636 W/ 250 OVERRIDE (IP): Performed by: INTERNAL MEDICINE

## 2020-12-31 RX ORDER — RANOLAZINE 500 MG/1
1000 TABLET, EXTENDED RELEASE ORAL DAILY
Status: CANCELLED | OUTPATIENT
Start: 2020-12-31

## 2020-12-31 RX ORDER — ONDANSETRON 4 MG/1
4 TABLET, ORALLY DISINTEGRATING ORAL EVERY 4 HOURS PRN
Status: CANCELLED | OUTPATIENT
Start: 2020-12-31

## 2020-12-31 RX ORDER — ONDANSETRON 4 MG/1
4 TABLET, ORALLY DISINTEGRATING ORAL EVERY 4 HOURS PRN
Status: DISCONTINUED | OUTPATIENT
Start: 2020-12-31 | End: 2021-01-05

## 2020-12-31 RX ORDER — POLYETHYLENE GLYCOL 3350 17 G/17G
1 POWDER, FOR SOLUTION ORAL
Status: DISCONTINUED | OUTPATIENT
Start: 2020-12-31 | End: 2021-01-02

## 2020-12-31 RX ORDER — INSULIN GLARGINE 100 [IU]/ML
10 INJECTION, SOLUTION SUBCUTANEOUS EVERY EVENING
Status: CANCELLED | OUTPATIENT
Start: 2020-12-31

## 2020-12-31 RX ORDER — AMOXICILLIN 250 MG
2 CAPSULE ORAL 2 TIMES DAILY
Status: CANCELLED | OUTPATIENT
Start: 2020-12-31

## 2020-12-31 RX ORDER — PROMETHAZINE HYDROCHLORIDE 25 MG/1
12.5-25 TABLET ORAL EVERY 4 HOURS PRN
Status: CANCELLED | OUTPATIENT
Start: 2020-12-31

## 2020-12-31 RX ORDER — OMEPRAZOLE 20 MG/1
40 CAPSULE, DELAYED RELEASE ORAL DAILY
Status: DISCONTINUED | OUTPATIENT
Start: 2021-01-01 | End: 2021-01-04

## 2020-12-31 RX ORDER — LISINOPRIL 2.5 MG/1
2.5 TABLET ORAL DAILY
Status: CANCELLED | OUTPATIENT
Start: 2021-01-01

## 2020-12-31 RX ORDER — ONDANSETRON 2 MG/ML
4 INJECTION INTRAMUSCULAR; INTRAVENOUS EVERY 4 HOURS PRN
Status: DISCONTINUED | OUTPATIENT
Start: 2020-12-31 | End: 2021-01-05

## 2020-12-31 RX ORDER — PROMETHAZINE HYDROCHLORIDE 12.5 MG/1
12.5-25 SUPPOSITORY RECTAL EVERY 4 HOURS PRN
Status: DISCONTINUED | OUTPATIENT
Start: 2020-12-31 | End: 2021-01-01

## 2020-12-31 RX ORDER — LISINOPRIL 2.5 MG/1
2.5 TABLET ORAL DAILY
Status: DISCONTINUED | OUTPATIENT
Start: 2021-01-01 | End: 2021-01-09 | Stop reason: HOSPADM

## 2020-12-31 RX ORDER — MECLIZINE HYDROCHLORIDE 25 MG/1
25 TABLET ORAL 3 TIMES DAILY PRN
Status: CANCELLED | OUTPATIENT
Start: 2020-12-31

## 2020-12-31 RX ORDER — GUAIFENESIN/DEXTROMETHORPHAN 100-10MG/5
5 SYRUP ORAL EVERY 6 HOURS PRN
Status: DISCONTINUED | OUTPATIENT
Start: 2020-12-31 | End: 2021-01-01

## 2020-12-31 RX ORDER — AMOXICILLIN 250 MG
2 CAPSULE ORAL 2 TIMES DAILY
Status: DISCONTINUED | OUTPATIENT
Start: 2021-01-01 | End: 2021-01-02

## 2020-12-31 RX ORDER — INSULIN GLARGINE 100 [IU]/ML
10 INJECTION, SOLUTION SUBCUTANEOUS EVERY EVENING
Status: DISCONTINUED | OUTPATIENT
Start: 2021-01-01 | End: 2021-01-02

## 2020-12-31 RX ORDER — PROMETHAZINE HYDROCHLORIDE 25 MG/1
12.5-25 SUPPOSITORY RECTAL EVERY 4 HOURS PRN
Status: CANCELLED | OUTPATIENT
Start: 2020-12-31

## 2020-12-31 RX ORDER — BISACODYL 10 MG
10 SUPPOSITORY, RECTAL RECTAL
Status: DISCONTINUED | OUTPATIENT
Start: 2020-12-31 | End: 2021-01-02

## 2020-12-31 RX ORDER — BISACODYL 10 MG
10 SUPPOSITORY, RECTAL RECTAL
Status: CANCELLED | OUTPATIENT
Start: 2020-12-31

## 2020-12-31 RX ORDER — DEXAMETHASONE SODIUM PHOSPHATE 4 MG/ML
6 INJECTION, SOLUTION INTRA-ARTICULAR; INTRALESIONAL; INTRAMUSCULAR; INTRAVENOUS; SOFT TISSUE DAILY
Status: COMPLETED | OUTPATIENT
Start: 2021-01-01 | End: 2021-01-02

## 2020-12-31 RX ORDER — ONDANSETRON 2 MG/ML
4 INJECTION INTRAMUSCULAR; INTRAVENOUS EVERY 4 HOURS PRN
Status: CANCELLED | OUTPATIENT
Start: 2020-12-31

## 2020-12-31 RX ORDER — METOPROLOL SUCCINATE 25 MG/1
25 TABLET, EXTENDED RELEASE ORAL DAILY
Status: DISCONTINUED | OUTPATIENT
Start: 2021-01-01 | End: 2021-01-09 | Stop reason: HOSPADM

## 2020-12-31 RX ORDER — METOCLOPRAMIDE 10 MG/1
10 TABLET ORAL
Status: DISPENSED | OUTPATIENT
Start: 2021-01-01 | End: 2021-01-04

## 2020-12-31 RX ORDER — DEXTROSE MONOHYDRATE 25 G/50ML
50 INJECTION, SOLUTION INTRAVENOUS
Status: DISCONTINUED | OUTPATIENT
Start: 2020-12-31 | End: 2021-01-09 | Stop reason: HOSPADM

## 2020-12-31 RX ORDER — PROCHLORPERAZINE EDISYLATE 5 MG/ML
5-10 INJECTION INTRAMUSCULAR; INTRAVENOUS EVERY 4 HOURS PRN
Status: DISCONTINUED | OUTPATIENT
Start: 2020-12-31 | End: 2021-01-01

## 2020-12-31 RX ORDER — GUAIFENESIN/DEXTROMETHORPHAN 100-10MG/5
5 SYRUP ORAL EVERY 6 HOURS PRN
Status: CANCELLED | OUTPATIENT
Start: 2020-12-31

## 2020-12-31 RX ORDER — OMEPRAZOLE 20 MG/1
40 CAPSULE, DELAYED RELEASE ORAL DAILY
Status: CANCELLED | OUTPATIENT
Start: 2021-01-01

## 2020-12-31 RX ORDER — MECLIZINE HYDROCHLORIDE 25 MG/1
25 TABLET ORAL 3 TIMES DAILY PRN
Status: DISCONTINUED | OUTPATIENT
Start: 2020-12-31 | End: 2021-01-04

## 2020-12-31 RX ORDER — RANOLAZINE 500 MG/1
1000 TABLET, EXTENDED RELEASE ORAL DAILY
Status: DISCONTINUED | OUTPATIENT
Start: 2021-01-01 | End: 2021-01-09 | Stop reason: HOSPADM

## 2020-12-31 RX ORDER — DEXAMETHASONE SODIUM PHOSPHATE 4 MG/ML
6 INJECTION, SOLUTION INTRA-ARTICULAR; INTRALESIONAL; INTRAMUSCULAR; INTRAVENOUS; SOFT TISSUE DAILY
Status: CANCELLED | OUTPATIENT
Start: 2020-12-31 | End: 2021-01-02

## 2020-12-31 RX ORDER — PROMETHAZINE HYDROCHLORIDE 25 MG/1
12.5-25 TABLET ORAL EVERY 4 HOURS PRN
Status: DISCONTINUED | OUTPATIENT
Start: 2020-12-31 | End: 2021-01-01

## 2020-12-31 RX ORDER — DEXTROSE MONOHYDRATE 25 G/50ML
50 INJECTION, SOLUTION INTRAVENOUS
Status: CANCELLED | OUTPATIENT
Start: 2020-12-31

## 2020-12-31 RX ORDER — METOPROLOL SUCCINATE 25 MG/1
25 TABLET, EXTENDED RELEASE ORAL DAILY
Status: CANCELLED | OUTPATIENT
Start: 2021-01-01

## 2020-12-31 RX ORDER — PROCHLORPERAZINE EDISYLATE 5 MG/ML
5-10 INJECTION INTRAMUSCULAR; INTRAVENOUS EVERY 4 HOURS PRN
Status: CANCELLED | OUTPATIENT
Start: 2020-12-31

## 2020-12-31 RX ORDER — POLYETHYLENE GLYCOL 3350 17 G/17G
1 POWDER, FOR SOLUTION ORAL
Status: CANCELLED | OUTPATIENT
Start: 2020-12-31

## 2020-12-31 RX ADMIN — INSULIN HUMAN 1 UNITS: 100 INJECTION, SOLUTION PARENTERAL at 11:11

## 2020-12-31 RX ADMIN — RIVAROXABAN 20 MG: 20 TABLET, FILM COATED ORAL at 05:57

## 2020-12-31 RX ADMIN — ASPIRIN 81 MG: 81 TABLET, COATED ORAL at 05:57

## 2020-12-31 RX ADMIN — RANOLAZINE 1000 MG: 500 TABLET, FILM COATED, EXTENDED RELEASE ORAL at 20:11

## 2020-12-31 RX ADMIN — STANDARDIZED SENNA CONCENTRATE AND DOCUSATE SODIUM 2 TABLET: 8.6; 5 TABLET, FILM COATED ORAL at 05:56

## 2020-12-31 RX ADMIN — INSULIN HUMAN 3 UNITS: 100 INJECTION, SOLUTION PARENTERAL at 20:08

## 2020-12-31 RX ADMIN — INSULIN GLARGINE 10 UNITS: 100 INJECTION, SOLUTION SUBCUTANEOUS at 17:24

## 2020-12-31 RX ADMIN — DEXAMETHASONE SODIUM PHOSPHATE 6 MG: 4 INJECTION, SOLUTION INTRA-ARTICULAR; INTRALESIONAL; INTRAMUSCULAR; INTRAVENOUS; SOFT TISSUE at 17:23

## 2020-12-31 RX ADMIN — LISINOPRIL 2.5 MG: 5 TABLET ORAL at 05:57

## 2020-12-31 RX ADMIN — BENZOCAINE AND MENTHOL, UNSPECIFIED FORM 1 LOZENGE: 15; 3.6 LOZENGE ORAL at 20:11

## 2020-12-31 RX ADMIN — OMEPRAZOLE 40 MG: 20 CAPSULE, DELAYED RELEASE ORAL at 05:56

## 2020-12-31 RX ADMIN — METOPROLOL SUCCINATE 25 MG: 25 TABLET, EXTENDED RELEASE ORAL at 05:57

## 2020-12-31 RX ADMIN — INSULIN HUMAN 1 UNITS: 100 INJECTION, SOLUTION PARENTERAL at 06:00

## 2020-12-31 ASSESSMENT — FIBROSIS 4 INDEX
FIB4 SCORE: 0.49
FIB4 SCORE: 0.49

## 2020-12-31 ASSESSMENT — PAIN DESCRIPTION - PAIN TYPE: TYPE: ACUTE PAIN

## 2020-12-31 NOTE — DISCHARGE SUMMARY
Discharge Summary    CHIEF COMPLAINT ON ADMISSION  Chief Complaint   Patient presents with   • Abdominal Pain       Reason for Admission  EMS     Admission Date  12/23/2020    CODE STATUS  Full Code    HPI & HOSPITAL COURSE  55 y.o. male past medical history of CAD, diabetes on Metformin, glipizide, GERD, paroxysmal A. fib, ischemic cardiomyopathy, hypertension, chronic anticoagulation with Xarelto, who presented 12/23/2020 with complaints of generalized abdominal pain for 2 days, dull, aggravated by cough.  Patient has been having cough and shortness of breath in the last 2 to 3 days.  He denies sick contact.  Abdominal CT did not reveal acute intra-abdominal abnormalities, but did show basilar pneumonitis.  COVID-19 test came back positive.  Patient found to be slightly tachypneic, desaturating to 84% on room air, he was placed on 5 L nasal cannula, SPO2 95%    Patient's respiratory status is gradually improving.  He is currently on 9 L.  Currently on remdesivir and Decadron.  Potassium continues to be elevated at 5.5, 1 L of IV normal saline given, since he appears hypovolemic.  12/30: Patient's respiratory status is improving he is currently on 5 L O2.  Continuing remdesivir and Decadron.  Patient is able to get up and ambulate and use the restroom.  T-max 98.1, BP 98/62.  He is endorsing a sore throat and lozenges have been ordered.    Patient's respiratory status is continued to improve is down to 4 L.  He originally comes from a group home which is unable to take him back at this time due to his recent diagnosis.  We will continue on Decadron.  [facility] will be transferred to [facility] as he awaits placement.      Therefore, he is discharged in good and stable condition to a critical access hospital.    The patient met 2-midnight criteria for an inpatient stay at the time of discharge.    Discharge Date  12/31/2020    FOLLOW UP ITEMS POST DISCHARGE    Covid pneumonia.        DISCHARGE DIAGNOSES  Active Problems:     Coronary artery disease involving native coronary artery of native heart without angina pectoris POA: Yes    Pneumonia due to COVID-19 virus POA: Unknown    Type 2 diabetes mellitus without complication, without long-term current use of insulin (HCC) (Chronic) POA: Yes    PAF (paroxysmal atrial fibrillation) (HCC) (Chronic) POA: Yes    Chronic anticoagulation POA: Yes    AP (abdominal pain) POA: Unknown  Resolved Problems:    * No resolved hospital problems. *      FOLLOW UP  Future Appointments   Date Time Provider Department Center   4/5/2021 11:00 AM YONAS Couch None   4/5/2021 11:15 AM YONAS Couch None     No follow-up provider specified.    MEDICATIONS ON DISCHARGE     Medication List      ASK your doctor about these medications      Instructions   aspirin EC 81 MG Tbec  Commonly known as: ECOTRIN   Take 81 mg by mouth every day.  Dose: 81 mg     atorvastatin 80 MG tablet  Commonly known as: LIPITOR   Take 80 mg by mouth every evening. Every other day  Dose: 80 mg     cyanocobalamin 1000 MCG Tabs  Commonly known as: VITAMIN B12   Take 1,000 mcg by mouth every day.  Dose: 1,000 mcg     cyclobenzaprine 10 mg Tabs  Commonly known as: Flexeril   Take 10 mg by mouth 3 times a day as needed for Muscle Spasms.  Dose: 10 mg     dicyclomine 20 MG Tabs  Commonly known as: BENTYL   Take 20 mg by mouth every 6 hours as needed (abdominal pain).  Dose: 20 mg     gemfibrozil 600 MG Tabs  Commonly known as: LOPID   Take 600 mg by mouth every day. Every other day  Dose: 600 mg     glyBURIDE 5 MG Tabs  Commonly known as: DIABETA   Take 5 mg by mouth 2 times a day, with meals.  Dose: 5 mg     lisinopril 2.5 MG Tabs  Commonly known as: PRINIVIL   Take 2.5 mg by mouth every day.  Dose: 2.5 mg     loratadine 10 MG Tabs  Commonly known as: CLARITIN   Take 10 mg by mouth every day.  Dose: 10 mg     meclizine 25 MG Tabs  Commonly known as: ANTIVERT   Take 25 mg by mouth 3 times a day as needed.  Dose: 25 mg    "  metformin 1000 MG tablet  Commonly known as: GLUCOPHAGE   Take 1,000 mg by mouth 2 times a day.  Dose: 1,000 mg     metoclopramide 10 MG Tabs  Commonly known as: REGLAN   Take 1 Tab by mouth 4 times a day as needed.  Dose: 10 mg     metoprolol SR 25 MG Tb24  Commonly known as: TOPROL XL   Take 1 Tab by mouth every day.  Dose: 25 mg     omeprazole 20 MG delayed-release capsule  Commonly known as: PRILOSEC   Take 2 Caps by mouth every day. 30 min before first meal  Dose: 40 mg     Ranolazine 1000 MG Tb12   Take 0.5 Tabs by mouth every day. With dinner  Dose: 500 mg     rivaroxaban 20 MG Tabs tablet  Commonly known as: XARELTO   Take 20 mg by mouth with dinner.  Dose: 20 mg     vitamin D 1000 Unit (25 mcg) Tabs  Commonly known as: cholecalciferol   Take 2,000 Units by mouth every day.  Dose: 2,000 Units            Allergies  Allergies   Allergen Reactions   • Methylphenidate Unspecified     \"Hyper\"       DIET  Orders Placed This Encounter   Procedures   • Diet Order Diet: Regular     Standing Status:   Standing     Number of Occurrences:   1     Order Specific Question:   Diet:     Answer:   Regular [1]       ACTIVITY  As tolerated.  Weight bearing as tolerated    CONSULTATIONS  None    PROCEDURES  None    LABORATORY  Lab Results   Component Value Date    SODIUM 133 (L) 12/31/2020    POTASSIUM 4.5 12/31/2020    CHLORIDE 101 12/31/2020    CO2 23 12/31/2020    GLUCOSE 175 (H) 12/31/2020    BUN 18 12/31/2020    CREATININE 0.80 12/31/2020    CREATININE 1.0 07/12/2007        Lab Results   Component Value Date    WBC 8.3 12/31/2020    HEMOGLOBIN 11.2 (L) 12/31/2020    HEMATOCRIT 34.4 (L) 12/31/2020    PLATELETCT 514 (H) 12/31/2020          Total time of the discharge process exceeds 35 minutes.  "

## 2020-12-31 NOTE — PROGRESS NOTES
Report received from Ama MCCLAIN. Plan of care discussed. Pt sitting up in bed watching television. Denies any needs at this time. Safety precautions in place. Call light within reach.

## 2020-12-31 NOTE — PROGRESS NOTES
0800 Assessment complete. Pt stated no needs at this time. Pt resting comfortably upright in bed, respirations even and unlabored, no complaints, VSS.

## 2021-01-01 LAB
ALBUMIN SERPL BCP-MCNC: 3.2 G/DL (ref 3.2–4.9)
ALBUMIN/GLOB SERPL: 0.9 G/DL
ALP SERPL-CCNC: 92 U/L (ref 30–99)
ALT SERPL-CCNC: 36 U/L (ref 2–50)
ANION GAP SERPL CALC-SCNC: 11 MMOL/L (ref 7–16)
AST SERPL-CCNC: 23 U/L (ref 12–45)
BASOPHILS # BLD AUTO: 0.3 % (ref 0–1.8)
BASOPHILS # BLD: 0.03 K/UL (ref 0–0.12)
BILIRUB SERPL-MCNC: 0.9 MG/DL (ref 0.1–1.5)
BUN SERPL-MCNC: 17 MG/DL (ref 8–22)
CALCIUM SERPL-MCNC: 9 MG/DL (ref 8.5–10.5)
CHLORIDE SERPL-SCNC: 98 MMOL/L (ref 96–112)
CO2 SERPL-SCNC: 22 MMOL/L (ref 20–33)
CREAT SERPL-MCNC: 0.9 MG/DL (ref 0.5–1.4)
EOSINOPHIL # BLD AUTO: 0 K/UL (ref 0–0.51)
EOSINOPHIL NFR BLD: 0 % (ref 0–6.9)
ERYTHROCYTE [DISTWIDTH] IN BLOOD BY AUTOMATED COUNT: 45.3 FL (ref 35.9–50)
GLOBULIN SER CALC-MCNC: 3.5 G/DL (ref 1.9–3.5)
GLUCOSE BLD-MCNC: 171 MG/DL (ref 65–99)
GLUCOSE BLD-MCNC: 188 MG/DL (ref 65–99)
GLUCOSE BLD-MCNC: 214 MG/DL (ref 65–99)
GLUCOSE BLD-MCNC: 255 MG/DL (ref 65–99)
GLUCOSE SERPL-MCNC: 143 MG/DL (ref 65–99)
HCT VFR BLD AUTO: 39.9 % (ref 42–52)
HGB BLD-MCNC: 13.4 G/DL (ref 14–18)
IMM GRANULOCYTES # BLD AUTO: 0.29 K/UL (ref 0–0.11)
IMM GRANULOCYTES NFR BLD AUTO: 2.6 % (ref 0–0.9)
LYMPHOCYTES # BLD AUTO: 0.83 K/UL (ref 1–4.8)
LYMPHOCYTES NFR BLD: 7.5 % (ref 22–41)
MCH RBC QN AUTO: 30.9 PG (ref 27–33)
MCHC RBC AUTO-ENTMCNC: 33.6 G/DL (ref 33.7–35.3)
MCV RBC AUTO: 91.9 FL (ref 81.4–97.8)
MONOCYTES # BLD AUTO: 0.56 K/UL (ref 0–0.85)
MONOCYTES NFR BLD AUTO: 5 % (ref 0–13.4)
NEUTROPHILS # BLD AUTO: 9.38 K/UL (ref 1.82–7.42)
NEUTROPHILS NFR BLD: 84.6 % (ref 44–72)
NRBC # BLD AUTO: 0 K/UL
NRBC BLD-RTO: 0 /100 WBC
PLATELET # BLD AUTO: 566 K/UL (ref 164–446)
PMV BLD AUTO: 10.7 FL (ref 9–12.9)
POTASSIUM SERPL-SCNC: 4.7 MMOL/L (ref 3.6–5.5)
PROT SERPL-MCNC: 6.7 G/DL (ref 6–8.2)
RBC # BLD AUTO: 4.34 M/UL (ref 4.7–6.1)
SODIUM SERPL-SCNC: 131 MMOL/L (ref 135–145)
WBC # BLD AUTO: 11.1 K/UL (ref 4.8–10.8)

## 2021-01-01 PROCEDURE — 82962 GLUCOSE BLOOD TEST: CPT | Mod: 91

## 2021-01-01 PROCEDURE — 94760 N-INVAS EAR/PLS OXIMETRY 1: CPT

## 2021-01-01 PROCEDURE — 36415 COLL VENOUS BLD VENIPUNCTURE: CPT

## 2021-01-01 PROCEDURE — 700111 HCHG RX REV CODE 636 W/ 250 OVERRIDE (IP): Performed by: STUDENT IN AN ORGANIZED HEALTH CARE EDUCATION/TRAINING PROGRAM

## 2021-01-01 PROCEDURE — 700102 HCHG RX REV CODE 250 W/ 637 OVERRIDE(OP): Performed by: STUDENT IN AN ORGANIZED HEALTH CARE EDUCATION/TRAINING PROGRAM

## 2021-01-01 PROCEDURE — 770014 HCHG ROOM/CARE - WARD (150)

## 2021-01-01 PROCEDURE — 700102 HCHG RX REV CODE 250 W/ 637 OVERRIDE(OP): Performed by: INTERNAL MEDICINE

## 2021-01-01 PROCEDURE — 99232 SBSQ HOSP IP/OBS MODERATE 35: CPT | Performed by: STUDENT IN AN ORGANIZED HEALTH CARE EDUCATION/TRAINING PROGRAM

## 2021-01-01 PROCEDURE — A9270 NON-COVERED ITEM OR SERVICE: HCPCS | Performed by: STUDENT IN AN ORGANIZED HEALTH CARE EDUCATION/TRAINING PROGRAM

## 2021-01-01 PROCEDURE — 80053 COMPREHEN METABOLIC PANEL: CPT

## 2021-01-01 PROCEDURE — A9270 NON-COVERED ITEM OR SERVICE: HCPCS | Performed by: INTERNAL MEDICINE

## 2021-01-01 PROCEDURE — 85025 COMPLETE CBC W/AUTO DIFF WBC: CPT

## 2021-01-01 RX ORDER — GUAIFENESIN 600 MG/1
600 TABLET, EXTENDED RELEASE ORAL EVERY 12 HOURS PRN
Status: DISCONTINUED | OUTPATIENT
Start: 2021-01-01 | End: 2021-01-05

## 2021-01-01 RX ADMIN — METOCLOPRAMIDE 10 MG: 10 TABLET ORAL at 17:55

## 2021-01-01 RX ADMIN — METOCLOPRAMIDE 10 MG: 10 TABLET ORAL at 05:13

## 2021-01-01 RX ADMIN — METOPROLOL SUCCINATE 25 MG: 25 TABLET, EXTENDED RELEASE ORAL at 05:12

## 2021-01-01 RX ADMIN — RIVAROXABAN 20 MG: 20 TABLET, FILM COATED ORAL at 05:13

## 2021-01-01 RX ADMIN — OMEPRAZOLE 40 MG: 20 CAPSULE, DELAYED RELEASE ORAL at 05:13

## 2021-01-01 RX ADMIN — ASPIRIN 81 MG: 81 TABLET, COATED ORAL at 05:13

## 2021-01-01 RX ADMIN — LISINOPRIL 2.5 MG: 2.5 TABLET ORAL at 05:12

## 2021-01-01 RX ADMIN — RANOLAZINE 1000 MG: 500 TABLET, FILM COATED, EXTENDED RELEASE ORAL at 20:00

## 2021-01-01 RX ADMIN — METOCLOPRAMIDE 10 MG: 10 TABLET ORAL at 12:50

## 2021-01-01 RX ADMIN — METOCLOPRAMIDE 10 MG: 10 TABLET ORAL at 20:00

## 2021-01-01 RX ADMIN — INSULIN GLARGINE 10 UNITS: 100 INJECTION, SOLUTION SUBCUTANEOUS at 18:34

## 2021-01-01 RX ADMIN — DEXAMETHASONE SODIUM PHOSPHATE 6 MG: 4 INJECTION, SOLUTION INTRA-ARTICULAR; INTRALESIONAL; INTRAMUSCULAR; INTRAVENOUS; SOFT TISSUE at 17:54

## 2021-01-01 RX ADMIN — DOCUSATE SODIUM 50 MG AND SENNOSIDES 8.6 MG 2 TABLET: 8.6; 5 TABLET, FILM COATED ORAL at 17:55

## 2021-01-01 ASSESSMENT — ENCOUNTER SYMPTOMS
ABDOMINAL PAIN: 0
COUGH: 1
VOMITING: 0
SHORTNESS OF BREATH: 1
DIARRHEA: 0
NAUSEA: 0
CHILLS: 0
FALLS: 0
FEVER: 0

## 2021-01-01 ASSESSMENT — PAIN DESCRIPTION - PAIN TYPE: TYPE: ACUTE PAIN

## 2021-01-01 NOTE — PROGRESS NOTES
[facility] Medicine Daily Progress Note    Date of Service  1/1/2021    Chief Complaint  55 y.o. male admitted 12/23/2020 with generalized weakness and shortness of breath    Hospital Course    55M, PMHx CAD, GERD, P-AFib on A/C.  Admitted 12/23 with shortness of breath and generalized weakness secondary to COVID-19 pneumonia with hypoxia, started on Decadron.    Interval Problem Update  Patient seen and examined at bedside this morning.  No acute events overnight.   Transferred from [facility] overnight to [facility] for further management for COVID-19 symptoms. Patient on 6 L oxygen nasal cannula this morning. Will work on weaning down his oxygen requirement over the next few days.    I have performed a physical exam and reviewed and updated ROS and Plan today (1/1/2021). In review of yesterday's note (12/31/2020), there are no changes except as documented above.     Consultants/Specialty  Discussed with infectious disease      Code Status  Full Code    Disposition  To be determined, anticipate medical clearance 2-3 days   Will likely need oxygen on discharge with remote monitoring.  Return to group home    Review of Systems  Review of Systems   Constitutional: Positive for malaise/fatigue (same ). Negative for chills and fever.   Respiratory: Positive for cough and shortness of breath (Unchanged).    Cardiovascular: Negative for chest pain and leg swelling.   Gastrointestinal: Negative for abdominal pain, diarrhea, nausea and vomiting.   Musculoskeletal: Negative for falls.   Skin: Negative for itching and rash.   All other systems reviewed and are negative.     Physical Exam  Temp:  [35.9 °C (96.6 °F)-36.8 °C (98.3 °F)] 36.4 °C (97.5 °F)  Pulse:  [61-88] 62  Resp:  [16-20] 20  BP: (111-118)/(65-82) 113/70  SpO2:  [91 %-95 %] 91 %    Physical Exam  Vitals signs and nursing note reviewed.   Constitutional:       General: He is not in acute distress.     Appearance: Normal appearance.   HENT:      Head: Normocephalic and  atraumatic.      Nose: Nose normal.      Mouth/Throat:      Mouth: Mucous membranes are moist.   Eyes:      General: No scleral icterus.     Conjunctiva/sclera: Conjunctivae normal.   Neck:      Musculoskeletal: Normal range of motion and neck supple.   Cardiovascular:      Rate and Rhythm: Normal rate and regular rhythm.      Heart sounds: No murmur.   Pulmonary:      Effort: Pulmonary effort is normal.      Breath sounds: Decreased air movement present. Rhonchi present. No wheezing.      Comments: Reduced air entry.  Diffuse rhonchi.  Abdominal:      General: Abdomen is flat. There is no distension.      Tenderness: There is no abdominal tenderness. There is no guarding or rebound.   Musculoskeletal:         General: No tenderness.      Right lower leg: No edema.      Left lower leg: No edema.   Skin:     General: Skin is warm and dry.   Neurological:      General: No focal deficit present.      Mental Status: He is alert and oriented to person, place, and time.   Psychiatric:         Mood and Affect: Mood normal.         Behavior: Behavior normal.       Fluids  No intake or output data in the 24 hours ending 01/01/21 0945    Laboratory  Recent Labs     12/30/20  0424 12/31/20  0657   WBC 8.3 8.3   RBC 3.73* 3.76*   HEMOGLOBIN 11.3* 11.2*   HEMATOCRIT 34.1* 34.4*   MCV 91.4 91.5   MCH 30.3 29.8   MCHC 33.1* 32.6*   RDW 44.2 44.1   PLATELETCT 606* 514*   MPV 9.9 9.9     Recent Labs     12/30/20  0424 12/31/20  0657   SODIUM 132* 133*   POTASSIUM 5.1 4.5   CHLORIDE 102 101   CO2 20 23   GLUCOSE 196* 175*   BUN 20 18   CREATININE 0.78 0.80   CALCIUM 8.6 8.6                   Imaging  No orders to display      Assessment/Plan  * Pneumonia due to COVID-19 virus- (present on admission)  Assessment & Plan  Patient will be started on Decadron due to hypoxia, 6 L  Procalcitonin is not significantly elevated, will refrain from starting antibiotics  No erythema or edema of both lower extremities at this point.   We will  start prophylactic pharmacologic agents with Lovenox.    Encourage prone positioning. Oxygen as needed, Incentive spirometry   S/p remdesivir  We will continue supportive care for now.    Coronary artery disease involving native coronary artery of native heart without angina pectoris- (present on admission)  Assessment & Plan  Patient denies chest pain.  Troponin is not elevated  Continue outpatient medication-aspirin, lisinopril, metoprolol and ranolazine    Essential hypertension- (present on admission)  Assessment & Plan  Continue home medication-lisinopril metoprolol with holding parameters.    PAF (paroxysmal atrial fibrillation) ([facility])- (present on admission)  Assessment & Plan  Resume home Xarelto.  Resume home metoprolol with hold parameters.  Rate been controlled     Type 2 diabetes mellitus without complication, without long-term current use of insulin ([facility])- (present on admission)  Assessment & Plan  Expect to worsen with Decadron.  Glycated hemoglobin 7.2%.  Increase Lantus to 10 units and continue sliding scale  Accu-Checks, hypoglycemia protocol      VTE prophylaxis: Xarelto for atrial fibrillation

## 2021-01-01 NOTE — ASSESSMENT & PLAN NOTE
He remains chest pain free  Troponin is not elevated  Continue outpatient medication-aspirin, lisinopril, metoprolol and ranolazine  Follow up with pcp

## 2021-01-01 NOTE — PROGRESS NOTES
Pt and belongings sent with [facility]. [facility] also notified of lack of IV access and attempts made to obtain. All questions answered. Care released.

## 2021-01-01 NOTE — ASSESSMENT & PLAN NOTE
Completed course of decadron   Continues to clinically improve  Secondary hypoxia continues to improve  Procalcitonin is not significantly elevated  Continue supportive care, zinc, vit C, melatonin, pepcid

## 2021-01-01 NOTE — PROGRESS NOTES
Received report from day nurse. Safety precautions in place. Bed locked and low. Offered assist. Call light and belongings within reach.

## 2021-01-02 LAB
GLUCOSE BLD-MCNC: 104 MG/DL (ref 65–99)
GLUCOSE BLD-MCNC: 182 MG/DL (ref 65–99)
GLUCOSE BLD-MCNC: 199 MG/DL (ref 65–99)
GLUCOSE BLD-MCNC: 320 MG/DL (ref 65–99)

## 2021-01-02 PROCEDURE — 99232 SBSQ HOSP IP/OBS MODERATE 35: CPT | Performed by: STUDENT IN AN ORGANIZED HEALTH CARE EDUCATION/TRAINING PROGRAM

## 2021-01-02 PROCEDURE — 700102 HCHG RX REV CODE 250 W/ 637 OVERRIDE(OP): Performed by: STUDENT IN AN ORGANIZED HEALTH CARE EDUCATION/TRAINING PROGRAM

## 2021-01-02 PROCEDURE — A9270 NON-COVERED ITEM OR SERVICE: HCPCS | Performed by: INTERNAL MEDICINE

## 2021-01-02 PROCEDURE — A9270 NON-COVERED ITEM OR SERVICE: HCPCS | Performed by: STUDENT IN AN ORGANIZED HEALTH CARE EDUCATION/TRAINING PROGRAM

## 2021-01-02 PROCEDURE — 700111 HCHG RX REV CODE 636 W/ 250 OVERRIDE (IP): Performed by: STUDENT IN AN ORGANIZED HEALTH CARE EDUCATION/TRAINING PROGRAM

## 2021-01-02 PROCEDURE — 770014 HCHG ROOM/CARE - WARD (150)

## 2021-01-02 PROCEDURE — 82962 GLUCOSE BLOOD TEST: CPT | Mod: 91

## 2021-01-02 PROCEDURE — 700102 HCHG RX REV CODE 250 W/ 637 OVERRIDE(OP): Performed by: INTERNAL MEDICINE

## 2021-01-02 RX ORDER — INSULIN GLARGINE 100 [IU]/ML
15 INJECTION, SOLUTION SUBCUTANEOUS EVERY EVENING
Status: DISCONTINUED | OUTPATIENT
Start: 2021-01-02 | End: 2021-01-09 | Stop reason: HOSPADM

## 2021-01-02 RX ORDER — ASPIRIN 81 MG/1
81 TABLET, CHEWABLE ORAL DAILY
Status: DISCONTINUED | OUTPATIENT
Start: 2021-01-02 | End: 2021-01-09 | Stop reason: HOSPADM

## 2021-01-02 RX ADMIN — RIVAROXABAN 20 MG: 20 TABLET, FILM COATED ORAL at 06:05

## 2021-01-02 RX ADMIN — METOCLOPRAMIDE 10 MG: 10 TABLET ORAL at 06:05

## 2021-01-02 RX ADMIN — RANOLAZINE 1000 MG: 500 TABLET, FILM COATED, EXTENDED RELEASE ORAL at 21:07

## 2021-01-02 RX ADMIN — METOCLOPRAMIDE 10 MG: 10 TABLET ORAL at 18:35

## 2021-01-02 RX ADMIN — METOPROLOL SUCCINATE 25 MG: 25 TABLET, EXTENDED RELEASE ORAL at 06:06

## 2021-01-02 RX ADMIN — ASPIRIN 81 MG CHEWABLE TABLET 81 MG: 81 TABLET CHEWABLE at 06:14

## 2021-01-02 RX ADMIN — INSULIN GLARGINE 15 UNITS: 100 INJECTION, SOLUTION SUBCUTANEOUS at 18:43

## 2021-01-02 RX ADMIN — ONDANSETRON 4 MG: 2 INJECTION INTRAMUSCULAR; INTRAVENOUS at 10:55

## 2021-01-02 RX ADMIN — LISINOPRIL 2.5 MG: 2.5 TABLET ORAL at 06:05

## 2021-01-02 RX ADMIN — METOCLOPRAMIDE 10 MG: 10 TABLET ORAL at 21:07

## 2021-01-02 RX ADMIN — OMEPRAZOLE 40 MG: 20 CAPSULE, DELAYED RELEASE ORAL at 06:05

## 2021-01-02 RX ADMIN — DEXAMETHASONE SODIUM PHOSPHATE 6 MG: 4 INJECTION, SOLUTION INTRA-ARTICULAR; INTRALESIONAL; INTRAMUSCULAR; INTRAVENOUS; SOFT TISSUE at 18:36

## 2021-01-02 RX ADMIN — METOCLOPRAMIDE 10 MG: 10 TABLET ORAL at 12:29

## 2021-01-02 ASSESSMENT — ENCOUNTER SYMPTOMS
NAUSEA: 0
FEVER: 0
DIARRHEA: 0
CHILLS: 0
VOMITING: 0
ABDOMINAL PAIN: 0
SHORTNESS OF BREATH: 1
COUGH: 1
FALLS: 0

## 2021-01-02 ASSESSMENT — PAIN DESCRIPTION - PAIN TYPE: TYPE: ACUTE PAIN

## 2021-01-02 NOTE — PROGRESS NOTES
[facility] Medicine Daily Progress Note    Date of Service  1/2/2021    Chief Complaint  55 y.o. male admitted 12/23/2020 with generalized weakness and shortness of breath    Hospital Course    55M, PMHx CAD, GERD, P-AFib on A/C.  Admitted 12/23 with shortness of breath and generalized weakness secondary to COVID-19 pneumonia with hypoxia, started on Decadron.    Interval Problem Update  Patient seen and examined at bedside this morning.  No acute events overnight.   Transferred from [facility] overnight to [facility] for further management for COVID-19 symptoms.   Patient now on 5L oxygen nasal cannula this morning. Will work on weaning down his oxygen requirement over the next few days.    I have performed a physical exam and reviewed and updated ROS and Plan today (1/2/2021). In review of yesterday's note (1/1/2021), there are no changes except as documented above.     Consultants/Specialty  Discussed with infectious disease      Code Status  Full Code    Disposition  To be determined, anticipate medical clearance 2-3 days   Will likely need oxygen on discharge with remote monitoring.  Return to group home    Review of Systems  Review of Systems   Constitutional: Positive for malaise/fatigue (same ). Negative for chills and fever.   Respiratory: Positive for cough and shortness of breath (Unchanged).    Cardiovascular: Negative for chest pain and leg swelling.   Gastrointestinal: Negative for abdominal pain, diarrhea, nausea and vomiting.   Musculoskeletal: Negative for falls.   Skin: Negative for itching and rash.   All other systems reviewed and are negative.     Physical Exam  Temp:  [36.1 °C (97 °F)-37 °C (98.6 °F)] 37 °C (98.6 °F)  Pulse:  [58-73] 70  Resp:  [16-20] 16  BP: ()/(56-73) 110/73  SpO2:  [91 %-97 %] 96 %    Physical Exam  Vitals signs and nursing note reviewed.   Constitutional:       General: He is not in acute distress.     Appearance: Normal appearance.   HENT:      Head: Normocephalic and  atraumatic.      Nose: Nose normal.      Mouth/Throat:      Mouth: Mucous membranes are moist.   Eyes:      General: No scleral icterus.     Conjunctiva/sclera: Conjunctivae normal.   Neck:      Musculoskeletal: Normal range of motion and neck supple.   Cardiovascular:      Rate and Rhythm: Normal rate and regular rhythm.      Heart sounds: No murmur.   Pulmonary:      Effort: Pulmonary effort is normal.      Breath sounds: Decreased air movement present. Rhonchi present. No wheezing.      Comments: Reduced air entry.  Diffuse rhonchi.  Abdominal:      General: Abdomen is flat. There is no distension.      Tenderness: There is no abdominal tenderness. There is no guarding or rebound.   Musculoskeletal:         General: No tenderness.      Right lower leg: No edema.      Left lower leg: No edema.   Skin:     General: Skin is warm and dry.   Neurological:      General: No focal deficit present.      Mental Status: He is alert and oriented to person, place, and time.   Psychiatric:         Mood and Affect: Mood normal.         Behavior: Behavior normal.       Fluids  No intake or output data in the 24 hours ending 01/02/21 0724    Laboratory  Recent Labs     12/31/20  0657 01/01/21  0939   WBC 8.3 11.1*   RBC 3.76* 4.34*   HEMOGLOBIN 11.2* 13.4*   HEMATOCRIT 34.4* 39.9*   MCV 91.5 91.9   MCH 29.8 30.9   MCHC 32.6* 33.6*   RDW 44.1 45.3   PLATELETCT 514* 566*   MPV 9.9 10.7     Recent Labs     12/31/20  0657 01/01/21  0939   SODIUM 133* 131*   POTASSIUM 4.5 4.7   CHLORIDE 101 98   CO2 23 22   GLUCOSE 175* 143*   BUN 18 17   CREATININE 0.80 0.90   CALCIUM 8.6 9.0                   Imaging  No orders to display      Assessment/Plan  * Pneumonia due to COVID-19 virus- (present on admission)  Assessment & Plan  Patient will be started on Decadron due to hypoxia, 6 L  Procalcitonin is not significantly elevated, will refrain from starting antibiotics  No erythema or edema of both lower extremities at this point.   We will  start prophylactic pharmacologic agents with Lovenox.    Encourage prone positioning. Oxygen as needed, Incentive spirometry   S/p remdesivir  We will continue supportive care for now.    Coronary artery disease involving native coronary artery of native heart without angina pectoris- (present on admission)  Assessment & Plan  Patient denies chest pain.  Troponin is not elevated  Continue outpatient medication-aspirin, lisinopril, metoprolol and ranolazine    Essential hypertension- (present on admission)  Assessment & Plan  Continue home medication-lisinopril metoprolol with holding parameters.    PAF (paroxysmal atrial fibrillation) ([facility])- (present on admission)  Assessment & Plan  Resume home Xarelto.  Resume home metoprolol with hold parameters.  Rate been controlled     Type 2 diabetes mellitus without complication, without long-term current use of insulin ([facility])- (present on admission)  Assessment & Plan  Expect to worsen with Decadron.  Glycated hemoglobin 7.2%.  Increase Lantus to 10 units and continue sliding scale  Accu-Checks, hypoglycemia protocol      VTE prophylaxis: Xarelto for atrial fibrillation

## 2021-01-02 NOTE — FACE TO FACE
"Face to Face Note  -  Durable Medical Equipment    Adán Sanchez D.O. - NPI: [id number]  I certify that this patient is under my care and that they had a durable medical equipment(DME)face to face encounter by myself that meets the physician DME face-to-face encounter requirements with this patient on:    Date of encounter:   Patient:                    MRN:                       YOB: 2023  Ryan Trevizo  [id number]  1965     The encounter with the patient was in whole, or in part, for the following medical condition, which is the primary reason for durable medical equipment:  Other - covid 19 PNA    I certify that, based on my findings, the following durable medical equipment is medically necessary:  Oxygen.    HOME O2 Saturation Measurements:(Values must be present for Home Oxygen orders)  Room air sat at rest: 85  Room air sat with amb: (pt unable to perform due to SOB)  With liters of O2: 3, O2 sat at rest with O2: 93   ,         My Clinical findings support the need for the above equipment due to:  Hypoxia    Supporting Symptoms: The patient requires supplemental oxygen, as the following interventions have been tried with limited or no improvement: \"Ambulation with oximetry    If patient feels more short of breath, they can go up to 6 liters per minute and contact healthcare provider.  "

## 2021-01-02 NOTE — PROGRESS NOTES
Report received from SARAHI Canseco.  Patient  Resting in bed.  POC gone over with pt and all questions answered.  Patient A & O  X 4.  No apparent signs of distress.  Safety precautions in place.  Patient educated to call for assistance.  Will continue to monitor.

## 2021-01-02 NOTE — DISCHARGE PLANNING
Care Transition Team Assessment    SW placed call to Pt's Group Home Owner Giovana ([phone]).  Per Giovana Pt can not return home until he is medically cleared from COVID. Giovana needs to clarify the State [place]'s protocol's on Monday prior to coming up with Pt's discharge plan.     Information Source  Orientation : Oriented x 4  Information Given By: Caregiver  Informant's Name: Giovana  Who is responsible for making decisions for patient? : Patient    Readmission Evaluation  Is this a readmission?: No    Interdisciplinary Discharge Planning  Primary Care Physician: Ezekiel  Lives with - Patient's Self Care Capacity: Attendant / Paid Care Giver  Housing / Facility: [facility]  Name of Care Facility: [facility]  Do You Take your Prescribed Medications Regularly: Yes    Discharge Preparedness  What is your plan after discharge?: FPC  What are your discharge supports?: Other (comment)(Caregivers)  Prior Functional Level: Needs Assist with Activities of Daily Living, Needs Assist with Medication Management    Functional Assesment  Prior Functional Level: Needs Assist with Activities of Daily Living, Needs Assist with Medication Management    Finances  Financial Barriers to Discharge: No  Prescription Coverage: Yes    Advance Directive  Advance Directive?: None    Psychological Assessment  History of Substance Abuse: Non     Anticipated Discharge Information  Discharge Disposition: Still a Patient (30)

## 2021-01-03 LAB
ALBUMIN SERPL BCP-MCNC: 3.4 G/DL (ref 3.2–4.9)
ALBUMIN/GLOB SERPL: 1 G/DL
ALP SERPL-CCNC: 89 U/L (ref 30–99)
ALT SERPL-CCNC: 31 U/L (ref 2–50)
ANION GAP SERPL CALC-SCNC: 10 MMOL/L (ref 7–16)
AST SERPL-CCNC: 19 U/L (ref 12–45)
BASOPHILS # BLD AUTO: 0.1 % (ref 0–1.8)
BASOPHILS # BLD: 0.01 K/UL (ref 0–0.12)
BILIRUB SERPL-MCNC: 0.8 MG/DL (ref 0.1–1.5)
BUN SERPL-MCNC: 19 MG/DL (ref 8–22)
CALCIUM SERPL-MCNC: 9.1 MG/DL (ref 8.5–10.5)
CHLORIDE SERPL-SCNC: 99 MMOL/L (ref 96–112)
CO2 SERPL-SCNC: 23 MMOL/L (ref 20–33)
CREAT SERPL-MCNC: 0.83 MG/DL (ref 0.5–1.4)
EOSINOPHIL # BLD AUTO: 0 K/UL (ref 0–0.51)
EOSINOPHIL NFR BLD: 0 % (ref 0–6.9)
ERYTHROCYTE [DISTWIDTH] IN BLOOD BY AUTOMATED COUNT: 46.2 FL (ref 35.9–50)
GLOBULIN SER CALC-MCNC: 3.3 G/DL (ref 1.9–3.5)
GLUCOSE BLD-MCNC: 112 MG/DL (ref 65–99)
GLUCOSE BLD-MCNC: 166 MG/DL (ref 65–99)
GLUCOSE BLD-MCNC: 221 MG/DL (ref 65–99)
GLUCOSE BLD-MCNC: 228 MG/DL (ref 65–99)
GLUCOSE SERPL-MCNC: 116 MG/DL (ref 65–99)
HCT VFR BLD AUTO: 37.7 % (ref 42–52)
HGB BLD-MCNC: 12.3 G/DL (ref 14–18)
IMM GRANULOCYTES # BLD AUTO: 0.25 K/UL (ref 0–0.11)
IMM GRANULOCYTES NFR BLD AUTO: 2 % (ref 0–0.9)
LYMPHOCYTES # BLD AUTO: 1.03 K/UL (ref 1–4.8)
LYMPHOCYTES NFR BLD: 8.1 % (ref 22–41)
MCH RBC QN AUTO: 30.3 PG (ref 27–33)
MCHC RBC AUTO-ENTMCNC: 32.6 G/DL (ref 33.7–35.3)
MCV RBC AUTO: 92.9 FL (ref 81.4–97.8)
MONOCYTES # BLD AUTO: 0.66 K/UL (ref 0–0.85)
MONOCYTES NFR BLD AUTO: 5.2 % (ref 0–13.4)
NEUTROPHILS # BLD AUTO: 10.73 K/UL (ref 1.82–7.42)
NEUTROPHILS NFR BLD: 84.6 % (ref 44–72)
NRBC # BLD AUTO: 0 K/UL
NRBC BLD-RTO: 0 /100 WBC
PLATELET # BLD AUTO: 623 K/UL (ref 164–446)
PMV BLD AUTO: 10.2 FL (ref 9–12.9)
POTASSIUM SERPL-SCNC: 4.5 MMOL/L (ref 3.6–5.5)
PROT SERPL-MCNC: 6.7 G/DL (ref 6–8.2)
RBC # BLD AUTO: 4.06 M/UL (ref 4.7–6.1)
SODIUM SERPL-SCNC: 132 MMOL/L (ref 135–145)
WBC # BLD AUTO: 12.7 K/UL (ref 4.8–10.8)

## 2021-01-03 PROCEDURE — 36415 COLL VENOUS BLD VENIPUNCTURE: CPT

## 2021-01-03 PROCEDURE — 82962 GLUCOSE BLOOD TEST: CPT

## 2021-01-03 PROCEDURE — 700102 HCHG RX REV CODE 250 W/ 637 OVERRIDE(OP): Performed by: STUDENT IN AN ORGANIZED HEALTH CARE EDUCATION/TRAINING PROGRAM

## 2021-01-03 PROCEDURE — A9270 NON-COVERED ITEM OR SERVICE: HCPCS | Performed by: STUDENT IN AN ORGANIZED HEALTH CARE EDUCATION/TRAINING PROGRAM

## 2021-01-03 PROCEDURE — 80053 COMPREHEN METABOLIC PANEL: CPT

## 2021-01-03 PROCEDURE — 94760 N-INVAS EAR/PLS OXIMETRY 1: CPT

## 2021-01-03 PROCEDURE — 770014 HCHG ROOM/CARE - WARD (150)

## 2021-01-03 PROCEDURE — A9270 NON-COVERED ITEM OR SERVICE: HCPCS | Performed by: INTERNAL MEDICINE

## 2021-01-03 PROCEDURE — 700102 HCHG RX REV CODE 250 W/ 637 OVERRIDE(OP): Performed by: INTERNAL MEDICINE

## 2021-01-03 PROCEDURE — 85025 COMPLETE CBC W/AUTO DIFF WBC: CPT

## 2021-01-03 PROCEDURE — 99232 SBSQ HOSP IP/OBS MODERATE 35: CPT | Performed by: STUDENT IN AN ORGANIZED HEALTH CARE EDUCATION/TRAINING PROGRAM

## 2021-01-03 RX ADMIN — OMEPRAZOLE 40 MG: 20 CAPSULE, DELAYED RELEASE ORAL at 05:30

## 2021-01-03 RX ADMIN — RIVAROXABAN 20 MG: 20 TABLET, FILM COATED ORAL at 05:30

## 2021-01-03 RX ADMIN — METOCLOPRAMIDE 10 MG: 10 TABLET ORAL at 20:13

## 2021-01-03 RX ADMIN — METOCLOPRAMIDE 10 MG: 10 TABLET ORAL at 05:31

## 2021-01-03 RX ADMIN — METOCLOPRAMIDE 10 MG: 10 TABLET ORAL at 17:29

## 2021-01-03 RX ADMIN — LISINOPRIL 2.5 MG: 2.5 TABLET ORAL at 05:31

## 2021-01-03 RX ADMIN — METOPROLOL SUCCINATE 25 MG: 25 TABLET, EXTENDED RELEASE ORAL at 05:31

## 2021-01-03 RX ADMIN — METOCLOPRAMIDE 10 MG: 10 TABLET ORAL at 11:59

## 2021-01-03 RX ADMIN — RANOLAZINE 1000 MG: 500 TABLET, FILM COATED, EXTENDED RELEASE ORAL at 20:13

## 2021-01-03 RX ADMIN — ASPIRIN 81 MG CHEWABLE TABLET 81 MG: 81 TABLET CHEWABLE at 05:30

## 2021-01-03 RX ADMIN — INSULIN GLARGINE 15 UNITS: 100 INJECTION, SOLUTION SUBCUTANEOUS at 18:16

## 2021-01-03 ASSESSMENT — ENCOUNTER SYMPTOMS
ABDOMINAL PAIN: 0
FALLS: 0
COUGH: 1
VOMITING: 0
SHORTNESS OF BREATH: 1
FEVER: 0
NAUSEA: 0
CHILLS: 0
DIARRHEA: 0

## 2021-01-03 ASSESSMENT — PAIN DESCRIPTION - PAIN TYPE: TYPE: ACUTE PAIN

## 2021-01-03 NOTE — DISCHARGE PLANNING
Agency/Facility Name: [facility]  Spoke To: Celio  Outcome: Patient does not have a credit card.  [facility] stated the patient has Medicare and Medicaid FFS.  Oxygen will be delivered between 1500- 1600 or sooner.    SARAHI LANGFORD Juany notified via Teams.

## 2021-01-03 NOTE — DISCHARGE PLANNING
Agency/Facility Name: [facility]  Spoke To: Celio  Outcome: Celio will contact patient to put a credit card on file.  Celio will call this [facility] back to verify referral is accepted and oxygen will be delivered.

## 2021-01-03 NOTE — DISCHARGE PLANNING
Agency/Facility Name: [facility]  Spoke To: Douglas Benson - kisha service  Outcome: Will contact on-call  to call this [facility].

## 2021-01-03 NOTE — DISCHARGE PLANNING
Agency/Facility Name: [facility]  Spoke To: Celio  Outcome: Portable oxygen will be delivered shortly.

## 2021-01-03 NOTE — PROGRESS NOTES
Hospital Medicine Daily Progress Note    Date of Service  1/3/2021    Chief Complaint  55 y.o. male admitted 12/23/2020 with generalized weakness and shortness of breath    Hospital Course    55M, PMHx CAD, GERD, P-AFib on A/C.  Admitted 12/23 with shortness of breath and generalized weakness secondary to COVID-19 pneumonia with hypoxia, started on Decadron.    Interval Problem Update  Patient seen and examined at bedside this morning.  No acute events overnight.   Transferred from [facility] overnight to [facility] for further management for COVID-19 symptoms.   Patient on 2-4L oxygen nasal cannula this morning. Will work on weaning down his oxygen requirement over the next few days.  Patient here from a group home, will likely need to be Covid free prior to disposition. Will work with  regarding this.    I have performed a physical exam and reviewed and updated ROS and Plan today (1/3/2021). In review of yesterday's note (1/2/2021), there are no changes except as documented above.     Consultants/Specialty  Discussed with infectious disease      Code Status  Full Code    Disposition  To be determined, anticipate medical clearance 2-3 days   Will likely need oxygen on discharge with remote monitoring.  Return to group home    Review of Systems  Review of Systems   Constitutional: Positive for malaise/fatigue (same ). Negative for chills and fever.   Respiratory: Positive for cough and shortness of breath (Unchanged).    Cardiovascular: Negative for chest pain and leg swelling.   Gastrointestinal: Negative for abdominal pain, diarrhea, nausea and vomiting.   Musculoskeletal: Negative for falls.   Skin: Negative for itching and rash.   All other systems reviewed and are negative.     Physical Exam  Temp:  [36.1 °C (96.9 °F)-37.1 °C (98.8 °F)] 37.1 °C (98.8 °F)  Pulse:  [61-68] 66  Resp:  [17-20] 17  BP: (103-111)/(64-69) 111/69  SpO2:  [92 %-95 %] 92 %    Physical Exam  Vitals signs and nursing note reviewed.    Constitutional:       General: He is not in acute distress.     Appearance: Normal appearance.   HENT:      Head: Normocephalic and atraumatic.      Nose: Nose normal.      Mouth/Throat:      Mouth: Mucous membranes are moist.   Eyes:      General: No scleral icterus.     Conjunctiva/sclera: Conjunctivae normal.   Neck:      Musculoskeletal: Normal range of motion and neck supple.   Cardiovascular:      Rate and Rhythm: Normal rate and regular rhythm.      Heart sounds: No murmur.   Pulmonary:      Effort: Pulmonary effort is normal.      Breath sounds: Decreased air movement present. Rhonchi present. No wheezing.      Comments: Reduced air entry.  Diffuse rhonchi.  Abdominal:      General: Abdomen is flat. There is no distension.      Tenderness: There is no abdominal tenderness. There is no guarding or rebound.   Musculoskeletal:         General: No tenderness.      Right lower leg: No edema.      Left lower leg: No edema.   Skin:     General: Skin is warm and dry.   Neurological:      General: No focal deficit present.      Mental Status: He is alert and oriented to person, place, and time.   Psychiatric:         Mood and Affect: Mood normal.         Behavior: Behavior normal.       Fluids    Intake/Output Summary (Last 24 hours) at 1/3/2021 0655  Last data filed at 1/3/2021 0400  Gross per 24 hour   Intake 250 ml   Output 650 ml   Net -400 ml       Laboratory  Recent Labs     12/31/20  0657 01/01/21  0939   WBC 8.3 11.1*   RBC 3.76* 4.34*   HEMOGLOBIN 11.2* 13.4*   HEMATOCRIT 34.4* 39.9*   MCV 91.5 91.9   MCH 29.8 30.9   MCHC 32.6* 33.6*   RDW 44.1 45.3   PLATELETCT 514* 566*   MPV 9.9 10.7     Recent Labs     12/31/20  0657 01/01/21  0939   SODIUM 133* 131*   POTASSIUM 4.5 4.7   CHLORIDE 101 98   CO2 23 22   GLUCOSE 175* 143*   BUN 18 17   CREATININE 0.80 0.90   CALCIUM 8.6 9.0                   Imaging  No orders to display      Assessment/Plan  * Pneumonia due to COVID-19 virus- (present on  admission)  Assessment & Plan  Patient will be started on Decadron due to hypoxia, 6 L  Procalcitonin is not significantly elevated, will refrain from starting antibiotics  No erythema or edema of both lower extremities at this point.   We will start prophylactic pharmacologic agents with Lovenox.    Encourage prone positioning. Oxygen as needed, Incentive spirometry   S/p remdesivir  We will continue supportive care for now.    Coronary artery disease involving native coronary artery of native heart without angina pectoris- (present on admission)  Assessment & Plan  Patient denies chest pain.  Troponin is not elevated  Continue outpatient medication-aspirin, lisinopril, metoprolol and ranolazine    Essential hypertension- (present on admission)  Assessment & Plan  Continue home medication-lisinopril metoprolol with holding parameters.    PAF (paroxysmal atrial fibrillation) ([facility])- (present on admission)  Assessment & Plan  Resume home Xarelto.  Resume home metoprolol with hold parameters.  Rate been controlled     Type 2 diabetes mellitus without complication, without long-term current use of insulin ([facility])- (present on admission)  Assessment & Plan  Expect to worsen with Decadron.  Glycated hemoglobin 7.2%.  Increase Lantus to 10 units and continue sliding scale  Accu-Checks, hypoglycemia protocol      VTE prophylaxis: Xarelto for atrial fibrillation

## 2021-01-03 NOTE — DISCHARGE PLANNING
Anticipated Discharge Disposition: group home w/ O2    Action:   0849 faxed O2 choice to [facility] ([facility]). Per chart review, group home owner Giovana needs to verify Covid clearance policy with the State on Monday before accepting pt back to group home  10:15 per [facility], [facility] O2 tank ETA [id number] today. Home O2 concentrator delivery instruction entered in AVS    Barriers to Discharge:   Group home acceptance    Plan: TBD

## 2021-01-03 NOTE — DISCHARGE PLANNING
Received Choice form at 0880  Agency/Facility Name: [facility]  Referral sent per Choice form @ [id number]

## 2021-01-04 ENCOUNTER — APPOINTMENT (OUTPATIENT)
Dept: RADIOLOGY | Facility: MEDICAL CENTER | Age: 56
DRG: 177 | End: 2021-01-04
Attending: STUDENT IN AN ORGANIZED HEALTH CARE EDUCATION/TRAINING PROGRAM
Payer: MEDICARE

## 2021-01-04 LAB
GLUCOSE BLD-MCNC: 108 MG/DL (ref 65–99)
GLUCOSE BLD-MCNC: 192 MG/DL (ref 65–99)
GLUCOSE BLD-MCNC: 198 MG/DL (ref 65–99)
GLUCOSE BLD-MCNC: 246 MG/DL (ref 65–99)

## 2021-01-04 PROCEDURE — A9270 NON-COVERED ITEM OR SERVICE: HCPCS | Performed by: INTERNAL MEDICINE

## 2021-01-04 PROCEDURE — 71275 CT ANGIOGRAPHY CHEST: CPT

## 2021-01-04 PROCEDURE — 82962 GLUCOSE BLOOD TEST: CPT | Mod: 91

## 2021-01-04 PROCEDURE — A9270 NON-COVERED ITEM OR SERVICE: HCPCS | Performed by: STUDENT IN AN ORGANIZED HEALTH CARE EDUCATION/TRAINING PROGRAM

## 2021-01-04 PROCEDURE — 700102 HCHG RX REV CODE 250 W/ 637 OVERRIDE(OP): Performed by: STUDENT IN AN ORGANIZED HEALTH CARE EDUCATION/TRAINING PROGRAM

## 2021-01-04 PROCEDURE — 700102 HCHG RX REV CODE 250 W/ 637 OVERRIDE(OP): Performed by: INTERNAL MEDICINE

## 2021-01-04 PROCEDURE — 700117 HCHG RX CONTRAST REV CODE 255: Performed by: STUDENT IN AN ORGANIZED HEALTH CARE EDUCATION/TRAINING PROGRAM

## 2021-01-04 PROCEDURE — 770014 HCHG ROOM/CARE - WARD (150)

## 2021-01-04 PROCEDURE — 99232 SBSQ HOSP IP/OBS MODERATE 35: CPT | Performed by: STUDENT IN AN ORGANIZED HEALTH CARE EDUCATION/TRAINING PROGRAM

## 2021-01-04 PROCEDURE — 94760 N-INVAS EAR/PLS OXIMETRY 1: CPT

## 2021-01-04 RX ADMIN — RIVAROXABAN 20 MG: 20 TABLET, FILM COATED ORAL at 05:24

## 2021-01-04 RX ADMIN — METOCLOPRAMIDE 10 MG: 10 TABLET ORAL at 05:24

## 2021-01-04 RX ADMIN — OMEPRAZOLE 40 MG: 20 CAPSULE, DELAYED RELEASE ORAL at 05:24

## 2021-01-04 RX ADMIN — METOCLOPRAMIDE 10 MG: 10 TABLET ORAL at 12:01

## 2021-01-04 RX ADMIN — ASPIRIN 81 MG CHEWABLE TABLET 81 MG: 81 TABLET CHEWABLE at 05:24

## 2021-01-04 RX ADMIN — METOPROLOL SUCCINATE 25 MG: 25 TABLET, EXTENDED RELEASE ORAL at 05:24

## 2021-01-04 RX ADMIN — IOHEXOL 65 ML: 350 INJECTION, SOLUTION INTRAVENOUS at 17:20

## 2021-01-04 RX ADMIN — RANOLAZINE 1000 MG: 500 TABLET, FILM COATED, EXTENDED RELEASE ORAL at 21:03

## 2021-01-04 RX ADMIN — INSULIN GLARGINE 15 UNITS: 100 INJECTION, SOLUTION SUBCUTANEOUS at 17:39

## 2021-01-04 ASSESSMENT — ENCOUNTER SYMPTOMS
VOMITING: 0
FALLS: 0
ABDOMINAL PAIN: 0
CHILLS: 0
SHORTNESS OF BREATH: 1
FEVER: 0
DIARRHEA: 0
COUGH: 1
NAUSEA: 0

## 2021-01-04 ASSESSMENT — CHA2DS2 SCORE
AGE 65 TO 74: NO
VASCULAR DISEASE: NO
CHA2DS2 VASC SCORE: 2
CHF OR LEFT VENTRICULAR DYSFUNCTION: NO
SEX: MALE
AGE 75 OR GREATER: NO
DIABETES: YES
HYPERTENSION: YES
PRIOR STROKE OR TIA OR THROMBOEMBOLISM: NO

## 2021-01-04 NOTE — PROGRESS NOTES
Primary RN requesting this RN to place order for VAT placement, Pt IV access lost. Order placed per protocol

## 2021-01-04 NOTE — PROGRESS NOTES
Assessment completed. Patient sitting at edge of bed. Pt A&Ox4. Respirations are even and unlabored on 6L n/c, sating 92%. Pt denies pain at this time. Monitors applied, VS stable, call light and belongings within reach. POC updated (grp home transfer).

## 2021-01-04 NOTE — PROGRESS NOTES
Hospital Medicine Daily Progress Note    Date of Service  1/4/2021    Chief Complaint  55 y.o. male admitted 12/23/2020 with generalized weakness and shortness of breath    Hospital Course    55M, PMHx CAD, GERD, P-AFib on A/C.  Admitted 12/23 with shortness of breath and generalized weakness secondary to COVID-19 pneumonia with hypoxia, started on Decadron.    Interval Problem Update  Patient seen and examined at bedside this morning.  No acute events overnight.   Transferred from [facility] overnight to [facility] for further management for COVID-19 symptoms.   Continues to require 4 to 6 L of oxygen. We will get a CTA chest to rule out acute PE despite patient being on Xarelto chronically. We will continue to work on weaning down oxygen requirement.    I have performed a physical exam and reviewed and updated ROS and Plan today (1/4/2021). In review of yesterday's note (1/3/2021), there are no changes except as documented above.     Consultants/Specialty  Discussed with infectious disease      Code Status  Full Code    Disposition  To be determined, anticipate medical clearance 2-3 days   Will likely need oxygen on discharge with remote monitoring.  Return to group home    Review of Systems  Review of Systems   Constitutional: Positive for malaise/fatigue (same ). Negative for chills and fever.   Respiratory: Positive for cough and shortness of breath (Unchanged).    Cardiovascular: Negative for chest pain and leg swelling.   Gastrointestinal: Negative for abdominal pain, diarrhea, nausea and vomiting.   Musculoskeletal: Negative for falls.   Skin: Negative for itching and rash.   All other systems reviewed and are negative.     Physical Exam  Temp:  [36.1 °C (97 °F)-36.4 °C (97.6 °F)] 36.2 °C (97.1 °F)  Pulse:  [60-78] 70  Resp:  [16-18] 18  BP: ()/(64-77) 106/64  SpO2:  [90 %-97 %] 90 %    Physical Exam  Vitals signs and nursing note reviewed.   Constitutional:       General: He is not in acute distress.      Appearance: Normal appearance.   HENT:      Head: Normocephalic and atraumatic.      Nose: Nose normal.      Mouth/Throat:      Mouth: Mucous membranes are moist.   Eyes:      General: No scleral icterus.     Conjunctiva/sclera: Conjunctivae normal.   Neck:      Musculoskeletal: Normal range of motion and neck supple.   Cardiovascular:      Rate and Rhythm: Normal rate and regular rhythm.      Heart sounds: No murmur.   Pulmonary:      Effort: Pulmonary effort is normal.      Breath sounds: Decreased air movement present. Rhonchi present. No wheezing.      Comments: Reduced air entry.  Diffuse rhonchi.  Abdominal:      General: Abdomen is flat. There is no distension.      Tenderness: There is no abdominal tenderness. There is no guarding or rebound.   Musculoskeletal:         General: No tenderness.      Right lower leg: No edema.      Left lower leg: No edema.   Skin:     General: Skin is warm and dry.   Neurological:      General: No focal deficit present.      Mental Status: He is alert and oriented to person, place, and time.   Psychiatric:         Mood and Affect: Mood normal.         Behavior: Behavior normal.       Fluids  No intake or output data in the 24 hours ending 01/04/21 1019    Laboratory  Recent Labs     01/03/21  1139   WBC 12.7*   RBC 4.06*   HEMOGLOBIN 12.3*   HEMATOCRIT 37.7*   MCV 92.9   MCH 30.3   MCHC 32.6*   RDW 46.2   PLATELETCT 623*   MPV 10.2     Recent Labs     01/03/21  1139   SODIUM 132*   POTASSIUM 4.5   CHLORIDE 99   CO2 23   GLUCOSE 116*   BUN 19   CREATININE 0.83   CALCIUM 9.1                   Imaging  CT-CTA CHEST PULMONARY ARTERY W/ RECONS    (Results Pending)      Assessment/Plan  * Pneumonia due to COVID-19 virus- (present on admission)  Assessment & Plan  Patient will be started on Decadron due to hypoxia, 6 L  Procalcitonin is not significantly elevated, will refrain from starting antibiotics  No erythema or edema of both lower extremities at this point.   We will start  prophylactic pharmacologic agents with Lovenox.    Encourage prone positioning. Oxygen as needed, Incentive spirometry   S/p remdesivir  CT chest to rule out acute PE.  We will continue supportive care for now.    Coronary artery disease involving native coronary artery of native heart without angina pectoris- (present on admission)  Assessment & Plan  Patient denies chest pain.  Troponin is not elevated  Continue outpatient medication-aspirin, lisinopril, metoprolol and ranolazine    Essential hypertension- (present on admission)  Assessment & Plan  Continue home medication-lisinopril metoprolol with holding parameters.    PAF (paroxysmal atrial fibrillation) (HCC)- (present on admission)  Assessment & Plan  Resume home Xarelto.  Resume home metoprolol with hold parameters.  Rate been controlled     Type 2 diabetes mellitus without complication, without long-term current use of insulin ([facility])- (present on admission)  Assessment & Plan  Expect to worsen with Decadron.  Glycated hemoglobin 7.2%.  Increase Lantus to 10 units and continue sliding scale  Accu-Checks, hypoglycemia protocol      VTE prophylaxis: Xarelto for atrial fibrillation

## 2021-01-05 LAB
25(OH)D3 SERPL-MCNC: 37 NG/ML (ref 30–100)
ANION GAP SERPL CALC-SCNC: 8 MMOL/L (ref 7–16)
BASOPHILS # BLD AUTO: 0.1 % (ref 0–1.8)
BASOPHILS # BLD: 0.01 K/UL (ref 0–0.12)
BUN SERPL-MCNC: 16 MG/DL (ref 8–22)
CALCIUM SERPL-MCNC: 8.8 MG/DL (ref 8.5–10.5)
CHLORIDE SERPL-SCNC: 101 MMOL/L (ref 96–112)
CO2 SERPL-SCNC: 27 MMOL/L (ref 20–33)
CREAT SERPL-MCNC: 0.88 MG/DL (ref 0.5–1.4)
EOSINOPHIL # BLD AUTO: 0.13 K/UL (ref 0–0.51)
EOSINOPHIL NFR BLD: 1.6 % (ref 0–6.9)
ERYTHROCYTE [DISTWIDTH] IN BLOOD BY AUTOMATED COUNT: 48.9 FL (ref 35.9–50)
GLUCOSE BLD-MCNC: 105 MG/DL (ref 65–99)
GLUCOSE BLD-MCNC: 247 MG/DL (ref 65–99)
GLUCOSE BLD-MCNC: 264 MG/DL (ref 65–99)
GLUCOSE BLD-MCNC: 93 MG/DL (ref 65–99)
GLUCOSE SERPL-MCNC: 111 MG/DL (ref 65–99)
HCT VFR BLD AUTO: 38.5 % (ref 42–52)
HGB BLD-MCNC: 12.5 G/DL (ref 14–18)
IMM GRANULOCYTES # BLD AUTO: 0.13 K/UL (ref 0–0.11)
IMM GRANULOCYTES NFR BLD AUTO: 1.6 % (ref 0–0.9)
LYMPHOCYTES # BLD AUTO: 1.03 K/UL (ref 1–4.8)
LYMPHOCYTES NFR BLD: 12.4 % (ref 22–41)
MCH RBC QN AUTO: 30.4 PG (ref 27–33)
MCHC RBC AUTO-ENTMCNC: 32.5 G/DL (ref 33.7–35.3)
MCV RBC AUTO: 93.7 FL (ref 81.4–97.8)
MONOCYTES # BLD AUTO: 0.6 K/UL (ref 0–0.85)
MONOCYTES NFR BLD AUTO: 7.2 % (ref 0–13.4)
NEUTROPHILS # BLD AUTO: 6.39 K/UL (ref 1.82–7.42)
NEUTROPHILS NFR BLD: 77.1 % (ref 44–72)
NRBC # BLD AUTO: 0 K/UL
NRBC BLD-RTO: 0 /100 WBC
PLATELET # BLD AUTO: 460 K/UL (ref 164–446)
PMV BLD AUTO: 9.7 FL (ref 9–12.9)
POTASSIUM SERPL-SCNC: 4.5 MMOL/L (ref 3.6–5.5)
RBC # BLD AUTO: 4.11 M/UL (ref 4.7–6.1)
SODIUM SERPL-SCNC: 136 MMOL/L (ref 135–145)
WBC # BLD AUTO: 8.3 K/UL (ref 4.8–10.8)

## 2021-01-05 PROCEDURE — 700102 HCHG RX REV CODE 250 W/ 637 OVERRIDE(OP): Performed by: STUDENT IN AN ORGANIZED HEALTH CARE EDUCATION/TRAINING PROGRAM

## 2021-01-05 PROCEDURE — 700102 HCHG RX REV CODE 250 W/ 637 OVERRIDE(OP): Performed by: HOSPITALIST

## 2021-01-05 PROCEDURE — 36415 COLL VENOUS BLD VENIPUNCTURE: CPT

## 2021-01-05 PROCEDURE — 770014 HCHG ROOM/CARE - WARD (150)

## 2021-01-05 PROCEDURE — A9270 NON-COVERED ITEM OR SERVICE: HCPCS | Performed by: STUDENT IN AN ORGANIZED HEALTH CARE EDUCATION/TRAINING PROGRAM

## 2021-01-05 PROCEDURE — 99232 SBSQ HOSP IP/OBS MODERATE 35: CPT | Performed by: HOSPITALIST

## 2021-01-05 PROCEDURE — 80048 BASIC METABOLIC PNL TOTAL CA: CPT

## 2021-01-05 PROCEDURE — 85025 COMPLETE CBC W/AUTO DIFF WBC: CPT

## 2021-01-05 PROCEDURE — 82962 GLUCOSE BLOOD TEST: CPT | Mod: 91

## 2021-01-05 PROCEDURE — A9270 NON-COVERED ITEM OR SERVICE: HCPCS | Performed by: HOSPITALIST

## 2021-01-05 PROCEDURE — 82306 VITAMIN D 25 HYDROXY: CPT

## 2021-01-05 PROCEDURE — 94760 N-INVAS EAR/PLS OXIMETRY 1: CPT

## 2021-01-05 RX ORDER — CHOLECALCIFEROL (VITAMIN D3) 125 MCG
2.5 CAPSULE ORAL NIGHTLY
Status: DISCONTINUED | OUTPATIENT
Start: 2021-01-05 | End: 2021-01-09 | Stop reason: HOSPADM

## 2021-01-05 RX ORDER — VITAMIN B COMPLEX
1000 TABLET ORAL
Status: DISCONTINUED | OUTPATIENT
Start: 2021-01-05 | End: 2021-01-05

## 2021-01-05 RX ORDER — ZINC SULFATE 50(220)MG
220 CAPSULE ORAL DAILY
Status: DISCONTINUED | OUTPATIENT
Start: 2021-01-05 | End: 2021-01-05

## 2021-01-05 RX ADMIN — RIVAROXABAN 20 MG: 20 TABLET, FILM COATED ORAL at 05:38

## 2021-01-05 RX ADMIN — Medication 2.5 MG: at 21:58

## 2021-01-05 RX ADMIN — RANOLAZINE 1000 MG: 500 TABLET, FILM COATED, EXTENDED RELEASE ORAL at 21:58

## 2021-01-05 RX ADMIN — METOPROLOL SUCCINATE 25 MG: 25 TABLET, EXTENDED RELEASE ORAL at 05:38

## 2021-01-05 RX ADMIN — LISINOPRIL 2.5 MG: 2.5 TABLET ORAL at 05:38

## 2021-01-05 RX ADMIN — INSULIN GLARGINE 15 UNITS: 100 INJECTION, SOLUTION SUBCUTANEOUS at 17:43

## 2021-01-05 RX ADMIN — ASPIRIN 81 MG CHEWABLE TABLET 81 MG: 81 TABLET CHEWABLE at 05:38

## 2021-01-05 ASSESSMENT — ENCOUNTER SYMPTOMS
GASTROINTESTINAL NEGATIVE: 1
MUSCULOSKELETAL NEGATIVE: 1
SORE THROAT: 0
HEADACHES: 0
EYES NEGATIVE: 1
MYALGIAS: 0
DIZZINESS: 0
BRUISES/BLEEDS EASILY: 0
ABDOMINAL PAIN: 0
VOMITING: 0
BLURRED VISION: 0
PSYCHIATRIC NEGATIVE: 1
CARDIOVASCULAR NEGATIVE: 1
DIARRHEA: 0
DEPRESSION: 0
DIAPHORESIS: 0
FALLS: 0
WEIGHT LOSS: 0
PALPITATIONS: 0
WEAKNESS: 0
NEUROLOGICAL NEGATIVE: 1
FEVER: 0
SHORTNESS OF BREATH: 1
FOCAL WEAKNESS: 0
NAUSEA: 0
CHILLS: 0
COUGH: 0

## 2021-01-05 ASSESSMENT — LIFESTYLE VARIABLES: SUBSTANCE_ABUSE: 0

## 2021-01-05 NOTE — PROGRESS NOTES
Assessment complete. Pt A&O X4. Mild work of breathing, SPO2 96% 6L n/c. POC discussed. belongings within reach. Needs met at this time.

## 2021-01-05 NOTE — PROGRESS NOTES
"Hospital Medicine Daily Progress Note    Date of Service  1/5/2021    Chief Complaint  55 y.o. male admitted 12/23/2020 with generalized weakness and shortness of breath    Hospital Course  Patient is a 73 year old male with history of CAD, GERD, P-AFib on chronic anticoagulation A/C.  Admitted 12/23 with shortness of breath and generalized weakness secondary to COVID-19 pneumonia with hypoxia, started on Decadron.    Interval Problem Update  He continues to require 6L.  He reported some mild \"stomach pain\" this am, it is difficult for him to characterized and it resolved with food.  He denies sob or cough although he continues to require 6 L. Voiding bowel and bladder.  Axox3, ros otherwise negative. Discussed with nursing and case mgt.    Consultants/Specialty      Code Status  Full Code    Disposition  To be determine, once down to 4L may be candidate to d/c back to group home with remote monitoring    Review of Systems  Review of Systems   Constitutional: Positive for malaise/fatigue. Negative for chills, diaphoresis, fever and weight loss.   HENT: Negative.  Negative for sore throat.    Eyes: Negative.  Negative for blurred vision.   Respiratory: Positive for shortness of breath (improving). Negative for cough.    Cardiovascular: Negative.  Negative for chest pain, palpitations and leg swelling.   Gastrointestinal: Negative.  Negative for abdominal pain, diarrhea, nausea and vomiting.   Genitourinary: Negative.  Negative for dysuria.   Musculoskeletal: Negative.  Negative for falls and myalgias.   Skin: Negative.  Negative for itching and rash.   Neurological: Negative.  Negative for dizziness, focal weakness, weakness and headaches.   Endo/Heme/Allergies: Negative.  Does not bruise/bleed easily.   Psychiatric/Behavioral: Negative.  Negative for depression, substance abuse and suicidal ideas.   All other systems reviewed and are negative.     Physical Exam  Temp:  [36.1 °C (97 °F)-36.3 °C (97.4 °F)] 36.1 °C (97 " °F)  Pulse:  [60-85] 85  Resp:  [16-18] 17  BP: (108-119)/(67-82) 119/80  SpO2:  [92 %-96 %] 93 %    Physical Exam  Vitals signs and nursing note reviewed.   Constitutional:       General: He is not in acute distress.     Appearance: Normal appearance.   HENT:      Head: Normocephalic and atraumatic.      Nose: Nose normal.      Mouth/Throat:      Mouth: Mucous membranes are moist.   Eyes:      General: No scleral icterus.     Conjunctiva/sclera: Conjunctivae normal.   Neck:      Musculoskeletal: Normal range of motion and neck supple.   Cardiovascular:      Rate and Rhythm: Normal rate and regular rhythm.      Heart sounds: No murmur.   Pulmonary:      Effort: Pulmonary effort is normal.      Breath sounds: Decreased air movement present. No wheezing or rhonchi.      Comments: Rhonchi bilaterally, decreased at bases  Abdominal:      General: Abdomen is flat. There is no distension.      Tenderness: There is no abdominal tenderness. There is no guarding or rebound.   Musculoskeletal:         General: No tenderness.      Right lower leg: No edema.      Left lower leg: No edema.   Skin:     General: Skin is warm and dry.   Neurological:      General: No focal deficit present.      Mental Status: He is alert and oriented to person, place, and time.   Psychiatric:         Mood and Affect: Mood normal.         Behavior: Behavior normal.       Fluids    Intake/Output Summary (Last 24 hours) at 1/5/2021 1501  Last data filed at 1/5/2021 1400  Gross per 24 hour   Intake 480 ml   Output --   Net 480 ml       Laboratory  Recent Labs     01/03/21  1139 01/05/21  1046   WBC 12.7* 8.3   RBC 4.06* 4.11*   HEMOGLOBIN 12.3* 12.5*   HEMATOCRIT 37.7* 38.5*   MCV 92.9 93.7   MCH 30.3 30.4   MCHC 32.6* 32.5*   RDW 46.2 48.9   PLATELETCT 623* 460*   MPV 10.2 9.7     Recent Labs     01/03/21  1139 01/05/21  1046   SODIUM 132* 136   POTASSIUM 4.5 4.5   CHLORIDE 99 101   CO2 23 27   GLUCOSE 116* 111*   BUN 19 16   CREATININE 0.83 0.88    CALCIUM 9.1 8.8                   Imaging  CT-CTA CHEST PULMONARY ARTERY W/ RECONS   Final Result      1.  There is no CT evidence of acute pulmonary embolism. Exam is suboptimal due to motion artifact particularly in the lung bases.   2.  There is multifocal patchy peripheral ground glass opacity with slight nodularity which would be consistent with Covid 19 pneumonia.            IR-US GUIDED PIV   Final Result    Ultrasound-guided PERIPHERAL IV INSERTION performed by    qualified nursing staff as above.         Assessment/Plan  * Pneumonia due to COVID-19 virus- (present on admission)  Assessment & Plan  continue Decadron   Procalcitonin is not significantly elevated  Continue supportive care, start zinc, vit C, melatonin, pepcid  Will check vitamin D    Coronary artery disease involving native coronary artery of native heart without angina pectoris- (present on admission)  Assessment & Plan  No chest pain.  Troponin is not elevated  Continue outpatient medication-aspirin, lisinopril, metoprolol and ranolazine  following    Essential hypertension- (present on admission)  Assessment & Plan  Continue lisinopril metoprolol as tolerated    PAF (paroxysmal atrial fibrillation) ([facility])- (present on admission)  Assessment & Plan  continue Xarelto.  Continue metoprolol as tolerated    Type 2 diabetes mellitus without complication, without long-term current use of insulin (HCC)- (present on admission)  Assessment & Plan  BS improved  Aic 7.2%.  Continue lantus and SSI     VTE prophylaxis: Xarelto for atrial fibrillation

## 2021-01-06 LAB
GLUCOSE BLD-MCNC: 129 MG/DL (ref 65–99)
GLUCOSE BLD-MCNC: 141 MG/DL (ref 65–99)
GLUCOSE BLD-MCNC: 176 MG/DL (ref 65–99)
GLUCOSE BLD-MCNC: 199 MG/DL (ref 65–99)

## 2021-01-06 PROCEDURE — 770014 HCHG ROOM/CARE - WARD (150)

## 2021-01-06 PROCEDURE — A9270 NON-COVERED ITEM OR SERVICE: HCPCS | Performed by: STUDENT IN AN ORGANIZED HEALTH CARE EDUCATION/TRAINING PROGRAM

## 2021-01-06 PROCEDURE — A9270 NON-COVERED ITEM OR SERVICE: HCPCS | Performed by: HOSPITALIST

## 2021-01-06 PROCEDURE — 700102 HCHG RX REV CODE 250 W/ 637 OVERRIDE(OP): Performed by: HOSPITALIST

## 2021-01-06 PROCEDURE — 700102 HCHG RX REV CODE 250 W/ 637 OVERRIDE(OP): Performed by: STUDENT IN AN ORGANIZED HEALTH CARE EDUCATION/TRAINING PROGRAM

## 2021-01-06 PROCEDURE — 82962 GLUCOSE BLOOD TEST: CPT | Mod: 91

## 2021-01-06 PROCEDURE — 99232 SBSQ HOSP IP/OBS MODERATE 35: CPT | Performed by: HOSPITALIST

## 2021-01-06 RX ORDER — FAMOTIDINE 20 MG/1
20 TABLET, FILM COATED ORAL DAILY
Status: DISCONTINUED | OUTPATIENT
Start: 2021-01-06 | End: 2021-01-09 | Stop reason: HOSPADM

## 2021-01-06 RX ORDER — ERGOCALCIFEROL 1.25 MG/1
50000 CAPSULE ORAL
Status: DISCONTINUED | OUTPATIENT
Start: 2021-01-06 | End: 2021-01-07

## 2021-01-06 RX ORDER — ZINC SULFATE 50(220)MG
220 CAPSULE ORAL DAILY
Status: DISCONTINUED | OUTPATIENT
Start: 2021-01-06 | End: 2021-01-09 | Stop reason: HOSPADM

## 2021-01-06 RX ADMIN — ZINC SULFATE 220 MG (50 MG) CAPSULE 220 MG: CAPSULE at 20:39

## 2021-01-06 RX ADMIN — INSULIN GLARGINE 15 UNITS: 100 INJECTION, SOLUTION SUBCUTANEOUS at 18:14

## 2021-01-06 RX ADMIN — RIVAROXABAN 20 MG: 20 TABLET, FILM COATED ORAL at 05:57

## 2021-01-06 RX ADMIN — ASPIRIN 81 MG CHEWABLE TABLET 81 MG: 81 TABLET CHEWABLE at 05:57

## 2021-01-06 RX ADMIN — Medication 2.5 MG: at 20:43

## 2021-01-06 RX ADMIN — METOPROLOL SUCCINATE 25 MG: 25 TABLET, EXTENDED RELEASE ORAL at 05:58

## 2021-01-06 RX ADMIN — FAMOTIDINE 20 MG: 20 TABLET ORAL at 20:38

## 2021-01-06 RX ADMIN — LISINOPRIL 2.5 MG: 2.5 TABLET ORAL at 05:57

## 2021-01-06 RX ADMIN — ERGOCALCIFEROL 50000 UNITS: 1.25 CAPSULE ORAL at 20:39

## 2021-01-06 RX ADMIN — RANOLAZINE 1000 MG: 500 TABLET, FILM COATED, EXTENDED RELEASE ORAL at 20:38

## 2021-01-06 ASSESSMENT — ENCOUNTER SYMPTOMS
WEIGHT LOSS: 0
GASTROINTESTINAL NEGATIVE: 1
DIZZINESS: 0
CARDIOVASCULAR NEGATIVE: 1
EYES NEGATIVE: 1
DIAPHORESIS: 0
VOMITING: 0
BLURRED VISION: 0
SHORTNESS OF BREATH: 1
HEADACHES: 0
PALPITATIONS: 0
FOCAL WEAKNESS: 0
DIARRHEA: 0
NEUROLOGICAL NEGATIVE: 1
PSYCHIATRIC NEGATIVE: 1
SORE THROAT: 0
COUGH: 0
FEVER: 0
MUSCULOSKELETAL NEGATIVE: 1
DEPRESSION: 0
CHILLS: 0
NAUSEA: 0
MYALGIAS: 0
WEAKNESS: 0
ABDOMINAL PAIN: 0
BRUISES/BLEEDS EASILY: 0
FALLS: 0

## 2021-01-06 ASSESSMENT — PAIN DESCRIPTION - PAIN TYPE: TYPE: ACUTE PAIN

## 2021-01-06 ASSESSMENT — LIFESTYLE VARIABLES: SUBSTANCE_ABUSE: 0

## 2021-01-06 NOTE — PROGRESS NOTES
[facility] Medicine Daily Progress Note    Date of Service  1/6/2021    Chief Complaint  55 y.o. male admitted 12/23/2020 with generalized weakness and shortness of breath    Hospital Course  Patient is a 73 year old male with history of CAD, GERD, P-AFib on chronic anticoagulation A/C.  Admitted 12/23 with shortness of breath and generalized weakness secondary to COVID-19 pneumonia with hypoxia, started on Decadron.    Interval Problem Update  Slowly improving, his oxygen requirements are decreasing, today down to 5L, ongoing dry cough,  He reports some abdominal wall soreness from coughing but otherwise denies pain. Axox3 but a poor historian.  ROS otherwise negative. Discussed with nursing and case mgt    Consultants/Specialty      Code Status  Full Code    Disposition  To be determine, once down to 4L may be candidate to d/c back to group home with remote monitoring    Review of Systems  Review of Systems   Constitutional: Positive for malaise/fatigue. Negative for chills, diaphoresis, fever and weight loss.   HENT: Negative.  Negative for sore throat.    Eyes: Negative.  Negative for blurred vision.   Respiratory: Positive for shortness of breath (improving). Negative for cough.    Cardiovascular: Negative.  Negative for chest pain, palpitations and leg swelling.   Gastrointestinal: Negative.  Negative for abdominal pain, diarrhea, nausea and vomiting.   Genitourinary: Negative.  Negative for dysuria.   Musculoskeletal: Negative.  Negative for falls and myalgias.   Skin: Negative.  Negative for itching and rash.   Neurological: Negative.  Negative for dizziness, focal weakness, weakness and headaches.   Endo/Heme/Allergies: Negative.  Does not bruise/bleed easily.   Psychiatric/Behavioral: Negative.  Negative for depression, substance abuse and suicidal ideas.   All other systems reviewed and are negative.     Physical Exam  Temp:  [36.1 °C (97 °F)-37.1 °C (98.7 °F)] 37.1 °C (98.7 °F)  Pulse:  [61-99] 66  Resp:   [16-20] 18  BP: (101-111)/(63-73) 101/63  SpO2:  [91 %-95 %] 95 %    Physical Exam  Vitals signs and nursing note reviewed.   Constitutional:       General: He is not in acute distress.     Appearance: Normal appearance.   HENT:      Head: Normocephalic and atraumatic.      Nose: Nose normal.      Mouth/Throat:      Mouth: Mucous membranes are moist.   Eyes:      General: No scleral icterus.     Conjunctiva/sclera: Conjunctivae normal.   Neck:      Musculoskeletal: Normal range of motion and neck supple.   Cardiovascular:      Rate and Rhythm: Normal rate and regular rhythm.      Heart sounds: No murmur.   Pulmonary:      Effort: Pulmonary effort is normal.      Breath sounds: Decreased air movement present. No wheezing or rhonchi.      Comments: decreased at bases  Abdominal:      General: Abdomen is flat. There is no distension.      Tenderness: There is no abdominal tenderness. There is no guarding or rebound.   Musculoskeletal:         General: No tenderness.      Right lower leg: No edema.      Left lower leg: No edema.   Skin:     General: Skin is warm and dry.   Neurological:      General: No focal deficit present.      Mental Status: He is alert and oriented to person, place, and time.   Psychiatric:         Mood and Affect: Mood normal.         Behavior: Behavior normal.       Fluids    Intake/Output Summary (Last 24 hours) at 1/6/2021 1202  Last data filed at 1/5/2021 1400  Gross per 24 hour   Intake 240 ml   Output 400 ml   Net -160 ml       Laboratory  Recent Labs     01/05/21  1046   WBC 8.3   RBC 4.11*   HEMOGLOBIN 12.5*   HEMATOCRIT 38.5*   MCV 93.7   MCH 30.4   MCHC 32.5*   RDW 48.9   PLATELETCT 460*   MPV 9.7     Recent Labs     01/05/21  1046   SODIUM 136   POTASSIUM 4.5   CHLORIDE 101   CO2 27   GLUCOSE 111*   BUN 16   CREATININE 0.88   CALCIUM 8.8                   Imaging  CT-CTA CHEST PULMONARY ARTERY W/ RECONS   Final Result      1.  There is no CT evidence of acute pulmonary embolism. Exam is  suboptimal due to motion artifact particularly in the lung bases.   2.  There is multifocal patchy peripheral ground glass opacity with slight nodularity which would be consistent with Covid 19 pneumonia.            IR-US GUIDED PIV   Final Result    Ultrasound-guided PERIPHERAL IV INSERTION performed by    qualified nursing staff as above.         Assessment/Plan  * Pneumonia due to COVID-19 virus- (present on admission)  Assessment & Plan  continue Decadron   Procalcitonin is not significantly elevated  Continue supportive care,  zinc, vit C, melatonin, pepcid ordered      Coronary artery disease involving native coronary artery of native heart without angina pectoris- (present on admission)  Assessment & Plan  No chest pain.  Troponin is not elevated  Continue outpatient medication-aspirin, lisinopril, metoprolol and ranolazine  Continue to follow    Essential hypertension- (present on admission)  Assessment & Plan  Continue lisinopril metoprolol as tolerated    PAF (paroxysmal atrial fibrillation) (HCC)- (present on admission)  Assessment & Plan  continue Xarelto.  Continue metoprolol as tolerated    Type 2 diabetes mellitus without complication, without long-term current use of insulin (HCC)- (present on admission)  Assessment & Plan  BS improved  Aic 7.2%.  Continue lantus and SSI    Vitamin D deficiency- (present on admission)  Assessment & Plan  Level 37  Replacement started     VTE prophylaxis: Xarelto for atrial fibrillation

## 2021-01-06 NOTE — PROGRESS NOTES
Handoff report received. Assumed patient care. PT is reclined in bed. AAOX4. POC discussed with PT and all questions addressed. Safety precaution in place.

## 2021-01-07 LAB
GLUCOSE BLD-MCNC: 117 MG/DL (ref 65–99)
GLUCOSE BLD-MCNC: 147 MG/DL (ref 65–99)
GLUCOSE BLD-MCNC: 148 MG/DL (ref 65–99)
GLUCOSE BLD-MCNC: 180 MG/DL (ref 65–99)

## 2021-01-07 PROCEDURE — 82962 GLUCOSE BLOOD TEST: CPT | Mod: 91

## 2021-01-07 PROCEDURE — U0003 INFECTIOUS AGENT DETECTION BY NUCLEIC ACID (DNA OR RNA); SEVERE ACUTE RESPIRATORY SYNDROME CORONAVIRUS 2 (SARS-COV-2) (CORONAVIRUS DISEASE [COVID-19]), AMPLIFIED PROBE TECHNIQUE, MAKING USE OF HIGH THROUGHPUT TECHNOLOGIES AS DESCRIBED BY CMS-2020-01-R: HCPCS

## 2021-01-07 PROCEDURE — 770014 HCHG ROOM/CARE - WARD (150)

## 2021-01-07 PROCEDURE — U0005 INFEC AGEN DETEC AMPLI PROBE: HCPCS

## 2021-01-07 PROCEDURE — A9270 NON-COVERED ITEM OR SERVICE: HCPCS | Performed by: STUDENT IN AN ORGANIZED HEALTH CARE EDUCATION/TRAINING PROGRAM

## 2021-01-07 PROCEDURE — 700102 HCHG RX REV CODE 250 W/ 637 OVERRIDE(OP): Performed by: HOSPITALIST

## 2021-01-07 PROCEDURE — 700102 HCHG RX REV CODE 250 W/ 637 OVERRIDE(OP): Performed by: STUDENT IN AN ORGANIZED HEALTH CARE EDUCATION/TRAINING PROGRAM

## 2021-01-07 PROCEDURE — 99232 SBSQ HOSP IP/OBS MODERATE 35: CPT | Performed by: HOSPITALIST

## 2021-01-07 PROCEDURE — 94760 N-INVAS EAR/PLS OXIMETRY 1: CPT

## 2021-01-07 PROCEDURE — A9270 NON-COVERED ITEM OR SERVICE: HCPCS | Performed by: HOSPITALIST

## 2021-01-07 PROCEDURE — 0240U HCHG SARS-COV-2 COVID-19 NFCT DS RESP RNA 3 TRGT MIC: CPT

## 2021-01-07 RX ORDER — VITAMIN B COMPLEX
1000 TABLET ORAL DAILY
Status: DISCONTINUED | OUTPATIENT
Start: 2021-01-08 | End: 2021-01-09 | Stop reason: HOSPADM

## 2021-01-07 RX ADMIN — FAMOTIDINE 20 MG: 20 TABLET ORAL at 05:28

## 2021-01-07 RX ADMIN — RANOLAZINE 1000 MG: 500 TABLET, FILM COATED, EXTENDED RELEASE ORAL at 20:51

## 2021-01-07 RX ADMIN — Medication 2.5 MG: at 20:51

## 2021-01-07 RX ADMIN — ZINC SULFATE 220 MG (50 MG) CAPSULE 220 MG: CAPSULE at 05:28

## 2021-01-07 RX ADMIN — INSULIN GLARGINE 15 UNITS: 100 INJECTION, SOLUTION SUBCUTANEOUS at 17:15

## 2021-01-07 RX ADMIN — ASPIRIN 81 MG CHEWABLE TABLET 81 MG: 81 TABLET CHEWABLE at 05:28

## 2021-01-07 RX ADMIN — RIVAROXABAN 20 MG: 20 TABLET, FILM COATED ORAL at 05:28

## 2021-01-07 ASSESSMENT — ENCOUNTER SYMPTOMS
DIZZINESS: 0
BRUISES/BLEEDS EASILY: 0
FEVER: 0
WEAKNESS: 0
FOCAL WEAKNESS: 0
BLURRED VISION: 0
MUSCULOSKELETAL NEGATIVE: 1
ABDOMINAL PAIN: 0
WEIGHT LOSS: 0
PALPITATIONS: 0
DEPRESSION: 0
PSYCHIATRIC NEGATIVE: 1
MYALGIAS: 0
NEUROLOGICAL NEGATIVE: 1
DIARRHEA: 0
CHILLS: 0
CARDIOVASCULAR NEGATIVE: 1
VOMITING: 0
GASTROINTESTINAL NEGATIVE: 1
COUGH: 0
DIAPHORESIS: 0
FALLS: 0
HEADACHES: 0
SORE THROAT: 0
EYES NEGATIVE: 1
NAUSEA: 0
SHORTNESS OF BREATH: 1

## 2021-01-07 ASSESSMENT — PAIN DESCRIPTION - PAIN TYPE: TYPE: ACUTE PAIN

## 2021-01-07 ASSESSMENT — LIFESTYLE VARIABLES: SUBSTANCE_ABUSE: 0

## 2021-01-07 NOTE — DISCHARGE PLANNING
Called and spoke with Giovana [phone] from  and she is requesting a repeat covid test. Dr. Gee messaged and will order test.

## 2021-01-07 NOTE — PROGRESS NOTES
[facility] Medicine Daily Progress Note    Date of Service  1/7/2021    Chief Complaint  55 y.o. male admitted 12/23/2020 with generalized weakness and shortness of breath    Hospital Course  Patient is a 73 year old male with history of CAD, GERD, P-AFib on chronic anticoagulation A/C.  Admitted 12/23 with shortness of breath and generalized weakness secondary to COVID-19 pneumonia with hypoxia, started on Decadron.    Interval Problem Update  He states he is feeling better, oxygen requirements down to 3L, cough improved, denies pain today, denies sob this am, just wants to know when he can be discharged - case mgt following up on this.  He remains axox3 but a poor historian.  Discussed with nursing    Consultants/Specialty    Code Status  Full Code    Disposition  To be determine, waiting to hear back from his group home when they will accept him back    Review of Systems  Review of Systems   Constitutional: Positive for malaise/fatigue. Negative for chills, diaphoresis, fever and weight loss.   HENT: Negative.  Negative for sore throat.    Eyes: Negative.  Negative for blurred vision.   Respiratory: Positive for shortness of breath (much improved). Negative for cough.    Cardiovascular: Negative.  Negative for chest pain, palpitations and leg swelling.   Gastrointestinal: Negative.  Negative for abdominal pain, diarrhea, nausea and vomiting.   Genitourinary: Negative.  Negative for dysuria.   Musculoskeletal: Negative.  Negative for falls and myalgias.   Skin: Negative.  Negative for itching and rash.   Neurological: Negative.  Negative for dizziness, focal weakness, weakness and headaches.   Endo/Heme/Allergies: Negative.  Does not bruise/bleed easily.   Psychiatric/Behavioral: Negative.  Negative for depression, substance abuse and suicidal ideas.   All other systems reviewed and are negative.     Physical Exam  Temp:  [36.1 °C (96.9 °F)-36.7 °C (98 °F)] 36.7 °C (98 °F)  Pulse:  [61-67] 61  Resp:  [16-20] 16  BP:  ()/(66-73) 114/69  SpO2:  [92 %-95 %] 94 %    Physical Exam  Vitals signs and nursing note reviewed.   Constitutional:       General: He is not in acute distress.     Appearance: Normal appearance.   HENT:      Head: Normocephalic and atraumatic.      Nose: Nose normal.      Mouth/Throat:      Mouth: Mucous membranes are moist.   Eyes:      General: No scleral icterus.     Conjunctiva/sclera: Conjunctivae normal.   Neck:      Musculoskeletal: Normal range of motion and neck supple.   Cardiovascular:      Rate and Rhythm: Normal rate and regular rhythm.      Heart sounds: No murmur.   Pulmonary:      Effort: Pulmonary effort is normal.      Breath sounds: No wheezing or rhonchi.      Comments: decreased at bases  Abdominal:      General: Abdomen is flat. There is no distension.      Tenderness: There is no abdominal tenderness. There is no guarding or rebound.   Musculoskeletal:         General: No tenderness.      Right lower leg: No edema.      Left lower leg: No edema.   Skin:     General: Skin is warm and dry.   Neurological:      General: No focal deficit present.      Mental Status: He is alert and oriented to person, place, and time.   Psychiatric:         Mood and Affect: Mood normal.         Behavior: Behavior normal.       Fluids    Intake/Output Summary (Last 24 hours) at 1/7/2021 1213  Last data filed at 1/6/2021 1245  Gross per 24 hour   Intake 240 ml   Output --   Net 240 ml       Laboratory  Recent Labs     01/05/21  1046   WBC 8.3   RBC 4.11*   HEMOGLOBIN 12.5*   HEMATOCRIT 38.5*   MCV 93.7   MCH 30.4   MCHC 32.5*   RDW 48.9   PLATELETCT 460*   MPV 9.7     Recent Labs     01/05/21  1046   SODIUM 136   POTASSIUM 4.5   CHLORIDE 101   CO2 27   GLUCOSE 111*   BUN 16   CREATININE 0.88   CALCIUM 8.8                   Imaging  CT-CTA CHEST PULMONARY ARTERY W/ RECONS   Final Result      1.  There is no CT evidence of acute pulmonary embolism. Exam is suboptimal due to motion artifact particularly in the  lung bases.   2.  There is multifocal patchy peripheral ground glass opacity with slight nodularity which would be consistent with Covid 19 pneumonia.            IR-US GUIDED PIV   Final Result    Ultrasound-guided PERIPHERAL IV INSERTION performed by    qualified nursing staff as above.         Assessment/Plan  * Pneumonia due to COVID-19 virus- (present on admission)  Assessment & Plan  continue Decadron   Clinically improving  Secondary hypoxia improving  Procalcitonin is not significantly elevated  Continue supportive care,  zinc, vit C, melatonin, pepcid ordered      Coronary artery disease involving native coronary artery of native heart without angina pectoris- (present on admission)  Assessment & Plan  He remains chest pain free  Troponin is not elevated  Continue outpatient medication-aspirin, lisinopril, metoprolol and ranolazine  Continue to follow    Essential hypertension- (present on admission)  Assessment & Plan  Continue lisinopril metoprolol as tolerated    PAF (paroxysmal atrial fibrillation) ([facility])- (present on admission)  Assessment & Plan  continue Xarelto.  Continue metoprolol as tolerated    Type 2 diabetes mellitus without complication, without long-term current use of insulin (HCC)- (present on admission)  Assessment & Plan  BS improved  Aic 7.2%.  Continue lantus and SSI    Vitamin D deficiency- (present on admission)  Assessment & Plan  Level 37, suboptimal  Continue replacement      VTE prophylaxis: Xarelto for atrial fibrillation

## 2021-01-07 NOTE — CARE PLAN
Problem: Communication  Goal: The ability to communicate needs accurately and effectively will improve  Outcome: PROGRESSING AS EXPECTED     Problem: Safety  Goal: Will remain free from injury  Outcome: PROGRESSING AS EXPECTED  Goal: Will remain free from falls  Outcome: PROGRESSING AS EXPECTED     Problem: Infection  Goal: Will remain free from infection  Outcome: PROGRESSING AS EXPECTED     Problem: Venous Thromboembolism (VTW)/Deep Vein Thrombosis (DVT) Prevention:  Goal: Patient will participate in Venous Thrombosis (VTE)/Deep Vein Thrombosis (DVT)Prevention Measures  Outcome: PROGRESSING AS EXPECTED     Problem: Knowledge Deficit  Goal: Knowledge of disease process/condition, treatment plan, diagnostic tests, and medications will improve  Outcome: PROGRESSING AS EXPECTED  Goal: Knowledge of the prescribed therapeutic regimen will improve  Outcome: PROGRESSING AS EXPECTED     Problem: Discharge Barriers/Planning  Goal: Patient's continuum of care needs will be met  Outcome: PROGRESSING AS EXPECTED     Problem: Respiratory:  Goal: Respiratory status will improve  Outcome: PROGRESSING AS EXPECTED

## 2021-01-08 LAB
COVID ORDER STATUS COVID19: NORMAL
GLUCOSE BLD-MCNC: 144 MG/DL (ref 65–99)
GLUCOSE BLD-MCNC: 147 MG/DL (ref 65–99)
GLUCOSE BLD-MCNC: 173 MG/DL (ref 65–99)
GLUCOSE BLD-MCNC: 198 MG/DL (ref 65–99)
SARS-COV-2 RNA RESP QL NAA+PROBE: NOTDETECTED

## 2021-01-08 PROCEDURE — 770014 HCHG ROOM/CARE - WARD (150)

## 2021-01-08 PROCEDURE — 700102 HCHG RX REV CODE 250 W/ 637 OVERRIDE(OP): Performed by: STUDENT IN AN ORGANIZED HEALTH CARE EDUCATION/TRAINING PROGRAM

## 2021-01-08 PROCEDURE — 700102 HCHG RX REV CODE 250 W/ 637 OVERRIDE(OP): Performed by: HOSPITALIST

## 2021-01-08 PROCEDURE — 82962 GLUCOSE BLOOD TEST: CPT

## 2021-01-08 PROCEDURE — 99232 SBSQ HOSP IP/OBS MODERATE 35: CPT | Performed by: HOSPITALIST

## 2021-01-08 PROCEDURE — 94760 N-INVAS EAR/PLS OXIMETRY 1: CPT

## 2021-01-08 PROCEDURE — A9270 NON-COVERED ITEM OR SERVICE: HCPCS | Performed by: STUDENT IN AN ORGANIZED HEALTH CARE EDUCATION/TRAINING PROGRAM

## 2021-01-08 PROCEDURE — A9270 NON-COVERED ITEM OR SERVICE: HCPCS | Performed by: HOSPITALIST

## 2021-01-08 RX ORDER — ASCORBIC ACID 500 MG
500 TABLET ORAL 2 TIMES DAILY
Qty: 60 TAB | Refills: 0 | Status: SHIPPED | OUTPATIENT
Start: 2021-01-08

## 2021-01-08 RX ORDER — CHOLECALCIFEROL (VITAMIN D3) 125 MCG
2.5 CAPSULE ORAL
Qty: 30 TAB | Refills: 0 | Status: SHIPPED | OUTPATIENT
Start: 2021-01-08

## 2021-01-08 RX ORDER — ZINC SULFATE 50(220)MG
220 CAPSULE ORAL DAILY
Qty: 30 CAP | Refills: 3 | Status: SHIPPED | OUTPATIENT
Start: 2021-01-09 | End: 2021-07-03

## 2021-01-08 RX ADMIN — ZINC SULFATE 220 MG (50 MG) CAPSULE 220 MG: CAPSULE at 05:20

## 2021-01-08 RX ADMIN — RANOLAZINE 1000 MG: 500 TABLET, FILM COATED, EXTENDED RELEASE ORAL at 20:33

## 2021-01-08 RX ADMIN — INSULIN GLARGINE 15 UNITS: 100 INJECTION, SOLUTION SUBCUTANEOUS at 17:53

## 2021-01-08 RX ADMIN — Medication 1000 UNITS: at 05:20

## 2021-01-08 RX ADMIN — ASPIRIN 81 MG CHEWABLE TABLET 81 MG: 81 TABLET CHEWABLE at 05:20

## 2021-01-08 RX ADMIN — Medication 2.5 MG: at 20:33

## 2021-01-08 RX ADMIN — FAMOTIDINE 20 MG: 20 TABLET ORAL at 05:20

## 2021-01-08 RX ADMIN — RIVAROXABAN 20 MG: 20 TABLET, FILM COATED ORAL at 05:19

## 2021-01-08 ASSESSMENT — ENCOUNTER SYMPTOMS
CARDIOVASCULAR NEGATIVE: 1
DIAPHORESIS: 0
PSYCHIATRIC NEGATIVE: 1
DIARRHEA: 0
COUGH: 0
EYES NEGATIVE: 1
PALPITATIONS: 0
FEVER: 0
NEUROLOGICAL NEGATIVE: 1
DEPRESSION: 0
HEADACHES: 0
VOMITING: 0
WEIGHT LOSS: 0
MYALGIAS: 0
BRUISES/BLEEDS EASILY: 0
DIZZINESS: 0
NAUSEA: 0
SORE THROAT: 0
MUSCULOSKELETAL NEGATIVE: 1
BLURRED VISION: 0
SHORTNESS OF BREATH: 0
WEAKNESS: 0
CHILLS: 0
GASTROINTESTINAL NEGATIVE: 1
FOCAL WEAKNESS: 0
FALLS: 0
ABDOMINAL PAIN: 0

## 2021-01-08 ASSESSMENT — LIFESTYLE VARIABLES: SUBSTANCE_ABUSE: 0

## 2021-01-08 NOTE — PROGRESS NOTES
[facility] Medicine Daily Progress Note    Date of Service  1/8/2021    Chief Complaint  55 y.o. male admitted 12/23/2020 with generalized weakness and shortness of breath    Hospital Course  Patient is a 73 year old male with history of CAD, GERD, P-AFib on chronic anticoagulation A/C.  Admitted 12/23 with shortness of breath and generalized weakness secondary to COVID-19 pneumonia with hypoxia, started on Decadron.    Interval Problem Update  He is doing well today, denies pain, no sob.  Oxygen requirements continue to decrease, currently down to 1L.  His repeat covid test collected yesterday is now back and results are negative.  ROS otherwise negative, he remains eager to return to his group home.  Discussed with nursing and case mgtl    Consultants/Specialty    Code Status  Full Code    Disposition  Medically cleared for d/c, repeat covid test negative - waiting to hear back from his group home when they will accept him back    Review of Systems  Review of Systems   Constitutional: Positive for malaise/fatigue. Negative for chills, diaphoresis, fever and weight loss.   HENT: Negative.  Negative for sore throat.    Eyes: Negative.  Negative for blurred vision.   Respiratory: Negative for cough and shortness of breath.    Cardiovascular: Negative.  Negative for chest pain, palpitations and leg swelling.   Gastrointestinal: Negative.  Negative for abdominal pain, diarrhea, nausea and vomiting.   Genitourinary: Negative.  Negative for dysuria.   Musculoskeletal: Negative.  Negative for falls and myalgias.   Skin: Negative.  Negative for itching and rash.   Neurological: Negative.  Negative for dizziness, focal weakness, weakness and headaches.   Endo/Heme/Allergies: Negative.  Does not bruise/bleed easily.   Psychiatric/Behavioral: Negative.  Negative for depression, substance abuse and suicidal ideas.   All other systems reviewed and are negative.     Physical Exam  Temp:  [36.3 °C (97.3 °F)-36.7 °C (98 °F)] 36.3 °C  (97.3 °F)  Pulse:  [66-87] 87  Resp:  [18-20] 18  BP: (101-123)/(69-87) 103/81  SpO2:  [91 %-95 %] 91 %    Physical Exam  Vitals signs and nursing note reviewed.   Constitutional:       General: He is not in acute distress.     Appearance: Normal appearance.   HENT:      Head: Normocephalic and atraumatic.      Nose: Nose normal.      Mouth/Throat:      Mouth: Mucous membranes are moist.   Eyes:      General: No scleral icterus.     Conjunctiva/sclera: Conjunctivae normal.   Neck:      Musculoskeletal: Normal range of motion and neck supple.   Cardiovascular:      Rate and Rhythm: Normal rate and regular rhythm.      Heart sounds: No murmur.   Pulmonary:      Effort: Pulmonary effort is normal.      Breath sounds: No wheezing or rhonchi.      Comments: decreased at bases  Abdominal:      General: Abdomen is flat. There is no distension.      Tenderness: There is no abdominal tenderness. There is no guarding or rebound.   Musculoskeletal:         General: No tenderness.      Right lower leg: No edema.      Left lower leg: No edema.   Skin:     General: Skin is warm and dry.   Neurological:      General: No focal deficit present.      Mental Status: He is alert and oriented to person, place, and time.   Psychiatric:         Mood and Affect: Mood normal.         Behavior: Behavior normal.       Fluids  No intake or output data in the 24 hours ending 01/08/21 1229    Laboratory                        Imaging  CT-CTA CHEST PULMONARY ARTERY W/ RECONS   Final Result      1.  There is no CT evidence of acute pulmonary embolism. Exam is suboptimal due to motion artifact particularly in the lung bases.   2.  There is multifocal patchy peripheral ground glass opacity with slight nodularity which would be consistent with Covid 19 pneumonia.            IR-US GUIDED PIV   Final Result    Ultrasound-guided PERIPHERAL IV INSERTION performed by    qualified nursing staff as above.         Assessment/Plan  * Pneumonia due to COVID-19  virus- (present on admission)  Assessment & Plan  Completed course of decadron   Continues to clinically improve  Secondary hypoxia continues to improve  Procalcitonin is not significantly elevated  Continue supportive care, zinc, vit C, melatonin, pepcid       Coronary artery disease involving native coronary artery of native heart without angina pectoris- (present on admission)  Assessment & Plan  He remains chest pain free  Troponin is not elevated  Continue outpatient medication-aspirin, lisinopril, metoprolol and ranolazine  Follow up with pcp    Essential hypertension- (present on admission)  Assessment & Plan  Continue lisinopril and metoprolol as tolerated    PAF (paroxysmal atrial fibrillation) (HCC)- (present on admission)  Assessment & Plan  continue Xarelto.  Continue metoprolol as tolerated    Type 2 diabetes mellitus without complication, without long-term current use of insulin ([facility])- (present on admission)  Assessment & Plan  BS improved  Aic 7.2%.  Continue lantus and SSI    Vitamin D deficiency- (present on admission)  Assessment & Plan  Level 37, suboptimal  Continue replacement   Outpatient follow up recommended     VTE prophylaxis: Xarelto for atrial fibrillation

## 2021-01-08 NOTE — DISCHARGE PLANNING
Agency/Facility Name: [facility]  Spoke To: Dimitrisergio  Outcome: [facility] notified Leanne that pt no longer needs O2.

## 2021-01-08 NOTE — PROGRESS NOTES
Patient in bed awake,a&ox4,no respiratory distress noticed,on oxygen 1l/nc with saturation 95%,pt oxygen brought to RA  Explained to pt and will monitor for desatting.,IS and d/b/c exercise encouraged,pt ambulates to bathroom,care aid rounding,call light at bed side,poc explained to patient.

## 2021-01-08 NOTE — DISCHARGE SUMMARY
Discharge Summary    CHIEF COMPLAINT ON ADMISSION  No chief complaint on file.      Reason for Admission  COVID PNA     Admission Date  12/31/2020    CODE STATUS  Full Code    HPI & HOSPITAL COURSE  Patient is a 73 year old male with history of CAD, DM, GERD, P-AFib on chronic anticoagulation.  He was  admitted on  12/23 with shortness of breath and generalized weakness secondary to COVID-19 pneumonia with acute respiratory failure with hypoxia.  He completed a course of decadron and was also provided with supportive care.  His presenting symptoms and hypoxia have resolved.   Repeat covid testing is now negative so his group home is able to accept him back.  He will be discharged on the home monitoring program.  He agrees to return to the ER if needed and follow up with his pcp.  Therefore, he is discharged in fair and stable condition to home with organized home healthcare and close outpatient follow-up.    The patient met 2-midnight criteria for an inpatient stay at the time of discharge.    Discharge Date  1/9/21    FOLLOW UP ITEMS POST DISCHARGE  Home monitoring  pcp    DISCHARGE DIAGNOSES  Principal Problem:    Pneumonia due to COVID-19 virus POA: Yes  Active Problems:    Essential hypertension POA: Yes    Coronary artery disease involving native coronary artery of native heart without angina pectoris POA: Yes    Type 2 diabetes mellitus without complication, without long-term current use of insulin (HCC) (Chronic) POA: Yes    PAF (paroxysmal atrial fibrillation) (HCC) (Chronic) POA: Yes    Vitamin D deficiency POA: Yes  Resolved Problems:    * No resolved hospital problems. *      FOLLOW UP  Future Appointments   Date Time Provider Department Center   4/5/2021 11:00 AM YONAS Couch None   4/5/2021 11:15 AM YONAS Couch None     [facility]  [address] [address]  [phone]  Call  call [facility] when you arrive home to deliver O2 concentrator      MEDICATIONS ON DISCHARGE      Medication List      START taking these medications      Instructions   ascorbic acid 500 MG tablet  Commonly known as: Vitamin C   Take 1 Tab by mouth 2 times a day.  Dose: 500 mg     melatonin 5 mg Tabs   Take 0.5 Tabs by mouth every bedtime.  Dose: 2.5 mg     zinc sulfate 220 (50 Zn) MG Caps  Commonly known as: ZINCATE   Take 1 Cap by mouth every day.  Dose: 220 mg        CHANGE how you take these medications      Instructions   Ranolazine 1000 MG Tb12  What changed: how much to take   Take 0.5 Tabs by mouth every day. With dinner  Dose: 500 mg        CONTINUE taking these medications      Instructions   aspirin EC 81 MG Tbec  Commonly known as: ECOTRIN   Take 81 mg by mouth every day.  Dose: 81 mg     atorvastatin 80 MG tablet  Commonly known as: LIPITOR   Take 80 mg by mouth every evening. Every other day  Dose: 80 mg     cyanocobalamin 1000 MCG Tabs  Commonly known as: VITAMIN B12   Take 1,000 mcg by mouth every day.  Dose: 1,000 mcg     cyclobenzaprine 10 mg Tabs  Commonly known as: Flexeril   Take 10 mg by mouth 3 times a day as needed for Muscle Spasms.  Dose: 10 mg     dicyclomine 20 MG Tabs  Commonly known as: BENTYL   Take 20 mg by mouth every 6 hours as needed (abdominal pain).  Dose: 20 mg     gemfibrozil 600 MG Tabs  Commonly known as: LOPID   Take 600 mg by mouth every day. Every other day  Dose: 600 mg     glyBURIDE 5 MG Tabs  Commonly known as: DIABETA   Take 5 mg by mouth 2 times a day, with meals.  Dose: 5 mg     lisinopril 2.5 MG Tabs  Commonly known as: PRINIVIL   Take 2.5 mg by mouth every day.  Dose: 2.5 mg     loratadine 10 MG Tabs  Commonly known as: CLARITIN   Take 10 mg by mouth every day.  Dose: 10 mg     meclizine 25 MG Tabs  Commonly known as: ANTIVERT   Take 25 mg by mouth 3 times a day as needed.  Dose: 25 mg     metformin 1000 MG tablet  Commonly known as: GLUCOPHAGE   Take 1,000 mg by mouth 2 times a day.  Dose: 1,000 mg     metoclopramide 10 MG Tabs  Commonly known as: REGLAN    "Take 1 Tab by mouth 4 times a day as needed.  Dose: 10 mg     metoprolol SR 25 MG Tb24  Commonly known as: TOPROL XL   Take 1 Tab by mouth every day.  Dose: 25 mg     omeprazole 20 MG delayed-release capsule  Commonly known as: PRILOSEC   Take 2 Caps by mouth every day. 30 min before first meal  Dose: 40 mg     rivaroxaban 20 MG Tabs tablet  Commonly known as: XARELTO   Take 20 mg by mouth with dinner.  Dose: 20 mg     vitamin D 1000 Unit (25 mcg) Tabs  Commonly known as: cholecalciferol   Take 2,000 Units by mouth every day.  Dose: 2,000 Units            Allergies  Allergies   Allergen Reactions   • Methylphenidate Unspecified     \"Hyper\"       DIET  Diabetic/ cardiac diet  Orders Placed This Encounter   Procedures   • Diet Order Diet: Regular     Standing Status:   Standing     Number of Occurrences:   1     Order Specific Question:   Diet:     Answer:   Regular [1]       ACTIVITY  As tolerated.  Weight bearing as tolerated    CONSULTATIONS    PROCEDURES  CT-CTA CHEST PULMONARY ARTERY W/ RECONS   Final Result      1.  There is no CT evidence of acute pulmonary embolism. Exam is suboptimal due to motion artifact particularly in the lung bases.   2.  There is multifocal patchy peripheral ground glass opacity with slight nodularity which would be consistent with Covid 19 pneumonia.            IR-US GUIDED PIV   Final Result    Ultrasound-guided PERIPHERAL IV INSERTION performed by    qualified nursing staff as above.            LABORATORY  Lab Results   Component Value Date    SODIUM 136 01/05/2021    POTASSIUM 4.5 01/05/2021    CHLORIDE 101 01/05/2021    CO2 27 01/05/2021    GLUCOSE 111 (H) 01/05/2021    BUN 16 01/05/2021    CREATININE 0.88 01/05/2021    CREATININE 1.0 07/12/2007        Lab Results   Component Value Date    WBC 8.3 01/05/2021    HEMOGLOBIN 12.5 (L) 01/05/2021    HEMATOCRIT 38.5 (L) 01/05/2021    PLATELETCT 460 (H) 01/05/2021        Total time of the discharge process exceeds 40 minutes.  "

## 2021-01-08 NOTE — DISCHARGE PLANNING
Called and spoke with Giovana at  and they will pick patient up on 01/09/2021 between 12 and 1. Message sent to MD requesting meds be ordered today and will need new O2 sats and new order if needs home O2.

## 2021-01-09 VITALS
SYSTOLIC BLOOD PRESSURE: 119 MMHG | RESPIRATION RATE: 18 BRPM | BODY MASS INDEX: 26 KG/M2 | WEIGHT: 166.01 LBS | HEART RATE: 71 BPM | OXYGEN SATURATION: 93 % | DIASTOLIC BLOOD PRESSURE: 76 MMHG | TEMPERATURE: 97.3 F

## 2021-01-09 LAB
GLUCOSE BLD-MCNC: 142 MG/DL (ref 65–99)
GLUCOSE BLD-MCNC: 211 MG/DL (ref 65–99)

## 2021-01-09 PROCEDURE — A9270 NON-COVERED ITEM OR SERVICE: HCPCS | Performed by: STUDENT IN AN ORGANIZED HEALTH CARE EDUCATION/TRAINING PROGRAM

## 2021-01-09 PROCEDURE — 94760 N-INVAS EAR/PLS OXIMETRY 1: CPT

## 2021-01-09 PROCEDURE — 700102 HCHG RX REV CODE 250 W/ 637 OVERRIDE(OP): Performed by: STUDENT IN AN ORGANIZED HEALTH CARE EDUCATION/TRAINING PROGRAM

## 2021-01-09 PROCEDURE — 99239 HOSP IP/OBS DSCHRG MGMT >30: CPT | Performed by: HOSPITALIST

## 2021-01-09 PROCEDURE — A9270 NON-COVERED ITEM OR SERVICE: HCPCS | Performed by: HOSPITALIST

## 2021-01-09 PROCEDURE — 82962 GLUCOSE BLOOD TEST: CPT

## 2021-01-09 PROCEDURE — 700102 HCHG RX REV CODE 250 W/ 637 OVERRIDE(OP): Performed by: HOSPITALIST

## 2021-01-09 RX ADMIN — RIVAROXABAN 20 MG: 20 TABLET, FILM COATED ORAL at 04:58

## 2021-01-09 RX ADMIN — ASPIRIN 81 MG CHEWABLE TABLET 81 MG: 81 TABLET CHEWABLE at 04:58

## 2021-01-09 RX ADMIN — LISINOPRIL 2.5 MG: 2.5 TABLET ORAL at 04:59

## 2021-01-09 RX ADMIN — FAMOTIDINE 20 MG: 20 TABLET ORAL at 04:58

## 2021-01-09 RX ADMIN — METOPROLOL SUCCINATE 25 MG: 25 TABLET, EXTENDED RELEASE ORAL at 04:59

## 2021-01-09 RX ADMIN — ZINC SULFATE 220 MG (50 MG) CAPSULE 220 MG: CAPSULE at 04:58

## 2021-01-09 RX ADMIN — Medication 1000 UNITS: at 04:58

## 2021-01-09 NOTE — PROGRESS NOTES
Discharge instructions printed out and gone over with the patient.  Pt voiced understanding and all questions answered. Pt's arm band and IV removed.  Pt's belongings gathered, and all accounted for to leave.  Pt escorted out to ride via wheelchair.

## 2021-01-09 NOTE — RESPIRATORY CARE
REMOTE MONITORING PROGRAM by RESPIRATORY THERAPY  1/9/2021 at 10:51 AM by OFELIA Munoz     Patient interviewed by RT. Patient does not meet requirements for Remote Monitoring program. Patient was unable to download applications on cell phone. Will provide pulse ox and education before discharge.

## 2021-01-09 NOTE — DISCHARGE INSTRUCTIONS
Discharge Instructions    Discharged to group home by car with escort. Discharged via wheelchair, hospital escort: Yes.  Special equipment needed: Not Applicable    Be sure to schedule a follow-up appointment with your primary care doctor or any specialists as instructed.     Discharge Plan:   Diet Plan: Discussed  Activity Level: Discussed  Confirmed Follow up Appointment: Patient to Call and Schedule Appointment  Confirmed Symptoms Management: Discussed  Medication Reconciliation Updated: Yes  Influenza Vaccine Indication: Not indicated: Previously immunized this influenza season and > 8 years of age    I understand that a diet low in cholesterol, fat, and sodium is recommended for good health. Unless I have been given specific instructions below for another diet, I accept this instruction as my diet prescription.   Other diet: Heart Healthy    Special Instructions: None    · Is patient discharged on Warfarin / Coumadin?   No     Depression / Suicide Risk    As you are discharged from this [facility] facility, it is important to learn how to keep safe from harming yourself.    Recognize the warning signs:  · Abrupt changes in personality, positive or negative- including increase in energy   · Giving away possessions  · Change in eating patterns- significant weight changes-  positive or negative  · Change in sleeping patterns- unable to sleep or sleeping all the time   · Unwillingness or inability to communicate  · Depression  · Unusual sadness, discouragement and loneliness  · Talk of wanting to die  · Neglect of personal appearance   · Rebelliousness- reckless behavior  · Withdrawal from people/activities they love  · Confusion- inability to concentrate     If you or a loved one observes any of these behaviors or has concerns about self-harm, here's what you can do:  · Talk about it- your feelings and reasons for harming yourself  · Remove any means that you might use to hurt yourself (examples: pills, rope,  extension cords, firearm)  · Get professional help from the community (Mental Health, Substance Abuse, psychological counseling)  · Do not be alone:Call your Safe Contact- someone whom you trust who will be there for you.  · Call your local CRISIS HOTLINE 724-4770 or 106-304-2216  · Call your local Children's Mobile Crisis Response Team [place] (513) 396-6731 or www.[employer]  · Call the toll free National Suicide Prevention Hotlines   · National Suicide Prevention Lifeline 359-173-KYWP (9877)  · [employer] Hope Line Network 800-SUICIDE (516-1744)    INSTRUCTIONS FOR COVID-19 OR ANY OTHER INFECTIOUS RESPIRATORY ILLNESSES    The Centers for Disease Control and Prevention (CDC) states that early indications for COVID-19 include cough, shortness of breath, difficulty breathing, or at least two of the following symptoms: chills, shaking with chills, muscle pain, headache, sore throat, and loss of taste or smell. Symptoms can range from mild to severe and may appear up to two weeks after exposure to the virus.    The practice of self-isolation and quarantine helps protect the public and your family by  preventing exposure to people who have or may have a contagious disease. Please follow the prevention steps below as based on CDC guidelines:    WHEN TO STOP ISOLATION: Persons with COVID-19 or any other infectious respiratory illness who have symptoms and were advised to care for themselves at home may discontinue home isolation under the following conditions:  · At least 24 hours have passed since recovery defined as resolution of fever without the use of fever-reducing medications; AND,  · Improvement in respiratory symptoms (e.g., cough, shortness of breath); AND,  · At least 10 days have passed since symptoms first appeared and have had no subsequent illness.    MONITOR YOUR SYMPTOMS: If your illness is worsening, seek prompt medical attention. If you have a medical emergency and need to call 911, notify  the dispatch personnel that you have, or are being evaluated for confirmed or suspected COVID-19 or another infectious respiratory illness. Wear a facemask if possible.    ACTIVITY RESTRICTION: restrict activities outside your home, except for getting medical care. Do not go to work, school, or public areas. Avoid using public transportation, ride-sharing, or taxis.    SCHEDULED MEDICAL APPOINTMENTS: Notify your provider that you have, or are being evaluated for, confirmed or suspected COVID-19 or another infectious respiratory. This will help the healthcare provider’s office safely take care of you and keep other people from getting exposed or infected.    FACEMASKS, when to wear: Anytime you are away from your home or around other people or pets. If you are unable to wear one, maintain a minimum of 6 feet distancing from others.    LIVING ENVIRONMENT: Stay in a separate room from other people and pets. If possible, use a separate bathroom, have someone else care for your pets and avoid sharing household items. Any items used should be washed thoroughly with soap and water. Clean all “high-touch” surfaces every day. Use a household cleaning spray or wipe, according to the label instructions. High touch surfaces include (but are not limited to) counters, tabletops, doorknobs, bathroom fixtures, toilets, phones, keyboards, tablets, and bedside tables.     HAND WASHING: Frequently wash hands with soap and water for at least 20 seconds,  especially after blowing your nose, coughing, or sneezing; going to the bathroom; before and after interacting with pets; and before and after eating or preparing food. If hands are visibly dirty use soap and water. If soap and water are not available, use an alcohol-based hand  with at least 60% alcohol. Avoid touching your eyes, nose, and mouth with unwashed hands. Cover your coughs and sneezes with a tissue. Throw used tissues in a lined trash can. Immediately wash your  hands.    ACTIVE/FACILITATED SELF-MONITORING: Follow instructions provided by your local health department or health professionals, as appropriate. When working with your local health department check their available hours.    [facility]   Phone Number   [place] [phone]   Jean-Paul Kraus [place], [place] [phone]   [place] Call [address]   [place] [phone]     IF YOU HAVE CONFIRMED POSITIVE COVID-19:    Those who have completely recovered from COVID-19 may have immune-boosting antibodies in their plasma--called “convalescent plasma”--that could be used to treat critically ill COVID19 patients.    [facility] is excited to begin working with [employer] on collecting convalescent plasma from  people who have recovered from COVID-19 as part of a program to treat patients infected with the virus. This FDA-approved “emergency investigational new drug” is a special blood product containing antibodies that may give patients an extra boost to fight the virus.    To be eligible to donate convalescent plasma, you must have a prior COVID-19 diagnosis documented by a laboratory test (or a positive test result for SARS-CoV-2 antibodies) and meet additional eligibility requirements.    If you are interested in donating convalescent plasma or have any additional questions, please contact the [facility] Convalescent Plasma  at [phone] or via e-mail at [email or web address].

## 2021-01-09 NOTE — PROGRESS NOTES
Report received from SARAHI Horvath.  Patient sitting up in bed.  POC gone over with pt and all questions answered.  Patient A & O  X 4.  No apparent signs of distress.  Safety precautions in place.  Patient educated to call for assistance.  Will continue to monitor.

## 2021-01-10 LAB
FLUAV RNA SPEC QL NAA+PROBE: NEGATIVE
FLUBV RNA SPEC QL NAA+PROBE: NEGATIVE
SARS-COV-2 RNA RESP QL NAA+PROBE: NOTDETECTED
SPECIMEN SOURCE: NORMAL

## 2021-01-12 LAB — EKG IMPRESSION: NORMAL

## 2021-01-15 NOTE — PROGRESS NOTES
CHW Jose Enrique will discharge pt. From CCM services due to lack of contact after x3 TC attempts 01/15/21 .

## 2021-03-15 DIAGNOSIS — Z23 NEED FOR VACCINATION: ICD-10-CM

## 2021-04-15 ENCOUNTER — OFFICE VISIT (OUTPATIENT)
Dept: CARDIOLOGY | Facility: MEDICAL CENTER | Age: 56
End: 2021-04-15
Payer: MEDICARE

## 2021-04-15 ENCOUNTER — NON-PROVIDER VISIT (OUTPATIENT)
Dept: CARDIOLOGY | Facility: MEDICAL CENTER | Age: 56
End: 2021-04-15
Payer: MEDICARE

## 2021-04-15 VITALS
WEIGHT: 182 LBS | HEART RATE: 84 BPM | BODY MASS INDEX: 28.56 KG/M2 | DIASTOLIC BLOOD PRESSURE: 82 MMHG | SYSTOLIC BLOOD PRESSURE: 120 MMHG | OXYGEN SATURATION: 95 % | HEIGHT: 67 IN

## 2021-04-15 VITALS
SYSTOLIC BLOOD PRESSURE: 120 MMHG | WEIGHT: 182.1 LBS | DIASTOLIC BLOOD PRESSURE: 82 MMHG | HEART RATE: 84 BPM | HEIGHT: 67 IN | BODY MASS INDEX: 28.58 KG/M2 | OXYGEN SATURATION: 95 %

## 2021-04-15 DIAGNOSIS — I48.0 PAF (PAROXYSMAL ATRIAL FIBRILLATION) (HCC): Chronic | ICD-10-CM

## 2021-04-15 DIAGNOSIS — I25.5 CARDIOMYOPATHY, ISCHEMIC: ICD-10-CM

## 2021-04-15 DIAGNOSIS — Z79.01 CHRONIC ANTICOAGULATION: ICD-10-CM

## 2021-04-15 DIAGNOSIS — E11.9 TYPE 2 DIABETES MELLITUS WITHOUT COMPLICATION, WITHOUT LONG-TERM CURRENT USE OF INSULIN (HCC): Chronic | ICD-10-CM

## 2021-04-15 DIAGNOSIS — I25.10 CORONARY ARTERY DISEASE INVOLVING NATIVE CORONARY ARTERY OF NATIVE HEART WITHOUT ANGINA PECTORIS: ICD-10-CM

## 2021-04-15 DIAGNOSIS — I10 ESSENTIAL HYPERTENSION: ICD-10-CM

## 2021-04-15 DIAGNOSIS — J12.82 PNEUMONIA DUE TO COVID-19 VIRUS: ICD-10-CM

## 2021-04-15 DIAGNOSIS — I49.5 SICK SINUS SYNDROME (HCC): ICD-10-CM

## 2021-04-15 DIAGNOSIS — Z95.0 CARDIAC PACEMAKER IN SITU: ICD-10-CM

## 2021-04-15 DIAGNOSIS — E78.5 DYSLIPIDEMIA: ICD-10-CM

## 2021-04-15 DIAGNOSIS — U07.1 PNEUMONIA DUE TO COVID-19 VIRUS: ICD-10-CM

## 2021-04-15 PROBLEM — M25.512 LEFT SHOULDER PAIN: Status: RESOLVED | Noted: 2019-08-02 | Resolved: 2021-04-15

## 2021-04-15 PROBLEM — N17.9 AKI (ACUTE KIDNEY INJURY) (HCC): Status: RESOLVED | Noted: 2019-03-28 | Resolved: 2021-04-15

## 2021-04-15 PROBLEM — R55 NEAR SYNCOPE: Status: RESOLVED | Noted: 2019-03-27 | Resolved: 2021-04-15

## 2021-04-15 PROBLEM — K85.00 IDIOPATHIC ACUTE PANCREATITIS: Status: RESOLVED | Noted: 2019-08-21 | Resolved: 2021-04-15

## 2021-04-15 PROBLEM — R10.9 AP (ABDOMINAL PAIN): Status: RESOLVED | Noted: 2019-08-21 | Resolved: 2021-04-15

## 2021-04-15 PROBLEM — R55 SYNCOPE: Status: RESOLVED | Noted: 2018-01-05 | Resolved: 2021-04-15

## 2021-04-15 PROCEDURE — 99214 OFFICE O/P EST MOD 30 MIN: CPT | Mod: 25 | Performed by: NURSE PRACTITIONER

## 2021-04-15 PROCEDURE — 93280 PM DEVICE PROGR EVAL DUAL: CPT | Performed by: NURSE PRACTITIONER

## 2021-04-15 RX ORDER — OMEPRAZOLE 40 MG/1
CAPSULE, DELAYED RELEASE ORAL
COMMUNITY
Start: 2021-04-12

## 2021-04-15 RX ORDER — RANOLAZINE 500 MG/1
TABLET, EXTENDED RELEASE ORAL
COMMUNITY
Start: 2021-04-12

## 2021-04-15 ASSESSMENT — FIBROSIS 4 INDEX
FIB4 SCORE: 0.42
FIB4 SCORE: 0.42

## 2021-04-15 ASSESSMENT — ENCOUNTER SYMPTOMS
LOSS OF CONSCIOUSNESS: 0
SHORTNESS OF BREATH: 0
HEADACHES: 0
MYALGIAS: 0
BRUISES/BLEEDS EASILY: 0
FEVER: 0
PND: 0
DIZZINESS: 0
PALPITATIONS: 0
NAUSEA: 0
ABDOMINAL PAIN: 0
CHILLS: 0
ORTHOPNEA: 0
INSOMNIA: 0
COUGH: 0

## 2021-04-15 NOTE — PROGRESS NOTES
Chief Complaint   Patient presents with   • Follow-Up   • Pacemaker Check/Dysfunction   • Atrial Fibrillation   • Anticoagulation   • Coronary Artery Disease   • Cardiomyopathy (Ischemic)   • HTN (Controlled)   • Hyperlipidemia   • Diabetes Mellitus       Subjective:   Ryan Trevizo is a 55 y.o. male who presents today for six month follow-up for PM check, CAD/ischemic cardiomyopathy, hypertension, hyperlipidemia, and diabetes.    Ryan is a 55 year old mildly disabled male with history of diffused moderate three vessel CAD/ischemic cardiomyopathy (with LVEF normalized on medical therapy on echo in July 2019), paroxysmal atrial fibrillation, anticoagulation, hypertension and hyperlipidemia. In January 2018, he has an ILR implanted for syncope and bradycardia; PM was then implanted on February 2019.     He is here today for follow-up with his caregiver; he lives in a group home. In December 2020, he was admitted to [facility] with Covid pneumonia; he did recover, and has since had both Covid-19 vaccines.    He states he is feeling well today. He doesn't really remember being in the hospital with Covid. No further cough, congestion or shortness of breath. No chest pain, pressure or discomfort.  No dizziness or lightheadedness or syncope; no palpitations or fluttering of his heart. No orthopnea or PND; no LE edema. BP has been stable. He is back to working 3 half days per week, at [employer]; he enjoys it.    Past Medical History:   Diagnosis Date   • ACS (acute coronary syndrome) (HCC)    • Anxiety disorder    • Arthritis    • Blood clotting disorder ([facility])    • CAD (coronary artery disease) 07/2017    Coronary angiogram with moderate diffuse disease (70% LAD, chronic total occlusion of 2nd OM of LCx, 95% ostium dual PDA). August 2019: MPI with moderate fixed defect in inferolateral wall, LVEF 46%.   • Chronic anticoagulation    • Claudication (HCC)    • Diabetes (HCC)     Oral medications   • Dizziness    •  GERD (gastroesophageal reflux disease)    • Glaucoma    • HTN (hypertension)    • Hyperlipemia    • Ischemic cardiomyopathy 03/2019    Echocardiogram with normal LV size, mild asymmetric septal hypertrophy, LVEF 50%. Trace MR, mild-moderate AI, trace TR.   • Mental retardation    • Mentally challenged     Has    • Osteoarthritis    • Paroxysmal atrial fibrillation (HCC)    • Pericarditis secondary to acute myocardial infarction ([facility])    • Sick sinus syndrome ([facility])    • SOB (shortness of breath)    • STEMI (ST elevation myocardial infarction) ([facility])    • Syncope 02/2019    Status post pacemaker placement.     Past Surgical History:   Procedure Laterality Date   • PACEMAKER INSERTION Left 02/12/2019    Medtronic Beba S DR MRI [id number] implanted by Dr. Blake.   • OTHER CARDIAC SURGERY Left 01/12/2018    Medtronic Reveal LINQ [id number] implanted by Dr. Blake   • GASTROSCOPY-ENDO  5/19/2017    Procedure: GASTROSCOPY-ENDO;  Surgeon: Reinaldo Berry M.D.;  Location: [facility];  Service:    • GASTROSCOPY-ENDO  7/26/2012    Performed by JORDIN VERA at ENDOSCOPY [facility]S   • GASTROSCOPY-ENDO  7/25/2012    Performed by JORDIN VERA at ENDOSCOPY [facility]   • OTHER ORTHOPEDIC SURGERY  7-     R knee surgery   • OTHER CARDIAC SURGERY      stent placement     History reviewed. No pertinent family history.  Social History     Socioeconomic History   • Marital status: Single     Spouse name: Not on file   • Number of children: Not on file   • Years of education: Not on file   • Highest education level: Not on file   Occupational History   • Not on file   Tobacco Use   • Smoking status: Never Smoker   • Smokeless tobacco: Never Used   Substance and Sexual Activity   • Alcohol use: No   • Drug use: No   • Sexual activity: Not on file     Comment: Single, has no children, works as an .   Other Topics Concern   • Not on file   Social History Narrative    ** Merged History  "Encounter **         ** Merged History Encounter **          Social Determinants of Health     Financial Resource Strain:    • Difficulty of Paying Living Expenses:    Food Insecurity:    • Worried About Running Out of Food in the Last Year:    • Ran Out of Food in the Last Year:    Transportation Needs:    • Lack of Transportation (Medical):    • Lack of Transportation (Non-Medical):    Physical Activity:    • Days of Exercise per Week:    • Minutes of Exercise per Session:    Stress:    • Feeling of Stress :    Social Connections:    • Frequency of Communication with Friends and Family:    • Frequency of Social Gatherings with Friends and Family:    • Attends [employer] Services:    • Active Member of Clubs or Organizations:    • Attends Club or Organization Meetings:    • Marital Status:    Intimate Partner Violence:    • Fear of Current or Ex-Partner:    • Emotionally Abused:    • Physically Abused:    • Sexually Abused:      Allergies   Allergen Reactions   • Methylphenidate Unspecified     \"Hyper\"     Outpatient Encounter Medications as of 4/15/2021   Medication Sig Dispense Refill   • metFORMIN (GLUCOPHAGE) 850 MG Tab Take 850 mg by mouth 2 times a day with meals.     • omeprazole (PRILOSEC) 40 MG delayed-release capsule      • ranolazine (RANEXA) 500 MG TABLET SR 12 HR      • melatonin 5 mg Tab Take 0.5 Tabs by mouth every bedtime. 30 Tab 0   • zinc sulfate (ZINCATE) 220 (50 Zn) MG Cap Take 1 Cap by mouth every day. 30 Cap 3   • ascorbic acid (VITAMIN C) 500 MG tablet Take 1 Tab by mouth 2 times a day. 60 Tab 0   • Ranolazine 1000 MG TABLET SR 12 HR Take 0.5 Tabs by mouth every day. With dinner 45 Tab 3   • metoprolol SR (TOPROL XL) 25 MG TABLET SR 24 HR Take 1 Tab by mouth every day. 90 Tab 3   • [DISCONTINUED] omeprazole (PRILOSEC) 20 MG delayed-release capsule Take 2 Caps by mouth every day. 30 min before first meal (Patient not taking: Reported on 4/15/2021) 90 Cap 3   • gemfibrozil (LOPID) 600 MG Tab " Take 600 mg by mouth every day. Every other day     • glyBURIDE (DIABETA) 5 MG Tab Take 5 mg by mouth 2 times a day, with meals.     • meclizine (ANTIVERT) 25 MG Tab Take 25 mg by mouth 3 times a day as needed.     • metoclopramide (REGLAN) 10 MG Tab Take 1 Tab by mouth 4 times a day as needed. 60 Tab 0   • aspirin EC (ECOTRIN) 81 MG Tablet Delayed Response Take 81 mg by mouth every day.     • atorvastatin (LIPITOR) 80 MG tablet Take 80 mg by mouth every evening. Every other day     • cyanocobalamin (VITAMIN B12) 1000 MCG Tab Take 1,000 mcg by mouth every day.     • cyclobenzaprine (FLEXERIL) 10 MG Tab Take 10 mg by mouth 3 times a day as needed for Muscle Spasms.     • dicyclomine (BENTYL) 20 MG Tab Take 20 mg by mouth every 6 hours as needed (abdominal pain).     • lisinopril (PRINIVIL) 2.5 MG Tab Take 2.5 mg by mouth every day.     • loratadine (CLARITIN) 10 MG Tab Take 10 mg by mouth every day.     • rivaroxaban (XARELTO) 20 MG Tab tablet Take 20 mg by mouth with dinner.     • vitamin D (CHOLECALCIFEROL) 1000 UNIT Tab Take 2,000 Units by mouth every day.     • [DISCONTINUED] metformin (GLUCOPHAGE) 1000 MG tablet Take 1,000 mg by mouth 2 times a day.       No facility-administered encounter medications on file as of 4/15/2021.     Review of Systems   Constitutional: Negative for chills and fever.   HENT: Negative for congestion.    Respiratory: Negative for cough and shortness of breath.    Cardiovascular: Negative for chest pain, palpitations, orthopnea, leg swelling and PND.   Gastrointestinal: Negative for abdominal pain and nausea.   Musculoskeletal: Negative for myalgias.   Skin: Negative for rash.   Neurological: Negative for dizziness, loss of consciousness and headaches.   Endo/Heme/Allergies: Does not bruise/bleed easily.   Psychiatric/Behavioral: The patient does not have insomnia.         Objective:   /82 (BP Location: Left arm, Patient Position: Sitting, BP Cuff Size: Adult)   Pulse 84   Ht  "1.702 m (5' 7\")   Wt 82.6 kg (182 lb)   SpO2 95%   BMI 28.51 kg/m²     Physical Exam   Constitutional: He is oriented to person, place, and time. He appears well-developed and well-nourished.   HENT:   Head: Normocephalic.   Eyes: EOM are normal.   Neck: No JVD present.   Cardiovascular: Normal rate, regular rhythm and normal heart sounds.   Pulmonary/Chest: Effort normal and breath sounds normal. No respiratory distress. He has no wheezes. He has no rales.   PM in left chest wall.   Abdominal: Soft. Bowel sounds are normal. He exhibits no distension. There is no abdominal tenderness.   Musculoskeletal:         General: No edema. Normal range of motion.      Cervical back: Normal range of motion and neck supple.   Neurological: He is alert and oriented to person, place, and time.   Skin: Skin is warm and dry. No rash noted.   Psychiatric: He has a normal mood and affect. Judgment normal.     PM interrogation reveals normal function. 2 brief NSVT episodes (2 and 6 seconds each), and 4 SVT episodes. No changes are made today.    NUCLEAR IMAGING INTERPRETATION OF MPI OF 8/3/2019:   Moderate fixed defect in the inferolateral wall, likely prior infarct. No    reversible defect. Slightly decreased LV ejection fraction = 46%.   ECG INTERPRETATION   Negative stress ECG for ischemia.     CONCLUSIONS OF ECHOCARDIOGRAM OF 3/28/2019:  Prior echo on 1/5/18. Compared to the images of the prior study done -    there has been no significant change.   Mildly reduced left ventricular systolic function.   Inferolateral wall and basal inferior wall hypokinesis. Left   ventricular ejection fraction is visually estimated to be 50%.   Mild to moderate eccentric aortic insufficiency.  Unable to estimate pulmonary artery pressure due to an inadequate   tricuspid regurgitant jet.  Ascending aorta diameter is 3.6 cm.    Lab Results   Component Value Date/Time    WBC 8.3 01/05/2021 10:46 AM    RBC 4.11 (L) 01/05/2021 10:46 AM    HEMOGLOBIN " 12.5 (L) 01/05/2021 10:46 AM    HEMATOCRIT 38.5 (L) 01/05/2021 10:46 AM    MCV 93.7 01/05/2021 10:46 AM    MCH 30.4 01/05/2021 10:46 AM    MCHC 32.5 (L) 01/05/2021 10:46 AM    MPV 9.7 01/05/2021 10:46 AM      Lab Results   Component Value Date/Time    SODIUM 136 01/05/2021 10:46 AM    POTASSIUM 4.5 01/05/2021 10:46 AM    CHLORIDE 101 01/05/2021 10:46 AM    CO2 27 01/05/2021 10:46 AM    GLUCOSE 111 (H) 01/05/2021 10:46 AM    BUN 16 01/05/2021 10:46 AM    CREATININE 0.88 01/05/2021 10:46 AM    CREATININE 1.0 07/12/2007 05:40 PM    BUNCREATRAT 18 06/13/2014 08:40 AM     Lab Results   Component Value Date/Time    CHOLSTRLTOT 149 09/08/2020 09:24 AM    LDL 75 09/08/2020 09:24 AM    HDL 38 (A) 09/08/2020 09:24 AM    TRIGLYCERIDE 181 (H) 09/08/2020 09:24 AM         Assessment:     1. Cardiac pacemaker in situ     2. Sick sinus syndrome (HCC)     3. PAF (paroxysmal atrial fibrillation) ([facility])     4. Chronic anticoagulation     5. Coronary artery disease involving native coronary artery of native heart without angina pectoris     6. Ischemic Cardiomyopathy EF 50%     7. Essential hypertension     8. Dyslipidemia     9. Type 2 diabetes mellitus without complication, without long-term current use of insulin ([facility])     10. Pneumonia due to COVID-19 virus         Medical Decision Making:  Today's Assessment / Status / Plan:     1. Sick sinus syndrome with paroxysmal atrial fibrillation, with PPM, which is working normally. 2 brief NSVT episodes and 4 SVT episodes. No changes are made today.    2. Chronic anticoagulation with Xarelto, no bleeding problems.    3. CAD/ischemic cardiomyopathy with normalized LVEF on last echo. He remains on ASA, BB, ACEI, and statin/fibrate. No angina or shortness of breath. To repeat echo at next follow-up.    4. Hypertension, treated with ACEI, BB, stable. BP is good today.    5. Hyperlipidemia, treated with Lipitor and Lopid. Last LDL was 75.    6. Diabetes mellitus, treated with Diabeta and  Metformin, followed by PCP.    7. History of Covid-19 pneumonia in December 2020, now resolved. He has received both Covid-19 vaccines.    He remains stable at this time. Same medications for now. FU in 6 months for next PM check; will repeat echo then. FU sooner if clinical condition changes.

## 2021-05-12 NOTE — LETTER
Name:          Ryan Trevizo   YOB: 1965  Date:     10/23/2018      Juancho Jenkins M.D.  [address]  [place] [address]     Joseph León MD  [address], [address]  [place] [address]  Phone: [phone]  Back Line: [phone]  Fax: [phone]  E-mail: [email or web address]   Dear Dr. Jenkins,    We had the pleasure of seeing your patient, Ryan Trevizo, in Cardiology Clinic at [facility] today.    As you know, he is a 53-year-old man with a history of ischemic congestive heart failure with an EF of 50% (recovered to near normal on medical therapy by echocardiogram 1/2018). He has complex multivessel coronary artery disease with no good targets for revascularization, paroxysmal atrial fibrillation, and was hospitalized with ischemic gastritis and a GI bleed in May, 2017 though able to tolerate an oral anticoagulant with no bleeding complications presently.    He is doing well today from a cardiovascular perspective with no complaints of angina and euvolemic on physical examination.  His blood pressure is well controlled today in our office, and his cluster previously has been well controlled.  I have not seen recent labs and I did ask him to get a CBC, chemistry panel, and lipids in January.     I discussed with him the critical nature of strict diet and high level of physical activity in conjunction with his severe multivessel coronary artery disease.  He voiced understanding, and I gave him some reading resources.  Otherwise, I have not changed his cardiovascular regimen at this time.    Return in about 6 months (around 4/23/2019).    Thank you for the referral and please do not hesitate to contact me at any time. My contact information is listed above.    This note was dictated using Dragon speech recognition software.     A full note including my physical examination and a full list of rectified medications is available in our medical record, and can be  faxed as well.    Joseph León MD  Cardiologist  [facility]   back pain general

## 2021-05-20 ENCOUNTER — ANTICOAGULATION MONITORING (OUTPATIENT)
Dept: VASCULAR LAB | Facility: MEDICAL CENTER | Age: 56
End: 2021-05-20

## 2021-05-20 DIAGNOSIS — I48.0 PAF (PAROXYSMAL ATRIAL FIBRILLATION) (HCC): ICD-10-CM

## 2021-07-03 ENCOUNTER — HOSPITAL ENCOUNTER (OUTPATIENT)
Facility: MEDICAL CENTER | Age: 56
End: 2021-07-03
Attending: PHYSICIAN ASSISTANT
Payer: MEDICARE

## 2021-07-03 ENCOUNTER — OFFICE VISIT (OUTPATIENT)
Dept: URGENT CARE | Facility: CLINIC | Age: 56
End: 2021-07-03
Payer: MEDICARE

## 2021-07-03 VITALS
DIASTOLIC BLOOD PRESSURE: 88 MMHG | SYSTOLIC BLOOD PRESSURE: 120 MMHG | TEMPERATURE: 97.3 F | HEIGHT: 66 IN | HEART RATE: 83 BPM | OXYGEN SATURATION: 95 % | BODY MASS INDEX: 30.05 KG/M2 | RESPIRATION RATE: 20 BRPM | WEIGHT: 187 LBS

## 2021-07-03 DIAGNOSIS — J06.9 VIRAL URI WITH COUGH: ICD-10-CM

## 2021-07-03 DIAGNOSIS — R06.02 SHORTNESS OF BREATH: ICD-10-CM

## 2021-07-03 LAB — COVID ORDER STATUS COVID19: NORMAL

## 2021-07-03 PROCEDURE — U0003 INFECTIOUS AGENT DETECTION BY NUCLEIC ACID (DNA OR RNA); SEVERE ACUTE RESPIRATORY SYNDROME CORONAVIRUS 2 (SARS-COV-2) (CORONAVIRUS DISEASE [COVID-19]), AMPLIFIED PROBE TECHNIQUE, MAKING USE OF HIGH THROUGHPUT TECHNOLOGIES AS DESCRIBED BY CMS-2020-01-R: HCPCS

## 2021-07-03 PROCEDURE — U0005 INFEC AGEN DETEC AMPLI PROBE: HCPCS

## 2021-07-03 PROCEDURE — 99213 OFFICE O/P EST LOW 20 MIN: CPT | Performed by: PHYSICIAN ASSISTANT

## 2021-07-03 RX ORDER — UREA 10 %
LOTION (ML) TOPICAL
COMMUNITY
Start: 2021-06-11

## 2021-07-03 RX ORDER — BENZONATATE 100 MG/1
100 CAPSULE ORAL 3 TIMES DAILY PRN
Qty: 30 CAPSULE | Refills: 0 | Status: SHIPPED | OUTPATIENT
Start: 2021-07-03

## 2021-07-03 RX ORDER — ALBUTEROL SULFATE 90 UG/1
2 AEROSOL, METERED RESPIRATORY (INHALATION) EVERY 6 HOURS PRN
Qty: 8.5 G | Refills: 0 | Status: SHIPPED | OUTPATIENT
Start: 2021-07-03

## 2021-07-03 RX ORDER — ALBUTEROL SULFATE 90 UG/1
1 AEROSOL, METERED RESPIRATORY (INHALATION) ONCE
Status: COMPLETED | OUTPATIENT
Start: 2021-07-03 | End: 2021-07-03

## 2021-07-03 RX ORDER — AFLIBERCEPT 40 MG/ML
INJECTION, SOLUTION INTRAVITREAL
COMMUNITY
Start: 2021-04-20

## 2021-07-03 RX ADMIN — ALBUTEROL SULFATE 1 PUFF: 90 AEROSOL, METERED RESPIRATORY (INHALATION) at 13:16

## 2021-07-03 ASSESSMENT — ENCOUNTER SYMPTOMS
HEADACHES: 0
COUGH: 1
DIARRHEA: 0
MYALGIAS: 0
SHORTNESS OF BREATH: 1
RHINORRHEA: 1
SORE THROAT: 0
ABDOMINAL PAIN: 0
CHILLS: 0
VOMITING: 0
SPUTUM PRODUCTION: 1
FEVER: 0
NAUSEA: 0
HEMOPTYSIS: 0
DIZZINESS: 0
WHEEZING: 0

## 2021-07-03 ASSESSMENT — FIBROSIS 4 INDEX: FIB4 SCORE: 0.43

## 2021-07-03 NOTE — PROGRESS NOTES
Subjective:      Ryan Trevizo is a 56 y.o. male who presents with Cough (sneezing, congested, runny nose, x 3 days. )    Cough  This is a new problem. The current episode started in the past 7 days (started 3 days ago). The problem has been unchanged. The problem occurs constantly. Cough characteristics: Productive of clear/yellow sputum. Associated symptoms include nasal congestion, rhinorrhea and shortness of breath. Pertinent negatives include no chest pain, chills, fever, headaches, hemoptysis, myalgias, rash, sore throat or wheezing. Nothing aggravates the symptoms. He has tried nothing for the symptoms. His past medical history is significant for pneumonia. Patient was hospitalized in January 2021 for COVID-19 pneumonia   Patient has had both doses of the COVID-19 vaccine.    Review of Systems   Constitutional: Negative for chills and fever.   HENT: Positive for congestion and rhinorrhea. Negative for sore throat.    Respiratory: Positive for cough, sputum production and shortness of breath. Negative for hemoptysis and wheezing.    Cardiovascular: Negative for chest pain.   Gastrointestinal: Negative for abdominal pain, diarrhea, nausea and vomiting.   Musculoskeletal: Negative for myalgias.   Skin: Negative for rash.   Neurological: Negative for dizziness and headaches.       PMH:  has a past medical history of ACS (acute coronary syndrome) ([facility]), Anxiety disorder, Arthritis, Blood clotting disorder ([facility]), CAD (coronary artery disease) (07/2017), Chronic anticoagulation, Claudication ([facility]), Diabetes ([facility]), Dizziness, GERD (gastroesophageal reflux disease), Glaucoma, HTN (hypertension), Hyperlipemia, Ischemic cardiomyopathy (03/2019), Mental retardation, Mentally challenged, Osteoarthritis, Paroxysmal atrial fibrillation ([facility]), Pericarditis secondary to acute myocardial infarction ([facility]), Sick sinus syndrome ([facility]), SOB (shortness of breath), STEMI (ST elevation myocardial infarction) ([facility]), and Syncope  (02/2019). He also has no past medical history of COPD, Liver disease, Other general symptoms(780.99), or Seizure disorder (HCC).  MEDS:   Current Outpatient Medications:   •  albuterol 108 (90 Base) MCG/ACT Aero Soln inhalation aerosol, Inhale 2 Puffs every 6 hours as needed for Shortness of Breath., Disp: 8.5 g, Rfl: 0  •  benzonatate (TESSALON) 100 MG Cap, Take 1 capsule by mouth 3 times a day as needed for Cough., Disp: 30 capsule, Rfl: 0  •  metFORMIN (GLUCOPHAGE) 850 MG Tab, Take 850 mg by mouth 2 times a day with meals., Disp: , Rfl:   •  omeprazole (PRILOSEC) 40 MG delayed-release capsule, , Disp: , Rfl:   •  ranolazine (RANEXA) 500 MG TABLET SR 12 HR, , Disp: , Rfl:   •  Polyethyl Glycol-Propyl Glycol (SYSTANE OP), Administer  into affected eye(s)., Disp: , Rfl:   •  melatonin 5 mg Tab, Take 0.5 Tabs by mouth every bedtime., Disp: 30 Tab, Rfl: 0  •  ascorbic acid (VITAMIN C) 500 MG tablet, Take 1 Tab by mouth 2 times a day., Disp: 60 Tab, Rfl: 0  •  Ranolazine 1000 MG TABLET SR 12 HR, Take 0.5 Tabs by mouth every day. With dinner, Disp: 45 Tab, Rfl: 3  •  metoprolol SR (TOPROL XL) 25 MG TABLET SR 24 HR, Take 1 Tab by mouth every day., Disp: 90 Tab, Rfl: 3  •  gemfibrozil (LOPID) 600 MG Tab, Take 600 mg by mouth every day. Every other day, Disp: , Rfl:   •  glyBURIDE (DIABETA) 5 MG Tab, Take 5 mg by mouth 2 times a day, with meals., Disp: , Rfl:   •  meclizine (ANTIVERT) 25 MG Tab, Take 25 mg by mouth 3 times a day as needed., Disp: , Rfl:   •  metoclopramide (REGLAN) 10 MG Tab, Take 1 Tab by mouth 4 times a day as needed., Disp: 60 Tab, Rfl: 0  •  aspirin EC (ECOTRIN) 81 MG Tablet Delayed Response, Take 81 mg by mouth every day., Disp: , Rfl:   •  atorvastatin (LIPITOR) 80 MG tablet, Take 80 mg by mouth every evening. Every other day, Disp: , Rfl:   •  cyanocobalamin (VITAMIN B12) 1000 MCG Tab, Take 1,000 mcg by mouth every day., Disp: , Rfl:   •  cyclobenzaprine (FLEXERIL) 10 MG Tab, Take 10 mg by mouth 3  "times a day as needed for Muscle Spasms., Disp: , Rfl:   •  dicyclomine (BENTYL) 20 MG Tab, Take 20 mg by mouth every 6 hours as needed (abdominal pain)., Disp: , Rfl:   •  lisinopril (PRINIVIL) 2.5 MG Tab, Take 2.5 mg by mouth every day., Disp: , Rfl:   •  loratadine (CLARITIN) 10 MG Tab, Take 10 mg by mouth every day., Disp: , Rfl:   •  rivaroxaban (XARELTO) 20 MG Tab tablet, Take 20 mg by mouth with dinner., Disp: , Rfl:   •  vitamin D (CHOLECALCIFEROL) 1000 UNIT Tab, Take 2,000 Units by mouth every day., Disp: , Rfl:   •  Zinc Sulfate 220 (50 Zn) MG Tab, , Disp: , Rfl:     Current Facility-Administered Medications:   •  albuterol inhaler 1 Puff, 1 Puff, Inhalation, Once, MARITZA NewberryAALPESH  ALLERGIES:   Allergies   Allergen Reactions   • Methylphenidate Unspecified     \"Hyper\"     SURGHX:   Past Surgical History:   Procedure Laterality Date   • PACEMAKER INSERTION Left 02/12/2019    Medtronic Beba S DR MRI [id number] implanted by Dr. Blake.   • OTHER CARDIAC SURGERY Left 01/12/2018    Medtronic Reveal LINQ [id number] implanted by Dr. Blake   • GASTROSCOPY-ENDO  5/19/2017    Procedure: GASTROSCOPY-ENDO;  Surgeon: Reinaldo Berry M.D.;  Location: [facility];  Service:    • GASTROSCOPY-ENDO  7/26/2012    Performed by JORDIN VERA at ENDOSCOPY [facility]   • GASTROSCOPY-ENDO  7/25/2012    Performed by JORDIN VERA at ENDOSCOPY [facility]   • OTHER ORTHOPEDIC SURGERY  7-     R knee surgery   • OTHER CARDIAC SURGERY      stent placement     SOCHX:  reports that he has never smoked. He has never used smokeless tobacco. He reports that he does not drink alcohol and does not use drugs.  FH: Family history was reviewed, no pertinent findings to report     Objective:     /88 (BP Location: Left arm, Patient Position: Sitting, BP Cuff Size: Adult)   Pulse 83   Temp 36.3 °C (97.3 °F)   Resp 20   Ht 1.676 m (5' 6\")   Wt 84.8 kg (187 lb)   SpO2 95%   BMI 30.18 kg/m²  "     Physical Exam  Constitutional:       Appearance: He is well-developed.   HENT:      Head: Normocephalic and atraumatic.      Right Ear: Tympanic membrane, ear canal and external ear normal.      Left Ear: Tympanic membrane, ear canal and external ear normal.      Nose: Mucosal edema, congestion and rhinorrhea present. Rhinorrhea is clear.      Mouth/Throat:      Lips: Pink.      Mouth: Mucous membranes are moist.      Pharynx: Oropharynx is clear.   Eyes:      Conjunctiva/sclera: Conjunctivae normal.      Pupils: Pupils are equal, round, and reactive to light.   Cardiovascular:      Rate and Rhythm: Normal rate and regular rhythm.      Heart sounds: Normal heart sounds. No murmur heard.     Pulmonary:      Effort: Pulmonary effort is normal.      Breath sounds: Normal breath sounds. No decreased breath sounds, wheezing, rhonchi or rales.   Musculoskeletal:      Cervical back: Normal range of motion.   Lymphadenopathy:      Cervical: No cervical adenopathy.   Skin:     General: Skin is warm and dry.      Capillary Refill: Capillary refill takes less than 2 seconds.   Neurological:      Mental Status: He is alert and oriented to person, place, and time.   Psychiatric:         Behavior: Behavior normal.         Judgment: Judgment normal.         Patient was given one half of an albuterol inhaler in the office to be able to properly show him how to use it.  He was unsure if he had any improvement in his shortness of breath.       Assessment/Plan:       1. Viral URI with cough  - albuterol inhaler 1 Puff  - benzonatate (TESSALON) 100 MG Cap; Take 1 capsule by mouth 3 times a day as needed for Cough.  Dispense: 30 capsule; Refill: 0  - COVID/SARS CoV-2 PCR; Future  - OTC cold/flu medications  - PO fluids  - Rest  - Tylenol or ibuprofen as needed for fever > 100.4 F      2. Shortness of breath  - albuterol 108 (90 Base) MCG/ACT Aero Soln inhalation aerosol; Inhale 2 Puffs every 6 hours as needed for Shortness of  Breath.  Dispense: 8.5 g; Refill: 0  - COVID/SARS CoV-2 PCR; Future      Discussed with patient that even though he has had COVID-19 virus and he is fully vaccinated he is having symptoms.  With him living in a group home setting we are testing for COVID-19 virus to definitively rule this out as a cause of his symptoms.        Differential Diagnosis, natural history, and supportive care discussed. Return to the Urgent Care or follow up with your PCP if symptoms fail to resolve, or for any new or worsening symptoms. Emergency room precautions discussed. Patient and/or family appears understanding of information.

## 2021-07-04 LAB
SARS-COV-2 RNA RESP QL NAA+PROBE: NOTDETECTED
SPECIMEN SOURCE: NORMAL

## 2021-07-28 ENCOUNTER — HOSPITAL ENCOUNTER (OUTPATIENT)
Dept: LAB | Facility: MEDICAL CENTER | Age: 56
End: 2021-07-28
Attending: FAMILY MEDICINE
Payer: MEDICARE

## 2021-07-28 ENCOUNTER — HOSPITAL ENCOUNTER (OUTPATIENT)
Dept: RADIOLOGY | Facility: MEDICAL CENTER | Age: 56
End: 2021-07-28
Attending: FAMILY MEDICINE
Payer: MEDICARE

## 2021-07-28 DIAGNOSIS — J18.9 UNRESOLVED PNEUMONIA: ICD-10-CM

## 2021-07-28 LAB
ALBUMIN SERPL BCP-MCNC: 4.4 G/DL (ref 3.2–4.9)
ALBUMIN/GLOB SERPL: 1.6 G/DL
ALP SERPL-CCNC: 86 U/L (ref 30–99)
ALT SERPL-CCNC: 26 U/L (ref 2–50)
ANION GAP SERPL CALC-SCNC: 11 MMOL/L (ref 7–16)
AST SERPL-CCNC: 22 U/L (ref 12–45)
BASOPHILS # BLD AUTO: 0.6 % (ref 0–1.8)
BASOPHILS # BLD: 0.03 K/UL (ref 0–0.12)
BILIRUB SERPL-MCNC: 1 MG/DL (ref 0.1–1.5)
BUN SERPL-MCNC: 13 MG/DL (ref 8–22)
CALCIUM SERPL-MCNC: 9 MG/DL (ref 8.5–10.5)
CHLORIDE SERPL-SCNC: 104 MMOL/L (ref 96–112)
CHOLEST SERPL-MCNC: 130 MG/DL (ref 100–199)
CO2 SERPL-SCNC: 24 MMOL/L (ref 20–33)
CREAT SERPL-MCNC: 1.08 MG/DL (ref 0.5–1.4)
EOSINOPHIL # BLD AUTO: 0.12 K/UL (ref 0–0.51)
EOSINOPHIL NFR BLD: 2.3 % (ref 0–6.9)
ERYTHROCYTE [DISTWIDTH] IN BLOOD BY AUTOMATED COUNT: 52.4 FL (ref 35.9–50)
EST. AVERAGE GLUCOSE BLD GHB EST-MCNC: 126 MG/DL
GLOBULIN SER CALC-MCNC: 2.8 G/DL (ref 1.9–3.5)
GLUCOSE SERPL-MCNC: 134 MG/DL (ref 65–99)
HBA1C MFR BLD: 6 % (ref 4–5.6)
HCT VFR BLD AUTO: 40.5 % (ref 42–52)
HDLC SERPL-MCNC: 35 MG/DL
HGB BLD-MCNC: 13 G/DL (ref 14–18)
IMM GRANULOCYTES # BLD AUTO: 0.03 K/UL (ref 0–0.11)
IMM GRANULOCYTES NFR BLD AUTO: 0.6 % (ref 0–0.9)
LDLC SERPL CALC-MCNC: 58 MG/DL
LYMPHOCYTES # BLD AUTO: 1.2 K/UL (ref 1–4.8)
LYMPHOCYTES NFR BLD: 23.3 % (ref 22–41)
MCH RBC QN AUTO: 30.8 PG (ref 27–33)
MCHC RBC AUTO-ENTMCNC: 32.1 G/DL (ref 33.7–35.3)
MCV RBC AUTO: 96 FL (ref 81.4–97.8)
MONOCYTES # BLD AUTO: 0.44 K/UL (ref 0–0.85)
MONOCYTES NFR BLD AUTO: 8.5 % (ref 0–13.4)
NEUTROPHILS # BLD AUTO: 3.33 K/UL (ref 1.82–7.42)
NEUTROPHILS NFR BLD: 64.7 % (ref 44–72)
NRBC # BLD AUTO: 0 K/UL
NRBC BLD-RTO: 0 /100 WBC
PLATELET # BLD AUTO: 260 K/UL (ref 164–446)
PMV BLD AUTO: 10.6 FL (ref 9–12.9)
POTASSIUM SERPL-SCNC: 4.5 MMOL/L (ref 3.6–5.5)
PROT SERPL-MCNC: 7.2 G/DL (ref 6–8.2)
PSA SERPL-MCNC: 0.36 NG/ML (ref 0–4)
RBC # BLD AUTO: 4.22 M/UL (ref 4.7–6.1)
SODIUM SERPL-SCNC: 139 MMOL/L (ref 135–145)
TRIGL SERPL-MCNC: 186 MG/DL (ref 0–149)
TSH SERPL DL<=0.005 MIU/L-ACNC: 3.57 UIU/ML (ref 0.38–5.33)
VIT B12 SERPL-MCNC: 975 PG/ML (ref 211–911)
WBC # BLD AUTO: 5.2 K/UL (ref 4.8–10.8)

## 2021-07-28 PROCEDURE — 85025 COMPLETE CBC W/AUTO DIFF WBC: CPT

## 2021-07-28 PROCEDURE — 84443 ASSAY THYROID STIM HORMONE: CPT

## 2021-07-28 PROCEDURE — 80061 LIPID PANEL: CPT

## 2021-07-28 PROCEDURE — 71046 X-RAY EXAM CHEST 2 VIEWS: CPT

## 2021-07-28 PROCEDURE — 80053 COMPREHEN METABOLIC PANEL: CPT

## 2021-07-28 PROCEDURE — 82607 VITAMIN B-12: CPT

## 2021-07-28 PROCEDURE — 83036 HEMOGLOBIN GLYCOSYLATED A1C: CPT | Mod: GA

## 2021-07-28 PROCEDURE — 36415 COLL VENOUS BLD VENIPUNCTURE: CPT

## 2021-07-28 PROCEDURE — 84153 ASSAY OF PSA TOTAL: CPT | Mod: GA

## 2021-10-18 ENCOUNTER — OFFICE VISIT (OUTPATIENT)
Dept: CARDIOLOGY | Facility: MEDICAL CENTER | Age: 56
End: 2021-10-18
Payer: MEDICARE

## 2021-10-18 ENCOUNTER — NON-PROVIDER VISIT (OUTPATIENT)
Dept: CARDIOLOGY | Facility: MEDICAL CENTER | Age: 56
End: 2021-10-18
Payer: MEDICARE

## 2021-10-18 VITALS
SYSTOLIC BLOOD PRESSURE: 120 MMHG | HEART RATE: 85 BPM | WEIGHT: 182 LBS | BODY MASS INDEX: 29.25 KG/M2 | DIASTOLIC BLOOD PRESSURE: 82 MMHG | OXYGEN SATURATION: 96 % | HEIGHT: 66 IN

## 2021-10-18 VITALS
HEART RATE: 85 BPM | OXYGEN SATURATION: 96 % | BODY MASS INDEX: 29.25 KG/M2 | DIASTOLIC BLOOD PRESSURE: 82 MMHG | HEIGHT: 66 IN | WEIGHT: 182 LBS | SYSTOLIC BLOOD PRESSURE: 120 MMHG

## 2021-10-18 DIAGNOSIS — I25.10 CORONARY ARTERY DISEASE INVOLVING NATIVE CORONARY ARTERY OF NATIVE HEART WITHOUT ANGINA PECTORIS: ICD-10-CM

## 2021-10-18 DIAGNOSIS — I48.0 PAF (PAROXYSMAL ATRIAL FIBRILLATION) (HCC): Chronic | ICD-10-CM

## 2021-10-18 DIAGNOSIS — E11.9 TYPE 2 DIABETES MELLITUS WITHOUT COMPLICATION, WITHOUT LONG-TERM CURRENT USE OF INSULIN (HCC): Chronic | ICD-10-CM

## 2021-10-18 DIAGNOSIS — Z95.0 CARDIAC PACEMAKER IN SITU: ICD-10-CM

## 2021-10-18 DIAGNOSIS — E78.5 DYSLIPIDEMIA: ICD-10-CM

## 2021-10-18 DIAGNOSIS — I10 ESSENTIAL HYPERTENSION: ICD-10-CM

## 2021-10-18 DIAGNOSIS — I25.5 CARDIOMYOPATHY, ISCHEMIC: ICD-10-CM

## 2021-10-18 DIAGNOSIS — I48.0 PAF (PAROXYSMAL ATRIAL FIBRILLATION) (HCC): ICD-10-CM

## 2021-10-18 DIAGNOSIS — I49.5 SICK SINUS SYNDROME (HCC): ICD-10-CM

## 2021-10-18 DIAGNOSIS — Z79.01 CHRONIC ANTICOAGULATION: ICD-10-CM

## 2021-10-18 PROCEDURE — 93280 PM DEVICE PROGR EVAL DUAL: CPT | Performed by: NURSE PRACTITIONER

## 2021-10-18 PROCEDURE — 99214 OFFICE O/P EST MOD 30 MIN: CPT | Mod: 25 | Performed by: NURSE PRACTITIONER

## 2021-10-18 ASSESSMENT — ENCOUNTER SYMPTOMS
DIZZINESS: 0
NAUSEA: 0
HEADACHES: 0
PND: 0
INSOMNIA: 0
ORTHOPNEA: 0
MYALGIAS: 0
COUGH: 0
LOSS OF CONSCIOUSNESS: 0
CHILLS: 0
SHORTNESS OF BREATH: 0
BRUISES/BLEEDS EASILY: 0
FEVER: 0
ABDOMINAL PAIN: 0
PALPITATIONS: 0

## 2021-10-18 ASSESSMENT — FIBROSIS 4 INDEX
FIB4 SCORE: 0.96
FIB4 SCORE: 0.96

## 2021-10-18 NOTE — PROGRESS NOTES
Chief Complaint   Patient presents with   • Follow-Up   • Pacemaker Check/Dysfunction   • Atrial Fibrillation   • Anticoagulation   • Coronary Artery Disease   • Cardiomyopathy (Ischemic)   • HTN (Controlled)   • Hyperlipidemia   • Diabetes Mellitus       Subjective     Ryan Trevizo is a 56 y.o. male who presents today for six month follow-up for PM check, PAFib, anticoagulation, CAD/ischemic cardiomyopathy, HTN, hyperlipidemia, and DM.    Ryan is a 56 year old mildly disabled male with history of diffuse moderate three vessel CAD/ischemic cardiomyopathy (with LVEF normalized on medical therapy on echo in March 2019), paroxysmal atrial fibrillation, anticoagulation, hypertension and hyperlipidemia. In February 2019, PM was implanted for sick sinus syndrome.    In December 2020, he was admitted to [facility] with Covid pneumonia; he did recover, and has received both Covid vaccine. He was last seen by me in April 2021.     He states he is feeling well today. No chest pain, pressure or discomfort.  No dizziness or lightheadedness or syncope; no palpitations or fluttering of his heart. No orthopnea or PND; no LE edema. BP has been stable. He is still working part time (3 days per week) at [employer]     Past Medical History:   Diagnosis Date   • ACS (acute coronary syndrome) ([facility])    • Anxiety disorder    • Arthritis    • Blood clotting disorder ([facility])    • CAD (coronary artery disease) 07/2017    Coronary angiogram with moderate diffuse disease (70% LAD, chronic total occlusion of 2nd OM of LCx, 95% ostium dual PDA). August 2019: MPI with moderate fixed defect in inferolateral wall, LVEF 46%.   • Chronic anticoagulation    • Claudication ([facility])    • Diabetes ([facility])     Oral medications   • Dizziness    • GERD (gastroesophageal reflux disease)    • Glaucoma    • HTN (hypertension)    • Hyperlipemia    • Ischemic cardiomyopathy 03/2019    Echocardiogram with normal LV size, mild asymmetric septal hypertrophy, LVEF  50%. Trace MR, mild-moderate AI, trace TR.   • Mental retardation    • Mentally challenged     Has    • Osteoarthritis    • Paroxysmal atrial fibrillation (HCC)    • Pericarditis secondary to acute myocardial infarction ([facility])    • Sick sinus syndrome ([facility])    • SOB (shortness of breath)    • STEMI (ST elevation myocardial infarction) ([facility])    • Syncope 02/2019    Status post pacemaker placement.     Past Surgical History:   Procedure Laterality Date   • PACEMAKER INSERTION Left 02/12/2019    Medtronic Beba S DR MRI [id number] implanted by Dr. Blake.   • OTHER CARDIAC SURGERY Left 01/12/2018    Medtronic Reveal LINQ [id number] implanted by Dr. Blake   • GASTROSCOPY-ENDO  5/19/2017    Procedure: GASTROSCOPY-ENDO;  Surgeon: Reinaldo Berry M.D.;  Location: [facility];  Service:    • GASTROSCOPY-ENDO  7/26/2012    Performed by JORDIN VERA at [facility]   • GASTROSCOPY-ENDO  7/25/2012    Performed by JORDIN VERA at [facility]   • OTHER ORTHOPEDIC SURGERY  7-     R knee surgery   • OTHER CARDIAC SURGERY      stent placement     History reviewed. No pertinent family history.  Social History     Socioeconomic History   • Marital status: Single     Spouse name: Not on file   • Number of children: Not on file   • Years of education: Not on file   • Highest education level: Not on file   Occupational History   • Not on file   Tobacco Use   • Smoking status: Never Smoker   • Smokeless tobacco: Never Used   Vaping Use   • Vaping Use: Never used   Substance and Sexual Activity   • Alcohol use: No   • Drug use: No   • Sexual activity: Not on file     Comment: Single, has no children, works as an .   Other Topics Concern   • Not on file   Social History Narrative    ** Merged History Encounter **         ** Merged History Encounter **          Social Determinants of Health     Financial Resource Strain:    • Difficulty of Paying Living Expenses:    Food  "Insecurity:    • Worried About Running Out of Food in the Last Year:    • Ran Out of Food in the Last Year:    Transportation Needs:    • Lack of Transportation (Medical):    • Lack of Transportation (Non-Medical):    Physical Activity:    • Days of Exercise per Week:    • Minutes of Exercise per Session:    Stress:    • Feeling of Stress :    Social Connections:    • Frequency of Communication with Friends and Family:    • Frequency of Social Gatherings with Friends and Family:    • Attends Jew Services:    • Active Member of Clubs or Organizations:    • Attends Club or Organization Meetings:    • Marital Status:    Intimate Partner Violence:    • Fear of Current or Ex-Partner:    • Emotionally Abused:    • Physically Abused:    • Sexually Abused:      Allergies   Allergen Reactions   • Methylphenidate Unspecified     \"Hyper\"     Outpatient Encounter Medications as of 10/18/2021   Medication Sig Dispense Refill   • albuterol 108 (90 Base) MCG/ACT Aero Soln inhalation aerosol Inhale 2 Puffs every 6 hours as needed for Shortness of Breath. 8.5 g 0   • benzonatate (TESSALON) 100 MG Cap Take 1 capsule by mouth 3 times a day as needed for Cough. 30 capsule 0   • Zinc Sulfate 220 (50 Zn) MG Tab      • EYLEA 2 MG/0.05ML Solution      • metFORMIN (GLUCOPHAGE) 850 MG Tab Take 850 mg by mouth 2 times a day with meals.     • omeprazole (PRILOSEC) 40 MG delayed-release capsule      • ranolazine (RANEXA) 500 MG TABLET SR 12 HR      • Polyethyl Glycol-Propyl Glycol (SYSTANE OP) Administer  into affected eye(s).     • melatonin 5 mg Tab Take 0.5 Tabs by mouth every bedtime. 30 Tab 0   • ascorbic acid (VITAMIN C) 500 MG tablet Take 1 Tab by mouth 2 times a day. 60 Tab 0   • Ranolazine 1000 MG TABLET SR 12 HR Take 0.5 Tabs by mouth every day. With dinner 45 Tab 3   • metoprolol SR (TOPROL XL) 25 MG TABLET SR 24 HR Take 1 Tab by mouth every day. 90 Tab 3   • gemfibrozil (LOPID) 600 MG Tab Take 600 mg by mouth every day. Every " "other day     • glyBURIDE (DIABETA) 5 MG Tab Take 5 mg by mouth 2 times a day, with meals.     • meclizine (ANTIVERT) 25 MG Tab Take 25 mg by mouth 3 times a day as needed.     • metoclopramide (REGLAN) 10 MG Tab Take 1 Tab by mouth 4 times a day as needed. 60 Tab 0   • aspirin EC (ECOTRIN) 81 MG Tablet Delayed Response Take 81 mg by mouth every day.     • atorvastatin (LIPITOR) 80 MG tablet Take 80 mg by mouth every evening. Every other day     • cyanocobalamin (VITAMIN B12) 1000 MCG Tab Take 1,000 mcg by mouth every day.     • cyclobenzaprine (FLEXERIL) 10 MG Tab Take 10 mg by mouth 3 times a day as needed for Muscle Spasms.     • dicyclomine (BENTYL) 20 MG Tab Take 20 mg by mouth every 6 hours as needed (abdominal pain).     • lisinopril (PRINIVIL) 2.5 MG Tab Take 2.5 mg by mouth every day.     • loratadine (CLARITIN) 10 MG Tab Take 10 mg by mouth every day.     • rivaroxaban (XARELTO) 20 MG Tab tablet Take 20 mg by mouth with dinner.     • vitamin D (CHOLECALCIFEROL) 1000 UNIT Tab Take 2,000 Units by mouth every day.       No facility-administered encounter medications on file as of 10/18/2021.     Review of Systems   Constitutional: Negative for chills and fever.   HENT: Negative for congestion.    Respiratory: Negative for cough and shortness of breath.    Cardiovascular: Negative for chest pain, palpitations, orthopnea, leg swelling and PND.   Gastrointestinal: Negative for abdominal pain and nausea.   Musculoskeletal: Negative for myalgias.   Skin: Negative for rash.   Neurological: Negative for dizziness, loss of consciousness and headaches.   Endo/Heme/Allergies: Does not bruise/bleed easily.   Psychiatric/Behavioral: The patient does not have insomnia.               Objective     /82 (BP Location: Left arm, Patient Position: Sitting, BP Cuff Size: Adult)   Pulse 85   Ht 1.676 m (5' 6\")   Wt 82.6 kg (182 lb)   SpO2 96%   BMI 29.38 kg/m²     Physical Exam  Constitutional:       Appearance: He is " well-developed.   HENT:      Head: Normocephalic.   Neck:      Vascular: No JVD.   Cardiovascular:      Rate and Rhythm: Normal rate and regular rhythm.      Heart sounds: Normal heart sounds.      Comments: PM in left chest wall.  Pulmonary:      Effort: Pulmonary effort is normal. No respiratory distress.      Breath sounds: Normal breath sounds. No wheezing or rales.   Abdominal:      General: Bowel sounds are normal. There is no distension.      Palpations: Abdomen is soft.      Tenderness: There is no abdominal tenderness.   Musculoskeletal:         General: Normal range of motion.      Cervical back: Normal range of motion and neck supple.   Skin:     General: Skin is warm and dry.      Findings: No rash.   Neurological:      Mental Status: He is alert and oriented to person, place, and time.      Comments: Mild cognitive impairment, but able to respond to questions appropriately.   Psychiatric:         Judgment: Judgment normal.       PM interrogation today reveals normal function. 3 brief SVT episodes. No changes are made today.    NUCLEAR IMAGING INTERPRETATION OF MPI OF 8/3/2019:   Moderate fixed defect in the inferolateral wall, likely prior infarct. No    reversible defect. Slightly decreased LV ejection fraction = 46%.   ECG INTERPRETATION   Negative stress ECG for ischemia.     CONCLUSIONS OF ECHOCARDIOGRAM OF 3/28/2019:  Prior echo on 1/5/18. Compared to the images of the prior study done -    there has been no significant change.   Mildly reduced left ventricular systolic function.   Inferolateral wall and basal inferior wall hypokinesis. Left   ventricular ejection fraction is visually estimated to be 50%.   Mild to moderate eccentric aortic insufficiency.  Unable to estimate pulmonary artery pressure due to an inadequate   tricuspid regurgitant jet.  Ascending aorta diameter is 3.6 cm.    Component      Latest Ref Rng & Units 7/28/2021 7/28/2021 7/28/2021           8:35 AM  8:35 AM  8:35 AM    Glycohemoglobin      4.0 - 5.6 %   6.0 (H)   Estim. Avg Glu      mg/dL   126   TSH      0.380 - 5.330 uIU/mL 3.570     Prostatic Specific Antigen Tot      0.00 - 4.00 ng/mL  0.36         Lab Results   Component Value Date/Time    CHOLSTRLTOT 130 07/28/2021 08:35 AM    LDL 58 07/28/2021 08:35 AM    HDL 35 (A) 07/28/2021 08:35 AM    TRIGLYCERIDE 186 (H) 07/28/2021 08:35 AM       Lab Results   Component Value Date/Time    SODIUM 139 07/28/2021 08:35 AM    POTASSIUM 4.5 07/28/2021 08:35 AM    CHLORIDE 104 07/28/2021 08:35 AM    CO2 24 07/28/2021 08:35 AM    GLUCOSE 134 (H) 07/28/2021 08:35 AM    BUN 13 07/28/2021 08:35 AM    CREATININE 1.08 07/28/2021 08:35 AM    CREATININE 1.0 07/12/2007 05:40 PM    BUNCREATRAT 18 06/13/2014 08:40 AM     Lab Results   Component Value Date/Time    ALKPHOSPHAT 86 07/28/2021 08:35 AM    ASTSGOT 22 07/28/2021 08:35 AM    ALTSGPT 26 07/28/2021 08:35 AM    TBILIRUBIN 1.0 07/28/2021 08:35 AM        Assessment & Plan     1. Cardiac pacemaker in situ     2. Sick sinus syndrome (HCC)     3. PAF (paroxysmal atrial fibrillation) ([facility])     4. Chronic anticoagulation     5. Coronary artery disease involving native coronary artery of native heart without angina pectoris     6. Ischemic Cardiomyopathy EF 50%     7. Essential hypertension     8. Dyslipidemia     9. Type 2 diabetes mellitus without complication, without long-term current use of insulin ([facility])         Medical Decision Making: Today's Assessment/Status/Plan:        1. Sick sinus syndrome with paroxysmal atrial fibrillation, with PPM, which is working normally. 3 brief SVT episodes, no changes are made today.    2. Chronic anticoagulation with Xarelto, no bleeding problems.    3. CAD/ischemic cardiomyopathy with LVEF 50% on last echo in March 2019. He remains on ASA, BB, ACEI and statin/Lopid. No angina or shortness of breath. To repeat echo in March 2022 before next follow-up.    4. Hypertension, treated with Lisinopril 2.5mg once daily  and Toprol XL 25mg once daily. LDL is 58.    5. Hyperlipidemia, treated with Lipitor and Lopid. LDL is 58, LFTs are normal.    6. Diabetes mellitus, treated with Metformin and Glyburide. Consider addition of SGLT2 inhibitor for cardiac benefits. Recent HgbA1c was at goal.    Same medications for now. Repeat echo in March 2022. Follow-up with me in 6 months for next PM check; follow-up sooner if clinical condition changes.

## 2022-01-12 ENCOUNTER — HOSPITAL ENCOUNTER (OUTPATIENT)
Dept: LAB | Facility: MEDICAL CENTER | Age: 57
End: 2022-01-12
Attending: FAMILY MEDICINE
Payer: MEDICARE

## 2022-01-12 LAB
ALBUMIN SERPL BCP-MCNC: 4.7 G/DL (ref 3.2–4.9)
ALBUMIN/GLOB SERPL: 1.6 G/DL
ALP SERPL-CCNC: 104 U/L (ref 30–99)
ALT SERPL-CCNC: 49 U/L (ref 2–50)
ANION GAP SERPL CALC-SCNC: 9 MMOL/L (ref 7–16)
AST SERPL-CCNC: 22 U/L (ref 12–45)
BASOPHILS # BLD AUTO: 0.4 % (ref 0–1.8)
BASOPHILS # BLD: 0.02 K/UL (ref 0–0.12)
BILIRUB SERPL-MCNC: 1.2 MG/DL (ref 0.1–1.5)
BUN SERPL-MCNC: 15 MG/DL (ref 8–22)
CALCIUM SERPL-MCNC: 9.7 MG/DL (ref 8.5–10.5)
CHLORIDE SERPL-SCNC: 104 MMOL/L (ref 96–112)
CHOLEST SERPL-MCNC: 158 MG/DL (ref 100–199)
CO2 SERPL-SCNC: 23 MMOL/L (ref 20–33)
CREAT SERPL-MCNC: 0.94 MG/DL (ref 0.5–1.4)
CREAT UR-MCNC: 95.46 MG/DL
EOSINOPHIL # BLD AUTO: 0.13 K/UL (ref 0–0.51)
EOSINOPHIL NFR BLD: 2.4 % (ref 0–6.9)
ERYTHROCYTE [DISTWIDTH] IN BLOOD BY AUTOMATED COUNT: 49.7 FL (ref 35.9–50)
EST. AVERAGE GLUCOSE BLD GHB EST-MCNC: 209 MG/DL
GLOBULIN SER CALC-MCNC: 2.9 G/DL (ref 1.9–3.5)
GLUCOSE SERPL-MCNC: 188 MG/DL (ref 65–99)
HBA1C MFR BLD: 8.9 % (ref 4–5.6)
HCT VFR BLD AUTO: 42.9 % (ref 42–52)
HDLC SERPL-MCNC: 39 MG/DL
HGB BLD-MCNC: 14 G/DL (ref 14–18)
IMM GRANULOCYTES # BLD AUTO: 0.04 K/UL (ref 0–0.11)
IMM GRANULOCYTES NFR BLD AUTO: 0.7 % (ref 0–0.9)
LDLC SERPL CALC-MCNC: 74 MG/DL
LYMPHOCYTES # BLD AUTO: 1.4 K/UL (ref 1–4.8)
LYMPHOCYTES NFR BLD: 25.6 % (ref 22–41)
MCH RBC QN AUTO: 30.6 PG (ref 27–33)
MCHC RBC AUTO-ENTMCNC: 32.6 G/DL (ref 33.7–35.3)
MCV RBC AUTO: 93.9 FL (ref 81.4–97.8)
MICROALBUMIN UR-MCNC: 6.3 MG/DL
MICROALBUMIN/CREAT UR: 66 MG/G (ref 0–30)
MONOCYTES # BLD AUTO: 0.42 K/UL (ref 0–0.85)
MONOCYTES NFR BLD AUTO: 7.7 % (ref 0–13.4)
NEUTROPHILS # BLD AUTO: 3.45 K/UL (ref 1.82–7.42)
NEUTROPHILS NFR BLD: 63.2 % (ref 44–72)
NRBC # BLD AUTO: 0 K/UL
NRBC BLD-RTO: 0 /100 WBC
PLATELET # BLD AUTO: 299 K/UL (ref 164–446)
PMV BLD AUTO: 10.4 FL (ref 9–12.9)
POTASSIUM SERPL-SCNC: 4.8 MMOL/L (ref 3.6–5.5)
PROT SERPL-MCNC: 7.6 G/DL (ref 6–8.2)
RBC # BLD AUTO: 4.57 M/UL (ref 4.7–6.1)
SODIUM SERPL-SCNC: 136 MMOL/L (ref 135–145)
TRIGL SERPL-MCNC: 224 MG/DL (ref 0–149)
WBC # BLD AUTO: 5.5 K/UL (ref 4.8–10.8)

## 2022-01-12 PROCEDURE — 80053 COMPREHEN METABOLIC PANEL: CPT

## 2022-01-12 PROCEDURE — 36415 COLL VENOUS BLD VENIPUNCTURE: CPT

## 2022-01-12 PROCEDURE — 83036 HEMOGLOBIN GLYCOSYLATED A1C: CPT | Mod: GA

## 2022-01-12 PROCEDURE — 80061 LIPID PANEL: CPT

## 2022-01-12 PROCEDURE — 82570 ASSAY OF URINE CREATININE: CPT

## 2022-01-12 PROCEDURE — 82043 UR ALBUMIN QUANTITATIVE: CPT

## 2022-01-12 PROCEDURE — 85025 COMPLETE CBC W/AUTO DIFF WBC: CPT

## 2022-03-22 ENCOUNTER — HOSPITAL ENCOUNTER (OUTPATIENT)
Dept: CARDIOLOGY | Facility: MEDICAL CENTER | Age: 57
End: 2022-03-22
Attending: NURSE PRACTITIONER
Payer: MEDICARE

## 2022-03-22 DIAGNOSIS — I48.0 PAF (PAROXYSMAL ATRIAL FIBRILLATION) (HCC): ICD-10-CM

## 2022-03-22 LAB
LV EJECT FRACT  99904: 40
LV EJECT FRACT MOD 2C 99903: 36.41
LV EJECT FRACT MOD 4C 99902: 29.65
LV EJECT FRACT MOD BP 99901: 29.49

## 2022-03-22 PROCEDURE — 93306 TTE W/DOPPLER COMPLETE: CPT | Mod: 26 | Performed by: INTERNAL MEDICINE

## 2022-03-22 PROCEDURE — 93306 TTE W/DOPPLER COMPLETE: CPT

## 2022-04-27 ENCOUNTER — HOSPITAL ENCOUNTER (OUTPATIENT)
Dept: LAB | Facility: MEDICAL CENTER | Age: 57
End: 2022-04-27
Attending: FAMILY MEDICINE
Payer: MEDICARE

## 2022-04-27 LAB
ALBUMIN SERPL BCP-MCNC: 4.4 G/DL (ref 3.2–4.9)
ALBUMIN/GLOB SERPL: 1.6 G/DL
ALP SERPL-CCNC: 104 U/L (ref 30–99)
ALT SERPL-CCNC: 46 U/L (ref 2–50)
ANION GAP SERPL CALC-SCNC: 9 MMOL/L (ref 7–16)
AST SERPL-CCNC: 23 U/L (ref 12–45)
BILIRUB SERPL-MCNC: 0.9 MG/DL (ref 0.1–1.5)
BUN SERPL-MCNC: 11 MG/DL (ref 8–22)
CALCIUM SERPL-MCNC: 9.4 MG/DL (ref 8.5–10.5)
CHLORIDE SERPL-SCNC: 107 MMOL/L (ref 96–112)
CHOLEST SERPL-MCNC: 152 MG/DL (ref 100–199)
CO2 SERPL-SCNC: 25 MMOL/L (ref 20–33)
CREAT SERPL-MCNC: 0.97 MG/DL (ref 0.5–1.4)
CREAT UR-MCNC: 134.31 MG/DL
EST. AVERAGE GLUCOSE BLD GHB EST-MCNC: 177 MG/DL
GFR SERPLBLD CREATININE-BSD FMLA CKD-EPI: 90 ML/MIN/1.73 M 2
GLOBULIN SER CALC-MCNC: 2.8 G/DL (ref 1.9–3.5)
GLUCOSE SERPL-MCNC: 159 MG/DL (ref 65–99)
HBA1C MFR BLD: 7.8 % (ref 4–5.6)
HDLC SERPL-MCNC: 33 MG/DL
LDLC SERPL CALC-MCNC: 71 MG/DL
MICROALBUMIN UR-MCNC: 7 MG/DL
MICROALBUMIN/CREAT UR: 52 MG/G (ref 0–30)
POTASSIUM SERPL-SCNC: 4.8 MMOL/L (ref 3.6–5.5)
PROT SERPL-MCNC: 7.2 G/DL (ref 6–8.2)
SODIUM SERPL-SCNC: 141 MMOL/L (ref 135–145)
TRIGL SERPL-MCNC: 238 MG/DL (ref 0–149)

## 2022-04-27 PROCEDURE — 36415 COLL VENOUS BLD VENIPUNCTURE: CPT

## 2022-04-27 PROCEDURE — 82043 UR ALBUMIN QUANTITATIVE: CPT

## 2022-04-27 PROCEDURE — 80053 COMPREHEN METABOLIC PANEL: CPT

## 2022-04-27 PROCEDURE — 80061 LIPID PANEL: CPT

## 2022-04-27 PROCEDURE — 82570 ASSAY OF URINE CREATININE: CPT

## 2022-04-27 PROCEDURE — 83036 HEMOGLOBIN GLYCOSYLATED A1C: CPT | Mod: GA

## 2022-05-20 NOTE — ASSESSMENT & PLAN NOTE
Patient is on metformin BID for his DM management.   Checks his blood sugars but hasn't checked his blood glucose immediately before or after a syncopal episode.   On admission, glucose was 134  A1C at 7.6  continue metformin   To be f/u by PCP     Epidermal Closure Graft Donor Site (Optional): simple interrupted

## 2022-10-05 ENCOUNTER — HOSPITAL ENCOUNTER (OUTPATIENT)
Dept: LAB | Facility: MEDICAL CENTER | Age: 57
End: 2022-10-05
Attending: FAMILY MEDICINE
Payer: MEDICARE

## 2022-10-05 LAB
ALBUMIN SERPL BCP-MCNC: 4.4 G/DL (ref 3.2–4.9)
ALBUMIN/GLOB SERPL: 1.2 G/DL
ALP SERPL-CCNC: 114 U/L (ref 30–99)
ALT SERPL-CCNC: 37 U/L (ref 2–50)
ANION GAP SERPL CALC-SCNC: 11 MMOL/L (ref 7–16)
AST SERPL-CCNC: 19 U/L (ref 12–45)
BASOPHILS # BLD AUTO: 0.6 % (ref 0–1.8)
BASOPHILS # BLD: 0.04 K/UL (ref 0–0.12)
BILIRUB SERPL-MCNC: 1.3 MG/DL (ref 0.1–1.5)
BUN SERPL-MCNC: 16 MG/DL (ref 8–22)
CALCIUM SERPL-MCNC: 9.7 MG/DL (ref 8.5–10.5)
CHLORIDE SERPL-SCNC: 104 MMOL/L (ref 96–112)
CHOLEST SERPL-MCNC: 145 MG/DL (ref 100–199)
CO2 SERPL-SCNC: 22 MMOL/L (ref 20–33)
CREAT SERPL-MCNC: 1.02 MG/DL (ref 0.5–1.4)
CREAT UR-MCNC: 95.8 MG/DL
EOSINOPHIL # BLD AUTO: 0.12 K/UL (ref 0–0.51)
EOSINOPHIL NFR BLD: 1.8 % (ref 0–6.9)
ERYTHROCYTE [DISTWIDTH] IN BLOOD BY AUTOMATED COUNT: 51.3 FL (ref 35.9–50)
EST. AVERAGE GLUCOSE BLD GHB EST-MCNC: 166 MG/DL
GFR SERPLBLD CREATININE-BSD FMLA CKD-EPI: 84 ML/MIN/1.73 M 2
GLOBULIN SER CALC-MCNC: 3.7 G/DL (ref 1.9–3.5)
GLUCOSE SERPL-MCNC: 132 MG/DL (ref 65–99)
HBA1C MFR BLD: 7.4 % (ref 4–5.6)
HCT VFR BLD AUTO: 46.4 % (ref 42–52)
HDLC SERPL-MCNC: 37 MG/DL
HGB BLD-MCNC: 15 G/DL (ref 14–18)
IMM GRANULOCYTES # BLD AUTO: 0.02 K/UL (ref 0–0.11)
IMM GRANULOCYTES NFR BLD AUTO: 0.3 % (ref 0–0.9)
LDLC SERPL CALC-MCNC: 75 MG/DL
LYMPHOCYTES # BLD AUTO: 1.57 K/UL (ref 1–4.8)
LYMPHOCYTES NFR BLD: 23.6 % (ref 22–41)
MCH RBC QN AUTO: 29.4 PG (ref 27–33)
MCHC RBC AUTO-ENTMCNC: 32.3 G/DL (ref 33.7–35.3)
MCV RBC AUTO: 90.8 FL (ref 81.4–97.8)
MICROALBUMIN UR-MCNC: 7 MG/DL
MICROALBUMIN/CREAT UR: 73 MG/G (ref 0–30)
MONOCYTES # BLD AUTO: 0.52 K/UL (ref 0–0.85)
MONOCYTES NFR BLD AUTO: 7.8 % (ref 0–13.4)
NEUTROPHILS # BLD AUTO: 4.39 K/UL (ref 1.82–7.42)
NEUTROPHILS NFR BLD: 65.9 % (ref 44–72)
NRBC # BLD AUTO: 0 K/UL
NRBC BLD-RTO: 0 /100 WBC
PLATELET # BLD AUTO: 313 K/UL (ref 164–446)
PMV BLD AUTO: 10 FL (ref 9–12.9)
POTASSIUM SERPL-SCNC: 4.8 MMOL/L (ref 3.6–5.5)
PROT SERPL-MCNC: 8.1 G/DL (ref 6–8.2)
RBC # BLD AUTO: 5.11 M/UL (ref 4.7–6.1)
SODIUM SERPL-SCNC: 137 MMOL/L (ref 135–145)
TRIGL SERPL-MCNC: 163 MG/DL (ref 0–149)
WBC # BLD AUTO: 6.7 K/UL (ref 4.8–10.8)

## 2022-10-05 PROCEDURE — 80053 COMPREHEN METABOLIC PANEL: CPT

## 2022-10-05 PROCEDURE — 85025 COMPLETE CBC W/AUTO DIFF WBC: CPT

## 2022-10-05 PROCEDURE — 82043 UR ALBUMIN QUANTITATIVE: CPT

## 2022-10-05 PROCEDURE — 36415 COLL VENOUS BLD VENIPUNCTURE: CPT

## 2022-10-05 PROCEDURE — 82570 ASSAY OF URINE CREATININE: CPT

## 2022-10-05 PROCEDURE — 80061 LIPID PANEL: CPT

## 2022-10-05 PROCEDURE — 83036 HEMOGLOBIN GLYCOSYLATED A1C: CPT | Mod: GA

## 2023-01-01 NOTE — PROGRESS NOTES
Assist RN: pt vomited 300mL emesis. Medicated per MAR.  Gown changed and complete linen set provided but pt declined change at this time dt persistent nausea.  
Called Lab to check on the result of the 3rd Trop sent since it had been almost an hour and a half and it had not resulted .   I was transferred to Chemistry, and I was told that they have been having some errors while running the Trop.  They are running it for the 3rd time, and will call me with the results.    
MD visited pt,for discharge, IV D/C'd. Discharge instructions provided to pt. Pt states understanding. Pt states all questions have been answered. Copy of discharge provided to pt. Signed copy in chart. Pt states that all personal belongings are in possession. Pt escorted off unit without incident.  
Pt back from stress test,pt on tele with SR,c/o left shoulder pain,pt states pain on movement,poc explained.    
Pt taken down for stress test.  
Pt was waiting for pna vaccine,pharmacy reported pna vaccine has to be out patient,iv dc'd.  
Pt's blood drawn and sent to the Lab.  
Pt's blood drawn for Troponin and sent to the Lab.  
Pt's nasea is better,c/o lt shoulder pain,medicated for pain.  
Report received from SARAHI Gonzalez.  Patient brought up to the CDU from the Green Pod of the ED department via wheelchair with an ACLS RN with a monitor.  Patient A & O x 4.  No apparent signs of distress.  Safety precautions in place.  Patient educated to call for assistance.  Will continue to monitor.  
(4) patient too young to ambulate or walks frequently

## 2023-01-12 NOTE — ED TRIAGE NOTES
Chief Complaint   Patient presents with   • Chest Pain     Chest pain after choking on food 2 hrs ago     Pt brought in by EMS from [facility] where pt lives d/t mental retardation. Pt states that he doesn't feel like the food is stuck in his throat anymore but he is still having pains in the center of his chest. Pt c/o cough secondary to choking on food but has no other complaints at this time. Pt has cardiac hx and received 324mg Aspirin PTA. FSBS was 150 per EMS.   No Vaccines Administered.

## 2023-03-13 ENCOUNTER — APPOINTMENT (OUTPATIENT)
Dept: LAB | Facility: MEDICAL CENTER | Age: 58
End: 2023-03-13
Attending: FAMILY MEDICINE
Payer: MEDICARE

## 2023-04-10 ENCOUNTER — HOSPITAL ENCOUNTER (OUTPATIENT)
Dept: LAB | Facility: MEDICAL CENTER | Age: 58
End: 2023-04-10
Attending: FAMILY MEDICINE
Payer: MEDICARE

## 2023-04-10 LAB
ALBUMIN SERPL BCP-MCNC: 3.8 G/DL (ref 3.2–4.9)
ALBUMIN/GLOB SERPL: 1.4 G/DL
ALP SERPL-CCNC: 74 U/L (ref 30–99)
ALT SERPL-CCNC: 50 U/L (ref 2–50)
ANION GAP SERPL CALC-SCNC: 13 MMOL/L (ref 7–16)
AST SERPL-CCNC: 25 U/L (ref 12–45)
BASOPHILS # BLD AUTO: 0.3 % (ref 0–1.8)
BASOPHILS # BLD: 0.01 K/UL (ref 0–0.12)
BILIRUB SERPL-MCNC: 0.7 MG/DL (ref 0.1–1.5)
BUN SERPL-MCNC: 13 MG/DL (ref 8–22)
CALCIUM ALBUM COR SERPL-MCNC: 8.8 MG/DL (ref 8.5–10.5)
CALCIUM SERPL-MCNC: 8.6 MG/DL (ref 8.5–10.5)
CHLORIDE SERPL-SCNC: 110 MMOL/L (ref 96–112)
CHOLEST SERPL-MCNC: 86 MG/DL (ref 100–199)
CO2 SERPL-SCNC: 19 MMOL/L (ref 20–33)
CREAT SERPL-MCNC: 1.04 MG/DL (ref 0.5–1.4)
CREAT UR-MCNC: 93.76 MG/DL
EOSINOPHIL # BLD AUTO: 0.14 K/UL (ref 0–0.51)
EOSINOPHIL NFR BLD: 4.3 % (ref 0–6.9)
ERYTHROCYTE [DISTWIDTH] IN BLOOD BY AUTOMATED COUNT: 52.4 FL (ref 35.9–50)
EST. AVERAGE GLUCOSE BLD GHB EST-MCNC: 169 MG/DL
GFR SERPLBLD CREATININE-BSD FMLA CKD-EPI: 82 ML/MIN/1.73 M 2
GLOBULIN SER CALC-MCNC: 2.8 G/DL (ref 1.9–3.5)
GLUCOSE SERPL-MCNC: 146 MG/DL (ref 65–99)
HBA1C MFR BLD: 7.5 % (ref 4–5.6)
HCT VFR BLD AUTO: 40.8 % (ref 42–52)
HDLC SERPL-MCNC: 21 MG/DL
HGB BLD-MCNC: 13.5 G/DL (ref 14–18)
IMM GRANULOCYTES # BLD AUTO: 0.02 K/UL (ref 0–0.11)
IMM GRANULOCYTES NFR BLD AUTO: 0.6 % (ref 0–0.9)
LDLC SERPL CALC-MCNC: 40 MG/DL
LYMPHOCYTES # BLD AUTO: 0.93 K/UL (ref 1–4.8)
LYMPHOCYTES NFR BLD: 28.8 % (ref 22–41)
MCH RBC QN AUTO: 30.2 PG (ref 27–33)
MCHC RBC AUTO-ENTMCNC: 33.1 G/DL (ref 33.7–35.3)
MCV RBC AUTO: 91.3 FL (ref 81.4–97.8)
MICROALBUMIN UR-MCNC: 5.7 MG/DL
MICROALBUMIN/CREAT UR: 61 MG/G (ref 0–30)
MONOCYTES # BLD AUTO: 0.46 K/UL (ref 0–0.85)
MONOCYTES NFR BLD AUTO: 14.2 % (ref 0–13.4)
NEUTROPHILS # BLD AUTO: 1.67 K/UL (ref 1.82–7.42)
NEUTROPHILS NFR BLD: 51.8 % (ref 44–72)
NRBC # BLD AUTO: 0 K/UL
NRBC BLD-RTO: 0 /100 WBC
PLATELET # BLD AUTO: 183 K/UL (ref 164–446)
PMV BLD AUTO: 10.3 FL (ref 9–12.9)
POTASSIUM SERPL-SCNC: 4 MMOL/L (ref 3.6–5.5)
PROT SERPL-MCNC: 6.6 G/DL (ref 6–8.2)
RBC # BLD AUTO: 4.47 M/UL (ref 4.7–6.1)
SODIUM SERPL-SCNC: 142 MMOL/L (ref 135–145)
TRIGL SERPL-MCNC: 126 MG/DL (ref 0–149)
TSH SERPL DL<=0.005 MIU/L-ACNC: 2.7 UIU/ML (ref 0.38–5.33)
WBC # BLD AUTO: 3.2 K/UL (ref 4.8–10.8)

## 2023-04-10 PROCEDURE — 80053 COMPREHEN METABOLIC PANEL: CPT

## 2023-04-10 PROCEDURE — 85025 COMPLETE CBC W/AUTO DIFF WBC: CPT

## 2023-04-10 PROCEDURE — 83036 HEMOGLOBIN GLYCOSYLATED A1C: CPT | Mod: GA

## 2023-04-10 PROCEDURE — 82043 UR ALBUMIN QUANTITATIVE: CPT

## 2023-04-10 PROCEDURE — 80061 LIPID PANEL: CPT

## 2023-04-10 PROCEDURE — 36415 COLL VENOUS BLD VENIPUNCTURE: CPT | Mod: GA

## 2023-04-10 PROCEDURE — 82570 ASSAY OF URINE CREATININE: CPT

## 2023-04-10 PROCEDURE — 84443 ASSAY THYROID STIM HORMONE: CPT

## 2023-11-13 ENCOUNTER — HOSPITAL ENCOUNTER (OUTPATIENT)
Dept: LAB | Facility: MEDICAL CENTER | Age: 58
End: 2023-11-13
Attending: PHYSICIAN ASSISTANT
Payer: MEDICARE

## 2023-11-13 LAB
ALBUMIN SERPL BCP-MCNC: 4.3 G/DL (ref 3.2–4.9)
ALBUMIN/GLOB SERPL: 1.4 G/DL
ALP SERPL-CCNC: 115 U/L (ref 30–99)
ALT SERPL-CCNC: 40 U/L (ref 2–50)
ANION GAP SERPL CALC-SCNC: 16 MMOL/L (ref 7–16)
AST SERPL-CCNC: 19 U/L (ref 12–45)
BASOPHILS # BLD AUTO: 0.6 % (ref 0–1.8)
BASOPHILS # BLD: 0.04 K/UL (ref 0–0.12)
BILIRUB SERPL-MCNC: 1.1 MG/DL (ref 0.1–1.5)
BUN SERPL-MCNC: 16 MG/DL (ref 8–22)
CALCIUM ALBUM COR SERPL-MCNC: 8.8 MG/DL (ref 8.5–10.5)
CALCIUM SERPL-MCNC: 9 MG/DL (ref 8.5–10.5)
CHLORIDE SERPL-SCNC: 106 MMOL/L (ref 96–112)
CHOLEST SERPL-MCNC: 148 MG/DL (ref 100–199)
CO2 SERPL-SCNC: 16 MMOL/L (ref 20–33)
CREAT SERPL-MCNC: 1 MG/DL (ref 0.5–1.4)
CREAT UR-MCNC: 72.28 MG/DL
EOSINOPHIL # BLD AUTO: 0.12 K/UL (ref 0–0.51)
EOSINOPHIL NFR BLD: 1.7 % (ref 0–6.9)
ERYTHROCYTE [DISTWIDTH] IN BLOOD BY AUTOMATED COUNT: 47.8 FL (ref 35.9–50)
EST. AVERAGE GLUCOSE BLD GHB EST-MCNC: 169 MG/DL
FASTING STATUS PATIENT QL REPORTED: NORMAL
GFR SERPLBLD CREATININE-BSD FMLA CKD-EPI: 86 ML/MIN/1.73 M 2
GLOBULIN SER CALC-MCNC: 3.1 G/DL (ref 1.9–3.5)
GLUCOSE SERPL-MCNC: 173 MG/DL (ref 65–99)
HBA1C MFR BLD: 7.5 % (ref 4–5.6)
HCT VFR BLD AUTO: 47.9 % (ref 42–52)
HDLC SERPL-MCNC: 36 MG/DL
HGB BLD-MCNC: 16 G/DL (ref 14–18)
IMM GRANULOCYTES # BLD AUTO: 0.05 K/UL (ref 0–0.11)
IMM GRANULOCYTES NFR BLD AUTO: 0.7 % (ref 0–0.9)
LDLC SERPL CALC-MCNC: 73 MG/DL
LYMPHOCYTES # BLD AUTO: 1.49 K/UL (ref 1–4.8)
LYMPHOCYTES NFR BLD: 21.6 % (ref 22–41)
MCH RBC QN AUTO: 31 PG (ref 27–33)
MCHC RBC AUTO-ENTMCNC: 33.4 G/DL (ref 32.3–36.5)
MCV RBC AUTO: 92.8 FL (ref 81.4–97.8)
MICROALBUMIN UR-MCNC: 12.7 MG/DL
MICROALBUMIN/CREAT UR: 176 MG/G (ref 0–30)
MONOCYTES # BLD AUTO: 0.5 K/UL (ref 0–0.85)
MONOCYTES NFR BLD AUTO: 7.2 % (ref 0–13.4)
NEUTROPHILS # BLD AUTO: 4.71 K/UL (ref 1.82–7.42)
NEUTROPHILS NFR BLD: 68.2 % (ref 44–72)
NRBC # BLD AUTO: 0 K/UL
NRBC BLD-RTO: 0 /100 WBC (ref 0–0.2)
PLATELET # BLD AUTO: 284 K/UL (ref 164–446)
PMV BLD AUTO: 10.3 FL (ref 9–12.9)
POTASSIUM SERPL-SCNC: 4.2 MMOL/L (ref 3.6–5.5)
PROT SERPL-MCNC: 7.4 G/DL (ref 6–8.2)
RBC # BLD AUTO: 5.16 M/UL (ref 4.7–6.1)
SODIUM SERPL-SCNC: 138 MMOL/L (ref 135–145)
TRIGL SERPL-MCNC: 197 MG/DL (ref 0–149)
WBC # BLD AUTO: 6.9 K/UL (ref 4.8–10.8)

## 2023-11-13 PROCEDURE — 82043 UR ALBUMIN QUANTITATIVE: CPT

## 2023-11-13 PROCEDURE — 80053 COMPREHEN METABOLIC PANEL: CPT

## 2023-11-13 PROCEDURE — 80061 LIPID PANEL: CPT

## 2023-11-13 PROCEDURE — 83036 HEMOGLOBIN GLYCOSYLATED A1C: CPT

## 2023-11-13 PROCEDURE — 85025 COMPLETE CBC W/AUTO DIFF WBC: CPT

## 2023-11-13 PROCEDURE — 82570 ASSAY OF URINE CREATININE: CPT

## 2023-11-13 PROCEDURE — 36415 COLL VENOUS BLD VENIPUNCTURE: CPT

## 2024-01-01 NOTE — DISCHARGE PLANNING
Per O2 sats patient no longer qualifies for home O2. Message sent to  and Reuben RN updated. Message sent to Gwen SIDDIQUI. Call placed to Giovana Lira  to update.    Well Child Check - 2 Week Visit  Name: Myke Irby  Age: 2 week old  Date: 2024  Historian(s): Mother    Subjective:     Patient Active Problem List   Diagnosis    Breech presentation (CMD)    Maternal RSV shot >2 weeks before delivery         Home Medications:   Current Outpatient Medications   Medication Sig Dispense Refill    Cholecalciferol ([employer] VITAMIN D3 DROPS/INFANT PO)        No current facility-administered medications for this visit.         Patient's history reviewed/updated as appropriate in medical record.       Concerns:  none  [place] screen results: normal    Nutrition/Elimination:   Feedings: BF 30 min every 2-3 hours. No pain with latch. No bottle supplementation at this time.   Feeding difficulties:  none  Vitamins/supplements: none  Multiple wet diapers per day: Yes   Stooling frequency: 3-5/day  Stool color/consistency: yellow seedy    Sleep:  Location: [facility]  Position: back  Any bed-sharing w/ parents: none    Social:  Baby's temperament: happy and content  Family adjusting well: yes      Screening:  Breech/FHx of DDH: breech positioning in third trimester  Car seat: rear facing 5 point harness    Objective:   Visit Vitals  Temp 98.9 °F (37.2 °C) (Rectal)   Ht 20.25\" (51.4 cm)   Wt 3.388 kg (7 lb 7.5 oz)   HC 37 cm (14.57\")   BMI 12.81 kg/m²   +14g/day since last weight check.   +25g/day since visit on 11/15     Weight change from birth: -3%     GEN: No dysmorphic features or congenital anomalies noted.  HEAD: Normocephalic.  Normal sutures.  Anterior fontanelle open, flat.  EYES: Normal eyes and eyelids. Red reflex present without opacification.  ENT: Normal external ears, no pits or tags. Nares appear patent. Palate intact.  NECK: Full range of motion without torticollis. Likes to look R but is able to achieve full ROM to the L.   HEART: Regular rate and rhythm.  No murmur. Equal and symmetrical femoral pulses  RESP: Breath sounds clear bilaterally.  Comfortable work of breathing  without retractions  ABD: Soft with no palpable masses, umbilical stump absent  without surrounding erythema or discharge or bleeding.  : normal male anatomy. Testes palpable bilaterally in scrotal sac. Healed circumcision.   MSK: Clavicles intact.  Spine straight without dimples, sinus tracts or hair yuri. Negative Ortolani and Morgan maneuvers  NEURO: Moves all extremities symmetrically. Normal tone and  reflexes  SKIN: Warm and well perfused, no jaundice present.             Assessment/Plan:  Myke was seen today for well infant birth to 23 months.    Diagnoses and all orders for this visit:    Well child check,  8-28 days old    Breech presentation, single or unspecified fetus (CMD)  -     US HIPS BILATERAL INFANT WITH MANIPULATION; Future. To be done 6 weeks or later ( or later)  - normal hip exam today. Will continue to monitor    Slow weight gain of   - robust weight gain from 11/15 to  with milder weight gain from  to today. Overall weight gain from 11/15 to today is appropriate.   - continue feeding at the breast on demand, no longer than 3 hours between feeds.   - RN weight check in 3 days.     Well child check:  - Weight: continue BF on demand, no longer than 3 hours between feeds.    - return for weight check on Friday. Goal weight is 3448 g. RN to contact me if not meeting goal.   - Guidance: routine anticipatory guidance discussed   - encourage looking to the L. Call if difficult to do so. If no improvement or if worsening at 1 month WCC, will refer to PT.   - Follow up: 1 month of age for next well child check    Orders Placed This Encounter    US HIPS BILATERAL INFANT WITH MANIPULATION       Ashli Hunter MD

## 2024-02-04 NOTE — PROGRESS NOTES
Assessment completed. Pt A&Ox4. Respirations are even and unlabored on 4L n/c, sating 95%. Pt denies pain at this time. Monitors applied, VS stable, call light and belongings within reach. POC updated (discharge to [facility] home after -COVID). Communication board updated. Needs met.   [Very Good] : ~his/her~  mood as very good [1 or 2 (0 pts)] : 1 or 2 (0 points) [Never (0 pts)] : Never (0 points) [No] : In the past 12 months have you used drugs other than those required for medical reasons? No [No falls in past year] : Patient reported no falls in the past year [0] : 2) Feeling down, depressed, or hopeless: Not at all (0) [PHQ-2 Negative - No further assessment needed] : PHQ-2 Negative - No further assessment needed [HIV test declined] : HIV test declined [Hepatitis C test declined] : Hepatitis C test declined [None] : None [Alone] : lives alone [Retired] : retired [Feels Safe at Home] : Feels safe at home [Fully functional (bathing, dressing, toileting, transferring, walking, feeding)] : Fully functional (bathing, dressing, toileting, transferring, walking, feeding) [Fully functional (using the telephone, shopping, preparing meals, housekeeping, doing laundry, using] : Fully functional and needs no help or supervision to perform IADLs (using the telephone, shopping, preparing meals, housekeeping, doing laundry, using transportation, managing medications and managing finances) [Never] : Never [FreeTextEntry1] : ^ see above  [de-identified] : none  [de-identified] : none  [Audit-CScore] : 0  [de-identified] : stretching, walking  [de-identified] : balanced; supplements-  vitamin e, ginko biloba, mvn, caclium, fish oil,md3, b12, mg , turmeric  prevegen  [de-identified] : + mild anxiety  [QNI4Sraqs] : 0  [Change in mental status noted] : No change in mental status noted [Language] : denies difficulty with language [Handling Complex Tasks] : denies difficulty handling complex tasks [Reasoning] : denies difficulty with reasoning [Reports changes in hearing] : Reports no changes in hearing [Reports changes in vision] : Reports no changes in vision [Reports changes in dental health] : Reports no changes in dental health [MammogramComments] : 20 years ago  [PapSmearComments] : 20 years ago  [BoneDensityComments] : will refer

## 2024-03-07 NOTE — PROGRESS NOTES
Health Maintenance Due   Topic Date Due    COVID-19 Vaccine (1) Never done    Shingles Vaccine (1 of 2) Never done       Patient is due for topics as listed above but is not proceeding with Immunization(s) COVID-19 and Shingles at this time. Education provided for Immunization(s) COVID-19 and Shingles.   Pt down to CT

## 2024-05-18 ENCOUNTER — APPOINTMENT (OUTPATIENT)
Dept: RADIOLOGY | Facility: MEDICAL CENTER | Age: 59
End: 2024-05-18
Payer: MEDICARE

## 2024-05-18 ENCOUNTER — APPOINTMENT (OUTPATIENT)
Dept: RADIOLOGY | Facility: MEDICAL CENTER | Age: 59
End: 2024-05-18
Attending: EMERGENCY MEDICINE
Payer: MEDICARE

## 2024-05-18 ENCOUNTER — HOSPITAL ENCOUNTER (INPATIENT)
Facility: MEDICAL CENTER | Age: 59
LOS: 10 days | End: 2024-05-28
Attending: EMERGENCY MEDICINE | Admitting: HOSPITALIST
Payer: MEDICARE

## 2024-05-18 DIAGNOSIS — E78.5 DYSLIPIDEMIA: ICD-10-CM

## 2024-05-18 DIAGNOSIS — J96.01 ACUTE HYPOXEMIC RESPIRATORY FAILURE (HCC): ICD-10-CM

## 2024-05-18 DIAGNOSIS — Z95.0 CARDIAC PACEMAKER IN SITU: ICD-10-CM

## 2024-05-18 DIAGNOSIS — I73.9 CLAUDICATION (HCC): Chronic | ICD-10-CM

## 2024-05-18 DIAGNOSIS — F79 INTELLECTUAL DISABILITY: ICD-10-CM

## 2024-05-18 DIAGNOSIS — I25.5 CARDIOMYOPATHY, ISCHEMIC: ICD-10-CM

## 2024-05-18 DIAGNOSIS — I48.0 PAF (PAROXYSMAL ATRIAL FIBRILLATION) (HCC): Chronic | ICD-10-CM

## 2024-05-18 DIAGNOSIS — U07.1 SEPSIS DUE TO COVID-19 (HCC): ICD-10-CM

## 2024-05-18 DIAGNOSIS — E11.9 TYPE 2 DIABETES MELLITUS WITHOUT COMPLICATION, WITHOUT LONG-TERM CURRENT USE OF INSULIN (HCC): Chronic | ICD-10-CM

## 2024-05-18 DIAGNOSIS — I10 ESSENTIAL HYPERTENSION: ICD-10-CM

## 2024-05-18 DIAGNOSIS — A41.89 SEPSIS DUE TO COVID-19 (HCC): ICD-10-CM

## 2024-05-18 PROBLEM — R94.31 ABNORMAL EKG: Status: ACTIVE | Noted: 2024-05-18

## 2024-05-18 PROBLEM — A41.9 SEPSIS (HCC): Status: ACTIVE | Noted: 2024-05-18

## 2024-05-18 PROBLEM — R09.02 HYPOXIA: Status: ACTIVE | Noted: 2024-05-18

## 2024-05-18 PROBLEM — A41.9 SEPSIS (HCC): Status: RESOLVED | Noted: 2024-05-18 | Resolved: 2024-05-18

## 2024-05-18 PROBLEM — E87.1 HYPONATREMIA: Status: ACTIVE | Noted: 2024-05-18

## 2024-05-18 LAB
ALBUMIN SERPL BCP-MCNC: 4.6 G/DL (ref 3.2–4.9)
ALBUMIN/GLOB SERPL: 1.4 G/DL
ALP SERPL-CCNC: 115 U/L (ref 30–99)
ALT SERPL-CCNC: 44 U/L (ref 2–50)
ANION GAP SERPL CALC-SCNC: 16 MMOL/L (ref 7–16)
APPEARANCE UR: CLEAR
AST SERPL-CCNC: 32 U/L (ref 12–45)
BASOPHILS # BLD AUTO: 0.3 % (ref 0–1.8)
BASOPHILS # BLD: 0.03 K/UL (ref 0–0.12)
BILIRUB SERPL-MCNC: 2 MG/DL (ref 0.1–1.5)
BILIRUB UR QL STRIP.AUTO: NEGATIVE
BUN SERPL-MCNC: 13 MG/DL (ref 8–22)
CALCIUM ALBUM COR SERPL-MCNC: 9.2 MG/DL (ref 8.5–10.5)
CALCIUM SERPL-MCNC: 9.7 MG/DL (ref 8.4–10.2)
CHLORIDE SERPL-SCNC: 99 MMOL/L (ref 96–112)
CO2 SERPL-SCNC: 19 MMOL/L (ref 20–33)
COLOR UR: YELLOW
CREAT SERPL-MCNC: 0.96 MG/DL (ref 0.5–1.4)
CRP SERPL HS-MCNC: 1.92 MG/DL (ref 0–0.75)
D DIMER PPP IA.FEU-MCNC: 0.38 UG/ML (FEU) (ref 0–0.5)
D DIMER PPP IA.FEU-MCNC: <0.27 UG/ML (FEU) (ref 0–0.5)
EKG IMPRESSION: NORMAL
EOSINOPHIL # BLD AUTO: 0.02 K/UL (ref 0–0.51)
EOSINOPHIL NFR BLD: 0.2 % (ref 0–6.9)
ERYTHROCYTE [DISTWIDTH] IN BLOOD BY AUTOMATED COUNT: 44.8 FL (ref 35.9–50)
ERYTHROCYTE [SEDIMENTATION RATE] IN BLOOD BY WESTERGREN METHOD: 4 MM/HOUR (ref 0–20)
FLUAV RNA SPEC QL NAA+PROBE: NEGATIVE
FLUBV RNA SPEC QL NAA+PROBE: NEGATIVE
GFR SERPLBLD CREATININE-BSD FMLA CKD-EPI: 90 ML/MIN/1.73 M 2
GLOBULIN SER CALC-MCNC: 3.3 G/DL (ref 1.9–3.5)
GLUCOSE SERPL-MCNC: 201 MG/DL (ref 65–99)
GLUCOSE UR STRIP.AUTO-MCNC: >=1000 MG/DL
HCT VFR BLD AUTO: 48 % (ref 42–52)
HGB BLD-MCNC: 16.4 G/DL (ref 14–18)
IMM GRANULOCYTES # BLD AUTO: 0.04 K/UL (ref 0–0.11)
IMM GRANULOCYTES NFR BLD AUTO: 0.4 % (ref 0–0.9)
INR PPP: 1.1 (ref 0.87–1.13)
KETONES UR STRIP.AUTO-MCNC: NEGATIVE MG/DL
LACTATE SERPL-SCNC: 1.8 MMOL/L (ref 0.5–2)
LACTATE SERPL-SCNC: 2.4 MMOL/L (ref 0.5–2)
LEUKOCYTE ESTERASE UR QL STRIP.AUTO: NEGATIVE
LYMPHOCYTES # BLD AUTO: 0.49 K/UL (ref 1–4.8)
LYMPHOCYTES NFR BLD: 4.6 % (ref 22–41)
MAGNESIUM SERPL-MCNC: 1.2 MG/DL (ref 1.5–2.5)
MCH RBC QN AUTO: 31.7 PG (ref 27–33)
MCHC RBC AUTO-ENTMCNC: 34.2 G/DL (ref 32.3–36.5)
MCV RBC AUTO: 92.8 FL (ref 81.4–97.8)
MICRO URNS: ABNORMAL
MONOCYTES # BLD AUTO: 0.68 K/UL (ref 0–0.85)
MONOCYTES NFR BLD AUTO: 6.3 % (ref 0–13.4)
NEUTROPHILS # BLD AUTO: 9.49 K/UL (ref 1.82–7.42)
NEUTROPHILS NFR BLD: 88.2 % (ref 44–72)
NITRITE UR QL STRIP.AUTO: NEGATIVE
NRBC # BLD AUTO: 0 K/UL
NRBC BLD-RTO: 0 /100 WBC (ref 0–0.2)
NT-PROBNP SERPL IA-MCNC: 155 PG/ML (ref 0–125)
PH UR STRIP.AUTO: 5.5 [PH] (ref 5–8)
PHOSPHATE SERPL-MCNC: 2.4 MG/DL (ref 2.5–4.5)
PLATELET # BLD AUTO: 235 K/UL (ref 164–446)
PMV BLD AUTO: 9.7 FL (ref 9–12.9)
POTASSIUM SERPL-SCNC: 4.9 MMOL/L (ref 3.6–5.5)
PROCALCITONIN SERPL-MCNC: 0.06 NG/ML
PROCALCITONIN SERPL-MCNC: 0.08 NG/ML
PROT SERPL-MCNC: 7.9 G/DL (ref 6–8.2)
PROT UR QL STRIP: NEGATIVE MG/DL
PROTHROMBIN TIME: 14.8 SEC (ref 12–14.6)
RBC # BLD AUTO: 5.17 M/UL (ref 4.7–6.1)
RBC UR QL AUTO: NEGATIVE
RSV RNA SPEC QL NAA+PROBE: NEGATIVE
SARS-COV-2 RNA RESP QL NAA+PROBE: DETECTED
SODIUM SERPL-SCNC: 134 MMOL/L (ref 135–145)
SP GR UR STRIP.AUTO: 1.01
SPECIMEN SOURCE: ABNORMAL
TROPONIN T SERPL-MCNC: 10 NG/L (ref 6–19)
TROPONIN T SERPL-MCNC: 8 NG/L (ref 6–19)
WBC # BLD AUTO: 10.8 K/UL (ref 4.8–10.8)

## 2024-05-18 PROCEDURE — 94760 N-INVAS EAR/PLS OXIMETRY 1: CPT

## 2024-05-18 PROCEDURE — 770020 HCHG ROOM/CARE - TELE (206)

## 2024-05-18 PROCEDURE — 700102 HCHG RX REV CODE 250 W/ 637 OVERRIDE(OP): Performed by: EMERGENCY MEDICINE

## 2024-05-18 PROCEDURE — 36415 COLL VENOUS BLD VENIPUNCTURE: CPT

## 2024-05-18 PROCEDURE — 96366 THER/PROPH/DIAG IV INF ADDON: CPT

## 2024-05-18 PROCEDURE — 80053 COMPREHEN METABOLIC PANEL: CPT

## 2024-05-18 PROCEDURE — 83880 ASSAY OF NATRIURETIC PEPTIDE: CPT

## 2024-05-18 PROCEDURE — 70498 CT ANGIOGRAPHY NECK: CPT

## 2024-05-18 PROCEDURE — 70496 CT ANGIOGRAPHY HEAD: CPT

## 2024-05-18 PROCEDURE — 700111 HCHG RX REV CODE 636 W/ 250 OVERRIDE (IP): Mod: JZ | Performed by: EMERGENCY MEDICINE

## 2024-05-18 PROCEDURE — 8E0ZXY6 ISOLATION: ICD-10-PCS | Performed by: HOSPITALIST

## 2024-05-18 PROCEDURE — 73564 X-RAY EXAM KNEE 4 OR MORE: CPT | Mod: RT

## 2024-05-18 PROCEDURE — 0241U HCHG SARS-COV-2 COVID-19 NFCT DS RESP RNA 4 TRGT MIC: CPT

## 2024-05-18 PROCEDURE — 96367 TX/PROPH/DG ADDL SEQ IV INF: CPT

## 2024-05-18 PROCEDURE — 85379 FIBRIN DEGRADATION QUANT: CPT | Mod: 91

## 2024-05-18 PROCEDURE — 83605 ASSAY OF LACTIC ACID: CPT | Mod: 91

## 2024-05-18 PROCEDURE — 85610 PROTHROMBIN TIME: CPT

## 2024-05-18 PROCEDURE — 71045 X-RAY EXAM CHEST 1 VIEW: CPT

## 2024-05-18 PROCEDURE — 85025 COMPLETE CBC W/AUTO DIFF WBC: CPT

## 2024-05-18 PROCEDURE — 83735 ASSAY OF MAGNESIUM: CPT

## 2024-05-18 PROCEDURE — A9270 NON-COVERED ITEM OR SERVICE: HCPCS | Performed by: HOSPITALIST

## 2024-05-18 PROCEDURE — 81003 URINALYSIS AUTO W/O SCOPE: CPT

## 2024-05-18 PROCEDURE — 99223 1ST HOSP IP/OBS HIGH 75: CPT | Mod: AI | Performed by: HOSPITALIST

## 2024-05-18 PROCEDURE — 96365 THER/PROPH/DIAG IV INF INIT: CPT

## 2024-05-18 PROCEDURE — A9270 NON-COVERED ITEM OR SERVICE: HCPCS | Performed by: EMERGENCY MEDICINE

## 2024-05-18 PROCEDURE — 85652 RBC SED RATE AUTOMATED: CPT

## 2024-05-18 PROCEDURE — 93005 ELECTROCARDIOGRAM TRACING: CPT | Performed by: EMERGENCY MEDICINE

## 2024-05-18 PROCEDURE — 84100 ASSAY OF PHOSPHORUS: CPT

## 2024-05-18 PROCEDURE — 87040 BLOOD CULTURE FOR BACTERIA: CPT | Mod: 91

## 2024-05-18 PROCEDURE — 700102 HCHG RX REV CODE 250 W/ 637 OVERRIDE(OP): Performed by: HOSPITALIST

## 2024-05-18 PROCEDURE — 700117 HCHG RX CONTRAST REV CODE 255: Performed by: EMERGENCY MEDICINE

## 2024-05-18 PROCEDURE — 87086 URINE CULTURE/COLONY COUNT: CPT

## 2024-05-18 PROCEDURE — 99285 EMERGENCY DEPT VISIT HI MDM: CPT

## 2024-05-18 PROCEDURE — 700105 HCHG RX REV CODE 258: Performed by: EMERGENCY MEDICINE

## 2024-05-18 PROCEDURE — 84484 ASSAY OF TROPONIN QUANT: CPT | Mod: 91

## 2024-05-18 PROCEDURE — 84145 PROCALCITONIN (PCT): CPT

## 2024-05-18 PROCEDURE — 86140 C-REACTIVE PROTEIN: CPT | Mod: 91

## 2024-05-18 RX ORDER — LISINOPRIL 2.5 MG/1
2.5 TABLET ORAL DAILY
Status: DISCONTINUED | OUTPATIENT
Start: 2024-05-19 | End: 2024-05-28 | Stop reason: HOSPADM

## 2024-05-18 RX ORDER — POLYETHYLENE GLYCOL 3350 17 G/17G
1 POWDER, FOR SOLUTION ORAL
Status: DISCONTINUED | OUTPATIENT
Start: 2024-05-18 | End: 2024-05-28 | Stop reason: HOSPADM

## 2024-05-18 RX ORDER — ORAL SEMAGLUTIDE 7 MG/1
7 TABLET ORAL EVERY MORNING
COMMUNITY

## 2024-05-18 RX ORDER — ACETAMINOPHEN 325 MG/1
650 TABLET ORAL EVERY 6 HOURS PRN
Status: DISCONTINUED | OUTPATIENT
Start: 2024-05-18 | End: 2024-05-28 | Stop reason: HOSPADM

## 2024-05-18 RX ORDER — DAPAGLIFLOZIN 10 MG/1
10 TABLET, FILM COATED ORAL DAILY
Status: DISCONTINUED | OUTPATIENT
Start: 2024-05-19 | End: 2024-05-25

## 2024-05-18 RX ORDER — AMOXICILLIN 250 MG
2 CAPSULE ORAL 2 TIMES DAILY
Status: DISCONTINUED | OUTPATIENT
Start: 2024-05-19 | End: 2024-05-28 | Stop reason: HOSPADM

## 2024-05-18 RX ORDER — SODIUM CHLORIDE, SODIUM LACTATE, POTASSIUM CHLORIDE, AND CALCIUM CHLORIDE .6; .31; .03; .02 G/100ML; G/100ML; G/100ML; G/100ML
30 INJECTION, SOLUTION INTRAVENOUS ONCE
Status: COMPLETED | OUTPATIENT
Start: 2024-05-18 | End: 2024-05-18

## 2024-05-18 RX ORDER — UREA 10 %
1000 LOTION (ML) TOPICAL DAILY
Status: DISCONTINUED | OUTPATIENT
Start: 2024-05-19 | End: 2024-05-28 | Stop reason: HOSPADM

## 2024-05-18 RX ORDER — UREA 10 %
2.5 LOTION (ML) TOPICAL
Status: DISCONTINUED | OUTPATIENT
Start: 2024-05-18 | End: 2024-05-28 | Stop reason: HOSPADM

## 2024-05-18 RX ORDER — ONDANSETRON 2 MG/ML
4 INJECTION INTRAMUSCULAR; INTRAVENOUS EVERY 4 HOURS PRN
Status: DISCONTINUED | OUTPATIENT
Start: 2024-05-18 | End: 2024-05-28 | Stop reason: HOSPADM

## 2024-05-18 RX ORDER — ATORVASTATIN CALCIUM 40 MG/1
80 TABLET, FILM COATED ORAL NIGHTLY
Status: DISCONTINUED | OUTPATIENT
Start: 2024-05-18 | End: 2024-05-28 | Stop reason: HOSPADM

## 2024-05-18 RX ORDER — MAGNESIUM SULFATE HEPTAHYDRATE 40 MG/ML
2 INJECTION, SOLUTION INTRAVENOUS ONCE
Status: COMPLETED | OUTPATIENT
Start: 2024-05-18 | End: 2024-05-18

## 2024-05-18 RX ORDER — EMPAGLIFLOZIN 25 MG/1
25 TABLET, FILM COATED ORAL EVERY MORNING
COMMUNITY

## 2024-05-18 RX ORDER — AZITHROMYCIN 250 MG/1
500 TABLET, FILM COATED ORAL ONCE
Status: COMPLETED | OUTPATIENT
Start: 2024-05-18 | End: 2024-05-18

## 2024-05-18 RX ORDER — GEMFIBROZIL 600 MG/1
600 TABLET, FILM COATED ORAL
Status: DISCONTINUED | OUTPATIENT
Start: 2024-05-20 | End: 2024-05-28 | Stop reason: HOSPADM

## 2024-05-18 RX ORDER — DEXAMETHASONE 4 MG/1
6 TABLET ORAL DAILY
Status: DISCONTINUED | OUTPATIENT
Start: 2024-05-19 | End: 2024-05-20

## 2024-05-18 RX ORDER — LORATADINE 10 MG/1
10 TABLET ORAL DAILY
Status: DISCONTINUED | OUTPATIENT
Start: 2024-05-19 | End: 2024-05-19

## 2024-05-18 RX ORDER — PROMETHAZINE HYDROCHLORIDE 25 MG/1
12.5-25 TABLET ORAL EVERY 4 HOURS PRN
Status: DISCONTINUED | OUTPATIENT
Start: 2024-05-18 | End: 2024-05-28 | Stop reason: HOSPADM

## 2024-05-18 RX ORDER — DEXAMETHASONE 4 MG/1
6 TABLET ORAL ONCE
Status: COMPLETED | OUTPATIENT
Start: 2024-05-18 | End: 2024-05-18

## 2024-05-18 RX ORDER — PROCHLORPERAZINE EDISYLATE 5 MG/ML
5-10 INJECTION INTRAMUSCULAR; INTRAVENOUS EVERY 4 HOURS PRN
Status: DISCONTINUED | OUTPATIENT
Start: 2024-05-18 | End: 2024-05-28 | Stop reason: HOSPADM

## 2024-05-18 RX ORDER — METOPROLOL SUCCINATE 25 MG/1
25 TABLET, EXTENDED RELEASE ORAL DAILY
Status: DISCONTINUED | OUTPATIENT
Start: 2024-05-19 | End: 2024-05-28 | Stop reason: HOSPADM

## 2024-05-18 RX ORDER — SODIUM CHLORIDE, SODIUM LACTATE, POTASSIUM CHLORIDE, AND CALCIUM CHLORIDE .6; .31; .03; .02 G/100ML; G/100ML; G/100ML; G/100ML
500 INJECTION, SOLUTION INTRAVENOUS
Status: DISCONTINUED | OUTPATIENT
Start: 2024-05-18 | End: 2024-05-28 | Stop reason: HOSPADM

## 2024-05-18 RX ORDER — PROMETHAZINE HYDROCHLORIDE 25 MG/1
12.5-25 SUPPOSITORY RECTAL EVERY 4 HOURS PRN
Status: DISCONTINUED | OUTPATIENT
Start: 2024-05-18 | End: 2024-05-28 | Stop reason: HOSPADM

## 2024-05-18 RX ORDER — ONDANSETRON 4 MG/1
4 TABLET, ORALLY DISINTEGRATING ORAL EVERY 4 HOURS PRN
Status: DISCONTINUED | OUTPATIENT
Start: 2024-05-18 | End: 2024-05-28 | Stop reason: HOSPADM

## 2024-05-18 RX ORDER — ASPIRIN 81 MG/1
81 TABLET ORAL DAILY
Status: DISCONTINUED | OUTPATIENT
Start: 2024-05-19 | End: 2024-05-28 | Stop reason: HOSPADM

## 2024-05-18 RX ORDER — ACETAMINOPHEN 325 MG/1
650 TABLET ORAL ONCE
Status: COMPLETED | OUTPATIENT
Start: 2024-05-18 | End: 2024-05-18

## 2024-05-18 RX ORDER — OMEPRAZOLE 20 MG/1
40 CAPSULE, DELAYED RELEASE ORAL DAILY
Status: DISCONTINUED | OUTPATIENT
Start: 2024-05-19 | End: 2024-05-28 | Stop reason: HOSPADM

## 2024-05-18 RX ADMIN — DEXAMETHASONE 6 MG: 4 TABLET ORAL at 19:00

## 2024-05-18 RX ADMIN — IOHEXOL 80 ML: 350 INJECTION, SOLUTION INTRAVENOUS at 16:05

## 2024-05-18 RX ADMIN — MAGNESIUM SULFATE HEPTAHYDRATE 2 G: 2 INJECTION, SOLUTION INTRAVENOUS at 19:00

## 2024-05-18 RX ADMIN — ATORVASTATIN CALCIUM 80 MG: 40 TABLET, FILM COATED ORAL at 22:42

## 2024-05-18 RX ADMIN — AMPICILLIN AND SULBACTAM 3 G: 1; 2 INJECTION, POWDER, FOR SOLUTION INTRAMUSCULAR; INTRAVENOUS at 17:02

## 2024-05-18 RX ADMIN — ACETAMINOPHEN 650 MG: 325 TABLET ORAL at 19:15

## 2024-05-18 RX ADMIN — SODIUM CHLORIDE, POTASSIUM CHLORIDE, SODIUM LACTATE AND CALCIUM CHLORIDE 1983 ML: 600; 310; 30; 20 INJECTION, SOLUTION INTRAVENOUS at 16:29

## 2024-05-18 RX ADMIN — Medication 2.5 MG: at 22:42

## 2024-05-18 RX ADMIN — AZITHROMYCIN DIHYDRATE 500 MG: 250 TABLET ORAL at 17:06

## 2024-05-18 ASSESSMENT — COGNITIVE AND FUNCTIONAL STATUS - GENERAL
SUGGESTED CMS G CODE MODIFIER MOBILITY: CI
CLIMB 3 TO 5 STEPS WITH RAILING: A LITTLE
MOBILITY SCORE: 23
SUGGESTED CMS G CODE MODIFIER DAILY ACTIVITY: CH
DAILY ACTIVITIY SCORE: 24

## 2024-05-18 ASSESSMENT — ENCOUNTER SYMPTOMS
INSOMNIA: 0
PALPITATIONS: 0
SORE THROAT: 0
VOMITING: 0
HEADACHES: 0
FEVER: 0
NECK PAIN: 0
WEAKNESS: 1
DOUBLE VISION: 0
DIZZINESS: 0
COUGH: 1
BLURRED VISION: 0
MYALGIAS: 0
DEPRESSION: 0
NAUSEA: 0
SHORTNESS OF BREATH: 1
BRUISES/BLEEDS EASILY: 0

## 2024-05-18 ASSESSMENT — LIFESTYLE VARIABLES
EVER FELT BAD OR GUILTY ABOUT YOUR DRINKING: NO
ALCOHOL_USE: NO
TOTAL SCORE: 0
HOW MANY TIMES IN THE PAST YEAR HAVE YOU HAD 5 OR MORE DRINKS IN A DAY: 0
CONSUMPTION TOTAL: NEGATIVE
TOTAL SCORE: 0
AVERAGE NUMBER OF DAYS PER WEEK YOU HAVE A DRINK CONTAINING ALCOHOL: 0
EVER HAD A DRINK FIRST THING IN THE MORNING TO STEADY YOUR NERVES TO GET RID OF A HANGOVER: NO
DOES PATIENT WANT TO STOP DRINKING: NO
TOTAL SCORE: 0
HAVE PEOPLE ANNOYED YOU BY CRITICIZING YOUR DRINKING: NO
HAVE YOU EVER FELT YOU SHOULD CUT DOWN ON YOUR DRINKING: NO
ON A TYPICAL DAY WHEN YOU DRINK ALCOHOL HOW MANY DRINKS DO YOU HAVE: 0

## 2024-05-18 ASSESSMENT — FIBROSIS 4 INDEX: FIB4 SCORE: 0.63

## 2024-05-18 ASSESSMENT — PATIENT HEALTH QUESTIONNAIRE - PHQ9
1. LITTLE INTEREST OR PLEASURE IN DOING THINGS: NOT AT ALL
SUM OF ALL RESPONSES TO PHQ9 QUESTIONS 1 AND 2: 0
2. FEELING DOWN, DEPRESSED, IRRITABLE, OR HOPELESS: NOT AT ALL

## 2024-05-18 ASSESSMENT — PAIN DESCRIPTION - PAIN TYPE: TYPE: ACUTE PAIN

## 2024-05-18 NOTE — ED NOTES
Saline lock with blood draw by this rn along with oral care-pt mouth lips dry and cracked. Aware of need for urine spec.

## 2024-05-18 NOTE — DISCHARGE PLANNING
Met with pt who said that he is normally independent with ADLs but needs assistance with IADLs. Pt said he has lived at [facility] for the past 10 years. CM also confirmed with friend Shiv. Shiv said he and pt attended a  today and pt did not make it back to the car. Wherein pt collapsed. Pt was unable to stand up and became lethargic.  Pt said he does not use any DMEs and no home O2 .     CM called caregiver Ganesh at [phone] at [facility].   [address] [facility]  PCP Missael Garcia MD    We discussed discharge planning. Pt said he would prefer to go back to group home when he is discharged.     Addendum: I talked to caregiver Ganesh and she said that pt is under the auspices of [facility] and are responsible to pay the group home.     Care Transition Team Assessment    Information Source  Orientation Level: Oriented X4  Information Given By: Patient  Who is responsible for making decisions for patient? : Patient    Readmission Evaluation  Is this a readmission?: No    Elopement Risk  Legal Hold: No    Interdisciplinary Discharge Planning  Does Admitting Nurse Feel This Could be a Complex Discharge?: No  Primary Care Physician: Dr Keyla Garcia  Lives with - Patient's Self Care Capacity: Other (Comments)  Patient or legal guardian wants to designate a caregiver: Yes  Caregiver name: Ganesh  Caregiver contact info: [phone]  Support Systems: Other (Comments)  Housing / Facility: shelter  Name of Care Facility: [facility]  Do You Take your Prescribed Medications Regularly: Yes  Able to Return to Previous ADL's: Future Time w/Therapy  Mobility Issues: No  Prior Services: Intermittent Physical Support for ADL Per Service  [facility]  Assistance Needed: Yes  Durable Medical Equipment: Not Applicable    Discharge Preparedness  What is your plan after discharge?: [facility]  What are your discharge supports?: Other (comment)  Prior Functional Level:  Ambulatory, Needs Assist with Activities of Daily Living, Needs Assist with Medication Management  Difficulity with ADLs: None  Difficulity with IADLs: Cooking, Driving, Laundry, Shopping    Functional Assesment  Prior Functional Level: Ambulatory, Needs Assist with Activities of Daily Living, Needs Assist with Medication Management    Finances  Financial Barriers to Discharge: No  Prescription Coverage: Yes    Vision / Hearing Impairment  Vision Impairment : Yes  Hearing Impairment : No    Values / Beliefs / Concerns  Values / Beliefs Concerns : No    Advance Directive  Advance Directive?: None    Discharge Risks or Barriers  Discharge risks or barriers?: Post-acute placement / services  Patient risk factors: Cognitive / sensory / physical deficit, Complex medical needs    Anticipated Discharge Information  Discharge Disposition: Discharged to home/self care (01)  Discharge Address: [address] [place] [address]  Discharge Contact Phone Number: [phone]

## 2024-05-18 NOTE — ED NOTES
Oxygen via n/c for rm air sat=89%, pt incon of urine in bed, to wheel chair and br for void completion. To ct after same

## 2024-05-18 NOTE — ED TRIAGE NOTES
"Chief Complaint   Patient presents with    Dizziness     \"Started today at work\". Reports also SOB. Denies CP.     Knee Pain     Reports he tripped over a broom at work and has since been experiencing R knee pain.    Cough     States this is non-productive. Reports also runny nose. Denies V/D.      Physical Exam  Pulmonary:      Effort: Pulmonary effort is normal.   Skin:     General: Skin is warm and dry.   Neurological:      Mental Status: He is alert.         "

## 2024-05-18 NOTE — ED NOTES
Pharmacy Medication Reconciliation    ~Med rec updated and complete per [facility] Serena Fishman ([phone])  ~Allergies have been verified and updated per patient at bedside   ~No oral ABX within the last 30 days    ~Anticoagulants (rivaroxaban, apixaban, edoxaban, dabigatran, warfarin, enoxaparin) taken in the last 14 days? Yes  ~Anticoagulant: Xarelto, Last dose: 5/17/2024

## 2024-05-19 LAB
ANION GAP SERPL CALC-SCNC: 15 MMOL/L (ref 7–16)
BUN SERPL-MCNC: 15 MG/DL (ref 8–22)
CALCIUM SERPL-MCNC: 9.5 MG/DL (ref 8.4–10.2)
CHLORIDE SERPL-SCNC: 103 MMOL/L (ref 96–112)
CO2 SERPL-SCNC: 19 MMOL/L (ref 20–33)
CREAT SERPL-MCNC: 0.84 MG/DL (ref 0.5–1.4)
CRP SERPL HS-MCNC: 4.72 MG/DL (ref 0–0.75)
ERYTHROCYTE [DISTWIDTH] IN BLOOD BY AUTOMATED COUNT: 48.5 FL (ref 35.9–50)
GFR SERPLBLD CREATININE-BSD FMLA CKD-EPI: 99 ML/MIN/1.73 M 2
GLUCOSE BLD STRIP.AUTO-MCNC: 219 MG/DL (ref 65–99)
GLUCOSE BLD STRIP.AUTO-MCNC: 221 MG/DL (ref 65–99)
GLUCOSE BLD STRIP.AUTO-MCNC: 349 MG/DL (ref 65–99)
GLUCOSE BLD STRIP.AUTO-MCNC: 378 MG/DL (ref 65–99)
GLUCOSE SERPL-MCNC: 246 MG/DL (ref 65–99)
HCT VFR BLD AUTO: 47 % (ref 42–52)
HGB BLD-MCNC: 15.5 G/DL (ref 14–18)
LACTATE SERPL-SCNC: 1.7 MMOL/L (ref 0.5–2)
MCH RBC QN AUTO: 31.9 PG (ref 27–33)
MCHC RBC AUTO-ENTMCNC: 33 G/DL (ref 32.3–36.5)
MCV RBC AUTO: 96.7 FL (ref 81.4–97.8)
PLATELET # BLD AUTO: 208 K/UL (ref 164–446)
PMV BLD AUTO: 10.2 FL (ref 9–12.9)
POTASSIUM SERPL-SCNC: 4.5 MMOL/L (ref 3.6–5.5)
PROCALCITONIN SERPL-MCNC: 0.08 NG/ML
RBC # BLD AUTO: 4.86 M/UL (ref 4.7–6.1)
SODIUM SERPL-SCNC: 137 MMOL/L (ref 135–145)
WBC # BLD AUTO: 7.2 K/UL (ref 4.8–10.8)

## 2024-05-19 PROCEDURE — 80048 BASIC METABOLIC PNL TOTAL CA: CPT

## 2024-05-19 PROCEDURE — 99232 SBSQ HOSP IP/OBS MODERATE 35: CPT | Performed by: INTERNAL MEDICINE

## 2024-05-19 PROCEDURE — 94760 N-INVAS EAR/PLS OXIMETRY 1: CPT

## 2024-05-19 PROCEDURE — 85027 COMPLETE CBC AUTOMATED: CPT

## 2024-05-19 PROCEDURE — 700102 HCHG RX REV CODE 250 W/ 637 OVERRIDE(OP): Performed by: INTERNAL MEDICINE

## 2024-05-19 PROCEDURE — 82962 GLUCOSE BLOOD TEST: CPT | Mod: 91

## 2024-05-19 PROCEDURE — 770020 HCHG ROOM/CARE - TELE (206)

## 2024-05-19 PROCEDURE — 700102 HCHG RX REV CODE 250 W/ 637 OVERRIDE(OP): Performed by: HOSPITALIST

## 2024-05-19 PROCEDURE — A9270 NON-COVERED ITEM OR SERVICE: HCPCS | Performed by: HOSPITALIST

## 2024-05-19 PROCEDURE — A9270 NON-COVERED ITEM OR SERVICE: HCPCS | Performed by: INTERNAL MEDICINE

## 2024-05-19 PROCEDURE — 83605 ASSAY OF LACTIC ACID: CPT

## 2024-05-19 RX ORDER — OXYCODONE HYDROCHLORIDE 5 MG/1
2.5 TABLET ORAL EVERY 4 HOURS PRN
Status: DISCONTINUED | OUTPATIENT
Start: 2024-05-19 | End: 2024-05-28 | Stop reason: HOSPADM

## 2024-05-19 RX ORDER — CETIRIZINE HYDROCHLORIDE 10 MG/1
10 TABLET ORAL DAILY
Status: DISCONTINUED | OUTPATIENT
Start: 2024-05-19 | End: 2024-05-28 | Stop reason: HOSPADM

## 2024-05-19 RX ORDER — OXYCODONE HYDROCHLORIDE 5 MG/1
5 TABLET ORAL EVERY 4 HOURS PRN
Status: DISCONTINUED | OUTPATIENT
Start: 2024-05-19 | End: 2024-05-28 | Stop reason: HOSPADM

## 2024-05-19 RX ADMIN — INSULIN HUMAN 4 UNITS: 100 INJECTION, SOLUTION PARENTERAL at 17:51

## 2024-05-19 RX ADMIN — CYANOCOBALAMIN TAB 500 MCG 1000 MCG: 500 TAB at 05:53

## 2024-05-19 RX ADMIN — CETIRIZINE HYDROCHLORIDE 10 MG: 10 TABLET, FILM COATED ORAL at 11:50

## 2024-05-19 RX ADMIN — SENNOSIDES AND DOCUSATE SODIUM 2 TABLET: 50; 8.6 TABLET ORAL at 17:48

## 2024-05-19 RX ADMIN — LORATADINE 10 MG: 10 TABLET ORAL at 05:54

## 2024-05-19 RX ADMIN — INSULIN HUMAN 10 UNITS: 100 INJECTION, SOLUTION PARENTERAL at 11:47

## 2024-05-19 RX ADMIN — INSULIN HUMAN 4 UNITS: 100 INJECTION, SOLUTION PARENTERAL at 06:01

## 2024-05-19 RX ADMIN — ASPIRIN 81 MG: 81 TABLET, COATED ORAL at 05:53

## 2024-05-19 RX ADMIN — DAPAGLIFLOZIN 10 MG: 10 TABLET, FILM COATED ORAL at 11:50

## 2024-05-19 RX ADMIN — RIVAROXABAN 20 MG: 20 TABLET, FILM COATED ORAL at 17:48

## 2024-05-19 RX ADMIN — DEXAMETHASONE 6 MG: 4 TABLET ORAL at 05:53

## 2024-05-19 RX ADMIN — OMEPRAZOLE 40 MG: 20 CAPSULE, DELAYED RELEASE ORAL at 05:53

## 2024-05-19 RX ADMIN — INSULIN HUMAN 8 UNITS: 100 INJECTION, SOLUTION PARENTERAL at 20:19

## 2024-05-19 RX ADMIN — SENNOSIDES AND DOCUSATE SODIUM 2 TABLET: 50; 8.6 TABLET ORAL at 05:53

## 2024-05-19 RX ADMIN — LISINOPRIL 2.5 MG: 2.5 TABLET ORAL at 05:53

## 2024-05-19 RX ADMIN — Medication 2.5 MG: at 20:17

## 2024-05-19 RX ADMIN — ATORVASTATIN CALCIUM 80 MG: 40 TABLET, FILM COATED ORAL at 20:18

## 2024-05-19 RX ADMIN — METOPROLOL SUCCINATE 25 MG: 25 TABLET, EXTENDED RELEASE ORAL at 05:53

## 2024-05-19 ASSESSMENT — ENCOUNTER SYMPTOMS
PHOTOPHOBIA: 0
HEADACHES: 0
CLAUDICATION: 0
COUGH: 1
BLURRED VISION: 0
CHILLS: 0
DIARRHEA: 0
INSOMNIA: 0
MYALGIAS: 0
ABDOMINAL PAIN: 0
WEAKNESS: 0
NERVOUS/ANXIOUS: 0
CONSTIPATION: 0
FEVER: 0
SHORTNESS OF BREATH: 1
SENSORY CHANGE: 0
DIZZINESS: 0
DEPRESSION: 0
VOMITING: 0
HEARTBURN: 0
SPEECH CHANGE: 0

## 2024-05-19 ASSESSMENT — PAIN DESCRIPTION - PAIN TYPE
TYPE: ACUTE PAIN
TYPE: ACUTE PAIN

## 2024-05-19 ASSESSMENT — COGNITIVE AND FUNCTIONAL STATUS - GENERAL
STANDING UP FROM CHAIR USING ARMS: A LITTLE
WALKING IN HOSPITAL ROOM: A LITTLE
MOVING TO AND FROM BED TO CHAIR: A LITTLE
CLIMB 3 TO 5 STEPS WITH RAILING: A LITTLE
TURNING FROM BACK TO SIDE WHILE IN FLAT BAD: A LITTLE
SUGGESTED CMS G CODE MODIFIER MOBILITY: CK
MOVING FROM LYING ON BACK TO SITTING ON SIDE OF FLAT BED: A LITTLE
MOBILITY SCORE: 18

## 2024-05-19 ASSESSMENT — FIBROSIS 4 INDEX: FIB4 SCORE: 1.39

## 2024-05-19 NOTE — ASSESSMENT & PLAN NOTE
-Prior history of MI with abnormal EKG at baseline.  -Patient is not having any chest pain and troponin is negative.  I will monitor him on cardiac unit.  Serial cardiac enzymes were added and I will repeat EKG at midnight and closely monitor.    5/27:  Pt c/o epigastric chest pain. ECG obtained did not show any STEMI or NSTEMI, showed sinus rhythm. Checking troponin.  I ordered famotidine IV, he is on omeprazole.

## 2024-05-19 NOTE — ED PROVIDER NOTES
"ED Provider Note    CHIEF COMPLAINT  Chief Complaint   Patient presents with    Dizziness     \"Started today at work\". Reports also SOB. Denies CP.     Knee Pain     Reports he tripped over a broom at work and has since been experiencing R knee pain.    Cough     States this is non-productive. Reports also runny nose. Denies V/D.            HPI/ROS    OUTSIDE HISTORIAN(S):  Caretakers at bedside and history review of systems.  Patient only has with that denies relatively tired, not acting appropriately.  She also states the patient has chronic right knee pain and normally is an to the right when he walks.  She states that he has disability group home with care providers.    Ryan Trevizo is a 58 y.o. male who presents with complaint of dizziness, cough, knee pain and.  The patient's care provider states that the patient was acting strong this morning doing fine, walking on his own, slight development delay.  He went to a function and became extremely weak and tired.  Global weakness.  She states she always limps to the right side secondary to her chronic knee injury several years ago.  The patient denies headache, loss of sensation or strength to arms or legs, nausea, vomiting, fever, dysuria, abdominal pain, hematochezia.  PAST MEDICAL HISTORY   has a past medical history of ACS (acute coronary syndrome) ([facility]), Anxiety disorder, Arthritis, Blood clotting disorder ([facility]), CAD (coronary artery disease) (07/2017), Chronic anticoagulation, Claudication ([facility]), Diabetes ([facility]), Dizziness, GERD (gastroesophageal reflux disease), Glaucoma, HTN (hypertension), Hyperlipemia, Ischemic cardiomyopathy (03/2022), Mental retardation, Mentally challenged, Osteoarthritis, Paroxysmal atrial fibrillation ([facility]), Pericarditis secondary to acute myocardial infarction ([facility]), Sick sinus syndrome ([facility]), SOB (shortness of breath), STEMI (ST elevation myocardial infarction) ([facility]), and Syncope (02/2019).    SURGICAL HISTORY   has a past " surgical history that includes other orthopedic surgery (7- ); other cardiac surgery; gastroscopy-endo (7/25/2012); gastroscopy-endo (7/26/2012); gastroscopy-endo (5/19/2017); other cardiac surgery (Left, 01/12/2018); and pacemaker insertion (Left, 02/12/2019).    FAMILY HISTORY  History reviewed. No pertinent family history.    SOCIAL HISTORY  Social History     Tobacco Use    Smoking status: Never    Smokeless tobacco: Never   Vaping Use    Vaping status: Never Used   Substance and Sexual Activity    Alcohol use: No    Drug use: No    Sexual activity: Not on file     Comment: Single, has no children, works as an .       CURRENT MEDICATIONS  Home Medications       Reviewed by Brian Berman (Pharmacy Tech) on 05/18/24 at 1545  Med List Status: Complete     Medication Last Dose Status   ascorbic acid (VITAMIN C) 500 MG tablet 5/18/2024 Active   aspirin EC (ECOTRIN) 81 MG Tablet Delayed Response 5/18/2024 Active   atorvastatin (LIPITOR) 80 MG tablet 5/17/2024 Active   Cholecalciferol (VITAMIN D) 2000 UNIT Tab 5/18/2024 Active   cyanocobalamin (VITAMIN B12) 1000 MCG Tab 5/18/2024 Active   Empagliflozin (JARDIANCE) 25 MG Tab 5/18/2024 Active   gemfibrozil (LOPID) 600 MG Tab UNK Active   lisinopril (PRINIVIL) 2.5 MG Tab 5/18/2024 Active   loratadine (CLARITIN) 10 MG Tab 5/18/2024 Active   melatonin 5 mg Tab 5/17/2024 Active   metformin (GLUCOPHAGE) 1000 MG tablet 5/18/2024 Active   metoprolol SR (TOPROL XL) 25 MG TABLET SR 24 HR 5/18/2024 Active   omeprazole (PRILOSEC) 40 MG delayed-release capsule 5/18/2024 Active   ranolazine (RANEXA) 500 MG TABLET SR 12 HR 5/17/2024 Active   rivaroxaban (XARELTO) 20 MG Tab tablet 5/17/2024 Active   Semaglutide (RYBELSUS) 7 MG Tab 5/18/2024 Active   Zinc Sulfate 220 (50 Zn) MG Tab 5/18/2024 Active                  Audit from Redirected Encounters    **Home medications have not yet been reviewed for this encounter**         ALLERGIES  Allergies   Allergen Reactions  "   Methylphenidate Unspecified     \"Hyper\"       PHYSICAL EXAM  VITAL SIGNS: BP (!) 146/108   Pulse 96   Temp 38 °C (100.4 °F) (Temporal)   Resp (!) 28   Ht 1.702 m (5' 7\")   Wt 122 kg (270 lb)   SpO2 93%   BMI 42.29 kg/m²      Nursing notes and vitals reviewed.  Constitutional: Well developed, Well nourished, No acute distress, Non-toxic appearance.   Eyes: PERRLA, EOMI, Conjunctiva normal, No discharge.   HENT: Normocephalic, Atraumatic, moist mucous membranes, no facial edema   Cardiovascular: Normal heart rate, Normal rhythm, No murmurs, No rubs, No gallops.   Thorax & Lungs: No respiratory distress, No rales, No rhonchi, No wheezing, No chest tenderness.   Abdomen: Bowel sounds normal, Soft, No tenderness, No guarding, No rebound, No masses, No pulsatile masses.   Skin: Warm, Dry, No erythema, No rash.   Extremities: No deformity, no edema, good range of motion range of motion upper lower extremes bilaterally  Neurologic: Alert & oriented x 3, no focal abnormalities noted, acting appropriately on examination  Psychiatric: Affect normal for clinical presentation.     EKG/LABS  NS tachycardia on monitor  I have independently interpreted this EKG  Results for orders placed or performed during the hospital encounter of 05/18/24   Lactic Acid   Result Value Ref Range    Lactic Acid 2.4 (H) 0.5 - 2.0 mmol/L   CBC with Differential   Result Value Ref Range    WBC 10.8 4.8 - 10.8 K/uL    RBC 5.17 4.70 - 6.10 M/uL    Hemoglobin 16.4 14.0 - 18.0 g/dL    Hematocrit 48.0 42.0 - 52.0 %    MCV 92.8 81.4 - 97.8 fL    MCH 31.7 27.0 - 33.0 pg    MCHC 34.2 32.3 - 36.5 g/dL    RDW 44.8 35.9 - 50.0 fL    Platelet Count 235 164 - 446 K/uL    MPV 9.7 9.0 - 12.9 fL    Neutrophils-Polys 88.20 (H) 44.00 - 72.00 %    Lymphocytes 4.60 (L) 22.00 - 41.00 %    Monocytes 6.30 0.00 - 13.40 %    Eosinophils 0.20 0.00 - 6.90 %    Basophils 0.30 0.00 - 1.80 %    Immature Granulocytes 0.40 0.00 - 0.90 %    Nucleated RBC 0.00 0.00 - 0.20 " /100 WBC    Neutrophils (Absolute) 9.49 (H) 1.82 - 7.42 K/uL    Lymphs (Absolute) 0.49 (L) 1.00 - 4.80 K/uL    Monos (Absolute) 0.68 0.00 - 0.85 K/uL    Eos (Absolute) 0.02 0.00 - 0.51 K/uL    Baso (Absolute) 0.03 0.00 - 0.12 K/uL    Immature Granulocytes (abs) 0.04 0.00 - 0.11 K/uL    NRBC (Absolute) 0.00 K/uL   Complete Metabolic Panel   Result Value Ref Range    Sodium 134 (L) 135 - 145 mmol/L    Potassium 4.9 3.6 - 5.5 mmol/L    Chloride 99 96 - 112 mmol/L    Co2 19 (L) 20 - 33 mmol/L    Anion Gap 16.0 7.0 - 16.0    Glucose 201 (H) 65 - 99 mg/dL    Bun 13 8 - 22 mg/dL    Creatinine 0.96 0.50 - 1.40 mg/dL    Calcium 9.7 8.4 - 10.2 mg/dL    Correct Calcium 9.2 8.5 - 10.5 mg/dL    AST(SGOT) 32 12 - 45 U/L    ALT(SGPT) 44 2 - 50 U/L    Alkaline Phosphatase 115 (H) 30 - 99 U/L    Total Bilirubin 2.0 (H) 0.1 - 1.5 mg/dL    Albumin 4.6 3.2 - 4.9 g/dL    Total Protein 7.9 6.0 - 8.2 g/dL    Globulin 3.3 1.9 - 3.5 g/dL    A-G Ratio 1.4 g/dL   Urinalysis    Specimen: Urine   Result Value Ref Range    Color Yellow     Character Clear     Specific Gravity 1.010 <1.035    Ph 5.5 5.0 - 8.0    Glucose >=1000 (A) Negative mg/dL    Ketones Negative Negative mg/dL    Protein Negative Negative mg/dL    Bilirubin Negative Negative    Nitrite Negative Negative    Leukocyte Esterase Negative Negative    Occult Blood Negative Negative    Micro Urine Req see below    D-DIMER   Result Value Ref Range    D-Dimer <0.27 0.00 - 0.50 ug/mL (FEU)   TROPONIN   Result Value Ref Range    Troponin T 8 6 - 19 ng/L   proBrain Natriuretic Peptide, NT   Result Value Ref Range    NT-proBNP 155 (H) 0 - 125 pg/mL   Magnesium   Result Value Ref Range    Magnesium 1.2 (L) 1.5 - 2.5 mg/dL   Phosphorus   Result Value Ref Range    Phosphorus 2.4 (L) 2.5 - 4.5 mg/dL   Sed Rate   Result Value Ref Range    Sed Rate [facility] 4 0 - 20 mm/hour   C Reactive Protein Quantitative (Non-Cardiac)   Result Value Ref Range    Stat C-Reactive Protein 1.92 (H) 0.00 - 0.75  mg/dL   CoV-2, FLU A/B, and RSV by PCR (2-4 Hours CEPHEID) : Collect NP swab in VTM    Specimen: Respirate   Result Value Ref Range    Influenza virus A RNA Negative Negative    Influenza virus B, PCR Negative Negative    RSV, PCR Negative Negative    SARS-CoV-2 by PCR DETECTED (AA)     SARS-CoV-2 Source NP Swab    ESTIMATED GFR   Result Value Ref Range    GFR (CKD-EPI) 90 >60 mL/min/1.73 m 2   PROCALCITONIN   Result Value Ref Range    Procalcitonin 0.06 <0.25 ng/mL   PROCALCITONIN   Result Value Ref Range    Procalcitonin 0.08 <0.25 ng/mL   LACTIC ACID   Result Value Ref Range    Lactic Acid 1.8 0.5 - 2.0 mmol/L   EKG   Result Value Ref Range    Report       [facility]    Test Date:  2024  Pt Name:    NICHELLE MARQUEZ                   Department: EDSM  MRN:        [id number]                      Room:  Gender:     Male                         Technician: vandana  :        1965                   Requested By:ER TRIAGE PROTOCOL  Order #:    [id number]                    Reading MD: AMINATA GUTIERREZ DO    Measurements  Intervals                                Axis  Rate:       125                          P:          50  PA:         164                          QRS:        114  QRSD:       101                          T:          19  QT:         307  QTc:        443    Interpretive Statements  Sinus tachycardia  Inferior infarct, acute (LCx)  Lateral leads are also involved  Compared to ECG 2020 17:59:04  No significant changes  Electronically Signed On 2024 18:00:32 PDT by AMINATA GUTIERREZ DO         RADIOLOGY/PROCEDURES   I have independently interpreted the diagnostic imaging associated with this visit and am waiting the final reading from the radiologist.   My preliminary interpretation is as follows: Chest x-ray is negative for infiltrate    Radiologist interpretation:  CT-CTA NECK WITH & W/O-POST PROCESSING   Final Result      1.  No acute  abnormality detected. No carotid stenosis detected.      DX-KNEE COMPLETE 4+ RIGHT   Final Result      1.  No fracture detected.   2.  Patella otto.   3.   Small joint effusion.   4.  Osteoarthritis.      DX-CHEST-PORTABLE (1 VIEW)   Final Result      No acute cardiac or pulmonary abnormalities are identified.      CT-CTA HEAD WITH & W/O-POST PROCESS   Final Result      1.  No acute abnormality detected.          COURSE & MEDICAL DECISION MAKING    ASSESSMENT, COURSE AND PLAN  Care Narrative: This is a pleasant 58-year-old gentleman presents with weakness.  The patient does not have any focal deficits suspicious for CVA and first stroke was not initiated.  CTA head and neck are negative for acute intracranial abnormality, no hemorrhage or LVO.  The patient does have evidence of sepsis with a lactic acidosis, hypoxia requiring oxygen supplementation which is new for him.  Patient had a negative D-dimer and I do not believe he has a pulmonary embolism.  In addition, BNP is negative troponin is negative EKG shows no acute changes.  Patient is positive for COVID and is hypoxic with saturation in the 88 percentile requiring nasal oxygen supplementation.  For this reason, the patient has received oxygen supplementation.  He received IV fluids secondary lactic acidosis, empiric antibiotics were given I do not or source of infection secondary to his sepsis.  Now the patient does have evidence of a viral condition I will not continue antibiotics.  He received Decadron 6 mg secondary to COVID in the setting of hypoxia.  I discussed the patient with Dr. Henderson patient accepts hospitalization of this patient.  Elicited called and was questioning the EKG for possible STEMI, I did review with her we do not believe the patient is having a STEMI at this point in time.  He may need further evaluation with serial troponins and EKGs.    ADDITIONAL PROBLEMS MANAGED  Development delayed, history of coronary disease    DISPOSITION AND  DISCUSSIONS  I have discussed management of the patient with the following physicians and SHAYNE's: Dr. Henderson for hospitalization      FINAL DIAGNOSIS  COVID infection  Hypoxia  Abscess  Sepsis    Electronically signed by: Victor Manuel Truong D.O., 5/18/2024 5:57 PM

## 2024-05-19 NOTE — ASSESSMENT & PLAN NOTE
This is Sepsis Present on admission  SIRS criteria identified on my evaluation include: Tachycardia, with heart rate greater than 90 BPM and Tachypnea, with respirations greater than 20 per minute  Clinical indicators of end organ dysfunction include Acute Respiratory Failure - (mechanical ventilation or BiPap or PaO2/FiO2 ratio < 300).  I also noticed that his total bilirubin is 2.0.    WBC 12.7, elevated today. I started Augmentin as he has risks for superimposed bacterial pneumonia with COVID. Procal 0.03.

## 2024-05-19 NOTE — ASSESSMENT & PLAN NOTE
Continue insulin sliding, Accu-Cheks and hypoglycemia protocol  Continue diabetic diet    Patient's glucose up to 223, I am increasing glargine to 15 units twice daily  Continue Farxiga and metformin.

## 2024-05-19 NOTE — PROGRESS NOTES
[facility] Medicine Daily Progress Note    Date of Service  5/19/2024    Chief Complaint  Ryan Trevizo is a 58 y.o. male admitted 5/18/2024 with shortness of breath and weakness    Hospital Course  Patient is a pleasant 58-year-old man who came in with dizziness, cough and knee pain after a fall.  He came into the emergency room with fatigue feeling extremely weak and found to be COVID-positive.  He was admitted for COVID-pneumonia and started on dexamethasone.  He states when he coughs or sneezes he does have chest pain but otherwise is not having chest pain at baseline.  I have started him on Zyrtec and pain medication for his sneezing and chest pain but I am not worried about cardiac etiology at this time.  He is currently requiring 2 to 3 L to maintain adequate saturation.    Interval Problem Update  5/19 patient states he feels about the same today.  He denies worsening shortness of breath but does say when he coughs or sneezes he has increased tenths chest pain.  He was started on sertraline without help therefore changes over to Zyrtec to help with his allergies.  I also ordered some as needed pain medication.  Will continue with dexamethasone and continue to monitor.    I have discussed this patient's plan of care and discharge plan at IDT rounds today with Case Management, Nursing, Nursing leadership, and other members of the IDT team.    Consultants/Specialty  none    Code Status  Full Code    Disposition  The patient is not medically cleared for discharge to home or a post-acute facility.  Anticipate discharge to: home with close outpatient follow-up    I have placed the appropriate orders for post-discharge needs.    Review of Systems  Review of Systems   Constitutional:  Negative for chills and fever.   HENT:  Negative for congestion.    Eyes:  Negative for blurred vision and photophobia.   Respiratory:  Positive for cough and shortness of breath.         Frequent sneezing   Cardiovascular:  Positive  for chest pain (With cough or sneeze). Negative for claudication and leg swelling.   Gastrointestinal:  Negative for abdominal pain, constipation, diarrhea, heartburn and vomiting.   Genitourinary:  Negative for dysuria and hematuria.   Musculoskeletal:  Negative for joint pain and myalgias.   Skin:  Negative for itching and rash.   Neurological:  Negative for dizziness, sensory change, speech change, weakness and headaches.   Psychiatric/Behavioral:  Negative for depression. The patient is not nervous/anxious and does not have insomnia.         Physical Exam  Temp:  [36.3 °C (97.3 °F)-37 °C (98.6 °F)] 36.3 °C (97.3 °F)  Pulse:  [] 73  Resp:  [15-32] 18  BP: (109-138)/(70-90) 112/76  SpO2:  [90 %-97 %] 95 %    Physical Exam  Vitals and nursing note reviewed.   Constitutional:       General: He is not in acute distress.     Appearance: Normal appearance.   HENT:      Head: Normocephalic and atraumatic.   Eyes:      General: No scleral icterus.     Extraocular Movements: Extraocular movements intact.   Cardiovascular:      Rate and Rhythm: Normal rate and regular rhythm.      Pulses: Normal pulses.      Heart sounds: Normal heart sounds. No murmur heard.  Pulmonary:      Effort: Pulmonary effort is normal. No respiratory distress.      Breath sounds: Normal breath sounds. No wheezing, rhonchi or rales.   Abdominal:      General: Abdomen is flat. Bowel sounds are normal. There is no distension.      Palpations: Abdomen is soft.      Tenderness: There is no rebound.   Musculoskeletal:         General: No swelling or tenderness.      Cervical back: Normal range of motion and neck supple.   Lymphadenopathy:      Cervical: No cervical adenopathy.   Skin:     Coloration: Skin is not jaundiced.      Findings: No erythema.   Neurological:      General: No focal deficit present.      Mental Status: He is alert and oriented to person, place, and time. Mental status is at baseline.      Cranial Nerves: No cranial nerve  deficit.   Psychiatric:         Mood and Affect: Mood normal.         Behavior: Behavior normal.         Fluids    Intake/Output Summary (Last 24 hours) at 5/19/2024 1511  Last data filed at 5/19/2024 1000  Gross per 24 hour   Intake 2153 ml   Output --   Net 2153 ml       Laboratory  Recent Labs     05/18/24  1445 05/19/24  0347   WBC 10.8 7.2   RBC 5.17 4.86   HEMOGLOBIN 16.4 15.5   HEMATOCRIT 48.0 47.0   MCV 92.8 96.7   MCH 31.7 31.9   MCHC 34.2 33.0   RDW 44.8 48.5   PLATELETCT 235 208   MPV 9.7 10.2     Recent Labs     05/18/24  1445 05/19/24  0347   SODIUM 134* 137   POTASSIUM 4.9 4.5   CHLORIDE 99 103   CO2 19* 19*   GLUCOSE 201* 246*   BUN 13 15   CREATININE 0.96 0.84   CALCIUM 9.7 9.5     Recent Labs     05/18/24  2155   INR 1.10               Imaging  CT-CTA NECK WITH & W/O-POST PROCESSING   Final Result      1.  No acute abnormality detected. No carotid stenosis detected.      DX-KNEE COMPLETE 4+ RIGHT   Final Result      1.  No fracture detected.   2.  Patella otto.   3.   Small joint effusion.   4.  Osteoarthritis.      DX-CHEST-PORTABLE (1 VIEW)   Final Result      No acute cardiac or pulmonary abnormalities are identified.      CT-CTA HEAD WITH & W/O-POST PROCESS   Final Result      1.  No acute abnormality detected.           Assessment/Plan  * Sepsis due to COVID-19 (HCC)- (present on admission)  Assessment & Plan  This is Sepsis Present on admission  SIRS criteria identified on my evaluation include: Tachycardia, with heart rate greater than 90 BPM and Tachypnea, with respirations greater than 20 per minute  Clinical indicators of end organ dysfunction include Acute Respiratory Failure - (mechanical ventilation or BiPap or PaO2/FiO2 ratio < 300).  I also noticed that his total bilirubin is 2.0.  Source is-Inpatient status on medical floor  -Positive COVID test:  -Patient is requiring supplemental oxygen:  -Patient started on dexamethasone 6 mg IV in the ED that will be continued.  -Please evaluate  for Remdesevir in the morning.  -Other inflammatory markers:    Sepsis protocol initiated  Crystalloid Fluid Administration: Resuscitation volume of 2L ordered. Reason that resuscitation volume of less than 30ml/kg was ordered: Patient has COVID.    IV antibiotics as appropriate for source of sepsis: He was given Unasyn and azithromycin for presumed bacterial pneumonia.  COVID test came back positive.  I order procalcitonin and will not order antibiotics if negative.  Reassessment: I have reassessed the patient's hemodynamic status    Patient already anticoagulated for A-fib.    Hypoxia  Assessment & Plan  -Hypoxia secondary to COVID infection.  Currently currently requiring 2 - 3 L of supplemental oxygen via nasal cannula  Dexamethasone 6mg daily      Hyponatremia  Assessment & Plan  -Likely reactive due to COVID.  Sodium is 134.  Monitor chemistry panel in the morning.    Abnormal EKG  Assessment & Plan  -Prior history of MI with abnormal EKG at baseline.  -Patient is not having any chest pain and troponin is negative.  I will monitor him on cardiac unit.  Serial cardiac enzymes were added and I will repeat EKG at midnight and closely monitor.    Intellectual disability- (present on admission)  Assessment & Plan  -He lives in a supported living care facility.    Cardiac pacemaker in situ- (present on admission)  Assessment & Plan  -Placed in February 2019, [employer] implanted by Dr. Blake.    Chronic anticoagulation- (present on admission)  Assessment & Plan  -He is on Xarelto for A-fib which I am continuing.    PAF (paroxysmal atrial fibrillation) (HCC)- (present on admission)  Assessment & Plan  -Continue metoprolol.  He is anticoagulated with Xarelto that I am continuing.    Type 2 diabetes mellitus without complication, without long-term current use of insulin (HCC)- (present on admission)  Assessment & Plan  SSI  Continue farxiga  Diabetic diet      Coronary artery disease involving native coronary artery of  native heart without angina pectoris- (present on admission)  Assessment & Plan  -History of STEMI in 2012.  Baseline EKG is abnormal.  -Patient is not having chest pain.  Has abnormal EKG and will continue to closely monitor.  Troponin was normal in the ED.    Dyslipidemia- (present on admission)  Assessment & Plan  -Continue gemfibrozil.    GERD with Nieves's esophagus- (present on admission)  Assessment & Plan  -Continue omeprazole.         VTE prophylaxis:    therapeutic anticoagulation with xarelto 20 mg daily witih meals      I have performed a physical exam and reviewed and updated ROS and Plan today (5/19/2024). In review of yesterday's note (5/18/2024), there are no changes except as documented above.

## 2024-05-19 NOTE — HOSPITAL COURSE
Patient is a pleasant 58-year-old man who came in with dizziness, cough and knee pain after a fall.  He came into the emergency room with fatigue feeling extremely weak and found to be COVID-positive.  He was admitted for COVID-pneumonia and started on dexamethasone.  He states when he coughs or sneezes he does have chest pain but otherwise is not having chest pain at baseline.  I have started him on Zyrtec and pain medication for his sneezing and chest pain but I am not worried about cardiac etiology at this time.  He is currently requiring 2 to 3 L to maintain adequate saturation.

## 2024-05-19 NOTE — PROGRESS NOTES
Telemetry Strip     Strip printed: 1302  Measurements from am strip were as follows:  Rhythm: SR  HR: 67-90  Measurements: 0.20/ 0.08/ 0.38  Ectopy: r PVC             Normal Values  Rhythm SR  HR Range    Measurements 0.12-0.20 / 0.06-0.10  / 0.30-0.52

## 2024-05-19 NOTE — PROGRESS NOTES
Telemetry shift summary    Rhythm: sr  HR: 85  Ectopy: r pvc, r coup, r trigeminy    Measurements: .18 / .10 / .36    Normal values  Rhythm SR  HR   Measurements: 0.12-0.20/0.08-0.10/0.30-0.52

## 2024-05-19 NOTE — ASSESSMENT & PLAN NOTE
Continue metoprolol and Xarelto    Pt c/o epigastric chest pain. ECG obtained did not show any STEMI or NSTEMI, showed sinus rhythm.

## 2024-05-19 NOTE — PROGRESS NOTES
4 Eyes Skin Assessment Completed by SARAHI golden and SARAHI christopher.    Head WDL  Ears WDL  Nose WDL  Mouth WDL  Neck WDL  Breast/Chest WDL  Shoulder Blades WDL  Spine WDL  (R) Arm/Elbow/Hand WDL  (L) Arm/Elbow/Hand WDL  Abdomen WDL  Groin WDL  Scrotum/Coccyx/Buttocks WDL  (R) Leg WDL  (L) Leg WDL  (R) Heel/Foot/Toe WDL  (L) Heel/Foot/Toe WDL          Devices In Places Tele Box      Interventions In Place N/A    Possible Skin Injury No    Pictures Uploaded Into Epic N/A  Wound Consult Placed N/A  RN Wound Prevention Protocol Ordered No

## 2024-05-19 NOTE — CARE PLAN
Problem: Pain - Standard  Goal: Alleviation of pain or a reduction in pain to the patient’s comfort goal  Outcome: Progressing     Problem: Knowledge Deficit - Standard  Goal: Patient and family/care givers will demonstrate understanding of plan of care, disease process/condition, diagnostic tests and medications  Outcome: Progressing   The patient is Stable - Low risk of patient condition declining or worsening         Progress made toward(s) clinical / shift goals:  pt educated on plan of care and medicaitons. No questions at this time. Oriented to room and instructed to use call bell and call staff for assitance oob    Patient is not progressing towards the following goals:

## 2024-05-19 NOTE — PROGRESS NOTES
Received patient from Night shift RN . Patient is awake and alert.On  3 liters via NC.Encourage patient to use IS often. Fall precaution in place, kept bed in lowest position, bed alarm on  and call light within reach.    ISOLATION PRECAUTIONS EDUCATION    Educated PATIENT, FAMILY, S.O: patient, family member, friend, and other on isolation for COVID-19.    Educated on reason for isolation, how the infection may be transmitted, and how to help prevent transmission to others. Educated precautions involves staff and visitors wearing PPE, following Standard Precautions and performing meticulous hand hygiene in order to prevent transmission of infection.     Contact Precautions: Educated that Contact Precautions involves staff and visitors wearing gowns and gloves when in the patient room.     In addition, educated that the patient may leave the room, but prior to exiting the patient room each time, the patient needs to have on a fresh patient gown, ensure the potentially infectious area is covered, and perform hand hygiene with soap and water or alcohol-based hand rub, immediately prior to exiting the room.    Enhanced Droplet Precautions: Educated that Enhanced Droplet Precautions involves staff and visitors wearing a surgical mask when in the patient room.     In addition,  educated that they may leave their room, but prior to exiting the patient room each time, the patient needs to have on a fresh patient gown, a surgical mask must be worn by the patient while out of the patient room, and perform hand hygiene immediately prior to exiting the room.     Patient transport and mobilization on unit  Educated that they may leave their room, but prior to exiting, the patient needs to have on a fresh patient gown, ensure the potentially infectious area is covered, performing appropriate hand hygiene immediately prior to exiting the room.     Eye protection

## 2024-05-19 NOTE — CARE PLAN
The patient is Stable - Low risk of patient condition declining or worsening    Shift Goals  Clinical Goals: Able to wean off Oxygen, SPO2 90% and above while on RA  Patient Goals: get better    Progress made toward(s) clinical / shift goals:  Patient is still requiring supplemental O2, currently on 3 liters via NC.Patient still having Dyspnea after going to bathroom, otherwise, patient denies any SOB     Patient is not progressing towards the following goals:    Problem: Pain - Standard  Goal: Alleviation of pain or a reduction in pain to the patient’s comfort goal  Outcome: Progressing     Problem: Knowledge Deficit - Standard  Goal: Patient and family/care givers will demonstrate understanding of plan of care, disease process/condition, diagnostic tests and medications  Outcome: Progressing     Problem: Hemodynamics  Goal: Patient's hemodynamics, fluid balance and neurologic status will be stable or improve  Outcome: Progressing     Problem: Fluid Volume  Goal: Fluid volume balance will be maintained  Outcome: Progressing     Problem: Urinary - Renal Perfusion  Goal: Ability to achieve and maintain adequate renal perfusion and functioning will improve  Outcome: Progressing     Problem: Respiratory  Goal: Patient will achieve/maintain optimum respiratory ventilation and gas exchange  Outcome: Progressing     Problem: Mechanical Ventilation  Goal: Safe management of artificial airway and ventilation  Outcome: Progressing  Goal: Successful weaning off mechanical ventilator, spontaneously maintains adequate gas exchange  Outcome: Progressing  Goal: Patient will be able to express needs and understand communication  Outcome: Progressing     Problem: Physical Regulation  Goal: Diagnostic test results will improve  Outcome: Progressing  Goal: Signs and symptoms of infection will decrease  Outcome: Progressing     Problem: Fall Risk  Goal: Patient will remain free from falls  Outcome: Progressing     Problem: Diabetes  Management  Goal: Patient will achieve and maintain glucose in satisfactory range  Outcome: Progressing     Problem: Knowledge Deficit - Diabetes  Goal: Patient will demonstrate knowledge of insulin injection, symptoms, and treatment of hypoglycemia and diet prior to discharge  Outcome: Progressing     Problem: Skin Integrity - Diabetes  Goal: Patient's skin on legs and feet will remain intact while hospitalized  Outcome: Progressing     Problem: Infection - Diabetes  Goal: Patient will remain free from signs and symptoms of infection  Outcome: Progressing     Problem: Nutrition Deficit - Diabetes  Goal: Patient will demonstrate adequate hydration and vital signs  Outcome: Progressing

## 2024-05-19 NOTE — H&P
"Hospital Medicine History & Physical Note    Date of Service  2024    Primary Care Physician  Missael Garcia M.D.    Consultants  None    Code Status  Full Code    Chief Complaint  Chief Complaint   Patient presents with    Dizziness     \"Started today at work\". Reports also SOB. Denies CP.     Knee Pain     Reports he tripped over a broom at work and has since been experiencing R knee pain.    Cough     States this is non-productive. Reports also runny nose. Denies V/D.        History of Presenting Illness  Poor historian.    Ryan Trevizo is a 58 y.o. male, with h/o Intellectual Disability, CAD, Pacemaker, Afib on Xarelto, HTN, HLD,  DM2, who presented to the emergency department on 2024 with Fatigue, generalized weakness, dizziness, productive cough, shortness of breath.  Patient states he was at a  and this was feeling \"extremely weak \".  He denies focal weakness, no slurred speech.  No fever, no dysuria, no abdominal pain, no nausea or vomiting.    I discussed the plan of care with patient and ERP Dr. Truong .    Review of Systems  Review of Systems   Constitutional:  Positive for malaise/fatigue. Negative for fever.   HENT:  Negative for congestion and sore throat.    Eyes:  Negative for blurred vision and double vision.   Respiratory:  Positive for cough and shortness of breath.    Cardiovascular:  Negative for chest pain and palpitations.   Gastrointestinal:  Negative for nausea and vomiting.   Genitourinary:  Negative for dysuria and urgency.   Musculoskeletal:  Negative for myalgias and neck pain.   Skin:  Negative for itching and rash.   Neurological:  Positive for weakness. Negative for dizziness and headaches.   Endo/Heme/Allergies:  Does not bruise/bleed easily.   Psychiatric/Behavioral:  Negative for depression. The patient does not have insomnia.        Past Medical History   has a past medical history of ACS (acute coronary syndrome) (HCC), Anxiety disorder, Arthritis, Blood " "clotting disorder (HCC), CAD (coronary artery disease) (07/2017), Chronic anticoagulation, Claudication (HCC), Diabetes (HCC), Dizziness, GERD (gastroesophageal reflux disease), Glaucoma, HTN (hypertension), Hyperlipemia, Ischemic cardiomyopathy (03/2022), Mental retardation, Mentally challenged, Osteoarthritis, Paroxysmal atrial fibrillation ([facility]), Pericarditis secondary to acute myocardial infarction ([facility]), Sick sinus syndrome ([facility]), SOB (shortness of breath), STEMI (ST elevation myocardial infarction) ([facility]), and Syncope (02/2019).    Surgical History   has a past surgical history that includes other orthopedic surgery (7- ); other cardiac surgery; gastroscopy-endo (7/25/2012); gastroscopy-endo (7/26/2012); gastroscopy-endo (5/19/2017); other cardiac surgery (Left, 01/12/2018); and pacemaker insertion (Left, 02/12/2019).     Family History  Patient unable to provide family history  Family history reviewed with patient. There is no family history that is pertinent to the chief complaint.     Social History   reports that he has never smoked. He has never used smokeless tobacco. He reports that he does not drink alcohol and does not use drugs.    Allergies  Allergies   Allergen Reactions    Methylphenidate Unspecified     \"Hyper\"       Medications  Prior to Admission Medications   Prescriptions Last Dose Informant Patient Reported? Taking?   Cholecalciferol (VITAMIN D) 2000 UNIT Tab 5/18/2024 at 0700 MAR from Other Facility Yes Yes   Sig: Take 2,000 Units by mouth every day.   Empagliflozin (JARDIANCE) 25 MG Tab 5/18/2024 at 0700 MAR from Other Facility Yes Yes   Sig: Take 25 mg by mouth every morning.   Semaglutide (RYBELSUS) 7 MG Tab 5/18/2024 at 0700 MAR from Other Facility Yes Yes   Sig: Take 7 mg by mouth every morning.   Zinc Sulfate 220 (50 Zn) MG Tab 5/18/2024 at 0700 MAR from Other Facility Yes Yes   ascorbic acid (VITAMIN C) 500 MG tablet 5/18/2024 at 0700 MAR from Other Facility No Yes   Sig: Take " 1 Tab by mouth 2 times a day.   aspirin EC (ECOTRIN) 81 MG Tablet Delayed Response 5/18/2024 at 0700 MAR from Other Facility Yes Yes   Sig: Take 81 mg by mouth every day.   atorvastatin (LIPITOR) 80 MG tablet 5/17/2024 at 2000 MAR from Other Facility Yes Yes   Sig: Take 80 mg by mouth every evening. Every other day   cyanocobalamin (VITAMIN B12) 1000 MCG Tab 5/18/2024 at 0700 MAR from Other Facility Yes Yes   Sig: Take 1,000 mcg by mouth every day.   gemfibrozil (LOPID) 600 MG Tab UNK at UNK MAR from Other Facility Yes No   Sig: Take 600 mg by mouth every 48 hours. Every other day   lisinopril (PRINIVIL) 2.5 MG Tab 5/18/2024 at 0700 MAR from Other Facility Yes Yes   Sig: Take 2.5 mg by mouth every day.   loratadine (CLARITIN) 10 MG Tab 5/18/2024 at 0700 MAR from Other Facility Yes Yes   Sig: Take 10 mg by mouth every day.   melatonin 5 mg Tab 5/17/2024 at 2000 MAR from Other Facility No Yes   Sig: Take 0.5 Tabs by mouth every bedtime.   metformin (GLUCOPHAGE) 1000 MG tablet 5/18/2024 at 0700 MAR from Other Facility Yes Yes   Sig: Take 1,000 mg by mouth 2 times a day with meals.   metoprolol SR (TOPROL XL) 25 MG TABLET SR 24 HR 5/18/2024 at 0700 MAR from Other Facility No Yes   Sig: Take 1 Tab by mouth every day.   omeprazole (PRILOSEC) 40 MG delayed-release capsule 5/18/2024 at 0700 MAR from Other Facility Yes Yes   Sig: Take 40 mg by mouth every day.   ranolazine (RANEXA) 500 MG TABLET SR 12 HR 5/17/2024 at 1700 MAR from Other Facility Yes Yes   Sig: Take 500 mg by mouth every day. With Dinner   rivaroxaban (XARELTO) 20 MG Tab tablet 5/17/2024 at 1700 MAR from Other Facility Yes Yes   Sig: Take 20 mg by mouth with dinner.      Facility-Administered Medications: None       Physical Exam  Temp:  [38 °C (100.4 °F)] 38 °C (100.4 °F)  Pulse:  [] 96  Resp:  [20-32] 28  BP: (146)/(108) 146/108  SpO2:  [93 %-96 %] 93 %  Blood Pressure: (!) 146/108   Temperature: 38 °C (100.4 °F)   Pulse: 96   Respiration: (!) 28    Pulse Oximetry: 93 %       Physical Exam  Constitutional:       Appearance: Normal appearance.   HENT:      Head: Normocephalic and atraumatic.      Nose: Nose normal.      Mouth/Throat:      Mouth: Mucous membranes are moist.      Pharynx: Oropharynx is clear.   Eyes:      Extraocular Movements: Extraocular movements intact.      Pupils: Pupils are equal, round, and reactive to light.   Cardiovascular:      Rate and Rhythm: Regular rhythm. Tachycardia present.      Pulses: Normal pulses.      Heart sounds: Normal heart sounds.   Pulmonary:      Effort: Pulmonary effort is normal.      Breath sounds: Normal breath sounds.   Abdominal:      General: Abdomen is flat. Bowel sounds are normal.      Palpations: Abdomen is soft.   Musculoskeletal:      Cervical back: Normal range of motion and neck supple.   Skin:     General: Skin is warm and dry.   Neurological:      General: No focal deficit present.      Mental Status: He is alert and oriented to person, place, and time.      Cranial Nerves: No cranial nerve deficit.      Motor: Weakness (Global weakness) present.      Coordination: Coordination normal.   Psychiatric:         Mood and Affect: Mood normal.         Behavior: Behavior normal.         Laboratory:  Recent Labs     05/18/24  1445   WBC 10.8   RBC 5.17   HEMOGLOBIN 16.4   HEMATOCRIT 48.0   MCV 92.8   MCH 31.7   MCHC 34.2   RDW 44.8   PLATELETCT 235   MPV 9.7     Recent Labs     05/18/24  1445   SODIUM 134*   POTASSIUM 4.9   CHLORIDE 99   CO2 19*   GLUCOSE 201*   BUN 13   CREATININE 0.96   CALCIUM 9.7     Recent Labs     05/18/24  1445   ALTSGPT 44   ASTSGOT 32   ALKPHOSPHAT 115*   TBILIRUBIN 2.0*   GLUCOSE 201*         Recent Labs     05/18/24  1445   NTPROBNP 155*         Recent Labs     05/18/24  1445   TROPONINT 8       Imaging:  CT-CTA NECK WITH & W/O-POST PROCESSING   Final Result      1.  No acute abnormality detected. No carotid stenosis detected.      DX-KNEE COMPLETE 4+ RIGHT   Final Result       1.  No fracture detected.   2.  Patella otto.   3.   Small joint effusion.   4.  Osteoarthritis.      DX-CHEST-PORTABLE (1 VIEW)   Final Result      No acute cardiac or pulmonary abnormalities are identified.      CT-CTA HEAD WITH & W/O-POST PROCESS   Final Result      1.  No acute abnormality detected.          X-Ray:  I have personally reviewed the images and compared with prior images. and My impression is: CTA of the neck and head were negative for acute abnormalities.  Knee x-ray showed no fracture and a small joint effusion.  Portable chest x-ray is negative for acute car pulmonary abnormalities.  EKG:  I have personally reviewed the images and compared with prior images. and My impression is: Sinus tachycardia 124 bpm.  Minimal ST elevation in leads II, III and aVF that seems to be a chronic finding.    Assessment/Plan:  Justification for Admission Status  I anticipate this patient will require at least two midnights for appropriate medical management, necessitating inpatient admission because 58-year-old with intellectual disability coming with sepsis and COVID.  Currently on 2 L    * Sepsis due to COVID-19 (HCC)- (present on admission)  Assessment & Plan  This is Sepsis Present on admission  SIRS criteria identified on my evaluation include: Tachycardia, with heart rate greater than 90 BPM and Tachypnea, with respirations greater than 20 per minute  Clinical indicators of end organ dysfunction include Acute Respiratory Failure - (mechanical ventilation or BiPap or PaO2/FiO2 ratio < 300).  I also noticed that his total bilirubin is 2.0.  Source is-Inpatient status on medical floor  -Positive COVID test:  -Patient is requiring supplemental oxygen:  -Patient started on dexamethasone 6 mg IV in the ED that will be continued.  -Please evaluate for Remdesevir in the morning.  -Other inflammatory markers:    Sepsis protocol initiated  Crystalloid Fluid Administration: Resuscitation volume of 2L ordered. Reason  that resuscitation volume of less than 30ml/kg was ordered: Patient has COVID.    IV antibiotics as appropriate for source of sepsis: He was given Unasyn and azithromycin for presumed bacterial pneumonia.  COVID test came back positive.  I order procalcitonin and will not order antibiotics if negative.  Reassessment: I have reassessed the patient's hemodynamic status    I also added D-dimer and CRP.  Patient already anticoagulated for A-fib.    Hypoxia  Assessment & Plan  -Hypoxia secondary to COVID infection.  Currently currently requiring 2 L of supplemental oxygen via nasal cannula    Hyponatremia  Assessment & Plan  -Likely reactive due to COVID.  Sodium is 134.  Monitor chemistry panel in the morning.    Abnormal EKG  Assessment & Plan  -Prior history of MI with abnormal EKG at baseline.  -Patient is not having any chest pain and troponin is negative.  I will monitor him on cardiac unit.  Serial cardiac enzymes were added and I will repeat EKG at midnight and closely monitor.    Intellectual disability- (present on admission)  Assessment & Plan  -He lives in a supported living care facility.    Cardiac pacemaker in situ- (present on admission)  Assessment & Plan  -Placed in February 2019, [employer] implanted by Dr. Blake.    Chronic anticoagulation- (present on admission)  Assessment & Plan  -He is on Xarelto for A-fib which I am continuing.    PAF (paroxysmal atrial fibrillation) ([facility])- (present on admission)  Assessment & Plan  -Continue metoprolol.  He is anticoagulated with Xarelto that I am continuing.    Type 2 diabetes mellitus without complication, without long-term current use of insulin (HCC)- (present on admission)  Assessment & Plan  -Continue fark [employer].  Hold metformin and start regular insulin sliding scale.    Coronary artery disease involving native coronary artery of native heart without angina pectoris- (present on admission)  Assessment & Plan  -History of STEMI in 2012.  Baseline EKG is  abnormal.  -Patient is not having chest pain.  Has abnormal EKG and will continue to closely monitor.  Troponin was normal in the ED.    Dyslipidemia- (present on admission)  Assessment & Plan  -Continue gemfibrozil.    GERD with Nieves's esophagus- (present on admission)  Assessment & Plan  -Continue omeprazole.        VTE prophylaxis: SCDs/TEDs and anticoagulation with Xarelto

## 2024-05-19 NOTE — ASSESSMENT & PLAN NOTE
-History of STEMI in 2012.  Baseline EKG is abnormal.  -Patient is not having chest pain.  Has abnormal EKG and will continue to closely monitor.  Troponin was normal in the ED.

## 2024-05-20 LAB
ANION GAP SERPL CALC-SCNC: 15 MMOL/L (ref 7–16)
BACTERIA UR CULT: NORMAL
BUN SERPL-MCNC: 22 MG/DL (ref 8–22)
CALCIUM SERPL-MCNC: 9.4 MG/DL (ref 8.4–10.2)
CHLORIDE SERPL-SCNC: 102 MMOL/L (ref 96–112)
CO2 SERPL-SCNC: 18 MMOL/L (ref 20–33)
CREAT SERPL-MCNC: 0.97 MG/DL (ref 0.5–1.4)
ERYTHROCYTE [DISTWIDTH] IN BLOOD BY AUTOMATED COUNT: 47.1 FL (ref 35.9–50)
GFR SERPLBLD CREATININE-BSD FMLA CKD-EPI: 89 ML/MIN/1.73 M 2
GLUCOSE BLD STRIP.AUTO-MCNC: 242 MG/DL (ref 65–99)
GLUCOSE BLD STRIP.AUTO-MCNC: 296 MG/DL (ref 65–99)
GLUCOSE BLD STRIP.AUTO-MCNC: 338 MG/DL (ref 65–99)
GLUCOSE SERPL-MCNC: 222 MG/DL (ref 65–99)
HCT VFR BLD AUTO: 44.1 % (ref 42–52)
HGB BLD-MCNC: 14.8 G/DL (ref 14–18)
MCH RBC QN AUTO: 31.8 PG (ref 27–33)
MCHC RBC AUTO-ENTMCNC: 33.6 G/DL (ref 32.3–36.5)
MCV RBC AUTO: 94.8 FL (ref 81.4–97.8)
PLATELET # BLD AUTO: 259 K/UL (ref 164–446)
PMV BLD AUTO: 10.6 FL (ref 9–12.9)
POTASSIUM SERPL-SCNC: 4.5 MMOL/L (ref 3.6–5.5)
RBC # BLD AUTO: 4.65 M/UL (ref 4.7–6.1)
SIGNIFICANT IND 70042: NORMAL
SITE SITE: NORMAL
SODIUM SERPL-SCNC: 135 MMOL/L (ref 135–145)
SOURCE SOURCE: NORMAL
WBC # BLD AUTO: 13.6 K/UL (ref 4.8–10.8)

## 2024-05-20 PROCEDURE — 97530 THERAPEUTIC ACTIVITIES: CPT

## 2024-05-20 PROCEDURE — 770020 HCHG ROOM/CARE - TELE (206)

## 2024-05-20 PROCEDURE — 99232 SBSQ HOSP IP/OBS MODERATE 35: CPT | Performed by: INTERNAL MEDICINE

## 2024-05-20 PROCEDURE — 85027 COMPLETE CBC AUTOMATED: CPT

## 2024-05-20 PROCEDURE — 80048 BASIC METABOLIC PNL TOTAL CA: CPT

## 2024-05-20 PROCEDURE — 700102 HCHG RX REV CODE 250 W/ 637 OVERRIDE(OP): Performed by: INTERNAL MEDICINE

## 2024-05-20 PROCEDURE — A9270 NON-COVERED ITEM OR SERVICE: HCPCS | Performed by: INTERNAL MEDICINE

## 2024-05-20 PROCEDURE — 97162 PT EVAL MOD COMPLEX 30 MIN: CPT

## 2024-05-20 PROCEDURE — 700102 HCHG RX REV CODE 250 W/ 637 OVERRIDE(OP): Performed by: HOSPITALIST

## 2024-05-20 PROCEDURE — A9270 NON-COVERED ITEM OR SERVICE: HCPCS | Performed by: HOSPITALIST

## 2024-05-20 PROCEDURE — 82962 GLUCOSE BLOOD TEST: CPT | Mod: 91

## 2024-05-20 RX ORDER — DEXAMETHASONE 4 MG/1
6 TABLET ORAL DAILY
Status: DISCONTINUED | OUTPATIENT
Start: 2024-05-21 | End: 2024-05-23

## 2024-05-20 RX ADMIN — CETIRIZINE HYDROCHLORIDE 10 MG: 10 TABLET, FILM COATED ORAL at 05:45

## 2024-05-20 RX ADMIN — GEMFIBROZIL 600 MG: 600 TABLET ORAL at 05:45

## 2024-05-20 RX ADMIN — ACETAMINOPHEN 650 MG: 325 TABLET ORAL at 11:51

## 2024-05-20 RX ADMIN — INSULIN HUMAN 7 UNITS: 100 INJECTION, SOLUTION PARENTERAL at 20:10

## 2024-05-20 RX ADMIN — LISINOPRIL 2.5 MG: 2.5 TABLET ORAL at 05:45

## 2024-05-20 RX ADMIN — ATORVASTATIN CALCIUM 80 MG: 40 TABLET, FILM COATED ORAL at 20:11

## 2024-05-20 RX ADMIN — ASPIRIN 81 MG: 81 TABLET, COATED ORAL at 05:45

## 2024-05-20 RX ADMIN — INSULIN HUMAN 2 UNITS: 100 INJECTION, SOLUTION PARENTERAL at 05:50

## 2024-05-20 RX ADMIN — INSULIN HUMAN 10 UNITS: 100 INJECTION, SOLUTION PARENTERAL at 17:26

## 2024-05-20 RX ADMIN — DAPAGLIFLOZIN 10 MG: 10 TABLET, FILM COATED ORAL at 05:45

## 2024-05-20 RX ADMIN — DEXAMETHASONE 6 MG: 4 TABLET ORAL at 05:45

## 2024-05-20 RX ADMIN — OXYCODONE HYDROCHLORIDE 5 MG: 5 TABLET ORAL at 15:08

## 2024-05-20 RX ADMIN — SENNOSIDES AND DOCUSATE SODIUM 2 TABLET: 50; 8.6 TABLET ORAL at 05:44

## 2024-05-20 RX ADMIN — INSULIN HUMAN 4 UNITS: 100 INJECTION, SOLUTION PARENTERAL at 11:54

## 2024-05-20 RX ADMIN — RIVAROXABAN 20 MG: 20 TABLET, FILM COATED ORAL at 17:26

## 2024-05-20 RX ADMIN — OMEPRAZOLE 40 MG: 20 CAPSULE, DELAYED RELEASE ORAL at 05:44

## 2024-05-20 RX ADMIN — METOPROLOL SUCCINATE 25 MG: 25 TABLET, EXTENDED RELEASE ORAL at 05:45

## 2024-05-20 RX ADMIN — Medication 2.5 MG: at 20:11

## 2024-05-20 RX ADMIN — CYANOCOBALAMIN TAB 500 MCG 1000 MCG: 500 TAB at 05:45

## 2024-05-20 ASSESSMENT — PAIN DESCRIPTION - PAIN TYPE
TYPE: ACUTE PAIN

## 2024-05-20 ASSESSMENT — ENCOUNTER SYMPTOMS
HEARTBURN: 0
WEAKNESS: 0
DIARRHEA: 0
COUGH: 1
NERVOUS/ANXIOUS: 0
VOMITING: 0
BLURRED VISION: 0
SENSORY CHANGE: 0
FEVER: 0
DIZZINESS: 0
CHILLS: 0
PHOTOPHOBIA: 0
CONSTIPATION: 0
HEADACHES: 0
INSOMNIA: 0
SPEECH CHANGE: 0
DEPRESSION: 0
MYALGIAS: 0
CLAUDICATION: 0
SHORTNESS OF BREATH: 1
ABDOMINAL PAIN: 0

## 2024-05-20 ASSESSMENT — CHA2DS2 SCORE
CHF OR LEFT VENTRICULAR DYSFUNCTION: YES
PRIOR STROKE OR TIA OR THROMBOEMBOLISM: NO
SEX: MALE
VASCULAR DISEASE: YES
DIABETES: YES
AGE 75 OR GREATER: NO
AGE 65 TO 74: NO
HYPERTENSION: YES
CHA2DS2 VASC SCORE: 4

## 2024-05-20 ASSESSMENT — COGNITIVE AND FUNCTIONAL STATUS - GENERAL
STANDING UP FROM CHAIR USING ARMS: A LITTLE
MOVING FROM LYING ON BACK TO SITTING ON SIDE OF FLAT BED: A LITTLE
SUGGESTED CMS G CODE MODIFIER MOBILITY: CK
MOVING TO AND FROM BED TO CHAIR: A LITTLE
TURNING FROM BACK TO SIDE WHILE IN FLAT BAD: A LITTLE
WALKING IN HOSPITAL ROOM: A LITTLE
CLIMB 3 TO 5 STEPS WITH RAILING: A LITTLE
MOBILITY SCORE: 18

## 2024-05-20 ASSESSMENT — GAIT ASSESSMENTS
GAIT LEVEL OF ASSIST: STANDBY ASSIST
DISTANCE (FEET): 30
ASSISTIVE DEVICE: FRONT WHEEL WALKER
DEVIATION: BRADYKINETIC;DECREASED HEEL STRIKE;DECREASED TOE OFF;DECREASED BASE OF SUPPORT;OTHER (COMMENT)

## 2024-05-20 ASSESSMENT — FIBROSIS 4 INDEX: FIB4 SCORE: 1.12

## 2024-05-20 NOTE — PROGRESS NOTES
Telemetry shift summary    Rhythm: sr/a paced  HR: 61-76  Ectopy: r-o-f pvc, r trig, r coup    Measurements: .18 / .10 / .34    Normal values  Rhythm SR  HR   Measurements: 0.12-0.20/0.08-0.10/0.30-0.52

## 2024-05-20 NOTE — CARE PLAN
The patient is Stable - Low risk of patient condition declining or worsening    Shift Goals  Clinical Goals: wean oxygen as tolerated; increase activity as tolerated; patient safety  Patient Goals: feel better  Family Goals: REGINA    Progress made toward(s) clinical / shift goals:    Problem: Knowledge Deficit - Standard  Goal: Patient and family/care givers will demonstrate understanding of plan of care, disease process/condition, diagnostic tests and medications  Outcome: Progressing  Note: Patient updated on plan of care. Wean O2 as tolerated; increase activity as tolerated; monitor output. Patient verbalized understanding.     Problem: Respiratory  Goal: Patient will achieve/maintain optimum respiratory ventilation and gas exchange  Outcome: Progressing  Note: Patient requiring 2L O2, weaned from 3L, monitor O2 needs with , maintain O2 saturation >90%. Wean to RA as tolerated.      Problem: Nutrition Deficit - Diabetes  Goal: Patient will demonstrate adequate hydration and vital signs  Outcome: Progressing  Note: Monitor intake. Monitor daily weight. Diabetic/cardiac diet as ordered.        Patient is not progressing towards the following goals:

## 2024-05-20 NOTE — CARE PLAN
Problem: Pain - Standard  Goal: Alleviation of pain or a reduction in pain to the patient’s comfort goal  Outcome: Progressing     Problem: Knowledge Deficit - Standard  Goal: Patient and family/care givers will demonstrate understanding of plan of care, disease process/condition, diagnostic tests and medications  Outcome: Progressing   The patient is Stable - Low risk of patient condition declining or worsening    Shift Goals  Clinical Goals: Able to wean off Oxygen, SPO2 90% and above while on RA  Patient Goals: get better    Progress made toward(s) clinical / shift goals:  pt educated on plan of care and medications. No questions at this time    Patient is not progressing towards the following goals:

## 2024-05-20 NOTE — DISCHARGE SUMMARY
"Case Management Discharge Planning    Admission Date: 5/18/2024  GMLOS: 5.1  ALOS: 2    6-Clicks ADL Score: 24  6-Clicks Mobility Score: 18      Anticipated Discharge Dispo: Discharge Disposition: Discharged to home/self care (01)  Discharge Address: [address]  Discharge Contact Phone Number: [phone]    DME Needed: No    Action(s) Taken: YOLANDA spoke with Ganesh at [facility] [phone].  RN ANASTASIYA asked what the protocol was at the  for accepting patient back after covid dx.  Ganesh could not give a clear answer for how many days after testing positive for covid, but said \"patient needs to no longer have covid prior to returning.\"  They are requesting a negative covid test and no longer symptomatic.  RN CM to follow up with [facility] when patient is medically cleared to discuss discharge.      Escalations Completed: None    Medically Clear: No    Next Steps: RN CM to follow up with  once patient is medically cleared     Barriers to Discharge: Medical clearance    Is the patient up for discharge tomorrow: No       "

## 2024-05-20 NOTE — DISCHARGE PLANNING
"Case Management Discharge Planning     Admission Date: 5/18/2024  GMLOS: 5.1  ALOS: 2     6-Clicks ADL Score: 24  6-Clicks Mobility Score: 18        Anticipated Discharge Dispo: Discharge Disposition: Discharged to home/self care (01)  Discharge Address: [address]  Discharge Contact Phone Number: [phone]     DME Needed: No     Action(s) Taken: YOLANDA spoke with Ganesh at [facility] [phone].  RN ANASTASIYA asked what the protocol was at the  for accepting patient back after covid dx.  Ganesh could not give a clear answer for how many days after testing positive for covid, but said \"patient needs to no longer have covid prior to returning.\"  They are requesting a negative covid test and no longer symptomatic.  RN CM to follow up with [facility] when patient is medically cleared to discuss discharge.       Escalations Completed: None     Medically Clear: No     Next Steps: RN CM to follow up with  once patient is medically cleared      Barriers to Discharge: Medical clearance     Is the patient up for discharge tomorrow: No     "

## 2024-05-20 NOTE — PROGRESS NOTES
Hospital Medicine Daily Progress Note    Date of Service  5/20/2024    Chief Complaint  Ryan Trevizo is a 58 y.o. male admitted 5/18/2024 with shortness of breath and weakness    Hospital Course  Patient is a pleasant 58-year-old man who came in with dizziness, cough and knee pain after a fall.  He came into the emergency room with fatigue feeling extremely weak and found to be COVID-positive.  He was admitted for COVID-pneumonia and started on dexamethasone.  He states when he coughs or sneezes he does have chest pain but otherwise is not having chest pain at baseline.  I have started him on Zyrtec and pain medication for his sneezing and chest pain but I am not worried about cardiac etiology at this time.  He is currently requiring 2 to 3 L to maintain adequate saturation.    Interval Problem Update  5/19 patient states he feels about the same today.  He denies worsening shortness of breath but does say when he coughs or sneezes he has increased tenths chest pain.  He was started on sertraline without help therefore changes over to Zyrtec to help with his allergies.  I also ordered some as needed pain medication.  Will continue with dexamethasone and continue to monitor.  5/20 patient without significant change compared to yesterday he is down to 2 L at time of examination.  I increased his sliding scale insulin given his hyperglycemia in the 300s with the dexamethasone.  Diabetic diet also in place.  Patient will likely need in the hospital at least 5 days since diagnosis and hopefully weaned from oxygen and able to return to his group home.    I have discussed this patient's plan of care and discharge plan at IDT rounds today with Case Management, Nursing, Nursing leadership, and other members of the IDT team.    Consultants/Specialty  none    Code Status  Full Code    Disposition  The patient is not medically cleared for discharge to home or a post-acute facility.  Anticipate discharge to: home with close  outpatient follow-up    I have placed the appropriate orders for post-discharge needs.    Review of Systems  Review of Systems   Constitutional:  Negative for chills and fever.   HENT:  Negative for congestion.    Eyes:  Negative for blurred vision and photophobia.   Respiratory:  Positive for cough and shortness of breath.         Frequent sneezing   Cardiovascular:  Positive for chest pain (With cough or sneeze). Negative for claudication and leg swelling.   Gastrointestinal:  Negative for abdominal pain, constipation, diarrhea, heartburn and vomiting.   Genitourinary:  Negative for dysuria and hematuria.   Musculoskeletal:  Negative for joint pain and myalgias.   Skin:  Negative for itching and rash.   Neurological:  Negative for dizziness, sensory change, speech change, weakness and headaches.   Psychiatric/Behavioral:  Negative for depression. The patient is not nervous/anxious and does not have insomnia.         Physical Exam  Temp:  [36 °C (96.8 °F)-36.8 °C (98.3 °F)] 36.3 °C (97.3 °F)  Pulse:  [61-79] 61  Resp:  [18] 18  BP: (111-123)/(67-89) 123/67  SpO2:  [92 %-97 %] 92 %    Physical Exam  Vitals and nursing note reviewed.   Constitutional:       General: He is not in acute distress.     Appearance: Normal appearance.   HENT:      Head: Normocephalic and atraumatic.   Eyes:      General: No scleral icterus.     Extraocular Movements: Extraocular movements intact.   Cardiovascular:      Rate and Rhythm: Normal rate and regular rhythm.      Pulses: Normal pulses.      Heart sounds: Normal heart sounds. No murmur heard.  Pulmonary:      Effort: Pulmonary effort is normal. No respiratory distress.      Breath sounds: Normal breath sounds. No wheezing, rhonchi or rales.   Abdominal:      General: Abdomen is flat. Bowel sounds are normal. There is no distension.      Palpations: Abdomen is soft.      Tenderness: There is no rebound.   Musculoskeletal:         General: No swelling or tenderness.      Cervical  back: Normal range of motion and neck supple.   Lymphadenopathy:      Cervical: No cervical adenopathy.   Skin:     Coloration: Skin is not jaundiced.      Findings: No erythema.   Neurological:      General: No focal deficit present.      Mental Status: He is alert and oriented to person, place, and time. Mental status is at baseline.      Cranial Nerves: No cranial nerve deficit.   Psychiatric:         Mood and Affect: Mood normal.         Behavior: Behavior normal.         Fluids    Intake/Output Summary (Last 24 hours) at 5/20/2024 1355  Last data filed at 5/20/2024 1000  Gross per 24 hour   Intake 240 ml   Output --   Net 240 ml       Laboratory  Recent Labs     05/18/24  1445 05/19/24  0347 05/20/24  0104   WBC 10.8 7.2 13.6*   RBC 5.17 4.86 4.65*   HEMOGLOBIN 16.4 15.5 14.8   HEMATOCRIT 48.0 47.0 44.1   MCV 92.8 96.7 94.8   MCH 31.7 31.9 31.8   MCHC 34.2 33.0 33.6   RDW 44.8 48.5 47.1   PLATELETCT 235 208 259   MPV 9.7 10.2 10.6     Recent Labs     05/18/24  1445 05/19/24  0347 05/20/24  0104   SODIUM 134* 137 135   POTASSIUM 4.9 4.5 4.5   CHLORIDE 99 103 102   CO2 19* 19* 18*   GLUCOSE 201* 246* 222*   BUN 13 15 22   CREATININE 0.96 0.84 0.97   CALCIUM 9.7 9.5 9.4     Recent Labs     05/18/24  2155   INR 1.10               Imaging  CT-CTA NECK WITH & W/O-POST PROCESSING   Final Result      1.  No acute abnormality detected. No carotid stenosis detected.      DX-KNEE COMPLETE 4+ RIGHT   Final Result      1.  No fracture detected.   2.  Patella otto.   3.   Small joint effusion.   4.  Osteoarthritis.      DX-CHEST-PORTABLE (1 VIEW)   Final Result      No acute cardiac or pulmonary abnormalities are identified.      CT-CTA HEAD WITH & W/O-POST PROCESS   Final Result      1.  No acute abnormality detected.           Assessment/Plan  * Sepsis due to COVID-19 (HCC)- (present on admission)  Assessment & Plan  This is Sepsis Present on admission  SIRS criteria identified on my evaluation include: Tachycardia, with  heart rate greater than 90 BPM and Tachypnea, with respirations greater than 20 per minute  Clinical indicators of end organ dysfunction include Acute Respiratory Failure - (mechanical ventilation or BiPap or PaO2/FiO2 ratio < 300).  I also noticed that his total bilirubin is 2.0.  Source is-Inpatient status on medical floor  -Positive COVID test:  -Patient started on dexamethasone 6 mg IV in the ED that will be continued.  -Other inflammatory markers:    Sepsis protocol initiated  Crystalloid Fluid Administration: Resuscitation volume of 2L ordered. Reason that resuscitation volume of less than 30ml/kg was ordered: Patient has COVID.    IV antibiotics as appropriate for source of sepsis: He was given Unasyn and azithromycin for presumed bacterial pneumonia.  COVID test came back positive.  I order procalcitonin and will not order antibiotics if negative.  Reassessment: I have reassessed the patient's hemodynamic status    Patient already anticoagulated for A-fib.    Hypoxia  Assessment & Plan  -Hypoxia secondary to COVID infection.  Currently currently requiring 2 - 3 L of supplemental oxygen via nasal cannula  Dexamethasone 6mg daily      Hyponatremia  Assessment & Plan  -Likely reactive due to COVID.  Sodium is 134.  Monitor chemistry panel in the morning.    Abnormal EKG  Assessment & Plan  -Prior history of MI with abnormal EKG at baseline.  -Patient is not having any chest pain and troponin is negative.  I will monitor him on cardiac unit.  Serial cardiac enzymes were added and I will repeat EKG at midnight and closely monitor.    Intellectual disability- (present on admission)  Assessment & Plan  -He lives in a supported living care facility.    Cardiac pacemaker in situ- (present on admission)  Assessment & Plan  -Placed in February 2019, [employer] implanted by Dr. Blake.    Chronic anticoagulation- (present on admission)  Assessment & Plan  -He is on Xarelto for A-fib which I am continuing.    PAF  (paroxysmal atrial fibrillation) (HCC)- (present on admission)  Assessment & Plan  -Continue metoprolol.  He is anticoagulated with Xarelto that I am continuing.    Type 2 diabetes mellitus without complication, without long-term current use of insulin (HCC)- (present on admission)  Assessment & Plan  SSI  Continue farxiga  Diabetic diet      Coronary artery disease involving native coronary artery of native heart without angina pectoris- (present on admission)  Assessment & Plan  -History of STEMI in 2012.  Baseline EKG is abnormal.  -Patient is not having chest pain.  Has abnormal EKG and will continue to closely monitor.  Troponin was normal in the ED.    Dyslipidemia- (present on admission)  Assessment & Plan  -Continue gemfibrozil.    GERD with Nieves's esophagus- (present on admission)  Assessment & Plan  -Continue omeprazole.         VTE prophylaxis: VTE Selection    I have performed a physical exam and reviewed and updated ROS and Plan today (5/20/2024). In review of yesterday's note (5/19/2024), there are no changes except as documented above.

## 2024-05-20 NOTE — THERAPY
Physical Therapy   Initial Evaluation     Patient Name: Ryan Trevizo  Age:  58 y.o., Sex:  male  Medical Record #: [id number]  Today's Date: 5/20/2024     Precautions  Precautions: Fall Risk  Comments: (P) COVID19, Enhanced droplet contact and eye protection, intellectual disability, pt reports dizziness and nausea    Assessment  Patient is 58 y.o. male with a diagnosis of sepsis with PMH of COVID19, CAD, intellectual disability, and DM-II. Pt presents today with the following impairments: R knee pain, decreased activity tolerance, decreased balance, decreased functional ROM and strength. Due to these impairments at this time, pt has impaired functional ambulation distance and now requires ambulation with FWW and SBA in order to maintain balance when walking. Pt is unable to complete tandem stance for 10sec indicating fall risk. Pt required VC and TC in order to maintain appropriate heel-to-toe gait mechanics with upright posture for 30ft, but pt felt limited due to pain. Pt is significantly functional but would still continue to benefit from skilled PT in order to improve activity tolerance and prevent deconditioning while infected with COVID19. Recommend outpatient physical therapy services to address higher level deficits.      Plan    Physical Therapy Initial Treatment Plan   Treatment Plan : (P) Gait Training, Neuro Re-Education / Balance, Stair Training, Therapeutic Activities, Therapeutic Exercise, Self Care / Home Evaluation, Equipment  Treatment Frequency: (P) 3 Times per Week  Duration: (P) Until Therapy Goals Met    DC Equipment Recommendations: (P) Front-Wheel Walker  Discharge Recommendations: (P) Recommend outpatient physical therapy services to address higher level deficits     Objective     05/20/24 1535   Initial Contact Note    Initial Contact Note Order Received and Verified, Physical Therapy Evaluation in Progress with Full Report to Follow.   Precautions   Precautions Fall Risk   Comments  COVID19, Enhanced droplet contact and eye protection, intellectual disability, pt reports dizziness and nausea   Vitals   O2 (LPM) 2   O2 Delivery Device Silicone Nasal Cannula   Vitals Comments 2L O2 with activity and at rest   Pain 0 - 10 Group   Location Knee   Location Orientation Right   Therapist Pain Assessment Prior to Activity;During Activity;Post Activity;Nurse Notified  (mod-severe)   Prior Living Situation   Prior Services Intermittent Physical Support for ADL Per Service   Housing / Facility Group Home   Steps Into Home 0   Steps In Home 0   Rail None   Bathroom Set up Walk In Shower;Shower Chair  (Shower chair is the pt's roommates and is unsure if he can use it)   Equipment Owned None   Lives with - Patient's Self Care Capacity Other (Comments)   Comments Pt lives in a group home that has staff that can assist with all activities   Prior Level of Functional Mobility   Bed Mobility Independent   Transfer Status Independent   Ambulation Independent   Ambulation Distance Community  (Pt goes to work)   Assistive Devices Used None   History of Falls   History of Falls Yes   Date of Last Fall 05/18/24  (per 5/19 hospitalist note which caused the pain in his knee)   Cognition    Cognition / Consciousness WDL   Level of Consciousness Alert   Passive ROM Lower Body   Passive ROM Lower Body Not Tested   Active ROM Lower Body    Active ROM Lower Body  X   Comments Limited R knee due to pain   Strength Lower Body   Lower Body Strength  X   Comments Generalized weakness but functional for ambulation   Sensation Lower Body   Lower Extremity Sensation   Not Tested   Balance Assessment   Sitting Balance (Static) Good   Sitting Balance (Dynamic) Fair +   Standing Balance (Static) Fair   Standing Balance (Dynamic) Fair   Weight Shift Sitting Good   Weight Shift Standing Fair   Bed Mobility    Supine to Sit Standby Assist   Sit to Supine Standby Assist   Scooting Standby Assist   Gait Analysis   Gait Level Of Assist  Standby Assist   Assistive Device Front Wheel Walker   Distance (Feet) 30   # of Times Distance was Traveled 1   Deviation Bradykinetic;Decreased Heel Strike;Decreased Toe Off;Decreased Base Of Support;Other (Comment)  (Poor upright posture)   Weight Bearing Status FWB   Comments Limited ambulation distance due to COVID19 precautions. Pt limited to room ambulation and reports significant pain in knee stopping from prolonged ambulation distance.   Functional Mobility   Sit to Stand Standby Assist   Bed, Chair, Wheelchair Transfer Standby Assist   Transfer Method Stand Step   Mobility bed mobility, STS, ambulation   6 Clicks Assessment - How much HELP from from another person do you currently need... (If the patient hasn't done an activity recently, how much help from another person do you think he/she would need if he/she tried?)   Turning from your back to your side while in a flat bed without using bedrails? 3   Moving from lying on your back to sitting on the side of a flat bed without using bedrails? 3   Moving to and from a bed to a chair (including a wheelchair)? 3   Standing up from a chair using your arms (e.g., wheelchair, or bedside chair)? 3   Walking in hospital room? 3   Climbing 3-5 steps with a railing? 3   6 clicks Mobility Score 18   Activity Tolerance   Sitting Edge of Bed 4min   Standing 4min   Edema / Skin Assessment   Edema / Skin  X   Comments Swelling R knee   Patient / Family Goals    Patient / Family Goal #1 PLOF   Short Term Goals    Short Term Goal # 1 Pt will be able to ambulate 150ft with SPV and FWW in order to ambulate household distances by end of 6 visits.   Short Term Goal # 2 Pt will be able to transfer to chair with FWW and SPV in order to improve transfers upon dc by end of 6 visits.   Education Group   Education Provided Role of Physical Therapist;Gait Training;Use of Assistive Device;Exercises - Seated   Role of Physical Therapist Patient Response  Patient;Acceptance;Explanation;Verbal Demonstration;Demonstration   Gait Training Patient Response Patient;Acceptance;Explanation;Verbal Demonstration;Action Demonstration   Use of Assistive Device Patient Response Patient;Acceptance;Explanation;Verbal Demonstration;Action Demonstration   Exercises - Seated Patient Response Patient;Acceptance;Explanation;Verbal Demonstration;Action Demonstration   Physical Therapy Initial Treatment Plan    Treatment Plan  Gait Training;Neuro Re-Education / Balance;Stair Training;Therapeutic Activities;Therapeutic Exercise;Self Care / Home Evaluation;Equipment   Treatment Frequency 3 Times per Week   Duration Until Therapy Goals Met   Problem List    Problems Pain;Impaired Ambulation;Functional Strength Deficit;Functional ROM Deficit;Impaired Balance;Decreased Activity Tolerance   Anticipated Discharge Equipment and Recommendations   DC Equipment Recommendations Front-Wheel Walker   Discharge Recommendations Recommend outpatient physical therapy services to address higher level deficits   Interdisciplinary Plan of Care Collaboration   IDT Collaboration with  Nursing   Patient Position at End of Therapy In Bed;Bed Alarm On;Call Light within Reach;Tray Table within Reach;Phone within Reach   Collaboration Comments Aware of session and recs   Session Information   Date / Session Number  5/20-1 (1/3, 5/26)

## 2024-05-21 ENCOUNTER — APPOINTMENT (OUTPATIENT)
Dept: CARDIOLOGY | Facility: MEDICAL CENTER | Age: 59
End: 2024-05-21
Attending: NURSE PRACTITIONER
Payer: MEDICARE

## 2024-05-21 PROBLEM — J96.01 ACUTE HYPOXEMIC RESPIRATORY FAILURE (HCC): Status: ACTIVE | Noted: 2024-05-18

## 2024-05-21 LAB
ANION GAP SERPL CALC-SCNC: 11 MMOL/L (ref 7–16)
BUN SERPL-MCNC: 23 MG/DL (ref 8–22)
CALCIUM SERPL-MCNC: 9 MG/DL (ref 8.4–10.2)
CHLORIDE SERPL-SCNC: 103 MMOL/L (ref 96–112)
CO2 SERPL-SCNC: 21 MMOL/L (ref 20–33)
CREAT SERPL-MCNC: 0.95 MG/DL (ref 0.5–1.4)
ERYTHROCYTE [DISTWIDTH] IN BLOOD BY AUTOMATED COUNT: 46.2 FL (ref 35.9–50)
EST. AVERAGE GLUCOSE BLD GHB EST-MCNC: 180 MG/DL
GFR SERPLBLD CREATININE-BSD FMLA CKD-EPI: 91 ML/MIN/1.73 M 2
GLUCOSE BLD STRIP.AUTO-MCNC: 153 MG/DL (ref 65–99)
GLUCOSE BLD STRIP.AUTO-MCNC: 175 MG/DL (ref 65–99)
GLUCOSE BLD STRIP.AUTO-MCNC: 182 MG/DL (ref 65–99)
GLUCOSE BLD STRIP.AUTO-MCNC: 255 MG/DL (ref 65–99)
GLUCOSE BLD STRIP.AUTO-MCNC: 283 MG/DL (ref 65–99)
GLUCOSE BLD STRIP.AUTO-MCNC: 292 MG/DL (ref 65–99)
GLUCOSE SERPL-MCNC: 154 MG/DL (ref 65–99)
HBA1C MFR BLD: 7.9 % (ref 4–5.6)
HCT VFR BLD AUTO: 42.4 % (ref 42–52)
HGB BLD-MCNC: 14.3 G/DL (ref 14–18)
MAGNESIUM SERPL-MCNC: 2.3 MG/DL (ref 1.5–2.5)
MCH RBC QN AUTO: 31.8 PG (ref 27–33)
MCHC RBC AUTO-ENTMCNC: 33.7 G/DL (ref 32.3–36.5)
MCV RBC AUTO: 94.2 FL (ref 81.4–97.8)
PLATELET # BLD AUTO: 234 K/UL (ref 164–446)
PMV BLD AUTO: 10.1 FL (ref 9–12.9)
POTASSIUM SERPL-SCNC: 4.3 MMOL/L (ref 3.6–5.5)
RBC # BLD AUTO: 4.5 M/UL (ref 4.7–6.1)
SODIUM SERPL-SCNC: 135 MMOL/L (ref 135–145)
WBC # BLD AUTO: 10.1 K/UL (ref 4.8–10.8)

## 2024-05-21 PROCEDURE — 700102 HCHG RX REV CODE 250 W/ 637 OVERRIDE(OP): Performed by: INTERNAL MEDICINE

## 2024-05-21 PROCEDURE — 94760 N-INVAS EAR/PLS OXIMETRY 1: CPT

## 2024-05-21 PROCEDURE — 85027 COMPLETE CBC AUTOMATED: CPT

## 2024-05-21 PROCEDURE — A9270 NON-COVERED ITEM OR SERVICE: HCPCS | Performed by: INTERNAL MEDICINE

## 2024-05-21 PROCEDURE — 99233 SBSQ HOSP IP/OBS HIGH 50: CPT | Performed by: INTERNAL MEDICINE

## 2024-05-21 PROCEDURE — 82962 GLUCOSE BLOOD TEST: CPT | Mod: 91

## 2024-05-21 PROCEDURE — 83735 ASSAY OF MAGNESIUM: CPT

## 2024-05-21 PROCEDURE — 770020 HCHG ROOM/CARE - TELE (206)

## 2024-05-21 PROCEDURE — 700102 HCHG RX REV CODE 250 W/ 637 OVERRIDE(OP): Performed by: HOSPITALIST

## 2024-05-21 PROCEDURE — A9270 NON-COVERED ITEM OR SERVICE: HCPCS | Performed by: HOSPITALIST

## 2024-05-21 PROCEDURE — 80048 BASIC METABOLIC PNL TOTAL CA: CPT

## 2024-05-21 PROCEDURE — 83036 HEMOGLOBIN GLYCOSYLATED A1C: CPT

## 2024-05-21 RX ADMIN — ATORVASTATIN CALCIUM 80 MG: 40 TABLET, FILM COATED ORAL at 20:52

## 2024-05-21 RX ADMIN — ASPIRIN 81 MG: 81 TABLET, COATED ORAL at 05:44

## 2024-05-21 RX ADMIN — DAPAGLIFLOZIN 10 MG: 10 TABLET, FILM COATED ORAL at 05:43

## 2024-05-21 RX ADMIN — LISINOPRIL 2.5 MG: 2.5 TABLET ORAL at 05:44

## 2024-05-21 RX ADMIN — RIVAROXABAN 20 MG: 20 TABLET, FILM COATED ORAL at 17:46

## 2024-05-21 RX ADMIN — INSULIN HUMAN 7 UNITS: 100 INJECTION, SOLUTION PARENTERAL at 17:45

## 2024-05-21 RX ADMIN — DEXAMETHASONE 6 MG: 4 TABLET ORAL at 05:43

## 2024-05-21 RX ADMIN — INSULIN GLARGINE-YFGN 5 UNITS: 100 INJECTION, SOLUTION SUBCUTANEOUS at 08:22

## 2024-05-21 RX ADMIN — SENNOSIDES AND DOCUSATE SODIUM 2 TABLET: 50; 8.6 TABLET ORAL at 17:46

## 2024-05-21 RX ADMIN — CYANOCOBALAMIN TAB 500 MCG 1000 MCG: 500 TAB at 05:45

## 2024-05-21 RX ADMIN — METOPROLOL SUCCINATE 25 MG: 25 TABLET, EXTENDED RELEASE ORAL at 05:43

## 2024-05-21 RX ADMIN — CETIRIZINE HYDROCHLORIDE 10 MG: 10 TABLET, FILM COATED ORAL at 05:44

## 2024-05-21 RX ADMIN — Medication 2.5 MG: at 20:53

## 2024-05-21 RX ADMIN — INSULIN HUMAN 3 UNITS: 100 INJECTION, SOLUTION PARENTERAL at 05:45

## 2024-05-21 RX ADMIN — INSULIN HUMAN 7 UNITS: 100 INJECTION, SOLUTION PARENTERAL at 20:55

## 2024-05-21 RX ADMIN — OMEPRAZOLE 40 MG: 20 CAPSULE, DELAYED RELEASE ORAL at 05:44

## 2024-05-21 RX ADMIN — INSULIN HUMAN 7 UNITS: 100 INJECTION, SOLUTION PARENTERAL at 11:52

## 2024-05-21 RX ADMIN — SENNOSIDES AND DOCUSATE SODIUM 2 TABLET: 50; 8.6 TABLET ORAL at 05:44

## 2024-05-21 ASSESSMENT — PAIN DESCRIPTION - PAIN TYPE
TYPE: ACUTE PAIN
TYPE: ACUTE PAIN

## 2024-05-21 ASSESSMENT — ENCOUNTER SYMPTOMS: SHORTNESS OF BREATH: 1

## 2024-05-21 NOTE — DISCHARGE PLANNING
Case Management Discharge Planning    Admission Date: 5/18/2024  GMLOS: 5.1  ALOS: 3    6-Clicks ADL Score: 24  6-Clicks Mobility Score: 18      Anticipated Discharge Dispo: Discharge Disposition: Discharged to home/self care (01)  Discharge Address: [address] [facility]  Discharge Contact Phone Number: [phone]    DME Needed: No    Action(s) Taken: Spoke to [facility] owner Giovana [phone]. Giovana agreeable to have patient return to  on 05/28 when 10 days COVID cleared. Requested note from Infection Control and call from CM 1-2 days in advance to prepare for pt's return.    Escalations Completed: None    Medically Clear: No    Next Steps:  f/u with medical team to discuss DC needs and barriers    Barriers to Discharge: Medical clearance (COVID + on 05/18/2024)

## 2024-05-21 NOTE — CARE PLAN
The patient is Stable - Low risk of patient condition declining or worsening    Shift Goals  Clinical Goals: wean O2 as tolerated; home O2 eval; monitor labs/vitals  Patient Goals: feel better  Family Goals: REGINA    Progress made toward(s) clinical / shift goals:    Problem: Knowledge Deficit - Standard  Goal: Patient and family/care givers will demonstrate understanding of plan of care, disease process/condition, diagnostic tests and medications  Outcome: Progressing  Note: Patient updated on plan of care. Wean O2 as tolerated, increase activity as tolerated; monitor FSBG. Patient verbalized understanding.      Problem: Fall Risk  Goal: Patient will remain free from falls  Outcome: Progressing  Note: Appropriate fall risk prevention interventions in place. Patient educated on preventing falls in the hospital, patient verbalized understanding. Call light within reach, patient A&Ox4, patient calls appropriately. Remain free of falls.      Problem: Knowledge Deficit - Diabetes  Goal: Patient will demonstrate knowledge of insulin injection, symptoms, and treatment of hypoglycemia and diet prior to discharge  Outcome: Progressing  Note: Continue education on managing diabetes, insulin, blood sugar and checks. Continue education on diet.        Patient is not progressing towards the following goals:

## 2024-05-21 NOTE — CARE PLAN
Problem: Pain - Standard  Goal: Alleviation of pain or a reduction in pain to the patient’s comfort goal  Outcome: Progressing     Problem: Knowledge Deficit - Standard  Goal: Patient and family/care givers will demonstrate understanding of plan of care, disease process/condition, diagnostic tests and medications  Outcome: Progressing   The patient is Stable - Low risk of patient condition declining or worsening    Shift Goals  Clinical Goals: wean oxygen as tolerated; increase activity as tolerated; patient safety  Patient Goals: feel better  Family Goals: REGINA    Progress made toward(s) clinical / shift goals:  pt educated on plan of care and medications. No questions at this time    Patient is not progressing towards the following goals:

## 2024-05-21 NOTE — PROGRESS NOTES
Telemetry Shift Summary     Rhythm SR/AP  HR Range 60-80  Ectopy o-fPVC; rCoup; rTrig  Measurements 0.12/0.08/0.34           Normal Values  Rhythm SR  HR Range    Measurements 0.12-0.20 / 0.06-0.10  / 0.30-0.52

## 2024-05-21 NOTE — PROGRESS NOTES
Telemetry shift summary    Rhythm: a paced   HR: 59-61  Ectopy: r-f pvc, r trig, r coup    Measurements: .20 / .08 / .36    Normal values  Rhythm SR  HR   Measurements: 0.12-0.20/0.08-0.10/0.30-0.52

## 2024-05-22 LAB
GLUCOSE BLD STRIP.AUTO-MCNC: 150 MG/DL (ref 65–99)
GLUCOSE BLD STRIP.AUTO-MCNC: 166 MG/DL (ref 65–99)
GLUCOSE BLD STRIP.AUTO-MCNC: 168 MG/DL (ref 65–99)
GLUCOSE BLD STRIP.AUTO-MCNC: 275 MG/DL (ref 65–99)

## 2024-05-22 PROCEDURE — A9270 NON-COVERED ITEM OR SERVICE: HCPCS | Performed by: HOSPITALIST

## 2024-05-22 PROCEDURE — 94760 N-INVAS EAR/PLS OXIMETRY 1: CPT

## 2024-05-22 PROCEDURE — A9270 NON-COVERED ITEM OR SERVICE: HCPCS | Performed by: INTERNAL MEDICINE

## 2024-05-22 PROCEDURE — 700102 HCHG RX REV CODE 250 W/ 637 OVERRIDE(OP): Performed by: HOSPITALIST

## 2024-05-22 PROCEDURE — 99233 SBSQ HOSP IP/OBS HIGH 50: CPT | Performed by: INTERNAL MEDICINE

## 2024-05-22 PROCEDURE — 82962 GLUCOSE BLOOD TEST: CPT

## 2024-05-22 PROCEDURE — 700102 HCHG RX REV CODE 250 W/ 637 OVERRIDE(OP): Performed by: INTERNAL MEDICINE

## 2024-05-22 PROCEDURE — 770020 HCHG ROOM/CARE - TELE (206)

## 2024-05-22 RX ORDER — HYDROCODONE BITARTRATE AND HOMATROPINE METHYLBROMIDE ORAL SOLUTION 5; 1.5 MG/5ML; MG/5ML
5 LIQUID ORAL EVERY 4 HOURS PRN
Status: DISCONTINUED | OUTPATIENT
Start: 2024-05-22 | End: 2024-05-28 | Stop reason: HOSPADM

## 2024-05-22 RX ADMIN — INSULIN HUMAN 3 UNITS: 100 INJECTION, SOLUTION PARENTERAL at 17:43

## 2024-05-22 RX ADMIN — DAPAGLIFLOZIN 10 MG: 10 TABLET, FILM COATED ORAL at 06:11

## 2024-05-22 RX ADMIN — INSULIN GLARGINE-YFGN 5 UNITS: 100 INJECTION, SOLUTION SUBCUTANEOUS at 08:10

## 2024-05-22 RX ADMIN — DEXAMETHASONE 6 MG: 4 TABLET ORAL at 06:02

## 2024-05-22 RX ADMIN — RIVAROXABAN 20 MG: 20 TABLET, FILM COATED ORAL at 17:42

## 2024-05-22 RX ADMIN — GEMFIBROZIL 600 MG: 600 TABLET ORAL at 06:03

## 2024-05-22 RX ADMIN — INSULIN HUMAN 3 UNITS: 100 INJECTION, SOLUTION PARENTERAL at 20:37

## 2024-05-22 RX ADMIN — ATORVASTATIN CALCIUM 80 MG: 40 TABLET, FILM COATED ORAL at 20:33

## 2024-05-22 RX ADMIN — OMEPRAZOLE 40 MG: 20 CAPSULE, DELAYED RELEASE ORAL at 06:05

## 2024-05-22 RX ADMIN — ASPIRIN 81 MG: 81 TABLET, COATED ORAL at 06:00

## 2024-05-22 RX ADMIN — SENNOSIDES AND DOCUSATE SODIUM 2 TABLET: 50; 8.6 TABLET ORAL at 06:06

## 2024-05-22 RX ADMIN — Medication 2.5 MG: at 20:34

## 2024-05-22 RX ADMIN — CYANOCOBALAMIN TAB 500 MCG 1000 MCG: 500 TAB at 06:01

## 2024-05-22 RX ADMIN — ACETAMINOPHEN 650 MG: 325 TABLET ORAL at 12:16

## 2024-05-22 RX ADMIN — LISINOPRIL 2.5 MG: 2.5 TABLET ORAL at 06:04

## 2024-05-22 RX ADMIN — CETIRIZINE HYDROCHLORIDE 10 MG: 10 TABLET, FILM COATED ORAL at 06:01

## 2024-05-22 RX ADMIN — INSULIN HUMAN 7 UNITS: 100 INJECTION, SOLUTION PARENTERAL at 12:09

## 2024-05-22 ASSESSMENT — PAIN DESCRIPTION - PAIN TYPE
TYPE: ACUTE PAIN
TYPE: ACUTE PAIN

## 2024-05-22 ASSESSMENT — ENCOUNTER SYMPTOMS: SHORTNESS OF BREATH: 1

## 2024-05-22 ASSESSMENT — FIBROSIS 4 INDEX: FIB4 SCORE: 1.24

## 2024-05-22 NOTE — PROGRESS NOTES
"Hospital Medicine Daily Progress Note    Date of Service  5/21/2024    Chief Complaint  Ryan Trevizo is a 58 y.o. male admitted 5/18/2024 with   Chief Complaint   Patient presents with    Dizziness     \"Started today at work\". Reports also SOB. Denies CP.     Knee Pain     Reports he tripped over a broom at work and has since been experiencing R knee pain.    Cough     States this is non-productive. Reports also runny nose. Denies V/D.        Hospital Course  Patient is a pleasant 58-year-old man who came in with dizziness, cough and knee pain after a fall.  He came into the emergency room with fatigue feeling extremely weak and found to be COVID-positive.  He was admitted for COVID-pneumonia and started on dexamethasone.  He states when he coughs or sneezes he does have chest pain but otherwise is not having chest pain at baseline.  I have started him on Zyrtec and pain medication for his sneezing and chest pain but I am not worried about cardiac etiology at this time.  He is currently requiring 2 to 3 L to maintain adequate saturation.    Interval Problem Update  Pt denied any acute complaints.  - remained on 2LNC. Continued on PO dexamethasone.  - A1c 7.9, glucose up to 338. I started glargine 5 units.    I have discussed this patient's plan of care and discharge plan at IDT rounds today with Case Management, Nursing, Nursing leadership, and other members of the IDT team.    Consultants/Specialty  none    Code Status  Full Code    Disposition  The patient is not medically cleared for discharge to home or a post-acute facility.  Anticipate discharge to: home with close outpatient follow-up    I have placed the appropriate orders for post-discharge needs.   Pt requires quarantine for 10 days before he can return to his Group Home.    Review of Systems  Review of Systems   Respiratory:  Positive for shortness of breath.         Physical Exam  Temp:  [36.3 °C (97.4 °F)-36.8 °C (98.2 °F)] 36.7 °C (98.1 " °F)  Pulse:  [60-66] 66  Resp:  [16-18] 18  BP: (110-133)/(74-91) 133/91  SpO2:  [92 %-96 %] 96 %    Physical Exam  Vitals and nursing note reviewed.   Constitutional:       General: He is not in acute distress.     Appearance: He is ill-appearing. He is not diaphoretic.   HENT:      Mouth/Throat:      Mouth: Mucous membranes are dry.      Pharynx: No oropharyngeal exudate.   Cardiovascular:      Rate and Rhythm: Normal rate and regular rhythm.      Pulses: Normal pulses.      Heart sounds: Normal heart sounds. No murmur heard.  Pulmonary:      Effort: Pulmonary effort is normal. No respiratory distress.      Breath sounds: Rhonchi present. No wheezing or rales.      Comments: On 2LNC  Abdominal:      General: Bowel sounds are normal. There is no distension.      Tenderness: There is no abdominal tenderness.   Musculoskeletal:         General: No swelling or tenderness. Normal range of motion.   Skin:     General: Skin is warm.      Capillary Refill: Capillary refill takes less than 2 seconds.      Coloration: Skin is not jaundiced or pale.   Neurological:      General: No focal deficit present.      Mental Status: He is alert and oriented to person, place, and time. Mental status is at baseline.      Motor: Weakness present.   Psychiatric:         Mood and Affect: Mood normal.         Behavior: Behavior normal.         Thought Content: Thought content normal.         Judgment: Judgment normal.         Fluids    Intake/Output Summary (Last 24 hours) at 5/21/2024 1741  Last data filed at 5/21/2024 0941  Gross per 24 hour   Intake 120 ml   Output --   Net 120 ml       Laboratory  Recent Labs     05/19/24 0347 05/20/24  0104 05/21/24  0141   WBC 7.2 13.6* 10.1   RBC 4.86 4.65* 4.50*   HEMOGLOBIN 15.5 14.8 14.3   HEMATOCRIT 47.0 44.1 42.4   MCV 96.7 94.8 94.2   MCH 31.9 31.8 31.8   MCHC 33.0 33.6 33.7   RDW 48.5 47.1 46.2   PLATELETCT 208 259 234   MPV 10.2 10.6 10.1     Recent Labs     05/19/24 0347 05/20/24  0104  05/21/24  0141   SODIUM 137 135 135   POTASSIUM 4.5 4.5 4.3   CHLORIDE 103 102 103   CO2 19* 18* 21   GLUCOSE 246* 222* 154*   BUN 15 22 23*   CREATININE 0.84 0.97 0.95   CALCIUM 9.5 9.4 9.0     Recent Labs     05/18/24  2155   INR 1.10               Imaging  CT-CTA NECK WITH & W/O-POST PROCESSING   Final Result      1.  No acute abnormality detected. No carotid stenosis detected.      DX-KNEE COMPLETE 4+ RIGHT   Final Result      1.  No fracture detected.   2.  Patella otto.   3.   Small joint effusion.   4.  Osteoarthritis.      DX-CHEST-PORTABLE (1 VIEW)   Final Result      No acute cardiac or pulmonary abnormalities are identified.      CT-CTA HEAD WITH & W/O-POST PROCESS   Final Result      1.  No acute abnormality detected.           Assessment/Plan  * Sepsis due to COVID-19 (HCC)- (present on admission)  Assessment & Plan  This is Sepsis Present on admission  SIRS criteria identified on my evaluation include: Tachycardia, with heart rate greater than 90 BPM and Tachypnea, with respirations greater than 20 per minute  Clinical indicators of end organ dysfunction include Acute Respiratory Failure - (mechanical ventilation or BiPap or PaO2/FiO2 ratio < 300).  I also noticed that his total bilirubin is 2.0.  Sepsis protocol initiated  Crystalloid Fluid Administration: Resuscitation volume of 2L ordered. Reason that resuscitation volume of less than 30ml/kg was ordered: Patient has COVID.    IV antibiotics as appropriate for source of sepsis: He was given Unasyn and azithromycin for presumed bacterial pneumonia.  COVID test came back positive.  I order procalcitonin and will not order antibiotics if negative.  Reassessment: I have reassessed the patient's hemodynamic status    Patient continued on oxygen support and dexamethasone for COVID   Acute sepsis due to acute COVID-19 pneumonia    Acute hypoxemic respiratory failure (HCC)- (present on admission)  Assessment & Plan  Due to COVID-19 viral pneumonia and acute  sepsis  - continue oxygen support, pt does not have oxygen at his group home  - continue dexamethasone      Hyponatremia- (present on admission)  Assessment & Plan  Na 135    Abnormal EKG  Assessment & Plan  -Prior history of MI with abnormal EKG at baseline.  -Patient is not having any chest pain and troponin is negative.  I will monitor him on cardiac unit.  Serial cardiac enzymes were added and I will repeat EKG at midnight and closely monitor.    Intellectual disability- (present on admission)  Assessment & Plan  -He lives in a supported living care facility.    Cardiac pacemaker in situ- (present on admission)  Assessment & Plan  -Placed in February 2019, Medtr[employer] implanted by Dr. Blake.    Chronic anticoagulation- (present on admission)  Assessment & Plan  Continue on Xarelto    PAF (paroxysmal atrial fibrillation) (HCC)- (present on admission)  Assessment & Plan  Continue metoprolol  Continue Xarelto    Type 2 diabetes mellitus without complication, without long-term current use of insulin (HCC)- (present on admission)  Assessment & Plan  Continue insulin sliding, Accu-Cheks and hypoglycemia protocol  Continue diabetic diet  [facility]      Coronary artery disease involving native coronary artery of native heart without angina pectoris- (present on admission)  Assessment & Plan  -History of STEMI in 2012.  Baseline EKG is abnormal.  -Patient is not having chest pain.  Has abnormal EKG and will continue to closely monitor.  Troponin was normal in the ED.    Dyslipidemia- (present on admission)  Assessment & Plan  Continue home gemfibrozil    GERD with Nieves's esophagus- (present on admission)  Assessment & Plan  Continue omeprazole         VTE prophylaxis:    therapeutic anticoagulation with xarelto 20 mg daily witih meals      I have performed a physical exam and reviewed and updated ROS and Plan today (5/21/2024). In review of yesterday's note (5/20/2024), there are no changes except as  documented above.      Total time spent 51 minutes. I spent greater than 50% of the time for patient care, including unit/floor time, multiple face-to-face encounters with the patient, counseling on treatment plan and discussion with bedside RN.  Further, I spent time on my own review of patient's overnight events, RN notes, imaging and lab analysis, and developing my assessment and plan above.  In addition, working with , social workers, and Charge RN on case coordination on this date.

## 2024-05-22 NOTE — PROGRESS NOTES
Telemetry Shift Summary    Rhythm SR / A Paced / V Paced on Demand  HR Range 59-66  Ectopy R-F PVC               R-O trigem               R coup  Measurements  0.24/0.08/0.40    Per monitor Tawnya kay    Normal Values  Rhythm SR  HR Range   Measurements 0.12-0.20 / 0.06-0.10 / 0.30-0.52

## 2024-05-22 NOTE — PROGRESS NOTES
"Hospital Medicine Daily Progress Note    Date of Service  5/22/2024    Chief Complaint  Ryan Trevizo is a 58 y.o. male admitted 5/18/2024 with   Chief Complaint   Patient presents with    Dizziness     \"Started today at work\". Reports also SOB. Denies CP.     Knee Pain     Reports he tripped over a broom at work and has since been experiencing R knee pain.    Cough     States this is non-productive. Reports also runny nose. Denies V/D.        Hospital Course  Patient is a pleasant 58-year-old man who came in with dizziness, cough and knee pain after a fall.  He came into the emergency room with fatigue feeling extremely weak and found to be COVID-positive.  He was admitted for COVID-pneumonia and started on dexamethasone.  He states when he coughs or sneezes he does have chest pain but otherwise is not having chest pain at baseline.  I have started him on Zyrtec and pain medication for his sneezing and chest pain but I am not worried about cardiac etiology at this time.  He is currently requiring 2 to 3 L to maintain adequate saturation.    Interval Problem Update  Patient had no acute complaints other than coughing today.  I am changing cough medication to Hycodan.  -Patient remains on 2 L nasal cannula  - Glucose up to 292, 275.  I am increasing glargine to 5 units twice daily.  Morning glucose was 150.      I have discussed this patient's plan of care and discharge plan at IDT rounds today with Case Management, Nursing, Nursing leadership, and other members of the IDT team.    Consultants/Specialty  none    Code Status  Full Code    Disposition  The patient is not medically cleared for discharge to home or a post-acute facility.      I have placed the appropriate orders for post-discharge needs.   Pt requires quarantine for 10 days before he can return to his Group Home.    Review of Systems  Review of Systems   Respiratory:  Positive for shortness of breath.         Physical Exam  Temp:  [36.2 °C (97.1 " °F)-36.8 °C (98.2 °F)] 36.8 °C (98.2 °F)  Pulse:  [59-81] 81  Resp:  [18] 18  BP: (102-133)/(61-91) 127/83  SpO2:  [88 %-96 %] 91 %    Physical Exam  Vitals and nursing note reviewed.   Constitutional:       General: He is not in acute distress.     Appearance: He is ill-appearing. He is not diaphoretic.   HENT:      Mouth/Throat:      Mouth: Mucous membranes are dry.      Pharynx: No oropharyngeal exudate.   Cardiovascular:      Rate and Rhythm: Normal rate and regular rhythm.      Pulses: Normal pulses.      Heart sounds: Normal heart sounds. No murmur heard.  Pulmonary:      Effort: Pulmonary effort is normal. No respiratory distress.      Breath sounds: Rhonchi present. No wheezing or rales.      Comments: On 2LNC  Abdominal:      General: Bowel sounds are normal. There is no distension.      Tenderness: There is no abdominal tenderness.   Musculoskeletal:         General: No swelling or tenderness. Normal range of motion.   Skin:     General: Skin is warm.      Capillary Refill: Capillary refill takes less than 2 seconds.      Coloration: Skin is not jaundiced or pale.   Neurological:      General: No focal deficit present.      Mental Status: He is alert and oriented to person, place, and time. Mental status is at baseline.      Motor: Weakness present.   Psychiatric:         Mood and Affect: Mood normal.         Behavior: Behavior normal.         Thought Content: Thought content normal.         Judgment: Judgment normal.         Fluids    Intake/Output Summary (Last 24 hours) at 5/22/2024 1553  Last data filed at 5/22/2024 0227  Gross per 24 hour   Intake 480 ml   Output 350 ml   Net 130 ml       Laboratory  Recent Labs     05/20/24  0104 05/21/24  0141   WBC 13.6* 10.1   RBC 4.65* 4.50*   HEMOGLOBIN 14.8 14.3   HEMATOCRIT 44.1 42.4   MCV 94.8 94.2   MCH 31.8 31.8   MCHC 33.6 33.7   RDW 47.1 46.2   PLATELETCT 259 234   MPV 10.6 10.1     Recent Labs     05/20/24  0104 05/21/24  0141   SODIUM 135 135    POTASSIUM 4.5 4.3   CHLORIDE 102 103   CO2 18* 21   GLUCOSE 222* 154*   BUN 22 23*   CREATININE 0.97 0.95   CALCIUM 9.4 9.0                     Imaging  CT-CTA NECK WITH & W/O-POST PROCESSING   Final Result      1.  No acute abnormality detected. No carotid stenosis detected.      DX-KNEE COMPLETE 4+ RIGHT   Final Result      1.  No fracture detected.   2.  Patella otto.   3.   Small joint effusion.   4.  Osteoarthritis.      DX-CHEST-PORTABLE (1 VIEW)   Final Result      No acute cardiac or pulmonary abnormalities are identified.      CT-CTA HEAD WITH & W/O-POST PROCESS   Final Result      1.  No acute abnormality detected.           Assessment/Plan  * Sepsis due to COVID-19 (HCC)- (present on admission)  Assessment & Plan  This is Sepsis Present on admission  SIRS criteria identified on my evaluation include: Tachycardia, with heart rate greater than 90 BPM and Tachypnea, with respirations greater than 20 per minute  Clinical indicators of end organ dysfunction include Acute Respiratory Failure - (mechanical ventilation or BiPap or PaO2/FiO2 ratio < 300).  I also noticed that his total bilirubin is 2.0.  Sepsis protocol initiated  Crystalloid Fluid Administration: Resuscitation volume of 2L ordered. Reason that resuscitation volume of less than 30ml/kg was ordered: Patient has COVID.    IV antibiotics as appropriate for source of sepsis: He was given Unasyn and azithromycin for presumed bacterial pneumonia.  COVID test came back positive.  I order procalcitonin and will not order antibiotics if negative.  Reassessment: I have reassessed the patient's hemodynamic status    Acute sepsis due to acute COVID-19 pneumonia  Rechecking labs in the morning  Continue dexamethasone    Acute hypoxemic respiratory failure (HCC)- (present on admission)  Assessment & Plan  Due to COVID-19 viral pneumonia and acute sepsis  -Continue oxygen support  - Continue dexamethasone  -Patient unable to wean off oxygen at this  time    Type 2 diabetes mellitus without complication, without long-term current use of insulin (HCC)- (present on admission)  Assessment & Plan  Continue insulin sliding, Accu-Cheks and hypoglycemia protocol  Continue diabetic diet  Continue home Farxiga  - Glucose up to 292, 275.  I am increasing glargine to 5 units twice daily.  Morning glucose was 150.  Will need to monitor closely for any hypoglycemic events.    Hyponatremia- (present on admission)  Assessment & Plan  Na 135    Abnormal EKG- (present on admission)  Assessment & Plan  -Prior history of MI with abnormal EKG at baseline.  -Patient is not having any chest pain and troponin is negative.  I will monitor him on cardiac unit.  Serial cardiac enzymes were added and I will repeat EKG at midnight and closely monitor.    Intellectual disability- (present on admission)  Assessment & Plan  -He lives in a supported living care facility.    Cardiac pacemaker in situ- (present on admission)  Assessment & Plan  -Placed in February 2019, Medtr[employer] implanted by Dr. Blake.    Chronic anticoagulation- (present on admission)  Assessment & Plan  Continue on Xarelto    PAF (paroxysmal atrial fibrillation) ([facility])- (present on admission)  Assessment & Plan  Continue metoprolol and Xarelto    Coronary artery disease involving native coronary artery of native heart without angina pectoris- (present on admission)  Assessment & Plan  -History of STEMI in 2012.  Baseline EKG is abnormal.  -Patient is not having chest pain.  Has abnormal EKG and will continue to closely monitor.  Troponin was normal in the ED.    Dyslipidemia- (present on admission)  Assessment & Plan  Continue home gemfibrozil    GERD with Nieves's esophagus- (present on admission)  Assessment & Plan  Continue Prilosec         VTE prophylaxis:    therapeutic anticoagulation with xarelto 20 mg daily witih meals      I have performed a physical exam and reviewed and updated ROS and Plan today (5/22/2024). In  review of yesterday's note (5/21/2024), there are no changes except as documented above.      Total time spent 52 minutes.    This included my review of patient's overnight RN notes, face to face interview, physical examination, lab analysis.  Also includes repeat visits with the patient, and my documented assessments and interventions above.  In addition, I spoke with entire care team on patient's treatment plan, and DC planning on morning rounds and IDT rounds.

## 2024-05-22 NOTE — CARE PLAN
The patient is Stable - Low risk of patient condition declining or worsening    Shift Goals  Clinical Goals: wean O2, home O2 eval, monitor labs & vitals  Patient Goals: feel better  Family Goals: REGINA    Progress made toward(s) clinical / shift goals:    Problem: Respiratory  Goal: Patient will achieve/maintain optimum respiratory ventilation and gas exchange  Outcome: Progressing  Note: Pt states to have shortness of breath with ambulation. O2 saturation in the 90s on 1L O2.        Patient is not progressing towards the following goals:

## 2024-05-22 NOTE — CARE PLAN
The patient is Stable - Low risk of patient condition declining or worsening    Shift Goals  Clinical Goals: wean O2 as tolerated; increase activity as tolerated; monitor for skin breakdown  Patient Goals: sit in chair today  Family Goals: REGINA    Progress made toward(s) clinical / shift goals:    Problem: Pain - Standard  Goal: Alleviation of pain or a reduction in pain to the patient’s comfort goal  Outcome: Progressing     Problem: Knowledge Deficit - Standard  Goal: Patient and family/care givers will demonstrate understanding of plan of care, disease process/condition, diagnostic tests and medications  Outcome: Progressing  Note: Patient updated on plan of care, wean O2 as tolerated, monitor cough. Patient verbalized understanding.      Problem: Respiratory  Goal: Patient will achieve/maintain optimum respiratory ventilation and gas exchange  Outcome: Progressing  Note: Wean O2 as tolerated, monitor on . Wean to baseline of RA as tolerated by patient, maintain O2 saturation >90%.        Patient is not progressing towards the following goals:

## 2024-05-22 NOTE — PROGRESS NOTES
Telemetry Shift Summary     Rhythm A paced   HR Range 59-63  Ectopy r-oPVC  Measurements -/.08/-           Normal Values  Rhythm SR  HR Range    Measurements 0.12-0.20 / 0.06-0.10  / 0.30-0.52

## 2024-05-23 LAB
ALBUMIN SERPL BCP-MCNC: 3.8 G/DL (ref 3.2–4.9)
BACTERIA BLD CULT: NORMAL
BACTERIA BLD CULT: NORMAL
BUN SERPL-MCNC: 18 MG/DL (ref 8–22)
CALCIUM ALBUM COR SERPL-MCNC: 9.2 MG/DL (ref 8.5–10.5)
CALCIUM SERPL-MCNC: 9 MG/DL (ref 8.4–10.2)
CHLORIDE SERPL-SCNC: 102 MMOL/L (ref 96–112)
CO2 SERPL-SCNC: 20 MMOL/L (ref 20–33)
CREAT SERPL-MCNC: 0.85 MG/DL (ref 0.5–1.4)
ERYTHROCYTE [DISTWIDTH] IN BLOOD BY AUTOMATED COUNT: 45 FL (ref 35.9–50)
GFR SERPLBLD CREATININE-BSD FMLA CKD-EPI: 99 ML/MIN/1.73 M 2
GLUCOSE BLD STRIP.AUTO-MCNC: 127 MG/DL (ref 65–99)
GLUCOSE BLD STRIP.AUTO-MCNC: 154 MG/DL (ref 65–99)
GLUCOSE BLD STRIP.AUTO-MCNC: 192 MG/DL (ref 65–99)
GLUCOSE BLD STRIP.AUTO-MCNC: 210 MG/DL (ref 65–99)
GLUCOSE BLD STRIP.AUTO-MCNC: 415 MG/DL (ref 65–99)
GLUCOSE SERPL-MCNC: 235 MG/DL (ref 65–99)
HCT VFR BLD AUTO: 44.9 % (ref 42–52)
HGB BLD-MCNC: 15.1 G/DL (ref 14–18)
MAGNESIUM SERPL-MCNC: 2.1 MG/DL (ref 1.5–2.5)
MCH RBC QN AUTO: 31.7 PG (ref 27–33)
MCHC RBC AUTO-ENTMCNC: 33.6 G/DL (ref 32.3–36.5)
MCV RBC AUTO: 94.3 FL (ref 81.4–97.8)
PHOSPHATE SERPL-MCNC: 3.5 MG/DL (ref 2.5–4.5)
PLATELET # BLD AUTO: 231 K/UL (ref 164–446)
PMV BLD AUTO: 10.1 FL (ref 9–12.9)
POTASSIUM SERPL-SCNC: 4.3 MMOL/L (ref 3.6–5.5)
PROCALCITONIN SERPL-MCNC: 0.03 NG/ML
RBC # BLD AUTO: 4.76 M/UL (ref 4.7–6.1)
SIGNIFICANT IND 70042: NORMAL
SIGNIFICANT IND 70042: NORMAL
SITE SITE: NORMAL
SITE SITE: NORMAL
SODIUM SERPL-SCNC: 134 MMOL/L (ref 135–145)
SOURCE SOURCE: NORMAL
SOURCE SOURCE: NORMAL
WBC # BLD AUTO: 8.1 K/UL (ref 4.8–10.8)

## 2024-05-23 PROCEDURE — 85027 COMPLETE CBC AUTOMATED: CPT

## 2024-05-23 PROCEDURE — 84145 PROCALCITONIN (PCT): CPT

## 2024-05-23 PROCEDURE — 99232 SBSQ HOSP IP/OBS MODERATE 35: CPT | Performed by: INTERNAL MEDICINE

## 2024-05-23 PROCEDURE — 83735 ASSAY OF MAGNESIUM: CPT

## 2024-05-23 PROCEDURE — 700102 HCHG RX REV CODE 250 W/ 637 OVERRIDE(OP): Performed by: INTERNAL MEDICINE

## 2024-05-23 PROCEDURE — 700102 HCHG RX REV CODE 250 W/ 637 OVERRIDE(OP): Performed by: HOSPITALIST

## 2024-05-23 PROCEDURE — 82962 GLUCOSE BLOOD TEST: CPT

## 2024-05-23 PROCEDURE — 770001 HCHG ROOM/CARE - MED/SURG/GYN PRIV*

## 2024-05-23 PROCEDURE — 80069 RENAL FUNCTION PANEL: CPT

## 2024-05-23 PROCEDURE — A9270 NON-COVERED ITEM OR SERVICE: HCPCS | Performed by: HOSPITALIST

## 2024-05-23 PROCEDURE — A9270 NON-COVERED ITEM OR SERVICE: HCPCS | Performed by: INTERNAL MEDICINE

## 2024-05-23 PROCEDURE — 97116 GAIT TRAINING THERAPY: CPT

## 2024-05-23 PROCEDURE — 94760 N-INVAS EAR/PLS OXIMETRY 1: CPT

## 2024-05-23 RX ADMIN — CYANOCOBALAMIN TAB 500 MCG 1000 MCG: 500 TAB at 06:04

## 2024-05-23 RX ADMIN — SENNOSIDES AND DOCUSATE SODIUM 2 TABLET: 50; 8.6 TABLET ORAL at 06:09

## 2024-05-23 RX ADMIN — INSULIN HUMAN 4 UNITS: 100 INJECTION, SOLUTION PARENTERAL at 20:59

## 2024-05-23 RX ADMIN — Medication 2.5 MG: at 20:51

## 2024-05-23 RX ADMIN — DAPAGLIFLOZIN 10 MG: 10 TABLET, FILM COATED ORAL at 06:13

## 2024-05-23 RX ADMIN — DEXAMETHASONE 6 MG: 4 TABLET ORAL at 06:04

## 2024-05-23 RX ADMIN — ASPIRIN 81 MG: 81 TABLET, COATED ORAL at 06:03

## 2024-05-23 RX ADMIN — INSULIN HUMAN 3 UNITS: 100 INJECTION, SOLUTION PARENTERAL at 17:32

## 2024-05-23 RX ADMIN — LISINOPRIL 2.5 MG: 2.5 TABLET ORAL at 06:05

## 2024-05-23 RX ADMIN — HYDROCODONE BITARTRATE AND HOMATROPINE METHYLBROMIDE 5 ML: 5; 1.5 SOLUTION ORAL at 11:24

## 2024-05-23 RX ADMIN — CETIRIZINE HYDROCHLORIDE 10 MG: 10 TABLET, FILM COATED ORAL at 06:03

## 2024-05-23 RX ADMIN — OMEPRAZOLE 40 MG: 20 CAPSULE, DELAYED RELEASE ORAL at 06:09

## 2024-05-23 RX ADMIN — INSULIN HUMAN 14 UNITS: 100 INJECTION, SOLUTION PARENTERAL at 12:12

## 2024-05-23 RX ADMIN — RIVAROXABAN 20 MG: 20 TABLET, FILM COATED ORAL at 17:31

## 2024-05-23 RX ADMIN — METFORMIN HYDROCHLORIDE 1000 MG: 500 TABLET ORAL at 17:31

## 2024-05-23 RX ADMIN — SENNOSIDES AND DOCUSATE SODIUM 2 TABLET: 50; 8.6 TABLET ORAL at 17:30

## 2024-05-23 RX ADMIN — ATORVASTATIN CALCIUM 80 MG: 40 TABLET, FILM COATED ORAL at 20:51

## 2024-05-23 ASSESSMENT — GAIT ASSESSMENTS
DEVIATION: DECREASED BASE OF SUPPORT;BRADYKINETIC;DECREASED HEEL STRIKE
GAIT LEVEL OF ASSIST: SUPERVISED
ASSISTIVE DEVICE: FRONT WHEEL WALKER
DISTANCE (FEET): 50

## 2024-05-23 ASSESSMENT — FIBROSIS 4 INDEX: FIB4 SCORE: 1.25

## 2024-05-23 ASSESSMENT — COGNITIVE AND FUNCTIONAL STATUS - GENERAL
CLIMB 3 TO 5 STEPS WITH RAILING: A LITTLE
STANDING UP FROM CHAIR USING ARMS: A LITTLE
MOVING TO AND FROM BED TO CHAIR: A LITTLE
TURNING FROM BACK TO SIDE WHILE IN FLAT BAD: A LITTLE
WALKING IN HOSPITAL ROOM: A LITTLE
MOBILITY SCORE: 18
SUGGESTED CMS G CODE MODIFIER MOBILITY: CK
MOVING FROM LYING ON BACK TO SITTING ON SIDE OF FLAT BED: A LITTLE

## 2024-05-23 ASSESSMENT — PAIN DESCRIPTION - PAIN TYPE
TYPE: ACUTE PAIN
TYPE: ACUTE PAIN

## 2024-05-23 ASSESSMENT — ENCOUNTER SYMPTOMS
COUGH: 1
SHORTNESS OF BREATH: 0

## 2024-05-23 NOTE — PROGRESS NOTES
"Hospital Medicine Daily Progress Note    Date of Service  5/23/2024    Chief Complaint  Ryan Trevizo is a 58 y.o. male admitted 5/18/2024 with   Chief Complaint   Patient presents with    Dizziness     \"Started today at work\". Reports also SOB. Denies CP.     Knee Pain     Reports he tripped over a broom at work and has since been experiencing R knee pain.    Cough     States this is non-productive. Reports also runny nose. Denies V/D.        Hospital Course  Patient is a pleasant 58-year-old man who came in with dizziness, cough and knee pain after a fall.  He came into the emergency room with fatigue feeling extremely weak and found to be COVID-positive.  He was admitted for COVID-pneumonia and started on dexamethasone.  He states when he coughs or sneezes he does have chest pain but otherwise is not having chest pain at baseline.  I have started him on Zyrtec and pain medication for his sneezing and chest pain but I am not worried about cardiac etiology at this time.  He is currently requiring 2 to 3 L to maintain adequate saturation.    Interval Problem Update  Patient had no acute complaints today.  He feels he did better with Hycodan.  Patient weaned off oxygen today  Reviewed labs, WBC 8.1, hemoglobin 5-10.1, sodium 134 slightly low, creatinine 0.85, potassium 4.3, Phos 3.5, magnesium 2.1.  Patient's glucose up to 415, lowest was 127 today.  I am starting metformin home medication 1000 mg p.o. twice daily.       I have discussed this patient's plan of care and discharge plan at IDT rounds today with Case Management, Nursing, Nursing leadership, and other members of the IDT team.    Consultants/Specialty  none    Code Status  Full Code    Disposition  The patient is not medically cleared for discharge to home or a post-acute facility.      I have placed the appropriate orders for post-discharge needs.   Pt requires quarantine for 10 days before he can return to his Group Home.  Potential discharge date " will be 5/28 or 5/29.    Review of Systems  Review of Systems   Respiratory:  Positive for cough. Negative for shortness of breath.         Physical Exam  Temp:  [36.3 °C (97.3 °F)-36.8 °C (98.2 °F)] 36.7 °C (98.1 °F)  Pulse:  [59-79] 67  Resp:  [18] 18  BP: (100-136)/(71-86) 103/71  SpO2:  [91 %-96 %] 94 %    Physical Exam  Vitals and nursing note reviewed.   Constitutional:       General: He is not in acute distress.     Appearance: He is not ill-appearing or diaphoretic.   HENT:      Mouth/Throat:      Mouth: Mucous membranes are dry.      Pharynx: No oropharyngeal exudate.   Cardiovascular:      Rate and Rhythm: Normal rate and regular rhythm.      Pulses: Normal pulses.      Heart sounds: Normal heart sounds. No murmur heard.  Pulmonary:      Effort: Pulmonary effort is normal. No respiratory distress.      Breath sounds: No wheezing, rhonchi or rales.      Comments: On 2LNC  Abdominal:      General: Bowel sounds are normal. There is no distension.      Tenderness: There is no abdominal tenderness.   Musculoskeletal:         General: No swelling or tenderness. Normal range of motion.   Skin:     General: Skin is warm.      Capillary Refill: Capillary refill takes less than 2 seconds.      Coloration: Skin is not jaundiced or pale.   Neurological:      General: No focal deficit present.      Mental Status: He is alert and oriented to person, place, and time. Mental status is at baseline.      Motor: Weakness present.   Psychiatric:         Mood and Affect: Mood normal.         Behavior: Behavior normal.         Fluids    Intake/Output Summary (Last 24 hours) at 5/23/2024 1535  Last data filed at 5/22/2024 2028  Gross per 24 hour   Intake 240 ml   Output 100 ml   Net 140 ml       Laboratory  Recent Labs     05/21/24  0141 05/23/24  0025   WBC 10.1 8.1   RBC 4.50* 4.76   HEMOGLOBIN 14.3 15.1   HEMATOCRIT 42.4 44.9   MCV 94.2 94.3   MCH 31.8 31.7   MCHC 33.7 33.6   RDW 46.2 45.0   PLATELETCT 234 231   MPV 10.1 10.1      Recent Labs     05/21/24  0141 05/23/24  0025   SODIUM 135 134*   POTASSIUM 4.3 4.3   CHLORIDE 103 102   CO2 21 20   GLUCOSE 154* 235*   BUN 23* 18   CREATININE 0.95 0.85   CALCIUM 9.0 9.0                     Imaging  CT-CTA NECK WITH & W/O-POST PROCESSING   Final Result      1.  No acute abnormality detected. No carotid stenosis detected.      DX-KNEE COMPLETE 4+ RIGHT   Final Result      1.  No fracture detected.   2.  Patella otto.   3.   Small joint effusion.   4.  Osteoarthritis.      DX-CHEST-PORTABLE (1 VIEW)   Final Result      No acute cardiac or pulmonary abnormalities are identified.      CT-CTA HEAD WITH & W/O-POST PROCESS   Final Result      1.  No acute abnormality detected.           Assessment/Plan  * Sepsis due to COVID-19 (HCC)- (present on admission)  Assessment & Plan  This is Sepsis Present on admission  SIRS criteria identified on my evaluation include: Tachycardia, with heart rate greater than 90 BPM and Tachypnea, with respirations greater than 20 per minute  Clinical indicators of end organ dysfunction include Acute Respiratory Failure - (mechanical ventilation or BiPap or PaO2/FiO2 ratio < 300).  I also noticed that his total bilirubin is 2.0.    Patient improving slowly from COVID-19 pneumonia.  Able to wean off oxygen today, stopping dexamethasone.    Acute hypoxemic respiratory failure (HCC)- (present on admission)  Assessment & Plan  Due to COVID-19 viral pneumonia and acute sepsis  Patient weaned off oxygen, stopping dexamethasone    Type 2 diabetes mellitus without complication, without long-term current use of insulin (HCC)- (present on admission)  Assessment & Plan  Continue insulin sliding, Accu-Cheks and hypoglycemia protocol  Continue diabetic diet  Continue Farxiga  Continue glargine 5 units twice daily  Patient's glucose up to 415, lowest was 127 today.  I am starting metformin home medication 1000 mg p.o. twice daily.  Patient was apparently given stephanie crackers and  normal chocolate pudding overnight and this morning.    Hyponatremia- (present on admission)  Assessment & Plan  Na 134    Abnormal EKG- (present on admission)  Assessment & Plan  -Prior history of MI with abnormal EKG at baseline.  -Patient is not having any chest pain and troponin is negative.  I will monitor him on cardiac unit.  Serial cardiac enzymes were added and I will repeat EKG at midnight and closely monitor.    Intellectual disability- (present on admission)  Assessment & Plan  -He lives in a supported living care facility.    Cardiac pacemaker in situ- (present on admission)  Assessment & Plan  -Placed in February 2019, Medtronic implanted by Dr. Blake.    Chronic anticoagulation- (present on admission)  Assessment & Plan  Continue on Xarelto    PAF (paroxysmal atrial fibrillation) (HCC)- (present on admission)  Assessment & Plan  Continue metoprolol and Xarelto    Coronary artery disease involving native coronary artery of native heart without angina pectoris- (present on admission)  Assessment & Plan  -History of STEMI in 2012.  Baseline EKG is abnormal.  -Patient is not having chest pain.  Has abnormal EKG and will continue to closely monitor.  Troponin was normal in the ED.    Dyslipidemia- (present on admission)  Assessment & Plan  Continue home gemfibrozil    GERD with Nieves's esophagus- (present on admission)  Assessment & Plan  Continue Prilosec         VTE prophylaxis:    therapeutic anticoagulation with xarelto 20 mg daily witih meals      I have performed a physical exam and reviewed and updated ROS and Plan today (5/23/2024). In review of yesterday's note (5/22/2024), there are no changes except as documented above.      Total time spent 36 minutes. I spent greater than 50% of the time for patient care, including unit/floor time, multiple face-to-face encounters with the patient, counseling on treatment plan and discussion with bedside RN.  Further, I spent time on my own review of  patient's overnight events, RN notes, imaging and lab analysis, and developing my assessment and plan above.  In addition, working with , social workers, and Charge RN on case coordination on this date.

## 2024-05-23 NOTE — THERAPY
Physical Therapy   Daily Treatment     Patient Name: Ryan Trevizo  Age:  58 y.o., Sex:  male  Medical Record #: [id number]  Today's Date: 5/23/2024     Precautions  Precautions: Fall Risk  Comments: COVID19    Assessment  Pt tolerated therapy well today which consisted primarily of functional mobility in bed and ambulation with FWW. Pt required VC and TC in order to maintain upright posture during ambulation with FWW with SPV and pt was able to double the ambulation distance from the initial evaluation. Pt appears to be at baseline with functional mobility and ambulation. Since pt cannot be d/c until medically clear, next session should focus on reaching the ambulation goal as indicated. Recommend outpatient physical therapy services to address higher level deficits.     Plan    Treatment Plan Status: Continue Current Treatment Plan  Type of Treatment: Gait Training, Neuro Re-Education / Balance, Stair Training, Therapeutic Activities, Therapeutic Exercise, Self Care / Home Evaluation, Equipment  Treatment Frequency: 3 Times per Week  Treatment Duration: Until Therapy Goals Met    DC Equipment Recommendations: Front-Wheel Walker  Discharge Recommendations: Recommend outpatient physical therapy services to address higher level deficits    Objective       05/23/24 1456   Precautions   Precautions Fall Risk   Comments COVID19   Pain 0 - 10 Group   Therapist Pain Assessment Prior to Activity;During Activity;Post Activity;Nurse Notified  (min-mod)   Cognition    Cognition / Consciousness WDL   Passive ROM Lower Body   Passive ROM Lower Body Not Tested   Active ROM Lower Body    Active ROM Lower Body  WDL   Strength Lower Body   Lower Body Strength  X   Comments Functional for ambulation   Sitting Lower Body Exercises   Sitting Lower Body Exercises Yes   Ankle Pumps 3 sets of 10   Neuro-Muscular Treatments   Neuro-Muscular Treatments Weight Shift Right;Weight Shift Left   Balance   Sitting Balance (Static) Good    Sitting Balance (Dynamic) Fair +   Standing Balance (Static) Fair +   Standing Balance (Dynamic) Fair   Weight Shift Sitting Good   Weight Shift Standing Fair   Skilled Intervention Verbal Cuing  (To widen DENA)   Bed Mobility    Supine to Sit Supervised   Sit to Supine Supervised   Scooting Supervised   Rolling Supervised   Comments Functional with HOB elevated   Gait Analysis   Gait Level Of Assist Supervised   Assistive Device Front Wheel Walker   Distance (Feet) 50   # of Times Distance was Traveled 2   Deviation Decreased Base Of Support;Bradykinetic;Decreased Heel Strike   Weight Bearing Status FWB   Skilled Intervention Verbal Cuing;Tactile Cuing   Functional Mobility   Sit to Stand Supervised   Bed, Chair, Wheelchair Transfer Supervised   Transfer Method Stand Step   Mobility bed mobility, STS, ambulation   Skilled Intervention Verbal Cuing   6 Clicks Assessment - How much HELP from from another person do you currently need... (If the patient hasn't done an activity recently, how much help from another person do you think he/she would need if he/she tried?)   Turning from your back to your side while in a flat bed without using bedrails? 3   Moving from lying on your back to sitting on the side of a flat bed without using bedrails? 3   Moving to and from a bed to a chair (including a wheelchair)? 3   Standing up from a chair using your arms (e.g., wheelchair, or bedside chair)? 3   Walking in hospital room? 3   Climbing 3-5 steps with a railing? 3   6 clicks Mobility Score 18   Activity Tolerance   Comments Functional   Patient / Family Goals    Patient / Family Goal #1 PLOF   Short Term Goals    Short Term Goal # 1 Pt will be able to ambulate 150ft with SPV and FWW in order to ambulate household distances by end of 6 visits.   Goal Outcome # 1 Progressing as expected   Short Term Goal # 2 Pt will be able to transfer to chair with FWW and SPV in order to improve transfers upon dc by end of 6 visits.   Goal  Outcome # 2 Goal met   Education Group   Education Provided Role of Physical Therapist;Gait Training;Exercises - Seated;Use of Assistive Device   Role of Physical Therapist Patient Response Patient;Acceptance;Explanation;Verbal Demonstration;Demonstration   Gait Training Patient Response Patient;Acceptance;Explanation;Verbal Demonstration;Action Demonstration   Use of Assistive Device Patient Response Patient;Acceptance;Explanation;Verbal Demonstration;Action Demonstration   Exercises - Seated Patient Response Patient;Acceptance;Explanation;Verbal Demonstration;Action Demonstration   Physical Therapy Treatment Plan   Physical Therapy Treatment Plan Continue Current Treatment Plan   Anticipated Discharge Equipment and Recommendations   DC Equipment Recommendations Front-Wheel Walker   Discharge Recommendations Recommend outpatient physical therapy services to address higher level deficits   Interdisciplinary Plan of Care Collaboration   IDT Collaboration with  Nursing   Patient Position at End of Therapy In Bed;Bed Alarm On;Call Light within Reach;Tray Table within Reach;Phone within Reach   Collaboration Comments Aware of session and recs   Session Information   Date / Session Number  5/23-2 (2/3, 5/26)

## 2024-05-23 NOTE — DISCHARGE PLANNING
Case Management Discharge Planning    Admission Date: 5/18/2024  GMLOS: 5.1  ALOS: 5    6-Clicks ADL Score: 24  6-Clicks Mobility Score: 18      Anticipated Discharge Dispo: Discharge Disposition: Discharged to home/self care (01)  Discharge Address: [address] [facility]  Discharge Contact Phone Number: [phone]    DME Needed: No    Action(s) Taken: Patient resides at [facility].  Pt being titrated to room air. Once medically cleared and has been in quarantine for 10 days, pt may return to the .      Escalations Completed: None    Medically Clear: No    Next Steps: RN CM to continue to follow for DC planning needs     Barriers to Discharge: Medical clearance, quarantine for 10 days per  management

## 2024-05-23 NOTE — PROGRESS NOTES
Telemetry Shift Summary    Rhythm A Paced / AV POD  HR Range 59-70  Ectopy R-O PVC               R trigem/coup  Measurements  0.18/0.10/0.40    Per monitor Joshua kay    Normal Values  Rhythm SR  HR Range   Measurements 0.12-0.20 / 0.06-0.10 / 0.30-0.52

## 2024-05-23 NOTE — PROGRESS NOTES
Telemetry Shift Summary     Rhythm A POD/SR  HR Range 59-64  Ectopy r-oPVC; rTrig; rCoup  Measurements 0.18/0.07/0.36           Normal Values  Rhythm SR  HR Range    Measurements 0.12-0.20 / 0.06-0.10  / 0.30-0.52

## 2024-05-23 NOTE — CARE PLAN
The patient is Stable - Low risk of patient condition declining or worsening    Shift Goals  Clinical Goals: wean O2, activity as tolerated  Patient Goals: shower  Family Goals: REGINA    Progress made toward(s) clinical / shift goals:    Problem: Respiratory  Goal: Patient will achieve/maintain optimum respiratory ventilation and gas exchange  Outcome: Progressing  Note: Pt weaned to 0.5 L O2       Patient is not progressing towards the following goals:

## 2024-05-24 LAB
GLUCOSE BLD STRIP.AUTO-MCNC: 117 MG/DL (ref 65–99)
GLUCOSE BLD STRIP.AUTO-MCNC: 124 MG/DL (ref 65–99)
GLUCOSE BLD STRIP.AUTO-MCNC: 163 MG/DL (ref 65–99)
GLUCOSE BLD STRIP.AUTO-MCNC: 234 MG/DL (ref 65–99)

## 2024-05-24 PROCEDURE — 700102 HCHG RX REV CODE 250 W/ 637 OVERRIDE(OP): Performed by: HOSPITALIST

## 2024-05-24 PROCEDURE — 770001 HCHG ROOM/CARE - MED/SURG/GYN PRIV*

## 2024-05-24 PROCEDURE — 94760 N-INVAS EAR/PLS OXIMETRY 1: CPT

## 2024-05-24 PROCEDURE — 82962 GLUCOSE BLOOD TEST: CPT | Mod: 91

## 2024-05-24 PROCEDURE — 99232 SBSQ HOSP IP/OBS MODERATE 35: CPT | Performed by: INTERNAL MEDICINE

## 2024-05-24 PROCEDURE — A9270 NON-COVERED ITEM OR SERVICE: HCPCS | Performed by: INTERNAL MEDICINE

## 2024-05-24 PROCEDURE — 700102 HCHG RX REV CODE 250 W/ 637 OVERRIDE(OP): Performed by: INTERNAL MEDICINE

## 2024-05-24 PROCEDURE — A9270 NON-COVERED ITEM OR SERVICE: HCPCS | Performed by: HOSPITALIST

## 2024-05-24 RX ADMIN — SENNOSIDES AND DOCUSATE SODIUM 2 TABLET: 50; 8.6 TABLET ORAL at 17:58

## 2024-05-24 RX ADMIN — DAPAGLIFLOZIN 10 MG: 10 TABLET, FILM COATED ORAL at 06:09

## 2024-05-24 RX ADMIN — LISINOPRIL 2.5 MG: 2.5 TABLET ORAL at 06:04

## 2024-05-24 RX ADMIN — ASPIRIN 81 MG: 81 TABLET, COATED ORAL at 06:01

## 2024-05-24 RX ADMIN — METFORMIN HYDROCHLORIDE 1000 MG: 500 TABLET ORAL at 08:38

## 2024-05-24 RX ADMIN — ATORVASTATIN CALCIUM 80 MG: 40 TABLET, FILM COATED ORAL at 20:20

## 2024-05-24 RX ADMIN — Medication 2.5 MG: at 20:20

## 2024-05-24 RX ADMIN — METFORMIN HYDROCHLORIDE 1000 MG: 500 TABLET ORAL at 17:58

## 2024-05-24 RX ADMIN — HYDROCODONE BITARTRATE AND HOMATROPINE METHYLBROMIDE 5 ML: 5; 1.5 SOLUTION ORAL at 06:02

## 2024-05-24 RX ADMIN — CETIRIZINE HYDROCHLORIDE 10 MG: 10 TABLET, FILM COATED ORAL at 06:03

## 2024-05-24 RX ADMIN — GEMFIBROZIL 600 MG: 600 TABLET ORAL at 06:03

## 2024-05-24 RX ADMIN — INSULIN HUMAN 3 UNITS: 100 INJECTION, SOLUTION PARENTERAL at 11:32

## 2024-05-24 RX ADMIN — OMEPRAZOLE 40 MG: 20 CAPSULE, DELAYED RELEASE ORAL at 06:05

## 2024-05-24 RX ADMIN — INSULIN HUMAN 4 UNITS: 100 INJECTION, SOLUTION PARENTERAL at 20:25

## 2024-05-24 RX ADMIN — SENNOSIDES AND DOCUSATE SODIUM 2 TABLET: 50; 8.6 TABLET ORAL at 06:06

## 2024-05-24 RX ADMIN — RIVAROXABAN 20 MG: 20 TABLET, FILM COATED ORAL at 17:58

## 2024-05-24 RX ADMIN — CYANOCOBALAMIN TAB 500 MCG 1000 MCG: 500 TAB at 06:03

## 2024-05-24 ASSESSMENT — ENCOUNTER SYMPTOMS
SHORTNESS OF BREATH: 0
COUGH: 1

## 2024-05-24 ASSESSMENT — PAIN DESCRIPTION - PAIN TYPE
TYPE: ACUTE PAIN
TYPE: ACUTE PAIN

## 2024-05-24 NOTE — PROGRESS NOTES
Telemetry Shift Summary    Rhythm Paced  HR Range 63-83  Ectopy r-fPVC, rTrig/Coup  Measurements 0.18/0.08/0.36      Normal Values  Rhythm SR  HR Range:   Measurements: 0.12-0.20/0.06-0.10/0.30-0.52

## 2024-05-24 NOTE — PROGRESS NOTES
"Hospital Medicine Daily Progress Note    Date of Service  5/24/2024    Chief Complaint  Ryan Trevizo is a 58 y.o. male admitted 5/18/2024 with   Chief Complaint   Patient presents with    Dizziness     \"Started today at work\". Reports also SOB. Denies CP.     Knee Pain     Reports he tripped over a broom at work and has since been experiencing R knee pain.    Cough     States this is non-productive. Reports also runny nose. Denies V/D.        Hospital Course  Patient is a pleasant 58-year-old man who came in with dizziness, cough and knee pain after a fall.  He came into the emergency room with fatigue feeling extremely weak and found to be COVID-positive.  He was admitted for COVID-pneumonia and started on dexamethasone.  He states when he coughs or sneezes he does have chest pain but otherwise is not having chest pain at baseline.  I have started him on Zyrtec and pain medication for his sneezing and chest pain but I am not worried about cardiac etiology at this time.  He is currently requiring 2 to 3 L to maintain adequate saturation.      Interval Problem Update  Patient had no acute complaints today  Remains off oxygen  Remains in quarantine for group home placement      I have discussed this patient's plan of care and discharge plan at IDT rounds today with Case Management, Nursing, Nursing leadership, and other members of the IDT team.    Consultants/Specialty  none    Code Status  Full Code    Disposition  The patient is not medically cleared for discharge to home or a post-acute facility.      I have placed the appropriate orders for post-discharge needs.   Pt requires quarantine for 10 days before he can return to his Group Home.  Potential discharge date will be 5/28 or 5/29.    Review of Systems  Review of Systems   Respiratory:  Positive for cough. Negative for shortness of breath.         Physical Exam  Temp:  [36.1 °C (97 °F)-36.7 °C (98.1 °F)] 36.7 °C (98.1 °F)  Pulse:  [58-96] 64  Resp:  " [15-18] 18  BP: (108-124)/(70-88) 108/70  SpO2:  [92 %-94 %] 92 %    Physical Exam  Vitals and nursing note reviewed.   Constitutional:       General: He is not in acute distress.     Appearance: He is not ill-appearing or diaphoretic.   HENT:      Mouth/Throat:      Mouth: Mucous membranes are dry.      Pharynx: No oropharyngeal exudate.   Cardiovascular:      Rate and Rhythm: Normal rate and regular rhythm.      Pulses: Normal pulses.      Heart sounds: Normal heart sounds. No murmur heard.  Pulmonary:      Effort: Pulmonary effort is normal. No respiratory distress.      Breath sounds: No wheezing, rhonchi or rales.      Comments: On 2LNC  Abdominal:      General: Bowel sounds are normal. There is no distension.      Tenderness: There is no abdominal tenderness.   Musculoskeletal:         General: No swelling or tenderness. Normal range of motion.   Skin:     General: Skin is warm.      Capillary Refill: Capillary refill takes less than 2 seconds.      Coloration: Skin is not jaundiced or pale.   Neurological:      General: No focal deficit present.      Mental Status: He is alert and oriented to person, place, and time. Mental status is at baseline.      Motor: Weakness present.   Psychiatric:         Mood and Affect: Mood normal.         Behavior: Behavior normal.         Fluids    Intake/Output Summary (Last 24 hours) at 5/24/2024 1610  Last data filed at 5/23/2024 1900  Gross per 24 hour   Intake 800 ml   Output 100 ml   Net 700 ml       Laboratory  Recent Labs     05/23/24  0025   WBC 8.1   RBC 4.76   HEMOGLOBIN 15.1   HEMATOCRIT 44.9   MCV 94.3   MCH 31.7   MCHC 33.6   RDW 45.0   PLATELETCT 231   MPV 10.1     Recent Labs     05/23/24  0025   SODIUM 134*   POTASSIUM 4.3   CHLORIDE 102   CO2 20   GLUCOSE 235*   BUN 18   CREATININE 0.85   CALCIUM 9.0                     Imaging  CT-CTA NECK WITH & W/O-POST PROCESSING   Final Result      1.  No acute abnormality detected. No carotid stenosis detected.       DX-KNEE COMPLETE 4+ RIGHT   Final Result      1.  No fracture detected.   2.  Patella otto.   3.   Small joint effusion.   4.  Osteoarthritis.      DX-CHEST-PORTABLE (1 VIEW)   Final Result      No acute cardiac or pulmonary abnormalities are identified.      CT-CTA HEAD WITH & W/O-POST PROCESS   Final Result      1.  No acute abnormality detected.           Assessment/Plan  * Sepsis due to COVID-19 (HCC)- (present on admission)  Assessment & Plan  This is Sepsis Present on admission  SIRS criteria identified on my evaluation include: Tachycardia, with heart rate greater than 90 BPM and Tachypnea, with respirations greater than 20 per minute  Clinical indicators of end organ dysfunction include Acute Respiratory Failure - (mechanical ventilation or BiPap or PaO2/FiO2 ratio < 300).  I also noticed that his total bilirubin is 2.0.    Sepsis improved    Acute hypoxemic respiratory failure (HCC)- (present on admission)  Assessment & Plan  Due to COVID-19 viral pneumonia and acute sepsis  Remains off oxygen.  Discontinue dexamethasone 5/23.  Continue Hycodan for cough as needed.    Type 2 diabetes mellitus without complication, without long-term current use of insulin ([facility])- (present on admission)  Assessment & Plan  Continue insulin sliding, Accu-Cheks and hypoglycemia protocol  Continue diabetic diet    Patient's glucose level up to 210. We will continue glargine 5 units twice daily, Farxiga.  Continue p.o. metformin.        Hyponatremia- (present on admission)  Assessment & Plan  Na 134    Abnormal EKG- (present on admission)  Assessment & Plan  -Prior history of MI with abnormal EKG at baseline.  -Patient is not having any chest pain and troponin is negative.  I will monitor him on cardiac unit.  Serial cardiac enzymes were added and I will repeat EKG at midnight and closely monitor.    Intellectual disability- (present on admission)  Assessment & Plan  -He lives in a supported living care facility.    Cardiac  pacemaker in situ- (present on admission)  Assessment & Plan  -Placed in February 2019, Medtr[employer] implanted by Dr. Blake.    Chronic anticoagulation- (present on admission)  Assessment & Plan  Continue Xarelto, monitoring for any signs of bleeding or hemorrhage    PAF (paroxysmal atrial fibrillation) (HCC)- (present on admission)  Assessment & Plan  Continue metoprolol and Xarelto.    Coronary artery disease involving native coronary artery of native heart without angina pectoris- (present on admission)  Assessment & Plan  -History of STEMI in 2012.  Baseline EKG is abnormal.  -Patient is not having chest pain.  Has abnormal EKG and will continue to closely monitor.  Troponin was normal in the ED.    Dyslipidemia- (present on admission)  Assessment & Plan  Continue home gemfibrozil    GERD with Nieves's esophagus- (present on admission)  Assessment & Plan  Continue omeprazole         VTE prophylaxis:    therapeutic anticoagulation with xarelto 20 mg daily witih meals      I have performed a physical exam and reviewed and updated ROS and Plan today (5/24/2024). In review of yesterday's note (5/23/2024), there are no changes except as documented above.      Total time spent 37 minutes.  This included my review of patient's overnight RN notes, face to face interview, physical examination, lab analysis.  Also includes repeat visits with the patient, and my documented assessments and interventions above.  In addition, I spoke with entire care team on patient's treatment plan, and DC planning on morning rounds and IDT rounds.

## 2024-05-24 NOTE — CARE PLAN
The patient is Stable - Low risk of patient condition declining or worsening    Shift Goals  Clinical Goals: stay off O2, activity as tolerated  Patient Goals: throat pain relief  Family Goals: REGINA    Progress made toward(s) clinical / shift goals:  remains on RA, throat pain relieved with PRNs      Problem: Pain - Standard  Goal: Alleviation of pain or a reduction in pain to the patient’s comfort goal  Outcome: Progressing     Problem: Knowledge Deficit - Standard  Goal: Patient and family/care givers will demonstrate understanding of plan of care, disease process/condition, diagnostic tests and medications  Outcome: Progressing     Problem: Respiratory  Goal: Patient will achieve/maintain optimum respiratory ventilation and gas exchange  Outcome: Progressing     Problem: Fall Risk  Goal: Patient will remain free from falls  Outcome: Progressing       Patient is not progressing towards the following goals:

## 2024-05-24 NOTE — CARE PLAN
The patient is Stable - Low risk of patient condition declining or worsening    Shift Goals  Clinical Goals: monitor O2, activity as tolerated  Patient Goals: eat stephanie crackers  Family Goals: REGINA    Progress made toward(s) clinical / shift goals:    Problem: Respiratory  Goal: Patient will achieve/maintain optimum respiratory ventilation and gas exchange  Outcome: Progressing  Flowsheets  Taken 5/24/2024 0027 by An Goodwin RDAISY  Deep Breathe and Cough: Performs Correctly  Taken 5/19/2024 1000 by Catina Doss RDAISY  Incentive Spirometer: Effective  Incentive Spirometer Volume: 1250 mL  Note: Pt remaining on room air.      Problem: Nutrition Deficit - Diabetes  Goal: Patient will demonstrate adequate hydration and vital signs  Outcome: Progressing  Note: Pt verbalized understanding of diabetic diet        Patient is not progressing towards the following goals:

## 2024-05-25 LAB
GLUCOSE BLD STRIP.AUTO-MCNC: 108 MG/DL (ref 65–99)
GLUCOSE BLD STRIP.AUTO-MCNC: 139 MG/DL (ref 65–99)
GLUCOSE BLD STRIP.AUTO-MCNC: 194 MG/DL (ref 65–99)
GLUCOSE BLD STRIP.AUTO-MCNC: 197 MG/DL (ref 65–99)

## 2024-05-25 PROCEDURE — A9270 NON-COVERED ITEM OR SERVICE: HCPCS | Performed by: INTERNAL MEDICINE

## 2024-05-25 PROCEDURE — 99232 SBSQ HOSP IP/OBS MODERATE 35: CPT | Performed by: INTERNAL MEDICINE

## 2024-05-25 PROCEDURE — 82962 GLUCOSE BLOOD TEST: CPT

## 2024-05-25 PROCEDURE — A9270 NON-COVERED ITEM OR SERVICE: HCPCS | Performed by: HOSPITALIST

## 2024-05-25 PROCEDURE — 770001 HCHG ROOM/CARE - MED/SURG/GYN PRIV*

## 2024-05-25 PROCEDURE — 700102 HCHG RX REV CODE 250 W/ 637 OVERRIDE(OP): Performed by: INTERNAL MEDICINE

## 2024-05-25 PROCEDURE — 700102 HCHG RX REV CODE 250 W/ 637 OVERRIDE(OP): Performed by: HOSPITALIST

## 2024-05-25 PROCEDURE — 94760 N-INVAS EAR/PLS OXIMETRY 1: CPT

## 2024-05-25 RX ORDER — DAPAGLIFLOZIN 10 MG/1
10 TABLET, FILM COATED ORAL DAILY
Status: DISCONTINUED | OUTPATIENT
Start: 2024-05-26 | End: 2024-05-28 | Stop reason: HOSPADM

## 2024-05-25 RX ADMIN — DAPAGLIFLOZIN 10 MG: 10 TABLET, FILM COATED ORAL at 05:55

## 2024-05-25 RX ADMIN — METFORMIN HYDROCHLORIDE 1000 MG: 500 TABLET ORAL at 08:19

## 2024-05-25 RX ADMIN — ATORVASTATIN CALCIUM 80 MG: 40 TABLET, FILM COATED ORAL at 20:43

## 2024-05-25 RX ADMIN — CYANOCOBALAMIN TAB 500 MCG 1000 MCG: 500 TAB at 06:00

## 2024-05-25 RX ADMIN — Medication 2.5 MG: at 20:43

## 2024-05-25 RX ADMIN — LISINOPRIL 2.5 MG: 2.5 TABLET ORAL at 06:00

## 2024-05-25 RX ADMIN — METFORMIN HYDROCHLORIDE 1000 MG: 500 TABLET ORAL at 17:30

## 2024-05-25 RX ADMIN — CETIRIZINE HYDROCHLORIDE 10 MG: 10 TABLET, FILM COATED ORAL at 06:00

## 2024-05-25 RX ADMIN — SENNOSIDES AND DOCUSATE SODIUM 2 TABLET: 50; 8.6 TABLET ORAL at 17:30

## 2024-05-25 RX ADMIN — INSULIN HUMAN 3 UNITS: 100 INJECTION, SOLUTION PARENTERAL at 17:32

## 2024-05-25 RX ADMIN — RIVAROXABAN 20 MG: 20 TABLET, FILM COATED ORAL at 17:30

## 2024-05-25 RX ADMIN — OMEPRAZOLE 40 MG: 20 CAPSULE, DELAYED RELEASE ORAL at 06:00

## 2024-05-25 RX ADMIN — ASPIRIN 81 MG: 81 TABLET, COATED ORAL at 06:00

## 2024-05-25 RX ADMIN — HYDROCODONE BITARTRATE AND HOMATROPINE METHYLBROMIDE 5 ML: 5; 1.5 SOLUTION ORAL at 05:54

## 2024-05-25 RX ADMIN — INSULIN HUMAN 3 UNITS: 100 INJECTION, SOLUTION PARENTERAL at 12:29

## 2024-05-25 ASSESSMENT — PAIN DESCRIPTION - PAIN TYPE
TYPE: ACUTE PAIN
TYPE: ACUTE PAIN

## 2024-05-25 ASSESSMENT — ENCOUNTER SYMPTOMS
SHORTNESS OF BREATH: 0
COUGH: 1

## 2024-05-25 NOTE — CARE PLAN
The patient is Stable - Low risk of patient condition declining or worsening    Shift Goals  Clinical Goals: Monitor BG, remain free from falls throughout shift  Patient Goals: Take a shower  Family Goals: REGINA    Progress made toward(s) clinical / shift goals:  ***    Patient is not progressing towards the following goals:

## 2024-05-25 NOTE — CARE PLAN
The patient is Stable - Low risk of patient condition declining or worsening    Shift Goals  Clinical Goals: Monitor BG, remain free from falls throughout shift  Patient Goals: Take a shower  Family Goals: REGINA    Progress made toward(s) clinical / shift goals:      Problem: Pain - Standard  Goal: Alleviation of pain or a reduction in pain to the patient’s comfort goal  5/25/2024 0414 by Michelle Pineda R.N.  Outcome: Progressing- Pt denied pain throughout shift.   Problem: Fall Risk  Goal: Patient will remain free from falls  Outcome: Progressing- Fall precautions in place, pt remained free from falls throughout shift.        Patient is not progressing towards the following goals:

## 2024-05-25 NOTE — CARE PLAN
The patient is Stable - Low risk of patient condition declining or worsening    Shift Goals  Clinical Goals: monitor BG, treat throat pain  Patient Goals: discharge  Family Goals: REGINA    Progress made toward(s) clinical / shift goals:        Problem: Pain - Standard  Goal: Alleviation of pain or a reduction in pain to the patient’s comfort goal  Outcome: Progressing     Problem: Knowledge Deficit - Standard  Goal: Patient and family/care givers will demonstrate understanding of plan of care, disease process/condition, diagnostic tests and medications  Outcome: Progressing     Problem: Fall Risk  Goal: Patient will remain free from falls  Outcome: Progressing       Patient is not progressing towards the following goals:

## 2024-05-25 NOTE — PROGRESS NOTES
Received report from day shift RN. Assumed pt care. Pt is A&Ox4, resting comfortably in bed. No signs of SOB/respiratory distress. No complaints of pain at this time. Went over plan of night with patient. Fall precautions in place. Bed at lowest position. Call light and personal belongings within reach. No further needs at this time.

## 2024-05-25 NOTE — PROGRESS NOTES
"Hospital Medicine Daily Progress Note    Date of Service  5/25/2024    Chief Complaint  Ryan Trevizo is a 58 y.o. male admitted 5/18/2024 with   Chief Complaint   Patient presents with    Dizziness     \"Started today at work\". Reports also SOB. Denies CP.     Knee Pain     Reports he tripped over a broom at work and has since been experiencing R knee pain.    Cough     States this is non-productive. Reports also runny nose. Denies V/D.        Hospital Course  Patient is a pleasant 58-year-old man who came in with dizziness, cough and knee pain after a fall.  He came into the emergency room with fatigue feeling extremely weak and found to be COVID-positive.  He was admitted for COVID-pneumonia and started on dexamethasone.  He states when he coughs or sneezes he does have chest pain but otherwise is not having chest pain at baseline.  I have started him on Zyrtec and pain medication for his sneezing and chest pain but I am not worried about cardiac etiology at this time.  He is currently requiring 2 to 3 L to maintain adequate saturation.      Interval Problem Update  Pt stated he felt congested today. He remains off oxygen.  Increase glargine to 10 units twice daily, continue metformin and Farxiga.      I have discussed this patient's plan of care and discharge plan at IDT rounds today with Case Management, Nursing, Nursing leadership, and other members of the IDT team.    Consultants/Specialty  none    Code Status  Full Code    Disposition  The patient is not medically cleared for discharge to home or a post-acute facility.      I have placed the appropriate orders for post-discharge needs.   Pt requires quarantine for 10 days before he can return to his Group Home.  Potential discharge date will be 5/28 or 5/29.    Review of Systems  Review of Systems   Respiratory:  Positive for cough. Negative for shortness of breath.         Physical Exam  Temp:  [36.2 °C (97.2 °F)-36.6 °C (97.9 °F)] 36.3 °C (97.3 " °F)  Pulse:  [67-81] 76  Resp:  [18-20] 18  BP: (114-125)/(68-89) 114/68  SpO2:  [90 %-93 %] 92 %    Physical Exam  Vitals and nursing note reviewed.   Constitutional:       General: He is not in acute distress.     Appearance: He is not diaphoretic.   HENT:      Mouth/Throat:      Mouth: Mucous membranes are dry.      Pharynx: No oropharyngeal exudate.   Cardiovascular:      Rate and Rhythm: Normal rate and regular rhythm.      Pulses: Normal pulses.      Heart sounds: Normal heart sounds. No murmur heard.  Pulmonary:      Effort: Pulmonary effort is normal. No respiratory distress.      Breath sounds: Normal breath sounds. No wheezing, rhonchi or rales.   Abdominal:      General: Bowel sounds are normal. There is no distension.      Tenderness: There is no abdominal tenderness.   Musculoskeletal:         General: No swelling or tenderness. Normal range of motion.   Skin:     General: Skin is warm.      Capillary Refill: Capillary refill takes less than 2 seconds.      Coloration: Skin is not jaundiced or pale.   Neurological:      General: No focal deficit present.      Mental Status: He is alert and oriented to person, place, and time. Mental status is at baseline.      Motor: No weakness.   Psychiatric:         Mood and Affect: Mood normal.         Behavior: Behavior normal.         Thought Content: Thought content normal.         Judgment: Judgment normal.         Fluids    Intake/Output Summary (Last 24 hours) at 5/25/2024 1717  Last data filed at 5/24/2024 2000  Gross per 24 hour   Intake 120 ml   Output --   Net 120 ml       Laboratory  Recent Labs     05/23/24  0025   WBC 8.1   RBC 4.76   HEMOGLOBIN 15.1   HEMATOCRIT 44.9   MCV 94.3   MCH 31.7   MCHC 33.6   RDW 45.0   PLATELETCT 231   MPV 10.1     Recent Labs     05/23/24  0025   SODIUM 134*   POTASSIUM 4.3   CHLORIDE 102   CO2 20   GLUCOSE 235*   BUN 18   CREATININE 0.85   CALCIUM 9.0                     Imaging  CT-CTA NECK WITH & W/O-POST PROCESSING    Final Result      1.  No acute abnormality detected. No carotid stenosis detected.      DX-KNEE COMPLETE 4+ RIGHT   Final Result      1.  No fracture detected.   2.  Patella otto.   3.   Small joint effusion.   4.  Osteoarthritis.      DX-CHEST-PORTABLE (1 VIEW)   Final Result      No acute cardiac or pulmonary abnormalities are identified.      CT-CTA HEAD WITH & W/O-POST PROCESS   Final Result      1.  No acute abnormality detected.           Assessment/Plan  * Sepsis due to COVID-19 (HCC)- (present on admission)  Assessment & Plan  This is Sepsis Present on admission  SIRS criteria identified on my evaluation include: Tachycardia, with heart rate greater than 90 BPM and Tachypnea, with respirations greater than 20 per minute  Clinical indicators of end organ dysfunction include Acute Respiratory Failure - (mechanical ventilation or BiPap or PaO2/FiO2 ratio < 300).  I also noticed that his total bilirubin is 2.0.    Sepsis improved    Acute hypoxemic respiratory failure (HCC)- (present on admission)  Assessment & Plan  Due to COVID-19 viral pneumonia and acute sepsis  Remains off oxygen.  Discontinue dexamethasone 5/23.  Continue as needed Hycodan    Type 2 diabetes mellitus without complication, without long-term current use of insulin ([facility])- (present on admission)  Assessment & Plan  Continue insulin sliding, Accu-Cheks and hypoglycemia protocol  Continue diabetic diet    Glucose up to 234, 194.  Glargine increased to 10 units twice daily.  Continue Farxiga and metformin.    Hyponatremia- (present on admission)  Assessment & Plan  Na 134    Abnormal EKG- (present on admission)  Assessment & Plan  -Prior history of MI with abnormal EKG at baseline.  -Patient is not having any chest pain and troponin is negative.  I will monitor him on cardiac unit.  Serial cardiac enzymes were added and I will repeat EKG at midnight and closely monitor.    Intellectual disability- (present on admission)  Assessment & Plan  -He  lives in a supported living care facility.    Cardiac pacemaker in situ- (present on admission)  Assessment & Plan  -Placed in February 2019, Medtr[employer] implanted by Dr. Blake.    Chronic anticoagulation- (present on admission)  Assessment & Plan  Continue Xarelto, monitoring for any signs of bleeding or hemorrhage    PAF (paroxysmal atrial fibrillation) (HCC)- (present on admission)  Assessment & Plan  Continue metoprolol and Xarelto.    Coronary artery disease involving native coronary artery of native heart without angina pectoris- (present on admission)  Assessment & Plan  -History of STEMI in 2012.  Baseline EKG is abnormal.  -Patient is not having chest pain.  Has abnormal EKG and will continue to closely monitor.  Troponin was normal in the ED.    Dyslipidemia- (present on admission)  Assessment & Plan  Continue home gemfibrozil    GERD with Nieves's esophagus- (present on admission)  Assessment & Plan  Continue omeprazole         VTE prophylaxis:    therapeutic anticoagulation with xarelto 20 mg daily witih meals      I have performed a physical exam and reviewed and updated ROS and Plan today (5/25/2024). In review of yesterday's note (5/24/2024), there are no changes except as documented above.      Total time spent 36 minutes. I spent greater than 50% of the time for patient care, including unit/floor time, multiple face-to-face encounters with the patient, counseling on treatment plan and discussion with bedside RN.  Further, I spent time on my own review of patient's overnight events, RN notes, imaging and lab analysis, and developing my assessment and plan above.  In addition, working with , social workers, and Charge RN on case coordination on this date.

## 2024-05-26 PROBLEM — E83.42 HYPOMAGNESEMIA: Status: ACTIVE | Noted: 2024-05-26

## 2024-05-26 LAB
ALBUMIN SERPL BCP-MCNC: 3.8 G/DL (ref 3.2–4.9)
BASOPHILS # BLD AUTO: 0.5 % (ref 0–1.8)
BASOPHILS # BLD: 0.06 K/UL (ref 0–0.12)
BUN SERPL-MCNC: 14 MG/DL (ref 8–22)
CALCIUM ALBUM COR SERPL-MCNC: 9.3 MG/DL (ref 8.5–10.5)
CALCIUM SERPL-MCNC: 9.1 MG/DL (ref 8.4–10.2)
CHLORIDE SERPL-SCNC: 100 MMOL/L (ref 96–112)
CO2 SERPL-SCNC: 20 MMOL/L (ref 20–33)
CREAT SERPL-MCNC: 0.92 MG/DL (ref 0.5–1.4)
EOSINOPHIL # BLD AUTO: 0.17 K/UL (ref 0–0.51)
EOSINOPHIL NFR BLD: 1.3 % (ref 0–6.9)
ERYTHROCYTE [DISTWIDTH] IN BLOOD BY AUTOMATED COUNT: 44.6 FL (ref 35.9–50)
GFR SERPLBLD CREATININE-BSD FMLA CKD-EPI: 95 ML/MIN/1.73 M 2
GLUCOSE BLD STRIP.AUTO-MCNC: 120 MG/DL (ref 65–99)
GLUCOSE BLD STRIP.AUTO-MCNC: 198 MG/DL (ref 65–99)
GLUCOSE BLD STRIP.AUTO-MCNC: 210 MG/DL (ref 65–99)
GLUCOSE BLD STRIP.AUTO-MCNC: 279 MG/DL (ref 65–99)
GLUCOSE SERPL-MCNC: 129 MG/DL (ref 65–99)
HCT VFR BLD AUTO: 49.2 % (ref 42–52)
HGB BLD-MCNC: 16.7 G/DL (ref 14–18)
IMM GRANULOCYTES # BLD AUTO: 0.2 K/UL (ref 0–0.11)
IMM GRANULOCYTES NFR BLD AUTO: 1.6 % (ref 0–0.9)
LYMPHOCYTES # BLD AUTO: 2.3 K/UL (ref 1–4.8)
LYMPHOCYTES NFR BLD: 18.1 % (ref 22–41)
MAGNESIUM SERPL-MCNC: 1.6 MG/DL (ref 1.5–2.5)
MCH RBC QN AUTO: 31.6 PG (ref 27–33)
MCHC RBC AUTO-ENTMCNC: 33.9 G/DL (ref 32.3–36.5)
MCV RBC AUTO: 93.2 FL (ref 81.4–97.8)
MONOCYTES # BLD AUTO: 0.68 K/UL (ref 0–0.85)
MONOCYTES NFR BLD AUTO: 5.4 % (ref 0–13.4)
NEUTROPHILS # BLD AUTO: 9.28 K/UL (ref 1.82–7.42)
NEUTROPHILS NFR BLD: 73.1 % (ref 44–72)
NRBC # BLD AUTO: 0 K/UL
NRBC BLD-RTO: 0 /100 WBC (ref 0–0.2)
PHOSPHATE SERPL-MCNC: 3.1 MG/DL (ref 2.5–4.5)
PLATELET # BLD AUTO: 280 K/UL (ref 164–446)
PMV BLD AUTO: 10.1 FL (ref 9–12.9)
POTASSIUM SERPL-SCNC: 4.4 MMOL/L (ref 3.6–5.5)
PROCALCITONIN SERPL-MCNC: 0.03 NG/ML
RBC # BLD AUTO: 5.28 M/UL (ref 4.7–6.1)
SODIUM SERPL-SCNC: 132 MMOL/L (ref 135–145)
WBC # BLD AUTO: 12.7 K/UL (ref 4.8–10.8)

## 2024-05-26 PROCEDURE — 85025 COMPLETE CBC W/AUTO DIFF WBC: CPT

## 2024-05-26 PROCEDURE — 770001 HCHG ROOM/CARE - MED/SURG/GYN PRIV*

## 2024-05-26 PROCEDURE — 80069 RENAL FUNCTION PANEL: CPT

## 2024-05-26 PROCEDURE — 700102 HCHG RX REV CODE 250 W/ 637 OVERRIDE(OP): Performed by: HOSPITALIST

## 2024-05-26 PROCEDURE — 700102 HCHG RX REV CODE 250 W/ 637 OVERRIDE(OP): Performed by: INTERNAL MEDICINE

## 2024-05-26 PROCEDURE — 94760 N-INVAS EAR/PLS OXIMETRY 1: CPT

## 2024-05-26 PROCEDURE — 99233 SBSQ HOSP IP/OBS HIGH 50: CPT | Performed by: INTERNAL MEDICINE

## 2024-05-26 PROCEDURE — 700111 HCHG RX REV CODE 636 W/ 250 OVERRIDE (IP): Mod: JZ | Performed by: INTERNAL MEDICINE

## 2024-05-26 PROCEDURE — 83735 ASSAY OF MAGNESIUM: CPT

## 2024-05-26 PROCEDURE — 82962 GLUCOSE BLOOD TEST: CPT | Mod: 91

## 2024-05-26 PROCEDURE — 84145 PROCALCITONIN (PCT): CPT

## 2024-05-26 PROCEDURE — A9270 NON-COVERED ITEM OR SERVICE: HCPCS | Performed by: HOSPITALIST

## 2024-05-26 PROCEDURE — A9270 NON-COVERED ITEM OR SERVICE: HCPCS | Performed by: INTERNAL MEDICINE

## 2024-05-26 RX ORDER — AMOXICILLIN AND CLAVULANATE POTASSIUM 875; 125 MG/1; MG/1
1 TABLET, FILM COATED ORAL EVERY 12 HOURS
Status: DISCONTINUED | OUTPATIENT
Start: 2024-05-26 | End: 2024-05-28

## 2024-05-26 RX ORDER — MAGNESIUM SULFATE HEPTAHYDRATE 40 MG/ML
2 INJECTION, SOLUTION INTRAVENOUS ONCE
Status: COMPLETED | OUTPATIENT
Start: 2024-05-26 | End: 2024-05-26

## 2024-05-26 RX ADMIN — INSULIN HUMAN 3 UNITS: 100 INJECTION, SOLUTION PARENTERAL at 17:08

## 2024-05-26 RX ADMIN — METFORMIN HYDROCHLORIDE 1000 MG: 500 TABLET ORAL at 17:05

## 2024-05-26 RX ADMIN — INSULIN HUMAN 4 UNITS: 100 INJECTION, SOLUTION PARENTERAL at 12:24

## 2024-05-26 RX ADMIN — METOPROLOL SUCCINATE 25 MG: 25 TABLET, EXTENDED RELEASE ORAL at 04:30

## 2024-05-26 RX ADMIN — MAGNESIUM SULFATE HEPTAHYDRATE 2 G: 2 INJECTION, SOLUTION INTRAVENOUS at 12:20

## 2024-05-26 RX ADMIN — BENZOCAINE AND MENTHOL 1 LOZENGE: 15; 3.6 LOZENGE ORAL at 17:07

## 2024-05-26 RX ADMIN — CETIRIZINE HYDROCHLORIDE 10 MG: 10 TABLET, FILM COATED ORAL at 04:30

## 2024-05-26 RX ADMIN — METFORMIN HYDROCHLORIDE 1000 MG: 500 TABLET ORAL at 08:22

## 2024-05-26 RX ADMIN — AMOXICILLIN AND CLAVULANATE POTASSIUM 1 TABLET: 875; 125 TABLET, FILM COATED ORAL at 17:05

## 2024-05-26 RX ADMIN — BENZOCAINE AND MENTHOL 1 LOZENGE: 15; 3.6 LOZENGE ORAL at 22:59

## 2024-05-26 RX ADMIN — DAPAGLIFLOZIN 10 MG: 10 TABLET, FILM COATED ORAL at 05:58

## 2024-05-26 RX ADMIN — CYANOCOBALAMIN TAB 500 MCG 1000 MCG: 500 TAB at 04:30

## 2024-05-26 RX ADMIN — Medication 2.5 MG: at 20:56

## 2024-05-26 RX ADMIN — ATORVASTATIN CALCIUM 80 MG: 40 TABLET, FILM COATED ORAL at 20:56

## 2024-05-26 RX ADMIN — LISINOPRIL 2.5 MG: 2.5 TABLET ORAL at 04:30

## 2024-05-26 RX ADMIN — AMOXICILLIN AND CLAVULANATE POTASSIUM 1 TABLET: 875; 125 TABLET, FILM COATED ORAL at 12:19

## 2024-05-26 RX ADMIN — SENNOSIDES AND DOCUSATE SODIUM 2 TABLET: 50; 8.6 TABLET ORAL at 04:30

## 2024-05-26 RX ADMIN — RIVAROXABAN 20 MG: 20 TABLET, FILM COATED ORAL at 17:05

## 2024-05-26 RX ADMIN — OMEPRAZOLE 40 MG: 20 CAPSULE, DELAYED RELEASE ORAL at 04:30

## 2024-05-26 RX ADMIN — INSULIN HUMAN 7 UNITS: 100 INJECTION, SOLUTION PARENTERAL at 21:02

## 2024-05-26 RX ADMIN — HYDROCODONE BITARTRATE AND HOMATROPINE METHYLBROMIDE 5 ML: 5; 1.5 SOLUTION ORAL at 22:59

## 2024-05-26 RX ADMIN — ASPIRIN 81 MG: 81 TABLET, COATED ORAL at 04:30

## 2024-05-26 RX ADMIN — GEMFIBROZIL 600 MG: 600 TABLET ORAL at 04:30

## 2024-05-26 ASSESSMENT — ENCOUNTER SYMPTOMS
SHORTNESS OF BREATH: 0
COUGH: 0

## 2024-05-26 NOTE — PROGRESS NOTES
Bedside report received from night RN. Assumed care of patient. Daily plan of care discussed. Pt awake and alert, able to ambulate to bathroom and void. 12 hour chart check complete. Hourly rounding in place.

## 2024-05-26 NOTE — CARE PLAN
Problem: Pain - Standard  Goal: Alleviation of pain or a reduction in pain to the patient’s comfort goal  Outcome: Progressing     Problem: Respiratory  Goal: Patient will achieve/maintain optimum respiratory ventilation and gas exchange  Outcome: Progressing     Problem: Fall Risk  Goal: Patient will remain free from falls  Outcome: Progressing     The patient is Stable - Low risk of patient condition declining or worsening    Shift Goals  Clinical Goals: Continue to monitor achs fsbs; ensure pt remains free from falls or injury; ensure pt maintains SpO2 >/=90% on room air  Patient Goals: Rest comfortably, discharge when able  Family Goals: n/a    Progress made toward(s) clinical / shift goals:  Pt remained free from falls throughout shift. Able to ambulate steadily with FWW and SBA. Pt maintained SpO2 >/=90% on room air throughout shift. Pt aware of plan to discharge on 5/28 when isolation period complete.    Patient is not progressing towards the following goals: n/a

## 2024-05-26 NOTE — PROGRESS NOTES
"Hospital Medicine Daily Progress Note    Date of Service  5/26/2024    Chief Complaint  Ryan Trevizo is a 58 y.o. male admitted 5/18/2024 with   Chief Complaint   Patient presents with    Dizziness     \"Started today at work\". Reports also SOB. Denies CP.     Knee Pain     Reports he tripped over a broom at work and has since been experiencing R knee pain.    Cough     States this is non-productive. Reports also runny nose. Denies V/D.        Hospital Course  Patient is a pleasant 58-year-old man who came in with dizziness, cough and knee pain after a fall.  He came into the emergency room with fatigue feeling extremely weak and found to be COVID-positive.  He was admitted for COVID-pneumonia and started on dexamethasone.  He states when he coughs or sneezes he does have chest pain but otherwise is not having chest pain at baseline.  I have started him on Zyrtec and pain medication for his sneezing and chest pain but I am not worried about cardiac etiology at this time.  He is currently requiring 2 to 3 L to maintain adequate saturation.      Interval Problem Update  Patient had no acute complaints.  WBC 12.7, elevated today. I started Augmentin as he has risks for superimposed bacterial pneumonia with COVID. Procal 0.03.  Mag 1.6 replacing via IV  Sodium 132, Cr 0.92.  Glucose 210 at lunchtime.    I have discussed this patient's plan of care and discharge plan at IDT rounds today with Case Management, Nursing, Nursing leadership, and other members of the IDT team.    Consultants/Specialty  none    Code Status  Full Code    Disposition  The patient is medically cleared for discharge to home or a post-acute facility.      I have placed the appropriate orders for post-discharge needs.   Pt requires quarantine for 10 days before he can return to his Group Home.  Potential discharge date will be 5/28 or 5/29.    Review of Systems  Review of Systems   Respiratory:  Negative for cough and shortness of breath.     "     Physical Exam  Temp:  [36.2 °C (97.2 °F)-36.9 °C (98.5 °F)] 36.9 °C (98.5 °F)  Pulse:  [67-83] 74  Resp:  [14-18] 14  BP: ()/(67-82) 104/72  SpO2:  [90 %-94 %] 93 %    Physical Exam  Vitals and nursing note reviewed.   Constitutional:       General: He is not in acute distress.     Appearance: He is not diaphoretic.   HENT:      Mouth/Throat:      Mouth: Mucous membranes are dry.      Pharynx: No oropharyngeal exudate.   Cardiovascular:      Rate and Rhythm: Normal rate and regular rhythm.      Pulses: Normal pulses.      Heart sounds: Normal heart sounds. No murmur heard.  Pulmonary:      Effort: Pulmonary effort is normal. No respiratory distress.      Breath sounds: Normal breath sounds. No wheezing or rales.   Abdominal:      General: Bowel sounds are normal. There is no distension.      Tenderness: There is no abdominal tenderness.   Musculoskeletal:         General: No swelling or tenderness. Normal range of motion.   Skin:     General: Skin is warm.      Capillary Refill: Capillary refill takes less than 2 seconds.      Coloration: Skin is not jaundiced or pale.   Neurological:      General: No focal deficit present.      Mental Status: He is alert and oriented to person, place, and time. Mental status is at baseline.      Motor: No weakness.   Psychiatric:         Mood and Affect: Mood normal.         Behavior: Behavior normal.         Thought Content: Thought content normal.         Judgment: Judgment normal.         Fluids    Intake/Output Summary (Last 24 hours) at 5/26/2024 1453  Last data filed at 5/26/2024 0400  Gross per 24 hour   Intake 390 ml   Output --   Net 390 ml       Laboratory  Recent Labs     05/26/24  0814   WBC 12.7*   RBC 5.28   HEMOGLOBIN 16.7   HEMATOCRIT 49.2   MCV 93.2   MCH 31.6   MCHC 33.9   RDW 44.6   PLATELETCT 280   MPV 10.1     Recent Labs     05/26/24  0814   SODIUM 132*   POTASSIUM 4.4   CHLORIDE 100   CO2 20   GLUCOSE 129*   BUN 14   CREATININE 0.92   CALCIUM 9.1                      Imaging  CT-CTA NECK WITH & W/O-POST PROCESSING   Final Result      1.  No acute abnormality detected. No carotid stenosis detected.      DX-KNEE COMPLETE 4+ RIGHT   Final Result      1.  No fracture detected.   2.  Patella otto.   3.   Small joint effusion.   4.  Osteoarthritis.      DX-CHEST-PORTABLE (1 VIEW)   Final Result      No acute cardiac or pulmonary abnormalities are identified.      CT-CTA HEAD WITH & W/O-POST PROCESS   Final Result      1.  No acute abnormality detected.           Assessment/Plan  * Sepsis due to COVID-19 (HCC)- (present on admission)  Assessment & Plan  This is Sepsis Present on admission  SIRS criteria identified on my evaluation include: Tachycardia, with heart rate greater than 90 BPM and Tachypnea, with respirations greater than 20 per minute  Clinical indicators of end organ dysfunction include Acute Respiratory Failure - (mechanical ventilation or BiPap or PaO2/FiO2 ratio < 300).  I also noticed that his total bilirubin is 2.0.    WBC 12.7, elevated today. I started Augmentin as he has risks for superimposed bacterial pneumonia with COVID. Procal 0.03.    Acute hypoxemic respiratory failure (HCC)- (present on admission)  Assessment & Plan  Due to COVID-19 viral pneumonia and acute sepsis  Remains off oxygen.  Discontinue dexamethasone 5/23.  Continue as needed Hycodan    Type 2 diabetes mellitus without complication, without long-term current use of insulin (HCC)- (present on admission)  Assessment & Plan  Continue insulin sliding, Accu-Cheks and hypoglycemia protocol  Continue diabetic diet    Glucose up to 234, 194.  Glargine increased to 10 units twice daily.  Continue Farxiga and metformin.    Hypomagnesemia  Assessment & Plan  Mag 1.6 replacing via IV  Monitor for hypotension side effect    Hyponatremia- (present on admission)  Assessment & Plan  Na 132    Abnormal EKG- (present on admission)  Assessment & Plan  -Prior history of MI with abnormal EKG at  baseline.  -Patient is not having any chest pain and troponin is negative.  I will monitor him on cardiac unit.  Serial cardiac enzymes were added and I will repeat EKG at midnight and closely monitor.    Intellectual disability- (present on admission)  Assessment & Plan  -He lives in a supported living care facility.    Cardiac pacemaker in situ- (present on admission)  Assessment & Plan  -Placed in February 2019, Medtr[employer] implanted by Dr. Blake.    Chronic anticoagulation- (present on admission)  Assessment & Plan  Continue Xarelto, monitoring for any signs of bleeding or hemorrhage    PAF (paroxysmal atrial fibrillation) (HCC)- (present on admission)  Assessment & Plan  Continue metoprolol and Xarelto    Coronary artery disease involving native coronary artery of native heart without angina pectoris- (present on admission)  Assessment & Plan  -History of STEMI in 2012.  Baseline EKG is abnormal.  -Patient is not having chest pain.  Has abnormal EKG and will continue to closely monitor.  Troponin was normal in the ED.    Dyslipidemia- (present on admission)  Assessment & Plan  Continue home gemfibrozil    GERD with Nieves's esophagus- (present on admission)  Assessment & Plan  Continue omeprazole         VTE prophylaxis:   SCDs/TEDs   therapeutic anticoagulation with xarelto 20 mg daily witih meals      I have performed a physical exam and reviewed and updated ROS and Plan today (5/26/2024). In review of yesterday's note (5/25/2024), there are no changes except as documented above.      Total time spent 51 minutes. I spent greater than 50% of the time for patient care, including unit/floor time, multiple face-to-face encounters with the patient, counseling on treatment plan and discussion with bedside RN.  Further, I spent time on my own review of patient's overnight events, RN notes, imaging and lab analysis, and developing my assessment and plan above.  In addition, working with , social workers,  and Charge RN on case coordination on this date.    This note was generated using voice recognition software which has a chance of producing errors of grammar and content.  I have made every reasonable attempt to find and correct any errors, but it should be expected that some may not be found prior to finalization of this note.

## 2024-05-26 NOTE — CARE PLAN
Problem: Knowledge Deficit - Standard  Goal: Patient and family/care givers will demonstrate understanding of plan of care, disease process/condition, diagnostic tests and medications  Outcome: Progressing     Problem: Diabetes Management  Goal: Patient will achieve and maintain glucose in satisfactory range  Outcome: Progressing   The patient is Stable - Low risk of patient condition declining or worsening    Shift Goals  Clinical Goals: Isolation,blood sugar control  Patient Goals: rest  Family Goals: REGINA    Progress made toward(s) clinical / shift goals:  Pt verbalized understanding on adhering to dietary prescription to control blood sugar.    Patient is not progressing towards the following goals:

## 2024-05-27 LAB
ALBUMIN SERPL BCP-MCNC: 3.7 G/DL (ref 3.2–4.9)
BUN SERPL-MCNC: 13 MG/DL (ref 8–22)
CALCIUM ALBUM COR SERPL-MCNC: 8.6 MG/DL (ref 8.5–10.5)
CALCIUM SERPL-MCNC: 8.4 MG/DL (ref 8.4–10.2)
CHLORIDE SERPL-SCNC: 101 MMOL/L (ref 96–112)
CO2 SERPL-SCNC: 20 MMOL/L (ref 20–33)
CREAT SERPL-MCNC: 0.9 MG/DL (ref 0.5–1.4)
EKG IMPRESSION: NORMAL
ERYTHROCYTE [DISTWIDTH] IN BLOOD BY AUTOMATED COUNT: 44.8 FL (ref 35.9–50)
FLUAV RNA SPEC QL NAA+PROBE: NEGATIVE
FLUBV RNA SPEC QL NAA+PROBE: NEGATIVE
GFR SERPLBLD CREATININE-BSD FMLA CKD-EPI: 97 ML/MIN/1.73 M 2
GLUCOSE BLD STRIP.AUTO-MCNC: 122 MG/DL (ref 65–99)
GLUCOSE BLD STRIP.AUTO-MCNC: 166 MG/DL (ref 65–99)
GLUCOSE BLD STRIP.AUTO-MCNC: 182 MG/DL (ref 65–99)
GLUCOSE BLD STRIP.AUTO-MCNC: 212 MG/DL (ref 65–99)
GLUCOSE SERPL-MCNC: 223 MG/DL (ref 65–99)
HCT VFR BLD AUTO: 48.5 % (ref 42–52)
HGB BLD-MCNC: 16.4 G/DL (ref 14–18)
MAGNESIUM SERPL-MCNC: 2.1 MG/DL (ref 1.5–2.5)
MCH RBC QN AUTO: 31.7 PG (ref 27–33)
MCHC RBC AUTO-ENTMCNC: 33.8 G/DL (ref 32.3–36.5)
MCV RBC AUTO: 93.6 FL (ref 81.4–97.8)
PHOSPHATE SERPL-MCNC: 2.5 MG/DL (ref 2.5–4.5)
PLATELET # BLD AUTO: 264 K/UL (ref 164–446)
PMV BLD AUTO: 10.1 FL (ref 9–12.9)
POTASSIUM SERPL-SCNC: 4 MMOL/L (ref 3.6–5.5)
RBC # BLD AUTO: 5.18 M/UL (ref 4.7–6.1)
RSV RNA SPEC QL NAA+PROBE: NEGATIVE
SARS-COV-2 RNA RESP QL NAA+PROBE: DETECTED
SODIUM SERPL-SCNC: 134 MMOL/L (ref 135–145)
SPECIMEN SOURCE: ABNORMAL
TROPONIN T SERPL-MCNC: 7 NG/L (ref 6–19)
WBC # BLD AUTO: 11.2 K/UL (ref 4.8–10.8)

## 2024-05-27 PROCEDURE — 83735 ASSAY OF MAGNESIUM: CPT

## 2024-05-27 PROCEDURE — 700111 HCHG RX REV CODE 636 W/ 250 OVERRIDE (IP): Mod: JZ | Performed by: INTERNAL MEDICINE

## 2024-05-27 PROCEDURE — 80069 RENAL FUNCTION PANEL: CPT

## 2024-05-27 PROCEDURE — 700102 HCHG RX REV CODE 250 W/ 637 OVERRIDE(OP): Performed by: HOSPITALIST

## 2024-05-27 PROCEDURE — A9270 NON-COVERED ITEM OR SERVICE: HCPCS | Performed by: HOSPITALIST

## 2024-05-27 PROCEDURE — 0241U HCHG SARS-COV-2 COVID-19 NFCT DS RESP RNA 4 TRGT MIC: CPT

## 2024-05-27 PROCEDURE — 84484 ASSAY OF TROPONIN QUANT: CPT

## 2024-05-27 PROCEDURE — 94760 N-INVAS EAR/PLS OXIMETRY 1: CPT

## 2024-05-27 PROCEDURE — 85027 COMPLETE CBC AUTOMATED: CPT

## 2024-05-27 PROCEDURE — 99233 SBSQ HOSP IP/OBS HIGH 50: CPT | Performed by: INTERNAL MEDICINE

## 2024-05-27 PROCEDURE — A9270 NON-COVERED ITEM OR SERVICE: HCPCS | Performed by: INTERNAL MEDICINE

## 2024-05-27 PROCEDURE — 93005 ELECTROCARDIOGRAM TRACING: CPT | Performed by: INTERNAL MEDICINE

## 2024-05-27 PROCEDURE — 700102 HCHG RX REV CODE 250 W/ 637 OVERRIDE(OP): Performed by: INTERNAL MEDICINE

## 2024-05-27 PROCEDURE — 770001 HCHG ROOM/CARE - MED/SURG/GYN PRIV*

## 2024-05-27 PROCEDURE — 82962 GLUCOSE BLOOD TEST: CPT | Mod: 91

## 2024-05-27 PROCEDURE — 93010 ELECTROCARDIOGRAM REPORT: CPT | Performed by: INTERNAL MEDICINE

## 2024-05-27 RX ORDER — FAMOTIDINE 20 MG/1
20 TABLET, FILM COATED ORAL 2 TIMES DAILY
Status: CANCELLED | OUTPATIENT
Start: 2024-05-27

## 2024-05-27 RX ORDER — FAMOTIDINE 20 MG/1
20 TABLET, FILM COATED ORAL 2 TIMES DAILY
Status: DISCONTINUED | OUTPATIENT
Start: 2024-05-27 | End: 2024-05-27

## 2024-05-27 RX ADMIN — AMOXICILLIN AND CLAVULANATE POTASSIUM 1 TABLET: 875; 125 TABLET, FILM COATED ORAL at 17:34

## 2024-05-27 RX ADMIN — CYANOCOBALAMIN TAB 500 MCG 1000 MCG: 500 TAB at 05:57

## 2024-05-27 RX ADMIN — AMOXICILLIN AND CLAVULANATE POTASSIUM 1 TABLET: 875; 125 TABLET, FILM COATED ORAL at 05:56

## 2024-05-27 RX ADMIN — Medication 2.5 MG: at 20:31

## 2024-05-27 RX ADMIN — BENZOCAINE AND MENTHOL 1 LOZENGE: 15; 3.6 LOZENGE ORAL at 10:34

## 2024-05-27 RX ADMIN — INSULIN HUMAN 3 UNITS: 100 INJECTION, SOLUTION PARENTERAL at 17:29

## 2024-05-27 RX ADMIN — METOPROLOL SUCCINATE 25 MG: 25 TABLET, EXTENDED RELEASE ORAL at 05:56

## 2024-05-27 RX ADMIN — ATORVASTATIN CALCIUM 80 MG: 40 TABLET, FILM COATED ORAL at 20:30

## 2024-05-27 RX ADMIN — LISINOPRIL 2.5 MG: 2.5 TABLET ORAL at 05:56

## 2024-05-27 RX ADMIN — METFORMIN HYDROCHLORIDE 1000 MG: 500 TABLET ORAL at 17:35

## 2024-05-27 RX ADMIN — BENZOCAINE AND MENTHOL 1 LOZENGE: 15; 3.6 LOZENGE ORAL at 15:25

## 2024-05-27 RX ADMIN — CETIRIZINE HYDROCHLORIDE 10 MG: 10 TABLET, FILM COATED ORAL at 05:56

## 2024-05-27 RX ADMIN — RIVAROXABAN 20 MG: 20 TABLET, FILM COATED ORAL at 17:35

## 2024-05-27 RX ADMIN — INSULIN HUMAN 4 UNITS: 100 INJECTION, SOLUTION PARENTERAL at 12:01

## 2024-05-27 RX ADMIN — SENNOSIDES AND DOCUSATE SODIUM 2 TABLET: 50; 8.6 TABLET ORAL at 05:57

## 2024-05-27 RX ADMIN — ASPIRIN 81 MG: 81 TABLET, COATED ORAL at 05:56

## 2024-05-27 RX ADMIN — METFORMIN HYDROCHLORIDE 1000 MG: 500 TABLET ORAL at 07:57

## 2024-05-27 RX ADMIN — OMEPRAZOLE 40 MG: 20 CAPSULE, DELAYED RELEASE ORAL at 05:57

## 2024-05-27 RX ADMIN — DAPAGLIFLOZIN 10 MG: 10 TABLET, FILM COATED ORAL at 05:56

## 2024-05-27 RX ADMIN — BENZOCAINE AND MENTHOL 1 LOZENGE: 15; 3.6 LOZENGE ORAL at 07:58

## 2024-05-27 RX ADMIN — FAMOTIDINE 20 MG: 10 INJECTION INTRAVENOUS at 11:56

## 2024-05-27 RX ADMIN — INSULIN HUMAN 3 UNITS: 100 INJECTION, SOLUTION PARENTERAL at 06:03

## 2024-05-27 SDOH — ECONOMIC STABILITY: TRANSPORTATION INSECURITY
IN THE PAST 12 MONTHS, HAS LACK OF RELIABLE TRANSPORTATION KEPT YOU FROM MEDICAL APPOINTMENTS, MEETINGS, WORK OR FROM GETTING THINGS NEEDED FOR DAILY LIVING?: PATIENT DECLINED

## 2024-05-27 SDOH — ECONOMIC STABILITY: TRANSPORTATION INSECURITY
IN THE PAST 12 MONTHS, HAS THE LACK OF TRANSPORTATION KEPT YOU FROM MEDICAL APPOINTMENTS OR FROM GETTING MEDICATIONS?: PATIENT DECLINED

## 2024-05-27 ASSESSMENT — SOCIAL DETERMINANTS OF HEALTH (SDOH)
IN THE PAST 12 MONTHS, HAS THE ELECTRIC, GAS, OIL, OR WATER COMPANY THREATENED TO SHUT OFF SERVICE IN YOUR HOME?: PATIENT DECLINED
WITHIN THE PAST 12 MONTHS, THE FOOD YOU BOUGHT JUST DIDN'T LAST AND YOU DIDN'T HAVE MONEY TO GET MORE: PATIENT DECLINED
WITHIN THE PAST 12 MONTHS, YOU WORRIED THAT YOUR FOOD WOULD RUN OUT BEFORE YOU GOT THE MONEY TO BUY MORE: PATIENT DECLINED

## 2024-05-27 ASSESSMENT — ENCOUNTER SYMPTOMS
SHORTNESS OF BREATH: 0
COUGH: 0

## 2024-05-27 ASSESSMENT — PAIN DESCRIPTION - PAIN TYPE
TYPE: ACUTE PAIN

## 2024-05-27 ASSESSMENT — FIBROSIS 4 INDEX: FIB4 SCORE: 1.11

## 2024-05-27 NOTE — PROGRESS NOTES
Assumed care, patient awake and alert. No complaints of pain or discomfort. Reminded patient to use the call light to ask for assistance when going to the bathroom. Reviewed plan of care for the night.

## 2024-05-27 NOTE — PROGRESS NOTES
"Hospital Medicine Daily Progress Note    Date of Service  5/27/2024    Chief Complaint  Ryan Trevizo is a 58 y.o. male admitted 5/18/2024 with   Chief Complaint   Patient presents with    Dizziness     \"Started today at work\". Reports also SOB. Denies CP.     Knee Pain     Reports he tripped over a broom at work and has since been experiencing R knee pain.    Cough     States this is non-productive. Reports also runny nose. Denies V/D.        Hospital Course  Patient is a pleasant 58-year-old man who came in with dizziness, cough and knee pain after a fall.  He came into the emergency room with fatigue feeling extremely weak and found to be COVID-positive.  He was admitted for COVID-pneumonia and started on dexamethasone.  He states when he coughs or sneezes he does have chest pain but otherwise is not having chest pain at baseline.  I have started him on Zyrtec and pain medication for his sneezing and chest pain but I am not worried about cardiac etiology at this time.  He is currently requiring 2 to 3 L to maintain adequate saturation.      Interval Problem Update  Patient's glucose up to 223, I am increasing glargine to 15 units twice daily  WBC 11.2, continue on Augmentin  Pt c/o epigastric chest pain. ECG obtained did not show any STEMI or NSTEMI, showed sinus rhythm. Checking troponin.  I ordered famotidine IV, he is on omeprazole.    I have discussed this patient's plan of care and discharge plan at IDT rounds today with Case Management, Nursing, Nursing leadership, and other members of the IDT team.    Consultants/Specialty  none    Code Status  Full Code    Disposition  The patient is not medically cleared for discharge to home or a post-acute facility.      I have placed the appropriate orders for post-discharge needs.   Pt requires quarantine for 10 days before he can return to his Group Home.  Potential discharge date will be 5/28 or 5/29.    Review of Systems  Review of Systems   Respiratory:  " Negative for cough and shortness of breath.    Cardiovascular:  Positive for chest pain.        Physical Exam  Temp:  [36.6 °C (97.8 °F)-36.9 °C (98.5 °F)] 36.8 °C (98.2 °F)  Pulse:  [] 75  Resp:  [14-20] 20  BP: (104-126)/(72-91) 112/77  SpO2:  [90 %-93 %] 92 %    Physical Exam  Vitals and nursing note reviewed.   Constitutional:       General: He is not in acute distress.     Appearance: He is not diaphoretic.   HENT:      Mouth/Throat:      Mouth: Mucous membranes are dry.      Pharynx: No oropharyngeal exudate.   Cardiovascular:      Rate and Rhythm: Normal rate and regular rhythm.      Pulses: Normal pulses.      Heart sounds: Normal heart sounds. No murmur heard.  Pulmonary:      Effort: Pulmonary effort is normal. No respiratory distress.      Breath sounds: Normal breath sounds. No wheezing or rales.   Abdominal:      General: Bowel sounds are normal. There is no distension.      Tenderness: There is no abdominal tenderness.   Musculoskeletal:         General: No swelling or tenderness. Normal range of motion.   Skin:     General: Skin is warm.      Capillary Refill: Capillary refill takes less than 2 seconds.      Coloration: Skin is not jaundiced or pale.   Neurological:      General: No focal deficit present.      Mental Status: He is alert and oriented to person, place, and time. Mental status is at baseline.      Motor: No weakness.   Psychiatric:         Mood and Affect: Mood normal.         Behavior: Behavior normal.         Thought Content: Thought content normal.         Judgment: Judgment normal.         Fluids    Intake/Output Summary (Last 24 hours) at 5/27/2024 1127  Last data filed at 5/27/2024 0900  Gross per 24 hour   Intake 240 ml   Output --   Net 240 ml         Laboratory  Recent Labs     05/26/24  0814 05/27/24  0217   WBC 12.7* 11.2*   RBC 5.28 5.18   HEMOGLOBIN 16.7 16.4   HEMATOCRIT 49.2 48.5   MCV 93.2 93.6   MCH 31.6 31.7   MCHC 33.9 33.8   RDW 44.6 44.8   PLATELETCT 280 264    MPV 10.1 10.1     Recent Labs     05/26/24  0814 05/27/24  0217   SODIUM 132* 134*   POTASSIUM 4.4 4.0   CHLORIDE 100 101   CO2 20 20   GLUCOSE 129* 223*   BUN 14 13   CREATININE 0.92 0.90   CALCIUM 9.1 8.4                     Imaging  CT-CTA NECK WITH & W/O-POST PROCESSING   Final Result      1.  No acute abnormality detected. No carotid stenosis detected.      DX-KNEE COMPLETE 4+ RIGHT   Final Result      1.  No fracture detected.   2.  Patella otto.   3.   Small joint effusion.   4.  Osteoarthritis.      DX-CHEST-PORTABLE (1 VIEW)   Final Result      No acute cardiac or pulmonary abnormalities are identified.      CT-CTA HEAD WITH & W/O-POST PROCESS   Final Result      1.  No acute abnormality detected.           Assessment/Plan  * Sepsis due to COVID-19 (HCC)- (present on admission)  Assessment & Plan  This is Sepsis Present on admission  SIRS criteria identified on my evaluation include: Tachycardia, with heart rate greater than 90 BPM and Tachypnea, with respirations greater than 20 per minute  Clinical indicators of end organ dysfunction include Acute Respiratory Failure - (mechanical ventilation or BiPap or PaO2/FiO2 ratio < 300).  I also noticed that his total bilirubin is 2.0.    WBC 12.7, elevated today. I started Augmentin as he has risks for superimposed bacterial pneumonia with COVID. Procal 0.03.    Acute hypoxemic respiratory failure (HCC)- (present on admission)  Assessment & Plan  Due to COVID-19 viral pneumonia and acute sepsis  Remains off oxygen.  Discontinue dexamethasone 5/23.  Continue as needed Hycodan    Abnormal EKG- (present on admission)  Assessment & Plan  -Prior history of MI with abnormal EKG at baseline.  -Patient is not having any chest pain and troponin is negative.  I will monitor him on cardiac unit.  Serial cardiac enzymes were added and I will repeat EKG at midnight and closely monitor.    5/27:  Pt c/o epigastric chest pain. ECG obtained did not show any STEMI or NSTEMI,  showed sinus rhythm. Checking troponin.  I ordered famotidine IV, he is on omeprazole.    Type 2 diabetes mellitus without complication, without long-term current use of insulin (HCC)- (present on admission)  Assessment & Plan  Continue insulin sliding, Accu-Cheks and hypoglycemia protocol  Continue diabetic diet    Patient's glucose up to 223, I am increasing glargine to 15 units twice daily  Continue Farxiga and metformin.    Hypomagnesemia  Assessment & Plan  Mag 2.1    Hyponatremia- (present on admission)  Assessment & Plan  Na 134    Intellectual disability- (present on admission)  Assessment & Plan  -He lives in a supported living care facility.    Cardiac pacemaker in situ- (present on admission)  Assessment & Plan  -Placed in February 2019, [employer] implanted by Dr. Blake.    Chronic anticoagulation- (present on admission)  Assessment & Plan  Continue Xarelto, monitoring for any signs of bleeding or hemorrhage    PAF (paroxysmal atrial fibrillation) ([facility])- (present on admission)  Assessment & Plan  Continue metoprolol and Xarelto    Pt c/o epigastric chest pain. ECG obtained did not show any STEMI or NSTEMI, showed sinus rhythm.     Coronary artery disease involving native coronary artery of native heart without angina pectoris- (present on admission)  Assessment & Plan  -History of STEMI in 2012.  Baseline EKG is abnormal.  -Patient is not having chest pain.  Has abnormal EKG and will continue to closely monitor.  Troponin was normal in the ED.    Dyslipidemia- (present on admission)  Assessment & Plan  Continue home gemfibrozil    GERD with Nieves's esophagus- (present on admission)  Assessment & Plan  I ordered famotidine IV, he is on omeprazole.         VTE prophylaxis:    therapeutic anticoagulation with xarelto 20 mg daily witih meals      I have performed a physical exam and reviewed and updated ROS and Plan today (5/27/2024). In review of yesterday's note (5/26/2024), there are no changes except as  documented above.      Total time spent 52 minutes   This included my review of patient's overnight RN notes, face to face interview, physical examination, lab analysis.  Also includes repeat visits with the patient, and my documented assessments and interventions above.  In addition, I spoke with entire care team on patient's treatment plan, and DC planning on morning rounds and IDT rounds.    This note was generated using voice recognition software which has a chance of producing errors of grammar and content.  I have made every reasonable attempt to find and correct any errors, but it should be expected that some may not be found prior to finalization of this note.

## 2024-05-27 NOTE — CARE PLAN
The patient is Stable - Low risk of patient condition declining or worsening    Shift Goals  Clinical Goals: Cough suppresant, Oxygenation, Free from falls, Monitor labs  Patient Goals: Comfort, Rest  Family Goals: n/a    Progress made toward(s) clinical / shift goals:  Symptoms of COVID are resolving. Patient uses the call light appropriately to ask for assistance.     Patient is not progressing towards the following goals:

## 2024-05-27 NOTE — PROGRESS NOTES
Pt with c/o chest pain and SOB. SpO2 92%, no signs of respiratory distress or altered mentation. MD notified. Stat EKG ordered. After review by MD, nothing significant read from EKG. Famotidine ordered for GERD.

## 2024-05-27 NOTE — DISCHARGE PLANNING
Case Management Discharge Planning    Admission Date: 5/18/2024  GMLOS: 5.1  ALOS: 9    6-Clicks ADL Score: 24  6-Clicks Mobility Score: 18      Anticipated Discharge Dispo: Discharge Disposition: Discharged to home/self care (01)  Discharge Address: [address] [place] [address]  Discharge Contact Phone Number: [phone]    SW spoke with Giovana from [facility] ([phone]). They can pick pt up around 2:00pm tomorrow. At discharge, she will need  1) discharge summary  2) negative covid test  3) note/letter for pt stating he is cleared to return to usual activities

## 2024-05-27 NOTE — CARE PLAN
The patient is Stable - Low risk of patient condition declining or worsening    Shift Goals  Clinical Goals: Pt without falls and injury; Pt SpO2 >90% throughout shift; Labs continue to show improvement, despite positive covid result; Pt remains afebrile.  Patient Goals: Comfort, Rest  Family Goals: n/a    Progress made toward(s) clinical / shift goals:  Pt without falls and injury; Pt SpO2 >90% throughout shift; Labs will continue to show improvement; Pt remains afebrile.    Patient is not progressing towards the following goals:

## 2024-05-28 VITALS
WEIGHT: 180.78 LBS | RESPIRATION RATE: 17 BRPM | TEMPERATURE: 97.8 F | HEIGHT: 67 IN | BODY MASS INDEX: 28.37 KG/M2 | DIASTOLIC BLOOD PRESSURE: 71 MMHG | SYSTOLIC BLOOD PRESSURE: 123 MMHG | OXYGEN SATURATION: 92 % | HEART RATE: 63 BPM

## 2024-05-28 PROBLEM — J96.01 ACUTE HYPOXEMIC RESPIRATORY FAILURE (HCC): Status: RESOLVED | Noted: 2024-05-18 | Resolved: 2024-05-28

## 2024-05-28 PROBLEM — E87.1 HYPONATREMIA: Status: RESOLVED | Noted: 2024-05-18 | Resolved: 2024-05-28

## 2024-05-28 PROBLEM — A41.89 SEPSIS DUE TO COVID-19 (HCC): Status: RESOLVED | Noted: 2020-12-23 | Resolved: 2024-05-28

## 2024-05-28 PROBLEM — E83.42 HYPOMAGNESEMIA: Status: RESOLVED | Noted: 2024-05-26 | Resolved: 2024-05-28

## 2024-05-28 PROBLEM — U07.1 SEPSIS DUE TO COVID-19 (HCC): Status: RESOLVED | Noted: 2020-12-23 | Resolved: 2024-05-28

## 2024-05-28 PROBLEM — R94.31 ABNORMAL EKG: Status: RESOLVED | Noted: 2024-05-18 | Resolved: 2024-05-28

## 2024-05-28 LAB
FLUAV RNA SPEC QL NAA+PROBE: NEGATIVE
FLUBV RNA SPEC QL NAA+PROBE: NEGATIVE
GLUCOSE BLD STRIP.AUTO-MCNC: 107 MG/DL (ref 65–99)
GLUCOSE BLD STRIP.AUTO-MCNC: 188 MG/DL (ref 65–99)
RSV RNA SPEC QL NAA+PROBE: NEGATIVE
SARS-COV-2 RNA RESP QL NAA+PROBE: DETECTED
SPECIMEN SOURCE: ABNORMAL

## 2024-05-28 PROCEDURE — 0241U HCHG SARS-COV-2 COVID-19 NFCT DS RESP RNA 4 TRGT MIC: CPT

## 2024-05-28 PROCEDURE — 94760 N-INVAS EAR/PLS OXIMETRY 1: CPT

## 2024-05-28 PROCEDURE — 700102 HCHG RX REV CODE 250 W/ 637 OVERRIDE(OP): Performed by: INTERNAL MEDICINE

## 2024-05-28 PROCEDURE — 99239 HOSP IP/OBS DSCHRG MGMT >30: CPT | Performed by: HOSPITALIST

## 2024-05-28 PROCEDURE — 700102 HCHG RX REV CODE 250 W/ 637 OVERRIDE(OP): Performed by: HOSPITALIST

## 2024-05-28 PROCEDURE — 82962 GLUCOSE BLOOD TEST: CPT

## 2024-05-28 PROCEDURE — A9270 NON-COVERED ITEM OR SERVICE: HCPCS | Performed by: INTERNAL MEDICINE

## 2024-05-28 PROCEDURE — A9270 NON-COVERED ITEM OR SERVICE: HCPCS | Performed by: HOSPITALIST

## 2024-05-28 RX ORDER — LANCETS 30 GAUGE
EACH MISCELLANEOUS
Qty: 100 EACH | Refills: 0 | Status: SHIPPED | OUTPATIENT
Start: 2024-05-28

## 2024-05-28 RX ADMIN — AMOXICILLIN AND CLAVULANATE POTASSIUM 1 TABLET: 875; 125 TABLET, FILM COATED ORAL at 05:54

## 2024-05-28 RX ADMIN — ASPIRIN 81 MG: 81 TABLET, COATED ORAL at 05:54

## 2024-05-28 RX ADMIN — SENNOSIDES AND DOCUSATE SODIUM 2 TABLET: 50; 8.6 TABLET ORAL at 05:53

## 2024-05-28 RX ADMIN — METFORMIN HYDROCHLORIDE 1000 MG: 500 TABLET ORAL at 08:09

## 2024-05-28 RX ADMIN — GEMFIBROZIL 600 MG: 600 TABLET ORAL at 05:54

## 2024-05-28 RX ADMIN — DAPAGLIFLOZIN 10 MG: 10 TABLET, FILM COATED ORAL at 05:54

## 2024-05-28 RX ADMIN — CETIRIZINE HYDROCHLORIDE 10 MG: 10 TABLET, FILM COATED ORAL at 05:54

## 2024-05-28 RX ADMIN — INSULIN HUMAN 3 UNITS: 100 INJECTION, SOLUTION PARENTERAL at 06:01

## 2024-05-28 RX ADMIN — CYANOCOBALAMIN TAB 500 MCG 1000 MCG: 500 TAB at 05:54

## 2024-05-28 RX ADMIN — OMEPRAZOLE 40 MG: 20 CAPSULE, DELAYED RELEASE ORAL at 05:54

## 2024-05-28 ASSESSMENT — PAIN DESCRIPTION - PAIN TYPE: TYPE: ACUTE PAIN

## 2024-05-28 NOTE — DISCHARGE PLANNING
Case Management Discharge Planning    Admission Date: 5/18/2024  GMLOS: 5.1  ALOS: 10    6-Clicks ADL Score: 24  6-Clicks Mobility Score: 18      Anticipated Discharge Dispo: Discharge Disposition: Discharged to home/self care (01)  Discharge Address: [address]  Discharge Contact Phone Number: [phone]    DME Needed: Yes    DME Ordered: Yes    Action(s) Taken:     Spoke to  owner Giovana via phone call and explained 10 day COVID clearance per CDC guidelines. Giovana requested new COVID test and stated that if test is still positive, she would call [facility] to verify it is okay fr pt to DC back to .    Spoke to Frances Palacio RN from [facility]. Frances agreeable to pt discharging home today per CDC COVID guidelines. Frances requested HH services. MD notified via Voalte.     HH order received. Spoke to pt at bedside. Choice forms completed for HH with 1)[facility] 2)[facility] 3)[facility] and DME FWW through [email or web address] [facility]. Choices faxed to [facility]. Bedside RN to deliver FWW to pt's bedside.    Spoke to  owner Giovana who stated she will be picking pt up today.    Spoke to Huber from [employer] . They have accepted.      Escalations Completed: Provider, Pending Discharge Destination, Bedside RN, and Leadership    Medically Clear: Yes    Next Steps: f/u with  company regarding acceptance    Barriers to Discharge: None

## 2024-05-28 NOTE — DISCHARGE SUMMARY
"Discharge Summary    CHIEF COMPLAINT ON ADMISSION  Chief Complaint   Patient presents with    Dizziness     \"Started today at work\". Reports also SOB. Denies CP.     Knee Pain     Reports he tripped over a broom at work and has since been experiencing R knee pain.    Cough     States this is non-productive. Reports also runny nose. Denies V/D.        Reason for Admission  R Knee Pain     Admission Date  5/18/2024    CODE STATUS  Full Code    HPI & HOSPITAL COURSE    Patient is a pleasant 58-year-old man who came in with dizziness, cough and knee pain after a fall.  He came into the emergency room with fatigue feeling extremely weak and found to be COVID-positive.  He was admitted for COVID-pneumonia and started on dexamethasone.  He states when he coughs or sneezes he does have chest pain but otherwise is not having chest pain at baseline.  I have started him on Zyrtec and pain medication for his sneezing and chest pain but I am not worried about cardiac etiology at this time.  He is currently requiring 2 to 3 L to maintain adequate saturation.    Patient received supportive treatment for COVID-19, patient is on Jardiance, metformin, semaglutide at home for his diabetes, his A1c is 7.9, patient is to continue with his home regimen but work more on his diet, patient is to continue monitoring his blood sugar at home watch for hypoglycemic and hyperglycemic episodes, and follow-up with primary care doctor for further blood glucose medication adjustment.  Patient now is on room air he has been cleared by infection control after 10 days of quarantine for his COVID-19, electrolytes have been replaced, patient has history of CAD and atrial fibrillation that is why he is on metoprolol Xarelto and aspirin, patient has history of Nieves's esophagus and he will continue on PPIs, at this time patient is back to baseline he is on room air he is not in any distress, patient has been accepted back to group home, patient is " hemodynamically stable he is being discharged in stable condition, all question have been answered.      Therefore, he is discharged in good and stable condition to home with organized home healthcare and close outpatient follow-up.    The patient met 2-midnight criteria for an inpatient stay at the time of discharge.    Discharge Date  5/28/24    FOLLOW UP ITEMS POST DISCHARGE  Primary care physician    DISCHARGE DIAGNOSES  Principal Problem (Resolved):    Sepsis due to COVID-19 (HCC) (POA: Yes)  Active Problems:    GERD with Nieves's esophagus (POA: Yes)    Dyslipidemia (POA: Yes)    Coronary artery disease involving native coronary artery of native heart without angina pectoris (POA: Yes)    Type 2 diabetes mellitus without complication, without long-term current use of insulin (HCC) (Chronic) (POA: Yes)    PAF (paroxysmal atrial fibrillation) (HCC) (Chronic) (POA: Yes)    Chronic anticoagulation (POA: Yes)    Cardiac pacemaker in situ (POA: Yes)      Overview: February 2019: [email or web address] S DR MRI [id number] implanted by Dr. Blake.    Intellectual disability (POA: Yes)  Resolved Problems:    Sepsis (HCC) (POA: Yes)    Abnormal EKG (POA: Yes)    Hyponatremia (POA: Yes)    Acute hypoxemic respiratory failure (HCC) (POA: Yes)    Hypomagnesemia (POA: Clinically Undetermined)      FOLLOW UP  No future appointments.  No follow-up provider specified.    MEDICATIONS ON DISCHARGE     Medication List        CONTINUE taking these medications        Instructions   ascorbic acid 500 MG tablet  Commonly known as: Vitamin C   Take 1 Tab by mouth 2 times a day.  Dose: 500 mg     aspirin EC 81 MG Tbec  Commonly known as: Ecotrin   Take 81 mg by mouth every day.  Dose: 81 mg     atorvastatin 80 MG tablet  Commonly known as: Lipitor   Take 80 mg by mouth every evening. Every other day  Dose: 80 mg     cyanocobalamin 1000 MCG Tabs  Commonly known as: Vitamin B12   Take 1,000 mcg by mouth every day.  Dose: 1,000 mcg     gemfibrozil  "600 MG Tabs  Commonly known as: Lopid   Take 600 mg by mouth every 48 hours. Every other day  Dose: 600 mg     Jardiance 25 MG Tabs  Generic drug: Empagliflozin   Take 25 mg by mouth every morning.  Dose: 25 mg     lisinopril 2.5 MG Tabs  Commonly known as: Prinivil   Take 2.5 mg by mouth every day.  Dose: 2.5 mg     loratadine 10 MG Tabs  Commonly known as: Claritin   Take 10 mg by mouth every day.  Dose: 10 mg     melatonin 5 mg Tabs   Take 0.5 Tabs by mouth every bedtime.  Dose: 2.5 mg     metformin 1000 MG tablet  Commonly known as: Glucophage   Take 1,000 mg by mouth 2 times a day with meals.  Dose: 1,000 mg     metoprolol SR 25 MG Tb24  Commonly known as: Toprol XL   Take 1 Tab by mouth every day.  Dose: 25 mg     omeprazole 40 MG delayed-release capsule  Commonly known as: PriLOSEC   Take 40 mg by mouth every day.  Dose: 40 mg     ranolazine 500 MG Tb12  Commonly known as: Ranexa   Take 500 mg by mouth every day. With Dinner  Dose: 500 mg     rivaroxaban 20 MG Tabs tablet  Commonly known as: Xarelto   Take 20 mg by mouth with dinner.  Dose: 20 mg     Rybelsus 7 MG Tabs  Generic drug: Semaglutide   Take 7 mg by mouth every morning.  Dose: 7 mg     vitamin D 2000 UNIT Tabs   Take 2,000 Units by mouth every day.  Dose: 2,000 Units     Zinc Sulfate 220 (50 Zn) MG Tabs               Allergies  Allergies   Allergen Reactions    Methylphenidate Unspecified     \"Hyper\"       DIET  Orders Placed This Encounter   Procedures    Diet Order Diet: Consistent CHO (Diabetic); Second Modifier: (optional): Cardiac     Standing Status:   Standing     Number of Occurrences:   1     Order Specific Question:   Diet:     Answer:   Consistent CHO (Diabetic) [4]     Order Specific Question:   Second Modifier: (optional)     Answer:   Cardiac [6]       ACTIVITY  As tolerated.  Weight bearing as tolerated    CONSULTATIONS  None    PROCEDURES  None    LABORATORY  Lab Results   Component Value Date    SODIUM 134 (L) 05/27/2024    " POTASSIUM 4.0 05/27/2024    CHLORIDE 101 05/27/2024    CO2 20 05/27/2024    GLUCOSE 223 (H) 05/27/2024    BUN 13 05/27/2024    CREATININE 0.90 05/27/2024    CREATININE 1.0 07/12/2007        Lab Results   Component Value Date    WBC 11.2 (H) 05/27/2024    HEMOGLOBIN 16.4 05/27/2024    HEMATOCRIT 48.5 05/27/2024    PLATELETCT 264 05/27/2024        Total time of the discharge process exceeds 34 minutes.

## 2024-05-28 NOTE — DISCHARGE PLANNING
Received Choice Form at: 1237  Agency/Facility Name: [facility] Referral per Choice Form at:  1309    CM RN notified

## 2024-05-28 NOTE — DISCHARGE PLANNING
Received Choice Form at: 1236  Agency/Facility Name: [facility]  Sent Referral per Choice Form at: 1306    CM RN notified

## 2024-05-28 NOTE — CARE PLAN
The patient is Stable - Low risk of patient condition declining or worsening    Shift Goals  Clinical Goals: Pt will remain free from falls or injury throughout the shift.  Patient Goals: rest  Family Goals: n/a    Progress made toward(s) clinical / shift goals:  Pt seen sleeping comfortably in between rounds; even and unlabored breathing noted. Fall precautions in place. Hourly rounding in progress.     Patient is not progressing towards the following goals: n/a

## 2024-05-28 NOTE — PROGRESS NOTES
Received bedside report from day shift nurse, Berta MCCLAIN. Assumed pt care at 1915.   Pt is awake, resting comfortably in bed. Pt on room air; no signs of SOB/respiratory distress. Labs noted, VSS. Pt denies pain at this moment. Needs attended well. Fall precautions in place. Bed at lowest position. Call light and personal belongings within reach. Encouraged to call for assistance. Plan of care on going, no further concerns as of present.   Hourly rounding in progress.

## 2024-05-28 NOTE — INFECTION CONTROL
5/28/2024 9:35 AM (Michael Leo): Patient currently 10 days from positive COVID. No longer symptomatic. My come out of precautions.

## 2024-05-28 NOTE — PROGRESS NOTES
0700 - Bedside report received from [place]. Alert and oriented X4, on RA in no acute respiratory distress. Daily plan of care discussed. Patient complains of no pain at this time. No other needs at this time. Call light and personal belongings within reach. Hourly rounding in place. Chart reviewed for recent labs, notes, and orders.    1401 - Patient medically cleared by MD. Went over After Visit Summary with Patient and answered questions. Patient verbalized understanding. Removed IV and transportation requested.

## 2024-05-28 NOTE — CARE PLAN
The patient is Stable - Low risk of patient condition declining or worsening    Shift Goals  Clinical Goals: free from falls this shift, monitor labs  Patient Goals: shower, rest, and update on POC  Family Goals: n/a    Progress made toward(s) clinical / shift goals:  Calls appropriately for assistance out of bed. Labs resulted    Patient is not progressing towards the following goals: n/a

## 2024-07-21 NOTE — ED AVS SNAPSHOT
8/5/2017    Ryan Trevizo  [address]  [facility] [facility] [address]    Dear Ryan:    [facility] wants to ensure your discharge home is safe and you or your loved ones have had all of your questions answered regarding your care after you leave the hospital.    Below is a list of resources and contact information should you have any questions regarding your hospital stay, follow-up instructions, or active medical symptoms.    Questions or Concerns Regarding… Contact   Medical Questions Related to Your Discharge  (7 days a week, 8am-5pm) Contact a Nurse Care Coordinator   [phone]   Medical Questions Not Related to Your Discharge  (24 hours a day / 7 days a week)  Contact the Nurse Health Line   [phone]    Medications or Discharge Instructions Refer to your discharge packet   or contact your [facility] Primary Care Provider   [phone]   Follow-up Appointment(s) Schedule your appointment via Back&   or contact Scheduling [phone]   Billing Review your statement via Back&  or contact Billing [phone]   Medical Records Review your records via Back&   or contact Medical Records [phone]     You may receive a telephone call within two days of discharge. This call is to make certain you understand your discharge instructions and have the opportunity to have any questions answered. You can also easily access your medical information, test results and upcoming appointments via the Back& free online health management tool. You can learn more and sign up at [email or web address]. For assistance setting up your Back& account, please call [phone].    Once again, we want to ensure your discharge home is safe and that you have a clear understanding of any next steps in your care. If you have any questions or concerns, please do not hesitate to contact us, we are here for you. Thank you for choosing [facility] for your healthcare needs.    Sincerely,    Your [facility] Healthcare Team          .

## 2024-08-23 NOTE — ED PROVIDER NOTES
ED Provider Note    Scribed for Torsten High M.D. by Jose L Cline. 8/9/2019  3:03 PM    Primary care provider: Juancho Jenkins D.O.  Means of arrival: Ambulance  History obtained from: Patient  History limited by: None    CHIEF COMPLAINT  Chief Complaint   Patient presents with   • Dizziness   • Shortness of Breath       HPI  Ryan Trevizo is a 54 y.o. male with a history of cardiomyopathy and acute coronary syndrome who presents to the Emergency Department via ambulance complaining of shortness of breath onset 2 hours ago. 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 The patient reports that upon arrival to his home, he called EMS for his shortness of breath. At that time he was not experiencing any chest pain, but notes that he began experiencing chest pain during EMS transit. He also notes an associated cough and myalgias in his arm, but denies any diarrhea. No alleviating or exacerbating factors were identified for the patient's chest pain. The patient denies any history of smoking, alcohol use, or drug abuse. He lives in an apartment home alone.     REVIEW OF SYSTEMS  Pertinent positives include shortness of breath, dizziness, chest pain, cough, arm myalgias. Pertinent negatives include no diarrhea.  All other systems reviewed and negative.    PAST MEDICAL HISTORY   has a past medical history of ACS (acute coronary syndrome) ([facility]), Anxiety disorder, Arthritis, CAD (coronary artery disease), Chronic anticoagulation, Claudication (HCC), Diabetes (HCC), Dizziness, GERD (gastroesophageal reflux disease), Glaucoma, HTN (hypertension), Hyperlipemia, Ischemic cardiomyopathy (05/2018), Mental retardation, Mentally challenged, Osteoarthritis, Paroxysmal atrial fibrillation (HCC), Pericarditis secondary to acute myocardial infarction (HCC), Sick sinus syndrome (HCC), SOB (shortness of breath), STEMI (ST  "elevation myocardial infarction) (HCC), and Syncope (02/2019).    SURGICAL HISTORY   has a past surgical history that includes other orthopedic surgery (7- ); other cardiac surgery; gastroscopy-endo (7/25/2012); gastroscopy-endo (7/26/2012); gastroscopy-endo (5/19/2017); other cardiac surgery (Left, 01/12/2018); and pacemaker insertion (Left, 02/12/2019).    SOCIAL HISTORY  Social History     Tobacco Use   • Smoking status: Never Smoker   • Smokeless tobacco: Never Used   Substance Use Topics   • Alcohol use: No   • Drug use: No      Social History     Substance and Sexual Activity   Drug Use No       FAMILY HISTORY  History reviewed. No pertinent family history.    CURRENT MEDICATIONS  Home Medications     Reviewed by Kesha Nieves R.N. (Registered Nurse) on 08/09/19 at 1453  Med List Status: Partial   Medication Last Dose Status   ASPIR-LOW 81 MG EC tablet  Active   atorvastatin (LIPITOR) 80 MG tablet  Active   Cyanocobalamin (VITAMIN B-12) 1000 MCG Tab  Active   cyclobenzaprine (FLEXERIL) 10 MG Tab  Active   lisinopril (PRINIVIL) 2.5 MG Tab  Active   loratadine (CLARITIN) 10 MG Tab  Active   metformin (GLUCOPHAGE) 1000 MG tablet  Active   methocarbamol (ROBAXIN) 750 MG Tab  Active   metoprolol SR (TOPROL XL) 25 MG TABLET SR 24 HR  Active   ondansetron (ZOFRAN ODT) 4 MG TABLET DISPERSIBLE  Active   pantoprazole (PROTONIX) 40 MG Tablet Delayed Response  Active   Ranolazine 1000 MG TABLET SR 12 HR  Active   rivaroxaban (XARELTO) 20 MG Tab tablet  Active   vitamin D3, cholecalciferol, 1000 UNIT Tab  Active                ALLERGIES  Allergies   Allergen Reactions   • Ritalin [Methylphenidate] Unspecified     \"Hyper\"       PHYSICAL EXAM  VITAL SIGNS: /73   Pulse 76   Temp 36.9 °C (98.5 °F) (Temporal)   Resp 16   Wt 84.4 kg (186 lb)   BMI 28.28 kg/m²     Vital signs reviewed.  Constitutional:  Appears well-developed and well-nourished. No distress.   Head: Normocephalic.   Mouth/Throat: " Oropharynx is clear and moist.   Eyes: EOM are normal. Pupils are equal, round, and reactive to light.   Neck: Normal range of motion. Neck supple.   Cardiovascular: Heart regular rate and rhythm.  Pulmonary/Chest: Lungs clear without wheezing.  Abdominal: Soft, non-tender.  Musculoskeletal: Exhibits no edema.   Lymphadenopathy: No cervical adenopathy.   Neurological: Mildly slow in speech. Normal strength and sensation. Cranial nerves 2-12 intact.  Skin: Skin is warm and dry.   Psychiatric: Patient has a normal mood and affect. Behavior is normal.     LABS  Results for orders placed or performed during the hospital encounter of 08/09/19   CBC WITH DIFFERENTIAL   Result Value Ref Range    WBC 8.3 4.8 - 10.8 K/uL    RBC 3.83 (L) 4.70 - 6.10 M/uL    Hemoglobin 12.1 (L) 14.0 - 18.0 g/dL    Hematocrit 36.9 (L) 42.0 - 52.0 %    MCV 96.3 81.4 - 97.8 fL    MCH 31.6 27.0 - 33.0 pg    MCHC 32.8 (L) 33.7 - 35.3 g/dL    RDW 45.5 35.9 - 50.0 fL    Platelet Count 280 164 - 446 K/uL    MPV 9.3 9.0 - 12.9 fL    Neutrophils-Polys 84.70 (H) 44.00 - 72.00 %    Lymphocytes 9.80 (L) 22.00 - 41.00 %    Monocytes 4.40 0.00 - 13.40 %    Eosinophils 0.50 0.00 - 6.90 %    Basophils 0.20 0.00 - 1.80 %    Immature Granulocytes 0.40 0.00 - 0.90 %    Nucleated RBC 0.00 /100 WBC    Neutrophils (Absolute) 7.01 1.82 - 7.42 K/uL    Lymphs (Absolute) 0.81 (L) 1.00 - 4.80 K/uL    Monos (Absolute) 0.36 0.00 - 0.85 K/uL    Eos (Absolute) 0.04 0.00 - 0.51 K/uL    Baso (Absolute) 0.02 0.00 - 0.12 K/uL    Immature Granulocytes (abs) 0.03 0.00 - 0.11 K/uL    NRBC (Absolute) 0.00 K/uL   BASIC METABOLIC PANEL   Result Value Ref Range    Sodium 133 (L) 135 - 145 mmol/L    Potassium 5.3 3.6 - 5.5 mmol/L    Chloride 102 96 - 112 mmol/L    Co2 20 20 - 33 mmol/L    Glucose 104 (H) 65 - 99 mg/dL    Bun 23 (H) 8 - 22 mg/dL    Creatinine 1.68 (H) 0.50 - 1.40 mg/dL    Calcium 9.2 8.5 - 10.5 mg/dL    Anion Gap 11.0 0.0 - 11.9   TROPONIN   Result Value Ref Range     Troponin T 9 6 - 19 ng/L   proBrain Natriuretic Peptide, NT   Result Value Ref Range    NT-proBNP 92 0 - 125 pg/mL   ESTIMATED GFR   Result Value Ref Range    GFR If  51 (A) >60 mL/min/1.73 m 2    GFR If Non  42 (A) >60 mL/min/1.73 m 2   EKG   Result Value Ref Range    Report       [facility] Emergency Dept.    Test Date:  2019  Pt Name:    NICHELLE MARQUEZ                   Department: ER  MRN:        [id number]                      Room:       [facility]  Gender:     Male                         Technician: [id number]  :        1965                   Requested By:ER TRIAGE PROTOCOL  Order #:    [id number]                    Reading MD:    Measurements  Intervals                                Axis  Rate:       73                           P:          28  DC:         154                          QRS:        3  QRSD:       113                          T:          33  QT:         395  QTc:        436    Interpretive Statements  Sinus rhythm  Borderline intraventricular conduction delay  Compared to ECG 2019 06:37:09  No significant changes       All labs reviewed by me.    EKG  12 Lead EKG interpreted by me to show sinus rhythm at 70. Normal P waves. Normal QRS. Normal ST segments. Normal T waves. Normal EKG.    RADIOLOGY  DX-CHEST-2 VIEWS   Final Result      1.  No evidence of acute cardiopulmonary process.        The radiologist's interpretation of all radiological studies have been reviewed by me.    COURSE & MEDICAL DECISION MAKING  Pertinent Labs & Imaging studies reviewed. (See chart for details) The patient's [facility] and past medical  records were reviewed and indicate that the patient was in the CDU 1 week ago c/o headache and shoulder pain. He has a history of cardiomyopathy, hypertension, cognitive delay, and acute coronary disease.Stress test done on 2019 which showed moderate fixed defect in the inferolateral wall with no reversable defects.  Echocardiogram done in March indicated mildly reduced left ventricular function, inferolateral wall and inferior wall hypokinesis. EF of 50%.    3:03 PM - Patient seen and examined at bedside. Ordered DX-Chest, Estimated GFR, CBC Diff, BMP, Troponin, BNP, and EKG to evaluate his symptoms. The differential diagnoses include but are not limited to: pneumonia versus ACS versus hemothorax.  Patient's work appears unremarkable.  Patient was recently been to the hospital and had a negative stress test.  I think he does have mild decreased left ventricular function secondary to cardiomyopathy and sounds like an old infarct but certainly nothing acute.  Patient was reassured and discharged home in stable condition    4:37 PM - Ordered Tylenol 650 mg.    5:52 PM - I reviewed the patient's lab and radiology results.    5:57 PM - Patient was reevaluated at bedside. Discussed lab and radiology results with the patient and informed them that they are largely normal. Discussed plan for discharge and the patient is understanding and agreeable.    The patient will return for new or worsening symptoms and is stable at the time of discharge.    DISPOSITION:  Patient will be discharged home in stable condition.    FOLLOW UP:  Juancho Jenkins D.O.  [address] [address]  [phone]    In 1 week        OUTPATIENT MEDICATIONS:  New Prescriptions    No medications on file     FINAL IMPRESSION  1. Dyspnea, unspecified type          I, Jose L Cline (Scribe), am scribing for, and in the presence of, Torsten High M.D..    Electronically signed by: Jose L Cline (Scribe), 8/9/2019    ITorsten M.D. personally performed the services described in this documentation, as scribed by Jose L Cline in my presence, and it is both accurate and complete.    C    The note accurately reflects work and decisions made by me.  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 Enteritis seen by Surgery Team for NGT Decompression withiout invasive procedure indication ans family also maintain full DNR/DNI with minimal invasive procedure to be admitted to PCU vs. ICU since pt is already on IV Pressor.   - Admit pt to MICU for Early SBO with Aspiration PNA, Septic Shock on IV Pressor   - A&Ox 1 f worsening from A&O x 3   - HFO2 40Li/40% SpO2 100% will be switch to Aerosolized Face Tent    - Septic Shock on IV Pressor titration to MAP >65 mmHg   - Chronic A.Fib in rate controlled to hold off medications   - Bedside POCUS for continue IV Titration (family refused CVP line)   - Maintenance IV Fluid D5LR @ 75cc/hr x 12 Hr S/P 3 Li IVF in ED    - NPO for Aspiration precaution  - Empiric ABx for Leukopenia with Aspiration PNA/MF-PNA coverage   - Serial CBC, CMP, PT/INR, ABG, CXR, Panculture, Lactate, Procalcitonin    - Closely monitor I&O and Renal Function   - DVT PPx - ELQ   - GOC - DNR/DNI, no CVP or A-Lines 89F Hx HTN, Chronic A.Fib on AC, Asthma, GERD, 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 Enteritis seen by Surgery Team for NGT Decompression withiout invasive procedure indication ans family also maintain full DNR/DNI with minimal invasive procedure to be admitted to PCU vs. ICU since pt is already on IV Pressor.   - Admit pt to MICU for Early SBO with Aspiration PNA, Septic Shock on IV Pressor   - A&Ox 1 f worsening from A&O x 3   - HFO2 40Li/40% SpO2 100% will be switch to Aerosolized Face Tent    - Septic Shock on IV Pressor titration to MAP >65 mmHg   - Chronic A.Fib in rate controlled to hold off medications   - Bedside POCUS for continue IV Titration (family refused CVP line)   - Maintenance IV Fluid D5LR @ 75cc/hr x 12 Hr S/P 3 Li IVF in ED    - NPO for Aspiration precaution  - Empiric ABx for Leukopenia with Aspiration PNA/MF-PNA coverage   - Serial CBC, CMP, PT/INR, ABG, CXR, Panculture, Lactate, Procalcitonin    - Closely monitor I&O and Renal Function   - DVT PPx - ELQ   - GOC - DNR/DNI, no CVP or A-Lines     Patient seen and examined with ICU Resident/Fellow at bedside after lab data, medical records and radiology reports reviewed. I have read and agreeable in general with resident's Documented Note, Assessment and Management Plan which reflected my opinions from bedside round and discussion.   Total Critical Care Time = 45 Min excluding teaching and procedure activity.

## 2024-09-23 ENCOUNTER — HOSPITAL ENCOUNTER (OUTPATIENT)
Dept: LAB | Facility: MEDICAL CENTER | Age: 59
End: 2024-09-23
Attending: PHYSICIAN ASSISTANT
Payer: MEDICARE

## 2024-09-23 LAB
CHOLEST SERPL-MCNC: 111 MG/DL (ref 100–199)
EST. AVERAGE GLUCOSE BLD GHB EST-MCNC: 160 MG/DL
HBA1C MFR BLD: 7.2 % (ref 4–5.6)
HDLC SERPL-MCNC: 33 MG/DL
LDLC SERPL CALC-MCNC: 47 MG/DL
TRIGL SERPL-MCNC: 157 MG/DL (ref 0–149)

## 2024-09-23 PROCEDURE — 83036 HEMOGLOBIN GLYCOSYLATED A1C: CPT | Mod: GA

## 2024-09-23 PROCEDURE — 36415 COLL VENOUS BLD VENIPUNCTURE: CPT

## 2024-09-23 PROCEDURE — 80061 LIPID PANEL: CPT

## 2024-09-28 ENCOUNTER — HOSPITAL ENCOUNTER (OUTPATIENT)
Dept: LAB | Facility: MEDICAL CENTER | Age: 59
End: 2024-09-28
Attending: STUDENT IN AN ORGANIZED HEALTH CARE EDUCATION/TRAINING PROGRAM
Payer: MEDICARE

## 2024-09-28 LAB
HCV AB SER QL: NORMAL
PSA SERPL-MCNC: 0.34 NG/ML (ref 0–4)

## 2024-09-28 PROCEDURE — 36415 COLL VENOUS BLD VENIPUNCTURE: CPT | Mod: GA

## 2024-09-28 PROCEDURE — 84153 ASSAY OF PSA TOTAL: CPT | Mod: GA

## 2024-09-28 PROCEDURE — 86803 HEPATITIS C AB TEST: CPT

## 2024-09-30 ENCOUNTER — NON-PROVIDER VISIT (OUTPATIENT)
Dept: CARDIOLOGY | Facility: MEDICAL CENTER | Age: 59
End: 2024-09-30
Attending: NURSE PRACTITIONER
Payer: MEDICARE

## 2024-09-30 VITALS
BODY MASS INDEX: 26.56 KG/M2 | WEIGHT: 169.2 LBS | RESPIRATION RATE: 16 BRPM | HEART RATE: 95 BPM | OXYGEN SATURATION: 95 % | SYSTOLIC BLOOD PRESSURE: 118 MMHG | HEIGHT: 67 IN | DIASTOLIC BLOOD PRESSURE: 70 MMHG

## 2024-09-30 DIAGNOSIS — I25.10 CORONARY ARTERY DISEASE INVOLVING NATIVE CORONARY ARTERY OF NATIVE HEART WITHOUT ANGINA PECTORIS: ICD-10-CM

## 2024-09-30 DIAGNOSIS — I49.5 SICK SINUS SYNDROME (HCC): ICD-10-CM

## 2024-09-30 DIAGNOSIS — I48.0 PAF (PAROXYSMAL ATRIAL FIBRILLATION) (HCC): Chronic | ICD-10-CM

## 2024-09-30 DIAGNOSIS — I10 ESSENTIAL HYPERTENSION: ICD-10-CM

## 2024-09-30 DIAGNOSIS — D68.318 CIRCULATING ANTICOAGULANTS (HCC): ICD-10-CM

## 2024-09-30 DIAGNOSIS — Z95.0 CARDIAC PACEMAKER IN SITU: ICD-10-CM

## 2024-09-30 DIAGNOSIS — E78.5 DYSLIPIDEMIA: ICD-10-CM

## 2024-09-30 DIAGNOSIS — E11.9 TYPE 2 DIABETES MELLITUS WITHOUT COMPLICATION, WITHOUT LONG-TERM CURRENT USE OF INSULIN (HCC): Chronic | ICD-10-CM

## 2024-09-30 DIAGNOSIS — I25.5 CARDIOMYOPATHY, ISCHEMIC: ICD-10-CM

## 2024-09-30 PROCEDURE — 99213 OFFICE O/P EST LOW 20 MIN: CPT | Performed by: NURSE PRACTITIONER

## 2024-09-30 ASSESSMENT — ENCOUNTER SYMPTOMS
HEADACHES: 0
DIZZINESS: 0
ORTHOPNEA: 0
COUGH: 0
LOSS OF CONSCIOUSNESS: 0
FEVER: 0
SHORTNESS OF BREATH: 0
PND: 0
ABDOMINAL PAIN: 0
PALPITATIONS: 0
NAUSEA: 0
CHILLS: 0
INSOMNIA: 0
MYALGIAS: 0
BRUISES/BLEEDS EASILY: 0

## 2024-09-30 ASSESSMENT — FIBROSIS 4 INDEX: FIB4 SCORE: 1.11

## 2024-09-30 NOTE — PROGRESS NOTES
Chief Complaint   Patient presents with    Follow-Up    Pacemaker Check/Dysfunction    Bradycardia    Atrial Fibrillation    Anticoagulation    Coronary Artery Disease           Cardiomyopathy (Ischemic)    HTN (Controlled)    Hyperlipidemia    Diabetes (Controlled)       Subjective     Brooks Trevizo is a 59 y.o. male who presents today for overdue annual follow-up for PM check, SSS with PAFib, anticoagulation, CAD/ischemic cardiomyopathy, HTN, hyperlipidemia, and DM.    Brooks is a 59 year old mildly disabled male with history of diffuse moderate three vessel CAD/ischemic cardiomyopathy (with LVEF normalized on medical therapy on echo in March 2019), paroxysmal atrial fibrillation, anticoagulation, hypertension and hyperlipidemia. In February 2019, PM was implanted for sick sinus syndrome.     In May 2024, he was admitted to [facility] with sepsis, following Covid pneumonia. He did recover.    He is here today for overdue annual follow-up. He was last seen by our office in October 2021.     He denies any chest pain, pressure, tightness or discomfort.  No dizziness or lightheadedness or syncope; no palpitations or fluttering of his heart. No orthopnea or PND; no LE edema. BP has been stable. He is living in a residential group home; he is here today with a caretaker.      Past Medical History:   Diagnosis Date    ACS (acute coronary syndrome) ([facility])     Anxiety disorder     Arthritis     Blood clotting disorder (HCC)     CAD (coronary artery disease) 07/2017    Coronary angiogram with moderate diffuse disease (70% LAD, chronic total occlusion of 2nd OM of LCx, 95% ostium dual PDA). August 2019: MPI with moderate fixed defect in inferolateral wall, LVEF 46%.    Chronic anticoagulation     Claudication (HCC)     Diabetes (HCC)     Oral medications    Dizziness     GERD (gastroesophageal reflux disease)     Glaucoma     HTN (hypertension)     Hyperlipemia     Ischemic cardiomyopathy 03/2022    Echocardiogram with normal LV  size, LVEF 40%. Normal RA, LA and RV. Mild AR, trace TR. RVSP 40mmHg.    Mental retardation     Mentally challenged     Has     Osteoarthritis     Paroxysmal atrial fibrillation (HCC)     Pericarditis secondary to acute myocardial infarction (HCC)     Sick sinus syndrome (HCC)     SOB (shortness of breath)     STEMI (ST elevation myocardial infarction) (HCC)     Syncope 02/2019    Status post pacemaker placement.     Past Surgical History:   Procedure Laterality Date    PACEMAKER INSERTION Left 02/12/2019    [employer]onic [place] S DR MRI [id number] implanted by Dr. Blake.    OTHER CARDIAC SURGERY Left 01/12/2018    [employer]tronic Reve[place] LINQ [id number] implanted by Dr. Blake    GASTROSCOPY-ENDO  5/19/2017    Procedure: GASTROSCOPY-ENDO;  Surgeon: Reinaldo Berry M.D.;  Location: [facility];  Service:     GASTROSCOPY-ENDO  7/26/2012    Performed by JORDIN VERA at [facility]    GASTROSCOPY-ENDO  7/25/2012    Performed by JORDIN VERA at [facility]    OTHER ORTHOPEDIC SURGERY  7-     R knee surgery    OTHER CARDIAC SURGERY      stent placement     History reviewed. No pertinent family history.  Social History     Socioeconomic History    Marital status: Single     Spouse name: Not on file    Number of children: Not on file    Years of education: Not on file    Highest education level: Not on file   Occupational History    Not on file   Tobacco Use    Smoking status: Never    Smokeless tobacco: Never   Vaping Use    Vaping status: Never Used   Substance and Sexual Activity    Alcohol use: No    Drug use: No    Sexual activity: Not on file     Comment: Single, has no children, works as an .   Other Topics Concern    Not on file   Social History Narrative    ** Merged History Encounter **         ** Merged History Encounter **          Social Determinants of Health     Financial Resource Strain: Not on file   Food Insecurity: Patient Declined (5/27/2024)     "Hunger Vital Sign     Worried About Running Out of Food in the Last Year: Patient declined     Ran Out of Food in the Last Year: Patient declined   Transportation Needs: Patient Declined (5/27/2024)    PRAPARE - Transportation     Lack of Transportation (Medical): Patient declined     Lack of Transportation (Non-Medical): Patient declined   Physical Activity: Not on file   Stress: Not on file   Social Connections: Not on file   Intimate Partner Violence: Not At Risk (5/18/2024)    Humiliation, Afraid, Rape, and Kick questionnaire     Fear of Current or Ex-Partner: No     Emotionally Abused: No     Physically Abused: No     Sexually Abused: No   Housing Stability: Patient Declined (5/27/2024)    Housing Stability Vital Sign     Unable to Pay for Housing in the Last Year: Patient declined     Number of Places Lived in the Last Year: 1     Unstable Housing in the Last Year: Patient declined     Allergies   Allergen Reactions    Methylphenidate Unspecified     \"Hyper\"     Outpatient Encounter Medications as of 9/30/2024   Medication Sig Dispense Refill    Blood Glucose Meter Kit Test blood sugar as recommended by provider. Per formulary  blood glucose monitoring kit. 1 Kit 0    Blood Glucose Test Strips Use one per formulary strip to test blood sugar twice daily. 100 Strip 0    Lancets Use one per formulary lancet to test blood sugar twice daily. 100 Each 0    Empagliflozin (JARDIANCE) 25 MG Tab Take 25 mg by mouth every morning.      Semaglutide (RYBELSUS) 7 MG Tab Take 7 mg by mouth every morning.      Zinc Sulfate 220 (50 Zn) MG Tab       metformin (GLUCOPHAGE) 1000 MG tablet Take 1,000 mg by mouth 2 times a day with meals.      omeprazole (PRILOSEC) 40 MG delayed-release capsule Take 40 mg by mouth every day.      ranolazine (RANEXA) 500 MG TABLET SR 12 HR Take 500 mg by mouth every day. With Dinner      melatonin 5 mg Tab Take 0.5 Tabs by mouth every bedtime. 30 Tab 0    ascorbic acid (VITAMIN C) 500 MG tablet Take " "1 Tab by mouth 2 times a day. 60 Tab 0    metoprolol SR (TOPROL XL) 25 MG TABLET SR 24 HR Take 1 Tab by mouth every day. 90 Tab 3    gemfibrozil (LOPID) 600 MG Tab Take 600 mg by mouth every 48 hours. Every other day      aspirin EC (ECOTRIN) 81 MG Tablet Delayed Response Take 81 mg by mouth every day.      atorvastatin (LIPITOR) 80 MG tablet Take 80 mg by mouth every evening. Every other day      cyanocobalamin (VITAMIN B12) 1000 MCG Tab Take 1,000 mcg by mouth every day.      lisinopril (PRINIVIL) 2.5 MG Tab Take 2.5 mg by mouth every day.      loratadine (CLARITIN) 10 MG Tab Take 10 mg by mouth every day.      rivaroxaban (XARELTO) 20 MG Tab tablet Take 20 mg by mouth with dinner.      Cholecalciferol (VITAMIN D) 2000 UNIT Tab Take 2,000 Units by mouth every day.       No facility-administered encounter medications on file as of 9/30/2024.     Review of Systems   Constitutional:  Negative for chills and fever.   HENT:  Negative for congestion.    Respiratory:  Negative for cough and shortness of breath.    Cardiovascular:  Negative for chest pain, palpitations, orthopnea, leg swelling and PND.   Gastrointestinal:  Negative for abdominal pain and nausea.   Musculoskeletal:  Negative for myalgias.   Skin:  Negative for rash.   Neurological:  Negative for dizziness, loss of consciousness and headaches.   Endo/Heme/Allergies:  Does not bruise/bleed easily.   Psychiatric/Behavioral:  The patient does not have insomnia.               Objective     /70 (BP Location: Left arm, Patient Position: Sitting, BP Cuff Size: Adult)   Pulse 95   Resp 16   Ht 1.702 m (5' 7\")   Wt 76.7 kg (169 lb 3.2 oz)   SpO2 95%   BMI 26.50 kg/m²     Physical Exam  Constitutional:       Appearance: He is well-developed.   HENT:      Head: Normocephalic.   Neck:      Vascular: No JVD.   Cardiovascular:      Rate and Rhythm: Normal rate and regular rhythm.      Heart sounds: Normal heart sounds.      Comments: PM in left chest " wall.  Pulmonary:      Effort: Pulmonary effort is normal. No respiratory distress.      Breath sounds: Normal breath sounds. No wheezing or rales.   Abdominal:      General: Bowel sounds are normal. There is no distension.      Palpations: Abdomen is soft.      Tenderness: There is no abdominal tenderness.   Musculoskeletal:         General: Normal range of motion.      Cervical back: Normal range of motion and neck supple.   Skin:     General: Skin is warm and dry.      Findings: No rash.   Neurological:      Mental Status: He is alert and oriented to person, place, and time.      Comments: Mild cognitive impairment, but able to respond to questions appropriately.   Psychiatric:         Judgment: Judgment normal.     PM interrogation today reveals normal function. 1 mode switching episode (in 2022, none since, <0.1% of total time). No changes are made today. Battery is good for 9.5 years.     CONCLUSIONS OF TTE OF 3/22/2022:  Technically difficult study.  Moderately reduced left ventricular systolic function. The left   ventricular ejection fraction is visually estimated to be 40%. Grade I   diastolic dysfunction.  The right ventricle is normal in size and systolic function.  Mild aortic insufficiency.  Normal pericardium without effusion.  Compared to the images of the prior study 03/28/2019, left ventricular   ejection fraction reduced from 50 to 40%.    NUCLEAR IMAGING INTERPRETATION OF MPI OF 8/3/2019:   Moderate fixed defect in the inferolateral wall, likely prior infarct. No    reversible defect. Slightly decreased LV ejection fraction = 46%.   ECG INTERPRETATION   Negative stress ECG for ischemia.     CONCLUSIONS OF ECHOCARDIOGRAM OF 3/28/2019:  Prior echo on 1/5/18. Compared to the images of the prior study done -    there has been no significant change.   Mildly reduced left ventricular systolic function.   Inferolateral wall and basal inferior wall hypokinesis. Left   ventricular ejection fraction is  visually estimated to be 50%.   Mild to moderate eccentric aortic insufficiency.  Unable to estimate pulmonary artery pressure due to an inadequate   tricuspid regurgitant jet.  Ascending aorta diameter is 3.6 cm.    Lab Results   Component Value Date/Time    CHOLSTRLTOT 111 09/23/2024 08:22 AM    LDL 47 09/23/2024 08:22 AM    HDL 33 (A) 09/23/2024 08:22 AM    TRIGLYCERIDE 157 (H) 09/23/2024 08:22 AM        Lab Results   Component Value Date/Time    ASTSGOT 32 05/18/2024 02:45 PM    ALTSGPT 44 05/18/2024 02:45 PM       Lab Results   Component Value Date/Time    SODIUM 134 (L) 05/27/2024 02:17 AM    POTASSIUM 4.0 05/27/2024 02:17 AM    CHLORIDE 101 05/27/2024 02:17 AM    CO2 20 05/27/2024 02:17 AM    GLUCOSE 223 (H) 05/27/2024 02:17 AM    BUN 13 05/27/2024 02:17 AM    CREATININE 0.90 05/27/2024 02:17 AM    CREATININE 1.0 07/12/2007 05:40 PM    BUNCREATRAT 18 06/13/2014 08:40 AM        Assessment & Plan     1. Cardiac pacemaker in situ        2. Sick sinus syndrome (HCC)        3. PAF (paroxysmal atrial fibrillation) (HCC)        4. Circulating anticoagulants (HCC)        5. Coronary artery disease involving native coronary artery of native heart without angina pectoris        6. Ischemic Cardiomyopathy  EC-ECHOCARDIOGRAM COMPLETE W/O CONT      7. Essential hypertension        8. Dyslipidemia        9. Type 2 diabetes mellitus without complication, without long-term current use of insulin (HCC)            Medical Decision Making: Today's Assessment/Status/Plan:      1. Sick sinus syndrome with PAFib, with PPM, which is working normally. 1 brief mode switching episode (in 2022, none since). No changes are made today.    2. CAD/ischemic cardiomyopathy, with LVEF 40% on echo in 2022; will repeat echo. Continue:  ASA 81mg once daily  Xarelto 20mg once daily  Jardiance 25mg once daily  Ranexa 500mg twice daily  Toprol XL 25mg once daily  LIpitor 80mg once daily  Lisionpril 2.5mg once daily    3. Hypertension, treated with  Lisinopri, Toprol XL, stable.    4. Hyperlipidemia, treated with Lipitor 80mg and Lopid 600mg twice daily. Last LDL was 47 (at goal).    5. Diabetes mellitus, treated with Jardiance 25mg once daily and Metformin 1000mg twice daily. Glucose is improving.    He remains stable at this time.  Same medications, which are renewed by his PCP.  Obtain updated echocardiogram.    Follow-up in 6 months for next PM check.

## 2024-10-07 ENCOUNTER — HOSPITAL ENCOUNTER (OUTPATIENT)
Dept: CARDIOLOGY | Facility: MEDICAL CENTER | Age: 59
End: 2024-10-07
Attending: NURSE PRACTITIONER
Payer: MEDICARE

## 2024-10-07 DIAGNOSIS — I25.5 CARDIOMYOPATHY, ISCHEMIC: ICD-10-CM

## 2024-10-07 LAB
LV EJECT FRACT MOD 2C 99903: 61.89
LV EJECT FRACT MOD 4C 99902: 48.62
LV EJECT FRACT MOD BP 99901: 62.17

## 2024-10-07 PROCEDURE — 700117 HCHG RX CONTRAST REV CODE 255: Mod: UD | Performed by: NURSE PRACTITIONER

## 2024-10-07 PROCEDURE — 93306 TTE W/DOPPLER COMPLETE: CPT

## 2024-10-07 PROCEDURE — 93306 TTE W/DOPPLER COMPLETE: CPT | Mod: 26 | Performed by: INTERNAL MEDICINE

## 2024-10-07 RX ADMIN — HUMAN ALBUMIN MICROSPHERES AND PERFLUTREN 3 ML: 10; .22 INJECTION, SOLUTION INTRAVENOUS at 10:45

## 2024-10-10 ENCOUNTER — TELEPHONE (OUTPATIENT)
Dept: CARDIOLOGY | Facility: MEDICAL CENTER | Age: 59
End: 2024-10-10
Payer: MEDICARE

## 2024-10-30 ENCOUNTER — APPOINTMENT (OUTPATIENT)
Dept: RADIOLOGY | Facility: MEDICAL CENTER | Age: 59
End: 2024-10-30
Attending: EMERGENCY MEDICINE
Payer: MEDICARE

## 2024-10-30 ENCOUNTER — HOSPITAL ENCOUNTER (OUTPATIENT)
Facility: MEDICAL CENTER | Age: 59
End: 2024-10-31
Attending: EMERGENCY MEDICINE | Admitting: HOSPITALIST
Payer: MEDICARE

## 2024-10-30 DIAGNOSIS — R55 SYNCOPE, UNSPECIFIED SYNCOPE TYPE: ICD-10-CM

## 2024-10-30 LAB
ALBUMIN SERPL BCP-MCNC: 4.9 G/DL (ref 3.2–4.9)
ALBUMIN/GLOB SERPL: 1.4 G/DL
ALP SERPL-CCNC: 104 U/L (ref 30–99)
ALT SERPL-CCNC: 48 U/L (ref 2–50)
ANION GAP SERPL CALC-SCNC: 14 MMOL/L (ref 7–16)
AST SERPL-CCNC: 38 U/L (ref 12–45)
BASOPHILS # BLD AUTO: 0.3 % (ref 0–1.8)
BASOPHILS # BLD: 0.04 K/UL (ref 0–0.12)
BILIRUB SERPL-MCNC: 1.4 MG/DL (ref 0.1–1.5)
BUN SERPL-MCNC: 12 MG/DL (ref 8–22)
CALCIUM ALBUM COR SERPL-MCNC: 9.4 MG/DL (ref 8.5–10.5)
CALCIUM SERPL-MCNC: 10.1 MG/DL (ref 8.5–10.5)
CHLORIDE SERPL-SCNC: 103 MMOL/L (ref 96–112)
CO2 SERPL-SCNC: 20 MMOL/L (ref 20–33)
CREAT SERPL-MCNC: 1.18 MG/DL (ref 0.5–1.4)
D DIMER PPP IA.FEU-MCNC: 0.61 UG/ML (FEU) (ref 0–0.5)
EKG IMPRESSION: NORMAL
EKG IMPRESSION: NORMAL
EOSINOPHIL # BLD AUTO: 0.07 K/UL (ref 0–0.51)
EOSINOPHIL NFR BLD: 0.5 % (ref 0–6.9)
ERYTHROCYTE [DISTWIDTH] IN BLOOD BY AUTOMATED COUNT: 46.6 FL (ref 35.9–50)
GFR SERPLBLD CREATININE-BSD FMLA CKD-EPI: 70 ML/MIN/1.73 M 2
GLOBULIN SER CALC-MCNC: 3.4 G/DL (ref 1.9–3.5)
GLUCOSE SERPL-MCNC: 175 MG/DL (ref 65–99)
HCT VFR BLD AUTO: 53.1 % (ref 42–52)
HGB BLD-MCNC: 18.3 G/DL (ref 14–18)
IMM GRANULOCYTES # BLD AUTO: 0.06 K/UL (ref 0–0.11)
IMM GRANULOCYTES NFR BLD AUTO: 0.5 % (ref 0–0.9)
LYMPHOCYTES # BLD AUTO: 0.54 K/UL (ref 1–4.8)
LYMPHOCYTES NFR BLD: 4.1 % (ref 22–41)
MCH RBC QN AUTO: 32.8 PG (ref 27–33)
MCHC RBC AUTO-ENTMCNC: 34.5 G/DL (ref 32.3–36.5)
MCV RBC AUTO: 95.2 FL (ref 81.4–97.8)
MONOCYTES # BLD AUTO: 0.55 K/UL (ref 0–0.85)
MONOCYTES NFR BLD AUTO: 4.2 % (ref 0–13.4)
NEUTROPHILS # BLD AUTO: 11.95 K/UL (ref 1.82–7.42)
NEUTROPHILS NFR BLD: 90.4 % (ref 44–72)
NRBC # BLD AUTO: 0 K/UL
NRBC BLD-RTO: 0 /100 WBC (ref 0–0.2)
NT-PROBNP SERPL IA-MCNC: 42 PG/ML (ref 0–125)
PLATELET # BLD AUTO: 270 K/UL (ref 164–446)
PMV BLD AUTO: 9.9 FL (ref 9–12.9)
POTASSIUM SERPL-SCNC: 4.7 MMOL/L (ref 3.6–5.5)
PROT SERPL-MCNC: 8.3 G/DL (ref 6–8.2)
RBC # BLD AUTO: 5.58 M/UL (ref 4.7–6.1)
SODIUM SERPL-SCNC: 137 MMOL/L (ref 135–145)
TROPONIN T SERPL-MCNC: 7 NG/L (ref 6–19)
TROPONIN T SERPL-MCNC: 8 NG/L (ref 6–19)
WBC # BLD AUTO: 13.2 K/UL (ref 4.8–10.8)

## 2024-10-30 PROCEDURE — 93005 ELECTROCARDIOGRAM TRACING: CPT | Performed by: EMERGENCY MEDICINE

## 2024-10-30 PROCEDURE — 93005 ELECTROCARDIOGRAM TRACING: CPT | Performed by: HOSPITALIST

## 2024-10-30 PROCEDURE — 99285 EMERGENCY DEPT VISIT HI MDM: CPT

## 2024-10-30 PROCEDURE — 83880 ASSAY OF NATRIURETIC PEPTIDE: CPT

## 2024-10-30 PROCEDURE — 85379 FIBRIN DEGRADATION QUANT: CPT

## 2024-10-30 PROCEDURE — G0378 HOSPITAL OBSERVATION PER HR: HCPCS

## 2024-10-30 PROCEDURE — 700102 HCHG RX REV CODE 250 W/ 637 OVERRIDE(OP): Mod: UD | Performed by: HOSPITALIST

## 2024-10-30 PROCEDURE — 99223 1ST HOSP IP/OBS HIGH 75: CPT | Performed by: HOSPITALIST

## 2024-10-30 PROCEDURE — 71045 X-RAY EXAM CHEST 1 VIEW: CPT

## 2024-10-30 PROCEDURE — A9270 NON-COVERED ITEM OR SERVICE: HCPCS | Mod: UD | Performed by: HOSPITALIST

## 2024-10-30 PROCEDURE — 93010 ELECTROCARDIOGRAM REPORT: CPT | Performed by: INTERNAL MEDICINE

## 2024-10-30 PROCEDURE — 94760 N-INVAS EAR/PLS OXIMETRY 1: CPT

## 2024-10-30 PROCEDURE — 36415 COLL VENOUS BLD VENIPUNCTURE: CPT

## 2024-10-30 PROCEDURE — 85025 COMPLETE CBC W/AUTO DIFF WBC: CPT

## 2024-10-30 PROCEDURE — 80053 COMPREHEN METABOLIC PANEL: CPT

## 2024-10-30 PROCEDURE — 84484 ASSAY OF TROPONIN QUANT: CPT

## 2024-10-30 RX ORDER — PROMETHAZINE HYDROCHLORIDE 12.5 MG/1
12.5-25 SUPPOSITORY RECTAL EVERY 4 HOURS PRN
Status: DISCONTINUED | OUTPATIENT
Start: 2024-10-30 | End: 2024-10-31 | Stop reason: HOSPADM

## 2024-10-30 RX ORDER — GEMFIBROZIL 600 MG/1
600 TABLET, FILM COATED ORAL
Status: DISCONTINUED | OUTPATIENT
Start: 2024-10-31 | End: 2024-10-31 | Stop reason: HOSPADM

## 2024-10-30 RX ORDER — HYDRALAZINE HYDROCHLORIDE 20 MG/ML
10 INJECTION INTRAMUSCULAR; INTRAVENOUS EVERY 4 HOURS PRN
Status: DISCONTINUED | OUTPATIENT
Start: 2024-10-30 | End: 2024-10-31 | Stop reason: HOSPADM

## 2024-10-30 RX ORDER — GINSENG 100 MG
50 CAPSULE ORAL
COMMUNITY

## 2024-10-30 RX ORDER — ATORVASTATIN CALCIUM 80 MG/1
80 TABLET, FILM COATED ORAL
Status: DISCONTINUED | OUTPATIENT
Start: 2024-10-30 | End: 2024-10-31 | Stop reason: HOSPADM

## 2024-10-30 RX ORDER — ONDANSETRON 2 MG/ML
4 INJECTION INTRAMUSCULAR; INTRAVENOUS EVERY 4 HOURS PRN
Status: DISCONTINUED | OUTPATIENT
Start: 2024-10-30 | End: 2024-10-31 | Stop reason: HOSPADM

## 2024-10-30 RX ORDER — LISINOPRIL 2.5 MG/1
2.5 TABLET ORAL DAILY
Status: DISCONTINUED | OUTPATIENT
Start: 2024-10-31 | End: 2024-10-31 | Stop reason: HOSPADM

## 2024-10-30 RX ORDER — ENOXAPARIN SODIUM 100 MG/ML
40 INJECTION SUBCUTANEOUS DAILY
Status: DISCONTINUED | OUTPATIENT
Start: 2024-10-30 | End: 2024-10-30

## 2024-10-30 RX ORDER — DAPAGLIFLOZIN 10 MG/1
10 TABLET, FILM COATED ORAL EVERY MORNING
Status: DISCONTINUED | OUTPATIENT
Start: 2024-10-31 | End: 2024-10-31 | Stop reason: HOSPADM

## 2024-10-30 RX ORDER — PROCHLORPERAZINE EDISYLATE 5 MG/ML
5-10 INJECTION INTRAMUSCULAR; INTRAVENOUS EVERY 4 HOURS PRN
Status: DISCONTINUED | OUTPATIENT
Start: 2024-10-30 | End: 2024-10-31 | Stop reason: HOSPADM

## 2024-10-30 RX ORDER — ONDANSETRON 2 MG/ML
4 INJECTION INTRAMUSCULAR; INTRAVENOUS EVERY 4 HOURS PRN
Status: DISCONTINUED | OUTPATIENT
Start: 2024-10-30 | End: 2024-10-30

## 2024-10-30 RX ORDER — ACETAMINOPHEN 325 MG/1
650 TABLET ORAL EVERY 6 HOURS PRN
Status: DISCONTINUED | OUTPATIENT
Start: 2024-10-30 | End: 2024-10-31 | Stop reason: HOSPADM

## 2024-10-30 RX ORDER — ASPIRIN 81 MG/1
81 TABLET ORAL DAILY
Status: DISCONTINUED | OUTPATIENT
Start: 2024-10-31 | End: 2024-10-31 | Stop reason: HOSPADM

## 2024-10-30 RX ORDER — RANOLAZINE 500 MG/1
500 TABLET, EXTENDED RELEASE ORAL EVERY EVENING
Status: DISCONTINUED | OUTPATIENT
Start: 2024-10-30 | End: 2024-10-31 | Stop reason: HOSPADM

## 2024-10-30 RX ORDER — NITROGLYCERIN 0.4 MG/1
0.4 TABLET SUBLINGUAL
Status: DISCONTINUED | OUTPATIENT
Start: 2024-10-30 | End: 2024-10-31 | Stop reason: HOSPADM

## 2024-10-30 RX ORDER — PROMETHAZINE HYDROCHLORIDE 25 MG/1
12.5-25 TABLET ORAL EVERY 4 HOURS PRN
Status: DISCONTINUED | OUTPATIENT
Start: 2024-10-30 | End: 2024-10-31 | Stop reason: HOSPADM

## 2024-10-30 RX ORDER — ONDANSETRON 4 MG/1
4 TABLET, ORALLY DISINTEGRATING ORAL EVERY 4 HOURS PRN
Status: DISCONTINUED | OUTPATIENT
Start: 2024-10-30 | End: 2024-10-31 | Stop reason: HOSPADM

## 2024-10-30 RX ORDER — METOPROLOL SUCCINATE 25 MG/1
25 TABLET, EXTENDED RELEASE ORAL DAILY
Status: DISCONTINUED | OUTPATIENT
Start: 2024-10-31 | End: 2024-10-31 | Stop reason: HOSPADM

## 2024-10-30 RX ADMIN — RANOLAZINE 500 MG: 500 TABLET, EXTENDED RELEASE ORAL at 17:34

## 2024-10-30 RX ADMIN — METFORMIN HYDROCHLORIDE 1000 MG: 500 TABLET ORAL at 17:34

## 2024-10-30 RX ADMIN — RIVAROXABAN 20 MG: 20 TABLET, FILM COATED ORAL at 17:34

## 2024-10-30 RX ADMIN — ATORVASTATIN CALCIUM 80 MG: 80 TABLET, FILM COATED ORAL at 17:34

## 2024-10-30 RX ADMIN — LIDOCAINE HYDROCHLORIDE 30 ML: 20 SOLUTION ORAL; TOPICAL at 16:17

## 2024-10-30 SDOH — ECONOMIC STABILITY: TRANSPORTATION INSECURITY
IN THE PAST 12 MONTHS, HAS LACK OF RELIABLE TRANSPORTATION KEPT YOU FROM MEDICAL APPOINTMENTS, MEETINGS, WORK OR FROM GETTING THINGS NEEDED FOR DAILY LIVING?: NO

## 2024-10-30 SDOH — ECONOMIC STABILITY: TRANSPORTATION INSECURITY
IN THE PAST 12 MONTHS, HAS THE LACK OF TRANSPORTATION KEPT YOU FROM MEDICAL APPOINTMENTS OR FROM GETTING MEDICATIONS?: NO

## 2024-10-30 ASSESSMENT — ENCOUNTER SYMPTOMS
RESPIRATORY NEGATIVE: 1
NEUROLOGICAL NEGATIVE: 1
NAUSEA: 1
CONSTITUTIONAL NEGATIVE: 1
CARDIOVASCULAR NEGATIVE: 1
EYES NEGATIVE: 1
PSYCHIATRIC NEGATIVE: 1
MUSCULOSKELETAL NEGATIVE: 1

## 2024-10-30 ASSESSMENT — CHA2DS2 SCORE
PRIOR STROKE OR TIA OR THROMBOEMBOLISM: NO
CHA2DS2 VASC SCORE: 4
VASCULAR DISEASE: NO
CHF OR LEFT VENTRICULAR DYSFUNCTION: YES
AGE 75 OR GREATER: NO
HYPERTENSION: YES
AGE 65 TO 74: YES
DIABETES: YES
SEX: MALE

## 2024-10-30 ASSESSMENT — SOCIAL DETERMINANTS OF HEALTH (SDOH)

## 2024-10-30 ASSESSMENT — LIFESTYLE VARIABLES
HAVE PEOPLE ANNOYED YOU BY CRITICIZING YOUR DRINKING: NO
TOTAL SCORE: 0
EVER HAD A DRINK FIRST THING IN THE MORNING TO STEADY YOUR NERVES TO GET RID OF A HANGOVER: NO
HAVE YOU EVER FELT YOU SHOULD CUT DOWN ON YOUR DRINKING: NO
TOTAL SCORE: 0
EVER FELT BAD OR GUILTY ABOUT YOUR DRINKING: NO
TOTAL SCORE: 0
ON A TYPICAL DAY WHEN YOU DRINK ALCOHOL HOW MANY DRINKS DO YOU HAVE: 0
ALCOHOL_USE: NO
AVERAGE NUMBER OF DAYS PER WEEK YOU HAVE A DRINK CONTAINING ALCOHOL: 0
HOW MANY TIMES IN THE PAST YEAR HAVE YOU HAD 5 OR MORE DRINKS IN A DAY: 0
CONSUMPTION TOTAL: NEGATIVE

## 2024-10-30 ASSESSMENT — PATIENT HEALTH QUESTIONNAIRE - PHQ9
SUM OF ALL RESPONSES TO PHQ9 QUESTIONS 1 AND 2: 0
2. FEELING DOWN, DEPRESSED, IRRITABLE, OR HOPELESS: NOT AT ALL
1. LITTLE INTEREST OR PLEASURE IN DOING THINGS: NOT AT ALL

## 2024-10-30 ASSESSMENT — FIBROSIS 4 INDEX
FIB4 SCORE: 1.24
FIB4 SCORE: 1.11

## 2024-10-30 ASSESSMENT — PAIN DESCRIPTION - PAIN TYPE
TYPE: ACUTE PAIN

## 2024-10-31 ENCOUNTER — APPOINTMENT (OUTPATIENT)
Dept: RADIOLOGY | Facility: MEDICAL CENTER | Age: 59
End: 2024-10-31
Attending: STUDENT IN AN ORGANIZED HEALTH CARE EDUCATION/TRAINING PROGRAM
Payer: MEDICARE

## 2024-10-31 VITALS
BODY MASS INDEX: 23.18 KG/M2 | HEIGHT: 71 IN | RESPIRATION RATE: 16 BRPM | OXYGEN SATURATION: 91 % | WEIGHT: 165.57 LBS | SYSTOLIC BLOOD PRESSURE: 107 MMHG | DIASTOLIC BLOOD PRESSURE: 69 MMHG | TEMPERATURE: 97.5 F | HEART RATE: 88 BPM

## 2024-10-31 LAB
ALBUMIN SERPL BCP-MCNC: 4.1 G/DL (ref 3.2–4.9)
ALBUMIN/GLOB SERPL: 1.4 G/DL
ALP SERPL-CCNC: 88 U/L (ref 30–99)
ALT SERPL-CCNC: 39 U/L (ref 2–50)
ANION GAP SERPL CALC-SCNC: 20 MMOL/L (ref 7–16)
AST SERPL-CCNC: 37 U/L (ref 12–45)
BILIRUB SERPL-MCNC: 3 MG/DL (ref 0.1–1.5)
BUN SERPL-MCNC: 19 MG/DL (ref 8–22)
CALCIUM ALBUM COR SERPL-MCNC: 9.1 MG/DL (ref 8.5–10.5)
CALCIUM SERPL-MCNC: 9.2 MG/DL (ref 8.5–10.5)
CHLORIDE SERPL-SCNC: 99 MMOL/L (ref 96–112)
CO2 SERPL-SCNC: 15 MMOL/L (ref 20–33)
CREAT SERPL-MCNC: 0.72 MG/DL (ref 0.5–1.4)
ERYTHROCYTE [DISTWIDTH] IN BLOOD BY AUTOMATED COUNT: 46.2 FL (ref 35.9–50)
GFR SERPLBLD CREATININE-BSD FMLA CKD-EPI: 104 ML/MIN/1.73 M 2
GLOBULIN SER CALC-MCNC: 2.9 G/DL (ref 1.9–3.5)
GLUCOSE SERPL-MCNC: 188 MG/DL (ref 65–99)
HCT VFR BLD AUTO: 47.5 % (ref 42–52)
HGB BLD-MCNC: 16.5 G/DL (ref 14–18)
MCH RBC QN AUTO: 32.5 PG (ref 27–33)
MCHC RBC AUTO-ENTMCNC: 34.7 G/DL (ref 32.3–36.5)
MCV RBC AUTO: 93.7 FL (ref 81.4–97.8)
PLATELET # BLD AUTO: 234 K/UL (ref 164–446)
PMV BLD AUTO: 10 FL (ref 9–12.9)
POTASSIUM SERPL-SCNC: 4.3 MMOL/L (ref 3.6–5.5)
PROT SERPL-MCNC: 7 G/DL (ref 6–8.2)
RBC # BLD AUTO: 5.07 M/UL (ref 4.7–6.1)
SODIUM SERPL-SCNC: 134 MMOL/L (ref 135–145)
TROPONIN T SERPL-MCNC: 11 NG/L (ref 6–19)
WBC # BLD AUTO: 13.9 K/UL (ref 4.8–10.8)

## 2024-10-31 PROCEDURE — 80053 COMPREHEN METABOLIC PANEL: CPT

## 2024-10-31 PROCEDURE — 36415 COLL VENOUS BLD VENIPUNCTURE: CPT

## 2024-10-31 PROCEDURE — 700102 HCHG RX REV CODE 250 W/ 637 OVERRIDE(OP): Mod: UD | Performed by: HOSPITALIST

## 2024-10-31 PROCEDURE — 99239 HOSP IP/OBS DSCHRG MGMT >30: CPT | Performed by: STUDENT IN AN ORGANIZED HEALTH CARE EDUCATION/TRAINING PROGRAM

## 2024-10-31 PROCEDURE — G0378 HOSPITAL OBSERVATION PER HR: HCPCS

## 2024-10-31 PROCEDURE — 71275 CT ANGIOGRAPHY CHEST: CPT

## 2024-10-31 PROCEDURE — 85027 COMPLETE CBC AUTOMATED: CPT

## 2024-10-31 PROCEDURE — 700117 HCHG RX CONTRAST REV CODE 255: Mod: UD | Performed by: STUDENT IN AN ORGANIZED HEALTH CARE EDUCATION/TRAINING PROGRAM

## 2024-10-31 PROCEDURE — 84484 ASSAY OF TROPONIN QUANT: CPT

## 2024-10-31 PROCEDURE — A9270 NON-COVERED ITEM OR SERVICE: HCPCS | Mod: UD | Performed by: HOSPITALIST

## 2024-10-31 RX ADMIN — DAPAGLIFLOZIN 10 MG: 10 TABLET, FILM COATED ORAL at 05:08

## 2024-10-31 RX ADMIN — ASPIRIN 81 MG: 81 TABLET, COATED ORAL at 05:08

## 2024-10-31 RX ADMIN — LISINOPRIL 2.5 MG: 2.5 TABLET ORAL at 05:08

## 2024-10-31 RX ADMIN — ACETAMINOPHEN 650 MG: 500 TABLET ORAL at 11:54

## 2024-10-31 RX ADMIN — IOHEXOL 40 ML: 350 INJECTION, SOLUTION INTRAVENOUS at 08:56

## 2024-10-31 RX ADMIN — OMEPRAZOLE 40 MG: 20 CAPSULE, DELAYED RELEASE ORAL at 05:08

## 2024-10-31 RX ADMIN — METOPROLOL SUCCINATE 25 MG: 25 TABLET, EXTENDED RELEASE ORAL at 05:08

## 2024-10-31 ASSESSMENT — PAIN DESCRIPTION - PAIN TYPE
TYPE: ACUTE PAIN

## (undated) DEVICE — CONTAINER, SPECIMEN, STERILE

## (undated) DEVICE — SOD. CHL 10CC SYRINGE PREFILL - W/10 CC (30/BX)

## (undated) DEVICE — TUBE CONNECTING SUCTION - CLEAR PLASTIC STERILE 72 IN (50EA/CA)

## (undated) DEVICE — FILM CASSETTE ENDO

## (undated) DEVICE — BITE BLOCK ADULT 60FR (100EA/CA)

## (undated) DEVICE — SYRINGE 3 CC 22 GA X 1-1/2 - NDL SAFETY (50/BX 8BX/CA)

## (undated) DEVICE — SYRINGE 6 CC 20 GA X 1 1/2 - NDL SAFETY  (50/BX)

## (undated) DEVICE — ELECTRODE 850 FOAM ADHESIVE - HYDROGEL RADIOTRNSPRNT (50/PK)

## (undated) DEVICE — SYRINGE DISP. 50CC LS - (40/BX)

## (undated) DEVICE — FORCEP RADIAL JAW 4 STANDARD CAPACITY W/NEEDLE 240CM (40EA/BX)

## (undated) DEVICE — CON SEDATION EA ADDL 15 MIN

## (undated) DEVICE — CANISTER SUCTION RIGID RED 1500CC (40EA/CA)

## (undated) DEVICE — KIT CUSTOM PROCEDURE SINGLE FOR ENDO  (15/CA)

## (undated) DEVICE — CON SEDATION/>5 YR 1ST 15 MIN

## (undated) DEVICE — MASK WITH FACE SHIELD (25/BX 4BX/CA)